# Patient Record
Sex: FEMALE | Race: WHITE | Employment: OTHER | URBAN - NONMETROPOLITAN AREA
[De-identification: names, ages, dates, MRNs, and addresses within clinical notes are randomized per-mention and may not be internally consistent; named-entity substitution may affect disease eponyms.]

---

## 2020-07-31 ENCOUNTER — HOSPITAL ENCOUNTER (EMERGENCY)
Age: 60
Discharge: HOME | End: 2020-07-31
Attending: EMERGENCY MEDICINE
Payer: MEDICARE

## 2020-07-31 ENCOUNTER — APPOINTMENT (OUTPATIENT)
Dept: CT IMAGING | Age: 60
End: 2020-07-31
Payer: MEDICARE

## 2020-07-31 VITALS
OXYGEN SATURATION: 99 % | HEIGHT: 65 IN | TEMPERATURE: 98.8 F | WEIGHT: 155 LBS | DIASTOLIC BLOOD PRESSURE: 73 MMHG | BODY MASS INDEX: 25.83 KG/M2 | SYSTOLIC BLOOD PRESSURE: 135 MMHG | RESPIRATION RATE: 14 BRPM | HEART RATE: 54 BPM

## 2020-07-31 LAB
ALBUMIN SERPL-MCNC: 4 G/DL (ref 3.5–5.1)
ALP BLD-CCNC: 68 U/L (ref 38–126)
ALT SERPL-CCNC: 13 U/L (ref 11–66)
ANION GAP SERPL CALCULATED.3IONS-SCNC: 10 MEQ/L (ref 8–16)
AST SERPL-CCNC: 18 U/L (ref 5–40)
BASOPHILS # BLD: 1 %
BASOPHILS ABSOLUTE: 0.1 THOU/MM3 (ref 0–0.1)
BILIRUB SERPL-MCNC: 0.2 MG/DL (ref 0.3–1.2)
BILIRUBIN DIRECT: < 0.2 MG/DL (ref 0–0.3)
BUN BLDV-MCNC: 11 MG/DL (ref 7–22)
CALCIUM SERPL-MCNC: 9 MG/DL (ref 8.5–10.5)
CHLORIDE BLD-SCNC: 102 MEQ/L (ref 98–111)
CO2: 26 MEQ/L (ref 23–33)
CREAT SERPL-MCNC: 0.7 MG/DL (ref 0.4–1.2)
EKG ATRIAL RATE: 65 BPM
EKG P AXIS: 61 DEGREES
EKG P-R INTERVAL: 172 MS
EKG Q-T INTERVAL: 426 MS
EKG QRS DURATION: 82 MS
EKG QTC CALCULATION (BAZETT): 443 MS
EKG R AXIS: 2 DEGREES
EKG T AXIS: 66 DEGREES
EKG VENTRICULAR RATE: 65 BPM
EOSINOPHIL # BLD: 1.2 %
EOSINOPHILS ABSOLUTE: 0.1 THOU/MM3 (ref 0–0.4)
ERYTHROCYTE [DISTWIDTH] IN BLOOD BY AUTOMATED COUNT: 12.7 % (ref 11.5–14.5)
ERYTHROCYTE [DISTWIDTH] IN BLOOD BY AUTOMATED COUNT: 41.7 FL (ref 35–45)
GFR SERPL CREATININE-BSD FRML MDRD: 86 ML/MIN/1.73M2
GLUCOSE BLD-MCNC: 98 MG/DL (ref 70–108)
HCT VFR BLD CALC: 37.9 % (ref 37–47)
HEMOGLOBIN: 12.4 GM/DL (ref 12–16)
IMMATURE GRANS (ABS): 0.01 THOU/MM3 (ref 0–0.07)
IMMATURE GRANULOCYTES: 0.2 %
LIPASE: 29.4 U/L (ref 5.6–51.3)
LYMPHOCYTES # BLD: 28.1 %
LYMPHOCYTES ABSOLUTE: 1.6 THOU/MM3 (ref 1–4.8)
MAGNESIUM: 1.9 MG/DL (ref 1.6–2.4)
MCH RBC QN AUTO: 29.9 PG (ref 26–33)
MCHC RBC AUTO-ENTMCNC: 32.7 GM/DL (ref 32.2–35.5)
MCV RBC AUTO: 91.3 FL (ref 81–99)
MONOCYTES # BLD: 5.3 %
MONOCYTES ABSOLUTE: 0.3 THOU/MM3 (ref 0.4–1.3)
NUCLEATED RED BLOOD CELLS: 0 /100 WBC
OSMOLALITY CALCULATION: 275.1 MOSMOL/KG (ref 275–300)
PLATELET # BLD: 254 THOU/MM3 (ref 130–400)
PMV BLD AUTO: 10.7 FL (ref 9.4–12.4)
POTASSIUM SERPL-SCNC: 3.5 MEQ/L (ref 3.5–5.2)
RBC # BLD: 4.15 MILL/MM3 (ref 4.2–5.4)
SEG NEUTROPHILS: 64.2 %
SEGMENTED NEUTROPHILS ABSOLUTE COUNT: 3.7 THOU/MM3 (ref 1.8–7.7)
SODIUM BLD-SCNC: 138 MEQ/L (ref 135–145)
TOTAL PROTEIN: 6.2 G/DL (ref 6.1–8)
TROPONIN T: < 0.01 NG/ML
WBC # BLD: 5.8 THOU/MM3 (ref 4.8–10.8)

## 2020-07-31 PROCEDURE — 85025 COMPLETE CBC W/AUTO DIFF WBC: CPT

## 2020-07-31 PROCEDURE — 82248 BILIRUBIN DIRECT: CPT

## 2020-07-31 PROCEDURE — 83690 ASSAY OF LIPASE: CPT

## 2020-07-31 PROCEDURE — 96375 TX/PRO/DX INJ NEW DRUG ADDON: CPT

## 2020-07-31 PROCEDURE — 6360000002 HC RX W HCPCS: Performed by: EMERGENCY MEDICINE

## 2020-07-31 PROCEDURE — 80053 COMPREHEN METABOLIC PANEL: CPT

## 2020-07-31 PROCEDURE — 96374 THER/PROPH/DIAG INJ IV PUSH: CPT

## 2020-07-31 PROCEDURE — 6360000004 HC RX CONTRAST MEDICATION: Performed by: EMERGENCY MEDICINE

## 2020-07-31 PROCEDURE — 6370000000 HC RX 637 (ALT 250 FOR IP)

## 2020-07-31 PROCEDURE — 93005 ELECTROCARDIOGRAM TRACING: CPT | Performed by: EMERGENCY MEDICINE

## 2020-07-31 PROCEDURE — 74177 CT ABD & PELVIS W/CONTRAST: CPT

## 2020-07-31 PROCEDURE — 84484 ASSAY OF TROPONIN QUANT: CPT

## 2020-07-31 PROCEDURE — 99283 EMERGENCY DEPT VISIT LOW MDM: CPT

## 2020-07-31 PROCEDURE — 83735 ASSAY OF MAGNESIUM: CPT

## 2020-07-31 PROCEDURE — 36415 COLL VENOUS BLD VENIPUNCTURE: CPT

## 2020-07-31 RX ORDER — BUTALBITAL, ACETAMINOPHEN AND CAFFEINE 50; 325; 40 MG/1; MG/1; MG/1
1 TABLET ORAL EVERY 4 HOURS PRN
COMMUNITY
End: 2021-06-09 | Stop reason: SDUPTHER

## 2020-07-31 RX ORDER — ATORVASTATIN CALCIUM 20 MG/1
40 TABLET, FILM COATED ORAL DAILY
COMMUNITY
End: 2021-04-15

## 2020-07-31 RX ORDER — MECLIZINE HCL 12.5 MG/1
TABLET ORAL
Qty: 24 TABLET | Refills: 0 | Status: SHIPPED | OUTPATIENT
Start: 2020-07-31 | End: 2021-03-10

## 2020-07-31 RX ORDER — BUPROPION HYDROCHLORIDE 300 MG/1
300 TABLET ORAL EVERY MORNING
COMMUNITY
End: 2021-02-17 | Stop reason: SDUPTHER

## 2020-07-31 RX ORDER — ONDANSETRON 2 MG/ML
4 INJECTION INTRAMUSCULAR; INTRAVENOUS ONCE
Status: COMPLETED | OUTPATIENT
Start: 2020-07-31 | End: 2020-07-31

## 2020-07-31 RX ORDER — TOPIRAMATE 100 MG/1
100 TABLET, FILM COATED ORAL 2 TIMES DAILY
COMMUNITY
End: 2021-01-20 | Stop reason: SDUPTHER

## 2020-07-31 RX ORDER — NITROGLYCERIN 0.6 MG/1
0.6 TABLET SUBLINGUAL EVERY 5 MIN PRN
COMMUNITY
End: 2021-09-07 | Stop reason: SDUPTHER

## 2020-07-31 RX ORDER — FENTANYL CITRATE 50 UG/ML
25 INJECTION, SOLUTION INTRAMUSCULAR; INTRAVENOUS ONCE
Status: COMPLETED | OUTPATIENT
Start: 2020-07-31 | End: 2020-07-31

## 2020-07-31 RX ORDER — PANTOPRAZOLE SODIUM 40 MG/1
40 TABLET, DELAYED RELEASE ORAL 2 TIMES DAILY
COMMUNITY
End: 2021-05-17 | Stop reason: SINTOL

## 2020-07-31 RX ORDER — LORAZEPAM 0.5 MG/1
0.5 TABLET ORAL EVERY 6 HOURS PRN
COMMUNITY
End: 2021-02-17 | Stop reason: SDUPTHER

## 2020-07-31 RX ORDER — TRAZODONE HYDROCHLORIDE 150 MG/1
225 TABLET ORAL NIGHTLY
COMMUNITY
End: 2021-02-17 | Stop reason: SDUPTHER

## 2020-07-31 RX ORDER — MECLIZINE HYDROCHLORIDE CHEWABLE TABLETS 25 MG/1
TABLET, CHEWABLE ORAL
Status: COMPLETED
Start: 2020-07-31 | End: 2020-07-31

## 2020-07-31 RX ORDER — HYDROXYCHLOROQUINE SULFATE 200 MG/1
200 TABLET, FILM COATED ORAL 2 TIMES DAILY
COMMUNITY

## 2020-07-31 RX ORDER — AMOXICILLIN AND CLAVULANATE POTASSIUM 875; 125 MG/1; MG/1
1 TABLET, FILM COATED ORAL 2 TIMES DAILY
Qty: 20 TABLET | Refills: 0 | Status: SHIPPED | OUTPATIENT
Start: 2020-07-31 | End: 2020-08-10

## 2020-07-31 RX ORDER — ONDANSETRON 4 MG/1
4 TABLET, FILM COATED ORAL 3 TIMES DAILY PRN
Qty: 15 TABLET | Refills: 0 | Status: SHIPPED | OUTPATIENT
Start: 2020-07-31 | End: 2021-10-15 | Stop reason: SDUPTHER

## 2020-07-31 RX ADMIN — ONDANSETRON 4 MG: 2 INJECTION INTRAMUSCULAR; INTRAVENOUS at 16:27

## 2020-07-31 RX ADMIN — IOPAMIDOL 80 ML: 755 INJECTION, SOLUTION INTRAVENOUS at 17:08

## 2020-07-31 RX ADMIN — FENTANYL CITRATE 25 MCG: 50 INJECTION, SOLUTION INTRAMUSCULAR; INTRAVENOUS at 16:27

## 2020-07-31 RX ADMIN — MECLIZINE HCL 25 MG: 25 TABLET, CHEWABLE ORAL at 17:12

## 2020-07-31 ASSESSMENT — PAIN DESCRIPTION - LOCATION: LOCATION: ABDOMEN

## 2020-07-31 ASSESSMENT — ENCOUNTER SYMPTOMS
BLOOD IN STOOL: 0
CHEST TIGHTNESS: 0
EYE PAIN: 0
NAUSEA: 1
ABDOMINAL PAIN: 1
SINUS PAIN: 0
RECTAL PAIN: 0
DIARRHEA: 1
VOMITING: 1
BACK PAIN: 0
SHORTNESS OF BREATH: 0
CONSTIPATION: 0
COUGH: 0
SINUS PRESSURE: 0

## 2020-07-31 ASSESSMENT — PAIN SCALES - GENERAL
PAINLEVEL_OUTOF10: 6
PAINLEVEL_OUTOF10: 5
PAINLEVEL_OUTOF10: 6

## 2020-07-31 ASSESSMENT — PAIN DESCRIPTION - ORIENTATION: ORIENTATION: LEFT;LOWER

## 2020-07-31 NOTE — ED PROVIDER NOTES
Attending Physician Note:    Patient was seen and manage initially by Dr. Noemi Hurtado. Patient was signed out to me because of pending CT scan    I personally saw and examined the patient. I have reviewed and agree with the Dr Noemi Hurtado findings, including all diagnostic interpretations and treatment plans as written. I was present for the key portion of any procedures performed and the inclusive time noted in any critical care statement. Patient presented to the emergency room due to abdominal pain mostly left-sided. Patient had a history of diverticulitis. Patient has been also having some dizziness especially when flexing the head and been vomiting. Patient had recent surgery for a sinus. Denies any fever, no chills, no shortness of breath or chest pain    Physical examination showed normocephalic tympanic membranes normal lungs clear to auscultation, heart regular rate and rhythm, abdomen is flat soft depressible tenderness mildly in the left lower quadrant back showed no CVA tenderness    Evaluation: Agree above , seen the patient and discussed the diagnosis and treatment plans     FINAL IMPRESSION       1. Left sided abdominal pain, clinical diverticulitis    2. Abdominal pain, unspecified abdominal location    3.  Non-intractable vomiting with nausea, unspecified vomiting type            DISPOSITION/PLAN  PATIENT REFERRED TO:  Family physician    Schedule an appointment as soon as possible for a visit in 3 days      [facility] [address] [address] EMERGENCY DEPT  [address]    As needed    DISCHARGE MEDICATIONS:  New Prescriptions    AMOXICILLIN-CLAVULANATE (AUGMENTIN) 875-125 MG PER TABLET    Take 1 tablet by mouth 2 times daily for 10 days    MECLIZINE (ANTIVERT) 12.5 MG TABLET    1 to 2 tablets every 6 hours for dizziness as needed    ONDANSETRON (ZOFRAN) 4 MG TABLET    Take 1 tablet by mouth 3 times daily as needed for Nausea or Vomiting       (Please note that portions of this note were completed with a voice recognition program and electronically transcribed. Efforts were [facility] edit the dictations but occasionally words are mis-transcribed . The transcription may contain errors not detected in proofreading.   This transcription was electronically signed.)      Mikel Moncada MD      Emergency room physician       Mikel Moncada MD  07/31/20 8212

## 2020-07-31 NOTE — ED NOTES
Pt called out stating that she is still having discomfort in her LLQ. Dr. Lashawn Medrano notified and pt would like to be updated.      Sharlot Rubinstein, RN  07/31/20 2466

## 2020-07-31 NOTE — ED NOTES
ED nurse-to-nurse bedside report    Chief Complaint   Patient presents with    Dizziness    Nausea      LOC: alert and orientated to name, place, date  Vital signs   Vitals:    07/31/20 1511 07/31/20 1633   BP: (!) 153/82 (!) 140/92   Pulse: 68 64   Resp: 18 19   Temp: 98.8 °F (37.1 °C)    TempSrc: Oral    SpO2: 100% 100%   Weight: 155 lb (70.3 kg)    Height: 5' 5\" (1.651 m)       Pain:    Pain Interventions: fentanyl 25 mcg  Pain Goal: 3  Oxygen: No    Current needs required none   Telemetry: Yes  LDAs:   Peripheral IV 07/31/20 Right Antecubital (Active)   Site Assessment Dry;Clean; Intact 07/31/20 1529   Line Status Blood return noted;Brisk blood return 07/31/20 1529   Dressing Status Clean;Dry; Intact 07/31/20 1529     Continuous Infusions:   Mobility: Independent  Real Fall Risk Score: No flowsheet data found.   Fall Interventions: none  Report given to: Luab Somers RN  07/31/20 9926

## 2020-07-31 NOTE — ED TRIAGE NOTES
Presents to ED via private car states that she hasn't felt well for a week complains of lower left abdominal pain, States that she has had some episodes of diarrhea. States that today she experienced dizziness along with nausea and thought that she was going to pass out.  Patient is alert and oriented to person place and time EKG completed and IV started

## 2020-07-31 NOTE — ED PROVIDER NOTES
history on file. MEDICATIONS   No current facility-administered medications for this encounter. Current Outpatient Medications:     buPROPion (WELLBUTRIN XL) 300 MG extended release tablet, Take 300 mg by mouth every morning, Disp: , Rfl:     pantoprazole (PROTONIX) 40 MG tablet, Take 40 mg by mouth daily, Disp: , Rfl:     hydroxychloroquine (PLAQUENIL) 200 MG tablet, Take 40 mg by mouth daily, Disp: , Rfl:     tiotropium (SPIRIVA) 18 MCG inhalation capsule, Inhale 18 mcg into the lungs daily, Disp: , Rfl:     Erenumab-aooe (AIMOVIG, 140 MG DOSE, SC), Inject 1 Dose into the skin Monthly injection, Disp: , Rfl:     traZODone (DESYREL) 150 MG tablet, Take 225 mg by mouth nightly, Disp: , Rfl:     topiramate (TOPAMAX) 100 MG tablet, Take 100 mg by mouth 2 times daily, Disp: , Rfl:     LORazepam (ATIVAN) 0.5 MG tablet, Take 0.5 mg by mouth every 6 hours as needed for Anxiety. , Disp: , Rfl:     butalbital-acetaminophen-caffeine (FIORICET, ESGIC) -40 MG per tablet, Take 1 tablet by mouth every 4 hours as needed for Headaches, Disp: , Rfl:     nitroglycerin (NITROSTAT) 0.6 MG SL tablet, Place 0.6 mg under the tongue every 5 minutes as needed for Chest pain up to max of 3 total doses. If no relief after 1 dose, call 911., Disp: , Rfl:     atorvastatin (LIPITOR) 20 MG tablet, Take 20 mg by mouth daily, Disp: , Rfl:       SOCIAL HISTORY     Social History     Social History Narrative    Not on file     Social History     Tobacco Use    Smoking status: Current Every Day Smoker     Packs/day: 0.50     Types: Cigarettes   Substance Use Topics    Alcohol use: Not on file    Drug use: Not on file         ALLERGIES     Allergies   Allergen Reactions    Morphine     Sulfa Antibiotics          FAMILY HISTORY   No family history on file. PREVIOUS RECORDS   Previous records reviewed: This is this patient's first visit to [facility] ED, no previous records available on EMR. Tonye Cogan        PHYSICAL EXAM     ED Triage Vitals [07/31/20 1511]   BP Temp Temp Source Pulse Resp SpO2 Height Weight   (!) 153/82 98.8 °F (37.1 °C) Oral 68 18 100 % 5' 5\" (1.651 m) 155 lb (70.3 kg)     Initial vital signs and nursing assessment reviewed and abnormal from Systolic hypertension. Pulsoximetry is normal per my interpretation. Additional Vital Signs:  Vitals:    07/31/20 1511   BP: (!) 153/82   Pulse: 68   Resp: 18   Temp: 98.8 °F (37.1 °C)   SpO2: 100%       Physical Exam  Vitals signs and nursing note reviewed. Constitutional:       General: She is not in acute distress. Appearance: Normal appearance. She is well-developed. HENT:      Head: Normocephalic and atraumatic. Right Ear: External ear normal.      Left Ear: External ear normal.      Nose: Nose normal.      Mouth/Throat:      Mouth: Mucous membranes are dry. Eyes:      Conjunctiva/sclera: Conjunctivae normal.      Pupils: Pupils are equal, round, and reactive to light. Neck:      Musculoskeletal: Neck supple. Vascular: No JVD. Cardiovascular:      Rate and Rhythm: Normal rate and regular rhythm. Heart sounds: Normal heart sounds. No murmur. No friction rub. No gallop. Pulmonary:      Effort: Pulmonary effort is normal.      Breath sounds: Normal breath sounds. No stridor. No wheezing or rales. Abdominal:      General: Abdomen is flat. There is no distension ([place] on RLQ). Palpations: Abdomen is soft. There is no mass. Tenderness: There is abdominal tenderness. There is no guarding or rebound. Musculoskeletal:      Right lower leg: No edema. Left lower leg: No edema. Skin:     General: Skin is warm and dry. Neurological:      Mental Status: She is alert and oriented to person, place, and time. Psychiatric:         Behavior: Behavior normal.             MEDICAL DECISION MAKING   Initial Assessment: Nausea, vomiting, diarrhea, possible diverticulitis, possible traveler's diarrhea.   Plan: IV line, labs, imaging, analgesia, antiemetic, observation. ED RESULTS   Laboratory results:  Labs Reviewed   CBC WITH AUTO DIFFERENTIAL - Abnormal; Notable for the following components:       Result Value    RBC 4.15 (*)     Monocytes Absolute 0.3 (*)     All other components within normal limits   HEPATIC FUNCTION PANEL - Abnormal; Notable for the following components: Total Bilirubin 0.2 (*)     All other components within normal limits   GLOMERULAR FILTRATION RATE, ESTIMATED - Abnormal; Notable for the following components:    Est, Glom Filt Rate 86 (*)     All other components within normal limits   BASIC METABOLIC PANEL   LIPASE   TROPONIN   MAGNESIUM   ANION GAP   OSMOLALITY       Radiologic studies results:  CT ABDOMEN PELVIS W IV CONTRAST Additional Contrast? None    (Results Pending)       ED Medications administered this visit:   Medications   fentaNYL (SUBLIMAZE) injection 25 mcg (25 mcg Intravenous Given 7/31/20 1627)   ondansetron (ZOFRAN) injection 4 mg (4 mg Intravenous Given 7/31/20 1627)   iopamidol (ISOVUE-370) 76 % injection 80 mL (80 mLs Intravenous Given 7/31/20 1708)   meclizine (ANTIVERT) 25 MG chewable tablet (25 mg  Given 7/31/20 1712)         MEDICATION CHANGES     New Prescriptions    No medications on file         FINAL DISPOSITION     Final diagnoses:   Abdominal pain, unspecified abdominal location   Non-intractable vomiting with nausea, unspecified vomiting type     Condition: condition: good  Dispo: Transfer of care to Dr. Vivienne Aggarwal, pending CT scan for reevaluation and disposition. This transcription was electronically signed. It was dictated by use of voice recognition software and electronically transcribed. The transcription may contain errors not detected in proofreading.         Beverly Stoddard MD  07/31/20 2368

## 2020-07-31 NOTE — ED NOTES
Pt back to room, had no problems or complaints. Placed back on monitor and revitaled.      Teri Olguin, EMT-P  07/31/20 0118

## 2020-09-16 ENCOUNTER — HOSPITAL ENCOUNTER (OUTPATIENT)
Age: 60
Setting detail: SPECIMEN
Discharge: HOME | End: 2020-09-16
Payer: MEDICARE

## 2020-09-16 LAB
ALBUMIN SERPL-MCNC: 4.6 G/DL (ref 3.5–5.2)
ALBUMIN/GLOBULIN RATIO: 2.4 (ref 1–2.5)
ALP BLD-CCNC: 82 U/L (ref 35–104)
ALT SERPL-CCNC: 15 U/L (ref 5–33)
ANION GAP SERPL CALCULATED.3IONS-SCNC: 14 MMOL/L (ref 9–17)
AST SERPL-CCNC: 17 U/L
BILIRUB SERPL-MCNC: 0.25 MG/DL (ref 0.3–1.2)
BUN BLDV-MCNC: 12 MG/DL (ref 6–20)
BUN/CREAT BLD: ABNORMAL (ref 9–20)
C-REACTIVE PROTEIN: 1.7 MG/L (ref 0–5)
CALCIUM SERPL-MCNC: 9.5 MG/DL (ref 8.6–10.4)
CHLORIDE BLD-SCNC: 106 MMOL/L (ref 98–107)
CHOLESTEROL/HDL RATIO: 4.4
CHOLESTEROL: 284 MG/DL
CO2: 21 MMOL/L (ref 20–31)
CREAT SERPL-MCNC: 0.72 MG/DL (ref 0.5–0.9)
GFR AFRICAN AMERICAN: >60 ML/MIN
GFR NON-AFRICAN AMERICAN: >60 ML/MIN
GFR SERPL CREATININE-BSD FRML MDRD: ABNORMAL ML/MIN/{1.73_M2}
GFR SERPL CREATININE-BSD FRML MDRD: ABNORMAL ML/MIN/{1.73_M2}
GLUCOSE BLD-MCNC: 85 MG/DL (ref 70–99)
HDLC SERPL-MCNC: 64 MG/DL
HEPATITIS C ANTIBODY: NONREACTIVE
HIGH SENSITIVE C-REACTIVE PROTEIN: 1.4 MG/L
LDL CHOLESTEROL: 193 MG/DL (ref 0–130)
POTASSIUM SERPL-SCNC: 3.9 MMOL/L (ref 3.7–5.3)
RHEUMATOID FACTOR: <10 IU/ML
SODIUM BLD-SCNC: 141 MMOL/L (ref 135–144)
TOTAL PROTEIN: 6.5 G/DL (ref 6.4–8.3)
TRIGL SERPL-MCNC: 135 MG/DL
TSH SERPL DL<=0.05 MIU/L-ACNC: 0.9 MIU/L (ref 0.3–5)
URIC ACID: 4.2 MG/DL (ref 2.4–5.7)
VLDLC SERPL CALC-MCNC: ABNORMAL MG/DL (ref 1–30)

## 2020-09-17 LAB
ANTI-NUCLEAR ANTIBODY (ANA): NEGATIVE
CCP IGG ANTIBODIES: <1.5 U/ML

## 2020-09-22 ENCOUNTER — HOSPITAL ENCOUNTER (OUTPATIENT)
Age: 60
Discharge: HOME | End: 2020-09-22
Payer: MEDICARE

## 2020-09-22 ENCOUNTER — HOSPITAL ENCOUNTER (OUTPATIENT)
Dept: GENERAL RADIOLOGY | Age: 60
Discharge: HOME | End: 2020-09-22
Payer: MEDICARE

## 2020-09-22 ENCOUNTER — HOSPITAL ENCOUNTER (OUTPATIENT)
Age: 60
Setting detail: SPECIMEN
Discharge: HOME | End: 2020-09-22
Payer: COMMERCIAL

## 2020-09-22 LAB
ABSOLUTE EOS #: 0.08 K/UL (ref 0–0.44)
ABSOLUTE IMMATURE GRANULOCYTE: <0.03 K/UL (ref 0–0.3)
ABSOLUTE LYMPH #: 2.38 K/UL (ref 1.1–3.7)
ABSOLUTE MONO #: 0.35 K/UL (ref 0.1–1.2)
BASOPHILS # BLD: 1 % (ref 0–2)
BASOPHILS ABSOLUTE: 0.07 K/UL (ref 0–0.2)
DIFFERENTIAL TYPE: NORMAL
EOSINOPHILS RELATIVE PERCENT: 1 % (ref 1–4)
HCT VFR BLD CALC: 38.5 % (ref 36.3–47.1)
HEMOGLOBIN: 12.2 G/DL (ref 11.9–15.1)
IMMATURE GRANULOCYTES: 0 %
LYMPHOCYTES # BLD: 34 % (ref 24–43)
MCH RBC QN AUTO: 29 PG (ref 25.2–33.5)
MCHC RBC AUTO-ENTMCNC: 31.7 G/DL (ref 28.4–34.8)
MCV RBC AUTO: 91.4 FL (ref 82.6–102.9)
MONOCYTES # BLD: 5 % (ref 3–12)
NRBC AUTOMATED: 0 PER 100 WBC
PDW BLD-RTO: 12.7 % (ref 11.8–14.4)
PLATELET # BLD: 284 K/UL (ref 138–453)
PLATELET ESTIMATE: NORMAL
PMV BLD AUTO: 12.6 FL (ref 8.1–13.5)
RBC # BLD: 4.21 M/UL (ref 3.95–5.11)
RBC # BLD: NORMAL 10*6/UL
SEDIMENTATION RATE, ERYTHROCYTE: 9 MM (ref 0–30)
SEG NEUTROPHILS: 59 % (ref 36–65)
SEGMENTED NEUTROPHILS ABSOLUTE COUNT: 4.06 K/UL (ref 1.5–8.1)
WBC # BLD: 7 K/UL (ref 3.5–11.3)
WBC # BLD: NORMAL 10*3/UL

## 2020-09-22 PROCEDURE — 72100 X-RAY EXAM L-S SPINE 2/3 VWS: CPT

## 2020-09-22 PROCEDURE — 73562 X-RAY EXAM OF KNEE 3: CPT

## 2020-09-22 PROCEDURE — 72040 X-RAY EXAM NECK SPINE 2-3 VW: CPT

## 2020-09-22 PROCEDURE — 72072 X-RAY EXAM THORAC SPINE 3VWS: CPT

## 2020-09-22 PROCEDURE — 73030 X-RAY EXAM OF SHOULDER: CPT

## 2020-09-29 ENCOUNTER — HOSPITAL ENCOUNTER (OUTPATIENT)
Dept: WOMENS IMAGING | Age: 60
Discharge: HOME | End: 2020-09-29

## 2020-10-05 ENCOUNTER — HOSPITAL ENCOUNTER (OUTPATIENT)
Dept: CT IMAGING | Age: 60
Discharge: HOME | End: 2020-10-05
Payer: MEDICARE

## 2020-10-05 ENCOUNTER — HOSPITAL ENCOUNTER (OUTPATIENT)
Dept: MRI IMAGING | Age: 60
Discharge: HOME | End: 2020-10-05
Payer: MEDICARE

## 2020-10-05 ENCOUNTER — HOSPITAL ENCOUNTER (OUTPATIENT)
Dept: MRI IMAGING | Age: 60
Discharge: HOME | End: 2020-10-05

## 2020-10-05 ENCOUNTER — HOSPITAL ENCOUNTER (OUTPATIENT)
Dept: CT IMAGING | Age: 60
Discharge: HOME | End: 2020-10-05

## 2020-10-05 PROCEDURE — 70551 MRI BRAIN STEM W/O DYE: CPT

## 2020-10-05 PROCEDURE — 72125 CT NECK SPINE W/O DYE: CPT

## 2020-10-05 PROCEDURE — 3209999900 MRI COMPARISON OF OUTSIDE FILMS

## 2020-10-05 PROCEDURE — 3209999900 CT COMPARISON OF OUTSIDE FILMS

## 2020-10-07 ENCOUNTER — HOSPITAL ENCOUNTER (OUTPATIENT)
Dept: GENERAL RADIOLOGY | Age: 60
Discharge: HOME | End: 2020-10-07
Payer: MEDICARE

## 2020-10-07 ENCOUNTER — HOSPITAL ENCOUNTER (OUTPATIENT)
Age: 60
Discharge: HOME | End: 2020-10-07
Payer: MEDICARE

## 2020-10-07 PROCEDURE — 71046 X-RAY EXAM CHEST 2 VIEWS: CPT

## 2020-10-12 ENCOUNTER — HOSPITAL ENCOUNTER (OUTPATIENT)
Dept: WOMENS IMAGING | Age: 60
Discharge: HOME | End: 2020-10-12
Payer: MEDICARE

## 2020-10-12 PROCEDURE — 76642 ULTRASOUND BREAST LIMITED: CPT

## 2020-10-12 PROCEDURE — 77066 DX MAMMO INCL CAD BI: CPT

## 2020-11-22 ENCOUNTER — HOSPITAL ENCOUNTER (EMERGENCY)
Age: 60
Discharge: HOME | End: 2020-11-22
Payer: MEDICARE

## 2020-11-22 VITALS
WEIGHT: 160 LBS | OXYGEN SATURATION: 99 % | HEIGHT: 66 IN | TEMPERATURE: 97.5 F | RESPIRATION RATE: 16 BRPM | SYSTOLIC BLOOD PRESSURE: 134 MMHG | BODY MASS INDEX: 25.71 KG/M2 | HEART RATE: 68 BPM | DIASTOLIC BLOOD PRESSURE: 63 MMHG

## 2020-11-22 LAB
BILIRUBIN URINE: NEGATIVE
BLOOD, URINE: NEGATIVE
CHARACTER, URINE: CLEAR
COLOR: YELLOW
GLUCOSE URINE: NEGATIVE MG/DL
KETONES, URINE: NEGATIVE
LEUKOCYTE ESTERASE, URINE: ABNORMAL
NITRITE, URINE: NEGATIVE
PH UA: 5.5 (ref 5–9)
PROTEIN UA: NEGATIVE MG/DL
SPECIFIC GRAVITY UA: 1.01 (ref 1–1.03)
UROBILINOGEN, URINE: 0.2 EU/DL (ref 0.2–1)

## 2020-11-22 PROCEDURE — 81003 URINALYSIS AUTO W/O SCOPE: CPT

## 2020-11-22 PROCEDURE — 99213 OFFICE O/P EST LOW 20 MIN: CPT

## 2020-11-22 PROCEDURE — 99214 OFFICE O/P EST MOD 30 MIN: CPT | Performed by: NURSE PRACTITIONER

## 2020-11-22 PROCEDURE — 87086 URINE CULTURE/COLONY COUNT: CPT

## 2020-11-22 RX ORDER — NITROFURANTOIN 25; 75 MG/1; MG/1
100 CAPSULE ORAL 2 TIMES DAILY
Qty: 10 CAPSULE | Refills: 0 | Status: SHIPPED | OUTPATIENT
Start: 2020-11-22 | End: 2020-11-27

## 2020-11-22 ASSESSMENT — PAIN DESCRIPTION - DESCRIPTORS: DESCRIPTORS: ACHING

## 2020-11-22 ASSESSMENT — ENCOUNTER SYMPTOMS
EYE ITCHING: 0
ABDOMINAL PAIN: 0
COUGH: 0
VOMITING: 0
EYE REDNESS: 0
SHORTNESS OF BREATH: 0
DIARRHEA: 0
BACK PAIN: 1
NAUSEA: 0

## 2020-11-22 ASSESSMENT — PAIN DESCRIPTION - ORIENTATION: ORIENTATION: LOWER

## 2020-11-22 ASSESSMENT — PAIN DESCRIPTION - LOCATION: LOCATION: BACK

## 2020-11-22 NOTE — ED NOTES
Presents with c/o burning with urination, low back pain, foul smelling urine x 4 days.      Dewey Garcia RN  11/22/20 4396

## 2020-11-22 NOTE — ED PROVIDER NOTES
[id number] Encounter      CHIEFCOMPLAINT       Chief Complaint   Patient presents with    Urinary Tract Infection       Nurses Notes reviewed and I agree except as noted in the HPI. HISTORY OF PRESENT ILLNESS   Ranjan Leonardo is a 62 y.o. female who presents for evaluation of UTI that started 4 days ago. REVIEW OF SYSTEMS     Review of Systems   Constitutional: Positive for chills. Negative for fatigue and fever. Eyes: Negative for redness and itching. Respiratory: Negative for cough and shortness of breath. Cardiovascular: Negative for chest pain. Gastrointestinal: Negative for abdominal pain, diarrhea, nausea and vomiting. Genitourinary: Positive for dysuria (feels like peeing out razor blades), frequency, hematuria and urgency. Musculoskeletal: Positive for back pain (chronic) and neck pain (chronic awaiting surgery). Negative for joint swelling and myalgias. Skin: Negative for rash. Allergic/Immunologic: Negative for environmental allergies and food allergies. Neurological: Negative for dizziness and headaches. Hematological: Negative for adenopathy. PAST MEDICAL HISTORY         Diagnosis Date    Adrenal abnormality ([facility] [address]     [facility])     Arthritis     Asthma     Cerebral artery occlusion with cerebral infarction ([facility] [address]     Headache     Hyperlipidemia        SURGICAL HISTORY     Patient  has a past surgical history that includes Hysterectomy; Tubal ligation; shoulder surgery; Tonsillectomy; sinus surgery; and Breast biopsy.     CURRENT MEDICATIONS       Discharge Medication List as of 11/22/2020  3:22 PM      CONTINUE these medications which have NOT CHANGED    Details   Rotigotine (NEUPRO TD) Place onto the skinHistorical Med      buPROPion (WELLBUTRIN XL) 300 MG extended release tablet Take 300 mg by mouth every morningHistorical Med      pantoprazole (PROTONIX) 40 MG tablet Take 40 mg by mouth dailyHistorical Med hydroxychloroquine (PLAQUENIL) 200 MG tablet Take 40 mg by mouth dailyHistorical Med      tiotropium (SPIRIVA) 18 MCG inhalation capsule Inhale 18 mcg into the lungs dailyHistorical Med      Erenumab-aooe (AIMOVIG, 140 MG DOSE, SC) Inject 1 Dose into the skin Monthly injectionHistorical Med      traZODone (DESYREL) 150 MG tablet Take 225 mg by mouth nightlyHistorical Med      topiramate (TOPAMAX) 100 MG tablet Take 100 mg by mouth 2 times dailyHistorical Med      LORazepam (ATIVAN) 0.5 MG tablet Take 0.5 mg by mouth every 6 hours as needed for Anxiety. Historical Med      butalbital-acetaminophen-caffeine (FIORICET, ESGIC) -40 MG per tablet Take 1 tablet by mouth every 4 hours as needed for HeadachesHistorical Med      nitroglycerin (NITROSTAT) 0.6 MG SL tablet Place 0.6 mg under the tongue every 5 minutes as needed for Chest pain up to max of 3 total doses. If no relief after 1 dose, call 911. Historical Med      atorvastatin (LIPITOR) 20 MG tablet Take 20 mg by mouth dailyHistorical Med      ondansetron (ZOFRAN) 4 MG tablet Take 1 tablet by mouth 3 times daily as needed for Nausea or Vomiting, Disp-15 tablet,R-0Print      meclizine (ANTIVERT) 12.5 MG tablet 1 to 2 tablets every 6 hours for dizziness as needed, Disp-24 tablet,R-0Print             ALLERGIES     Patient is is allergic to amoxil [amoxicillin]; doxycycline; morphine; and sulfa antibiotics. FAMILY HISTORY     Patient'sfamily history is not on file. SOCIAL HISTORY     Patient  reports that she has been smoking cigarettes. She has never used smokeless tobacco. She reports current alcohol use. She reports that she does not use drugs. PHYSICAL EXAM     ED TRIAGE VITALS  BP: 134/63, Temp: 97.5 °F (36.4 °C), Pulse: 68, Resp: 16, SpO2: 99 %  Physical Exam  Vitals signs and nursing note reviewed. Constitutional:       General: She is not in acute distress. Appearance: Normal appearance. She is well-developed.    HENT:      Head:

## 2020-11-24 LAB
ORGANISM: ABNORMAL
URINE CULTURE, ROUTINE: ABNORMAL

## 2021-01-18 ENCOUNTER — HOSPITAL ENCOUNTER (OUTPATIENT)
Age: 61
Setting detail: SPECIMEN
Discharge: HOME | End: 2021-01-18
Payer: MEDICARE

## 2021-01-19 LAB
CULTURE: NORMAL
Lab: NORMAL
SPECIMEN DESCRIPTION: NORMAL

## 2021-01-20 ENCOUNTER — VIRTUAL VISIT (OUTPATIENT)
Dept: NEUROLOGY | Age: 61
End: 2021-01-20
Payer: MEDICARE

## 2021-01-20 DIAGNOSIS — G43.109 MIGRAINE WITH AURA AND WITHOUT STATUS MIGRAINOSUS, NOT INTRACTABLE: Primary | ICD-10-CM

## 2021-01-20 PROCEDURE — G8428 CUR MEDS NOT DOCUMENT: HCPCS | Performed by: PSYCHIATRY & NEUROLOGY

## 2021-01-20 PROCEDURE — 3017F COLORECTAL CA SCREEN DOC REV: CPT | Performed by: PSYCHIATRY & NEUROLOGY

## 2021-01-20 PROCEDURE — 99204 OFFICE O/P NEW MOD 45 MIN: CPT | Performed by: PSYCHIATRY & NEUROLOGY

## 2021-01-20 RX ORDER — TOPIRAMATE 100 MG/1
100 TABLET, FILM COATED ORAL 2 TIMES DAILY
Qty: 60 TABLET | Refills: 3 | Status: SHIPPED | OUTPATIENT
Start: 2021-01-20 | End: 2021-05-04

## 2021-01-20 RX ORDER — ERENUMAB-AOOE 140 MG/ML
140 INJECTION, SOLUTION SUBCUTANEOUS
Qty: 1 PEN | Refills: 3 | Status: SHIPPED | OUTPATIENT
Start: 2021-01-20 | End: 2021-06-09 | Stop reason: SDUPTHER

## 2021-01-20 NOTE — PATIENT INSTRUCTIONS
1. Continue with Aimovig 140 mg/ml injection monthly. Refills given  2. Continue with Topamax at current dose. Refills given   3. Referral to PM&R  for Botox injections  4. Keep headache diary  5. Follow up in 5 months or sooner if needed. 6. Call if any questions or concerns.

## 2021-01-26 NOTE — PROGRESS NOTES
2021    TELEHEALTH EVALUATION -- Audio/Visual (During MIINT-00 public health emergency)    HPI:    Mary Pang (:  1960) has requested an audio/video evaluation for the following concern(s): Headache for for the past 35 years. She recently moved here from [place] and is establishing local care. Location of her pain is starts in her neck and radiates behind her bilateral eyes. Symptoms are severe and constant. Onset is sudden. Modifiers are unknown. She is nauseated and sensitive to light and sound. Frequency is 6-8 headaches a month. Duration of headache can last up to 1 week. She has also had complicated migraine where she can have right sided weakness with severe headaches. She is on aimovig 140 mg monthly as well as Topamax. She has tried fioricet, [place] which have helped some. She has also tired over the counter medications, noretriptyline. Her sleep is poor and interrupted, she wakes up feeling tired from sleep. She does not take daytime naps. She has been told she snores. Family history is significant for headache in her son. She denies any history of head injury with loss of consciousness. MRI brain done 10/5/2020 showed Minimal signal hyperintensity foci in the white matter the brain suggesting chronic small vessel ischemic changes. These foci are stable. No acute findings. History provided by patient. Review of Systems  All systems were reviewed and were negative other than what is mentioned in the HPI    Prior to Visit Medications    Medication Sig Taking?  Authorizing Provider   Rotigotine (NEUPRO TD) Place onto the skin  Historical Provider, MD   buPROPion (WELLBUTRIN XL) 300 MG extended release tablet Take 300 mg by mouth every morning  Historical Provider, MD   pantoprazole (PROTONIX) 40 MG tablet Take 40 mg by mouth daily  Historical Provider, MD   hydroxychloroquine (PLAQUENIL) 200 MG tablet Take 40 mg by mouth daily  Historical Provider, MD   tiotropium (SPIRIVA) 18 MCG inhalation capsule Inhale 18 mcg into the lungs daily  Historical Provider, MD   Erenumab-aooe (AIMOVIG, 140 MG DOSE, SC) Inject 1 Dose into the skin Monthly injection  Historical Provider, MD   traZODone (DESYREL) 150 MG tablet Take 225 mg by mouth nightly  Historical Provider, MD   topiramate (TOPAMAX) 100 MG tablet Take 100 mg by mouth 2 times daily  Historical Provider, MD   LORazepam (ATIVAN) 0.5 MG tablet Take 0.5 mg by mouth every 6 hours as needed for Anxiety. Historical Provider, MD   butalbital-acetaminophen-caffeine (FIORICET, ESGIC) -40 MG per tablet Take 1 tablet by mouth every 4 hours as needed for Headaches  Historical Provider, MD   nitroglycerin (NITROSTAT) 0.6 MG SL tablet Place 0.6 mg under the tongue every 5 minutes as needed for Chest pain up to max of 3 total doses. If no relief after 1 dose, call 911.   Historical Provider, MD   atorvastatin (LIPITOR) 20 MG tablet Take 20 mg by mouth daily  Historical Provider, MD   ondansetron (ZOFRAN) 4 MG tablet Take 1 tablet by mouth 3 times daily as needed for Nausea or Vomiting  Miracle Wang MD   meclizine (ANTIVERT) 12.5 MG tablet 1 to 2 tablets every 6 hours for dizziness as needed  Miracle Lrr, MD       Social History     Tobacco Use    Smoking status: Current Some Day Smoker     Types: Cigarettes    Smokeless tobacco: Never Used   Substance Use Topics    Alcohol use: Yes     Comment: occasionally    Drug use: Never        Past Medical History:   Diagnosis Date    Ad[facility]al abnormality ([facility] [address]     [facility])     Arthritis     Asthma     Cerebral artery occlusion with cerebral infarction ([facility] [address]     Headache     Hyperlipidemia    ,   Past Surgical History:   Procedure Laterality Date    BREAST BIOPSY      HYSTERECTOMY      SHOULDER SURGERY      SINUS SURGERY      TONSILLECTOMY      TUBAL LIGATION     ,   Social History     Tobacco Use    Smoking status: Current Some Day Smoker     Types: Cigarettes    Smokeless tobacco: Never Used   Substance Use Topics    Alcohol use: Yes     Comment: occasionally    Drug use: Never   , No family history on file. PHYSICAL EXAMINATION:  [ INSTRUCTIONS:  \"[x]\" Indicates a positive item  \"[]\" Indicates a negative item  -- DELETE ALL ITEMS NOT EXAMINED]  Vital Signs: (As obtained by patient/caregiver or practitioner observation)    Blood pressure-  Heart rate-    Respiratory rate-    Temperature-  Pulse oximetry-     Constitutional: [x] Appears well-developed and well-nourished [x] No apparent distress      [] Abnormal-   Mental status  [x] Alert and awake  [x] Oriented to person/place/time [x]Able to follow commands      Eyes:  EOM    [x]  Normal  [] Abnormal-  Sclera  [x]  Normal  [] Abnormal -         Discharge [x]  None visible  [] Abnormal -    HENT:   [x] Normocephalic, atraumatic. [] Abnormal   [x] Mouth/Throat: Mucous membranes are moist.     External Ears [x] Normal  [] Abnormal-     Neck: [x] No visualized mass     Pulmonary/Chest: [x] Respiratory effort normal.  [x] No visualized signs of difficulty breathing or respiratory distress        [] Abnormal-      Musculoskeletal:   [] Normal gait with no signs of ataxia         [] Normal range of motion of neck        [x] Abnormal- LROM of neck    Neurological:        [x] No Facial Asymmetry (Cranial nerve 7 motor function) (limited exam to video visit)          [x] No gaze palsy        [] Abnormal-         Skin:        [x] No significant exanthematous lesions or discoloration noted on facial skin         [] Abnormal-            Psychiatric:       [x] Normal Affect [x] No Hallucinations        [] Abnormal-     Other pertinent observable physical exam findings-     ASSESSMENT/PLAN:  1. Migraine with aura and without status migrainosus, not intractable    This is a 52-year-old female who presents with headaches for the past 35 years. She recently moved here from [place] and is establishing local neurologic care.   Her exam is nonfocal.  She has been tried on numerous medications in the past for her headache including Fioricet, Ubrelvy, nortriptyline, and is currently on Aimovig and Topamax. She did undergo an MRI brain done 10/5/2020 that showed no acute findings. We will continue her on her current medications, and refer her to physical medicine and rehab for Botox injections. After detailed discussion with the patient we agreed on the following plan. Plan:  1. Continue with Aimovig 140 mg/ml injection monthly. Refills given  2. Continue with Topamax at current dose. Refills given   3. Referral to PM&R  for Botox injections  4. Keep headache diary  5. Follow up in 5 months or sooner if needed. 6. Call if any questions or concerns. Yamileth Ornelas is a 62 y.o. female being evaluated by a Virtual Visit (video visit) encounter to address concerns as mentioned above. A caregiver was present when appropriate. Due to this being a TeleHealth encounter (During CLITY-38 public health emergency), evaluation of the following organ systems was limited: Vitals/Constitutional/EENT/Resp/CV/GI//MS/Neuro/Skin/Heme-Lymph-Imm. Pursuant to the emergency declaration under the [address] [place] [address] authority and the [employer] Act, this Virtual Visit was conducted with patient's (and/or legal guardian's) consent, to reduce the patient's risk of exposure to COVID-19 and provide necessary medical care. The patient (and/or legal guardian) has also been advised to contact this office for worsening conditions or problems, and seek emergency medical treatment and/or call 911 if deemed necessary. Patient identification was verified at the start of the visit: Yes  Total time spent on this encounter: 55 minutes  Services were provided through a video synchronous discussion virtually to substitute for in-person clinic visit.  Patient and provider were located at their individual homes. Terrance Finley MD on 1/20/2021 at 10:31 AM    An electronic signature was used to authenticate this note.

## 2021-02-17 ENCOUNTER — VIRTUAL VISIT (OUTPATIENT)
Dept: PSYCHIATRY | Age: 61
End: 2021-02-17
Payer: MEDICARE

## 2021-02-17 DIAGNOSIS — F41.9 ANXIETY: ICD-10-CM

## 2021-02-17 DIAGNOSIS — F43.10 PTSD (POST-TRAUMATIC STRESS DISORDER): Primary | ICD-10-CM

## 2021-02-17 DIAGNOSIS — R41.3 MEMORY PROBLEM: ICD-10-CM

## 2021-02-17 DIAGNOSIS — F33.41 RECURRENT MAJOR DEPRESSIVE DISORDER, IN PARTIAL REMISSION (HCC): ICD-10-CM

## 2021-02-17 PROCEDURE — 90792 PSYCH DIAG EVAL W/MED SRVCS: CPT | Performed by: PSYCHIATRY & NEUROLOGY

## 2021-02-17 RX ORDER — UBROGEPANT 100 MG/1
100 TABLET ORAL PRN
COMMUNITY
End: 2021-10-15 | Stop reason: SDUPTHER

## 2021-02-17 RX ORDER — DONEPEZIL HYDROCHLORIDE 5 MG/1
5 TABLET, FILM COATED ORAL NIGHTLY
COMMUNITY
End: 2021-04-15 | Stop reason: ALTCHOICE

## 2021-02-17 RX ORDER — BUPROPION HYDROCHLORIDE 300 MG/1
300 TABLET ORAL EVERY MORNING
Qty: 30 TABLET | Refills: 2 | Status: SHIPPED | OUTPATIENT
Start: 2021-02-17 | End: 2021-05-19

## 2021-02-17 RX ORDER — LORAZEPAM 0.5 MG/1
0.5 TABLET ORAL DAILY PRN
Qty: 30 TABLET | Refills: 0 | Status: SHIPPED | OUTPATIENT
Start: 2021-02-17 | End: 2021-03-19

## 2021-02-17 RX ORDER — TRAZODONE HYDROCHLORIDE 150 MG/1
225 TABLET ORAL NIGHTLY
Qty: 45 TABLET | Refills: 2 | Status: SHIPPED | OUTPATIENT
Start: 2021-02-17 | End: 2021-04-15 | Stop reason: SDUPTHER

## 2021-02-17 RX ORDER — DICYCLOMINE HYDROCHLORIDE 10 MG/1
10 CAPSULE ORAL
COMMUNITY
End: 2021-04-15 | Stop reason: ALTCHOICE

## 2021-02-17 RX ORDER — FLUOXETINE HYDROCHLORIDE 20 MG/1
20 CAPSULE ORAL DAILY
Qty: 30 CAPSULE | Refills: 2 | Status: SHIPPED | OUTPATIENT
Start: 2021-02-17 | End: 2021-03-30
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
## 2021-02-19 ENCOUNTER — HOSPITAL ENCOUNTER (OUTPATIENT)
Age: 61
Setting detail: SPECIMEN
Discharge: HOME | End: 2021-02-19
Payer: COMMERCIAL

## 2021-02-19 LAB
ANION GAP SERPL CALCULATED.3IONS-SCNC: 10 MMOL/L (ref 9–17)
BUN BLDV-MCNC: 16 MG/DL (ref 6–20)
BUN/CREAT BLD: ABNORMAL (ref 9–20)
CALCIUM SERPL-MCNC: 9.2 MG/DL (ref 8.6–10.4)
CHLORIDE BLD-SCNC: 109 MMOL/L (ref 98–107)
CO2: 23 MMOL/L (ref 20–31)
CREAT SERPL-MCNC: 0.76 MG/DL (ref 0.5–0.9)
GFR AFRICAN AMERICAN: >60 ML/MIN
GFR NON-AFRICAN AMERICAN: >60 ML/MIN
GFR SERPL CREATININE-BSD FRML MDRD: ABNORMAL ML/MIN/{1.73_M2}
GFR SERPL CREATININE-BSD FRML MDRD: ABNORMAL ML/MIN/{1.73_M2}
GLUCOSE BLD-MCNC: 77 MG/DL (ref 70–99)
POTASSIUM SERPL-SCNC: 3.2 MMOL/L (ref 3.7–5.3)
SODIUM BLD-SCNC: 142 MMOL/L (ref 135–144)

## 2021-03-02 ENCOUNTER — TELEPHONE (OUTPATIENT)
Dept: CARDIOLOGY CLINIC | Age: 61
End: 2021-03-02

## 2021-03-03 ENCOUNTER — HOSPITAL ENCOUNTER (OUTPATIENT)
Dept: CT IMAGING | Age: 61
Discharge: HOME | End: 2021-03-03

## 2021-03-03 ENCOUNTER — TELEPHONE (OUTPATIENT)
Dept: ENT CLINIC | Age: 61
End: 2021-03-03

## 2021-03-03 DIAGNOSIS — Z00.6 ENCOUNTER FOR EXAMINATION FOR NORMAL COMPARISON AND CONTROL IN CLINICAL RESEARCH PROGRAM: ICD-10-CM

## 2021-03-03 NOTE — TELEPHONE ENCOUNTER
Lorena Devi was referred to [facility] by Neetu Shen CNP for chronic sinusitis. Lorena Devi stated that she recently moved here from [place]. Lorena Devi stated that while she lived in [place], she had a balloon sinoplasty. Lorena Devi stated that following the surgery for her sinuses, she had four sinus infections, in addition, she states she had an ear infection. Lorena Devi stated that she had a CT of her sinuses completed at [facility] sometime around September 2020. Lorena Devi stated that Neetu Shen ordered the imaging and reviewed the CT scan. Lorena Devi also mentioned she had an MRI completed.

## 2021-03-03 NOTE — TELEPHONE ENCOUNTER
Talked to patient and she is requesting Dr. Autumn Plunkett. Appt scheduled on 3/10/2021 at 1130. I was confirming appt and call was disconnected. Attempted to call patient back with our location and call goes to .

## 2021-03-03 NOTE — TELEPHONE ENCOUNTER
[facility], spoke to [facility]. [facility] stated patient only had a CT Head done on Aug 1, 2020. [facility] states there was no CT sinus done. Asked to have the CT uploaded to the PACS. I have called our radiology to have the images uploaded.

## 2021-03-04 NOTE — TELEPHONE ENCOUNTER
Called [facility] radiology and spoke with Blake Retana stated patient did not have any MRI done. Called patient and she stated it may be at [facility]. Called [facility] medical records, [place], and patient did not have MRI done. Patient only had xr head/neck in January. If we need xr record, the office will need to send letter head requesting it to fax [phone]. Attempted to call patient. Got voicemail, left message to call the office.

## 2021-03-04 NOTE — TELEPHONE ENCOUNTER
Anju Rees called [facility] to let us know that she had a MRI completed which showed swelling. Anju Rees stated that she had the MRI completed in October of 2020, at [facility].

## 2021-03-05 ENCOUNTER — OFFICE VISIT (OUTPATIENT)
Dept: PHYSICAL MEDICINE AND REHAB | Age: 61
End: 2021-03-05
Payer: MEDICARE

## 2021-03-05 VITALS
SYSTOLIC BLOOD PRESSURE: 96 MMHG | HEIGHT: 66 IN | BODY MASS INDEX: 27.64 KG/M2 | TEMPERATURE: 97.5 F | WEIGHT: 172 LBS | DIASTOLIC BLOOD PRESSURE: 78 MMHG

## 2021-03-05 DIAGNOSIS — G89.29 OTHER CHRONIC PAIN: ICD-10-CM

## 2021-03-05 DIAGNOSIS — G43.019 INTRACTABLE MIGRAINE WITHOUT AURA AND WITHOUT STATUS MIGRAINOSUS: Primary | ICD-10-CM

## 2021-03-05 DIAGNOSIS — M54.59 LUMBAR FACET JOINT PAIN: ICD-10-CM

## 2021-03-05 PROCEDURE — 64615 CHEMODENERV MUSC MIGRAINE: CPT | Performed by: ANESTHESIOLOGY

## 2021-03-05 PROCEDURE — G8427 DOCREV CUR MEDS BY ELIG CLIN: HCPCS | Performed by: ANESTHESIOLOGY

## 2021-03-05 PROCEDURE — 99205 OFFICE O/P NEW HI 60 MIN: CPT | Performed by: ANESTHESIOLOGY

## 2021-03-05 PROCEDURE — G8484 FLU IMMUNIZE NO ADMIN: HCPCS | Performed by: ANESTHESIOLOGY

## 2021-03-05 PROCEDURE — 3017F COLORECTAL CA SCREEN DOC REV: CPT | Performed by: ANESTHESIOLOGY

## 2021-03-05 PROCEDURE — 4004F PT TOBACCO SCREEN RCVD TLK: CPT | Performed by: ANESTHESIOLOGY

## 2021-03-05 PROCEDURE — G8419 CALC BMI OUT NRM PARAM NOF/U: HCPCS | Performed by: ANESTHESIOLOGY

## 2021-03-05 NOTE — PROGRESS NOTES
Pre-operative Diagnosis: Chronic Migraine Headaches     Post-operative Diagnosis: Chronic Migraine Headaches     Procedure: Botox for migraine headaches     Procedure Description:  Patient was reclined on the examination table. Botox was drawn up into a tuberculin syringes, to a concentration of 5 units per 0.1 mL with a 30-gauge needle. Skin was prepped with alcohol wipes. The following muscles were injected after negative aspiration; The frontalis muscle bilaterally a total of 4 sites, The  muscle bilaterally a total of 2 sites, the procerus one site, the occipitalis bilaterally a total of 6 sites, The temporalis muscle bilaterally a total of 8 sites, the trapezius bilaterally a total of 6 sites, and cervical paraspinal muscle groups a total of 4 sites. This was a total of 155 units at 31 sites. The patient tolerated the procedure well. Medications: 2.2 mL in 100 U Botox drawn up in 1 mL TB syringe = 5 U per 0.1 mL syringe (4 total TB syringes used).   Amount medication wasted: 45 units        Procedural Complications: None        Leandro Powell DO  Interventional Pain Management/PM&R   [place]

## 2021-03-05 NOTE — PROGRESS NOTES
Chronic Pain Clinic Note     Encounter Date: 3/5/21    Subjective:   Chief Complaint:   Chief Complaint   Patient presents with    New Patient     Migraine       History of Present Illness:   Karey Mayfield is a 62 y.o. female seen in the office on 03/05/21 for her management of migraines. She has medical history of previous TIA with residual cognitive deficits, and ACDF C3C7 with Dr Sandy Simmons. She has longstanding history of migraine headaches as well as of the last 35 years. She describes 2 different types of migraine headaches. Her standard migraine headaches she reports started on the right side the neck and wrap around the entire side of the front of the head. She has associated phonophobia and photophobia. She also notices these migraines to begin with an aura where her nose starts to burn. She has associated nausea/vomiting when the migraines are severe. Her migraines last greater than 4 hours in duration interfering everyday activities. In addition she reports greater than 15 migraine attacks last month. She has failed multiple medications in the past including Fioricet, Ubrelvy, nortriptyline, and is currently on Aimovig and Topamax. 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 In addition, she does have axial low back pain that contributes to a lot of her debilitating pain. She denies any radiation down the legs, focal leg weakness, leg paresthesias, saddle anesthesia, bowel/bladder incontinence.     Past Medical History:   Diagnosis Date    Adrenal abnormality (HCC)     [facility] rest [facility])     Arthritis     Asthma     Cerebral artery occlusion with cerebral infarction ([facility] [address]     Headache     Hyperlipidemia        Past Surgical History:   Procedure Laterality Date    BREAST BIOPSY      HYSTERECTOMY      NECK SURGERY  12/2020    ACDF    SHOULDER SURGERY      SHOULDER SURGERY Left     SINUS SURGERY      TONSILLECTOMY      TUBAL LIGATION         History reviewed. No pertinent family history.     Social History     Socioeconomic History    Marital status:      Spouse name: Not on file    Number of children: Not on file    Years of education: Not on file    Highest education level: Not on file   Occupational History    Not on file   Social Needs    Financial resource strain: Not on file    Food insecurity     Worry: Not on file     Inability: Not on file    Transportation needs     Medical: Not on file     Non-medical: Not on file   Tobacco Use    Smoking status: Current Some Day Smoker     Types: Cigarettes    Smokeless tobacco: Never Used   Substance and Sexual Activity    Alcohol use: Yes     Comment: occasionally    Drug use: Never    Sexual activity: Never   Lifestyle    Physical activity     Days per week: Not on file     Minutes per session: Not on file    Stress: Not on file   Relationships    Social connections     Talks on phone: Not on file     Gets together: Not on file     Attends [employer] service: Not on file     Active member of club or organization: Not on file     Attends meetings of clubs or organizations: Not on file     Relationship status: Not on file    Intimate partner violence     Fear of current or ex partner: Not on file     Emotionally abused: Not on file     Physically abused: Not on file     Forced sexual activity: Not on file   Other Topics Concern    Not on file   Social History Narrative    Not on file       Medications & Allergies:   Current Outpatient Medications   Medication Instructions    Aimovig 140 mg, Subcutaneous, EVERY 30 DAYS    atorvastatin (LIPITOR) 40 mg, Oral, DAILY    buPROPion (WELLBUTRIN XL) 300 mg, Oral, EVERY MORNING    butalbital-acetaminophen-caffeine (FIORICET, ESGIC) -40 MG per tablet 1 tablet, Oral, EVERY 4 HOURS PRN    dicyclomine (BENTYL) 10 mg, Oral, 4 TIMES DAILY BEFORE MEALS & NIGHTLY    donepezil (ARICEPT) 5 mg, Oral, NIGHTLY    FLUoxetine (PROZAC) 20 mg, Oral, DAILY    hydroxychloroquine (PLAQUENIL) 200 mg, Oral, 2 TIMES DAILY    LORazepam (ATIVAN) 0.5 mg, Oral, DAILY PRN    meclizine (ANTIVERT) 12.5 MG tablet 1 to 2 tablets every 6 hours for dizziness as needed    nitroglycerin (NITROSTAT) 0.6 mg, Sublingual, EVERY 5 MIN PRN, up to max of 3 total doses. If no relief after 1 dose, call 911.  ondansetron (ZOFRAN) 4 mg, Oral, 3 TIMES DAILY PRN    pantoprazole (PROTONIX) 40 mg, Oral, 2 TIMES DAILY    Rotigotine (NEUPRO TD) Transdermal    tiotropium (SPIRIVA) 18 mcg, Inhalation, DAILY    topiramate (TOPAMAX) 100 mg, Oral, 2 TIMES DAILY    traZODone (DESYREL) 225 mg, Oral, NIGHTLY    Ubrelvy 100 mg, Oral, PRN       Allergies   Allergen Reactions    Amoxil [Amoxicillin]     Doxycycline     Morphine     Sulfa Antibiotics        Review of Systems:   Constitutional: negative for fevers  MSK: Positive for low back pain  Neurological: positive for migraines  Behavioral/Psych: negative for anxiety/depression   All other systems reviewed and are negative    Objective:     Vitals:    03/05/21 1112   BP: 96/78   Temp: 97.5 °F (36.4 °C)       Constitutional: Pleasant, no acute distress   Head: Normocephalic, atraumatic   Eyes: Conjunctivae normal   Neck: Supple, symmetrical   Respiration: Non-labored breathing   Cardiovascular: Limbs warm and well perfused   Musculoskeletal:   Cervical - Reduced range of motion in all cervical planes. Tenderness to palpation in cervical paraspinals. Positive tinel's sign over 1 inch lateral to occipital protuberance bilaterally. Lumbar -lumbar facet mediated pain. Tenderness palpation along bilateral lumbar paraspinals. Neuro: Alert, oriented. CN II-XII appear grossly intact. No focal motor deficits appreciated. Skin: no skin rashes or lesions visible on face  Psychological: Cooperative, no exaggerated pain behaviors       Assessment:    Diagnosis Orders   1. Intractable migraine without aura and without status migrainosus     2. Lumbar facet joint pain     3. Other chronic pain           Krys Curtis is a 62 y. o.female presenting to the pain clinic for evaluation of migraines. She underwent treatment with Botox today. In addition, she has lumbar facet mediated pain she would like to potentially address in the near future. We may consider bilateral lumbar medial branch targeting the bilateral facet joints at L4/L5 and L5/S1. Follow-up in 6 weeks tentatively for Botox follow-up. Plan: The following treatment recommendations and plan were discussed in detail with Krys Curtis. In the light of patient's clinical history and suboptimal response to multiple treatment modalities as detailed above, we discussed the option of using onabotulinumtoxinA (Botox) injection for better management of chronic migraine headaches. The risks and benefits were discussed in detail with the patient. Patient wants to proceed with this injection during today's visit. A second injection is planned 12 weeks after the first injection. Follow-up: 6 weeks    I spent 50 minutes with the patient greater than 50% this time was discussing Botox treatments, previous treatments for migraine headaches, and description of her migraine headaches. In addition, we discussed her low back pain and potential  treatment options of this as well.       Olamide Wright DO  Interventional Pain Management/PM&R   [place]

## 2021-03-05 NOTE — TELEPHONE ENCOUNTER
MRI was completed at [facility] Please review and see if patient needs a sooner appointment then 03/23/2021.

## 2021-03-10 ENCOUNTER — HOSPITAL ENCOUNTER (OUTPATIENT)
Dept: SPEECH THERAPY | Age: 61
Setting detail: THERAPIES SERIES
Discharge: HOME | End: 2021-03-10
Payer: MEDICARE

## 2021-03-10 ENCOUNTER — OFFICE VISIT (OUTPATIENT)
Dept: CARDIOLOGY CLINIC | Age: 61
End: 2021-03-10
Payer: MEDICARE

## 2021-03-10 VITALS — DIASTOLIC BLOOD PRESSURE: 72 MMHG | HEART RATE: 72 BPM | SYSTOLIC BLOOD PRESSURE: 134 MMHG

## 2021-03-10 DIAGNOSIS — M79.89 LOCALIZED SWELLING OF LOWER EXTREMITY: ICD-10-CM

## 2021-03-10 DIAGNOSIS — I87.1 MAY-THURNER SYNDROME: Primary | ICD-10-CM

## 2021-03-10 PROCEDURE — 3017F COLORECTAL CA SCREEN DOC REV: CPT | Performed by: INTERNAL MEDICINE

## 2021-03-10 PROCEDURE — 96125 COGNITIVE TEST BY HC PRO: CPT | Performed by: SPEECH-LANGUAGE PATHOLOGIST

## 2021-03-10 PROCEDURE — G8419 CALC BMI OUT NRM PARAM NOF/U: HCPCS | Performed by: INTERNAL MEDICINE

## 2021-03-10 PROCEDURE — G8427 DOCREV CUR MEDS BY ELIG CLIN: HCPCS | Performed by: INTERNAL MEDICINE

## 2021-03-10 PROCEDURE — 1036F TOBACCO NON-USER: CPT | Performed by: INTERNAL MEDICINE

## 2021-03-10 PROCEDURE — 99204 OFFICE O/P NEW MOD 45 MIN: CPT | Performed by: INTERNAL MEDICINE

## 2021-03-10 PROCEDURE — G8484 FLU IMMUNIZE NO ADMIN: HCPCS | Performed by: INTERNAL MEDICINE

## 2021-03-10 NOTE — PROGRESS NOTES
[address] [facility] [address]  [facility] [address]  Dept: [phone]  Dept Fax: [phone]  Loc: [phone]    Visit Date: 3/10/2021    Ms. Estiven Valadez is a 62 y.o. female  who presented for:  Chief Complaint   Patient presents with    New Patient     moved from [place]       HPI:   HPI   61 yo F had hx of L sided MT syndrome s/p stent 2013 who presents to [facility] care. Swelling occurred in the last 3 weeks. She feels that the swelling is similar to prior. She has microvascular angina. She is on chronic Lasix, she takes potassium. No recent LE DVT. She is retired from [employer]. After she was stented, she felt much better. She is having significant pain when she sits and walks. 8/10. She took a couple extra Lasix but still not improving. No issues in the other legs. No a/t/d. No major family hx. She was on ASA/coumadin after the stent, then was just on ASA. She is not on ASA any more either. She recently had shoulder and cervical surgeries in the last 6 months. She did drive from [facility] to [place] in 8/2020. She takes Plaquenil for MCTD--gets butterfly facial rash. She has noted more left back sided pain. Current Outpatient Medications:     donepezil (ARICEPT) 5 MG tablet, Take 5 mg by mouth nightly, Disp: , Rfl:     Ubrogepant (UBRELVY) 100 MG TABS, Take 100 mg by mouth as needed, Disp: , Rfl:     dicyclomine (BENTYL) 10 MG capsule, Take 10 mg by mouth 4 times daily (before meals and nightly), Disp: , Rfl:     traZODone (DESYREL) 150 MG tablet, Take 1.5 tablets by mouth nightly, Disp: 45 tablet, Rfl: 2    LORazepam (ATIVAN) 0.5 MG tablet, Take 1 tablet by mouth daily as needed for Anxiety for up to 30 days. , Disp: 30 tablet, Rfl: 0    buPROPion (WELLBUTRIN XL) 300 MG extended release tablet, Take 1 tablet by mouth every morning, Disp: 30 tablet, Rfl: 2    FLUoxetine (PROZAC) 20 MG capsule, Take 1 capsule by mouth daily, Disp: 30 capsule, Rfl: 2    topiramate (TOPAMAX) 100 MG tablet, Take 1 tablet by mouth 2 times daily, Disp: 60 tablet, Rfl: 3    Erenumab-aooe (AIMOVIG) 140 MG/ML SOAJ, Inject 140 mg into the skin every 30 days, Disp: 1 pen, Rfl: 3    Rotigotine (NEUPRO TD), Place onto the skin, Disp: , Rfl:     pantoprazole (PROTONIX) 40 MG tablet, Take 40 mg by mouth 2 times daily , Disp: , Rfl:     hydroxychloroquine (PLAQUENIL) 200 MG tablet, Take 200 mg by mouth 2 times daily , Disp: , Rfl:     tiotropium (SPIRIVA) 18 MCG inhalation capsule, Inhale 18 mcg into the lungs daily, Disp: , Rfl:     butalbital-acetaminophen-caffeine (FIORICET, ESGIC) -40 MG per tablet, Take 1 tablet by mouth every 4 hours as needed for Headaches, Disp: , Rfl:     nitroglycerin (NITROSTAT) 0.6 MG SL tablet, Place 0.6 mg under the tongue every 5 minutes as needed for Chest pain up to max of 3 total doses. If no relief after 1 dose, call 911., Disp: , Rfl:     atorvastatin (LIPITOR) 20 MG tablet, Take 40 mg by mouth daily , Disp: , Rfl:     ondansetron (ZOFRAN) 4 MG tablet, Take 1 tablet by mouth 3 times daily as needed for Nausea or Vomiting, Disp: 15 tablet, Rfl: 0    Past Medical History  Sabino Wright  has a past medical history of Adrenal abnormality ([facility] [address] Angina at rest [facility]), Arthritis, Asthma, Cerebral artery occlusion with cerebral infarction ([facility] [address] Headache, Hyperlipidemia, and Stroud syndrome. Social History  Sabino Wright  reports that she has quit smoking. Her smoking use included cigarettes. She has never used smokeless tobacco. She reports current alcohol use. She reports that she does not use drugs. Family History  Sabino Wright family history is not on file. There is no family history of bicuspid aortic valve, aneurysms, heart transplant, pacemakers, defibrillators, or sudden cardiac death.       Past Surgical History   Past Surgical History:   Procedure Laterality Date    BREAST BIOPSY      HYSTERECTOMY      NECK SURGERY  12/2020 ACDF    SHOULDER SURGERY      SHOULDER SURGERY Left     SINUS SURGERY      TONSILLECTOMY      TUBAL LIGATION         Review of Systems   Constitutional: Negative for chills and fever  HENT: Negative for congestion, sinus pressure, sneezing and sore throat. Eyes: Negative for pain, discharge, redness and itching. Respiratory: Negative for apnea, cough  Gastrointestinal: Negative for blood in stool, constipation, diarrhea   Endocrine: Negative for cold intolerance, heat intolerance, polydipsia. Genitourinary: Negative for dysuria, enuresis, flank pain and hematuria. Musculoskeletal: Negative for arthralgias, joint swelling and neck pain. Neurological: Negative for numbness and headaches. Psychiatric/Behavioral: Negative for agitation, confusion, decreased concentration and dysphoric mood. Objective:     /72   Pulse 72     Wt Readings from Last 3 Encounters:   03/05/21 172 lb (78 kg)   11/22/20 160 lb (72.6 kg)   07/31/20 155 lb (70.3 kg)     BP Readings from Last 3 Encounters:   03/10/21 134/72   03/05/21 96/78   11/22/20 134/63       Nursing note and vitals reviewed. Physical Exam   Constitutional: Oriented to person, place, and time. Appears well-developed and well-nourished. HENT:   Head: Normocephalic and atraumatic. Eyes: EOM are normal. Pupils are equal, round, and reactive to light. Neck: Normal range of motion. Neck supple. No JVD present. Cardiovascular: Normal rate, regular rhythm, normal heart sounds and intact distal pulses. No murmur heard. Pulmonary/Chest: Effort normal and breath sounds normal. No respiratory distress. No wheezes. No rales. Abdominal: Soft. Bowel sounds are normal. No distension. There is no tenderness. Musculoskeletal: Normal range of motion. 2-3+ of the LLE edema, small superficial varicosities. Neurological: Alert and oriented to person, place, and time. No cranial nerve deficit. Coordination normal.   Skin: Skin is warm and dry. Psychiatric: Normal mood and affect. No results found for: CKTOTAL, CKMB, CKMBINDEX    Lab Results   Component Value Date    WBC 7.3 02/05/2021    RBC 4.72 02/05/2021    HGB 13.7 02/05/2021    HCT 41.7 02/05/2021    MCV 88.4 02/05/2021    MCH 29.0 02/05/2021    MCHC 32.8 02/05/2021    RDW 13.7 02/05/2021     02/05/2021    MPV 12.6 09/22/2020       Lab Results   Component Value Date     02/20/2021    K 3.4 02/20/2021     02/20/2021    CO2 26 02/20/2021    BUN 14 02/20/2021    LABALBU 4.5 02/20/2021    CREATININE 0.68 02/20/2021    CALCIUM 9.20 02/20/2021    GFRAA >60 02/19/2021    LABGLOM >60 02/19/2021    LABGLOM 86 07/31/2020    GLUCOSE 99 02/20/2021       Lab Results   Component Value Date    ALKPHOS 89 02/20/2021    ALT 18 02/20/2021    AST 15 02/20/2021    PROT 6.9 02/20/2021    BILITOT 0.2 02/20/2021    BILIDIR 0.1 02/20/2021    LABALBU 4.5 02/20/2021       Lab Results   Component Value Date    MG 1.9 07/31/2020       No results found for: INR, PROTIME      No results found for: LABA1C    Lab Results   Component Value Date    TRIG 135 09/16/2020    HDL 64 09/16/2020       Lab Results   Component Value Date    TSH 1.697 02/05/2021         Testing Reviewed:      I have individually reviewed the cardiac test below:    ECHO: No results found for this or any previous visit. Assessment/Plan   L MTS s/p stenting, 2013 (non-thrombotic)  New and worsening LLE swelling/pain  Hx of MCTD on Plaquenil  Microvascular Angina  Check CT venogram and LE duplex. Refer to [facility]. Compression stockings as much as she can tolerate. Continue Lasix. Had LifeStent in the past, may have fractured or occluded. She wants to undergo intervention if needed ASAP. If there is an indication, we will proceed. Prone, IVUS/Venovo would be needed. Discussed diet/exercise/BP/weight loss/health lifestyle choices/lipids; the patient understands the goals and will try to comply.       Disposition:  3-6 months Electronically signed by Gibran Tyler MD   3/10/2021 at 11:45 AM EST

## 2021-03-10 NOTE — FLOWSHEET NOTE
** PLEASE SIGN, DATE AND TIME CERTIFICATION BELOW AND RETURN TO [facility] OUTPATIENT REHABILITATION (FAX #: [phone]). ATTEST/CO-SIGN IF ACCESSING VIA [employer]. THANK YOU.**    I certify that I have examined the patient below and determined that Physical Medicine and Rehabilitation service is necessary and that I approve the established plan of care for up to 90 days or as specifically noted. Attestation, signature or co-signature of physician indicates approval of certification requirements.    ________________________ ____________ __________  Physician Signature   Date   Time     [address] [facility]  [x] SPEECH LANGUAGE COGNITIVE EVALUATION  [] DAILY NOTE   [] PROGRESS NOTE [] DISCHARGE NOTE    [x] [facility]   [] [address]    [] [address]   [] [place]     Date: 3/10/2021  Patient Name:  Yaquelin Zelaya  : 1960  MRN: [id number]  CSN: [id number]    Referring Practitioner ESTEFANIA Borden *   Diagnosis Mild cognitive impairment, so stated [G31.84]    Treatment Diagnosis Cognitive-communication deficits   Date of Evaluation 3/10/21      Functional Outcome Measure Used CLQT   Functional Outcome Score 3.4/4.0 (3/10/21)       Insurance: Primary: Payor: Teetee Foreman /  /  / ,   Secondary: [facility]   Authorization Information: Medicare   Visit # 1, 1/10 for progress note   Visits Allowed: 1 - unlimited visits based on medical necessity   Recertification Date:    Physician Follow-Up: Dr. Sheryl Abraham (Neurologist) - 2021; Rosemary Rubin - ?? Physician Orders: Speech therapy   Pertinent History: Patient reports she had 2 TIA's about 4-5 years ago. Patient reports she had more symptoms/deficits from the second TIA. Patient reports she still has some weakness on the right side. Patient reports she has memory deficits and it has gotten progressively worse over the last 2 years.   Patient reports she Functioning  Memory: 130. Moderate Impairment   Executive Functions: 27. Typical Functioning  Language: 27. Mild Impairment  Visuospatial Skills: 89. Typical Functioning  Clock Drawing: 10. Mild Impairment     Overall Composite Severity Range: 3.4/4.0. Mild Impairment    **Patient reports difficulty recalling words as well as remembering how to get places sometimes. **Patient with occasional anomia and phonemic paraphasias within conversation this session. **Patient reports she has over withdrawn her account a couple of times because she forgot that she made some purchases. SWALLOWING:  Patient is on a regular diet with thin liquids. Patient reports she has some difficulty swallowing pills at times. Reports they feel like they stick in her throat at times. Patient reports she had an EGD with dilation about 6 months ago and she is due to have it completed again soon. IMPRESSIONS: Patient presents with mild cognitive deficits characterized by impaired short term memory, working memory and functional executive function tasks, such as drawing a clock. Patient also presents with mild expressive language deficits characterized by occasional anomia and phonemic paraphasias in conversation. Areas for Improvement: Impaired cognition and Impaired expressive language  Prognosis: good  Specific Interventions Next Treatment: education on word finding strategies and memory strategies. Complete memory and word finding task.     Activity/Treatment Tolerance:  [x]  Patient tolerated treatment well  []  Patient limited by fatigue  []  Patient limited by pain   []  Patient limited by other medical complications  []  Other:     GOALS:  Patient Goal: \"Recall words better\"    Short Term Goals:  Short-term Goals  Timeframe for Short-term Goals: 4 weeks  Goal 1: Patient will verbalize and utilize word finding strategies to complete higher level naming tasks with no more than min cues, 80% of the time for improved success

## 2021-03-15 ENCOUNTER — HOSPITAL ENCOUNTER (OUTPATIENT)
Dept: CT IMAGING | Age: 61
Discharge: HOME | End: 2021-03-15
Payer: MEDICARE

## 2021-03-15 ENCOUNTER — HOSPITAL ENCOUNTER (OUTPATIENT)
Dept: INTERVENTIONAL RADIOLOGY/VASCULAR | Age: 61
Discharge: HOME | End: 2021-03-15
Payer: MEDICARE

## 2021-03-15 DIAGNOSIS — M79.89 LOCALIZED SWELLING OF LOWER EXTREMITY: ICD-10-CM

## 2021-03-15 DIAGNOSIS — I87.1 MAY-THURNER SYNDROME: ICD-10-CM

## 2021-03-15 PROCEDURE — 74174 CTA ABD&PLVS W/CONTRAST: CPT

## 2021-03-15 PROCEDURE — 93970 EXTREMITY STUDY: CPT

## 2021-03-15 PROCEDURE — 6360000004 HC RX CONTRAST MEDICATION: Performed by: INTERNAL MEDICINE

## 2021-03-15 RX ADMIN — IOPAMIDOL 90 ML: 755 INJECTION, SOLUTION INTRAVENOUS at 15:09

## 2021-03-17 ENCOUNTER — TELEPHONE (OUTPATIENT)
Dept: CARDIOLOGY CLINIC | Age: 61
End: 2021-03-17

## 2021-03-17 DIAGNOSIS — M79.89 LOCALIZED SWELLING OF LOWER EXTREMITY: Primary | ICD-10-CM

## 2021-03-18 ENCOUNTER — HOSPITAL ENCOUNTER (OUTPATIENT)
Dept: SPEECH THERAPY | Age: 61
Setting detail: THERAPIES SERIES
Discharge: HOME | End: 2021-03-18
Payer: MEDICARE

## 2021-03-18 PROCEDURE — 92507 TX SP LANG VOICE COMM INDIV: CPT | Performed by: SPEECH-LANGUAGE PATHOLOGIST

## 2021-03-18 NOTE — PROGRESS NOTES
[address] [facility]  [] SPEECH LANGUAGE COGNITIVE EVALUATION  [x] DAILY NOTE   [] PROGRESS NOTE [] DISCHARGE NOTE    [x] OUTPATIENT REHABILITATION CENTER [facility]   [] [place] [address]    [] [facility]   [] [place] [place]    Date: 3/18/2021  Patient Name:  Luann Sanchez  : 1960  MRN: [id number]    Referring Practitioner ESTEFANIA Weldon *   Diagnosis Mild cognitive impairment, so stated [G31.84]    Treatment Diagnosis Cognitive-communication deficits   Date of Evaluation 3/10/21      Functional Outcome Measure Used CLQT   Functional Outcome Score 3.4/4.0 (3/10/21)       Insurance: Primary: Payor: Ishan Wiggins /  /  / ,   Secondary: [facility]   Authorization Information: Medicare   Visit # 2, 2/10 for progress note   Visits Allowed: Unlimited visits based on medical necessity   Recertification Date:    Physician Follow-Up: Dr. Alley Watters (Neurologist) - 2021; Nancy Greenberg - ?? Physician Orders: Speech therapy   Pertinent History: Patient reports she had 2 TIA's about 4-5 years ago. Patient reports she had more symptoms/deficits from the second TIA. Patient reports she still has some weakness on the right side. Patient reports she has memory deficits and it has gotten progressively worse over the last 2 years. Patient reports she has a cane that she uses some times d/t loss of balance. Reports she has also had several falls recently. SUBJECTIVE: Patient reports she has been talking with social security the past couple of days and it has her upset and frustrated. She stated, \"I will try to focus. \"  Patient reports she has difficulty focusing when filling out paperwork at [employer]. Discussed the recommendation of using earbuds to help block out the noise. Patient reports she could try it to see if it helps.   Also discussed the recommendation to avoid other distractors at home, such as the TV on, music playing, etc when completing important tasks. SHORT TERM GOAL #1:  Goal 1: Patient will verbalize and utilize word finding strategies to complete higher level naming tasks with no more than min cues, 80% of the time for improved success communicating with family and friends. INTERVENTIONS:  Completed extensive education on word finding strategies, including gesturing, describing, sentence completion, coming up the initial sound in the word or trying to visualize the word. An example was provided this date for the patient. Patient then completed a word finding task in which she was given 3 clues/descriptors and she had to verbalize the word that fit the descriptors:  19/20 without cues, 1/20 with min cues; mildly slow response time on 2/20 trials. Patient also given a handout to focus on higher level word finding for homework. SHORT TERM GOAL #2:  Goal 2: Patient will demonstrate use of word finding strategies within functional communication for 4-5 minutes with no more than 2 instances of anomia or paraphasias for improved success communicating with family and friends. INTERVENTIONS:  Educated the patient on this goal and rationale for it. No instances of anomia or paraphasias noted throughout this session. SHORT TERM GOAL #3:  Goal 3: Patient will verbalize and demonstrate use of compensatory memory strategies to complete functional recall tasks with up to 4 items with no more than min cues for improved success with managing her daily schedule/appointments. INTERVENTIONS:  Patient educated at length on compensatory memory strategies utilizing the acronym WARP (write it down, associate it, repeat it, picture it). Examples provided for each strategy and patient was given a list of the strategies to put in a location at home that she will see it frequently. Patient verbalized understanding. Patient reports she misplaces items at home frequently.   Educated the patient on the importance of having a \"home\" for her important items that she uses frequently. Patient verbalized understanding and reports she will try it with her keys for now. SHORT TERM GOAL #4:  Goal 4: Patient will complete working memory tasks with 80% accuracy with no more than min cues for improved success with completing IADL's. INTERVENTIONS:  Did not test this date d/t focus on other goals, however, educated the patient on the goal and rationale for it. Assessment: Progressing towards goals. Specific Interventions Next Treatment: high level word finding task, 4-5 minute conversation with use of word finding strategies as needed, short term memory task, working memory task    Activity/Treatment Tolerance:  [x]  Patient tolerated treatment well  []  Patient limited by fatigue  []  Patient limited by pain   []  Patient limited by other medical complications  []  Other:     Patient Education:   [x]  HEP/Education Completed: Plan of Care, Goals, word finding strategies, memory strategies  []  No new Education completed  []  Reviewed Prior HEP      [x]  Patient verbalized and/or demonstrated understanding of education provided. []  Patient unable to verbalize and/or demonstrate understanding of education provided. Will continue education. []  Barriers to learning:     PLAN:  []  Plan of care initiated. Plan to see patient 2 times per week for 6 weeks to address the treatment planned outlined above.   [x]  Continue with current plan of care  []  Modify plan of care as follows:    []  Hold pending physician visit  []  Discharge    Time In 1446   Time Out 1516   Timed Code Minutes: 0 min   Total Treatment Time: 30 min       RALPH Salamanca [id number]

## 2021-03-23 ENCOUNTER — OFFICE VISIT (OUTPATIENT)
Dept: ENT CLINIC | Age: 61
End: 2021-03-23
Payer: MEDICARE

## 2021-03-23 VITALS
BODY MASS INDEX: 25.83 KG/M2 | DIASTOLIC BLOOD PRESSURE: 68 MMHG | WEIGHT: 160.7 LBS | HEIGHT: 66 IN | TEMPERATURE: 96.8 F | HEART RATE: 56 BPM | SYSTOLIC BLOOD PRESSURE: 92 MMHG | RESPIRATION RATE: 12 BRPM

## 2021-03-23 DIAGNOSIS — J34.2 NASAL SEPTAL DEVIATION: ICD-10-CM

## 2021-03-23 DIAGNOSIS — J32.2 CHRONIC ETHMOIDAL SINUSITIS: ICD-10-CM

## 2021-03-23 DIAGNOSIS — I87.1 MAY-THURNER SYNDROME: Primary | ICD-10-CM

## 2021-03-23 DIAGNOSIS — H65.01 NON-RECURRENT ACUTE SEROUS OTITIS MEDIA OF RIGHT EAR: ICD-10-CM

## 2021-03-23 PROCEDURE — G8484 FLU IMMUNIZE NO ADMIN: HCPCS | Performed by: OTOLARYNGOLOGY

## 2021-03-23 PROCEDURE — 1036F TOBACCO NON-USER: CPT | Performed by: OTOLARYNGOLOGY

## 2021-03-23 PROCEDURE — G8427 DOCREV CUR MEDS BY ELIG CLIN: HCPCS | Performed by: OTOLARYNGOLOGY

## 2021-03-23 PROCEDURE — 99204 OFFICE O/P NEW MOD 45 MIN: CPT | Performed by: OTOLARYNGOLOGY

## 2021-03-23 PROCEDURE — 3017F COLORECTAL CA SCREEN DOC REV: CPT | Performed by: OTOLARYNGOLOGY

## 2021-03-23 PROCEDURE — G8419 CALC BMI OUT NRM PARAM NOF/U: HCPCS | Performed by: OTOLARYNGOLOGY

## 2021-03-23 ASSESSMENT — ENCOUNTER SYMPTOMS
CHEST TIGHTNESS: 0
ABDOMINAL PAIN: 0
SINUS PRESSURE: 0
DIARRHEA: 0
VOMITING: 0
COLOR CHANGE: 0
WHEEZING: 0
VOICE CHANGE: 0
APNEA: 0
SHORTNESS OF BREATH: 0
FACIAL SWELLING: 0
CHOKING: 0
SORE THROAT: 0
STRIDOR: 0
RHINORRHEA: 0
TROUBLE SWALLOWING: 0
COUGH: 0
NAUSEA: 0

## 2021-03-23 NOTE — PATIENT INSTRUCTIONS
The rationale for prolonged antibiotics for sinusitis was discussed including the importance of recovery of the mucociliary transport mechanism to prevent reinfection from retained secretions. The patient will be given a four-week prescription for broad-spectrum antibiotics based on the culture results of possible, will use topical steroid nasal spray, and perform bid sinus irrigations. Then a CT of the paranasal sinuses without contrast will be obtained, and the patient will return for reevaluation.

## 2021-03-23 NOTE — PROGRESS NOTES
[address] EAR, NOSE AND THROAT  [facility]  Dept: [phone]  Dept Fax: [phone]  Loc: [phone]    Heri Guevara is a 62 y.o. female who was referred byAmanda Mattson APRN * for:  Chief Complaint   Patient presents with    Sinusitis     New patient here for evaluation of her chronic sinusitis. Patient believes she currently has a sinus infection. Referred by GAMA De Oliveira HPI:     Heri Guevara is a 62 y.o. female who presents today for chronic sinusitis. She had a balloon sinuplasty last July and has gotten worse. She has had 3 major sinus infections since and has 1 now as well. She gets a rash with amoxicillin sulfa antibiotic and doxycycline. She has chronic nasal obstruction and bad headaches. Right ear had ear infection a while back and has not cleared. She has decreased hearing on the right side with autophony. MRIs when she was not ill were reviewed. This demonstrated a left septal deviation especially posteriorly with a large spur impaling a turbinate. Juan Peterson History:      Allergies   Allergen Reactions    Amoxil [Amoxicillin]     Doxycycline     Morphine     Sulfa Antibiotics      Current Outpatient Medications   Medication Sig Dispense Refill    donepezil (ARICEPT) 5 MG tablet Take 5 mg by mouth nightly      Ubrogepant (UBRELVY) 100 MG TABS Take 100 mg by mouth as needed      dicyclomine (BENTYL) 10 MG capsule Take 10 mg by mouth 4 times daily (before meals and nightly)      traZODone (DESYREL) 150 MG tablet Take 1.5 tablets by mouth nightly 45 tablet 2    buPROPion (WELLBUTRIN XL) 300 MG extended release tablet Take 1 tablet by mouth every morning 30 tablet 2    FLUoxetine (PROZAC) 20 MG capsule Take 1 capsule by mouth daily 30 capsule 2    topiramate (TOPAMAX) 100 MG tablet Take 1 tablet by mouth 2 times daily 60 tablet 3    Erenumab-aooe (AIMOVIG) 140 MG/ML SOAJ Inject 140 mg into the skin every 30 days 1 pen 3    pantoprazole (PROTONIX) 40 MG tablet Take 40 mg by mouth 2 times daily       hydroxychloroquine (PLAQUENIL) 200 MG tablet Take 200 mg by mouth 2 times daily       tiotropium (SPIRIVA) 18 MCG inhalation capsule Inhale 18 mcg into the lungs daily      butalbital-acetaminophen-caffeine (FIORICET, ESGIC) -40 MG per tablet Take 1 tablet by mouth every 4 hours as needed for Headaches      nitroglycerin (NITROSTAT) 0.6 MG SL tablet Place 0.6 mg under the tongue every 5 minutes as needed for Chest pain up to max of 3 total doses. If no relief after 1 dose, call 911.  atorvastatin (LIPITOR) 20 MG tablet Take 40 mg by mouth daily       ondansetron (ZOFRAN) 4 MG tablet Take 1 tablet by mouth 3 times daily as needed for Nausea or Vomiting 15 tablet 0    Rotigotine (NEUPRO TD) Place onto the skin       Current Facility-Administered Medications   Medication Dose Route Frequency Provider Last Rate Last Admin    onabotulinumtoxin A (BOTOX) injection 200 Units  200 Units Intramuscular Once Leandro Calderón DO         Past Medical History:   Diagnosis Date    Adrenal abnormality ([facility] [address]     Angina at rest [facility])     Arthritis     Asthma     Cerebral artery occlusion with cerebral infarction ([facility] [address]     Headache     Hyperlipidemia       Past Surgical History:   Procedure Laterality Date    BREAST BIOPSY      HYSTERECTOMY      NECK SURGERY  12/2020    ACDF    SHOULDER SURGERY      SHOULDER SURGERY Left     SINUS SURGERY      TONSILLECTOMY      TUBAL LIGATION       History reviewed. No pertinent family history.   Social History     Tobacco Use    Smoking status: Former Smoker     Types: Cigarettes    Smokeless tobacco: Never Used   Substance Use Topics    Alcohol use: Yes     Comment: occasionally       Subjective:      Review of Systems   Constitutional: Negative for activity change, appetite change, chills, diaphoresis, fatigue, fever and unexpected weight change. HENT: Negative for congestion, dental problem, ear discharge, ear pain, facial swelling, hearing loss, mouth sores, nosebleeds, postnasal drip, rhinorrhea, sinus pressure, sneezing, sore throat, tinnitus, trouble swallowing and voice change. Eyes: Negative for visual disturbance. Respiratory: Negative for apnea, cough, choking, chest tightness, shortness of breath, wheezing and stridor. Cardiovascular: Negative for chest pain, palpitations and leg swelling. Gastrointestinal: Negative for abdominal pain, diarrhea, nausea and vomiting. Endocrine: Negative for cold intolerance, heat intolerance, polydipsia and polyuria. Genitourinary: Negative for dysuria, enuresis and hematuria. Musculoskeletal: Negative for arthralgias, gait problem, neck pain and neck stiffness. Skin: Negative for color change and rash. Allergic/Immunologic: Negative for environmental allergies, food allergies and immunocompromised state. Neurological: Negative for dizziness, syncope, facial asymmetry, speech difficulty, light-headedness and headaches. Hematological: Negative for adenopathy. Does not bruise/bleed easily. Psychiatric/Behavioral: Negative for confusion and sleep disturbance. The patient is not nervous/anxious. Objective:   BP 92/68 (Site: Right Upper Arm, Position: Sitting)   Pulse 56   Temp 96.8 °F (36 °C) (Infrared)   Resp 12   Ht 5' 6\" (1.676 m)   Wt 160 lb 11.2 oz (72.9 kg)   BMI 25.94 kg/m²     Physical Exam  Vitals signs and nursing note reviewed. Constitutional:       Appearance: She is well-developed. HENT:      Head: Normocephalic and atraumatic. No laceration. Comments:        Right Ear: Ear canal and external ear normal. Decreased hearing noted. No drainage or swelling. A middle ear effusion is present. Tympanic membrane is not perforated or erythematous. Tympanic membrane has decreased mobility.       Left Ear: Hearing, tympanic membrane, ear canal and external ear normal. No drainage or swelling. No middle ear effusion. Tympanic membrane is not perforated or erythematous. Nose: Septal deviation, mucosal edema and rhinorrhea ( Cultured) present. Mouth/Throat:      Mouth: Mucous membranes are not pale and not dry. No oral lesions. Pharynx: Oropharynx is clear. Uvula midline. No oropharyngeal exudate or posterior oropharyngeal erythema. Comments: LIps: lips normal     Mallampati 1  Base of tongue: symmetric,  Eyes:      Extraocular Movements:      Right eye: Normal extraocular motion and no nystagmus. Left eye: Normal extraocular motion and no nystagmus. Comments: Conjugate gaze   Neck:      Musculoskeletal: Neck supple. Thyroid: No thyroid mass or thyromegaly. Trachea: Phonation normal. No tracheal deviation. Comments:   Salivary glands not enlarged and normal to palpation    Pulmonary:      Effort: Pulmonary effort is normal. No retractions. Breath sounds: No stridor. Neurological:      Mental Status: She is alert and oriented to person, place, and time. Cranial Nerves: No cranial nerve deficit (VIIth N function intact bilat). Psychiatric:         Mood and Affect: Mood and affect normal.         Behavior: Behavior is cooperative. Data:  All of the past medical history, past surgical history, family history,social history, allergies and current medications were reviewed with the patient. Assessment & Plan   Diagnoses and all orders for this visit:     Diagnosis Orders   1. May-Thurner syndrome     2. Chronic ethmoidal sinusitis  Culture, Nasal    CT FACIAL BONES WO CONTRAST   3. Nasal septal deviation  CT FACIAL BONES WO CONTRAST   4. Non-recurrent acute serous otitis media of right ear  CT FACIAL BONES WO CONTRAST       The findings were explained and her questions were answered.   CT is indicated following vigorous therapy for sinusitis to help figure out what it takes to correct the problems because by the outflow obstruction. This will also assess her airway. We will use culture directed antibiotics and Cefdinir if nothing grows. The rationale for prolonged antibiotics for sinusitis was discussed including the importance of recovery of the mucociliary transport mechanism to prevent reinfection from retained secretions. The patient will be given a four-week prescription for broad-spectrum antibiotics, will use topical steroid nasal spray, and perform bid sinus irrigations. Then a CT of the paranasal sinuses without contrast will be obtained, and the patient will return for reevaluation. Raad Mas. Regan Ramey MD    **This report has been created using voice recognition software. It may contain minor errors which are inherent in voicerecognition technology. **

## 2021-03-25 ENCOUNTER — HOSPITAL ENCOUNTER (OUTPATIENT)
Dept: SPEECH THERAPY | Age: 61
Setting detail: THERAPIES SERIES
Discharge: HOME | End: 2021-03-25
Payer: MEDICARE

## 2021-03-25 PROCEDURE — 97129 THER IVNTJ 1ST 15 MIN: CPT | Performed by: SPEECH-LANGUAGE PATHOLOGIST

## 2021-03-25 PROCEDURE — 97130 THER IVNTJ EA ADDL 15 MIN: CPT | Performed by: SPEECH-LANGUAGE PATHOLOGIST

## 2021-03-25 NOTE — PROGRESS NOTES
[address] [facility]  [] SPEECH LANGUAGE COGNITIVE EVALUATION  [x] DAILY NOTE   [] PROGRESS NOTE [] DISCHARGE NOTE    [x] [facility] [place]   [] [facility]    [] [facility]   [] Conchita Lara    Date: 3/25/2021  Patient Name:  Yaquelin Zelaya  : 1960  MRN: [id number]    Referring Practitioner ESTEFANIA Borden *   Diagnosis Mild cognitive impairment, so stated [G31.84]    Treatment Diagnosis Cognitive-communication deficits   Date of Evaluation 3/10/21      Functional Outcome Measure Used CLQT   Functional Outcome Score 3.4/4.0 (3/10/21)       Insurance: Primary: Payor: [employer] /  /  / ,   Secondary: [facility]   Authorization Information: Medicare   Visit # 3, 3/10 for progress note   Visits Allowed: Unlimited visits based on medical necessity   Recertification Date:    Physician Follow-Up: Dr. Sheryl Abraham (Neurologist) - 2021; Rosemary Rubin - ?? Physician Orders: Speech therapy   Pertinent History: Patient reports she had 2 TIA's about 4-5 years ago. Patient reports she had more symptoms/deficits from the second TIA. Patient reports she still has some weakness on the right side. Patient reports she has memory deficits and it has gotten progressively worse over the last 2 years. Patient reports she has a cane that she uses some times d/t loss of balance. Reports she has also had several falls recently. SUBJECTIVE: Patient reports the Neurologist in [facility] diagnosed her with \"cognitive impairment with retrograde amnesia. \"  Patient reports she started on aricept about 2 months ago with no improvements in her memory. SHORT TERM GOAL #1:  Goal 1: Patient will verbalize and utilize word finding strategies to complete higher level naming tasks with no more than min cues, 80% of the time for improved success communicating with family and friends.   INTERVENTIONS:  Briefly reviewed word finding strategies to utilize when needed. Patient verbalized understanding. SHORT TERM GOAL #2:  Goal 2: Patient will demonstrate use of word finding strategies within functional communication for 4-5 minutes with no more than 2 instances of anomia or paraphasias for improved success communicating with family and friends. INTERVENTIONS:  Patient talked about her dog for ~3 minutes. Within the 3 minutes, patient noted to have x 1 paraphasia as she stated \"adopted\" but meant \"rescued. \"  Patient self-corrected immediately. SHORT TERM GOAL #3:  Goal 3: Patient will verbalize and demonstrate use of compensatory memory strategies to complete functional recall tasks with up to 4 items with no more than min cues for improved success with managing her daily schedule/appointments. INTERVENTIONS:  Patient reports she can recall the acronym WARP (write it down, associate it, repeat it, picture it), but she has a hard time remembering each strategy, except \"write it down. \"  Patient required max cues to recall 3/4 of the strategies. Patient reviewed a written list of the strategies for ~1 minute and then recalled 3/4 of the strategies without cues following an additional 5 delay. 1/4 strategies unable to be recalled with max cues. Patient reports she has been putting her car keys and her life alert necklace (at night) in the same place so she knows right where they are at when she needs them. SHORT TERM GOAL #4:  Goal 4: Patient will complete working memory tasks with 80% accuracy with no more than min cues for improved success with completing IADL's. INTERVENTIONS:   Completed a working memory task in which the patient was provided with a list of 3 words with auditory provision and she mentally manipulated the words into reverse order: 8/10 without cues, 2/10 with min cues    Assessment: Progressing towards goals.   Specific Interventions Next Treatment: high level word finding task, 4-5 minute conversation with use of word finding strategies as needed, short term memory task, working memory task    Activity/Treatment Tolerance:  [x]  Patient tolerated treatment well  []  Patient limited by fatigue  []  Patient limited by pain   []  Patient limited by other medical complications  []  Other:     Patient Education:   [x]  HEP/Education Completed: Plan of Care, Goals, word finding strategies, memory strategies  []  No new Education completed  []  Reviewed Prior HEP      [x]  Patient verbalized and/or demonstrated understanding of education provided. []  Patient unable to verbalize and/or demonstrate understanding of education provided. Will continue education. []  Barriers to learning:     PLAN:  []  Plan of care initiated. Plan to see patient 2 times per week for 6 weeks to address the treatment planned outlined above.   [x]  Continue with current plan of care  []  Modify plan of care as follows:    []  Hold pending physician visit  []  Discharge    Time In 1402   Time Out 1434   Timed Code Minutes: 32 min   Total Treatment Time: 32 min       RALPH Foy [id number]

## 2021-03-26 ENCOUNTER — TELEPHONE (OUTPATIENT)
Dept: ENT CLINIC | Age: 61
End: 2021-03-26

## 2021-03-26 LAB — AEROBIC CULTURE: NORMAL

## 2021-03-26 RX ORDER — CEPHALEXIN 500 MG/1
500 CAPSULE ORAL 3 TIMES DAILY
Qty: 84 CAPSULE | Refills: 0 | Status: SHIPPED | OUTPATIENT
Start: 2021-03-26 | End: 2021-04-23

## 2021-03-26 RX ORDER — FLUCONAZOLE 150 MG/1
150 TABLET ORAL WEEKLY
Qty: 4 TABLET | Refills: 0 | Status: SHIPPED | OUTPATIENT
Start: 2021-03-26 | End: 2021-04-17

## 2021-03-26 NOTE — TELEPHONE ENCOUNTER
Called patient and updated her allergies to medications. She stated that Dr. Merrilyn Eisenmenger mentioned at her appointment putting her on keflex. She stated that she can take that. She also mentioned that she would need a order for diflucan because when she on antibiotics she gets a yeast infection,.

## 2021-03-26 NOTE — TELEPHONE ENCOUNTER
Patient has multiple antibiotic allergies listed in her chart. There is no severity or reaction type listed. She has listed reactions to penicillins, doxycycline and sulfa antibiotics. That leaves very little room for treatment depending on what her reactions actually are. Her culture was negative, but we need to find an antibiotic that we can treat her with prior to her CT sinus.

## 2021-03-30 ENCOUNTER — OFFICE VISIT (OUTPATIENT)
Dept: RHEUMATOLOGY | Age: 61
End: 2021-03-30
Payer: MEDICARE

## 2021-03-30 ENCOUNTER — NURSE ONLY (OUTPATIENT)
Dept: LAB | Age: 61
End: 2021-03-30

## 2021-03-30 VITALS
OXYGEN SATURATION: 97 % | BODY MASS INDEX: 25.83 KG/M2 | WEIGHT: 160.72 LBS | SYSTOLIC BLOOD PRESSURE: 110 MMHG | HEIGHT: 66 IN | DIASTOLIC BLOOD PRESSURE: 68 MMHG | HEART RATE: 113 BPM | TEMPERATURE: 97 F

## 2021-03-30 DIAGNOSIS — M25.50 POLYARTHRALGIA: ICD-10-CM

## 2021-03-30 DIAGNOSIS — Z87.39 H/O MIXED CONNECTIVE TISSUE DISEASE: ICD-10-CM

## 2021-03-30 DIAGNOSIS — Z86.73 H/O: CVA (CEREBROVASCULAR ACCIDENT): ICD-10-CM

## 2021-03-30 DIAGNOSIS — M25.50 POLYARTHRALGIA: Primary | ICD-10-CM

## 2021-03-30 DIAGNOSIS — Z51.81 MEDICATION MONITORING ENCOUNTER: ICD-10-CM

## 2021-03-30 LAB
ALBUMIN SERPL-MCNC: 4.6 G/DL (ref 3.5–5.1)
ALP BLD-CCNC: 83 U/L (ref 38–126)
ALT SERPL-CCNC: 13 U/L (ref 11–66)
AMORPHOUS: ABNORMAL
ANION GAP SERPL CALCULATED.3IONS-SCNC: 12 MEQ/L (ref 8–16)
AST SERPL-CCNC: 19 U/L (ref 5–40)
BACTERIA: ABNORMAL
BASOPHILS # BLD: 1.1 %
BASOPHILS ABSOLUTE: 0.1 THOU/MM3 (ref 0–0.1)
BILIRUB SERPL-MCNC: 0.2 MG/DL (ref 0.3–1.2)
BILIRUBIN URINE: ABNORMAL
BLOOD, URINE: NEGATIVE
BUN BLDV-MCNC: 24 MG/DL (ref 7–22)
C-REACTIVE PROTEIN: 0.68 MG/DL (ref 0–1)
CALCIUM SERPL-MCNC: 10.1 MG/DL (ref 8.5–10.5)
CASTS: ABNORMAL /LPF
CASTS: ABNORMAL /LPF
CHARACTER, URINE: ABNORMAL
CHLORIDE BLD-SCNC: 103 MEQ/L (ref 98–111)
CO2: 24 MEQ/L (ref 23–33)
COLOR: ABNORMAL
CREAT SERPL-MCNC: 0.8 MG/DL (ref 0.4–1.2)
CREATININE URINE: 168.5 MG/DL
CRYSTALS: ABNORMAL
EOSINOPHIL # BLD: 4.3 %
EOSINOPHILS ABSOLUTE: 0.2 THOU/MM3 (ref 0–0.4)
EPITHELIAL CELLS, UA: ABNORMAL /HPF
ERYTHROCYTE [DISTWIDTH] IN BLOOD BY AUTOMATED COUNT: 12.8 % (ref 11.5–14.5)
ERYTHROCYTE [DISTWIDTH] IN BLOOD BY AUTOMATED COUNT: 43.8 FL (ref 35–45)
GFR SERPL CREATININE-BSD FRML MDRD: 73 ML/MIN/1.73M2
GLUCOSE BLD-MCNC: 98 MG/DL (ref 70–108)
GLUCOSE, URINE: NEGATIVE MG/DL
HCT VFR BLD CALC: 42.4 % (ref 37–47)
HEMOGLOBIN: 12.9 GM/DL (ref 12–16)
ICTOTEST: NEGATIVE
IMMATURE GRANS (ABS): 0.01 THOU/MM3 (ref 0–0.07)
IMMATURE GRANULOCYTES: 0.2 %
KETONES, URINE: NEGATIVE
LD: 186 U/L (ref 100–190)
LEUKOCYTE EST, POC: ABNORMAL
LYMPHOCYTES # BLD: 34.6 %
LYMPHOCYTES ABSOLUTE: 1.9 THOU/MM3 (ref 1–4.8)
MCH RBC QN AUTO: 28.5 PG (ref 26–33)
MCHC RBC AUTO-ENTMCNC: 30.4 GM/DL (ref 32.2–35.5)
MCV RBC AUTO: 93.6 FL (ref 81–99)
MISCELLANEOUS LAB TEST RESULT: ABNORMAL
MONOCYTES # BLD: 7.8 %
MONOCYTES ABSOLUTE: 0.4 THOU/MM3 (ref 0.4–1.3)
MUCUS: ABNORMAL
NITRITE, URINE: NEGATIVE
NUCLEATED RED BLOOD CELLS: 0 /100 WBC
PH UA: 8.5 (ref 5–9)
PLATELET # BLD: 245 THOU/MM3 (ref 130–400)
PMV BLD AUTO: 13 FL (ref 9.4–12.4)
POTASSIUM SERPL-SCNC: 4.2 MEQ/L (ref 3.5–5.2)
PROT/CREAT RATIO, UR: 0.09
PROTEIN UA: ABNORMAL MG/DL
PROTEIN, URINE: 15.5 MG/DL
RBC # BLD: 4.53 MILL/MM3 (ref 4.2–5.4)
RBC URINE: ABNORMAL /HPF
RENAL EPITHELIAL, UA: ABNORMAL
SEDIMENTATION RATE, ERYTHROCYTE: 17 MM/HR (ref 0–20)
SEG NEUTROPHILS: 52 %
SEGMENTED NEUTROPHILS ABSOLUTE COUNT: 2.9 THOU/MM3 (ref 1.8–7.7)
SODIUM BLD-SCNC: 139 MEQ/L (ref 135–145)
SPECIFIC GRAVITY UA: 1.02 (ref 1–1.03)
TOTAL CK: 124 U/L (ref 30–135)
TOTAL PROTEIN: 7.4 G/DL (ref 6.1–8)
UROBILINOGEN, URINE: 0.2 EU/DL (ref 0–1)
WBC # BLD: 5.6 THOU/MM3 (ref 4.8–10.8)
WBC UA: ABNORMAL /HPF
YEAST: ABNORMAL

## 2021-03-30 PROCEDURE — G8484 FLU IMMUNIZE NO ADMIN: HCPCS | Performed by: INTERNAL MEDICINE

## 2021-03-30 PROCEDURE — 99204 OFFICE O/P NEW MOD 45 MIN: CPT | Performed by: INTERNAL MEDICINE

## 2021-03-30 PROCEDURE — G8427 DOCREV CUR MEDS BY ELIG CLIN: HCPCS | Performed by: INTERNAL MEDICINE

## 2021-03-30 PROCEDURE — G8419 CALC BMI OUT NRM PARAM NOF/U: HCPCS | Performed by: INTERNAL MEDICINE

## 2021-03-30 NOTE — PROGRESS NOTES
[facility]   Date Of Service: 3/30/2021  Provider: Winsome Cotton DO  Name: Lorraine Eaton   MRN: [id number]    Chief Complaint(s)      Chief Complaint   Patient presents with    New Patient     referral from Gerald Kaba CNP for SLE        History of Present illness (HPI)    Lorraine Eaton   is a(n)60 y.o. female with a hx of  has a past medical history of Adrenal abnormality ([address] Angina at rest [facility]), Arthritis, Asthma, Cerebral artery occlusion with cerebral infarction [facility]), Headache, and Hyperlipidemia. Myalgia, h/o myositis, bipolar disorder, dry eye, fatty liver,   referred by ESTEFANIA Dolan * for evaluation of mctd  / systemic lupus     Reported diagnosed with MCTD / myositis - after presenting with   inflamamtory arthritis, malar rash, orasl sores, nasal sores, muscle weakness, dry eyes. Treated with plaquenil (restarted 2019) which helps with flares, but continues to   required  prednisone tapers. H/o CVA  X 2 2016 with residual right sided weakness since , retrograde amnesia. Other treatment - none. Moved from [employer] August 2020. Back pain curently followed by pain manage, recent right shoulder surgery, left shoulder surgery 2020, cervical spine sugery nov 2020 ([facility] C3-C6). Ongoing pain ans stiffness in the hands, knees feet and lower back. Intermittent, localized pain , most severe pain in the mornings and with weather changes. Aggravating factors: weatsher changes,. Back: getting up up from seated positing, prolonged standing. Knees: getting up from seated position. Alleviating factors: tylnenol, hands - movement. Swelling left cmc and right 1st MTP. AM stiffness lasting about 30 minutes. Balance issues with reported recurrent falls and occasionally walking with a cane. - swelling of left leg.   - intermittent numbness/tingling of fingers.        Cancer screening: up to date       Review of Systems    Review of Systems  BOLDED INFORMATION is positive     REVIEW OF SYSTEMS:    CONSTITUTIONAL:  negative for  fevers, fatigue and malaise  EYES:  negative for  dry eyes, visual disturbance and redness  HEENT:  negative for  Recurrent infection, + sinus pain - on antibiotics after July 2020  sinus surgery   Eyes: negative for: Vision changes, eye pain, Eye redness, Dry eyes, photophobia  Ears:  negative for hearing  loss , Tinnitus ,  Discharge. Nose: negative for  frequent nose bleed,  persistent congestion,   Mouth: negative for mouth sores, pain with chewing  RESPIRATORY:  negative for  dyspnea, hemoptysis and pleuritic pain, h/o pleural effusions   CARDIOVASCULAR:  negative for  chest pain, dyspnea, pleurisy, h/o pericardial effusion   GASTROINTESTINAL:  negative for nausea, vomiting, change in bowel habits, diarrhea, constipation, dysphagia , melena, BRBPR  GENITOURINARY:  negative for frequency, dysuria, hematuria, pyuria, foamy urination,     INTEGUMENT/BREAST:  negative for rash, alopecia,malar rash,, livedo reticularis  and Raynaud's symptoms. HEMATOLOGIC/LYMPHATIC:  negative for easy bruising, lymphadenopathy, cytopenias, blood clots,   ALLERGY/IMMUNOLOGIC:  Negative for blood transfusions, pulmonary embolism, or deep venous thrombus  ENDOCRINE:  Negative.,  Polydipsia, polyuria, polyphagia , heat intolerance, cold intolerance,  NEUROLOGICAL:  negative for seizures, memory problems, visual disturbance, dysphagia and weakness  BEHAVIOR/PSYCH:  negative for depressed mood and psychosis    PAST MEDICAL HISTORY     has a past medical history of Adrenal abnormality ([facility] [address] Angina at rest [facility]), Arthritis, Asthma, Cerebral artery occlusion with cerebral infarction ([facility] [address] Headache, and Hyperlipidemia. PAST SURGICAL HISTORY     has a past surgical history that includes Hysterectomy; Tubal ligation; shoulder surgery; Tonsillectomy; sinus surgery; Breast biopsy; shoulder surgery (Left); and Neck surgery (12/2020).      FAMILY HISTORY      Family History Adopted: Yes       SOCIAL HISTORY     reports that she has quit smoking. Her smoking use included cigarettes. She has a 12.00 pack-year smoking history. She has never used smokeless tobacco. She reports current alcohol use. She reports that she does not use drugs.       ALLERGIES     Allergies   Allergen Reactions    Amoxil [Amoxicillin]      rash    Doxycycline      rash    Morphine     Sulfa Antibiotics Swelling     lip       CURRENT MEDICATIONS      Current Outpatient Medications:     cephALEXin (KEFLEX) 500 MG capsule, Take 1 capsule by mouth 3 times daily for 28 days, Disp: 84 capsule, Rfl: 0    fluconazole (DIFLUCAN) 150 MG tablet, Take 1 tablet by mouth once a week for 4 doses, Disp: 4 tablet, Rfl: 0    donepezil (ARICEPT) 5 MG tablet, Take 5 mg by mouth nightly, Disp: , Rfl:     Ubrogepant (UBRELVY) 100 MG TABS, Take 100 mg by mouth as needed, Disp: , Rfl:     dicyclomine (BENTYL) 10 MG capsule, Take 10 mg by mouth 4 times daily (before meals and nightly), Disp: , Rfl:     traZODone (DESYREL) 150 MG tablet, Take 1.5 tablets by mouth nightly, Disp: 45 tablet, Rfl: 2    buPROPion (WELLBUTRIN XL) 300 MG extended release tablet, Take 1 tablet by mouth every morning, Disp: 30 tablet, Rfl: 2    topiramate (TOPAMAX) 100 MG tablet, Take 1 tablet by mouth 2 times daily, Disp: 60 tablet, Rfl: 3    Erenumab-aooe (AIMOVIG) 140 MG/ML SOAJ, Inject 140 mg into the skin every 30 days, Disp: 1 pen, Rfl: 3    pantoprazole (PROTONIX) 40 MG tablet, Take 40 mg by mouth 2 times daily , Disp: , Rfl:     hydroxychloroquine (PLAQUENIL) 200 MG tablet, Take 200 mg by mouth 2 times daily , Disp: , Rfl:     tiotropium (SPIRIVA) 18 MCG inhalation capsule, Inhale 18 mcg into the lungs daily, Disp: , Rfl:     butalbital-acetaminophen-caffeine (FIORICET, ESGIC) -40 MG per tablet, Take 1 tablet by mouth every 4 hours as needed for Headaches, Disp: , Rfl:     nitroglycerin (NITROSTAT) 0.6 MG SL tablet, Place 0.6 mg under the tongue every 5 minutes as needed for Chest pain up to max of 3 total doses. If no relief after 1 dose, call 911., Disp: , Rfl:     atorvastatin (LIPITOR) 20 MG tablet, Take 40 mg by mouth daily , Disp: , Rfl:     ondansetron (ZOFRAN) 4 MG tablet, Take 1 tablet by mouth 3 times daily as needed for Nausea or Vomiting, Disp: 15 tablet, Rfl: 0    PHYSICAL EXAMINATION / OBJECTIVE     Objective:  /68 (Site: Left Upper Arm, Position: Sitting, Cuff Size: Medium Adult)   Pulse 113   Temp 97 °F (36.1 °C)   Ht 5' 5.98\" (1.676 m)   Wt 160 lb 11.5 oz (72.9 kg)   SpO2 97%   BMI 25.95 kg/m²     General Appearance:   alert and oriented to person, place and time well-developed and well nourished  Physch : appropriate affects ,   Head:  Normocephalic and atraumatic  Eyes: No gross abnormalities. , PERRL, Sclera nonicteric, conjunctiva non-INJECTED  ENT:  MMM,  no deformities , NO oral/nasal sores, Non-tender sinuses. Neck:  Neck supple, Non-tender, No  mass, thyromegaly,    Lymph:  No cervical  or  supraclavicular lymph node swelling. Pulmonary/Chest:  CTA bilat. , normal air movement, no respiratory distress  Cardiovascular:  Normal rate, + S1 and S2,  NO murmurs , rubs, gallups,     * edema  :  Deferred   Abd/GI: Deferred   Neurologic:  gait and coordination normal and speech normal  Intact, symmetric bilateral  DTR's 2/4 Patellar,  Achilles,  biceps,  triceps,  brachioradialis   Skin:  Skin color and temp ,  No rashes or lesions  Extremities:  No clubbing ,     Musculoskeletal:  Strength 4/5 bilat upper and lower ext (shoulder, , hips, knees,)   Upper extremities:    SHOULDERS - tender right. , + hawking right  ,   ELBOWS tender l ,   WRISTS Non-tender, No swelling ,   HANDS/FINGERS    CMCs: tender bilat, cmc grind bilat, squaring left. MCPs tender right 2,3  PIPs tender left 5  , w/ dorsal left 5th PIP mobile nodule  DIPs + dipsnidules     Lower extremities:  HIPS tender bilat.    KNEES tender medal joint space. ANKLES Non-tender,    FEET : tender mid soles bilat, mtps bilat. Spine:   C-spine, T-spine & L-spine:  + tender - upper shoulder/traps and lumbar spine. ,  ROM ,  abundio, , SLR/Cross SLR.         KEY:  Tender : T  Swelling: S  Non-tender : NT  No swelling: NS           LABS        CBC  Lab Results   Component Value Date    WBC 7.3 02/05/2021    RBC 4.72 02/05/2021    HGB 13.7 02/05/2021    HCT 41.7 02/05/2021    MCV 88.4 02/05/2021    MCH 29.0 02/05/2021    MCHC 32.8 02/05/2021    RDW 13.7 02/05/2021     02/05/2021       CMP  Lab Results   Component Value Date    CALCIUM 9.20 02/20/2021    LABALBU 4.5 02/20/2021    PROT 6.9 02/20/2021     02/20/2021    K 3.4 02/20/2021    CO2 26 02/20/2021     02/20/2021    BUN 14 02/20/2021    CREATININE 0.68 02/20/2021    ALKPHOS 89 02/20/2021    ALT 18 02/20/2021    AST 15 02/20/2021       HgBA1c: No components found for: HGBA1C    Lab Results   Component Value Date    TSH 1.697 02/05/2021     No results found for: VITD25      No results found for: ANASCRN  No results found for: SSA  No results found for: SSB  No results found for: ANTI-SMITH  No results found for: DSDNAAB   No results found for: ANTIRNP  No results found for: C3, C4  No results found for: CCPAB  Lab Results   Component Value Date    RF <10 09/16/2020       No components found for: CANCASCRN, APANCASCRN  Lab Results   Component Value Date    SEDRATE 9 09/22/2020     Lab Results   Component Value Date    CRP < 0.5 02/05/2021       RADIOLOGY:     PROCEDURE: XR SHOULDER LEFT (MIN 2 VIEWS)       CLINICAL INFORMATION: Left shoulder pain       TECHNIQUE: 4 views of the left shoulder       COMPARISON: None       FINDINGS: There is no fracture or dislocation. Joint spaces are preserved. No soft tissue or osseous abnormalities are identified. Impression       No fracture or dislocation.      PROCEDURE: XR KNEE RIGHT (3 VIEWS)       CLINICAL INFORMATION: Right knee pain       TECHNIQUE: 3 views of the right knee       COMPARISON: None       FINDINGS: There is no fracture or dislocation. Joint spaces are preserved. No soft tissue or osseous abnormalities are identified. Impression       No fracture or dislocation. CLINICAL INFORMATION: Low back pain, unspecified back pain laterality, unspecified chronicity, unspecified whether sciatica present. COMPARISON: No prior study. TECHNIQUE: AP, lateral and swimmer's view       FINDINGS:    There is a levocurvature of the thoracic spine. There is otherwise anatomic vertebral body height and alignment. No fracture of the thoracic vertebral column is identified. Intervertebral disc spaces appear preserved. There are minimal anterior    osteophytes. Impression    Mild levocurvature with mild degenerative changes anteriorly. Narrative   PROCEDURE: CT CERVICAL SPINE WO CONTRAST       CLINICAL INFORMATION: Cervicalgia. Neck pain, especially on the right. Nerve pain. Prior stroke with some right-sided weakness. COMPARISON: Cervical spine x-rays 9/22/2020. TECHNIQUE: 3 mm noncontrast axial images were obtained through the cervical spine with sagittal and coronal reconstructions. All CT scans at this facility use dose modulation, iterative reconstruction, and/or weight-based dosing when appropriate to reduce radiation dose to as low as reasonably achievable. FINDINGS:               There is 3 mm of anterolisthesis of C3 on C4. There is stepwise retrolisthesis of C4 on C5 and C5 on C6. There is osseous demineralization. There is no fracture. There is no prevertebral soft tissue swelling. There is a plate loss of disc height at the    C4-5 and C5-6 levels. There are some scattered facet degenerative changes. These are most pronounced in the upper cervical spine and they are worse on the left than the right. No suspicious osseous lesions are present.        There are no gross abnormalities within the spinal canal.       On the axial images, at C2-3, there is no spinal canal stenosis. There is mild bilateral foraminal stenosis. At C3-4, there is no spinal canal stenosis. There is mild bilateral foraminal stenosis. At C4-5, there is mild spinal canal stenosis. There is moderate severity left and mild right foraminal stenosis. At C5-6, there is no spinal canal stenosis. There is moderate to severe left foraminal stenosis. There is no right foraminal stenosis. At C6-7, there is no spinal canal or foraminal stenosis. At C7-T1, there is no spinal canal or foraminal stenosis. There are no suspicious findings in the cervical soft tissues. There are no suspicious findings in the lung apices. Impression       1. Mild spinal canal stenosis at the C4-5 level with moderate severity left and mild right foraminal stenosis. 2. Moderate-severe left foraminal stenosis at the C5-6 level. ASSESSMENT/PLAN      1. Polyarthralgia    2. H/O: CVA (cerebrovascular accident)    3. H/O mixed connective tissue disease    4. Medication monitoring encounter        1. Polyarthralgia  2. H/O mixed connective tissue disease   - reported hx of inflammatory arthritis, malar rash, nasla sores, oral sores,  Muscle inflammation.    - baseline serologic evaluation as outlined below. - cont plaquenil 200mg twice daily   - request outside rheumatology records. -     CBC Auto Differential; Future  -     Comprehensive Metabolic Panel; Future  -     Sedimentation Rate; Future  -     C-Reactive Protein; Future  -     Miscellaneous Sendout 1; Future  -     C4 Complement; Future  -     C3 Complement; Future  -     Protein / creatinine ratio, urine; Future  -     Urinalysis; Future  -     CK; Future  -     Aldolase; Future  -     Lactate Dehydrogenase; Future  -     Lupus Anticoagulant;  Future  -     AFL - Veena Leal MD, Opthalmology, [place] [email or web address]    3 H/O: CVA (cerebrovascular accident)   - evaluation for APLS ab's. , prior smoker, no h/o miscarriages, eclpamsia or pre-eclampsia     4. Medication monitoring encounter  -     AFL - Veena Leal MD, Opthalmology, [facility] [place] [email or web address] -  For plaquenil eye examiantion. Return in about 2 months (around 5/30/2021). Electronically signed by Mara Pena DO on 3/30/2021 at 12:32 PM    New Prescriptions    No medications on file       3/30/2021       The risks and benefits of my recommendations, as well as other treatment options, benefits and side effects were discussed with the patient today. Questions were answered. Thank you for allowing me to participate in the care of this patient. Please call if there are any questions.

## 2021-04-01 ENCOUNTER — HOSPITAL ENCOUNTER (OUTPATIENT)
Dept: SPEECH THERAPY | Age: 61
Setting detail: THERAPIES SERIES
End: 2021-04-01
Payer: MEDICARE

## 2021-04-01 LAB
ALDOLASE: 5.6 U/L (ref 1.5–8.1)
C3 COMPLEMENT: 131 MG/DL (ref 90–180)
COMPLEMENT C4: 25 MG/DL (ref 10–40)

## 2021-04-02 LAB
MYELOPEROXIDASE AB, IGG: 0 AU/ML (ref 0–19)
NEUTROPHIL CYTOPLASMIC AB IGG: NORMAL
SERINE PROTEASE 3, IGG: 1 AU/ML (ref 0–19)

## 2021-04-03 LAB
DRVVT 1:1 MIX: ABNORMAL SEC (ref 33–44)
DRVVT CONFIRMATION TEST: ABNORMAL RATIO
DRVVT SCREEN: 33 SEC (ref 33–44)
HEXAGONAL PHOSPHOLIPID NEUTRALIZAT TEST: ABNORMAL
LUPUS ANTICOAG INTERP: ABNORMAL
PLATELET NEUTRALIZATION: ABNORMAL
PROTHROMBIN TIME: 12.4 SEC (ref 12–15.5)
PTT 1:1 MIX: ABNORMAL SEC (ref 32–48)
PTT LUPUS ANTICOAGULANT: 31 SEC (ref 32–48)
PTT-HEPARIN NEUTRALIZED: ABNORMAL SEC (ref 32–48)
REPTILASE TIME: ABNORMAL SEC
THROMBIN TIME: ABNORMAL SEC (ref 14.7–19.5)

## 2021-04-06 ENCOUNTER — TELEPHONE (OUTPATIENT)
Dept: RHEUMATOLOGY | Age: 61
End: 2021-04-06

## 2021-04-06 NOTE — TELEPHONE ENCOUNTER
----- Message from Fernando Diggs DO sent at 4/6/2021  7:51 AM EDT -----  Regarding: please request patients prior rheumatology records. Will you please request the prior rheumatology records for review.

## 2021-04-06 NOTE — TELEPHONE ENCOUNTER
Spoke with patient. Dr Mesha Brennan is the physician she saw in [place] at [facility]. Phone [phone] and fax [phone] filled out and pending patients signature. She states she will stop in this afternoon to sign so we can fax it.  JACQUE given to [facility]

## 2021-04-08 ENCOUNTER — HOSPITAL ENCOUNTER (OUTPATIENT)
Dept: SPEECH THERAPY | Age: 61
Setting detail: THERAPIES SERIES
Discharge: HOME | End: 2021-04-08
Payer: MEDICARE

## 2021-04-08 LAB — MISC REFERENCE: NORMAL

## 2021-04-08 PROCEDURE — 97130 THER IVNTJ EA ADDL 15 MIN: CPT | Performed by: SPEECH-LANGUAGE PATHOLOGIST

## 2021-04-08 PROCEDURE — 97129 THER IVNTJ 1ST 15 MIN: CPT | Performed by: SPEECH-LANGUAGE PATHOLOGIST

## 2021-04-08 NOTE — PROGRESS NOTES
[address] [facility]  [] SPEECH LANGUAGE COGNITIVE EVALUATION  [x] DAILY NOTE   [] PROGRESS NOTE [] DISCHARGE NOTE    [x] [facility] [place]   [] [facility]    [] [facility]   [] Arina Lewis    Date: 2021  Patient Name:  Demian Lu  : 1960  MRN: [id number]    Referring Practitioner ESTEFANIA Ahmadi *   Diagnosis Mild cognitive impairment, so stated [G31.84]    Treatment Diagnosis Cognitive-communication deficits   Date of Evaluation 3/10/21      Functional Outcome Measure Used CLQT   Functional Outcome Score 3.4/4.0 (3/10/21)       Insurance: Primary: Payor: Lauryn Kerns /  /  / ,   Secondary: [facility]   Authorization Information: Medicare   Visit # 3, 3/10 for progress note   Visits Allowed: Unlimited visits based on medical necessity   Recertification Date:    Physician Follow-Up: Dr. Yaima Hart (Neurologist) - 2021; Jonny Flynn - ?? Physician Orders: Speech therapy   Pertinent History: Patient reports she had 2 TIA's about 4-5 years ago. Patient reports she had more symptoms/deficits from the second TIA. Patient reports she still has some weakness on the right side. Patient reports she has memory deficits and it has gotten progressively worse over the last 2 years. Patient reports she has a cane that she uses some times d/t loss of balance. Reports she has also had several falls recently. SUBJECTIVE: Patient reports she has a headache this date. Patient reported the pain is 6/10 (recently took medicine). Patient reports the Neurologist changed her from aricept to namenda, but she hasn't started it yet. Patient reports she was also tested for myasthenia gravis.       SHORT TERM GOAL #1:  Goal 1: Patient will verbalize and utilize word finding strategies to complete higher level naming tasks with no more than min cues, 80% of the time for improved success communicating with family with no more than min cues for improved success with completing IADL's. INTERVENTIONS:   Working memory task completed in which the patient was asked to complete a basic task or answer a basic question. Immediately following, patient was asked to complete another basic task. After the second task was completed, the patient was asked to recall the information from the first task/question (brief delay). Patient recalled the information without cues on 8/10 trials, min cues on 2/10 trials. Patient noted to utilize visualization as a strategy to aid in recall. **Patient reports she has difficulty with telling time with an analog clock as well as counting change back. Plan to address in the next session to determine the need for adding a goal.    Assessment: Progressing towards goals. Specific Interventions Next Treatment: high level word finding task, 4-5 minute conversation with use of word finding strategies as needed, short term memory task, working memory task    Activity/Treatment Tolerance:  [x]  Patient tolerated treatment well  []  Patient limited by fatigue  []  Patient limited by pain   []  Patient limited by other medical complications  []  Other:     Patient Education:   [x]  HEP/Education Completed: Plan of Care, Goals, word finding strategies, memory strategies  []  No new Education completed  []  Reviewed Prior HEP      [x]  Patient verbalized and/or demonstrated understanding of education provided. []  Patient unable to verbalize and/or demonstrate understanding of education provided. Will continue education. []  Barriers to learning:     PLAN:  []  Plan of care initiated. Plan to see patient 2 times per week for 6 weeks to address the treatment planned outlined above.   [x]  Continue with current plan of care  []  Modify plan of care as follows:    []  Hold pending physician visit  []  Discharge    Time In 1503   Time Out 1533   Timed Code Minutes: 30 min   Total Treatment Time: 30 min Cori Orourke, M.S. [id number] [phone]

## 2021-04-15 ENCOUNTER — NURSE ONLY (OUTPATIENT)
Dept: LAB | Age: 61
End: 2021-04-15

## 2021-04-15 ENCOUNTER — OFFICE VISIT (OUTPATIENT)
Dept: FAMILY MEDICINE CLINIC | Age: 61
End: 2021-04-15
Payer: MEDICARE

## 2021-04-15 ENCOUNTER — VIRTUAL VISIT (OUTPATIENT)
Dept: PSYCHIATRY | Age: 61
End: 2021-04-15
Payer: MEDICARE

## 2021-04-15 VITALS
DIASTOLIC BLOOD PRESSURE: 60 MMHG | WEIGHT: 161 LBS | RESPIRATION RATE: 12 BRPM | BODY MASS INDEX: 25.88 KG/M2 | SYSTOLIC BLOOD PRESSURE: 96 MMHG | TEMPERATURE: 98.1 F | OXYGEN SATURATION: 96 % | HEIGHT: 66 IN | HEART RATE: 75 BPM

## 2021-04-15 DIAGNOSIS — G43.409 HEMIPLEGIC MIGRAINE WITHOUT STATUS MIGRAINOSUS, NOT INTRACTABLE: ICD-10-CM

## 2021-04-15 DIAGNOSIS — I69.30 HISTORY OF CVA WITH RESIDUAL DEFICIT: ICD-10-CM

## 2021-04-15 DIAGNOSIS — E78.5 HYPERLIPIDEMIA, UNSPECIFIED HYPERLIPIDEMIA TYPE: ICD-10-CM

## 2021-04-15 DIAGNOSIS — K76.0 FATTY LIVER DISEASE, NONALCOHOLIC: ICD-10-CM

## 2021-04-15 DIAGNOSIS — Z00.00 HEALTH CARE MAINTENANCE: ICD-10-CM

## 2021-04-15 DIAGNOSIS — F41.9 ANXIETY: ICD-10-CM

## 2021-04-15 DIAGNOSIS — J32.2 CHRONIC ETHMOIDAL SINUSITIS: ICD-10-CM

## 2021-04-15 DIAGNOSIS — L82.1 SEBORRHEIC KERATOSIS: Primary | ICD-10-CM

## 2021-04-15 DIAGNOSIS — F32.A DEPRESSION, UNSPECIFIED DEPRESSION TYPE: ICD-10-CM

## 2021-04-15 DIAGNOSIS — F33.41 RECURRENT MAJOR DEPRESSIVE DISORDER, IN PARTIAL REMISSION (HCC): ICD-10-CM

## 2021-04-15 DIAGNOSIS — R41.3 MEMORY PROBLEM: ICD-10-CM

## 2021-04-15 DIAGNOSIS — I87.1 MAY-THURNER SYNDROME: ICD-10-CM

## 2021-04-15 DIAGNOSIS — F43.10 PTSD (POST-TRAUMATIC STRESS DISORDER): Primary | ICD-10-CM

## 2021-04-15 DIAGNOSIS — M35.1 MIXED CONNECTIVE TISSUE DISEASE (HCC): ICD-10-CM

## 2021-04-15 LAB
CHOLESTEROL, TOTAL: 210 MG/DL (ref 100–199)
HDLC SERPL-MCNC: 46 MG/DL
LDL CHOLESTEROL CALCULATED: 140 MG/DL
TRIGL SERPL-MCNC: 122 MG/DL (ref 0–199)

## 2021-04-15 PROCEDURE — 1036F TOBACCO NON-USER: CPT | Performed by: STUDENT IN AN ORGANIZED HEALTH CARE EDUCATION/TRAINING PROGRAM

## 2021-04-15 PROCEDURE — 99204 OFFICE O/P NEW MOD 45 MIN: CPT | Performed by: STUDENT IN AN ORGANIZED HEALTH CARE EDUCATION/TRAINING PROGRAM

## 2021-04-15 PROCEDURE — 99443 PR PHYS/QHP TELEPHONE EVALUATION 21-30 MIN: CPT | Performed by: PSYCHIATRY & NEUROLOGY

## 2021-04-15 PROCEDURE — G8427 DOCREV CUR MEDS BY ELIG CLIN: HCPCS | Performed by: STUDENT IN AN ORGANIZED HEALTH CARE EDUCATION/TRAINING PROGRAM

## 2021-04-15 PROCEDURE — 3017F COLORECTAL CA SCREEN DOC REV: CPT | Performed by: STUDENT IN AN ORGANIZED HEALTH CARE EDUCATION/TRAINING PROGRAM

## 2021-04-15 PROCEDURE — G8419 CALC BMI OUT NRM PARAM NOF/U: HCPCS | Performed by: STUDENT IN AN ORGANIZED HEALTH CARE EDUCATION/TRAINING PROGRAM

## 2021-04-15 RX ORDER — CYCLOBENZAPRINE HCL 10 MG
TABLET ORAL 2 TIMES DAILY PRN
COMMUNITY
Start: 2020-09-16

## 2021-04-15 RX ORDER — MEMANTINE HYDROCHLORIDE 5 MG/1
5 TABLET ORAL DAILY
COMMUNITY

## 2021-04-15 RX ORDER — DIAZEPAM 5 MG/1
TABLET ORAL EVERY 8 HOURS PRN
COMMUNITY
Start: 2021-01-22 | End: 2021-05-17

## 2021-04-15 RX ORDER — PRAMIPEXOLE DIHYDROCHLORIDE 0.12 MG/1
0.12 TABLET ORAL
COMMUNITY
Start: 2021-04-14 | End: 2021-05-17

## 2021-04-15 RX ORDER — ATORVASTATIN CALCIUM 40 MG/1
40 TABLET, FILM COATED ORAL DAILY
Status: ON HOLD | COMMUNITY
Start: 2020-09-16 | End: 2021-06-14

## 2021-04-15 RX ORDER — FLUTICASONE PROPIONATE 50 MCG
SPRAY, SUSPENSION (ML) NASAL PRN
Status: ON HOLD | COMMUNITY
Start: 2020-09-16 | End: 2021-06-15 | Stop reason: HOSPADM

## 2021-04-15 RX ORDER — TRAZODONE HYDROCHLORIDE 150 MG/1
225 TABLET ORAL NIGHTLY
Qty: 45 TABLET | Refills: 2 | Status: SHIPPED | OUTPATIENT
Start: 2021-04-15 | End: 2021-06-28 | Stop reason: SDUPTHER

## 2021-04-15 RX ORDER — BUPROPION HYDROCHLORIDE 150 MG/1
150 TABLET ORAL EVERY MORNING
Qty: 30 TABLET | Refills: 2 | Status: SHIPPED | OUTPATIENT
Start: 2021-04-15 | End: 2021-06-28 | Stop reason: SDUPTHER

## 2021-04-15 RX ORDER — POTASSIUM CHLORIDE 750 MG/1
TABLET, FILM COATED, EXTENDED RELEASE ORAL PRN
COMMUNITY
Start: 2020-09-16

## 2021-04-15 RX ORDER — CLOTRIMAZOLE AND BETAMETHASONE DIPROPIONATE 10; .64 MG/G; MG/G
CREAM TOPICAL PRN
COMMUNITY
Start: 2020-09-16

## 2021-04-15 RX ORDER — FUROSEMIDE 20 MG/1
TABLET ORAL PRN
COMMUNITY
Start: 2020-09-16

## 2021-04-15 SDOH — HEALTH STABILITY: MENTAL HEALTH: HOW OFTEN DO YOU HAVE A DRINK CONTAINING ALCOHOL?: NOT ASKED

## 2021-04-15 ASSESSMENT — ENCOUNTER SYMPTOMS
SHORTNESS OF BREATH: 0
SINUS PAIN: 1
ABDOMINAL PAIN: 0
BACK PAIN: 1
ABDOMINAL DISTENTION: 0
COUGH: 0
NAUSEA: 0
DIARRHEA: 0
SORE THROAT: 0
SINUS PRESSURE: 1
CONSTIPATION: 0
VOMITING: 0

## 2021-04-15 NOTE — PROGRESS NOTES
Health Maintenance Due   Topic Date Due    HIV screen  Never done Pended    COVID-19 Vaccine (1) Never done Declined    DTaP/Tdap/Td vaccine (1 - Tdap) Never done    Cervical cancer screen  Never done    Shingles Vaccine (1 of 2) Never done Will get completed at pharmacy    Colon cancer screen colonoscopy  Never done    Annual Wellness Visit (AWV)  Never done Will schedule

## 2021-04-15 NOTE — PROGRESS NOTES
buPROPion (WELLBUTRIN XL) 300 MG extended release tablet Take 1 tablet by mouth every morning 30 tablet 2    topiramate (TOPAMAX) 100 MG tablet Take 1 tablet by mouth 2 times daily (Patient taking differently: Take 100 mg by mouth daily ) 60 tablet 3    Erenumab-aooe (AIMOVIG) 140 MG/ML SOAJ Inject 140 mg into the skin every 30 days 1 pen 3    pantoprazole (PROTONIX) 40 MG tablet Take 40 mg by mouth 2 times daily       hydroxychloroquine (PLAQUENIL) 200 MG tablet Take 200 mg by mouth daily       butalbital-acetaminophen-caffeine (FIORICET, ESGIC) -40 MG per tablet Take 1 tablet by mouth every 4 hours as needed for Headaches      nitroglycerin (NITROSTAT) 0.6 MG SL tablet Place 0.6 mg under the tongue every 5 minutes as needed for Chest pain up to max of 3 total doses. If no relief after 1 dose, call 911.       ondansetron (ZOFRAN) 4 MG tablet Take 1 tablet by mouth 3 times daily as needed for Nausea or Vomiting 15 tablet 0    memantine (NAMENDA) 5 MG tablet Take 5 mg by mouth daily       traZODone (DESYREL) 150 MG tablet Take 1.5 tablets by mouth nightly 45 tablet 2    buPROPion (WELLBUTRIN XL) 150 MG extended release tablet Take 1 tablet by mouth every morning Take with 300 mg tablet for total 450 mg once daily in the morning 30 tablet 2    fluconazole (DIFLUCAN) 150 MG tablet Take 1 tablet by mouth once a week for 4 doses (Patient not taking: Reported on 4/15/2021) 4 tablet 0    tiotropium (SPIRIVA) 18 MCG inhalation capsule Inhale 18 mcg into the lungs daily as needed        Current Facility-Administered Medications   Medication Dose Route Frequency Provider Last Rate Last Admin    onabotulinumtoxin A (BOTOX) injection 200 Units  200 Units Intramuscular Once Leandro Quiroz, DO           Social Needs   Food insecurity    Worry: Not on file    Inability: Not on file       Health Maintenance   Topic Date Due    HIV screen  Never done    COVID-19 Vaccine (1) Never done    DTaP/Tdap/Td vaccine (1 - Tdap) Never done    Cervical cancer screen  Never done    Shingles Vaccine (1 of 2) Never done    Colon cancer screen colonoscopy  Never done    Flu vaccine (Season Ended) 09/01/2021    Potassium monitoring  03/30/2022    Creatinine monitoring  03/30/2022    Lipid screen  04/15/2022    Breast cancer screen  10/12/2022    Hepatitis C screen  Completed    Hepatitis A vaccine  Aged Out    Hepatitis B vaccine  Aged Out    Hib vaccine  Aged Out    Meningococcal (ACWY) vaccine  Aged Out    Pneumococcal 0-64 years Vaccine  Aged Out       ROS:      Review of Systems   Constitutional: Negative for activity change, appetite change, chills, fatigue and fever. HENT: Positive for sinus pressure and sinus pain. Negative for congestion and sore throat. Eyes: Negative for visual disturbance. Respiratory: Negative for cough and shortness of breath. Cardiovascular: Positive for leg swelling. Negative for chest pain and palpitations. Left LE   Gastrointestinal: Negative for abdominal distention, abdominal pain, constipation, diarrhea, nausea and vomiting. Genitourinary: Negative for dysuria and menstrual problem. Musculoskeletal: Positive for back pain and neck pain. Negative for arthralgias. Skin: Negative for rash. Neurological: Negative for dizziness, weakness, light-headedness, numbness and headaches. Psychiatric/Behavioral: Negative for dysphoric mood, sleep disturbance and suicidal ideas. The patient is not nervous/anxious. Objective:     Vitals:    04/15/21 1012   BP: 96/60   Site: Right Upper Arm   Position: Sitting   Cuff Size: Medium Adult   Pulse: 75   Resp: 12   Temp: 98.1 °F (36.7 °C)   TempSrc: Oral   SpO2: 96%   Weight: 161 lb (73 kg)   Height: 5' 5.98\" (1.676 m)       Body mass index is 26 kg/m².     Wt Readings from Last 3 Encounters:   04/15/21 161 lb (73 kg)   03/30/21 160 lb 11.5 oz (72.9 kg)   03/23/21 160 lb 11.2 oz (72.9 kg)     BP Readings from Last 3 Encounters:   04/15/21 96/60   03/30/21 110/68   03/23/21 92/68       Physical Exam  Vitals signs reviewed. Constitutional:       General: She is not in acute distress. Appearance: Normal appearance. HENT:      Head: Normocephalic and atraumatic. Right Ear: External ear normal.      Left Ear: External ear normal.      Nose:      Right Sinus: Maxillary sinus tenderness present. Mouth/Throat:      Mouth: Mucous membranes are moist.   Eyes:      Conjunctiva/sclera: Conjunctivae normal.   Neck:      Musculoskeletal: Normal range of motion. Cardiovascular:      Rate and Rhythm: Normal rate and regular rhythm. Pulses: Normal pulses. Heart sounds: Normal heart sounds. No murmur. No gallop. Pulmonary:      Effort: Pulmonary effort is normal. No respiratory distress. Breath sounds: Normal breath sounds. No wheezing, rhonchi or rales. Abdominal:      General: Abdomen is flat. Bowel sounds are normal. There is no distension. Palpations: Abdomen is soft. Tenderness: There is no abdominal tenderness. Musculoskeletal:         General: Swelling present. Left lower leg: Edema present. Skin:     General: Skin is warm and dry. Capillary Refill: Capillary refill takes less than 2 seconds. Neurological:      General: No focal deficit present. Mental Status: She is alert. Motor: Pronator drift present. Comments: Some difficulty with word-finding   Psychiatric:         Mood and Affect: Mood normal.         Behavior: Behavior normal.         Assessment / Plan:     1. Seborrheic keratosis  -Skin lesion looks like seborrheic keratosis. Instructed to follow it and let us know if it changes in size. May consider punch biopsy if changes. 2. History of CVA with residual deficit  -Follow with neurology and speech therapy     3. May-Thurner syndrome  -Follow with cardiology and lymphedema clinic.      4. Mixed connective tissue disease ([facility] [address]  -Follow with rheumatology    5. Hyperlipidemia, unspecified hyperlipidemia type  -Continue Lipitor. Will check Lipitor. Discussed changing to TID or switching to lower potency statin due to side effects.  - Lipid Panel; Future    6. Chronic ethmoidal sinusitis  -Follow with ENT     7. Depression, unspecified depression type  -Follow with psychiatry    8. Hemiplegic migraine without status migrainosus, not intractable  -Follow with neurology    9. Fatty liver disease, nonalcoholic  -Follow with endocrinology    10. Health care maintenance  -Obtain records for colonoscopy, pap smear. Will check HIV screen. - HIV Screen; Future           Return in about 6 months (around 10/15/2021). Medications Prescribed:  No orders of the defined types were placed in this encounter. Future Appointments   Date Time Provider Bharat Veliz   4/19/2021 11:00 AM Leandro Clifford DO N [facility] [address]   4/21/2021  2:00 PM Anna Sarkar, [facility]   4/27/2021  4:00 PM STR CT IMAGING RM1 STRZ CT SCAN STR Radiolog   4/29/2021  3:00 PM Mynor Fuentes MD N ENT [address]   6/3/2021 12:30 PM Tylor Schaefer MD N [facility] Heart [address]   6/8/2021 10:20 AM DO JAIMIE Bernal [facility]um [facility] [place]   6/28/2021  1:30 PM MD Blade Wong [place] [facility] [address]   7/2/2021  3:30 PM Parish Gardner MD N [facility] [address]   10/19/2021  1:00 PM Masood Medrano MD [facility] FM RES [address]       Patient given educational materials - see patient instructions. Discussed use, benefit, and side effects of prescribed medications. All patient questions answered. Patient voiced understanding. Reviewed health maintenance. Instructed to continue current medications, diet and exercise. Patient agreed with treatment plan. Follow up as directed.      Electronically signed by Masood Medrano MD on 4/15/2021 at 10:08 PM

## 2021-04-15 NOTE — PATIENT INSTRUCTIONS
Thank you   1. Thank you for trusting us with your healthcare needs. You may receive a survey regarding today's visit. It would help us out if you would take a few moments to provide your feedback. We value your input. 2. Please bring in ALL medications BOTTLES, including inhalers, herbal supplements, over the counter, prescribed & non-prescribed medicine. The office would like actual medication bottles and a list.   3. Please note our OFFICE POLICIES:   a. Prior to getting your labs drawn, please check with your insurance company for benefits and eligibility of lab services. Often, insurance companies cover certain tests for preventative visits only. It is patient's responsibility to see what is covered. b. We are unable to change a diagnosis after the test has been performed. c. Lab orders will not be re-printed. Please hold onto your original lab orders and take them to your lab to be completed. d. If you no show your scheduled appointment three times, you will be dismissed from this practice. e. Vearl Hey must be completed 24 hours prior to your schedule appointment. 4. If the list below has been completed, PLEASE FAX RECORDS TO OUR OFFICE @ [phone].  Once the records have been received we will update your records at our office:  Health Maintenance Due   Topic Date Due    HIV screen  Never done    COVID-19 Vaccine (1) Never done    DTaP/Tdap/Td vaccine (1 - Tdap) Never done    Cervical cancer screen  Never done    Shingles Vaccine (1 of 2) Never done    Colon cancer screen colonoscopy  Never done    Annual Wellness Visit (AWV)  Never done

## 2021-04-15 NOTE — PROGRESS NOTES
S: 62 y.o. female with   Chief Complaint   Patient presents with    New Patient     Establish care also lump on Right side of head feels that it has increased in size       HPI: est care. Has multiple specialists. MT syndrome seeing dr. Lisbeth Meneses. Managed there. lymphedemain with PT twice weekly. Stroke 5 yrs ago with significant cognitive deficits, right sided weakness. Active in speech therapy. Migraines: managed by neuro and PMR for botox injections as well as a doc in [facility]. Sinus surgeries and problem sin the past managed by ENT. Mixed CT disorder. Managed by Dr. Berlin Lamb. Adrenal insufficiency managed by endo. NAFLD told to lose weight, using optiva and managed by endo. BP Readings from Last 3 Encounters:   04/15/21 96/60   03/30/21 110/68   03/23/21 92/68     Wt Readings from Last 3 Encounters:   04/15/21 161 lb (73 kg)   03/30/21 160 lb 11.5 oz (72.9 kg)   03/23/21 160 lb 11.2 oz (72.9 kg)           O: VS:  height is 5' 5.98\" (1.676 m) and weight is 161 lb (73 kg). Her oral temperature is 98.1 °F (36.7 °C). Her blood pressure is 96/60 and her pulse is 75. Her respiration is 12 and oxygen saturation is 96%. AAO/NAD, appropriate affect for mood  Raised skin colored lesion with bumpy appearance. Diagnosis Orders   1. Seborrheic keratosis     2. History of CVA with residual deficit     3. May-Thurner syndrome     4. Mixed connective tissue disease ([facility] [address]     5. Hyperlipidemia, unspecified hyperlipidemia type  Lipid Panel   6. Chronic ethmoidal sinusitis     7. Depression, unspecified depression type     8. Hemiplegic migraine without status migrainosus, not intractable     9. Fatty liver disease, nonalcoholic     10. Health care maintenance  HIV Screen       Plan:  Continue f/u with consultants. Skin lesions looks like a seb K but she is to let us know if it's growing or changing.        Health Maintenance Due   Topic Date Due    HIV screen  Never done    COVID-19 Vaccine (1) Never done    DTaP/Tdap/Td vaccine (1 - Tdap) Never done    Cervical cancer screen  Never done    Shingles Vaccine (1 of 2) Never done    Colon cancer screen colonoscopy  Never done         Attending Physician Statement  I have discussed the case, including pertinent history and exam findings with the resident. I also have seen the patient and performed key portions of the examination. I agree with the documented assessment and plan as documented by the resident.         Laquita Diggs DO 4/16/2021 11:41 AM
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
## 2021-04-16 NOTE — RESULT ENCOUNTER NOTE
Leonardo Velazquez. Nice to meet you today. I just reviewed your lipid panel. Your total cholesterol is still elevated. There are some improvements in the LDL and triglycerides. We will need to continue the statin. If you are having muscle pains, take it every other day. If you continue to have pain, we can switch to a lower potency one. The results from the HIV test have not been released yet. I will let you know when I receive them. Please let me know if you have any questions or concerns.  Thanks,  Dr. Aliya Llanos

## 2021-04-17 LAB — HIV-2 AB: NEGATIVE

## 2021-04-19 ENCOUNTER — OFFICE VISIT (OUTPATIENT)
Dept: PHYSICAL MEDICINE AND REHAB | Age: 61
End: 2021-04-19
Payer: MEDICARE

## 2021-04-19 VITALS
WEIGHT: 161 LBS | DIASTOLIC BLOOD PRESSURE: 68 MMHG | BODY MASS INDEX: 25.88 KG/M2 | SYSTOLIC BLOOD PRESSURE: 94 MMHG | HEIGHT: 66 IN

## 2021-04-19 DIAGNOSIS — M54.59 LUMBAR FACET JOINT PAIN: ICD-10-CM

## 2021-04-19 DIAGNOSIS — G43.019 INTRACTABLE MIGRAINE WITHOUT AURA AND WITHOUT STATUS MIGRAINOSUS: Primary | ICD-10-CM

## 2021-04-19 DIAGNOSIS — G89.29 OTHER CHRONIC PAIN: ICD-10-CM

## 2021-04-19 PROCEDURE — 99214 OFFICE O/P EST MOD 30 MIN: CPT | Performed by: ANESTHESIOLOGY

## 2021-04-19 PROCEDURE — 1036F TOBACCO NON-USER: CPT | Performed by: ANESTHESIOLOGY

## 2021-04-19 PROCEDURE — G8427 DOCREV CUR MEDS BY ELIG CLIN: HCPCS | Performed by: ANESTHESIOLOGY

## 2021-04-19 PROCEDURE — G8419 CALC BMI OUT NRM PARAM NOF/U: HCPCS | Performed by: ANESTHESIOLOGY

## 2021-04-19 PROCEDURE — 3017F COLORECTAL CA SCREEN DOC REV: CPT | Performed by: ANESTHESIOLOGY

## 2021-04-19 NOTE — RESULT ENCOUNTER NOTE
Good news - The results of the HIV test are negative. Let me know if you have any questions or concerns about your lab results.   Thanks,  Dr. Sagar Castrejon

## 2021-04-19 NOTE — PROGRESS NOTES
Chronic Pain Clinic Note     Encounter Date: 4/19/21    Subjective:   Chief Complaint:   Chief Complaint   Patient presents with    Follow-up       History of Present Illness:   Hellen Opitz is a 62 y.o. female seen in the office on 03/05/21 for her management of migraines. She has medical history of previous TIA with residual cognitive deficits, and ACDF C3C7 with Dr Abundio Bishop. She has longstanding history of migraine headaches as well as of the last 35 years. She describes 2 different types of migraine headaches. Her standard migraine headaches she reports started on the right side the neck and wrap around the entire side of the front of the head. She has associated phonophobia and photophobia. She also notices these migraines to begin with an aura where her nose starts to burn. She has associated nausea/vomiting when the migraines are severe. Her migraines last greater than 4 hours in duration interfering everyday activities. In addition she reports greater than 15 migraine attacks last month. She has failed multiple medications in the past including Fioricet, Ubrelvy, nortriptyline, and is currently on Aimovig and Topamax. 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 In addition, she does have axial low back pain that contributes to a lot of her debilitating pain. She denies any radiation down the legs, focal leg weakness, leg paresthesias, saddle anesthesia, bowel/bladder incontinence. Today, 4/19/2021, patient presents for planned follow-up for management of migraine headaches. She reports that her initial round of Botox has provided some relief in her chronic migraine headaches. She reports about 50% reduction in severity and frequency of her chronic migraine headaches. She still appears to have hemiplegic migraines that have remained unchanged. She denies any changes to her medications to treat migraine headaches.   She does want to proceed with repeat Botox injections next 6 weeks. She still wants to investigate her low back pain. She states that she is undergoing potentially septoplasty and correct her sinus issues in the near future. She wants hold off any lumbar medial branch blocks until this is done.     Past Medical History:   Diagnosis Date    Adrenal abnormality (HCC)     Angina at rest [facility])     Anxiety     Arthritis     Asthma     Cerebral artery occlusion with cerebral infarction (HCC)     Chronic sinusitis     Cognitive impairment     with retograde amnesia    Diabetes mellitus ([facility] [address]     Headache     Migraines Hemiplegic    History of degenerative disc disease     Hyperlipidemia     Lymphedema     Left leg    May-Thurner syndrome     Peripheral vascular disease (HCC)     Positive BJ (antinuclear antibody)     PTSD (post-traumatic stress disorder)        Past Surgical History:   Procedure Laterality Date    BREAST BIOPSY      HYSTERECTOMY      NECK SURGERY  12/2020    ACDF    SHOULDER SURGERY      SHOULDER SURGERY Left     SINUS SURGERY      TONSILLECTOMY      TUBAL LIGATION         Family History   Adopted: Yes   Problem Relation Age of Onset    Cancer Father         Stomach Cancer    Cancer Paternal Uncle         Colon Cancer    Stroke Maternal Grandmother     Rheum Arthritis Maternal Grandmother        Social History     Socioeconomic History    Marital status:      Spouse name: Not on file    Number of children: Not on file    Years of education: Not on file    Highest education level: Not on file   Occupational History    Not on file   Social Needs    Financial resource strain: Not on file    Food insecurity     Worry: Not on file     Inability: Not on file    Transportation needs     Medical: Not on file     Non-medical: Not on file   Tobacco Use    Smoking status: Former Smoker     Packs/day: 1.00     Years: 12.00     Pack years: 12.00     Types: Cigarettes     Quit date: 1/1/2019     Years since quittin.2    Smokeless tobacco: Never Used   Substance and Sexual Activity    Alcohol use: Not Currently     Comment: stopped in 2020 due to fatty liver    Drug use: Never    Sexual activity: Never   Lifestyle    Physical activity     Days per week: Not on file     Minutes per session: Not on file    Stress: Not on file   Relationships    Social connections     Talks on phone: Not on file     Gets together: Not on file     Attends L[employer]-day service: Not on file     Active member of club or organization: Not on file     Attends meetings of clubs or organizations: Not on file     Relationship status: Not on file    Intimate partner violence     Fear of current or ex partner: Not on file     Emotionally abused: Not on file     Physically abused: Not on file     Forced sexual activity: Not on file   Other Topics Concern    Not on file   Social History Narrative    Not on file       Medications & Allergies:   Current Outpatient Medications   Medication Instructions    Aimovig 140 mg, Subcutaneous, EVERY 30 DAYS    atorvastatin (LIPITOR) 40 mg, Oral, DAILY    buPROPion (WELLBUTRIN XL) 300 mg, Oral, EVERY MORNING    buPROPion (WELLBUTRIN XL) 150 mg, Oral, EVERY MORNING, Take with 300 mg tablet for total 450 mg once daily in the morning    butalbital-acetaminophen-caffeine (FIORICET, ESGIC) -40 MG per tablet 1 tablet, Oral, EVERY 4 HOURS PRN    cephALEXin (KEFLEX) 500 mg, Oral, 3 TIMES DAILY    clotrimazole-betamethasone (LOTRISONE) 1-0.05 % cream Topical, PRN    cyclobenzaprine (FLEXERIL) 10 MG tablet 2 TIMES DAILY PRN    diazePAM (VALIUM) 5 MG tablet EVERY 8 HOURS PRN    fluticasone (FLONASE) 50 MCG/ACT nasal spray PRN    furosemide (LASIX) 20 MG tablet PRN    hydroxychloroquine (PLAQUENIL) 200 mg, Oral, DAILY    memantine (NAMENDA) 5 mg, Oral, DAILY    nitroglycerin (NITROSTAT) 0.6 mg, Sublingual, EVERY 5 MIN PRN, up to max of 3 total doses. If no relief after 1 dose, call 911.  Onabotulinumtoxin A (BOTOX, COSMETIC,) 200 units injection Botulinum Toxin Type A Botox 200 unit injection recon soln monthly for migraines   [facility] ([id number])    ondansetron (ZOFRAN) 4 mg, Oral, 3 TIMES DAILY PRN    pantoprazole (PROTONIX) 40 mg, Oral, 2 TIMES DAILY    potassium chloride (KLOR-CON) 10 MEQ extended release tablet PRN    pramipexole (MIRAPEX) 0.125 mg, Oral    tiotropium (SPIRIVA) 18 mcg, Inhalation, DAILY PRN    topiramate (TOPAMAX) 100 mg, Oral, 2 TIMES DAILY    traZODone (DESYREL) 225 mg, Oral, NIGHTLY    Ubrelvy 100 mg, Oral, PRN       Allergies   Allergen Reactions    Amoxil [Amoxicillin]      rash    Doxycycline      rash    Morphine     Sulfa Antibiotics Swelling     lip       Review of Systems:   Constitutional: negative for fevers  MSK: Positive for low back pain  Neurological: positive for migraines  Behavioral/Psych: negative for anxiety/depression   All other systems reviewed and are negative    Objective:     Vitals:    04/19/21 1110   BP: 94/68       Constitutional: Pleasant, no acute distress   Head: Normocephalic, atraumatic   Eyes: Conjunctivae normal   Neck: Supple, symmetrical   Respiration: Non-labored breathing   Cardiovascular: Limbs warm and well perfused   Musculoskeletal:   Cervical - Reduced range of motion in all cervical planes. Tenderness to palpation in cervical paraspinals. Positive tinel's sign over 1 inch lateral to occipital protuberance bilaterally. Lumbar -lumbar facet mediated pain. Tenderness palpation along bilateral lumbar paraspinals. Neuro: Alert, oriented. CN II-XII appear grossly intact. No focal motor deficits appreciated. Skin: no skin rashes or lesions visible on face  Psychological: Cooperative, no exaggerated pain behaviors       Assessment:    Diagnosis Orders   1. Intractable migraine without aura and without status migrainosus     2. Lumbar facet joint pain     3.  Other chronic pain           Alexia Tony Malou Sosa is a 62 y. o.female presenting to the pain clinic for evaluation of migraines. She underwent treatment with Botox today. In addition, she has lumbar facet mediated pain she would like to potentially address in the near future. We may consider bilateral lumbar medial branch targeting the bilateral facet joints at L4/L5 and L5/S1 but wants to wait until she has completed her sinus surgery. Follow-up in 6 weeks for repeat Botox injections. Plan: The following treatment recommendations and plan were discussed in detail with Felisha Kumar. Interventions:  Low Back Pain  Patient wants to consider diagnostic lumbar medial branch blocks but is in the process of potentially undergoing a septoplasty for her sinus issues. She will likely reach out to us when this will be done and coordinate efforts to start her diagnostic test blocks. Migraine Headaches  In the light of patient's clinical history and suboptimal response to multiple treatment modalities as detailed above, we discussed the option of using onabotulinumtoxinA (Botox) injection for better management of chronic migraine headaches. The risks and benefits were discussed in detail with the patient. Patient wants to proceed with this injection during the next visit. Follow-up: 6 weeks for repeat Botox injections    I spent 30 minutes with the patient greater than 50% of time was spent discussing expectations for Botox treatment of chronic migraines and discussing diagnostic lumbar medial branch blocks to address low back pain from lumbar facet arthropathy.       Sarah Izquierdo, DO  Interventional Pain Management/PM&R   [place]

## 2021-04-19 NOTE — PATIENT INSTRUCTIONS
The following treatment recommendations and plan were discussed in detail with Danny Casas. Interventions:  Low Back Pain  Patient wants to consider diagnostic lumbar medial branch blocks but is in the process of potentially undergoing a septoplasty for her sinus issues. She will likely reach out to us when this will be done and coordinate efforts to start her diagnostic test blocks. Migraine Headaches  In the light of patient's clinical history and suboptimal response to multiple treatment modalities as detailed above, we discussed the option of using onabotulinumtoxinA (Botox) injection for better management of chronic migraine headaches. The risks and benefits were discussed in detail with the patient. Patient wants to proceed with this injection during the next visit.       Follow-up: 6 weeks for repeat Botox injections

## 2021-04-19 NOTE — TELEPHONE ENCOUNTER
[facility] has requested a 90 day supply of Fluoxetine 20mg on Isha's behalf. She attended an appointment 4/15 and is to return 6/28. It is unclear as to whether patient is still taking this medication. If refill inappropriate, please refuse.

## 2021-04-20 RX ORDER — FLUOXETINE HYDROCHLORIDE 20 MG/1
20 CAPSULE ORAL DAILY
Qty: 90 CAPSULE | Refills: 0 | OUTPATIENT
Start: 2021-04-20

## 2021-04-20 NOTE — TELEPHONE ENCOUNTER
Juan Kamini reported to me on 4/15 that she has discontinued Prozac so I have refused Rx refill.    Electronically signed by Fabio Rosen MD on 4/20/2021 at 9:13 AM

## 2021-04-23 ENCOUNTER — TELEPHONE (OUTPATIENT)
Dept: FAMILY MEDICINE CLINIC | Age: 61
End: 2021-04-23

## 2021-04-27 ENCOUNTER — HOSPITAL ENCOUNTER (OUTPATIENT)
Dept: CT IMAGING | Age: 61
Discharge: HOME | End: 2021-04-27
Payer: MEDICARE

## 2021-04-27 DIAGNOSIS — J32.2 CHRONIC ETHMOIDAL SINUSITIS: ICD-10-CM

## 2021-04-27 DIAGNOSIS — J34.2 NASAL SEPTAL DEVIATION: ICD-10-CM

## 2021-04-27 DIAGNOSIS — H65.01 NON-RECURRENT ACUTE SEROUS OTITIS MEDIA OF RIGHT EAR: ICD-10-CM

## 2021-04-27 PROCEDURE — 70486 CT MAXILLOFACIAL W/O DYE: CPT

## 2021-04-29 ENCOUNTER — OFFICE VISIT (OUTPATIENT)
Dept: ENT CLINIC | Age: 61
End: 2021-04-29
Payer: MEDICARE

## 2021-04-29 VITALS
RESPIRATION RATE: 16 BRPM | SYSTOLIC BLOOD PRESSURE: 102 MMHG | WEIGHT: 159 LBS | HEART RATE: 74 BPM | DIASTOLIC BLOOD PRESSURE: 74 MMHG | BODY MASS INDEX: 25.68 KG/M2 | TEMPERATURE: 90.5 F

## 2021-04-29 DIAGNOSIS — J34.3 HYPERTROPHY OF INFERIOR NASAL TURBINATE: ICD-10-CM

## 2021-04-29 DIAGNOSIS — R06.83 SNORING: ICD-10-CM

## 2021-04-29 DIAGNOSIS — E55.9 VITAMIN D DEFICIENCY: ICD-10-CM

## 2021-04-29 DIAGNOSIS — Z01.818 PRE-OP TESTING: ICD-10-CM

## 2021-04-29 DIAGNOSIS — J34.89 CONCHA BULLOSA: ICD-10-CM

## 2021-04-29 DIAGNOSIS — G25.81 RESTLESS LEG SYNDROME: ICD-10-CM

## 2021-04-29 DIAGNOSIS — J34.2 DEVIATED NASAL SEPTUM: Primary | ICD-10-CM

## 2021-04-29 DIAGNOSIS — K21.9 GASTROESOPHAGEAL REFLUX DISEASE WITHOUT ESOPHAGITIS: ICD-10-CM

## 2021-04-29 PROCEDURE — G8419 CALC BMI OUT NRM PARAM NOF/U: HCPCS | Performed by: OTOLARYNGOLOGY

## 2021-04-29 PROCEDURE — 31575 DIAGNOSTIC LARYNGOSCOPY: CPT | Performed by: OTOLARYNGOLOGY

## 2021-04-29 PROCEDURE — G8427 DOCREV CUR MEDS BY ELIG CLIN: HCPCS | Performed by: OTOLARYNGOLOGY

## 2021-04-29 PROCEDURE — 99214 OFFICE O/P EST MOD 30 MIN: CPT | Performed by: OTOLARYNGOLOGY

## 2021-04-29 PROCEDURE — 3017F COLORECTAL CA SCREEN DOC REV: CPT | Performed by: OTOLARYNGOLOGY

## 2021-04-29 PROCEDURE — 1036F TOBACCO NON-USER: CPT | Performed by: OTOLARYNGOLOGY

## 2021-04-29 ASSESSMENT — ENCOUNTER SYMPTOMS
COUGH: 0
COLOR CHANGE: 0
SHORTNESS OF BREATH: 0
VOMITING: 0
STRIDOR: 0
DIARRHEA: 0
SORE THROAT: 0
FACIAL SWELLING: 0
VOICE CHANGE: 0
APNEA: 0
WHEEZING: 0
TROUBLE SWALLOWING: 0
CHEST TIGHTNESS: 0
RHINORRHEA: 0
NAUSEA: 0
SINUS PRESSURE: 0
CHOKING: 0
ABDOMINAL PAIN: 0

## 2021-04-29 NOTE — PROGRESS NOTES
[facility]  [facility] [place]  Dept: [phone]  Dept Fax: [phone]  Loc: [phone]    Marla Houser is a 62 y.o. female who was referred byNo ref. provider found for:  Chief Complaint   Patient presents with    Results     Patient here for results of her CT scan. Radha Morejon HPI:     Marla Houser is a 62 y.o. female who presents today for CT results. Results reviewed with patient. Thyroid ultrasound reviewed from [facility] as well. CT Facial bones:   1. Normally aerated paranasal sinuses. No evidence of sinusitis. 2. Nasal septal deviation to the left.                   **This report has been created using voice recognition software. It may contain minor errors which are inherent in voice recognition technology. **       Final report electronically signed by Dr. Jonh Nogueira on 4/28/2021     Nasal septum is deviated to right anteriorly at caudal margin. Deviated to left posteriorly with a spur. Hypertrophy right inferior turbinate. Superior turbinates impacting septum. There is a vinh bullosa, right middle turbinate also impacting the septum. She is having pain under her eyes, states it's getting worse. The pain is under her eyes and goes back to her ears. Has drainage down the back of her throat. She is not getting anything up. Feeling like she has bad breath. Went to her dentist and nothing is wrong with her teeth. She has trouble breathing through her nose. She was diagnoses on sleep study with Upper airway resistance syndrome. She wakes up with a stuffy nose. Does not take vitamin D 3, she use to live in [place]. She gets regular migraines. She has GERD and she has takes Pantoprazole. She sleeps with 2 pillows. She has to get her esophagus dilated. Wakes up choking. She kept getting sore throats had her tonsils out age 28.   She had an infection and she went septic, then they took her tonsils out. She has restless leg syndrome. 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 History:      Allergies   Allergen Reactions    Amoxil [Amoxicillin]      rash    Doxycycline      rash    Morphine     Sulfa Antibiotics Swelling     lip     Current Outpatient Medications   Medication Sig Dispense Refill    atorvastatin (LIPITOR) 40 MG tablet Take 40 mg by mouth daily       memantine (NAMENDA) 5 MG tablet Take 5 mg by mouth daily       clotrimazole-betamethasone (LOTRISONE) 1-0.05 % cream Apply topically as needed       cyclobenzaprine (FLEXERIL) 10 MG tablet 2 times daily as needed       fluticasone (FLONASE) 50 MCG/ACT nasal spray as needed       furosemide (LASIX) 20 MG tablet as needed       potassium chloride (KLOR-CON) 10 MEQ extended release tablet as needed       pramipexole (MIRAPEX) 0.125 MG tablet Take 0.125 mg by mouth       Onabotulinumtoxin A (BOTOX, COSMETIC,) 200 units injection Botulinum Toxin Type A Botox 200 unit injection recon soln monthly for migraines   [facility] ([id number])      traZODone (DESYREL) 150 MG tablet Take 1.5 tablets by mouth nightly 45 tablet 2    buPROPion (WELLBUTRIN XL) 150 MG extended release tablet Take 1 tablet by mouth every morning Take with 300 mg tablet for total 450 mg once daily in the morning 30 tablet 2    Ubrogepant (UBRELVY) 100 MG TABS Take 100 mg by mouth as needed      buPROPion (WELLBUTRIN XL) 300 MG extended release tablet Take 1 tablet by mouth every morning 30 tablet 2    topiramate (TOPAMAX) 100 MG tablet Take 1 tablet by mouth 2 times daily (Patient taking differently: Take 100 mg by mouth daily ) 60 tablet 3    Erenumab-aooe (AIMOVIG) 140 MG/ML SOAJ Inject 140 mg into the skin every 30 days 1 pen 3    pantoprazole (PROTONIX) 40 MG tablet Take 40 mg by mouth 2 times daily       hydroxychloroquine (PLAQUENIL) 200 MG tablet Take 200 mg by mouth daily       tiotropium (SPIRIVA) 18 MCG inhalation capsule Inhale 18 mcg into the lungs daily as needed       butalbital-acetaminophen-caffeine (FIORICET, ESGIC) -40 MG per tablet Take 1 tablet by mouth every 4 hours as needed for Headaches      nitroglycerin (NITROSTAT) 0.6 MG SL tablet Place 0.6 mg under the tongue every 5 minutes as needed for Chest pain up to max of 3 total doses. If no relief after 1 dose, call 911.  ondansetron (ZOFRAN) 4 MG tablet Take 1 tablet by mouth 3 times daily as needed for Nausea or Vomiting 15 tablet 0    diazePAM (VALIUM) 5 MG tablet every 8 hours as needed.         Current Facility-Administered Medications   Medication Dose Route Frequency Provider Last Rate Last Admin    onabotulinumtoxin A (BOTOX) injection 200 Units  200 Units Intramuscular Once Leandro Morejon,          Past Medical History:   Diagnosis Date    Adrenal abnormality ([facility] [address]     Angina at rest [facility])     Anxiety     Arthritis     Asthma     Cerebral artery occlusion with cerebral infarction ([address]     Chronic sinusitis     Cognitive impairment     with retograde amnesia    Diabetes mellitus ([address]     Headache     Migraines Hemiplegic    History of degenerative disc disease     Hyperlipidemia     Lymphedema     Left leg    May-Thurner syndrome     Peripheral vascular disease (HCC)     Positive BJ (antinuclear antibody)     PTSD (post-traumatic stress disorder)       Past Surgical History:   Procedure Laterality Date    BREAST BIOPSY      HYSTERECTOMY      NECK SURGERY  12/2020    ACDF    SHOULDER SURGERY      SHOULDER SURGERY Left     SINUS SURGERY      TONSILLECTOMY      TUBAL LIGATION       Family History   Adopted: Yes   Problem Relation Age of Onset    Cancer Father         Stomach Cancer    Cancer Paternal Uncle         Colon Cancer    Stroke Maternal Grandmother     Rheum Arthritis Maternal Grandmother      Social History     Tobacco Use    Smoking status: Former Smoker     Packs/day: 1.00     Years: 12.00     Pack years: 12.00     Types: Cigarettes     Quit date: 2019     Years since quittin.3    Smokeless tobacco: Never Used   Substance Use Topics    Alcohol use: Not Currently     Comment: stopped in 2020 due to fatty liver       Subjective:       Review of Systems   Constitutional: Negative for activity change, appetite change, chills, diaphoresis, fatigue, fever and unexpected weight change. HENT: Negative for congestion, dental problem, ear discharge, ear pain, facial swelling, hearing loss, mouth sores, nosebleeds, postnasal drip, rhinorrhea, sinus pressure, sneezing, sore throat, tinnitus, trouble swallowing and voice change. Eyes: Negative for visual disturbance. Respiratory: Negative for apnea, cough, choking, chest tightness, shortness of breath, wheezing and stridor. Cardiovascular: Negative for chest pain, palpitations and leg swelling. Gastrointestinal: Negative for abdominal pain, diarrhea, nausea and vomiting. Endocrine: Negative for cold intolerance, heat intolerance, polydipsia and polyuria. Genitourinary: Negative for dysuria, enuresis and hematuria. Musculoskeletal: Negative for arthralgias, gait problem, neck pain and neck stiffness. Skin: Negative for color change and rash. Allergic/Immunologic: Negative for environmental allergies, food allergies and immunocompromised state. Neurological: Negative for dizziness, syncope, facial asymmetry, speech difficulty, light-headedness and headaches. Hematological: Negative for adenopathy. Does not bruise/bleed easily. Psychiatric/Behavioral: Negative for confusion and sleep disturbance. The patient is not nervous/anxious. Objective:     /74 (Site: Left Upper Arm, Position: Sitting)   Pulse 74   Temp (!) 90.5 °F (32.5 °C) (Infrared)   Resp 16   Wt 159 lb (72.1 kg)   BMI 25.68 kg/m²     Physical Exam  Vitals signs and nursing note reviewed. Constitutional:       Appearance: She is well-developed. HENT:      Head: Normocephalic and atraumatic. No laceration. Comments:        Right Ear: Ear canal and external ear normal. No drainage or swelling. Tympanic membrane is not perforated or erythematous. Tympanic membrane has normal mobility. Left Ear: Hearing, tympanic membrane, ear canal and external ear normal. No drainage or swelling. Tympanic membrane is not perforated or erythematous. Nose: Septal deviation, mucosal edema and rhinorrhea ( Cultured) present. Mouth/Throat:      Mouth: Mucous membranes are not pale and not dry. No oral lesions. Pharynx: Oropharynx is clear. Uvula midline. No oropharyngeal exudate or posterior oropharyngeal erythema. Comments: LIps: lips normal     Mallampati 1  Base of tongue: symmetric,  Eyes:      Extraocular Movements:      Right eye: Normal extraocular motion and no nystagmus. Left eye: Normal extraocular motion and no nystagmus. Comments: Conjugate gaze   Neck:      Musculoskeletal: Neck supple. Thyroid: No thyroid mass or thyromegaly. Trachea: Phonation normal. No tracheal deviation. Comments:   Salivary glands not enlarged and normal to palpation    Cardiovascular:      Rate and Rhythm: Normal rate and regular rhythm. Heart sounds: No murmur. Pulmonary:      Effort: Pulmonary effort is normal. No retractions. Breath sounds: Normal breath sounds. No stridor. Chest:      Chest wall: There is no dullness to percussion. Neurological:      Mental Status: She is alert and oriented to person, place, and time. Cranial Nerves: No cranial nerve deficit (VIIth N function intact bilat). Psychiatric:         Mood and Affect: Mood and affect normal.         Behavior: Behavior is cooperative. Ian Fox PROCEDURE: FIBEROPTIC LARYNGOSCOPY    A fiberoptic laryngoscopy was performed under topical anesthesia, after using Afrin and Lidocaine spray in the nasal fossa. The nasal fossa, nasopharynx, hypopharynx and larynx were carefully examined. Base of tongue was symmetrical, with very large lingual tonsils. . Epiglottis appeared normal and was retrodisplaced. True vocal cords had normal mobility. There was erythema of the arytenoid area bilaterally. Bright red arytenoids. Erythema at the vocal processes where she makes contact clearing her throat  No masses or mucosal lesions were noted. No pooling in the pyriform sinuses. Data:  All of the past medical history, past surgical history, family history,social history, allergies and current medications were reviewed with the patient. Assessment & Plan   Diagnoses and all orders for this visit:     Diagnosis Orders   1. Deviated nasal septum  DC REPAIR OF NASAL SEPTUM   2. Hypertrophy of inferior nasal turbinate  DC EXCISION TURBINATE,SUBMUCOUS   3. Jhon bullosa  DC NASAL/SINUS SCOPY,RMV JHON BULL   4. Vitamin D deficiency  Vitamin D 25 Hydroxy   5. Gastroesophageal reflux disease without esophagitis  DC LARYNGOSCOPY FLEXIBLE DIAGNOSTIC   6. Snoring     7. Restless leg syndrome         The findings were explained and her questions were answered. Nithin Payan she is vitamin D deficient and supplementation with D3 would probably help her leg jerks. GERD regimen would be beneficial.  Do not fill up stomach for 3 hours before bedtime. Elevate the head of the bed, perhaps with a foam wedge. May take Pantoprazole with supper. She agreed. Options were discussed including surgery to clear her airway. Surgery would be Septoplasty, right SMR inferior turbinate partial resection right middle turbinate jhon bullosa.      Surgical time estimate would be 2 hours    Informed consent: Septoplasty complications that may occur were discussed including postoperative bleeding (usually easy to control), infection, nasal crusting, a hole in the septum (perforation), failure to completely improve breathing, persistent septal deflection resulting in the need for revision (uncommon), and very rarely, a change in appearance. They understand that no guarantees are made regarding either outcome or potential complications. They read the patient information sheet and were given a copy to take with them. All of their questions were answered. They requested we proceed. Benefits and risks are discussed, along with alternatives, and their questions were answered. No guarantees were made. They request we proceed. I, Judy Tatum CMA ([facility]), am scribing for, and in the presence of Dr. Norberto Vega. Electronically signed by Harpal Reyez CMA ([facility]) on 4/29/21 at 2:53 PM EDT. (Please note that portions of this note were completed with a voice recognition program. Efforts were made to edit the dictations butoccasionally words are mis-transcribed.)     I agree to the above documentation placed by my scribe. I have personally evaluated this patient. Additional findings are as noted. I reviewed the scribe's note and agree with the documented findings and plan of care. Any areas of disagreement are corrected. I agree with the chief complaint, past medical history, past surgical history, allergies, medications, social and family history as documented unless otherwise noted below.      Electronically signed by Pepe Alexis MD on 5/9/2021 at 6:02 PM

## 2021-04-30 ENCOUNTER — HOSPITAL ENCOUNTER (OUTPATIENT)
Dept: SPEECH THERAPY | Age: 61
Setting detail: THERAPIES SERIES
Discharge: HOME | End: 2021-04-30
Payer: MEDICARE

## 2021-04-30 ENCOUNTER — TELEPHONE (OUTPATIENT)
Dept: FAMILY MEDICINE CLINIC | Age: 61
End: 2021-04-30

## 2021-04-30 ENCOUNTER — TELEPHONE (OUTPATIENT)
Dept: CARDIOLOGY CLINIC | Age: 61
End: 2021-04-30

## 2021-04-30 PROCEDURE — 97130 THER IVNTJ EA ADDL 15 MIN: CPT | Performed by: SPEECH-LANGUAGE PATHOLOGIST

## 2021-04-30 PROCEDURE — 97129 THER IVNTJ 1ST 15 MIN: CPT | Performed by: SPEECH-LANGUAGE PATHOLOGIST

## 2021-04-30 NOTE — PROGRESS NOTES
[address] [facility]  [] SPEECH LANGUAGE COGNITIVE EVALUATION  [x] DAILY NOTE   [] PROGRESS NOTE [] DISCHARGE NOTE    [x] OUTPATIENT REHABILITATION CENTER [facility]   [] [place] [address]    [] [facility]   [] Dierdre Leaver    Date: 2021  Patient Name:  Adelfo Zamuido  : 1960  MRN: [id number]    Referring Practitioner Dr. Dayan Allen   Diagnosis Cognitive impairment   Treatment Diagnosis Cognitive-communication deficits   Date of Evaluation 3/10/21      Functional Outcome Measure Used CLQT   Functional Outcome Score 3.4/4.0 (3/10/21)       Insurance: Primary: Payor: [employer]ong [employer] /  /  / ,   Secondary: MEDICARE   Authorization Information: Medicare   Visit # 4, 4/10 for progress note   Visits Allowed: Unlimited visits based on medical necessity   Recertification Date:  (new order received on 21)   Physician Follow-Up: Dr. Don Jordan (Neurologist) - 2021; Jimena Montgomery - ?? Physician Orders: Speech therapy   Pertinent History: Patient reports she had 2 TIA's about 4-5 years ago. Patient reports she had more symptoms/deficits from the second TIA. Patient reports she still has some weakness on the right side. Patient reports she has memory deficits and it has gotten progressively worse over the last 2 years. Patient reports she has a cane that she uses some times d/t loss of balance. Reports she has also had several falls recently. SUBJECTIVE: Patient feels the namenda that she has been taking does not seem to be helping her memory. Reports it may be a little worse. SHORT TERM GOAL #1:  Goal 1: Patient will verbalize and utilize word finding strategies to complete higher level naming tasks with no more than min cues, 80% of the time for improved success communicating with family and friends. INTERVENTIONS:  Briefly reviewed word finding strategies. Patient reports she has been using them, but they don't always help. Reports others still help her come up with the words occasionally. Completed a higher level word finding task in which the patient was asked to verbalize an object when provided with a part of the object as quickly as possible. Patient complete the task without cues or errors on 9/10 trials, 1/10 with min cues. SHORT TERM GOAL #2:  Goal 2: Patient will demonstrate use of word finding strategies within functional communication for 4-5 minutes with no more than 2 instances of anomia or paraphasias for improved success communicating with family and friends. INTERVENTIONS:  Minimal instances of paraphasias in conversation this session, however, identified and self-corrected each time. Patient reports her words will run together at times (ie: panper for paper - reports she was thinking pen and paper) and it's frustrating for her. Encouraged the patient to slow her speech down just a little. SHORT TERM GOAL #3:  Goal 3: Patient will verbalize and demonstrate use of compensatory memory strategies to complete functional recall tasks with up to 4 items with no more than min cues for improved success with managing her daily schedule/appointments. INTERVENTIONS:  Patient reports she feels like her memory is getting worse, despite being on namenda. Patient reports she put on her deodorant this morning and shortly after she couldn't remember if she did or not. Also reports she has difficulty remembering how to use some objects, such as her sweeper. Reports she can eventually recall, but it takes extra time. Patient also reports she has been having difficulty keeping track of her appointments if they switch the dates/times after they are already scheduled. Reports she gets overwhelmed and she writes down the wrong time, etc.  Discussed the possibility of using her calendar on her cell phone for this and she could set reminders/alerts for each.   Patient initially thought it might be too overwhelming with all of the alarms going off, but reports she is willing to try it. Patient reports she would still like to continue to write everything down in a planner (for the visual) and she is waiting for a smaller planner to arrive that will fit inside her purse. Patient reports she left her car running 2 times because she has a push button start/stop and she forgets to push the button to turn it off. Discussed use of notes to remind her to turn off her car, putting the notes in a place that she will see it before she gets out of the car (on the door handle, dash, etc). Patient verbalized understanding. Patient independently recalled 3/4 compensatory memory strategies this session, but required min cues to recall 1/4 strategies. Completed a short term recall task in which the patient was provided with 4 words associated with the suits in a deck of cards. Reviewed the association/visualization to aid in recall of the words. Patient then immediately recalled 4/4 of the words without cues. Following a 6 minute delay, patient independently recalled 4/4 of the words. Patient then verbalized the word associated with the suit while going through a pile of 20 cards without cues throughout and timely completion of the task. SHORT TERM GOAL #4:  Goal 4: Patient will complete working memory tasks with 80% accuracy with no more than min cues for improved success with completing IADL's. INTERVENTIONS:  Patient completed a working memory task in which she was provided with a list of 4 words with auditory provision and she then answered a question regarding attributes immediately after presentation of the words: 7/10 without cues, 3/10 with min cues. Patient with difficulty recalling 1-2 of the words on 3 trials. Assessment: Progressing towards goals.   Specific Interventions Next Treatment: high level word finding task, 4-5 minute conversation with use of word finding strategies as needed, short term memory task,

## 2021-04-30 NOTE — TELEPHONE ENCOUNTER
Patient is being scheduled for surgery with Dr. Johnathon Taylor on 06/02/2021    Surgery: septoplasty, smr-right partial inferior, nasal endoscopy with partial resection of right middle vinh bullosa. Requesting a clearance from patients PCP, Dr. Dolly Mccrary. Faxing over form for your convenience. Thank you!

## 2021-04-30 NOTE — TELEPHONE ENCOUNTER
Patient is scheduled with Dr. Suleman Cat for surgery on 06/02/2021. Surgery: septoplasty, smr right partial inferior, nasal endoscopy with partial resection of right middle vinh bullosa. Requesting cardiac clearance from Dr. Lana Chin. He last saw patient on 3/10/2021. Thank you!

## 2021-04-30 NOTE — TELEPHONE ENCOUNTER
Pre op Risk Assessment    Procedure Septoplasty, smr right partial inferior nasal endoscopy with partial resection right middle vinh bullosa  Physician Dr. Sejal Murray  Date of surgery/procedure 6/2/2021    Last OV 3/10/2021  Last Stress Nothing in [facility]  Last Echo Nothing in [facility]  Last Cath Nothing in [facility]  Last Stent hx life stent  Is patient on blood thinners   Hold Meds/how many days

## 2021-05-04 DIAGNOSIS — G43.109 MIGRAINE WITH AURA AND WITHOUT STATUS MIGRAINOSUS, NOT INTRACTABLE: Primary | ICD-10-CM

## 2021-05-04 RX ORDER — TOPIRAMATE 100 MG/1
TABLET, FILM COATED ORAL
Qty: 180 TABLET | Refills: 1 | Status: SHIPPED | OUTPATIENT
Start: 2021-05-04 | End: 2021-07-30 | Stop reason: SDUPTHER

## 2021-05-07 ENCOUNTER — HOSPITAL ENCOUNTER (OUTPATIENT)
Dept: SPEECH THERAPY | Age: 61
Setting detail: THERAPIES SERIES
End: 2021-05-07
Payer: MEDICARE

## 2021-05-10 ENCOUNTER — NURSE ONLY (OUTPATIENT)
Dept: LAB | Age: 61
End: 2021-05-10

## 2021-05-10 DIAGNOSIS — E55.9 VITAMIN D DEFICIENCY: ICD-10-CM

## 2021-05-10 DIAGNOSIS — G25.81 RESTLESS LEG SYNDROME: ICD-10-CM

## 2021-05-10 LAB — VITAMIN D 25-HYDROXY: 41 NG/ML (ref 30–100)

## 2021-05-12 ENCOUNTER — HOSPITAL ENCOUNTER (OUTPATIENT)
Dept: SPEECH THERAPY | Age: 61
Setting detail: THERAPIES SERIES
Discharge: HOME | End: 2021-05-12
Payer: MEDICARE

## 2021-05-12 PROCEDURE — 97130 THER IVNTJ EA ADDL 15 MIN: CPT

## 2021-05-12 PROCEDURE — 97129 THER IVNTJ 1ST 15 MIN: CPT

## 2021-05-12 NOTE — PROGRESS NOTES
[address] [facility]  [] SPEECH LANGUAGE COGNITIVE EVALUATION  [x] DAILY NOTE   [] PROGRESS NOTE [] DISCHARGE NOTE    [x] OUTPATIENT [facility]HAB[facility] - [place]   [] [place] [address]    [] [facility]   [] Aylin Esqueda    Date: 2021  Patient Name:  Margareth Jones  : 1960  MRN: [id number]    Referring Practitioner Dr. Clare Garcia   Diagnosis Cognitive impairment   Treatment Diagnosis Cognitive-communication deficits   Date of Evaluation 3/10/21      Functional Outcome Measure Used CLQT   Functional Outcome Score 3.4/4.0 (3/10/21)       Insurance: Primary: Payor: [employer] [place] [employer] /  /  / ,   Secondary: [facility]   Authorization Information: Medicare   Visit # 5, 5/10 for progress note   Visits Allowed: Unlimited visits based on medical necessity   Recertification Date: 25 (new order received on 21)   Physician Follow-Up: Dr. Guera Valladares (Neurologist) - 2021; Frandy Correa - ?? Physician Orders: Speech therapy   Pertinent History: Patient reports she had 2 TIA's about 4-5 years ago. Patient reports she had more symptoms/deficits from the second TIA. Patient reports she still has some weakness on the right side. Patient reports she has memory deficits and it has gotten progressively worse over the last 2 years. Patient reports she has a cane that she uses some times d/t loss of balance. Reports she has also had several falls recently. SUBJECTIVE: Patient feels expressing increased frustration with memory since previous session. PT reported and example of difficulties arising at home -- unable to determine how to put a 3-piece mop, despite having written directions. SHORT TERM GOAL #1:  Goal 1: Patient will verbalize and utilize word finding strategies to complete higher level naming tasks with no more than min cues, 80% of the time for improved success communicating with family and friends.   INTERVENTIONS:  Did not address on this date d/t focus on other goals. PRIOR SESSION:  Briefly reviewed word finding strategies. Patient reports she has been using them, but they don't always help. Reports others still help her come up with the words occasionally. Completed a higher level word finding task in which the patient was asked to verbalize an object when provided with a part of the object as quickly as possible. Patient complete the task without cues or errors on 9/10 trials, 1/10 with min cues. SHORT TERM GOAL #2:  Goal 2: Patient will demonstrate use of word finding strategies within functional communication for 4-5 minutes with no more than 2 instances of anomia or paraphasias for improved success communicating with family and friends. INTERVENTIONS:  Did not address on this date d/t focus on other goals. PRIOR SESSION:  Minimal instances of paraphasias in conversation this session, however, identified and self-corrected each time. Patient reports her words will run together at times (ie: panper for paper - reports she was thinking pen and paper) and it's frustrating for her. Encouraged the patient to slow her speech down just a little. SHORT TERM GOAL #3:  Goal 3: Patient will verbalize and demonstrate use of compensatory memory strategies to complete functional recall tasks with up to 4 items with no more than min cues for improved success with managing her daily schedule/appointments. INTERVENTIONS:   Complete quick review of memory strategies the pt is implementing in the home setting. The pt reported setting a daily timer to recall eating every 2 hours, as well as, leaving sticky notes throughout her house. STM:   Pt required to create a daily check list, which will later be placed in phone \"notes\" application (HEP). Pt can then refer back to the note to determine if she has anything else that needs completed or to reassure that she complete the task already.  Pt required to complete this functional tasks as she stated; \"I will even forget if I put on my deodorant. I think there have been days that I've put it on 3x. \"  *disorganized thought processing  *decreased attention. SHORT TERM GOAL #4:  Goal 4: Patient will complete working memory tasks with 80% accuracy with no more than min cues for improved success with completing IADL's. INTERVENTIONS:  Given a deck of cards, the the was required to complete a complex naming task to address immediate recall, word retrieval,, attention, and mental flexibility as the task evolves and becomes more difficult. -Each suit represents a category, which pt has to ID a item each time she sorts that particular suit. TASK 1: (Sort 10 cards following aforementioned directions, given written/visual cues of 4/4 categories)   -10/10 indep   TASK 2: (Sort 10 cards following aforementioned directions, given written/visual cues of 2/4 categories)   -10/10 indep   TASK 3: (Sort 15 cards followingn aforementioned directions, given NO written/visual cues of categories)   -13/15 indep, 2/15 mod I additional time for naming without repetition   TASK 4: (Sort 12 cards followingn aforementioned directions, given 0 visual/written cues of categories, AND suit ORDER was rearranged)   -8/12 indep, 2/12 mod I via additional time, 2/12 min A via verbal cues   *2 repetitions   *1 error of naming the wrong category. Assessment: Progressing towards goals.   Specific Interventions Next Treatment: high level word finding task, 4-5 minute conversation with use of word finding strategies as needed, short term memory task, working memory task    Activity/Treatment Tolerance:  [x]  Patient tolerated treatment well  []  Patient limited by fatigue  []  Patient limited by pain   []  Patient limited by other medical complications  []  Other:     Patient Education:   [x]  HEP/Education Completed: Plan of Care, Goals, word finding strategies, memory strategies  []  No new Education completed  []  Reviewed Prior HEP      [x]  Patient verbalized and/or demonstrated understanding of education provided. []  Patient unable to verbalize and/or demonstrate understanding of education provided. Will continue education. []  Barriers to learning:     PLAN:  []  Plan of care initiated. Plan to see patient 2 times per week for 6 weeks to address the treatment planned outlined above. [x]  Continue with current plan of care  []  Modify plan of care as follows:    []  Hold pending physician visit  []  Discharge    Time In 1417   Time Out 1449   Timed Code Minutes: 32 min   Total Treatment Time: 32 min       Marta Wilcox 19 MA., CCC-SLP ED#.[id number]

## 2021-05-14 ENCOUNTER — OFFICE VISIT (OUTPATIENT)
Dept: ENT CLINIC | Age: 61
End: 2021-05-14

## 2021-05-14 VITALS
BODY MASS INDEX: 24.68 KG/M2 | DIASTOLIC BLOOD PRESSURE: 68 MMHG | WEIGHT: 152.8 LBS | HEART RATE: 72 BPM | TEMPERATURE: 96.8 F | SYSTOLIC BLOOD PRESSURE: 102 MMHG | RESPIRATION RATE: 14 BRPM

## 2021-05-14 DIAGNOSIS — J34.3 HYPERTROPHY OF INFERIOR NASAL TURBINATE: ICD-10-CM

## 2021-05-14 DIAGNOSIS — J34.2 DEVIATED NASAL SEPTUM: Primary | ICD-10-CM

## 2021-05-14 DIAGNOSIS — J34.89 CONCHA BULLOSA: ICD-10-CM

## 2021-05-14 DIAGNOSIS — G43.909 MIGRAINE WITHOUT STATUS MIGRAINOSUS, NOT INTRACTABLE, UNSPECIFIED MIGRAINE TYPE: ICD-10-CM

## 2021-05-14 DIAGNOSIS — R51.9 RHINOGENIC HEADACHE: ICD-10-CM

## 2021-05-14 PROCEDURE — 99999 PR OFFICE/OUTPT VISIT,PROCEDURE ONLY: CPT | Performed by: OTOLARYNGOLOGY

## 2021-05-14 ASSESSMENT — ENCOUNTER SYMPTOMS
DIARRHEA: 0
RHINORRHEA: 0
CHEST TIGHTNESS: 0
TROUBLE SWALLOWING: 0
VOICE CHANGE: 0
NAUSEA: 0
CHOKING: 0
COLOR CHANGE: 0
SHORTNESS OF BREATH: 0
STRIDOR: 0
ABDOMINAL PAIN: 0
SINUS PRESSURE: 0
APNEA: 0
SORE THROAT: 0
WHEEZING: 0
VOMITING: 0
FACIAL SWELLING: 0
COUGH: 0

## 2021-05-14 NOTE — PROGRESS NOTES
[address] EAR, NOSE AND THROAT  [facility] [place]  Dept: [phone]  Dept Fax: [phone]  Loc: [phone]    Kevin Uriostegui is a 62 y.o. female who was referred byNo ref. provider found for:  Chief Complaint   Patient presents with    Pre-op Exam     Patient here for pre op exam.    . HPI:     Kevin Uriostegui is a 62 y.o. female who presents today for preop visit regarding upcoming nasal surgery. 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 There is also contact of the superior turbinates and middle turbinates against the septum, which can cause rhinogenic headache. There is a left posterior septal spur. Below is a representative image. History:      Allergies   Allergen Reactions    Amoxil [Amoxicillin]      rash    Biaxin [Clarithromycin] Swelling     Lips swelled    Doxycycline      rash    Morphine     Sulfa Antibiotics Swelling     lip     Current Outpatient Medications   Medication Sig Dispense Refill    topiramate (TOPAMAX) 100 MG tablet TAKE 1 TABLET BY MOUTH TWICE A  tablet 1    atorvastatin (LIPITOR) 40 MG tablet Take 40 mg by mouth daily       memantine (NAMENDA) 5 MG tablet Take 5 mg by mouth daily       clotrimazole-betamethasone (LOTRISONE) 1-0.05 % cream Apply topically as needed       cyclobenzaprine (FLEXERIL) 10 MG tablet 2 times daily as needed       fluticasone (FLONASE) 50 MCG/ACT nasal spray as needed       furosemide (LASIX) 20 MG tablet as needed       potassium chloride (KLOR-CON) 10 MEQ extended release tablet as needed       Onabotulinumtoxin A (BOTOX, COSMETIC,) 200 units injection Botulinum Toxin Type A Botox 200 unit injection recon soln monthly for migraines   [facility] ([id number])     [facility] History of degenerative disc disease     Hyperlipidemia     Lymphedema     Left leg    May-Thurner syndrome     Peripheral vascular disease (HCC)     Positive BJ (antinuclear antibody)     PTSD (post-traumatic stress disorder)       Past Surgical History:   Procedure Laterality Date    BREAST BIOPSY      HYSTERECTOMY      NECK SURGERY  2020    ACDF    SHOULDER SURGERY      SHOULDER SURGERY Left     SINUS SURGERY      TONSILLECTOMY      TUBAL LIGATION       Family History   Adopted: Yes   Problem Relation Age of Onset    Cancer Father         Stomach Cancer    Cancer Paternal Uncle         Colon Cancer    Stroke Maternal Grandmother     Rheum Arthritis Maternal Grandmother      Social History     Tobacco Use    Smoking status: Former Smoker     Packs/day: 1.00     Years: 12.00     Pack years: 12.00     Types: Cigarettes     Quit date: 2019     Years since quittin.4    Smokeless tobacco: Never Used   Substance Use Topics    Alcohol use: Not Currently     Comment: stopped in 2020 due to fatty liver       Subjective:      Review of Systems   Constitutional: Negative for activity change, appetite change, chills, diaphoresis, fatigue, fever and unexpected weight change. HENT: Negative for congestion, dental problem, ear discharge, ear pain, facial swelling, hearing loss, mouth sores, nosebleeds, postnasal drip, rhinorrhea, sinus pressure, sneezing, sore throat, tinnitus, trouble swallowing and voice change. Eyes: Negative for visual disturbance. Respiratory: Negative for apnea, cough, choking, chest tightness, shortness of breath, wheezing and stridor. Cardiovascular: Negative for chest pain, palpitations and leg swelling. Gastrointestinal: Negative for abdominal pain, diarrhea, nausea and vomiting. Endocrine: Negative for cold intolerance, heat intolerance, polydipsia and polyuria. Genitourinary: Negative for dysuria, enuresis and hematuria.    Musculoskeletal: Negative for arthralgias, gait problem, neck pain and neck stiffness. Skin: Negative for color change and rash. Allergic/Immunologic: Negative for environmental allergies, food allergies and immunocompromised state. Neurological: Negative for dizziness, syncope, facial asymmetry, speech difficulty, light-headedness and headaches. Hematological: Negative for adenopathy. Does not bruise/bleed easily. Psychiatric/Behavioral: Negative for confusion and sleep disturbance. The patient is not nervous/anxious. Objective:   /68 (Site: Left Upper Arm, Position: Sitting)   Pulse 72   Temp 96.8 °F (36 °C) (Infrared)   Resp 14   Wt 152 lb 12.8 oz (69.3 kg)   BMI 24.68 kg/m²     Physical Exam  Vitals and nursing note reviewed. Constitutional:       Appearance: She is well-developed. HENT:      Head: Normocephalic and atraumatic. No laceration. Comments:        Right Ear: Hearing, ear canal and external ear normal. No drainage or swelling. No middle ear effusion. Tympanic membrane is not perforated or erythematous. Left Ear: Hearing, tympanic membrane, ear canal and external ear normal. No drainage or swelling. No middle ear effusion. Tympanic membrane is not perforated or erythematous. Nose: Septal deviation ( 3+ to the left) present. No mucosal edema or rhinorrhea. Right Turbinates: Enlarged ( 3+ hypertrophy right inferior turbinate and wide right middle turbinate). Mouth/Throat:      Mouth: Mucous membranes are not pale and not dry. No oral lesions. Pharynx: Oropharynx is clear. Uvula midline. No oropharyngeal exudate or posterior oropharyngeal erythema. Comments: LIps: lips normal     Mallampati 1  Base of tongue: symmetric,  Eyes:      Extraocular Movements:      Right eye: Normal extraocular motion and no nystagmus. Left eye: Normal extraocular motion and no nystagmus. Comments: Conjugate gaze   Neck:      Thyroid: No thyroid mass or thyromegaly. outcome or potential complications. They read the patient information sheet and were given a copy to take with them. All of their questions were answered. They requested we proceed. Prescriptions to be held prior to surgery: None       Bernarda Del Toro. Paulette Boeck, MD    **This report has been created using voice recognition software. It may contain minor errors which are inherent in voicerecognition technology. **

## 2021-05-17 ENCOUNTER — OFFICE VISIT (OUTPATIENT)
Dept: FAMILY MEDICINE CLINIC | Age: 61
End: 2021-05-17
Payer: MEDICARE

## 2021-05-17 VITALS
DIASTOLIC BLOOD PRESSURE: 78 MMHG | BODY MASS INDEX: 24.62 KG/M2 | OXYGEN SATURATION: 98 % | SYSTOLIC BLOOD PRESSURE: 128 MMHG | HEART RATE: 86 BPM | HEIGHT: 66 IN | TEMPERATURE: 98.1 F | RESPIRATION RATE: 14 BRPM | WEIGHT: 153.2 LBS

## 2021-05-17 DIAGNOSIS — R41.9 NEUROCOGNITIVE DISORDER: ICD-10-CM

## 2021-05-17 DIAGNOSIS — G25.81 RESTLESS LEGS: ICD-10-CM

## 2021-05-17 DIAGNOSIS — Z01.818 PREOP GENERAL PHYSICAL EXAM: Primary | ICD-10-CM

## 2021-05-17 DIAGNOSIS — K21.9 GASTROESOPHAGEAL REFLUX DISEASE, UNSPECIFIED WHETHER ESOPHAGITIS PRESENT: ICD-10-CM

## 2021-05-17 DIAGNOSIS — B37.9 YEAST INFECTION: ICD-10-CM

## 2021-05-17 PROCEDURE — 3017F COLORECTAL CA SCREEN DOC REV: CPT | Performed by: STUDENT IN AN ORGANIZED HEALTH CARE EDUCATION/TRAINING PROGRAM

## 2021-05-17 PROCEDURE — G8420 CALC BMI NORM PARAMETERS: HCPCS | Performed by: STUDENT IN AN ORGANIZED HEALTH CARE EDUCATION/TRAINING PROGRAM

## 2021-05-17 PROCEDURE — 99214 OFFICE O/P EST MOD 30 MIN: CPT | Performed by: STUDENT IN AN ORGANIZED HEALTH CARE EDUCATION/TRAINING PROGRAM

## 2021-05-17 PROCEDURE — G8427 DOCREV CUR MEDS BY ELIG CLIN: HCPCS | Performed by: STUDENT IN AN ORGANIZED HEALTH CARE EDUCATION/TRAINING PROGRAM

## 2021-05-17 PROCEDURE — 1036F TOBACCO NON-USER: CPT | Performed by: STUDENT IN AN ORGANIZED HEALTH CARE EDUCATION/TRAINING PROGRAM

## 2021-05-17 RX ORDER — FLUCONAZOLE 150 MG/1
150 TABLET ORAL DAILY
Qty: 3 TABLET | Refills: 0 | Status: SHIPPED | OUTPATIENT
Start: 2021-05-17 | End: 2021-05-20

## 2021-05-17 RX ORDER — PRAMIPEXOLE DIHYDROCHLORIDE 0.12 MG/1
0.25 TABLET ORAL 2 TIMES DAILY
Qty: 120 TABLET | Refills: 3 | Status: SHIPPED | OUTPATIENT
Start: 2021-05-17

## 2021-05-17 RX ORDER — ESOMEPRAZOLE MAGNESIUM 40 MG/1
40 CAPSULE, DELAYED RELEASE ORAL
Qty: 90 CAPSULE | Refills: 1 | Status: SHIPPED | OUTPATIENT
Start: 2021-05-17

## 2021-05-17 SDOH — ECONOMIC STABILITY: FOOD INSECURITY: WITHIN THE PAST 12 MONTHS, THE FOOD YOU BOUGHT JUST DIDN'T LAST AND YOU DIDN'T HAVE MONEY TO GET MORE.: NEVER TRUE

## 2021-05-17 ASSESSMENT — ENCOUNTER SYMPTOMS
NAUSEA: 0
SHORTNESS OF BREATH: 0
ABDOMINAL PAIN: 0
COUGH: 0
CONSTIPATION: 0
SINUS PRESSURE: 1
VOMITING: 0
SINUS PAIN: 1
DIARRHEA: 0

## 2021-05-17 NOTE — PROGRESS NOTES
S: 62 y.o. female with   Chief Complaint   Patient presents with    Pre-op Exam     Pt presents for a pre-op for surgery with Dr. Candie Charles.  Yeast Infection     Pt presents c/o a yeast infection d/t being on an antibiotic for 5 weeks.  Other     Pt would like to discuss issues with cognitive therapy. HPI: please see resident note for HPI and ROS. BP Readings from Last 3 Encounters:   05/17/21 128/78   05/14/21 102/68   04/29/21 102/74     Wt Readings from Last 3 Encounters:   05/17/21 153 lb 3.2 oz (69.5 kg)   05/14/21 152 lb 12.8 oz (69.3 kg)   04/29/21 159 lb (72.1 kg)       O: VS:  height is 5' 6\" (1.676 m) and weight is 153 lb 3.2 oz (69.5 kg). Her temperature is 98.1 °F (36.7 °C). Her blood pressure is 128/78 and her pulse is 86. Her respiration is 14 and oxygen saturation is 98%. AAO/NAD, appropriate affect for mood       Diagnosis Orders   1. Preop general physical exam     2. Yeast infection  fluconazole (DIFLUCAN) 150 MG tablet   3. Gastroesophageal reflux disease, unspecified whether esophagitis present  esomeprazole (NEXIUM) 40 MG delayed release capsule       Plan:  Stable vascular status. [address] Donna Guillen for surgery. diflucan 150mg. Restart nexium. Stop protonix. Health Maintenance Due   Topic Date Due    COVID-19 Vaccine (1) Never done    DTaP/Tdap/Td vaccine (1 - Tdap) Never done    Cervical cancer screen  Never done    Shingles Vaccine (1 of 2) Never done    Colon cancer screen colonoscopy  Never done    Annual Wellness Visit (AWV)  Never done       Attending Physician Statement  I have discussed the case, including pertinent history and exam findings with the resident. I also have seen the patient and performed key portions of the examination. I agree with the documented assessment and plan as documented by the resident.         Aston Iyer DO 5/17/2021 11:05 AM

## 2021-05-17 NOTE — PROGRESS NOTES
and having itching and some discharge. Otherwise has continued to lose weight on the [address]. Thinks she is consuming around 1000 calories per day. All GI symptoms have improved since starting diet.      Current Outpatient Medications   Medication Sig Dispense Refill    esomeprazole (NEXIUM) 40 MG delayed release capsule Take 1 capsule by mouth every morning (before breakfast) 90 capsule 1    fluconazole (DIFLUCAN) 150 MG tablet Take 1 tablet by mouth daily for 3 days 3 tablet 0    pramipexole (MIRAPEX) 0.125 MG tablet Take 2 tablets by mouth 2 times daily 120 tablet 3    topiramate (TOPAMAX) 100 MG tablet TAKE 1 TABLET BY MOUTH TWICE A  tablet 1    atorvastatin (LIPITOR) 40 MG tablet Take 40 mg by mouth daily       memantine (NAMENDA) 5 MG tablet Take 5 mg by mouth daily       clotrimazole-betamethasone (LOTRISONE) 1-0.05 % cream Apply topically as needed       cyclobenzaprine (FLEXERIL) 10 MG tablet 2 times daily as needed       fluticasone (FLONASE) 50 MCG/ACT nasal spray as needed       furosemide (LASIX) 20 MG tablet as needed       potassium chloride (KLOR-CON) 10 MEQ extended release tablet as needed       Onabotulinumtoxin A (BOTOX, COSMETIC,) 200 units injection Botulinum Toxin Type A Botox 200 unit injection recon soln monthly for migraines   [facility] ([id number])      traZODone (DESYREL) 150 MG tablet Take 1.5 tablets by mouth nightly 45 tablet 2    buPROPion (WELLBUTRIN XL) 150 MG extended release tablet Take 1 tablet by mouth every morning Take with 300 mg tablet for total 450 mg once daily in the morning 30 tablet 2    Ubrogepant (UBRELVY) 100 MG TABS Take 100 mg by mouth as needed      buPROPion (WELLBUTRIN XL) 300 MG extended release tablet Take 1 tablet by mouth every morning 30 tablet 2    Erenumab-aooe (AIMOVIG) 140 MG/ML SOAJ Inject 140 mg into the skin every 30 days 1 pen 3    hydroxychloroquine (PLAQUENIL) 200 MG tablet Take 200 mg by mouth daily       tiotropium (SPIRIVA) 18 MCG inhalation capsule Inhale 18 mcg into the lungs daily as needed       butalbital-acetaminophen-caffeine (FIORICET, ESGIC) -40 MG per tablet Take 1 tablet by mouth every 4 hours as needed for Headaches      nitroglycerin (NITROSTAT) 0.6 MG SL tablet Place 0.6 mg under the tongue every 5 minutes as needed for Chest pain up to max of 3 total doses. If no relief after 1 dose, call 911.  ondansetron (ZOFRAN) 4 MG tablet Take 1 tablet by mouth 3 times daily as needed for Nausea or Vomiting 15 tablet 0     Current Facility-Administered Medications   Medication Dose Route Frequency Provider Last Rate Last Admin    onabotulinumtoxin A (BOTOX) injection 200 Units  200 Units Intramuscular Once Leandro Dillon Shoulder, DO              Food Insecurity: No Food Insecurity    Worried About Running Out of Food in the Last Year: Never true    Ran Out of Food in the Last Year: Never true       Health Maintenance   Topic Date Due    COVID-19 Vaccine (1) Never done    DTaP/Tdap/Td vaccine (1 - Tdap) Never done    Cervical cancer screen  Never done    Shingles Vaccine (1 of 2) Never done    Colon cancer screen colonoscopy  Never done   [employer] Visit (AWV)  Never done    Flu vaccine (Season Ended) 09/01/2021    Potassium monitoring  03/30/2022    Creatinine monitoring  03/30/2022    Lipid screen  04/15/2022    Breast cancer screen  10/12/2022    Hepatitis C screen  Completed    HIV screen  Completed    Hepatitis A vaccine  Aged Out    Hepatitis B vaccine  Aged Out    Hib vaccine  Aged Out    Meningococcal (ACWY) vaccine  Aged Out    Pneumococcal 0-64 years Vaccine  Aged Out       ROS:      Review of Systems   Constitutional: Negative for activity change, appetite change, chills, fatigue and fever. HENT: Positive for sinus pressure and sinus pain. Negative for congestion. Eyes: Negative for visual disturbance.    Respiratory: Negative for cough and shortness of breath. Cardiovascular: Negative for chest pain and palpitations. Left left lymphedema   Gastrointestinal: Negative for abdominal pain, constipation, diarrhea, nausea and vomiting. Genitourinary: Negative for dysuria and menstrual problem. Musculoskeletal: Positive for arthralgias. Trigger points, generalized joint pain   Skin: Negative for rash. Neurological: Positive for headaches. Negative for dizziness, weakness and light-headedness. Difficulty with word finding, memory   Psychiatric/Behavioral: Positive for sleep disturbance. Negative for decreased concentration and suicidal ideas. The patient is not nervous/anxious. Objective:     Vitals:    05/17/21 1021   BP: 128/78   Pulse: 86   Resp: 14   Temp: 98.1 °F (36.7 °C)   SpO2: 98%   Weight: 153 lb 3.2 oz (69.5 kg)   Height: 5' 6\" (1.676 m)       Body mass index is 24.73 kg/m². Wt Readings from Last 3 Encounters:   05/17/21 153 lb 3.2 oz (69.5 kg)   05/14/21 152 lb 12.8 oz (69.3 kg)   04/29/21 159 lb (72.1 kg)     BP Readings from Last 3 Encounters:   05/17/21 128/78   05/14/21 102/68   04/29/21 102/74       Physical Exam  Vitals reviewed. Constitutional:       General: She is not in acute distress. Appearance: Normal appearance. She is normal weight. HENT:      Head: Normocephalic and atraumatic. Right Ear: External ear normal.      Left Ear: External ear normal.      Nose: Nose normal. No congestion. Mouth/Throat:      Mouth: Mucous membranes are moist.      Pharynx: Oropharynx is clear. Eyes:      General:         Right eye: No discharge. Left eye: No discharge. Conjunctiva/sclera: Conjunctivae normal.      Pupils: Pupils are equal, round, and reactive to light. Cardiovascular:      Rate and Rhythm: Normal rate and regular rhythm. Pulses: Normal pulses. Heart sounds: Normal heart sounds. No murmur heard. No friction rub. No gallop.     Pulmonary:      Effort: Prescribed:  Orders Placed This Encounter   Medications    esomeprazole (NEXIUM) 40 MG delayed release capsule     Sig: Take 1 capsule by mouth every morning (before breakfast)     Dispense:  90 capsule     Refill:  1    fluconazole (DIFLUCAN) 150 MG tablet     Sig: Take 1 tablet by mouth daily for 3 days     Dispense:  3 tablet     Refill:  0    pramipexole (MIRAPEX) 0.125 MG tablet     Sig: Take 2 tablets by mouth 2 times daily     Dispense:  120 tablet     Refill:  3       Future Appointments   Date Time Provider Bharat Puentesi   5/18/2021  2:15 PM Dru Delacruz, SLP [facility] [place]   5/25/2021 11:45 AM BELGICA Nuñez [facility] [place]   6/3/2021 11:45 AM MD JAIMIE Patterson ENT [facility] [place]   6/8/2021 10:20 AM William Vicente DO N [facility] Rheum [facility] - [place]   6/10/2021  3:00 PM MD JAIMIE Patterson ENT [facility] [place]   6/24/2021  2:00 PM MD JAIMIE Patterson ENT [facility] [place]   6/28/2021  9:30 AM DO JAIMIE Dahl [facility] Pain [facility] [place]   6/28/2021  1:30 PM Norva Hammans, MD Joyce Sly PSY[facility] P - [place]   7/2/2021  3:30 PM MD JAIMIE Bergman [facility] [place]   8/9/2021 11:00 AM MD JAIMIE Miller [facility] Heart [facility] [place]   8/17/2021  1:40 PM MD ROMELIA WilsonX FM RES [facility] [place]   10/19/2021  1:00 PM [facility] [place]       Patient given educational materials - see patient instructions. Discussed use, benefit, and side effects of prescribed medications. All patient questions answered. Patient voiced understanding. Reviewed health maintenance. Instructed to continue current medications, diet and exercise. Patient agreed with treatment plan. Follow up as directed.      Electronically signed by Ashish Comer MD on 5/17/2021 at 1:15 PM

## 2021-05-18 ENCOUNTER — APPOINTMENT (OUTPATIENT)
Dept: SPEECH THERAPY | Age: 61
End: 2021-05-18
Payer: MEDICARE

## 2021-05-19 RX ORDER — BUPROPION HYDROCHLORIDE 300 MG/1
TABLET ORAL
Qty: 90 TABLET | Refills: 0 | Status: SHIPPED | OUTPATIENT
Start: 2021-05-19 | End: 2021-06-28 | Stop reason: SDUPTHER

## 2021-05-19 NOTE — TELEPHONE ENCOUNTER
[facility] has requested a refill of Wellbutrin XL 300mg/d on Isha's behalf.   She attended an appointment 4/15 and is to return 6/28

## 2021-05-19 NOTE — TELEPHONE ENCOUNTER
Yes, patient is cleared for surgery. Needed note cosigned. Will print and fax to Dr. Demarcus Payan office.  Thanks

## 2021-05-25 ENCOUNTER — APPOINTMENT (OUTPATIENT)
Dept: SPEECH THERAPY | Age: 61
End: 2021-05-25
Payer: MEDICARE

## 2021-05-27 ENCOUNTER — TELEPHONE (OUTPATIENT)
Dept: ENT CLINIC | Age: 61
End: 2021-05-27

## 2021-05-27 NOTE — TELEPHONE ENCOUNTER
Patient lvm on nurse line asking if she should d/c plaquenil for surgery. The nurse from registration advised her to call and ask the office. I called her back and informed her that it was not necessary to hold. We had only directed her to hold asa, ibuprofen, garlic, supplements, fish oils. She voiced understanding.

## 2021-06-03 ENCOUNTER — OFFICE VISIT (OUTPATIENT)
Dept: RHEUMATOLOGY | Age: 61
End: 2021-06-03
Payer: MEDICARE

## 2021-06-03 VITALS
HEIGHT: 65 IN | DIASTOLIC BLOOD PRESSURE: 62 MMHG | BODY MASS INDEX: 25.19 KG/M2 | SYSTOLIC BLOOD PRESSURE: 116 MMHG | WEIGHT: 151.2 LBS | HEART RATE: 84 BPM | OXYGEN SATURATION: 97 %

## 2021-06-03 DIAGNOSIS — M25.50 POLYARTHRALGIA: Primary | ICD-10-CM

## 2021-06-03 DIAGNOSIS — Z87.39 H/O MIXED CONNECTIVE TISSUE DISEASE: ICD-10-CM

## 2021-06-03 DIAGNOSIS — Z86.73 H/O: CVA (CEREBROVASCULAR ACCIDENT): ICD-10-CM

## 2021-06-03 DIAGNOSIS — Z51.81 MEDICATION MONITORING ENCOUNTER: ICD-10-CM

## 2021-06-03 PROCEDURE — G8419 CALC BMI OUT NRM PARAM NOF/U: HCPCS | Performed by: NURSE PRACTITIONER

## 2021-06-03 PROCEDURE — G8427 DOCREV CUR MEDS BY ELIG CLIN: HCPCS | Performed by: NURSE PRACTITIONER

## 2021-06-03 PROCEDURE — 1036F TOBACCO NON-USER: CPT | Performed by: NURSE PRACTITIONER

## 2021-06-03 PROCEDURE — 3017F COLORECTAL CA SCREEN DOC REV: CPT | Performed by: NURSE PRACTITIONER

## 2021-06-03 PROCEDURE — 99214 OFFICE O/P EST MOD 30 MIN: CPT | Performed by: NURSE PRACTITIONER

## 2021-06-03 ASSESSMENT — ENCOUNTER SYMPTOMS
ABDOMINAL PAIN: 0
CONSTIPATION: 1
COUGH: 0
EYE PAIN: 0
BACK PAIN: 0
TROUBLE SWALLOWING: 0
DIARRHEA: 0
EYE ITCHING: 0
NAUSEA: 0
SHORTNESS OF BREATH: 1

## 2021-06-03 NOTE — PROGRESS NOTES
[facility] RHEUMATOLOGY FOLLOW UP NOTE       Date Of Service: 6/3/2021  Provider: Radha Rojas APRN - CNP    Name: Paulie Manzo   MRN: [id number]    Chief Complaint(s)     Chief Complaint   Patient presents with    Follow-up     2 month f/u        History of Present Illness (HPI)     Paulie Manzo  is a(n)60 y.o. female with a hx of adrenal abnormalities, arthritis, asthma, CVA, headaches, hyperlipidemia, h/o myositis, bipolar disorder, dry eye, fatty liver here for the f/u evaluation of MCTD/ systemic lupus. Interval hx:    - every joint is \"on fire\" since getting new mattress    pain affecting the fingers, wrists, elbows, shoulder,s hips, knees, ankles, toes, neck, back  Pain on a scale 0-10: 7.5/10  Type of pain: constant  Timing: intermittent  Aggravating factors: weather changes, back: getting up from seated position, prolonged standing  Alleviating factors: tylenol hands    Associated symptoms:  denies swelling/  Redness/ warmth, + AM stiffness lasting ~ 30 minutes      REVIEW OF SYSTEMS: (ROS)    Review of Systems   Constitutional: Positive for fatigue. Negative for fever and unexpected weight change. HENT: Negative for congestion and trouble swallowing. Eyes: Negative for pain and itching. Respiratory: Positive for shortness of breath. Negative for cough. Cardiovascular: Negative for chest pain and leg swelling. Gastrointestinal: Positive for constipation. Negative for abdominal pain, diarrhea and nausea. Endocrine: Negative for cold intolerance and heat intolerance. Genitourinary: Negative for difficulty urinating, frequency and urgency. Musculoskeletal: Positive for arthralgias. Negative for back pain and joint swelling. Skin: Negative for rash. Neurological: Negative for dizziness, weakness, numbness and headaches. Psychiatric/Behavioral: The patient is not nervous/anxious.         PAST MEDICAL HISTORY      Past Medical History:   Diagnosis Date    Adrenal abnormality ([address][email or web address]) 40 MG delayed release capsule Take 1 capsule by mouth every morning (before breakfast) 90 capsule 1    pramipexole (MIRAPEX) 0.125 MG tablet Take 2 tablets by mouth 2 times daily 120 tablet 3    topiramate (TOPAMAX) 100 MG tablet TAKE 1 TABLET BY MOUTH TWICE A  tablet 1    atorvastatin (LIPITOR) 40 MG tablet Take 40 mg by mouth daily       memantine (NAMENDA) 5 MG tablet Take 5 mg by mouth daily       clotrimazole-betamethasone (LOTRISONE) 1-0.05 % cream Apply topically as needed       cyclobenzaprine (FLEXERIL) 10 MG tablet 2 times daily as needed       fluticasone (FLONASE) 50 MCG/ACT nasal spray as needed       furosemide (LASIX) 20 MG tablet as needed       potassium chloride (KLOR-CON) 10 MEQ extended release tablet as needed       Onabotulinumtoxin A (BOTOX, COSMETIC,) 200 units injection Botulinum Toxin Type A Botox 200 unit injection recon soln monthly for migraines   [facility] ([address])      traZODone (DESYREL) 150 MG tablet Take 1.5 tablets by mouth nightly 45 tablet 2    buPROPion (WELLBUTRIN XL) 150 MG extended release tablet Take 1 tablet by mouth every morning Take with 300 mg tablet for total 450 mg once daily in the morning 30 tablet 2    Ubrogepant (UBRELVY) 100 MG TABS Take 100 mg by mouth as needed      Erenumab-aooe (AIMOVIG) 140 MG/ML SOAJ Inject 140 mg into the skin every 30 days 1 pen 3    hydroxychloroquine (PLAQUENIL) 200 MG tablet Take 200 mg by mouth daily       tiotropium (SPIRIVA) 18 MCG inhalation capsule Inhale 18 mcg into the lungs daily as needed       butalbital-acetaminophen-caffeine (FIORICET, ESGIC) -40 MG per tablet Take 1 tablet by mouth every 4 hours as needed for Headaches      nitroglycerin (NITROSTAT) 0.6 MG SL tablet Place 0.6 mg under the tongue every 5 minutes as needed for Chest pain up to max of 3 total doses. If no relief after 1 dose, call 911.       ondansetron (ZOFRAN) 4 MG tablet Take 1 tablet by Component Value Date    CALCIUM 10.1 03/30/2021    LABALBU 4.6 03/30/2021    PROT 7.4 03/30/2021     03/30/2021    K 4.2 03/30/2021    CO2 24 03/30/2021     03/30/2021    BUN 24 03/30/2021    CREATININE 0.8 03/30/2021    ALKPHOS 83 03/30/2021    ALT 13 03/30/2021    AST 19 03/30/2021       HgBA1c: No components found for: HGBA1C    Lab Results   Component Value Date    VITD25 41 05/10/2021         No results found for: ANASCRN  No results found for: SSA  No results found for: SSB  No results found for: ANTI-SMITH  No results found for: DSDNAAB   No results found for: ANTIRNP  Lab Results   Component Value Date    C3 131 03/30/2021    C4 25 03/30/2021     No results found for: CCPAB  Lab Results   Component Value Date    RF <10 09/16/2020       No components found for: CANCASCRN, APANCASCRN  Lab Results   Component Value Date    SEDRATE 17 03/30/2021     Lab Results   Component Value Date    CRP 0.68 03/30/2021       RADIOLOGY:         ASSESSMENT/PLAN    Assessment   Plan     Polyarthralgia  H/o mixed connective tissue disease  - reported hx of inflammatory arthritis, malar rash, nasal sores, oral sores, muscle inflammation  - AVISE panel 3/2021 +BJ 1:320 homogenous, negative subserologies   - plaquenil 200 mg BID    H/o CVA  - negative APLS evaluation    Medication monitoring   - plaquenil eye exam (5/10/2021)      No follow-ups on file. Electronically signed by ESTEFANIA Bell CNP on 6/3/2021 at 11:41 AM    New Prescriptions    No medications on file       Thank you for allowing me to participate in the care of this patient. Please call if there are any questions.

## 2021-06-06 PROBLEM — J34.89 CONCHA BULLOSA: Status: ACTIVE | Noted: 2021-06-06

## 2021-06-06 PROBLEM — J34.2 DEVIATED NASAL SEPTUM: Status: ACTIVE | Noted: 2021-06-06

## 2021-06-06 PROBLEM — R51.9 RHINOGENIC HEADACHE: Status: ACTIVE | Noted: 2021-06-06

## 2021-06-06 PROBLEM — G43.909 MIGRAINE WITHOUT STATUS MIGRAINOSUS, NOT INTRACTABLE: Status: ACTIVE | Noted: 2021-06-06

## 2021-06-06 PROBLEM — J34.3 HYPERTROPHY OF INFERIOR NASAL TURBINATE: Status: ACTIVE | Noted: 2021-06-06

## 2021-06-06 NOTE — H&P (VIEW-ONLY)
[address] EAR, NOSE AND THROAT  [facility]  Dept: [phone]  Dept Fax: [phone]  Loc: [phone]    Kelby Spears is a 62 y.o. female who was referred byNo ref. provider found for:  No chief complaint on file. Syed Neighbours HPI:     Kelby Spears is a 62 y.o. female who presents today for preop visit regarding upcoming nasal surgery. 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 There is also contact of the superior turbinates and middle turbinates against the septum, which can cause rhinogenic headache. There is a left posterior septal spur. Below is a representative image. History:      Allergies   Allergen Reactions    Amoxil [Amoxicillin]      rash    Biaxin [Clarithromycin] Swelling     Lips swelled    Doxycycline      rash    Morphine     Sulfa Antibiotics Swelling     lip     Current Facility-Administered Medications   Medication Dose Route Frequency Provider Last Rate Last Admin    onabotulinumtoxin A (BOTOX) injection 200 Units  200 Units Intramuscular Once Leandro Pelletier Holiday, DO         Current Outpatient Medications   Medication Sig Dispense Refill    buPROPion (WELLBUTRIN XL) 300 MG extended release tablet TAKE 1 TABLET BY MOUTH EVERY DAY IN THE MORNING 90 tablet 0    esomeprazole (NEXIUM) 40 MG delayed release capsule Take 1 capsule by mouth every morning (before breakfast) 90 capsule 1    pramipexole (MIRAPEX) 0.125 MG tablet Take 2 tablets by mouth 2 times daily 120 tablet 3    topiramate (TOPAMAX) 100 MG tablet TAKE 1 TABLET BY MOUTH TWICE A  tablet 1    atorvastatin (LIPITOR) 40 MG tablet Take 40 mg by mouth daily       memantine (NAMENDA) 5 MG tablet Take 5 mg by mouth daily       clotrimazole-betamethasone (LOTRISONE) 1-0.05 % cream Apply topically as needed       cyclobenzaprine (FLEXERIL) 10 MG tablet 2 times daily as needed       fluticasone (FLONASE) 50 MCG/ACT nasal spray as needed       furosemide (LASIX) 20 MG tablet as needed       potassium chloride (KLOR-CON) 10 MEQ extended release tablet as needed       Onabotulinumtoxin A (BOTOX, COSMETIC,) 200 units injection Botulinum Toxin Type A Botox 200 unit injection recon soln monthly for migraines   [facility] ([id number])      traZODone (DESYREL) 150 MG tablet Take 1.5 tablets by mouth nightly 45 tablet 2    buPROPion (WELLBUTRIN XL) 150 MG extended release tablet Take 1 tablet by mouth every morning Take with 300 mg tablet for total 450 mg once daily in the morning 30 tablet 2    Ubrogepant (UBRELVY) 100 MG TABS Take 100 mg by mouth as needed      Erenumab-aooe (AIMOVIG) 140 MG/ML SOAJ Inject 140 mg into the skin every 30 days 1 pen 3    hydroxychloroquine (PLAQUENIL) 200 MG tablet Take 200 mg by mouth daily       tiotropium (SPIRIVA) 18 MCG inhalation capsule Inhale 18 mcg into the lungs daily as needed       butalbital-acetaminophen-caffeine (FIORICET, ESGIC) -40 MG per tablet Take 1 tablet by mouth every 4 hours as needed for Headaches      nitroglycerin (NITROSTAT) 0.6 MG SL tablet Place 0.6 mg under the tongue every 5 minutes as needed for Chest pain up to max of 3 total doses. If no relief after 1 dose, call 911.       ondansetron (ZOFRAN) 4 MG tablet Take 1 tablet by mouth 3 times daily as needed for Nausea or Vomiting 15 tablet 0     Past Medical History:   Diagnosis Date    Adrenal abnormality (HCC)     Angina at rest [facility])     Anxiety     Arthritis     Asthma     Cerebral artery occlusion with cerebral infarction ([facility] [address]     Chronic sinusitis     Cognitive impairment     with retograde amnesia    Diabetes mellitus ([facility] [address]     Headache     Migraines Hemiplegic    History of degenerative disc disease     Hyperlipidemia     stiffness. Skin: Negative for color change and rash. Allergic/Immunologic: Negative for environmental allergies, food allergies and immunocompromised state. Neurological: Negative for dizziness, syncope, facial asymmetry, speech difficulty, light-headedness and headaches. Hematological: Negative for adenopathy. Does not bruise/bleed easily. Psychiatric/Behavioral: Negative for confusion and sleep disturbance. The patient is not nervous/anxious. Objective:   Ht 5' 5\" (1.651 m)   Wt 148 lb (67.1 kg)   BMI 24.63 kg/m²     Physical Exam  Vitals and nursing note reviewed. Constitutional:       Appearance: She is well-developed. HENT:      Head: Normocephalic and atraumatic. No laceration. Comments:        Right Ear: Hearing, ear canal and external ear normal. No drainage or swelling. No middle ear effusion. Tympanic membrane is not perforated or erythematous. Left Ear: Hearing, tympanic membrane, ear canal and external ear normal. No drainage or swelling. No middle ear effusion. Tympanic membrane is not perforated or erythematous. Nose: Septal deviation ( 3+ to the left) present. No mucosal edema or rhinorrhea. Right Turbinates: Enlarged ( 3+ hypertrophy right inferior turbinate and wide right middle turbinate). Mouth/Throat:      Mouth: Mucous membranes are not pale and not dry. No oral lesions. Pharynx: Oropharynx is clear. Uvula midline. No oropharyngeal exudate or posterior oropharyngeal erythema. Comments: LIps: lips normal     Mallampati 1  Base of tongue: symmetric,  Eyes:      Extraocular Movements:      Right eye: Normal extraocular motion and no nystagmus. Left eye: Normal extraocular motion and no nystagmus. Comments: Conjugate gaze   Neck:      Thyroid: No thyroid mass or thyromegaly. Trachea: Phonation normal. No tracheal deviation.       Comments:   Salivary glands not enlarged and normal to palpation    Pulmonary:      Effort: Pulmonary effort is normal. No retractions. Breath sounds: No stridor. Musculoskeletal:      Cervical back: Neck supple. Neurological:      Mental Status: She is alert and oriented to person, place, and time. Cranial Nerves: No cranial nerve deficit (VIIth N function intact bilat). Psychiatric:         Mood and Affect: Mood and affect normal.         Behavior: Behavior is cooperative. Data:  All of the past medical history, past surgical history, family history,social history, allergies and current medications were reviewed with the patient. Assessment & Plan   Diagnoses and all orders for this visit:     Diagnosis Orders   1. Deviated nasal septum     2. Hypertrophy of right inferior turbinate     3. Vinh bullosa right middle     4. Rhinogenic headache     5. Migraine without status migrainosus, not intractable, unspecified migraine type         The findings were explained and her questions were answered. CT is reviewed with the patient and compared to normal imaging. These problems are bothering her enough that she would like to proceed with surgical correction. Recommended surgical procedures are  Septoplasty  Partial submucous resection (SMR)  inferior turbinate right  Partial resection vinh bullosa middle turbinate right  Also possible disimpaction of the turbinates against septum. Surgical time estimate 2 hours. Informed consent: Septoplasty complications that may occur were discussed including postoperative bleeding (usually easy to control), infection, nasal crusting, a hole in the septum (perforation), failure to completely improve breathing, persistent septal deflection resulting in the need for revision (uncommon), and very rarely, a change in appearance. They understand that no guarantees are made regarding either outcome or potential complications. They read the patient information sheet and were given a copy to take with them.  All of their questions were

## 2021-06-09 DIAGNOSIS — G43.109 MIGRAINE WITH AURA AND WITHOUT STATUS MIGRAINOSUS, NOT INTRACTABLE: Primary | ICD-10-CM

## 2021-06-09 RX ORDER — BUTALBITAL, ACETAMINOPHEN AND CAFFEINE 50; 325; 40 MG/1; MG/1; MG/1
1 TABLET ORAL EVERY 4 HOURS PRN
Qty: 180 TABLET | Refills: 1 | Status: SHIPPED | OUTPATIENT
Start: 2021-06-09 | End: 2021-10-15 | Stop reason: SDUPTHER

## 2021-06-09 RX ORDER — ERENUMAB-AOOE 140 MG/ML
140 INJECTION, SOLUTION SUBCUTANEOUS
Qty: 1 PEN | Refills: 5 | Status: SHIPPED | OUTPATIENT
Start: 2021-06-09

## 2021-06-09 NOTE — TELEPHONE ENCOUNTER
Nancy Womack called requesting a refill on the following medications:  Requested Prescriptions     Pending Prescriptions Disp Refills    butalbital-acetaminophen-caffeine (FIORICET, ESGIC) -40 MG per tablet 180 tablet      Sig: Take 1 tablet by mouth every 4 hours as needed for Headaches     Pharmacy verified: [facility]/pharmacy #[id number]- [place], [place] - [address] [address] - F [phone]  . pv      Date of last visit: 5/17/21  Date of next visit (if applicable): 6/36/78

## 2021-06-13 ENCOUNTER — ANESTHESIA EVENT (OUTPATIENT)
Dept: OPERATING ROOM | Age: 61
End: 2021-06-13
Payer: MEDICARE

## 2021-06-14 ENCOUNTER — ANESTHESIA (OUTPATIENT)
Dept: OPERATING ROOM | Age: 61
End: 2021-06-14
Payer: MEDICARE

## 2021-06-14 ENCOUNTER — HOSPITAL ENCOUNTER (OUTPATIENT)
Age: 61
Discharge: HOME | End: 2021-06-15
Attending: OTOLARYNGOLOGY | Admitting: OTOLARYNGOLOGY
Payer: MEDICARE

## 2021-06-14 VITALS
SYSTOLIC BLOOD PRESSURE: 120 MMHG | DIASTOLIC BLOOD PRESSURE: 67 MMHG | TEMPERATURE: 96.8 F | OXYGEN SATURATION: 99 % | RESPIRATION RATE: 24 BRPM

## 2021-06-14 DIAGNOSIS — R51.9 RHINOGENIC HEADACHE: ICD-10-CM

## 2021-06-14 DIAGNOSIS — J34.2 DEVIATED NASAL SEPTUM: Primary | ICD-10-CM

## 2021-06-14 DIAGNOSIS — J34.89 CONCHA BULLOSA: ICD-10-CM

## 2021-06-14 DIAGNOSIS — G89.18 ACUTE POST-OPERATIVE PAIN: ICD-10-CM

## 2021-06-14 DIAGNOSIS — J34.3 HYPERTROPHY OF INFERIOR NASAL TURBINATE: ICD-10-CM

## 2021-06-14 PROBLEM — Z98.890 POSTOPERATIVE STATE: Status: ACTIVE | Noted: 2021-06-14

## 2021-06-14 LAB — POC POTASSIUM, WHOLE BLOOD: 3.8 MEQ/L (ref 3.5–4.9)

## 2021-06-14 PROCEDURE — 6370000000 HC RX 637 (ALT 250 FOR IP): Performed by: OTOLARYNGOLOGY

## 2021-06-14 PROCEDURE — 31240 NSL/SNS NDSC CNCH BULL RESCJ: CPT | Performed by: OTOLARYNGOLOGY

## 2021-06-14 PROCEDURE — 6360000002 HC RX W HCPCS: Performed by: OTOLARYNGOLOGY

## 2021-06-14 PROCEDURE — 3700000001 HC ADD 15 MINUTES (ANESTHESIA): Performed by: OTOLARYNGOLOGY

## 2021-06-14 PROCEDURE — 6370000000 HC RX 637 (ALT 250 FOR IP)

## 2021-06-14 PROCEDURE — 7100000010 HC PHASE II RECOVERY - FIRST 15 MIN: Performed by: OTOLARYNGOLOGY

## 2021-06-14 PROCEDURE — 2580000003 HC RX 258: Performed by: OTOLARYNGOLOGY

## 2021-06-14 PROCEDURE — 3700000000 HC ANESTHESIA ATTENDED CARE: Performed by: OTOLARYNGOLOGY

## 2021-06-14 PROCEDURE — 84132 ASSAY OF SERUM POTASSIUM: CPT

## 2021-06-14 PROCEDURE — 2780000010 HC IMPLANT OTHER: Performed by: OTOLARYNGOLOGY

## 2021-06-14 PROCEDURE — 2709999900 HC NON-CHARGEABLE SUPPLY: Performed by: OTOLARYNGOLOGY

## 2021-06-14 PROCEDURE — 2720000010 HC SURG SUPPLY STERILE: Performed by: OTOLARYNGOLOGY

## 2021-06-14 PROCEDURE — 7100000001 HC PACU RECOVERY - ADDTL 15 MIN: Performed by: OTOLARYNGOLOGY

## 2021-06-14 PROCEDURE — 2500000003 HC RX 250 WO HCPCS: Performed by: OTOLARYNGOLOGY

## 2021-06-14 PROCEDURE — 3600000004 HC SURGERY LEVEL 4 BASE: Performed by: OTOLARYNGOLOGY

## 2021-06-14 PROCEDURE — 6360000002 HC RX W HCPCS

## 2021-06-14 PROCEDURE — 3600000014 HC SURGERY LEVEL 4 ADDTL 15MIN: Performed by: OTOLARYNGOLOGY

## 2021-06-14 PROCEDURE — 2500000003 HC RX 250 WO HCPCS

## 2021-06-14 PROCEDURE — 30520 REPAIR OF NASAL SEPTUM: CPT | Performed by: OTOLARYNGOLOGY

## 2021-06-14 PROCEDURE — 7100000011 HC PHASE II RECOVERY - ADDTL 15 MIN: Performed by: OTOLARYNGOLOGY

## 2021-06-14 PROCEDURE — 7100000000 HC PACU RECOVERY - FIRST 15 MIN: Performed by: OTOLARYNGOLOGY

## 2021-06-14 PROCEDURE — 30140 RESECT INFERIOR TURBINATE: CPT | Performed by: OTOLARYNGOLOGY

## 2021-06-14 RX ORDER — MINERAL OIL AND WHITE PETROLATUM 150; 830 MG/G; MG/G
OINTMENT OPHTHALMIC PRN
Status: DISCONTINUED | OUTPATIENT
Start: 2021-06-14 | End: 2021-06-14 | Stop reason: ALTCHOICE

## 2021-06-14 RX ORDER — FENTANYL CITRATE 50 UG/ML
50 INJECTION, SOLUTION INTRAMUSCULAR; INTRAVENOUS ONCE
Status: COMPLETED | OUTPATIENT
Start: 2021-06-14 | End: 2021-06-14

## 2021-06-14 RX ORDER — FENTANYL CITRATE 50 UG/ML
INJECTION, SOLUTION INTRAMUSCULAR; INTRAVENOUS PRN
Status: DISCONTINUED | OUTPATIENT
Start: 2021-06-14 | End: 2021-06-14 | Stop reason: SDUPTHER

## 2021-06-14 RX ORDER — LIDOCAINE HYDROCHLORIDE AND EPINEPHRINE 10; 10 MG/ML; UG/ML
INJECTION, SOLUTION INFILTRATION; PERINEURAL PRN
Status: DISCONTINUED | OUTPATIENT
Start: 2021-06-14 | End: 2021-06-14 | Stop reason: ALTCHOICE

## 2021-06-14 RX ORDER — ATORVASTATIN CALCIUM 40 MG/1
40 TABLET, FILM COATED ORAL DAILY
Qty: 30 TABLET | Refills: 3 | OUTPATIENT
Start: 2021-06-14

## 2021-06-14 RX ORDER — SCOLOPAMINE TRANSDERMAL SYSTEM 1 MG/1
PATCH, EXTENDED RELEASE TRANSDERMAL
Status: DISCONTINUED
Start: 2021-06-14 | End: 2021-06-15 | Stop reason: HOSPADM

## 2021-06-14 RX ORDER — PRAMIPEXOLE DIHYDROCHLORIDE 0.25 MG/1
0.25 TABLET ORAL 2 TIMES DAILY
Status: DISCONTINUED | OUTPATIENT
Start: 2021-06-14 | End: 2021-06-15 | Stop reason: HOSPADM

## 2021-06-14 RX ORDER — HYDROCODONE BITARTRATE AND ACETAMINOPHEN 7.5; 325 MG/1; MG/1
1 TABLET ORAL EVERY 6 HOURS PRN
Qty: 20 TABLET | Refills: 0 | OUTPATIENT
Start: 2021-06-14 | End: 2021-06-19

## 2021-06-14 RX ORDER — EPINEPHRINE 1 MG/ML
INJECTION, SOLUTION, CONCENTRATE INTRAVENOUS PRN
Status: DISCONTINUED | OUTPATIENT
Start: 2021-06-14 | End: 2021-06-14 | Stop reason: ALTCHOICE

## 2021-06-14 RX ORDER — DEXAMETHASONE SODIUM PHOSPHATE 10 MG/ML
10 INJECTION, EMULSION INTRAMUSCULAR; INTRAVENOUS
Status: DISCONTINUED | OUTPATIENT
Start: 2021-06-14 | End: 2021-06-14

## 2021-06-14 RX ORDER — BUPROPION HYDROCHLORIDE 150 MG/1
150 TABLET ORAL EVERY MORNING
Status: DISCONTINUED | OUTPATIENT
Start: 2021-06-15 | End: 2021-06-15 | Stop reason: HOSPADM

## 2021-06-14 RX ORDER — SODIUM CHLORIDE 9 MG/ML
25 INJECTION, SOLUTION INTRAVENOUS PRN
Status: DISCONTINUED | OUTPATIENT
Start: 2021-06-14 | End: 2021-06-15 | Stop reason: HOSPADM

## 2021-06-14 RX ORDER — OXYMETAZOLINE HYDROCHLORIDE 0.05 G/100ML
SPRAY NASAL PRN
Status: DISCONTINUED | OUTPATIENT
Start: 2021-06-14 | End: 2021-06-14 | Stop reason: ALTCHOICE

## 2021-06-14 RX ORDER — BUPROPION HYDROCHLORIDE 300 MG/1
300 TABLET ORAL DAILY
Status: DISCONTINUED | OUTPATIENT
Start: 2021-06-14 | End: 2021-06-15 | Stop reason: HOSPADM

## 2021-06-14 RX ORDER — BUTALBITAL, ACETAMINOPHEN AND CAFFEINE 50; 325; 40 MG/1; MG/1; MG/1
1 TABLET ORAL EVERY 4 HOURS PRN
Status: DISCONTINUED | OUTPATIENT
Start: 2021-06-14 | End: 2021-06-15 | Stop reason: HOSPADM

## 2021-06-14 RX ORDER — MEMANTINE HYDROCHLORIDE 5 MG/1
5 TABLET ORAL DAILY
Status: DISCONTINUED | OUTPATIENT
Start: 2021-06-14 | End: 2021-06-15 | Stop reason: HOSPADM

## 2021-06-14 RX ORDER — ONDANSETRON 2 MG/ML
INJECTION INTRAMUSCULAR; INTRAVENOUS
Status: COMPLETED
Start: 2021-06-14 | End: 2021-06-14

## 2021-06-14 RX ORDER — PREDNISONE 20 MG/1
20 TABLET ORAL DAILY
Qty: 4 TABLET | Refills: 0 | OUTPATIENT
Start: 2021-06-14 | End: 2021-06-17

## 2021-06-14 RX ORDER — LIDOCAINE HYDROCHLORIDE 20 MG/ML
INJECTION, SOLUTION EPIDURAL; INFILTRATION; INTRACAUDAL; PERINEURAL PRN
Status: DISCONTINUED | OUTPATIENT
Start: 2021-06-14 | End: 2021-06-14 | Stop reason: SDUPTHER

## 2021-06-14 RX ORDER — SODIUM CHLORIDE 0.9 % (FLUSH) 0.9 %
10 SYRINGE (ML) INJECTION EVERY 12 HOURS SCHEDULED
Status: DISCONTINUED | OUTPATIENT
Start: 2021-06-14 | End: 2021-06-15 | Stop reason: HOSPADM

## 2021-06-14 RX ORDER — GINSENG 100 MG
CAPSULE ORAL PRN
Status: DISCONTINUED | OUTPATIENT
Start: 2021-06-14 | End: 2021-06-14 | Stop reason: ALTCHOICE

## 2021-06-14 RX ORDER — CYCLOBENZAPRINE HCL 10 MG
5 TABLET ORAL 2 TIMES DAILY PRN
Status: DISCONTINUED | OUTPATIENT
Start: 2021-06-14 | End: 2021-06-15 | Stop reason: HOSPADM

## 2021-06-14 RX ORDER — ONDANSETRON 4 MG/1
4 TABLET, FILM COATED ORAL 3 TIMES DAILY PRN
Status: DISCONTINUED | OUTPATIENT
Start: 2021-06-14 | End: 2021-06-15 | Stop reason: HOSPADM

## 2021-06-14 RX ORDER — HYDROXYCHLOROQUINE SULFATE 200 MG/1
200 TABLET, FILM COATED ORAL DAILY
Status: DISCONTINUED | OUTPATIENT
Start: 2021-06-14 | End: 2021-06-15 | Stop reason: HOSPADM

## 2021-06-14 RX ORDER — SODIUM CHLORIDE 9 MG/ML
INJECTION, SOLUTION INTRAVENOUS CONTINUOUS
Status: DISCONTINUED | OUTPATIENT
Start: 2021-06-14 | End: 2021-06-15 | Stop reason: HOSPADM

## 2021-06-14 RX ORDER — CLINDAMYCIN PHOSPHATE 600 MG/50ML
600 INJECTION INTRAVENOUS ONCE
Status: COMPLETED | OUTPATIENT
Start: 2021-06-14 | End: 2021-06-14

## 2021-06-14 RX ORDER — ROPIVACAINE HYDROCHLORIDE 5 MG/ML
INJECTION, SOLUTION EPIDURAL; INFILTRATION; PERINEURAL PRN
Status: DISCONTINUED | OUTPATIENT
Start: 2021-06-14 | End: 2021-06-14 | Stop reason: ALTCHOICE

## 2021-06-14 RX ORDER — ROCURONIUM BROMIDE 10 MG/ML
INJECTION, SOLUTION INTRAVENOUS PRN
Status: DISCONTINUED | OUTPATIENT
Start: 2021-06-14 | End: 2021-06-14 | Stop reason: SDUPTHER

## 2021-06-14 RX ORDER — OXYMETAZOLINE HYDROCHLORIDE 0.05 G/100ML
2 SPRAY NASAL EVERY 6 HOURS SCHEDULED
Status: DISCONTINUED | OUTPATIENT
Start: 2021-06-14 | End: 2021-06-15 | Stop reason: HOSPADM

## 2021-06-14 RX ORDER — FENTANYL CITRATE 50 UG/ML
INJECTION, SOLUTION INTRAMUSCULAR; INTRAVENOUS
Status: COMPLETED
Start: 2021-06-14 | End: 2021-06-14

## 2021-06-14 RX ORDER — TOPIRAMATE 100 MG/1
100 TABLET, FILM COATED ORAL 2 TIMES DAILY
Status: DISCONTINUED | OUTPATIENT
Start: 2021-06-14 | End: 2021-06-15 | Stop reason: HOSPADM

## 2021-06-14 RX ORDER — ONDANSETRON 2 MG/ML
4 INJECTION INTRAMUSCULAR; INTRAVENOUS ONCE
Status: COMPLETED | OUTPATIENT
Start: 2021-06-14 | End: 2021-06-14

## 2021-06-14 RX ORDER — CLINDAMYCIN HYDROCHLORIDE 300 MG/1
300 CAPSULE ORAL 3 TIMES DAILY
Qty: 42 CAPSULE | Refills: 0 | OUTPATIENT
Start: 2021-06-14 | End: 2021-06-28

## 2021-06-14 RX ORDER — SCOLOPAMINE TRANSDERMAL SYSTEM 1 MG/1
1 PATCH, EXTENDED RELEASE TRANSDERMAL
Status: DISCONTINUED | OUTPATIENT
Start: 2021-06-14 | End: 2021-06-15 | Stop reason: HOSPADM

## 2021-06-14 RX ORDER — GLYCOPYRROLATE 1 MG/5 ML
SYRINGE (ML) INTRAVENOUS PRN
Status: DISCONTINUED | OUTPATIENT
Start: 2021-06-14 | End: 2021-06-14 | Stop reason: SDUPTHER

## 2021-06-14 RX ORDER — PROPOFOL 10 MG/ML
INJECTION, EMULSION INTRAVENOUS PRN
Status: DISCONTINUED | OUTPATIENT
Start: 2021-06-14 | End: 2021-06-14 | Stop reason: SDUPTHER

## 2021-06-14 RX ORDER — PANTOPRAZOLE SODIUM 40 MG/1
40 TABLET, DELAYED RELEASE ORAL
Status: DISCONTINUED | OUTPATIENT
Start: 2021-06-15 | End: 2021-06-15 | Stop reason: HOSPADM

## 2021-06-14 RX ORDER — NITROGLYCERIN 0.6 MG/1
0.6 TABLET SUBLINGUAL EVERY 5 MIN PRN
Status: DISCONTINUED | OUTPATIENT
Start: 2021-06-14 | End: 2021-06-15 | Stop reason: HOSPADM

## 2021-06-14 RX ORDER — ONDANSETRON 2 MG/ML
INJECTION INTRAMUSCULAR; INTRAVENOUS PRN
Status: DISCONTINUED | OUTPATIENT
Start: 2021-06-14 | End: 2021-06-14 | Stop reason: SDUPTHER

## 2021-06-14 RX ORDER — NEOSTIGMINE METHYLSULFATE 5 MG/5 ML
SYRINGE (ML) INTRAVENOUS PRN
Status: DISCONTINUED | OUTPATIENT
Start: 2021-06-14 | End: 2021-06-14 | Stop reason: SDUPTHER

## 2021-06-14 RX ORDER — HYDROCODONE BITARTRATE AND ACETAMINOPHEN 7.5; 325 MG/1; MG/1
1 TABLET ORAL ONCE
Status: COMPLETED | OUTPATIENT
Start: 2021-06-14 | End: 2021-06-14

## 2021-06-14 RX ORDER — SODIUM CHLORIDE 0.9 % (FLUSH) 0.9 %
10 SYRINGE (ML) INJECTION PRN
Status: DISCONTINUED | OUTPATIENT
Start: 2021-06-14 | End: 2021-06-15 | Stop reason: HOSPADM

## 2021-06-14 RX ORDER — OXYMETAZOLINE HYDROCHLORIDE 0.05 G/100ML
SPRAY NASAL PRN
Status: DISCONTINUED | OUTPATIENT
Start: 2021-06-14 | End: 2021-06-14 | Stop reason: SDUPTHER

## 2021-06-14 RX ORDER — HYDROCODONE BITARTRATE AND ACETAMINOPHEN 7.5; 325 MG/1; MG/1
1 TABLET ORAL EVERY 6 HOURS PRN
Status: DISCONTINUED | OUTPATIENT
Start: 2021-06-14 | End: 2021-06-15 | Stop reason: HOSPADM

## 2021-06-14 RX ORDER — DEXAMETHASONE SODIUM PHOSPHATE 4 MG/ML
INJECTION, SOLUTION INTRA-ARTICULAR; INTRALESIONAL; INTRAMUSCULAR; INTRAVENOUS; SOFT TISSUE PRN
Status: DISCONTINUED | OUTPATIENT
Start: 2021-06-14 | End: 2021-06-14 | Stop reason: ALTCHOICE

## 2021-06-14 RX ADMIN — HYDROCODONE BITARTRATE AND ACETAMINOPHEN 1 TABLET: 7.5; 325 TABLET ORAL at 20:16

## 2021-06-14 RX ADMIN — MEMANTINE HYDROCHLORIDE 5 MG: 5 TABLET, FILM COATED ORAL at 20:17

## 2021-06-14 RX ADMIN — OXYMETAZOLINE HYDROCHLORIDE 4 SPRAY: 0.05 SPRAY NASAL at 09:48

## 2021-06-14 RX ADMIN — PHENYLEPHRINE HYDROCHLORIDE 50 MCG: 10 INJECTION INTRAVENOUS at 10:28

## 2021-06-14 RX ADMIN — HYDROXYCHLOROQUINE SULFATE 200 MG: 200 TABLET ORAL at 20:17

## 2021-06-14 RX ADMIN — FENTANYL CITRATE 50 MCG: 50 INJECTION, SOLUTION INTRAMUSCULAR; INTRAVENOUS at 09:41

## 2021-06-14 RX ADMIN — ONDANSETRON 4 MG: 2 INJECTION INTRAMUSCULAR; INTRAVENOUS at 12:37

## 2021-06-14 RX ADMIN — PHENYLEPHRINE HYDROCHLORIDE 50 MCG: 10 INJECTION INTRAVENOUS at 10:40

## 2021-06-14 RX ADMIN — Medication 0.5 MG: at 11:54

## 2021-06-14 RX ADMIN — FENTANYL CITRATE 50 MCG: 50 INJECTION, SOLUTION INTRAMUSCULAR; INTRAVENOUS at 12:50

## 2021-06-14 RX ADMIN — PRAMIPEXOLE DIHYDROCHLORIDE 0.25 MG: 0.25 TABLET ORAL at 21:41

## 2021-06-14 RX ADMIN — DEXAMETHASONE SODIUM PHOSPHATE 10 MG: 10 INJECTION, EMULSION INTRAMUSCULAR; INTRAVENOUS at 09:02

## 2021-06-14 RX ADMIN — ROCURONIUM BROMIDE 10 MG: 10 INJECTION INTRAVENOUS at 10:25

## 2021-06-14 RX ADMIN — FENTANYL CITRATE 25 MCG: 50 INJECTION, SOLUTION INTRAMUSCULAR; INTRAVENOUS at 11:39

## 2021-06-14 RX ADMIN — PHENYLEPHRINE HYDROCHLORIDE 50 MCG: 10 INJECTION INTRAVENOUS at 11:22

## 2021-06-14 RX ADMIN — TOPIRAMATE 100 MG: 100 TABLET, FILM COATED ORAL at 20:16

## 2021-06-14 RX ADMIN — ROCURONIUM BROMIDE 10 MG: 10 INJECTION INTRAVENOUS at 10:05

## 2021-06-14 RX ADMIN — CYCLOBENZAPRINE 5 MG: 10 TABLET, FILM COATED ORAL at 20:16

## 2021-06-14 RX ADMIN — LIDOCAINE HYDROCHLORIDE 60 MG: 20 INJECTION, SOLUTION EPIDURAL; INFILTRATION; INTRACAUDAL; PERINEURAL at 09:41

## 2021-06-14 RX ADMIN — PHENYLEPHRINE HYDROCHLORIDE 50 MCG: 10 INJECTION INTRAVENOUS at 10:49

## 2021-06-14 RX ADMIN — PROPOFOL 150 MG: 10 INJECTION, EMULSION INTRAVENOUS at 09:41

## 2021-06-14 RX ADMIN — FENTANYL CITRATE 50 MCG: 50 INJECTION, SOLUTION INTRAMUSCULAR; INTRAVENOUS at 10:08

## 2021-06-14 RX ADMIN — BUTALBITAL, ACETAMINOPHEN, AND CAFFEINE 1 TABLET: 50; 325; 40 TABLET ORAL at 20:16

## 2021-06-14 RX ADMIN — ROCURONIUM BROMIDE 40 MG: 10 INJECTION INTRAVENOUS at 09:41

## 2021-06-14 RX ADMIN — PHENYLEPHRINE HYDROCHLORIDE 50 MCG: 10 INJECTION INTRAVENOUS at 10:31

## 2021-06-14 RX ADMIN — Medication 3 MG: at 11:54

## 2021-06-14 RX ADMIN — FENTANYL CITRATE 25 MCG: 50 INJECTION, SOLUTION INTRAMUSCULAR; INTRAVENOUS at 11:48

## 2021-06-14 RX ADMIN — PHENYLEPHRINE HYDROCHLORIDE 50 MCG: 10 INJECTION INTRAVENOUS at 11:01

## 2021-06-14 RX ADMIN — CLINDAMYCIN PHOSPHATE 600 MG: 600 INJECTION, SOLUTION INTRAVENOUS at 09:07

## 2021-06-14 RX ADMIN — OXYMETAZOLINE HYDROCHLORIDE 2 SPRAY: 0.05 SPRAY NASAL at 22:01

## 2021-06-14 RX ADMIN — ROCURONIUM BROMIDE 10 MG: 10 INJECTION INTRAVENOUS at 10:51

## 2021-06-14 RX ADMIN — ONDANSETRON HYDROCHLORIDE 4 MG: 4 INJECTION, SOLUTION INTRAMUSCULAR; INTRAVENOUS at 11:42

## 2021-06-14 RX ADMIN — MINERAL OIL AND WHITE PETROLATUM 1 APPLICATOR: 150; 830 OINTMENT OPHTHALMIC at 09:42

## 2021-06-14 RX ADMIN — SODIUM CHLORIDE, PRESERVATIVE FREE 10 ML: 5 INJECTION INTRAVENOUS at 20:17

## 2021-06-14 RX ADMIN — HYDROCODONE BITARTRATE AND ACETAMINOPHEN 1 TABLET: 7.5; 325 TABLET ORAL at 13:53

## 2021-06-14 RX ADMIN — SODIUM CHLORIDE: 9 INJECTION, SOLUTION INTRAVENOUS at 08:58

## 2021-06-14 RX ADMIN — SODIUM CHLORIDE: 9 INJECTION, SOLUTION INTRAVENOUS at 11:17

## 2021-06-14 ASSESSMENT — PULMONARY FUNCTION TESTS
PIF_VALUE: 15
PIF_VALUE: 1
PIF_VALUE: 15
PIF_VALUE: 16
PIF_VALUE: 15
PIF_VALUE: 16
PIF_VALUE: 15
PIF_VALUE: 16
PIF_VALUE: 15
PIF_VALUE: 15
PIF_VALUE: 21
PIF_VALUE: 15
PIF_VALUE: 1
PIF_VALUE: 15
PIF_VALUE: 16
PIF_VALUE: 15
PIF_VALUE: 15
PIF_VALUE: 16
PIF_VALUE: 15
PIF_VALUE: 16
PIF_VALUE: 16
PIF_VALUE: 15
PIF_VALUE: 4
PIF_VALUE: 15
PIF_VALUE: 16
PIF_VALUE: 13
PIF_VALUE: 15
PIF_VALUE: 13
PIF_VALUE: 15
PIF_VALUE: 13
PIF_VALUE: 16
PIF_VALUE: 15
PIF_VALUE: 1
PIF_VALUE: 15
PIF_VALUE: 16
PIF_VALUE: 15
PIF_VALUE: 16
PIF_VALUE: 16
PIF_VALUE: 3
PIF_VALUE: 15
PIF_VALUE: 16
PIF_VALUE: 7
PIF_VALUE: 16
PIF_VALUE: 14
PIF_VALUE: 15
PIF_VALUE: 16
PIF_VALUE: 16
PIF_VALUE: 15
PIF_VALUE: 13
PIF_VALUE: 16
PIF_VALUE: 16
PIF_VALUE: 2
PIF_VALUE: 4
PIF_VALUE: 16
PIF_VALUE: 15
PIF_VALUE: 23
PIF_VALUE: 18
PIF_VALUE: 16
PIF_VALUE: 15
PIF_VALUE: 16
PIF_VALUE: 13
PIF_VALUE: 15
PIF_VALUE: 16
PIF_VALUE: 15
PIF_VALUE: 16
PIF_VALUE: 16
PIF_VALUE: 15
PIF_VALUE: 12
PIF_VALUE: 15
PIF_VALUE: 15
PIF_VALUE: 18
PIF_VALUE: 15
PIF_VALUE: 16
PIF_VALUE: 16
PIF_VALUE: 15
PIF_VALUE: 15
PIF_VALUE: 3
PIF_VALUE: 15
PIF_VALUE: 1
PIF_VALUE: 15
PIF_VALUE: 15
PIF_VALUE: 16
PIF_VALUE: 15
PIF_VALUE: 16
PIF_VALUE: 14
PIF_VALUE: 15
PIF_VALUE: 16
PIF_VALUE: 15
PIF_VALUE: 16
PIF_VALUE: 14
PIF_VALUE: 16
PIF_VALUE: 13
PIF_VALUE: 15
PIF_VALUE: 16
PIF_VALUE: 15
PIF_VALUE: 16
PIF_VALUE: 16
PIF_VALUE: 1
PIF_VALUE: 15
PIF_VALUE: 15
PIF_VALUE: 16
PIF_VALUE: 13
PIF_VALUE: 15
PIF_VALUE: 16
PIF_VALUE: 15
PIF_VALUE: 16
PIF_VALUE: 15
PIF_VALUE: 16

## 2021-06-14 ASSESSMENT — PAIN - FUNCTIONAL ASSESSMENT
PAIN_FUNCTIONAL_ASSESSMENT: ACTIVITIES ARE NOT PREVENTED
PAIN_FUNCTIONAL_ASSESSMENT: 0-10

## 2021-06-14 ASSESSMENT — PAIN SCALES - GENERAL
PAINLEVEL_OUTOF10: 9
PAINLEVEL_OUTOF10: 8

## 2021-06-14 ASSESSMENT — PAIN DESCRIPTION - DESCRIPTORS: DESCRIPTORS: HEADACHE

## 2021-06-14 ASSESSMENT — PAIN SCALES - WONG BAKER: WONGBAKER_NUMERICALRESPONSE: 0

## 2021-06-14 NOTE — ANESTHESIA POSTPROCEDURE EVALUATION
Department of Anesthesiology  Postprocedure Note    Patient: Ciro Hernandez  MRN: [id number]  YOB: 1960  Date of evaluation: 6/14/2021  Time:  4:20 PM     Procedure Summary     Date: 06/14/21 Room / Location: [facility] 01 / [facility]    Anesthesia Start: 7196 Anesthesia Stop: 6268    Procedure: SEPTOPLASTY, SUBMUCOUS RESECTION TURBINATE, RIGHT PARTIAL INFERIOR, NASAL ENDOSCOPY WITH PARTIAL RESECTION OF RIGHT MIDDLE JHON BULLOSA (Right ) Diagnosis: (DEVIATED NASAL SEPTUM, HYPERTROPHY OF RIGHT INFERIOR TURBINATE, JHON BULLOSA)    Surgeons: Daniel Batista MD Responsible Provider: Edmond Valladares MD    Anesthesia Type: general ASA Status: 3          Anesthesia Type: general    Sean Phase I: Sean Score: 9    Sean Phase II: Sean Score: 9    Last vitals: Reviewed and per EMR flowsheets.        Anesthesia Post Evaluation    Patient location during evaluation: PACU  Complications: no  Cardiovascular status: hemodynamically stable  Respiratory status: acceptable

## 2021-06-14 NOTE — BRIEF OP NOTE
Brief Postoperative Note      Patient: Garima Rogers  YOB: 1960  MRN: [id number]    Date of Procedure: 6/14/2021    Pre-Op Diagnosis: DEVIATED NASAL SEPTUM, HYPERTROPHY OF RIGHT INFERIOR TURBINATE, JHON BULLOSA RIGHT MIDDLE TURBINATE, RHINOGENIC HEADACHE    Post-Op Diagnosis: Same plus cognitive impairment       Procedure(s):  SEPTOPLASTY, SUBMUCOUS RESECTION TURBINATE, RIGHT PARTIAL INFERIOR, NASAL ENDOSCOPY WITH PARTIAL RESECTION OF RIGHT MIDDLE JHON BULLOSA    Surgeon(s):  Latrell Neff MD    Assistant:  * No surgical staff found *    Anesthesia: General    Estimated Blood Loss (mL): less than 910     Complications: None    Specimens:   * No specimens in log *    Implants:  * No implants in log *      Drains: * No LDAs found *    Findings: AS PER DIAGNOSES. PROMINENT LEFT MAXILLARY CREST EXTENDING POSTERIORLY TO LARGE SPUR AT AEWX OF THE LEFT DEVIATION. SUPERIOR TURBINATES DISIMPACTED FROM THE SEPTUM. This patient has cognitive impairment and no one to stay with her overnight. She will be kept overnight outpt in a bed.     Electronically signed by Sharda Lowery MD on 6/14/2021 at 12:16 PM

## 2021-06-14 NOTE — INTERVAL H&P NOTE
Pt Name: Shirlene Hernandez  MRN: [id number]  YOB: 1960  Date of evaluation: 6/14/2021    I have examined the patient and reviewed the H&P/Consult and there are no changes to the patient or plans.          Electronically signed by Charlene Pickett MD on 6/14/2021 at 9:08 AM

## 2021-06-14 NOTE — PROGRESS NOTES
1209-Patient to Phase I via cart. Report received from [facility]. CRNA student and [facility]. Patient placed on cardiac monitor. Patient not responding to command at this time. Oral airway in place. Respirations even and unlabored on room air. No drainage from nose noted. 1213-Patient opens eyes on command. Oral airway removed. Some bloody drainage noted on oral airway. No drainage from nose. Patient denies pain and nausea. Continues to be drowsy. 1220-Patient questioned about who will stay with her 24 hours post-op per Dr. Priscilla Tsang request . Patient states her friend is just a ride home and cannot stay with her. Patient cannot stay at friends house do to poor living conditions and smoke exposure. Patient states \" My son does not get off until 5 pm and will not provide care that is necessary. \" will update Dr. Vandana Meyers. 1225-Dr. Vandana Meyers notified and wants patient to be admitted for 24 hour observation. Grace Garcia RN manager notified and agrees with plan. Patient updated as well. 1230-Patient complains of pain. Updated Dr. Erik Allan and order for fentanyl. Will medicate per order. 1235-patient complains of nausea. Will wait to medicate for pain until nausea subsides. Dr. Erik Allan notified. Order to medicate with zofran. 1239-Patient medicated with zofran per order. See MAR. Provided with ice chips for throat. 1250-Patient denies nausea. Patient rates pain 9/10. Complains of burning in nose and forehead. States throat is sore. Medicated with fentanyl per order. See MAR.   1300-Patient rates pain 7/10. States throat is still bothersome. Provided with ice chips. 1310-Patient states pain is still about the same. Ok to move to Phase II to take oral medication. 1315-Patient to Phase II via cart. Patient provided with snack and drink. Instructed on call light use. Denies needs. 1320-Patient states she is not ready for oral pain meds at this time. Wants to get settled before taking pain meds. Call light remains in reach.   1340-Patient states she needs to go to bathroom. Assisted to wheelchair and to bathroom by RN. Patient able to void. Assisted back to room. 1345-While patient up moving scant amount of blood from left nostril noted. Provided with tissues. Instructed to not blow nose just blot away drainage. Offered nasal splint dressing but declines at this time. 1415-Patient states pain is improving. Provided with another drink. Kev light remains in reach. 1430-Admitting called to notify of admit order. State they are waiting for available bed. Updated patient. 1450-Patient provided with orange sherbet. Denies needs. Call light in reach. 1520- Patient resting in bed. States pain is tolerable. Denies nausea. 1530-Report given to [employer]. Updated patient.

## 2021-06-14 NOTE — ANESTHESIA PRE PROCEDURE
Rhinogenic headache R51.9    Migraine without status migrainosus, not intractable G43.909       Past Medical History:        Diagnosis Date    Adrenal abnormality (HCC)     [facility] rest [facility])     Anxiety     Arthritis     Asthma     Cerebral artery occlusion with cerebral infarction (HCC)     Chronic sinusitis     Cognitive impairment     with retograde amnesia    Headache     Migraines Hemiplegic    History of degenerative disc disease     Hyperlipidemia     Lymphedema     Left leg    May-Thurner syndrome     Peripheral vascular disease (HCC)     Positive BJ (antinuclear antibody)     PTSD (post-traumatic stress disorder)        Past Surgical History:        Procedure Laterality Date    BREAST BIOPSY      HYSTERECTOMY      NECK SURGERY  2020    ACDF    SHOULDER SURGERY      SHOULDER SURGERY Left     SINUS SURGERY      TONSILLECTOMY      TUBAL LIGATION         Social History:    Social History     Tobacco Use    Smoking status: Former Smoker     Packs/day: 1.00     Years: 12.00     Pack years: 12.00     Types: Cigarettes     Quit date: 2019     Years since quittin.4    Smokeless tobacco: Never Used   Substance Use Topics    Alcohol use: Not Currently     Comment: stopped in 2020 due to fatty liver                                Counseling given: Not Answered      Vital Signs (Current):   Vitals:    21 0758   BP: 105/60   Pulse: 68   Resp: 18   Temp: 97.6 °F (36.4 °C)   TempSrc: Temporal   SpO2: 97%   Weight: 148 lb 6.4 oz (67.3 kg)   Height: 5' 5\" (1.651 m)                                              BP Readings from Last 3 Encounters:   21 105/60   21 116/62   21 128/78       NPO Status: Time of last liquid consumption: 0000                        Time of last solid consumption:                         Date of last liquid consumption: 21                        Date of last solid food consumption: 21    BMI:   Wt Readings from Last 3 Encounters:   06/14/21 148 lb 6.4 oz (67.3 kg)   06/03/21 151 lb 3.2 oz (68.6 kg)   05/17/21 153 lb 3.2 oz (69.5 kg)     Body mass index is 24.7 kg/m². CBC:   Lab Results   Component Value Date    WBC 5.6 03/30/2021    RBC 4.53 03/30/2021    HGB 12.9 03/30/2021    HCT 42.4 03/30/2021    MCV 93.6 03/30/2021    RDW 13.7 02/05/2021     03/30/2021       CMP:   Lab Results   Component Value Date     03/30/2021    K 4.2 03/30/2021     03/30/2021    CO2 24 03/30/2021    BUN 24 03/30/2021    CREATININE 0.8 03/30/2021    GFRAA >60 02/19/2021    AGRATIO 2.9 02/05/2021    LABGLOM 73 03/30/2021    GLUCOSE 98 03/30/2021    GLUCOSE 99 02/20/2021    PROT 7.4 03/30/2021    CALCIUM 10.1 03/30/2021    BILITOT 0.2 03/30/2021    ALKPHOS 83 03/30/2021    AST 19 03/30/2021    ALT 13 03/30/2021       POC Tests: No results for input(s): POCGLU, POCNA, POCK, POCCL, POCBUN, POCHEMO, POCHCT in the last 72 hours. Coags:   Lab Results   Component Value Date    PROTIME 12.4 03/30/2021       HCG (If Applicable): No results found for: PREGTESTUR, PREGSERUM, HCG, HCGQUANT     ABGs: No results found for: PHART, PO2ART, CUR4RPN, NJT1UZZ, BEART, S9ORYLLQ     Type & Screen (If Applicable):  No results found for: LABABO, LABRH    Drug/Infectious Status (If Applicable):  Lab Results   Component Value Date    HEPCAB NONREACTIVE 09/16/2020       COVID-19 Screening (If Applicable): No results found for: COVID19        Anesthesia Evaluation    Airway: Mallampati: II        Dental:          Pulmonary:   (+) asthma:                            Cardiovascular:    (+) angina:,                   Neuro/Psych:   (+) CVA:, headaches:, psychiatric history:            GI/Hepatic/Renal:             Endo/Other:                     Abdominal:           Vascular:                                        Anesthesia Plan      general     ASA 3       Induction: intravenous. Anesthetic plan and risks discussed with patient.       Plan discussed with CRNA.                   Noreen Rosa MD   6/14/2021

## 2021-06-15 ENCOUNTER — OFFICE VISIT (OUTPATIENT)
Dept: ENT CLINIC | Age: 61
End: 2021-06-15

## 2021-06-15 VITALS
OXYGEN SATURATION: 99 % | SYSTOLIC BLOOD PRESSURE: 116 MMHG | DIASTOLIC BLOOD PRESSURE: 71 MMHG | HEART RATE: 66 BPM | BODY MASS INDEX: 24.72 KG/M2 | RESPIRATION RATE: 16 BRPM | TEMPERATURE: 97.6 F | WEIGHT: 148.4 LBS | HEIGHT: 65 IN

## 2021-06-15 VITALS
SYSTOLIC BLOOD PRESSURE: 110 MMHG | DIASTOLIC BLOOD PRESSURE: 78 MMHG | WEIGHT: 150.9 LBS | RESPIRATION RATE: 16 BRPM | HEART RATE: 64 BPM | TEMPERATURE: 97.5 F | BODY MASS INDEX: 25.11 KG/M2

## 2021-06-15 DIAGNOSIS — R51.9 RHINOGENIC HEADACHE: ICD-10-CM

## 2021-06-15 DIAGNOSIS — J34.2 DEVIATED NASAL SEPTUM: Primary | ICD-10-CM

## 2021-06-15 DIAGNOSIS — J34.3 HYPERTROPHY OF INFERIOR NASAL TURBINATE: ICD-10-CM

## 2021-06-15 DIAGNOSIS — J34.89 CONCHA BULLOSA: ICD-10-CM

## 2021-06-15 DIAGNOSIS — I87.1 MAY-THURNER SYNDROME: ICD-10-CM

## 2021-06-15 DIAGNOSIS — Z98.890 STATUS POST NASAL SEPTOPLASTY: ICD-10-CM

## 2021-06-15 PROCEDURE — 2580000003 HC RX 258: Performed by: OTOLARYNGOLOGY

## 2021-06-15 PROCEDURE — 99024 POSTOP FOLLOW-UP VISIT: CPT | Performed by: OTOLARYNGOLOGY

## 2021-06-15 PROCEDURE — 6370000000 HC RX 637 (ALT 250 FOR IP): Performed by: OTOLARYNGOLOGY

## 2021-06-15 PROCEDURE — 99024 POSTOP FOLLOW-UP VISIT: CPT | Performed by: NURSE PRACTITIONER

## 2021-06-15 PROCEDURE — 6370000000 HC RX 637 (ALT 250 FOR IP): Performed by: NURSE PRACTITIONER

## 2021-06-15 RX ORDER — OXYMETAZOLINE HYDROCHLORIDE 0.05 G/100ML
SPRAY NASAL
Refills: 3 | Status: ON HOLD | COMMUNITY
Start: 2021-06-15 | End: 2021-06-29 | Stop reason: ALTCHOICE

## 2021-06-15 RX ORDER — PSEUDOEPHEDRINE HCL 30 MG
100 TABLET ORAL DAILY
Qty: 7 CAPSULE | Refills: 0 | Status: SHIPPED | OUTPATIENT
Start: 2021-06-15 | End: 2021-06-22

## 2021-06-15 RX ORDER — PSEUDOEPHEDRINE HYDROCHLORIDE 30 MG/1
30 TABLET ORAL EVERY 6 HOURS PRN
Qty: 56 TABLET | Refills: 0 | Status: SHIPPED | OUTPATIENT
Start: 2021-06-15 | End: 2021-06-29

## 2021-06-15 RX ORDER — DOCUSATE SODIUM 100 MG/1
100 CAPSULE, LIQUID FILLED ORAL DAILY
Status: DISCONTINUED | OUTPATIENT
Start: 2021-06-15 | End: 2021-06-15 | Stop reason: HOSPADM

## 2021-06-15 RX ORDER — CLINDAMYCIN HYDROCHLORIDE 300 MG/1
300 CAPSULE ORAL 3 TIMES DAILY
Qty: 42 CAPSULE | Refills: 0 | Status: SHIPPED | OUTPATIENT
Start: 2021-06-15 | End: 2021-06-29

## 2021-06-15 RX ORDER — HYDROCODONE BITARTRATE AND ACETAMINOPHEN 5; 325 MG/1; MG/1
1 TABLET ORAL EVERY 6 HOURS PRN
Qty: 20 TABLET | Refills: 0 | Status: SHIPPED | OUTPATIENT
Start: 2021-06-15 | End: 2021-06-20

## 2021-06-15 RX ORDER — PREDNISONE 20 MG/1
20 TABLET ORAL DAILY
Qty: 4 TABLET | Refills: 0 | Status: SHIPPED | OUTPATIENT
Start: 2021-06-15 | End: 2021-06-18

## 2021-06-15 RX ADMIN — HYDROXYCHLOROQUINE SULFATE 200 MG: 200 TABLET ORAL at 08:17

## 2021-06-15 RX ADMIN — SODIUM CHLORIDE, PRESERVATIVE FREE 10 ML: 5 INJECTION INTRAVENOUS at 08:18

## 2021-06-15 RX ADMIN — BUPROPION HYDROCHLORIDE 300 MG: 300 TABLET, EXTENDED RELEASE ORAL at 08:18

## 2021-06-15 RX ADMIN — BUPROPION HYDROCHLORIDE 150 MG: 150 TABLET, EXTENDED RELEASE ORAL at 08:18

## 2021-06-15 RX ADMIN — OXYMETAZOLINE HYDROCHLORIDE 2 SPRAY: 0.05 SPRAY NASAL at 11:28

## 2021-06-15 RX ADMIN — HYDROCODONE BITARTRATE AND ACETAMINOPHEN 1 TABLET: 7.5; 325 TABLET ORAL at 11:27

## 2021-06-15 RX ADMIN — OXYMETAZOLINE HYDROCHLORIDE 2 SPRAY: 0.05 SPRAY NASAL at 05:48

## 2021-06-15 RX ADMIN — PRAMIPEXOLE DIHYDROCHLORIDE 0.25 MG: 0.25 TABLET ORAL at 08:17

## 2021-06-15 RX ADMIN — PANTOPRAZOLE SODIUM 40 MG: 40 TABLET, DELAYED RELEASE ORAL at 05:48

## 2021-06-15 RX ADMIN — HYDROCODONE BITARTRATE AND ACETAMINOPHEN 1 TABLET: 7.5; 325 TABLET ORAL at 03:12

## 2021-06-15 RX ADMIN — DOCUSATE SODIUM 100 MG: 100 CAPSULE, LIQUID FILLED ORAL at 10:40

## 2021-06-15 RX ADMIN — OXYMETAZOLINE HYDROCHLORIDE 2 SPRAY: 0.05 SPRAY NASAL at 00:32

## 2021-06-15 RX ADMIN — ONDANSETRON HYDROCHLORIDE 4 MG: 4 TABLET, FILM COATED ORAL at 11:27

## 2021-06-15 ASSESSMENT — PAIN DESCRIPTION - ORIENTATION
ORIENTATION: MID
ORIENTATION: MID

## 2021-06-15 ASSESSMENT — PAIN DESCRIPTION - ONSET: ONSET: ON-GOING

## 2021-06-15 ASSESSMENT — PAIN DESCRIPTION - PAIN TYPE
TYPE: SURGICAL PAIN

## 2021-06-15 ASSESSMENT — PAIN DESCRIPTION - LOCATION
LOCATION: THROAT;NOSE
LOCATION: THROAT;HEAD

## 2021-06-15 ASSESSMENT — ENCOUNTER SYMPTOMS
WHEEZING: 0
NAUSEA: 0
TROUBLE SWALLOWING: 0
SORE THROAT: 0
VOMITING: 0
SINUS PRESSURE: 0
STRIDOR: 0
VOICE CHANGE: 0
COUGH: 0
FACIAL SWELLING: 0
ABDOMINAL PAIN: 0
APNEA: 0
RHINORRHEA: 0
DIARRHEA: 0
SHORTNESS OF BREATH: 0
COLOR CHANGE: 0
CHOKING: 0
CHEST TIGHTNESS: 0

## 2021-06-15 ASSESSMENT — PAIN DESCRIPTION - PROGRESSION: CLINICAL_PROGRESSION: NOT CHANGED

## 2021-06-15 ASSESSMENT — PAIN SCALES - GENERAL
PAINLEVEL_OUTOF10: 8
PAINLEVEL_OUTOF10: 6
PAINLEVEL_OUTOF10: 7
PAINLEVEL_OUTOF10: 5
PAINLEVEL_OUTOF10: 5

## 2021-06-15 ASSESSMENT — PAIN - FUNCTIONAL ASSESSMENT: PAIN_FUNCTIONAL_ASSESSMENT: ACTIVITIES ARE NOT PREVENTED

## 2021-06-15 ASSESSMENT — PAIN DESCRIPTION - DESCRIPTORS: DESCRIPTORS: BURNING;ACHING

## 2021-06-15 ASSESSMENT — PAIN DESCRIPTION - FREQUENCY: FREQUENCY: CONTINUOUS

## 2021-06-15 NOTE — PROGRESS NOTES
[address] EAR, NOSE AND THROAT  [facility]  Dept: [phone]  Dept Fax: [phone]  Loc: [phone]    Guy Hinton is a 62 y.o. female who was referred byNo ref. provider found for:  Chief Complaint   Patient presents with   Krissy Almonte Post-Op Check     Patient here for post op exam.    . HPI:     Guy Hinton is a 62 y.o. female who presents today for follow-up on her nasal surgery yesterday. She has some cognitive issues and had no one to take care of her last night so she was kept as outpatient in a bed. She is doing well today. Maryln Solar History: Allergies   Allergen Reactions    Amoxil [Amoxicillin]      rash    Biaxin [Clarithromycin] Swelling     Lips swelled    Doxycycline      rash    Morphine Nausea And Vomiting    Sulfa Antibiotics Rash     Current Outpatient Medications   Medication Sig Dispense Refill    docusate sodium (COLACE, DULCOLAX) 100 MG CAPS Take 100 mg by mouth daily for 7 days 7 capsule 0    oxymetazoline (AFRIN) 0.05 % nasal spray Use Afrin nasal spray, 8 drops each nostril on back with head hanging off edge of bed, stay 5 minutes. Repeat every 6 hours. IF IT JUST RUNS DOWN THE THROAT, YOU ARE NOT BACK FAR ENOUGH. REPOSITION AND DO IT AGAIN. Use for 5 days.   3    pseudoephedrine (DECONGESTANT) 30 MG tablet Take 1 tablet by mouth every 6 hours as needed for Congestion 56 tablet 0    Erenumab-aooe (AIMOVIG) 140 MG/ML SOAJ Inject 140 mg into the skin every 30 days 1 pen 5    butalbital-acetaminophen-caffeine (FIORICET, ESGIC) -40 MG per tablet Take 1 tablet by mouth every 4 hours as needed for Headaches 180 tablet 1    buPROPion (WELLBUTRIN XL) 300 MG extended release tablet TAKE 1 TABLET BY MOUTH EVERY DAY IN THE MORNING 90 tablet 0    esomeprazole (NEXIUM) 40 MG delayed release capsule Take 1 capsule by mouth every morning (before breakfast) 90 capsule 1    pramipexole (MIRAPEX) 0.125 MG tablet Take 2 tablets by mouth 2 times daily 120 tablet 3    topiramate (TOPAMAX) 100 MG tablet TAKE 1 TABLET BY MOUTH TWICE A  tablet 1    memantine (NAMENDA) 5 MG tablet Take 5 mg by mouth daily       clotrimazole-betamethasone (LOTRISONE) 1-0.05 % cream Apply topically as needed       cyclobenzaprine (FLEXERIL) 10 MG tablet 2 times daily as needed       furosemide (LASIX) 20 MG tablet as needed       potassium chloride (KLOR-CON) 10 MEQ extended release tablet as needed       Onabotulinumtoxin A (BOTOX, COSMETIC,) 200 units injection Botulinum Toxin Type A Botox 200 unit injection recon soln monthly for migraines   [facility] ([id number])      traZODone (DESYREL) 150 MG tablet Take 1.5 tablets by mouth nightly 45 tablet 2    buPROPion (WELLBUTRIN XL) 150 MG extended release tablet Take 1 tablet by mouth every morning Take with 300 mg tablet for total 450 mg once daily in the morning 30 tablet 2    Ubrogepant (UBRELVY) 100 MG TABS Take 100 mg by mouth as needed      hydroxychloroquine (PLAQUENIL) 200 MG tablet Take 200 mg by mouth daily       nitroglycerin (NITROSTAT) 0.6 MG SL tablet Place 0.6 mg under the tongue every 5 minutes as needed for Chest pain up to max of 3 total doses. If no relief after 1 dose, call 911.  ondansetron (ZOFRAN) 4 MG tablet Take 1 tablet by mouth 3 times daily as needed for Nausea or Vomiting 15 tablet 0    HYDROcodone-acetaminophen (NORCO) 5-325 MG per tablet Take 1 tablet by mouth every 6 hours as needed for Pain for up to 5 days. Intended supply: 5 days. Take lowest dose possible to manage pain 20 tablet 0    predniSONE (DELTASONE) 20 MG tablet Take 1 tablet by mouth daily for 3 days Then one-half tablet for two days.  Start the day AFTER surgery 4 tablet 0    clindamycin (CLEOCIN) 300 MG capsule Take 1 capsule by mouth 3 times daily for 14 days 42 capsule 0     Current Facility-Administered Medications   Medication Dose Route unexpected weight change. HENT: Negative for congestion, dental problem, ear discharge, ear pain, facial swelling, hearing loss, mouth sores, nosebleeds, postnasal drip, rhinorrhea, sinus pressure, sneezing, sore throat, tinnitus, trouble swallowing and voice change. Eyes: Negative for visual disturbance. Respiratory: Negative for apnea, cough, choking, chest tightness, shortness of breath, wheezing and stridor. Cardiovascular: Negative for chest pain, palpitations and leg swelling. Gastrointestinal: Negative for abdominal pain, diarrhea, nausea and vomiting. Endocrine: Negative for cold intolerance, heat intolerance, polydipsia and polyuria. Genitourinary: Negative for dysuria, enuresis and hematuria. Musculoskeletal: Negative for arthralgias, gait problem, neck pain and neck stiffness. Skin: Negative for color change and rash. Allergic/Immunologic: Negative for environmental allergies, food allergies and immunocompromised state. Neurological: Negative for dizziness, syncope, facial asymmetry, speech difficulty, light-headedness and headaches. Hematological: Negative for adenopathy. Does not bruise/bleed easily. Psychiatric/Behavioral: Negative for confusion and sleep disturbance. The patient is not nervous/anxious. Objective:   /78 (Site: Left Upper Arm, Position: Sitting)   Pulse 64   Temp 97.5 °F (36.4 °C) (Infrared)   Resp 16   Wt 150 lb 14.4 oz (68.4 kg)   BMI 25.11 kg/m²     Physical Exam   Nose: Nose is decongested and anesthetized with topical sprays. Nasal fossa is suctioned bilaterally. Clots are removed. Normal postoperative appearance. Data:  All of the past medical history, past surgical history, family history,social history, allergies and current medications were reviewed with the patient. Assessment & Plan   Diagnoses and all orders for this visit:     Diagnosis Orders   1. Deviated nasal septum     2.  Hypertrophy of right inferior turbinate 3. Latisha bullosa right middle     4. Rhinogenic headache     5. Status post nasal septoplasty     6. May-Thurner syndrome         The findings were explained and her questions were answered. Follow the instructions from yesterday's AVS form regarding continuing the Afrin, and starting the saline irrigations in 3 days. Use NeilMed bottle, their packets, and only distilled water at least twice a day. Flush up one nostril and out the other, leaning forward over sink. Stop the Afrin after 4 more days. Slim Johnson. Ruslan Ramirez MD    **This report has been created using voice recognition software. It may contain minor errors which are inherent in voicerecognition technology. **

## 2021-06-15 NOTE — OP NOTE
[address] [place] [address]                                OPERATIVE REPORT    PATIENT NAME: Philip Morley                        :        1960  MED REC NO:   [id number]                           ROOM:       [id number]  ACCOUNT NO:   [de-identified]                           ADMIT DATE: 2021  PROVIDER:     Charli Hernadez. Leah Guo M.D.    Yadira Kaba:  2021    OPERATIONS:  Septoplasty; partial submucous resection, right inferior  turbinate; partial resection of vinh bullosa, right middle turbinate. SURGEON:  Charli Hernadez. Leah Guo MD    ANESTHESIA:  General endotracheal.    PREOPERATIVE DIAGNOSES:  Nasal obstruction secondary to nasal septal  deviation, right inferior turbinate hypertrophy, and vinh bullosa,  right middle turbinate. Also, May-Thurner syndrome, rhinogenic  headache. POSTOPERATIVE DIAGNOSES:  Nasal obstruction secondary to nasal septal  deviation, right inferior turbinate hypertrophy, and vinh bullosa,  right middle turbinate. Also, May-Thurner syndrome, rhinogenic  headache. HISTORY AND OPERATIVE FINDINGS:  This 67-year-old female who has had  chronic nasal obstruction. She also has chronic headaches with stuffy  nose. She had contact between the middle turbinate and superior  turbinates of the septum, has a left septal deviation. There is  prominent left maxillary crest that does extend this further  posteriorly, becomes a pronounced deviation with a spur present,  impacted inferior turbinate. Left inferior turbinate was dented inward. Right inferior turbinate was quite large and there was a vinh bullosa  in the right middle turbinate. Airway corrected very nicely with the  above procedures.   There appeared to be some scarring in the anterior  septum but the more posterior deviation appeared to be congenital.   There was an adequate fenestration on the left side at the end of  procedure and there was no packing required. ESTIMATED BLOOD LOSS:  Less than 100 mL. DESCRIPTION OF PROCEDURE:  After adequate level of general endotracheal  anesthesia had been obtained, the face was prepped and draped in an  aseptic fashion. The nose decongested, vasoconstricted, and  anesthetized in usual manner for nasal surgery. Partial submucous resection in the right inferior turbinate was  performed through an anterior entry point treating the entire medial  surface. The turbinate was then fractured laterally and two cottonoids  with Afrin were placed against it. Lateral aspect of the right middle turbinate vinh bullosa was trimmed  down with the microdebrider and cutting forceps as needed. Cottonoid-impregnated Afrin was placed in that space. Mucosal  preservation from within the vinh bullosa was excellent. Left hemitransfixion incision was created and left anterior tunnel was  elevated. Posterior-inferior aspect of quadrangular cartilage was  resected submucosally. Bilateral posterior tunnels were elevated. Posterior bony and cartilaginous deflections were removed. This left an  adequate fenestration on the left side only posteriorly. Prominent left wing of maxillary crest was dissected out and trimmed  with the Dioni forceps. Septal envelope was reapproximated with a plain 4-0 gut suture with a  knot in the end and tied anteriorly, \"quilting\" septum back together. Hemitransfixion incision was closed with this suture as well. Neurovascular bundle blocks were placed with 0.5% ropivacaine with  epinephrine. Surgiflo was placed in the right middle meatus on the raw  surfaces. Superior turbinates were disimpacted using Boies elevator. 2 mL 1% lidocaine with epinephrine injected into the right  pterygopalatine fossa for postoperative pain relief and  vasoconstriction. Stomach was suctioned of some clear fluid. Attention turned to the nose.   Septum appeared straight and there

## 2021-06-15 NOTE — CARE COORDINATION
6/15/21, 9:19 AM EDT  DISCHARGE PLANNING EVALUATION:    Paulie Manzo       Admitted: 6/14/2021/ [address] [facility] day: 0   Location: Count includes the [facility]13/013-A Reason for admit: Postoperative state [Z98.890]   PMH:  has a past medical history of Adrenal abnormality ([address] Angina at rest [facility]), Anxiety, Arthritis, Asthma, Cerebral artery occlusion with cerebral infarction ([address] Chronic sinusitis, Cognitive impairment, Headache, History of degenerative disc disease, Hyperlipidemia, Lymphedema, May-Thurner syndrome, Peripheral vascular disease ([address] PONV (postoperative nausea and vomiting), Positive BJ (antinuclear antibody), and PTSD (post-traumatic stress disorder). Procedure:6/14/21 surgery by Dr Marin Rosa:  SEPTOPLASTY, SUBMUCOUS RESECTION TURBINATE, RIGHT PARTIAL INFERIOR, NASAL ENDOSCOPY WITH PARTIAL RESECTION OF RIGHT MIDDLE JHON BULLOSA   Barriers to Discharge:  POD 1. Ambulate with assistance. I&O. Pain management. Per Dr Christa Trujillo is not prn.  Q6h  Note the instructions regarding dripping it in with head hanging down. Call me if she refuses. She has May Thurner syndrome which predisposes to venous retention in legs.  Must use the SCD's while in bed. She has cognitive impairment\"  PCP: Roderick Duncan MD   %    Patient Goals/Plan/Treatment Preferences: Tony Kahn said that she is going to Dr Harden Jennifer office today from here. A friend is then driving her home. Her friend helps her as needed. Her landlord is watching her dog. Her son and daughter in law live in town. Denied need for [facility] or any other services. Transportation/Food Security/Housekeeping Addressed:  No issues identified. 6/15/21, 9:29 AM EDT    Patient goals/plan/ treatment preferences discussed by  and . Patient goals/plan/ treatment preferences reviewed with patient/ family. Patient/ family verbalize understanding of discharge plan and are in agreement with goal/plan/treatment preferences.   Understanding was demonstrated using the teach back method. AVS provided by RN at time of discharge, which includes all necessary medical information pertaining to the patients current course of illness, treatment, post-discharge goals of care, and treatment preferences.

## 2021-06-15 NOTE — PATIENT INSTRUCTIONS
NASAL SURGERY   POST-OP INSTRUCTION SHEET    1. Keep your scheduled post-operative appointment. 2. Do not blow your nose for 3 days after surgery. You may gently sniff   to clear drainage. No snorting. 3. Expect moderate amounts of bloody discharge for a few hours. Change your dressing   as needed if it becomes soiled. Place it on your upper lip and do not block the nostrils. If you are not having discharge you do not have to wear a dressing. 4. Activity should be gradually increased, but you should not lift over   10-15 lbs., or exercise more strenuously than fast walking for 3 days  following surgery. Straining to stabilize your core is the issue, not how much your arm is holding. You may return to work, with these guidelines in mind, as soon as you feel well enough, and are off narcotic pain medicine, unless work is in a dexter/dirty environment. Please follow additional post-op instructions provided by nursing staff upon leaving hospital.  If you have any questions or problems, please contact our office ar [phone]       What symptoms are normal?     Soreness in front of nose and/or upper teeth   Clear/bloody drainage during first 3 days is not unusual   Facial pains/headaches   Sutures inside of nose will either dissolve away or fall off-- May take up to 6 wks after surgery   Nasal stuffiness improves gradually over weeks. Medicines: You will get 5 prescriptions sent directly to your pharmacy     Use Afrin nasal spray, 8 drops each nostril on back with head hanging off edge of bed, stay five minutes, at least every 8 hours and at bedtime for five days. Hitesh Armenta Apply Bacitracin ointment with a Q tip, inside about a half inch all around. Three times a day for 2 weeks. .then every night as needed for 4 weeks     Saline Sinus Rinse:   Twice a Day beginning three days after surgery. Use NeilMed bottle and packets and use only distilled water.  If distilled water is not available, boiled tap water is acceptable, but let it cool. May use this more often if desired, as it  helps congestion and pressure, especially if you use two packets per bottle to make it hypertonic. Dos and Donts    DO cough or sneeze with your mouth open  DO sniff in to clear secretions  DO sleep on a recliner if possible for the first 1-2days  DO use the sinus rinse twice a day in each nostril after three days  DO NOT use fingernails or nozzle of ointment tube in nose  DO NOT pick at crusts just inside the nostril if you have a septoplasty. There are sutures there. May clean gently with peroxide on a Q-tip to remove bloody crusts from the nostrils  DO NOT take aspirin products (aspirin, aleve, ibuprofen, motrin, etc for (2) two weeks following surgery or any of the other platelet inhibitors on the list provided to you. Includes garlic in any form. DO NOT blow your nose for 3 days following surgery. DO NOT try to \"stifle\" a sneeze by holding your nose, rather open mouth and let it out, even holler.   DO NOT participate in strenuous activity for at least 3 days following surgery, seven if a nasal splint is applied to dorsum

## 2021-06-15 NOTE — PROGRESS NOTES
9695 Discharge instructions reviewed and taught using teach-back method. Patient denies any questions. Await for transport. 1400 Patient discharged via wheelchair with personal belongings. Patient sent to   [facility] to see ENT physician. Friend to pick patient up from office.

## 2021-06-15 NOTE — DISCHARGE SUMMARY
DISCHARGE SUMMARY    Pt Name: Marvin Mccoy  MRN: [id number]  YOB: 1960  Primary Care Physician: Osiel Hamlin MD    Admit date:  6/14/2021  7:27 AM  Discharge date:  6/15/2021  Disposition: Home  Admitting Diagnosis:   1. Deviated nasal septum    2. Hypertrophy of right inferior turbinate    3. Vinh bullosa right middle    4. Rhinogenic headache      Discharge Diagnosis: S/p nasal septoplasty, partial SMR right inferior turbinate and partial resection right middle turbinate vinh bullosa 6/14/2021 with Dr Ruslan Ramirez  Patient Active Problem List   Diagnosis Code    May-Thurner syndrome I87.1    Deviated nasal septum J34.2    Hypertrophy of right inferior turbinate J34.3    Vinh bullosa right middle J34.89    Rhinogenic headache R51.9    Migraine without status migrainosus, not intractable G43.909    Postoperative state Z98.890     Consultants:  none  Procedures/Diagnostic Test:  none    Hospital Course: Dalia Gonzales originally presented to the hospital on 6/14/2021  7:27 AM for above described procedure. She was admitted overnight for observation and pain control. At time of discharge, Dalia Gonzales was tolerating a regular diet, having bowel movements, ambulating on her own accord and had adequate analgesia on oral pain medications. Patient had no signs or symptoms of complications. PHYSICAL EXAMINATION     Discharge Vitals:  height is 5' 5\" (1.651 m) and weight is 148 lb 6.4 oz (67.3 kg). Her oral temperature is 97.6 °F (36.4 °C). Her blood pressure is 116/71 and her pulse is 66. Her respiration is 16 and oxygen saturation is 99%. General appearance - alert, well appearing, and in no distress and oriented to person, place, and time  HEENT - dried blood bilateral nares    LABS     No results for input(s): WBC, HGB, HCT, PLT, NA, K, CL, CO2, BUN, CREATININE in the last 72 hours.     DISCHARGE INSTRUCTIONS     Discharge Medications:      Medication List      START taking these medications    docusate 100 MG Caps  Commonly known as: COLACE, DULCOLAX  Take 100 mg by mouth daily for 7 days     oxymetazoline 0.05 % nasal spray  Commonly known as: AFRIN  Use Afrin nasal spray, 8 drops each nostril on back with head hanging off edge of bed, stay 5 minutes. Repeat every 6 hours. IF IT JUST RUNS DOWN THE THROAT, YOU ARE NOT BACK FAR ENOUGH. REPOSITION AND DO IT AGAIN. Use for 5 days. pseudoephedrine 30 MG tablet  Commonly known as: Decongestant  Take 1 tablet by mouth every 6 hours as needed for Congestion        CONTINUE taking these medications    Aimovig 140 MG/ML Soaj  Generic drug: Erenumab-aooe  Inject 140 mg into the skin every 30 days     * buPROPion 150 MG extended release tablet  Commonly known as:  Wellbutrin XL  Take 1 tablet by mouth every morning Take with 300 mg tablet for total 450 mg once daily in the morning     * buPROPion 300 MG extended release tablet  Commonly known as: WELLBUTRIN XL  TAKE 1 TABLET BY MOUTH EVERY DAY IN THE MORNING     butalbital-acetaminophen-caffeine -40 MG per tablet  Commonly known as: FIORICET, ESGIC  Take 1 tablet by mouth every 4 hours as needed for Headaches     clotrimazole-betamethasone 1-0.05 % cream  Commonly known as: LOTRISONE     cyclobenzaprine 10 MG tablet  Commonly known as: FLEXERIL     esomeprazole 40 MG delayed release capsule  Commonly known as: NEXIUM  Take 1 capsule by mouth every morning (before breakfast)     furosemide 20 MG tablet  Commonly known as: LASIX     memantine 5 MG tablet  Commonly known as: NAMENDA     nitroglycerin 0.6 MG SL tablet  Commonly known as: NITROSTAT     Onabotulinumtoxin A 200 units injection  Commonly known as: BOTOX (COSMETIC)     ondansetron 4 MG tablet  Commonly known as: ZOFRAN  Take 1 tablet by mouth 3 times daily as needed for Nausea or Vomiting     Plaquenil 200 MG tablet  Generic drug: hydroxychloroquine     potassium chloride 10 MEQ extended release tablet  Commonly known as: KLOR-CON     pramipexole 0.125 MG tablet  Commonly known as: MIRAPEX  Take 2 tablets by mouth 2 times daily     topiramate 100 MG tablet  Commonly known as: TOPAMAX  TAKE 1 TABLET BY MOUTH TWICE A DAY     traZODone 150 MG tablet  Commonly known as: DESYREL  Take 1.5 tablets by mouth nightly     Ubrelvy 100 MG Tabs  Generic drug: Ubrogepant         * This list has 2 medication(s) that are the same as other medications prescribed for you. Read the directions carefully, and ask your doctor or other care provider to review them with you.             STOP taking these medications    fluticasone 50 MCG/ACT nasal spray  Commonly known as: FLONASE           Where to Get Your Medications      These medications were sent to [facility]/pharmacy #[id number]- [place], [place] - [address] - F [phone]  [address] [place] [address]    Phone: [phone]   · docusate 100 MG Caps  · pseudoephedrine 30 MG tablet     You can get these medications from any pharmacy    You don't need a prescription for these medications  · oxymetazoline 0.05 % nasal spray         Electronically signed by ESTEFANIA Herrera CNP on 6/15/2021 at 11:16 AM

## 2021-06-15 NOTE — PROGRESS NOTES
Verbal order from Dr. Johnathon aTylor to anesthetize the patient's nasal cavity. If patient used Afrin as directed at 12:00 pm, proceed with 4% Xylocaine only. After checking the patient's allergy list to confirm no listed medication allergy and verbally asking the patient, the patient's nasal cavity was anesthetized with topical 4% Xylocaine 4 sprays each nostril.

## 2021-06-16 ENCOUNTER — TELEPHONE (OUTPATIENT)
Dept: FAMILY MEDICINE CLINIC | Age: 61
End: 2021-06-16

## 2021-06-22 ENCOUNTER — OFFICE VISIT (OUTPATIENT)
Dept: ENT CLINIC | Age: 61
End: 2021-06-22

## 2021-06-22 ENCOUNTER — OFFICE VISIT (OUTPATIENT)
Dept: FAMILY MEDICINE CLINIC | Age: 61
End: 2021-06-22
Payer: MEDICARE

## 2021-06-22 VITALS
TEMPERATURE: 97.8 F | WEIGHT: 146.2 LBS | SYSTOLIC BLOOD PRESSURE: 120 MMHG | OXYGEN SATURATION: 98 % | HEIGHT: 65 IN | RESPIRATION RATE: 16 BRPM | DIASTOLIC BLOOD PRESSURE: 64 MMHG | BODY MASS INDEX: 24.36 KG/M2 | HEART RATE: 74 BPM

## 2021-06-22 VITALS
TEMPERATURE: 97 F | SYSTOLIC BLOOD PRESSURE: 112 MMHG | HEART RATE: 80 BPM | DIASTOLIC BLOOD PRESSURE: 68 MMHG | BODY MASS INDEX: 24.21 KG/M2 | WEIGHT: 145.3 LBS | HEIGHT: 65 IN | RESPIRATION RATE: 14 BRPM

## 2021-06-22 DIAGNOSIS — J34.2 DEVIATED NASAL SEPTUM: ICD-10-CM

## 2021-06-22 DIAGNOSIS — J34.89 CONCHA BULLOSA: ICD-10-CM

## 2021-06-22 DIAGNOSIS — I87.1 MAY-THURNER SYNDROME: ICD-10-CM

## 2021-06-22 DIAGNOSIS — Z98.890 STATUS POST NASAL SEPTOPLASTY: ICD-10-CM

## 2021-06-22 DIAGNOSIS — J31.0 PURULENT RHINITIS: Primary | ICD-10-CM

## 2021-06-22 DIAGNOSIS — J34.3 HYPERTROPHY OF INFERIOR NASAL TURBINATE: ICD-10-CM

## 2021-06-22 DIAGNOSIS — J34.3 HYPERTROPHY OF INFERIOR NASAL TURBINATE: Primary | ICD-10-CM

## 2021-06-22 PROCEDURE — 1036F TOBACCO NON-USER: CPT | Performed by: STUDENT IN AN ORGANIZED HEALTH CARE EDUCATION/TRAINING PROGRAM

## 2021-06-22 PROCEDURE — G8427 DOCREV CUR MEDS BY ELIG CLIN: HCPCS | Performed by: STUDENT IN AN ORGANIZED HEALTH CARE EDUCATION/TRAINING PROGRAM

## 2021-06-22 PROCEDURE — 99213 OFFICE O/P EST LOW 20 MIN: CPT | Performed by: STUDENT IN AN ORGANIZED HEALTH CARE EDUCATION/TRAINING PROGRAM

## 2021-06-22 PROCEDURE — 3017F COLORECTAL CA SCREEN DOC REV: CPT | Performed by: STUDENT IN AN ORGANIZED HEALTH CARE EDUCATION/TRAINING PROGRAM

## 2021-06-22 PROCEDURE — 99024 POSTOP FOLLOW-UP VISIT: CPT | Performed by: OTOLARYNGOLOGY

## 2021-06-22 PROCEDURE — 1111F DSCHRG MED/CURRENT MED MERGE: CPT | Performed by: STUDENT IN AN ORGANIZED HEALTH CARE EDUCATION/TRAINING PROGRAM

## 2021-06-22 PROCEDURE — G8420 CALC BMI NORM PARAMETERS: HCPCS | Performed by: STUDENT IN AN ORGANIZED HEALTH CARE EDUCATION/TRAINING PROGRAM

## 2021-06-22 ASSESSMENT — ENCOUNTER SYMPTOMS
ABDOMINAL PAIN: 0
WHEEZING: 0
SHORTNESS OF BREATH: 0
APNEA: 0
VOICE CHANGE: 0
VOMITING: 0
ABDOMINAL PAIN: 0
NAUSEA: 0
TROUBLE SWALLOWING: 0
CHEST TIGHTNESS: 0
COUGH: 0
BACK PAIN: 0
FACIAL SWELLING: 0
SINUS PRESSURE: 1
SINUS PAIN: 1
SORE THROAT: 0
STRIDOR: 0
CONSTIPATION: 0
COLOR CHANGE: 0
NAUSEA: 0
SORE THROAT: 1
CHOKING: 0
RHINORRHEA: 0
DIARRHEA: 0
VOMITING: 0
PHOTOPHOBIA: 1
SINUS PRESSURE: 0
SHORTNESS OF BREATH: 0
COUGH: 0
DIARRHEA: 0

## 2021-06-22 NOTE — PROGRESS NOTES
SUBJECTIVE     Dagoberto Guthrie is a 62 y. o.female    Chief Complaint   Patient presents with    Follow-Up from Hospital     sinus surgery        Chief complaint, [place], and all pertinent details of the case reviewed with the resident. Please see resident's note for specific details discussed at today's visit. Patient Active Problem List   Diagnosis    May-Thurner syndrome    Deviated nasal septum    Hypertrophy of right inferior turbinate    Latisha bullosa right middle    Rhinogenic headache    Migraine without status migrainosus, not intractable    Postoperative state       Current Outpatient Medications   Medication Sig Dispense Refill    docusate sodium (COLACE, DULCOLAX) 100 MG CAPS Take 100 mg by mouth daily for 7 days 7 capsule 0    oxymetazoline (AFRIN) 0.05 % nasal spray Use Afrin nasal spray, 8 drops each nostril on back with head hanging off edge of bed, stay 5 minutes. Repeat every 6 hours. IF IT JUST RUNS DOWN THE THROAT, YOU ARE NOT BACK FAR ENOUGH. REPOSITION AND DO IT AGAIN. Use for 5 days.   3    pseudoephedrine (DECONGESTANT) 30 MG tablet Take 1 tablet by mouth every 6 hours as needed for Congestion 56 tablet 0    clindamycin (CLEOCIN) 300 MG capsule Take 1 capsule by mouth 3 times daily for 14 days 42 capsule 0    Erenumab-aooe (AIMOVIG) 140 MG/ML SOAJ Inject 140 mg into the skin every 30 days 1 pen 5    butalbital-acetaminophen-caffeine (FIORICET, ESGIC) -40 MG per tablet Take 1 tablet by mouth every 4 hours as needed for Headaches 180 tablet 1    buPROPion (WELLBUTRIN XL) 300 MG extended release tablet TAKE 1 TABLET BY MOUTH EVERY DAY IN THE MORNING 90 tablet 0    esomeprazole (NEXIUM) 40 MG delayed release capsule Take 1 capsule by mouth every morning (before breakfast) 90 capsule 1    pramipexole (MIRAPEX) 0.125 MG tablet Take 2 tablets by mouth 2 times daily 120 tablet 3    topiramate (TOPAMAX) 100 MG tablet TAKE 1 TABLET BY MOUTH TWICE A  tablet 1    memantine (NAMENDA) 5 MG tablet Take 5 mg by mouth daily       clotrimazole-betamethasone (LOTRISONE) 1-0.05 % cream Apply topically as needed       cyclobenzaprine (FLEXERIL) 10 MG tablet 2 times daily as needed       furosemide (LASIX) 20 MG tablet as needed       potassium chloride (KLOR-CON) 10 MEQ extended release tablet as needed       Onabotulinumtoxin A (BOTOX, COSMETIC,) 200 units injection Botulinum Toxin Type A Botox 200 unit injection recon soln monthly for migraines   [facility] ([id number])      traZODone (DESYREL) 150 MG tablet Take 1.5 tablets by mouth nightly 45 tablet 2    buPROPion (WELLBUTRIN XL) 150 MG extended release tablet Take 1 tablet by mouth every morning Take with 300 mg tablet for total 450 mg once daily in the morning 30 tablet 2    Ubrogepant (UBRELVY) 100 MG TABS Take 100 mg by mouth as needed      hydroxychloroquine (PLAQUENIL) 200 MG tablet Take 200 mg by mouth daily       nitroglycerin (NITROSTAT) 0.6 MG SL tablet Place 0.6 mg under the tongue every 5 minutes as needed for Chest pain up to max of 3 total doses. If no relief after 1 dose, call 911.       ondansetron (ZOFRAN) 4 MG tablet Take 1 tablet by mouth 3 times daily as needed for Nausea or Vomiting 15 tablet 0     Current Facility-Administered Medications   Medication Dose Route Frequency Provider Last Rate Last Admin    onabotulinumtoxin A (BOTOX) injection 200 Units  200 Units Intramuscular Once Leandro Lopez, DO           Review of Systems per Dr. Claudetta Dutton     /64 (Site: Left Upper Arm, Position: Sitting, Cuff Size: Medium Adult)   Pulse 74   Temp 97.8 °F (36.6 °C) (Oral)   Resp 16   Ht 5' 5\" (1.651 m)   Wt 146 lb 3.2 oz (66.3 kg)   SpO2 98%   BMI 24.33 kg/m²   BP Readings from Last 3 Encounters:   06/22/21 120/64   06/22/21 112/68   06/15/21 110/78       Wt Readings from Last 3 Encounters:   06/22/21 146 lb 3.2 oz (66.3 kg)   06/22/21 145 lb 4.8 oz (65.9 kg) 06/15/21 150 lb 14.4 oz (68.4 kg)     Body mass index is 24.33 kg/m². Physical Exam per Dr. Mara Ann    No results found for this visit on 06/22/21. No results found for: LABA1C    Lab Results   Component Value Date    CHOL 210 (H) 04/15/2021    TRIG 122 04/15/2021    HDL 46 04/15/2021    LDLCALC 140 04/15/2021       The 10-year ASCVD risk score (Alma Andres et al., 2013) is: 3.1%    Values used to calculate the score:      Age: 62 years      Sex: Female      Is Non- : No      Diabetic: No      Tobacco smoker: No      Systolic Blood Pressure: 495 mmHg      Is BP treated: No      HDL Cholesterol: 46 mg/dL      Total Cholesterol: 210 mg/dL    Lab Results   Component Value Date     03/30/2021    K 4.2 03/30/2021     03/30/2021    CO2 24 03/30/2021    BUN 24 (H) 03/30/2021    CREATININE 0.8 03/30/2021    GLUCOSE 98 03/30/2021    CALCIUM 10.1 03/30/2021    PROT 7.4 03/30/2021    LABALBU 4.6 03/30/2021    BILITOT 0.2 (L) 03/30/2021    ALKPHOS 83 03/30/2021    AST 19 03/30/2021    ALT 13 03/30/2021    LABGLOM 73 (A) 03/30/2021    GFRAA >60 02/19/2021    AGRATIO 2.9 (H) 02/05/2021       Lab Results   Component Value Date    TSH 1.697 02/05/2021       Lab Results   Component Value Date    WBC 5.6 03/30/2021    HGB 12.9 03/30/2021    HCT 42.4 03/30/2021    MCV 93.6 03/30/2021     03/30/2021           There is no immunization history on file for this patient.     Health Maintenance   Topic Date Due    COVID-19 Vaccine (1) Never done    DTaP/Tdap/Td vaccine (1 - Tdap) Never done    Cervical cancer screen  Never done    Shingles Vaccine (1 of 2) Never done    Colon cancer screen colonoscopy  Never done    Annual Wellness Visit (AWV)  Never done    Flu vaccine (Season Ended) 09/01/2021    Creatinine monitoring  03/30/2022    Potassium monitoring  06/14/2022    Breast cancer screen  10/12/2022    Lipid screen  04/15/2026    Hepatitis C screen  Completed    HIV screen

## 2021-06-22 NOTE — PROGRESS NOTES
Post-Discharge Transitional Care Management Services or Hospital Follow Up      Demian Lu   YOB: 1960    Date of Office Visit:  6/22/2021  Date of Hospital Admission: 6/14/21  Date of Hospital Discharge: 6/15/21  Readmission Risk Score(high >=14%.  Medium >=10%):No data recorded    Care management risk score Rising risk (score 2-5) and Complex Care (Scores >=6): 0     Non face to face  following discharge, date last encounter closed (first attempt may have been earlier): 6/16/2021 10:34 AM 6/16/2021 10:34 AM    Call initiated 2 business days of discharge: Yes     Patient Active Problem List   Diagnosis    May-Thurner syndrome    Deviated nasal septum    Hypertrophy of right inferior turbinate    Latisha bullosa right middle    Rhinogenic headache    Migraine without status migrainosus, not intractable    Postoperative state       Allergies   Allergen Reactions    Amoxil [Amoxicillin]      rash    Biaxin [Clarithromycin] Swelling     Lips swelled    Doxycycline      rash    Morphine Nausea And Vomiting    Sulfa Antibiotics Rash       Medications listed as ordered at the time of discharge from hospital   Si Anis   Home Medication Instructions KSENIA:    Printed on:06/22/21 1341   Medication Information                      buPROPion (WELLBUTRIN XL) 150 MG extended release tablet  Take 1 tablet by mouth every morning Take with 300 mg tablet for total 450 mg once daily in the morning             buPROPion (WELLBUTRIN XL) 300 MG extended release tablet  TAKE 1 TABLET BY MOUTH EVERY DAY IN THE MORNING             butalbital-acetaminophen-caffeine (FIORICET, ESGIC) -40 MG per tablet  Take 1 tablet by mouth every 4 hours as needed for Headaches             clindamycin (CLEOCIN) 300 MG capsule  Take 1 capsule by mouth 3 times daily for 14 days             clotrimazole-betamethasone (LOTRISONE) 1-0.05 % cream  Apply topically as needed              cyclobenzaprine (FLEXERIL) 10 MG tablet  2 times daily as needed              docusate sodium (COLACE, DULCOLAX) 100 MG CAPS  Take 100 mg by mouth daily for 7 days             Erenumab-aooe (AIMOVIG) 140 MG/ML SOAJ  Inject 140 mg into the skin every 30 days             esomeprazole (NEXIUM) 40 MG delayed release capsule  Take 1 capsule by mouth every morning (before breakfast)             furosemide (LASIX) 20 MG tablet  as needed              hydroxychloroquine (PLAQUENIL) 200 MG tablet  Take 200 mg by mouth daily              memantine (NAMENDA) 5 MG tablet  Take 5 mg by mouth daily              nitroglycerin (NITROSTAT) 0.6 MG SL tablet  Place 0.6 mg under the tongue every 5 minutes as needed for Chest pain up to max of 3 total doses. If no relief after 1 dose, call 911. Onabotulinumtoxin A (BOTOX, COSMETIC,) 200 units injection  Botulinum Toxin Type A Botox 200 unit injection recon soln monthly for migraines   [facility] ([id number])             ondansetron (ZOFRAN) 4 MG tablet  Take 1 tablet by mouth 3 times daily as needed for Nausea or Vomiting             oxymetazoline (AFRIN) 0.05 % nasal spray  Use Afrin nasal spray, 8 drops each nostril on back with head hanging off edge of bed, stay 5 minutes. Repeat every 6 hours. IF IT JUST RUNS DOWN THE THROAT, YOU ARE NOT BACK FAR ENOUGH. REPOSITION AND DO IT AGAIN. Use for 5 days.              potassium chloride (KLOR-CON) 10 MEQ extended release tablet  as needed              pramipexole (MIRAPEX) 0.125 MG tablet  Take 2 tablets by mouth 2 times daily             pseudoephedrine (DECONGESTANT) 30 MG tablet  Take 1 tablet by mouth every 6 hours as needed for Congestion             topiramate (TOPAMAX) 100 MG tablet  TAKE 1 TABLET BY MOUTH TWICE A DAY             traZODone (DESYREL) 150 MG tablet  Take 1.5 tablets by mouth nightly             Ubrogepant (UBRELVY) 100 MG TABS  Take 100 mg by mouth as needed                   Medications marked \"taking\" at this time  Outpatient Medications Marked as Taking for the 6/22/21 encounter (Office Visit) with Lauryn Maldonado MD   Medication Sig Dispense Refill    docusate sodium (COLACE, DULCOLAX) 100 MG CAPS Take 100 mg by mouth daily for 7 days 7 capsule 0    oxymetazoline (AFRIN) 0.05 % nasal spray Use Afrin nasal spray, 8 drops each nostril on back with head hanging off edge of bed, stay 5 minutes. Repeat every 6 hours. IF IT JUST RUNS DOWN THE THROAT, YOU ARE NOT BACK FAR ENOUGH. REPOSITION AND DO IT AGAIN. Use for 5 days.   3    pseudoephedrine (DECONGESTANT) 30 MG tablet Take 1 tablet by mouth every 6 hours as needed for Congestion 56 tablet 0    clindamycin (CLEOCIN) 300 MG capsule Take 1 capsule by mouth 3 times daily for 14 days 42 capsule 0    Erenumab-aooe (AIMOVIG) 140 MG/ML SOAJ Inject 140 mg into the skin every 30 days 1 pen 5    butalbital-acetaminophen-caffeine (FIORICET, ESGIC) -40 MG per tablet Take 1 tablet by mouth every 4 hours as needed for Headaches 180 tablet 1    buPROPion (WELLBUTRIN XL) 300 MG extended release tablet TAKE 1 TABLET BY MOUTH EVERY DAY IN THE MORNING 90 tablet 0    esomeprazole (NEXIUM) 40 MG delayed release capsule Take 1 capsule by mouth every morning (before breakfast) 90 capsule 1    pramipexole (MIRAPEX) 0.125 MG tablet Take 2 tablets by mouth 2 times daily 120 tablet 3    topiramate (TOPAMAX) 100 MG tablet TAKE 1 TABLET BY MOUTH TWICE A  tablet 1    memantine (NAMENDA) 5 MG tablet Take 5 mg by mouth daily       clotrimazole-betamethasone (LOTRISONE) 1-0.05 % cream Apply topically as needed       cyclobenzaprine (FLEXERIL) 10 MG tablet 2 times daily as needed       furosemide (LASIX) 20 MG tablet as needed       potassium chloride (KLOR-CON) 10 MEQ extended release tablet as needed       Onabotulinumtoxin A (BOTOX, COSMETIC,) 200 units injection Botulinum Toxin Type A Botox 200 unit injection recon soln monthly for migraines   [facility] [address] [facility] ([id number])      traZODone (DESYREL) 150 MG tablet Take 1.5 tablets by mouth nightly 45 tablet 2    buPROPion (WELLBUTRIN XL) 150 MG extended release tablet Take 1 tablet by mouth every morning Take with 300 mg tablet for total 450 mg once daily in the morning 30 tablet 2    Ubrogepant (UBRELVY) 100 MG TABS Take 100 mg by mouth as needed      hydroxychloroquine (PLAQUENIL) 200 MG tablet Take 200 mg by mouth daily       nitroglycerin (NITROSTAT) 0.6 MG SL tablet Place 0.6 mg under the tongue every 5 minutes as needed for Chest pain up to max of 3 total doses. If no relief after 1 dose, call 911.  ondansetron (ZOFRAN) 4 MG tablet Take 1 tablet by mouth 3 times daily as needed for Nausea or Vomiting 15 tablet 0        Medications patient taking as of now reconciled against medications ordered at time of hospital discharge: Yes    Chief Complaint   Patient presents with    Follow-Up from Hospital     sinus surgery        HPI    Inpatient course: Discharge summary reviewed- see chart. Interval history/Current status: Had surgery last week. Stayed overnight 1 night. States she still is not yet feeling well. Feels like she has a sinus infection. Still taking Clindamycin. Saw Dr. Andre Babinski this am and sent sample for culture. Taking Fiorecet and Hydrocodone as well as pseudophed for generalized stuffiness. Appetite is low after surgery. Able to stay hydrated. Review of Systems   Constitutional: Positive for appetite change. Negative for fatigue and fever. HENT: Positive for sinus pressure, sinus pain and sore throat. Negative for congestion. Eyes: Positive for photophobia. Negative for visual disturbance. Respiratory: Negative for cough and shortness of breath. Cardiovascular: Positive for leg swelling. Negative for chest pain and palpitations. Gastrointestinal: Negative for abdominal pain, constipation, diarrhea, nausea and vomiting.    Genitourinary: Negative for dysuria and menstrual problem. Musculoskeletal: Negative for arthralgias and back pain. Skin: Negative for rash. Neurological: Positive for headaches. Psychiatric/Behavioral: Negative for sleep disturbance. The patient is not nervous/anxious. Vitals:    06/22/21 1310   BP: 120/64   Site: Left Upper Arm   Position: Sitting   Cuff Size: Medium Adult   Pulse: 74   Resp: 16   Temp: 97.8 °F (36.6 °C)   TempSrc: Oral   SpO2: 98%   Weight: 146 lb 3.2 oz (66.3 kg)   Height: 5' 5\" (1.651 m)     Body mass index is 24.33 kg/m². Wt Readings from Last 3 Encounters:   06/22/21 146 lb 3.2 oz (66.3 kg)   06/22/21 145 lb 4.8 oz (65.9 kg)   06/15/21 150 lb 14.4 oz (68.4 kg)     BP Readings from Last 3 Encounters:   06/22/21 120/64   06/22/21 112/68   06/15/21 110/78       Physical Exam  Vitals reviewed. Constitutional:       Appearance: Normal appearance. HENT:      Head: Normocephalic and atraumatic. Right Ear: External ear normal.      Left Ear: External ear normal.      Nose: Nasal tenderness present. Right Sinus: Maxillary sinus tenderness and frontal sinus tenderness present. Left Sinus: Maxillary sinus tenderness and frontal sinus tenderness present. Mouth/Throat:      Mouth: Mucous membranes are moist.   Eyes:      General:         Right eye: No discharge. Left eye: No discharge. Conjunctiva/sclera: Conjunctivae normal.   Cardiovascular:      Rate and Rhythm: Normal rate and regular rhythm. Pulses: Normal pulses. Heart sounds: Normal heart sounds. No murmur heard. Pulmonary:      Effort: Pulmonary effort is normal. No respiratory distress. Breath sounds: Normal breath sounds. No wheezing. Abdominal:      General: Abdomen is flat. Bowel sounds are normal. There is no distension. Palpations: Abdomen is soft. Tenderness: There is no abdominal tenderness. Musculoskeletal:      Cervical back: Normal range of motion. Right lower leg: No edema.       Left

## 2021-06-22 NOTE — PROGRESS NOTES
[address] EAR, NOSE AND THROAT  [facility]  Dept: [phone]  Dept Fax: [phone]  Loc: [phone]    Paulie Manzo is a 62 y.o. female who was referred byNo ref. provider found for:  Chief Complaint   Patient presents with    Post-Op Check     Patient is here post-op septo,SMR,vinh 6/14/21 c/o congestion, headache, fatigue    . HPI:     Paulie Manzo is a 62 y.o. female who presents today for follow-up on her nasal surgery about a week ago. She feels like she is getting a sinus infection. She is blowing out purulent drainage. [facility] Payment History: Allergies   Allergen Reactions    Amoxil [Amoxicillin]      rash    Biaxin [Clarithromycin] Swelling     Lips swelled    Doxycycline      rash    Morphine Nausea And Vomiting    Sulfa Antibiotics Rash     Current Outpatient Medications   Medication Sig Dispense Refill    oxymetazoline (AFRIN) 0.05 % nasal spray Use Afrin nasal spray, 8 drops each nostril on back with head hanging off edge of bed, stay 5 minutes. Repeat every 6 hours. IF IT JUST RUNS DOWN THE THROAT, YOU ARE NOT BACK FAR ENOUGH. REPOSITION AND DO IT AGAIN. Use for 5 days.   3    pseudoephedrine (DECONGESTANT) 30 MG tablet Take 1 tablet by mouth every 6 hours as needed for Congestion 56 tablet 0    clindamycin (CLEOCIN) 300 MG capsule Take 1 capsule by mouth 3 times daily for 14 days 42 capsule 0    Erenumab-aooe (AIMOVIG) 140 MG/ML SOAJ Inject 140 mg into the skin every 30 days 1 pen 5    butalbital-acetaminophen-caffeine (FIORICET, ESGIC) -40 MG per tablet Take 1 tablet by mouth every 4 hours as needed for Headaches 180 tablet 1    esomeprazole (NEXIUM) 40 MG delayed release capsule Take 1 capsule by mouth every morning (before breakfast) 90 capsule 1    pramipexole (MIRAPEX) 0.125 MG tablet Take 2 tablets by mouth 2 times daily 120 tablet 3    topiramate (TOPAMAX) 100 MG tablet TAKE 1 TABLET BY MOUTH TWICE A  tablet 1    memantine (NAMENDA) 5 MG tablet Take 5 mg by mouth daily       clotrimazole-betamethasone (LOTRISONE) 1-0.05 % cream Apply topically as needed       cyclobenzaprine (FLEXERIL) 10 MG tablet 2 times daily as needed       furosemide (LASIX) 20 MG tablet as needed       potassium chloride (KLOR-CON) 10 MEQ extended release tablet as needed       Onabotulinumtoxin A (BOTOX, COSMETIC,) 200 units injection Botulinum Toxin Type A Botox 200 unit injection recon soln monthly for migraines   [facility] ([id number])      Ubrogepant (UBRELVY) 100 MG TABS Take 100 mg by mouth as needed      hydroxychloroquine (PLAQUENIL) 200 MG tablet Take 200 mg by mouth daily       nitroglycerin (NITROSTAT) 0.6 MG SL tablet Place 0.6 mg under the tongue every 5 minutes as needed for Chest pain up to max of 3 total doses. If no relief after 1 dose, call 911.  ondansetron (ZOFRAN) 4 MG tablet Take 1 tablet by mouth 3 times daily as needed for Nausea or Vomiting 15 tablet 0    traZODone (DESYREL) 150 MG tablet Take 1.5 tablets by mouth nightly 45 tablet 2    buPROPion (WELLBUTRIN XL) 150 MG extended release tablet Take 1 tablet by mouth every morning Take with 300 mg tablet for total 450 mg once daily in the morning 90 tablet 0    buPROPion (WELLBUTRIN XL) 300 MG extended release tablet TAKE 1 TABLET BY MOUTH EVERY DAY IN THE MORNING 90 tablet 0    LORazepam (ATIVAN) 0.5 MG tablet Take 1 tablet by mouth daily as needed for Anxiety for up to 30 days.  30 tablet 0     Current Facility-Administered Medications   Medication Dose Route Frequency Provider Last Rate Last Admin    onabotulinumtoxin A (BOTOX) injection 200 Units  200 Units Intramuscular Once Leandro De La Cruz Fus, DO         Past Medical History:   Diagnosis Date    Adrenal abnormality (HCC)     [facility] at rest [facility])     Anxiety     Arthritis     Asthma     Cerebral artery occlusion with cerebral infarction ([facility] [address]     Chronic sinusitis     Cognitive impairment     with retograde amnesia    Headache     Migraines Hemiplegic    History of degenerative disc disease     Hyperlipidemia     Lymphedema     Left leg    May-Thurner syndrome     Peripheral vascular disease (HCC)     PONV (postoperative nausea and vomiting)     Positive BJ (antinuclear antibody)     PTSD (post-traumatic stress disorder)       Past Surgical History:   Procedure Laterality Date    BREAST BIOPSY      HYSTERECTOMY      NECK SURGERY  2020    ACDF    SEPTOPLASTY Right 2021    SEPTOPLASTY, SUBMUCOUS RESECTION TURBINATE, RIGHT PARTIAL INFERIOR, NASAL ENDOSCOPY WITH PARTIAL RESECTION OF RIGHT MIDDLE JHON BULLOSA performed by Lia Potts MD at [address] Left     SINUS SURGERY      TONSILLECTOMY      TUBAL LIGATION       Family History   Adopted: Yes   Problem Relation Age of Onset    Cancer Father         Stomach Cancer    Cancer Paternal Uncle         Colon Cancer    Stroke Maternal Grandmother     Rheum Arthritis Maternal Grandmother      Social History     Tobacco Use    Smoking status: Former Smoker     Packs/day: 1.00     Years: 12.00     Pack years: 12.00     Types: Cigarettes     Quit date: 2019     Years since quittin.4    Smokeless tobacco: Never Used   Substance Use Topics    Alcohol use: Not Currently     Comment: stopped in 2020 due to fatty liver       Subjective:      Review of Systems   Constitutional: Negative for activity change, appetite change, chills, diaphoresis, fatigue, fever and unexpected weight change. HENT: Negative for congestion, dental problem, ear discharge, ear pain, facial swelling, hearing loss, mouth sores, nosebleeds, postnasal drip, rhinorrhea, sinus pressure, sneezing, sore throat, tinnitus, trouble swallowing and voice change. Eyes: Negative for visual disturbance.    Respiratory: Negative for apnea, cough, choking, chest tightness, shortness of breath, wheezing and stridor. Cardiovascular: Negative for chest pain, palpitations and leg swelling. Gastrointestinal: Negative for abdominal pain, diarrhea, nausea and vomiting. Endocrine: Negative for cold intolerance, heat intolerance, polydipsia and polyuria. Genitourinary: Negative for dysuria, enuresis and hematuria. Musculoskeletal: Negative for arthralgias, gait problem, neck pain and neck stiffness. Skin: Negative for color change and rash. Allergic/Immunologic: Negative for environmental allergies, food allergies and immunocompromised state. Neurological: Negative for dizziness, syncope, facial asymmetry, speech difficulty, light-headedness and headaches. Hematological: Negative for adenopathy. Does not bruise/bleed easily. Psychiatric/Behavioral: Negative for confusion and sleep disturbance. The patient is not nervous/anxious. Objective:   /68 (Site: Left Upper Arm, Position: Sitting)   Pulse 80   Temp 97 °F (36.1 °C) (Infrared)   Resp 14   Ht 5' 5\" (1.651 m)   Wt 145 lb 4.8 oz (65.9 kg)   BMI 24.18 kg/m²     Physical Exam   Nose: Decongested anesthetized with sprays. Thick purulent drainage from right side is cultured and suctioned. Data:  All of the past medical history, past surgical history, family history,social history, allergies and current medications were reviewed with the patient. Assessment & Plan   Diagnoses and all orders for this visit:     Diagnosis Orders   1. Purulent rhinitis  Culture, Nasal   2. Deviated nasal septum     3. Hypertrophy of right inferior turbinate     4. Latisha bullosa right middle     5. Status post nasal septoplasty     6. May-Thurner syndrome         The findings were explained and her questions were answered. Patient is encouraged to pursue the irrigations and keep her return visit. ADDENDUM: Culture showed no growth       Jaspal Saunders MD    **This report has been created using voice recognition software. It may contain minor errors which are inherent in voicerecognition technology. **

## 2021-06-24 LAB — AEROBIC CULTURE: NORMAL

## 2021-06-28 ENCOUNTER — TELEPHONE (OUTPATIENT)
Dept: ENT CLINIC | Age: 61
End: 2021-06-28

## 2021-06-28 ENCOUNTER — OFFICE VISIT (OUTPATIENT)
Dept: PHYSICAL MEDICINE AND REHAB | Age: 61
End: 2021-06-28
Payer: MEDICARE

## 2021-06-28 ENCOUNTER — OFFICE VISIT (OUTPATIENT)
Dept: PSYCHIATRY | Age: 61
End: 2021-06-28
Payer: MEDICARE

## 2021-06-28 VITALS
DIASTOLIC BLOOD PRESSURE: 66 MMHG | WEIGHT: 145 LBS | SYSTOLIC BLOOD PRESSURE: 92 MMHG | HEIGHT: 65 IN | BODY MASS INDEX: 24.16 KG/M2

## 2021-06-28 DIAGNOSIS — G43.019 INTRACTABLE MIGRAINE WITHOUT AURA AND WITHOUT STATUS MIGRAINOSUS: ICD-10-CM

## 2021-06-28 DIAGNOSIS — F41.9 ANXIETY: ICD-10-CM

## 2021-06-28 DIAGNOSIS — G89.29 OTHER CHRONIC PAIN: ICD-10-CM

## 2021-06-28 DIAGNOSIS — M79.18 MYOFASCIAL PAIN: ICD-10-CM

## 2021-06-28 DIAGNOSIS — M54.59 LUMBAR FACET JOINT PAIN: Primary | ICD-10-CM

## 2021-06-28 DIAGNOSIS — R41.3 MEMORY PROBLEM: ICD-10-CM

## 2021-06-28 DIAGNOSIS — F33.41 RECURRENT MAJOR DEPRESSIVE DISORDER, IN PARTIAL REMISSION (HCC): ICD-10-CM

## 2021-06-28 DIAGNOSIS — F43.10 PTSD (POST-TRAUMATIC STRESS DISORDER): Primary | ICD-10-CM

## 2021-06-28 PROCEDURE — 3017F COLORECTAL CA SCREEN DOC REV: CPT | Performed by: ANESTHESIOLOGY

## 2021-06-28 PROCEDURE — G8420 CALC BMI NORM PARAMETERS: HCPCS | Performed by: ANESTHESIOLOGY

## 2021-06-28 PROCEDURE — G8420 CALC BMI NORM PARAMETERS: HCPCS | Performed by: PSYCHIATRY & NEUROLOGY

## 2021-06-28 PROCEDURE — 1036F TOBACCO NON-USER: CPT | Performed by: ANESTHESIOLOGY

## 2021-06-28 PROCEDURE — 1036F TOBACCO NON-USER: CPT | Performed by: PSYCHIATRY & NEUROLOGY

## 2021-06-28 PROCEDURE — 99214 OFFICE O/P EST MOD 30 MIN: CPT | Performed by: ANESTHESIOLOGY

## 2021-06-28 PROCEDURE — G8427 DOCREV CUR MEDS BY ELIG CLIN: HCPCS | Performed by: ANESTHESIOLOGY

## 2021-06-28 PROCEDURE — 99214 OFFICE O/P EST MOD 30 MIN: CPT | Performed by: PSYCHIATRY & NEUROLOGY

## 2021-06-28 PROCEDURE — 64615 CHEMODENERV MUSC MIGRAINE: CPT | Performed by: ANESTHESIOLOGY

## 2021-06-28 PROCEDURE — 3017F COLORECTAL CA SCREEN DOC REV: CPT | Performed by: PSYCHIATRY & NEUROLOGY

## 2021-06-28 PROCEDURE — G8427 DOCREV CUR MEDS BY ELIG CLIN: HCPCS | Performed by: PSYCHIATRY & NEUROLOGY

## 2021-06-28 RX ORDER — TRAZODONE HYDROCHLORIDE 150 MG/1
225 TABLET ORAL NIGHTLY
Qty: 45 TABLET | Refills: 2 | Status: SHIPPED | OUTPATIENT
Start: 2021-06-28 | End: 2021-09-20 | Stop reason: SDUPTHER

## 2021-06-28 RX ORDER — LORAZEPAM 0.5 MG/1
0.5 TABLET ORAL DAILY PRN
Qty: 30 TABLET | Refills: 0 | Status: SHIPPED | OUTPATIENT
Start: 2021-06-28 | End: 2021-07-28

## 2021-06-28 RX ORDER — BUPROPION HYDROCHLORIDE 150 MG/1
150 TABLET ORAL EVERY MORNING
Qty: 90 TABLET | Refills: 0 | Status: SHIPPED | OUTPATIENT
Start: 2021-06-28 | End: 2021-09-20 | Stop reason: SDUPTHER

## 2021-06-28 RX ORDER — BUPROPION HYDROCHLORIDE 300 MG/1
TABLET ORAL
Qty: 90 TABLET | Refills: 0 | Status: SHIPPED | OUTPATIENT
Start: 2021-06-28 | End: 2021-09-20 | Stop reason: SDUPTHER

## 2021-06-28 NOTE — PROGRESS NOTES
Chronic Pain/PM&R Clinic Note     Encounter Date: 6/28/21    Subjective:   Chief Complaint:   Chief Complaint   Patient presents with    Botox Injection     100 units     Procedure     discuss RFA        History of Present Illness:   Chris Carolina is a 62 y.o. female seen in the office initially on 03/05/21 for her management of migraines. She has medical history of previous TIA with residual cognitive deficits, and ACDF C3-C7 with Dr Chiquita Acosta. She has longstanding history of migraine headaches as well as of the last 35 years. She describes 2 different types of migraine headaches. Her standard migraine headaches she reports started on the right side the neck and wrap around the entire side of the front of the head. She has associated phonophobia and photophobia. She also notices these migraines to begin with an aura where her nose starts to burn. She has associated nausea/vomiting when the migraines are severe. Her migraines last greater than 4 hours in duration interfering everyday activities. In addition she reports greater than 15 migraine attacks last month. She has failed multiple medications in the past including Fioricet, Ubrelvy, nortriptyline, and is currently on Aimovig and Topamax. 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 In addition, she does have axial low back pain that contributes to a lot of her debilitating pain. She denies any radiation down the legs, focal leg weakness, leg paresthesias, saddle anesthesia, bowel/bladder incontinence. 4/19/2021: Patient presents for planned follow-up for management of migraine headaches. She reports that her initial round of Botox has provided some relief in her chronic migraine headaches. She reports about 50% reduction in severity and frequency of her chronic migraine headaches. She still appears to have hemiplegic migraines that have remained unchanged.   She denies any changes to her medications to treat migraine headaches. She does want to proceed with repeat Botox injections next 6 weeks. She still wants to investigate her low back pain. She states that she is undergoing potentially septoplasty and correct her sinus issues in the near future. She wants hold off any lumbar medial branch blocks until this is done. Today, 6/20/2021, patient presents for planned follow-up for management of migraine headaches and low back pain. She reports greater than 50% of reduction in severity and frequency of her chronic migraine headaches for 12 weeks since her last Botox injection. She states that she has continued hemiplegic migraines very rarely. She denies any changes to the medications that she is taking to treat migraine headaches. She denies any changes to the presentation of her classic migraine headaches. She states that she has completed a septoplasty and is currently on prophylactic antibiotics. She states they obtained cultures that were negative for any growth. She is continuing to have low back pain particularly at her waistline. She states this is worse with any activities where she is standing upright for an extended duration of time or bending over to pick something up. She denies any pain rating down her legs, leg paresthesias, saddle anesthesia, bowel/bladder incontinence. She states she would like to proceed with diagnostic test blocks in her low back as previously discussed in clinic.     Past Medical History:   Diagnosis Date    Adrenal abnormality (HCC)     Angina at rest [facility])     Anxiety     Arthritis     Asthma     Cerebral artery occlusion with cerebral infarction (HCC)     Chronic sinusitis     Cognitive impairment     with retograde amnesia    Headache     Migraines Hemiplegic    History of degenerative disc disease     Hyperlipidemia     Lymphedema     Left leg    May-Thurner syndrome     Peripheral vascular disease ([facility])     PONV (postoperative nausea and vomiting)  Positive BJ (antinuclear antibody)     PTSD (post-traumatic stress disorder)        Past Surgical History:   Procedure Laterality Date    BREAST BIOPSY      HYSTERECTOMY      NECK SURGERY  2020    ACDF    SEPTOPLASTY Right 2021    SEPTOPLASTY, SUBMUCOUS RESECTION TURBINATE, RIGHT PARTIAL INFERIOR, NASAL ENDOSCOPY WITH PARTIAL RESECTION OF RIGHT MIDDLE JHON BULLOSA performed by Tana Jimenez MD at [address]     SINUS SURGERY      TONSILLECTOMY      TUBAL LIGATION         Family History   Adopted: Yes   Problem Relation Age of Onset    Cancer Father         Stomach Cancer    Cancer Paternal Uncle         Colon Cancer    Stroke Maternal Grandmother     Rheum Arthritis Maternal Grandmother        Social History     Socioeconomic History    Marital status:      Spouse name: Not on file    Number of children: Not on file    Years of education: Not on file    Highest education level: Not on file   Occupational History    Not on file   Tobacco Use    Smoking status: Former Smoker     Packs/day: 1.00     Years: 12.00     Pack years: 12.00     Types: Cigarettes     Quit date: 2019     Years since quittin.4    Smokeless tobacco: Never Used   Vaping Use    Vaping Use: Never used   Substance and Sexual Activity    Alcohol use: Not Currently     Comment: stopped in 2020 due to fatty liver    Drug use: Never    Sexual activity: Never   Other Topics Concern    Not on file   Social History Narrative    Not on file     Social Determinants of Health     Financial Resource Strain: Low Risk     Difficulty of Paying Living Expenses: Not hard at all   Food Insecurity: No Food Insecurity    Worried About Running Out of Food in the Last Year: Never true    Migue of Food in the Last Year: Never true   Transportation Needs:     Lack of Transportation (Medical):      Lack of Transportation (Non-Medical): minutes. Repeat every 6 hours. IF IT JUST RUNS DOWN THE THROAT, YOU ARE NOT BACK FAR ENOUGH. REPOSITION AND DO IT AGAIN. Use for 5 days.  potassium chloride (KLOR-CON) 10 MEQ extended release tablet PRN    pramipexole (MIRAPEX) 0.25 mg, Oral, 2 TIMES DAILY    pseudoephedrine (DECONGESTANT) 30 mg, Oral, EVERY 6 HOURS PRN    topiramate (TOPAMAX) 100 MG tablet TAKE 1 TABLET BY MOUTH TWICE A DAY    traZODone (DESYREL) 225 mg, Oral, NIGHTLY    Ubrelvy 100 mg, Oral, PRN       Allergies   Allergen Reactions    Amoxil [Amoxicillin]      rash    Biaxin [Clarithromycin] Swelling     Lips swelled    Doxycycline      rash    Morphine Nausea And Vomiting    Sulfa Antibiotics Rash       Review of Systems:   Constitutional: negative for fevers  Musculoskeletal: Positive for low back pain  Neurological: positive for migraines but negative for leg numbness/tingling or weakness  Behavioral/Psych: negative for anxiety/depression   All other systems reviewed and are negative    Objective:     Vitals:    06/28/21 0947   BP: 92/66       Constitutional: Pleasant, no acute distress   Head: Normocephalic, atraumatic   Eyes: Conjunctivae normal   Neck: Supple, symmetrical   Respiration: Non-labored breathing   Cardiovascular: Limbs warm and well perfused   Musculoskeletal:   Cervical - Reduced range of motion in all cervical planes. Tenderness to palpation in cervical paraspinals. Positive tinel's sign over 1 inch lateral to occipital protuberance bilaterally. Lumbar -ROM reduced in extension. Increased pain with facet loading bilaterally. Tenderness palpation along bilateral lumbar paraspinals. Neuro: Alert, oriented. CN II-XII appear grossly intact. 5/5 motor strength in bilateral hip flexion, knee extension, knee flexion, ankle dorsiflexion, ankle plantarflexion. Light touch sensation intact in lower limbs. No evidence of hyperreflexia on examination and patellar Achilles reflexes.   Skin: no skin rashes or lesions management of chronic migraine headaches. The risks and benefits were discussed in detail with the patient. Patient underwent repeat Botox injections today. (Please see attached procedure note)      Follow-up: For lumbar MBBs, then 3 months for repeat Botox    I spent 30 minutes with the patient greater than 50% this time was spent discussing lumbar facet mediated pain and discussing diagnostic lumbar medial branch blocks and the risks versus benefits of this procedure.       Akua Valentino, DO  Interventional Pain Management/PM&R   [place]

## 2021-06-28 NOTE — H&P
Today, patient presents for planned medial branch blocks at bilateral L4/L5 and L5/S1. This note is reflective of the patient's previous visit for evaluation. We will proceed with today's planned procedure. Since patient's last visit for evaluation, there have been no interval changes in medical history. Patient has no new numbness, weakness, or focal neurological deficit since evaluation. Patient has no contraindications to injection (no anticoagulation or recent antibiotic intake for active infections), and has a  present or is able to drive themselves (as discussed and cleared by physician). Allergies to latex, contrast dye, and steroid medications have been confirmed with the patient prior to the procedure. NPO necessity has been assessed and accepted based on procedure complexity. The risks and benefits of the procedure have been explained including but are not limited to infection, bleeding, paralysis, immediate post procedure weakness, and dizziness; the patient acknowledges understanding and desires to proceed with the procedure. Patient has signed consent for same procedure as discussed in previous clinic encounter. All other questions and concerns were addressed at bedside. See procedure note for full details. Follow up: 7/9/21    Post procedure Instructions: The patient was advised not to drive during the day of the procedure and not to engage in any significant decision making (unless otherwise states by physician). The patient was also advised to be cautious with walking/activity for 24 hours following today's visit and asked not to engage in over-exertion (unless otherwise states by physician). After this time, it is ok to resume pre-procedure level of activity. Patient advised to apply ice to site of injection in situations of pain and discomfort. Patient advised to not submerge site of injection during bath or pool activities for approximately 24 hours post-procedure.  Patient attested remained unchanged. She denies any changes to her medications to treat migraine headaches. She does want to proceed with repeat Botox injections next 6 weeks. She still wants to investigate her low back pain. She states that she is undergoing potentially septoplasty and correct her sinus issues in the near future. She wants hold off any lumbar medial branch blocks until this is done. Today, 6/20/2021, patient presents for planned follow-up for management of migraine headaches and low back pain. She reports greater than 50% of reduction in severity and frequency of her chronic migraine headaches for 12 weeks since her last Botox injection. She states that she has continued hemiplegic migraines very rarely. She denies any changes to the medications that she is taking to treat migraine headaches. She denies any changes to the presentation of her classic migraine headaches. She states that she has completed a septoplasty and is currently on prophylactic antibiotics. She states they obtained cultures that were negative for any growth. She is continuing to have low back pain particularly at her waistline. She states this is worse with any activities where she is standing upright for an extended duration of time or bending over to pick something up. She denies any pain rating down her legs, leg paresthesias, saddle anesthesia, bowel/bladder incontinence. She states she would like to proceed with diagnostic test blocks in her low back as previously discussed in clinic.     Past Medical History:   Diagnosis Date    Adrenal abnormality (HCC)     Angina at rest [facility])     Anxiety     Arthritis     Asthma     Cerebral artery occlusion with cerebral infarction (HCC)     Chronic sinusitis     Cognitive impairment     with retograde amnesia    Headache     Migraines Hemiplegic    History of degenerative disc disease     Hyperlipidemia     Lymphedema     Left leg    May-Thurner syndrome     Peripheral vascular disease ([facility] [address]     PONV (postoperative nausea and vomiting)     Positive BJ (antinuclear antibody)     PTSD (post-traumatic stress disorder)        Past Surgical History:   Procedure Laterality Date    BREAST BIOPSY      HYSTERECTOMY      NECK SURGERY  2020    ACDF    SEPTOPLASTY Right 2021    SEPTOPLASTY, SUBMUCOUS RESECTION TURBINATE, RIGHT PARTIAL INFERIOR, NASAL ENDOSCOPY WITH PARTIAL RESECTION OF RIGHT MIDDLE JHON BULLOSA performed by Cheryl Britton MD at [address] Left     SINUS SURGERY      TONSILLECTOMY      TUBAL LIGATION         Family History   Adopted: Yes   Problem Relation Age of Onset    Cancer Father         Stomach Cancer    Cancer Paternal Uncle         Colon Cancer    Stroke Maternal Grandmother     Rheum Arthritis Maternal Grandmother        Social History     Socioeconomic History    Marital status:      Spouse name: Not on file    Number of children: Not on file    Years of education: Not on file    Highest education level: Not on file   Occupational History    Not on file   Tobacco Use    Smoking status: Former Smoker     Packs/day: 1.00     Years: 12.00     Pack years: 12.00     Types: Cigarettes     Quit date: 2019     Years since quittin.4    Smokeless tobacco: Never Used   Vaping Use    Vaping Use: Never used   Substance and Sexual Activity    Alcohol use: Not Currently     Comment: stopped in 2020 due to fatty liver    Drug use: Never    Sexual activity: Never   Other Topics Concern    Not on file   Social History Narrative    Not on file     Social Determinants of Health     Financial Resource Strain: Low Risk     Difficulty of Paying Living Expenses: Not hard at all   Food Insecurity: No Food Insecurity    Worried About Running Out of Food in the Last Year: Never true    Migue of Food in the Last Year: Never true   Transportation Needs:     Lack of Transportation (Medical):  Lack of Transportation (Non-Medical):    Physical Activity:     Days of Exercise per Week:     Minutes of Exercise per Session:    Stress:     Feeling of Stress :    Social Connections:     Frequency of Communication with Friends and Family:     Frequency of Social Gatherings with Friends and Family:     Attends Hindu Services:     Active Member of Clubs or Organizations:     Attends Club or Organization Meetings:     Marital Status:    Intimate Partner Violence:     Fear of Current or Ex-Partner:     Emotionally Abused:     Physically Abused:     Sexually Abused:        Medications & Allergies:   Current Outpatient Medications   Medication Instructions    Aimovig 140 mg, Subcutaneous, EVERY 30 DAYS    buPROPion (WELLBUTRIN XL) 300 MG extended release tablet TAKE 1 TABLET BY MOUTH EVERY DAY IN THE MORNING    buPROPion (WELLBUTRIN XL) 150 mg, Oral, EVERY MORNING, Take with 300 mg tablet for total 450 mg once daily in the morning    butalbital-acetaminophen-caffeine (FIORICET, ESGIC) -40 MG per tablet 1 tablet, Oral, EVERY 4 HOURS PRN    clindamycin (CLEOCIN) 300 mg, Oral, 3 TIMES DAILY    clotrimazole-betamethasone (LOTRISONE) 1-0.05 % cream Topical, PRN    cyclobenzaprine (FLEXERIL) 10 MG tablet 2 TIMES DAILY PRN    esomeprazole (NEXIUM) 40 mg, Oral, DAILY BEFORE BREAKFAST    furosemide (LASIX) 20 MG tablet PRN    hydroxychloroquine (PLAQUENIL) 200 mg, Oral, DAILY    LORazepam (ATIVAN) 0.5 mg, Oral, DAILY PRN    memantine (NAMENDA) 5 mg, Oral, DAILY    nitroglycerin (NITROSTAT) 0.6 mg, Sublingual, EVERY 5 MIN PRN, up to max of 3 total doses. If no relief after 1 dose, call 911.      Onabotulinumtoxin A (BOTOX, COSMETIC,) 200 units injection Botulinum Toxin Type A Botox 200 unit injection recon soln monthly for migraines   [facility] ([id number])    ondansetron (ZOFRAN) 4 mg, Oral, 3 TIMES DAILY PRN    oxymetazoline (AFRIN) 0.05 % nasal spray Use Afrin nasal spray, 8 drops each nostril on back with head hanging off edge of bed, stay 5 minutes. Repeat every 6 hours. IF IT JUST RUNS DOWN THE THROAT, YOU ARE NOT BACK FAR ENOUGH. REPOSITION AND DO IT AGAIN. Use for 5 days.  potassium chloride (KLOR-CON) 10 MEQ extended release tablet PRN    pramipexole (MIRAPEX) 0.25 mg, Oral, 2 TIMES DAILY    pseudoephedrine (DECONGESTANT) 30 mg, Oral, EVERY 6 HOURS PRN    topiramate (TOPAMAX) 100 MG tablet TAKE 1 TABLET BY MOUTH TWICE A DAY    traZODone (DESYREL) 225 mg, Oral, NIGHTLY    Ubrelvy 100 mg, Oral, PRN       Allergies   Allergen Reactions    Amoxil [Amoxicillin]      rash    Biaxin [Clarithromycin] Swelling     Lips swelled    Doxycycline      rash    Morphine Nausea And Vomiting    Sulfa Antibiotics Rash       Review of Systems:   Constitutional: negative for fevers  Musculoskeletal: Positive for low back pain  Neurological: positive for migraines but negative for leg numbness/tingling or weakness  Behavioral/Psych: negative for anxiety/depression   All other systems reviewed and are negative    Objective: There were no vitals filed for this visit. Constitutional: Pleasant, no acute distress   Head: Normocephalic, atraumatic   Eyes: Conjunctivae normal   Neck: Supple, symmetrical   Respiration: Non-labored breathing   Cardiovascular: Limbs warm and well perfused   Musculoskeletal:   Cervical - Reduced range of motion in all cervical planes. Tenderness to palpation in cervical paraspinals. Positive tinel's sign over 1 inch lateral to occipital protuberance bilaterally. Lumbar -ROM reduced in extension. Increased pain with facet loading bilaterally. Tenderness palpation along bilateral lumbar paraspinals. Neuro: Alert, oriented. CN II-XII appear grossly intact. 5/5 motor strength in bilateral hip flexion, knee extension, knee flexion, ankle dorsiflexion, ankle plantarflexion.   Light touch sensation intact in lower treatment modalities as detailed above, we discussed the option of using onabotulinumtoxinA (Botox) injection for better management of chronic migraine headaches. The risks and benefits were discussed in detail with the patient. Patient underwent repeat Botox injections today. (Please see attached procedure note)      Follow-up: For lumbar MBBs, then 3 months for repeat Botox    I spent 30 minutes with the patient greater than 50% this time was spent discussing lumbar facet mediated pain and discussing diagnostic lumbar medial branch blocks and the risks versus benefits of this procedure.       Sarah Izquierdo, DO  Interventional Pain Management/PM&R   [place]
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
## 2021-06-28 NOTE — PROGRESS NOTES
Pre-operative Diagnosis: Chronic Migraine Headaches     Post-operative Diagnosis: Chronic Migraine Headaches     Procedure: Botox for migraine headaches     Procedure Description:  Patient was reclined on the examination table. Botox was drawn up into a tuberculin syringes, to a concentration of 5 units per 0.1 mL with a 30-gauge needle. Skin was prepped with alcohol wipes. The following muscles were injected after negative aspiration; The frontalis muscle bilaterally a total of 4 sites (20 units), The  muscle bilaterally a total of 2 sites (10 units), the procerus one site (5 units), the occipitalis bilaterally a total of 6 sites (30 units), The temporalis muscle bilaterally a total of 8 sites (40 units), the trapezius bilaterally a total of 6 sites (30 units), and cervical paraspinal muscle groups a total of 4 sites (20 units). This was a total of 155 units at 31 sites. The patient tolerated the procedure well.     Medications: 4 mL in 200 U Botox drawn up in 4 separate 1 mL TB syringes = 5 U per 0.1 mL syringe    Amount medication wasted: 45 units        Procedural Complications: None        Leandro Powell DO  Interventional Pain Management/PM&R   [place]

## 2021-06-29 ENCOUNTER — HOSPITAL ENCOUNTER (OUTPATIENT)
Age: 61
Setting detail: OUTPATIENT SURGERY
Discharge: HOME | End: 2021-06-29
Attending: ANESTHESIOLOGY | Admitting: ANESTHESIOLOGY
Payer: MEDICARE

## 2021-06-29 ENCOUNTER — APPOINTMENT (OUTPATIENT)
Dept: GENERAL RADIOLOGY | Age: 61
End: 2021-06-29
Attending: ANESTHESIOLOGY
Payer: MEDICARE

## 2021-06-29 VITALS
DIASTOLIC BLOOD PRESSURE: 50 MMHG | OXYGEN SATURATION: 98 % | SYSTOLIC BLOOD PRESSURE: 89 MMHG | TEMPERATURE: 98.5 F | HEART RATE: 60 BPM | WEIGHT: 142.6 LBS | BODY MASS INDEX: 23.76 KG/M2 | RESPIRATION RATE: 16 BRPM | HEIGHT: 65 IN

## 2021-06-29 PROCEDURE — 3209999900 FLUORO FOR SURGICAL PROCEDURES

## 2021-06-29 PROCEDURE — 7100000011 HC PHASE II RECOVERY - ADDTL 15 MIN: Performed by: ANESTHESIOLOGY

## 2021-06-29 PROCEDURE — 64494 INJ PARAVERT F JNT L/S 2 LEV: CPT | Performed by: ANESTHESIOLOGY

## 2021-06-29 PROCEDURE — 2500000003 HC RX 250 WO HCPCS: Performed by: ANESTHESIOLOGY

## 2021-06-29 PROCEDURE — 3600000057 HC PAIN LEVEL 4 ADDL 15 MIN: Performed by: ANESTHESIOLOGY

## 2021-06-29 PROCEDURE — 7100000010 HC PHASE II RECOVERY - FIRST 15 MIN: Performed by: ANESTHESIOLOGY

## 2021-06-29 PROCEDURE — 64493 INJ PARAVERT F JNT L/S 1 LEV: CPT | Performed by: ANESTHESIOLOGY

## 2021-06-29 PROCEDURE — 2709999900 HC NON-CHARGEABLE SUPPLY: Performed by: ANESTHESIOLOGY

## 2021-06-29 PROCEDURE — 99152 MOD SED SAME PHYS/QHP 5/>YRS: CPT | Performed by: ANESTHESIOLOGY

## 2021-06-29 PROCEDURE — 3600000056 HC PAIN LEVEL 4 BASE: Performed by: ANESTHESIOLOGY

## 2021-06-29 PROCEDURE — 6360000002 HC RX W HCPCS: Performed by: ANESTHESIOLOGY

## 2021-06-29 RX ORDER — LIDOCAINE HYDROCHLORIDE 10 MG/ML
INJECTION, SOLUTION EPIDURAL; INFILTRATION; INTRACAUDAL; PERINEURAL PRN
Status: DISCONTINUED | OUTPATIENT
Start: 2021-06-29 | End: 2021-06-29 | Stop reason: ALTCHOICE

## 2021-06-29 RX ORDER — BUPIVACAINE HYDROCHLORIDE 5 MG/ML
INJECTION, SOLUTION PERINEURAL PRN
Status: DISCONTINUED | OUTPATIENT
Start: 2021-06-29 | End: 2021-06-29 | Stop reason: ALTCHOICE

## 2021-06-29 RX ORDER — FENTANYL CITRATE 50 UG/ML
INJECTION, SOLUTION INTRAMUSCULAR; INTRAVENOUS PRN
Status: DISCONTINUED | OUTPATIENT
Start: 2021-06-29 | End: 2021-06-29 | Stop reason: ALTCHOICE

## 2021-06-29 RX ORDER — MIDAZOLAM HYDROCHLORIDE 1 MG/ML
INJECTION INTRAMUSCULAR; INTRAVENOUS PRN
Status: DISCONTINUED | OUTPATIENT
Start: 2021-06-29 | End: 2021-06-29 | Stop reason: ALTCHOICE

## 2021-06-29 ASSESSMENT — PAIN - FUNCTIONAL ASSESSMENT: PAIN_FUNCTIONAL_ASSESSMENT: 0-10

## 2021-06-29 NOTE — PRE SEDATION
[facility]  Pre-Sedation/Analgesia History & Physical    Pt Name: Demian Lu  MRN: [id number]  YOB: 1960  Provider Performing Procedure: Tata Arenas DO   Primary Care Physician: Jarett Davis MD      MEDICAL HISTORY       has a past medical history of Adrenal abnormality ([facility] [address] Angina at rest [facility]), Anxiety, Arthritis, Asthma, Cerebral artery occlusion with cerebral infarction [facility]), Chronic sinusitis, Cognitive impairment, Headache, History of degenerative disc disease, Hyperlipidemia, Lymphedema, May-Thurner syndrome, Peripheral vascular disease ([facility] [address] PONV (postoperative nausea and vomiting), Positive BJ (antinuclear antibody), and PTSD (post-traumatic stress disorder). SURGICAL HISTORY   has a past surgical history that includes Hysterectomy; Tubal ligation; shoulder surgery; Tonsillectomy; sinus surgery; Breast biopsy; shoulder surgery (Left); Neck surgery (12/2020); and Septoplasty (Right, 6/14/2021). ALLERGIES   Allergies as of 06/28/2021 - Fully Reviewed 06/28/2021   Allergen Reaction Noted    Amoxil [amoxicillin]  11/22/2020    Biaxin [clarithromycin] Swelling 05/27/2021    Doxycycline  11/22/2020    Morphine Nausea And Vomiting 07/31/2020    Sulfa antibiotics Rash 07/31/2020       MEDICATIONS   Prior to Admission medications    Medication Sig Start Date End Date Taking?  Authorizing Provider   traZODone (DESYREL) 150 MG tablet Take 1.5 tablets by mouth nightly 6/28/21  Yes Satya Martinez MD   buPROPion (WELLBUTRIN XL) 150 MG extended release tablet Take 1 tablet by mouth every morning Take with 300 mg tablet for total 450 mg once daily in the morning 6/28/21  Yes Satya Martinez MD   buPROPion (WELLBUTRIN XL) 300 MG extended release tablet TAKE 1 TABLET BY MOUTH EVERY DAY IN THE MORNING 6/28/21  Yes Satya Martinez MD   pseudoephedrine (DECONGESTANT) 30 MG tablet Take 1 tablet by mouth every 6 hours as needed for Congestion 6/15/21 6/29/21 Yes [facility]el [facility] [place] [facility], APRN - CNP   clindamycin (CLEOCIN) 300 MG capsule Take 1 capsule by mouth 3 times daily for 14 days 6/15/21 6/29/21 Yes ESTEFANIA San CNP   Erenumab-aooe (AIMOVIG) 140 MG/ML SOAJ Inject 140 mg into the skin every 30 days 6/9/21  Yes ESTEFANIA Stock CNP   butalbital-acetaminophen-caffeine (FIORICET, ESGIC) -33 MG per tablet Take 1 tablet by mouth every 4 hours as needed for Headaches 6/9/21  Yes Asher Began, MD   esomeprazole (LIANAI) 40 MG delayed release capsule Take 1 capsule by mouth every morning (before breakfast) 5/17/21  Yes Asher Bhandari MD   pramipexole (MIRAPEX) 0.125 MG tablet Take 2 tablets by mouth 2 times daily 5/17/21  Yes Asher Bhandari MD   topiramate (TOPAMAX) 100 MG tablet TAKE 1 TABLET BY MOUTH TWICE A DAY 5/4/21  Yes ESTEFANIA Stock CNP   memantine (NAMENDA) 5 MG tablet Take 5 mg by mouth daily    Yes Historical Provider, MD   cyclobenzaprine (FLEXERIL) 10 MG tablet 2 times daily as needed  9/16/20  Yes Historical Provider, MD   Onabotulinumtoxin A (BOTOX, COSMETIC,) 200 units injection Botulinum Toxin Type A Botox 200 unit injection recon soln monthly for migraines   [facility] ([id number])   Yes Historical Provider, MD   Ubrogepant (UBRELVY) 100 MG TABS Take 100 mg by mouth as needed   Yes Historical Provider, MD   hydroxychloroquine (PLAQUENIL) 200 MG tablet Take 200 mg by mouth daily    Yes Historical Provider, MD   ondansetron (ZOFRAN) 4 MG tablet Take 1 tablet by mouth 3 times daily as needed for Nausea or Vomiting 7/31/20  Yes Vika Haider MD   LORazepam (ATIVAN) 0.5 MG tablet Take 1 tablet by mouth daily as needed for Anxiety for up to 30 days.  6/28/21 7/28/21  Jessica Cottrell MD   clotrimazole-betamethasone (LOTRISONE) 1-0.05 % cream Apply topically as needed  9/16/20   Historical Provider, MD   furosemide (LASIX) 20 MG tablet as needed  9/16/20   Historical Provider, MD   potassium chloride (KLOR-CON) 10 MEQ extended release tablet as needed  9/16/20   Historical Provider, MD   nitroglycerin (NITROSTAT) 0.6 MG SL tablet Place 0.6 mg under the tongue every 5 minutes as needed for Chest pain up to max of 3 total doses. If no relief after 1 dose, call 911. Historical Provider, MD     PHYSICAL:   Vitals:    06/29/21 1157   BP: (!) 89/50   Pulse: 60   Resp: 16   Temp: 98.5 °F (36.9 °C)   SpO2: 98%     PLANNED PROCEDURE   See procedure note  SEDATION  Planned agent: Versed and Fentanyl  ASA Classification: 1  Class 1: A normal healthy patient  Class 2: Pt with mild to moderate systemic disease  Class 3: Severe systemic disease or disturbance  Class 4: Severe systemic disorders that are already life threatening. Class 5: Moribund pt with little chances of survival, for more than 24 hours. Mallampati I Airway Classification: 1    1. Pre-procedure diagnostic studies complete and results available. 2. Previous sedation/anesthesia experiences assessed. 3. The patient is an appropriate candidate to undergo the planned procedure sedation and anesthesia. (Refer to nursing sedation/analgesia documentation record)  4. Formulation and discussion of sedation/procedure plan, risks, and expectations with patient and/or responsible adult completed. 5. Patient examined immediately prior to the procedure.  (Refer to nursing sedation/analgesia documentation record)    Florentino Ellison DO  Electronically signed 6/29/2021 at 12:53 PM

## 2021-06-29 NOTE — PROGRESS NOTES
1155: Patient arrived to Phase II Recovery via cart. Awake and alert. Report received from Abena Espion, [address] [place] [address] Rd: VSS, patient denies pain at this time. HOB elevated and snack and drink provided. Call light placed within reach. 1215: IV removed without complications. Band aid applied to site. Patient sat edge of bed without difficulty. Dressed independently in bed. 1240: Patient discharged home in stable condition with friend.

## 2021-06-29 NOTE — PROCEDURES
Pre-operative Diagnosis: Bilateral lumbar facet pain     Post-operative Diagnosis: Bilateral lumbar facet pain     Procedure: Bilateral lumbar medial branch blocks targeting facet joints of L4/L5 and L5/S1     Procedure Description:  After consent was obtained, the patient was placed in the prone position having pressure points appropriately padded. The lower back was prepped with chloraprep and draped in a sterile fashion. 0.5 cc of 1 % lidocaine was used for local anesthesia of the skin and superficial subcutaneous tissues. Three 22-gauge 3.5-inch needles were advanced under fluoroscopy in an AP view with caudad angulation: one at the sacral ala and two at the junction of the right superior articular process and the transverse process of the L4 and L5 vertebrae. There was no paresthesias, heme, or CSF obtained. Needle placement was confirmed using AP and oblique views. Then 0.5 cc of 0.5% bupivacaine was injected at the site without complications. The procedure was repeated at L4, L5, and sacral ala on the left side. The patient tolerated the procedure well, transported to the recovery room, and observed for 15 minutes prior to being discharged in ambulatory fashion.      Procedural Complications: None      IV sedation was used during the procedure:  - Moderate intravenous conscious sedation was supervised by Dr. Melisa Rajput  - The patient was independently monitored by a Registered Nurse assigned to the procedure room  - Monitoring included automated blood pressure, continuous EKG, and continuous pulse oximetry  - The detailed conscious record is permanently stored in the [place] [id number]  - The following is the conscious sedation record:  Start Time: 11:43  End Time : 11:59  Duration: 16 minutes   Medications Administered: 1 mg Versed, 50 mcg Fentanyl     Leandro Powell DO  Interventional Pain Management/PM&R   [facility]

## 2021-07-01 ENCOUNTER — TELEPHONE (OUTPATIENT)
Dept: FAMILY MEDICINE CLINIC | Age: 61
End: 2021-07-01

## 2021-07-01 ENCOUNTER — OFFICE VISIT (OUTPATIENT)
Dept: ENT CLINIC | Age: 61
End: 2021-07-01

## 2021-07-01 VITALS
TEMPERATURE: 97.2 F | WEIGHT: 144.8 LBS | HEART RATE: 69 BPM | HEIGHT: 65 IN | DIASTOLIC BLOOD PRESSURE: 77 MMHG | SYSTOLIC BLOOD PRESSURE: 116 MMHG | BODY MASS INDEX: 24.12 KG/M2

## 2021-07-01 DIAGNOSIS — J34.3 HYPERTROPHY OF INFERIOR NASAL TURBINATE: ICD-10-CM

## 2021-07-01 DIAGNOSIS — R51.9 RHINOGENIC HEADACHE: ICD-10-CM

## 2021-07-01 DIAGNOSIS — J34.89 CONCHA BULLOSA: ICD-10-CM

## 2021-07-01 DIAGNOSIS — I87.1 MAY-THURNER SYNDROME: ICD-10-CM

## 2021-07-01 DIAGNOSIS — J34.2 DEVIATED NASAL SEPTUM: Primary | ICD-10-CM

## 2021-07-01 DIAGNOSIS — Z98.890 STATUS POST NASAL SEPTOPLASTY: ICD-10-CM

## 2021-07-01 DIAGNOSIS — B37.9 YEAST INFECTION: Primary | ICD-10-CM

## 2021-07-01 PROCEDURE — 99024 POSTOP FOLLOW-UP VISIT: CPT | Performed by: OTOLARYNGOLOGY

## 2021-07-01 RX ORDER — FLUCONAZOLE 150 MG/1
150 TABLET ORAL ONCE
Qty: 1 TABLET | Refills: 0 | Status: SHIPPED | OUTPATIENT
Start: 2021-07-01 | End: 2021-07-09 | Stop reason: SDUPTHER

## 2021-07-01 NOTE — TELEPHONE ENCOUNTER
Pt called to request Diflucan for a yeast infection that's starting up. Uses [facility].     DOLV:6/22/21

## 2021-07-08 DIAGNOSIS — B37.9 YEAST INFECTION: ICD-10-CM

## 2021-07-08 NOTE — TELEPHONE ENCOUNTER
Patient requesting another dose or 2 of medication to treat yeast infection. Patient stated she usually has to take a few doses to get rid of infection.      LOV: 6/22/2021

## 2021-07-09 ENCOUNTER — OFFICE VISIT (OUTPATIENT)
Dept: PHYSICAL MEDICINE AND REHAB | Age: 61
End: 2021-07-09
Payer: MEDICARE

## 2021-07-09 VITALS
HEIGHT: 65 IN | SYSTOLIC BLOOD PRESSURE: 84 MMHG | WEIGHT: 144 LBS | DIASTOLIC BLOOD PRESSURE: 62 MMHG | BODY MASS INDEX: 23.99 KG/M2

## 2021-07-09 DIAGNOSIS — M79.18 MYOFASCIAL PAIN: ICD-10-CM

## 2021-07-09 DIAGNOSIS — M54.59 LUMBAR FACET JOINT PAIN: Primary | ICD-10-CM

## 2021-07-09 DIAGNOSIS — M47.816 LUMBAR SPONDYLOSIS: ICD-10-CM

## 2021-07-09 DIAGNOSIS — G89.29 OTHER CHRONIC PAIN: ICD-10-CM

## 2021-07-09 PROCEDURE — 1036F TOBACCO NON-USER: CPT | Performed by: ANESTHESIOLOGY

## 2021-07-09 PROCEDURE — G8427 DOCREV CUR MEDS BY ELIG CLIN: HCPCS | Performed by: ANESTHESIOLOGY

## 2021-07-09 PROCEDURE — 3017F COLORECTAL CA SCREEN DOC REV: CPT | Performed by: ANESTHESIOLOGY

## 2021-07-09 PROCEDURE — 99213 OFFICE O/P EST LOW 20 MIN: CPT | Performed by: ANESTHESIOLOGY

## 2021-07-09 PROCEDURE — G8420 CALC BMI NORM PARAMETERS: HCPCS | Performed by: ANESTHESIOLOGY

## 2021-07-09 RX ORDER — FLUCONAZOLE 150 MG/1
150 TABLET ORAL ONCE
Qty: 1 TABLET | Refills: 0 | Status: SHIPPED | OUTPATIENT
Start: 2021-07-09 | End: 2021-07-09

## 2021-07-09 NOTE — PROGRESS NOTES
Chronic Pain/PM&R Clinic Note     Encounter Date: 7/9/21    Subjective:   Chief Complaint:   Chief Complaint   Patient presents with    Follow Up After Procedure       History of Present Illness:   Philomena Malhotra is a 62 y.o. female seen in the office initially on 03/05/21 for her management of migraines. She has medical history of previous TIA with residual cognitive deficits, and ACDF C3-C7 with Dr Meryl Persaud. She has longstanding history of migraine headaches as well as of the last 35 years. She describes 2 different types of migraine headaches. Her standard migraine headaches she reports started on the right side the neck and wrap around the entire side of the front of the head. She has associated phonophobia and photophobia. She also notices these migraines to begin with an aura where her nose starts to burn. She has associated nausea/vomiting when the migraines are severe. Her migraines last greater than 4 hours in duration interfering everyday activities. In addition she reports greater than 15 migraine attacks last month. She has failed multiple medications in the past including Fioricet, Ubrelvy, nortriptyline, and is currently on Aimovig and Topamax. 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 In addition, she does have axial low back pain that contributes to a lot of her debilitating pain. She denies any radiation down the legs, focal leg weakness, leg paresthesias, saddle anesthesia, bowel/bladder incontinence. Today, 7/10/2021, patient presents for planned follow-up for chronic low back pain. She underwent diagnostic lumbar medial branch blocks on 6/29/2021. She reports 100% pain relief in her low back for 12 hours after the procedure.     Past Medical History:   Diagnosis Date    Adrenal abnormality (HCC)     Angina at rest [facility])     MICROVASCULAR CARDIAC DISEASE    Anxiety     Arthritis     Asthma     Cerebral artery occlusion with cerebral infarction ([facility] [address]     Chronic sinusitis     Cognitive impairment     with retograde amnesia    Headache     Migraines Hemiplegic    History of degenerative disc disease     Hyperlipidemia     Lymphedema     Left leg    May-Thurner syndrome     Peripheral vascular disease (HCC)     PONV (postoperative nausea and vomiting)     Positive BJ (antinuclear antibody)     PTSD (post-traumatic stress disorder)        Past Surgical History:   Procedure Laterality Date    BREAST BIOPSY      HYSTERECTOMY      NECK SURGERY  2020    ACDF    PAIN MANAGEMENT PROCEDURE Bilateral 2021    medial branch blocks at bilateral L4/L5 and L5/S1 performed by Olga Simmons DO at [address] [facility] SEPTOPLASTY Right 2021    SEPTOPLASTY, SUBMUCOUS RESECTION TURBINATE, RIGHT PARTIAL INFERIOR, NASAL ENDOSCOPY WITH PARTIAL RESECTION OF RIGHT MIDDLE JHON BULLOSA performed by Rebel Calvo MD at [address] Left     SINUS SURGERY      TONSILLECTOMY      TUBAL LIGATION         Family History   Adopted: Yes   Problem Relation Age of Onset    Cancer Father         Stomach Cancer    Cancer Paternal Uncle         Colon Cancer    Stroke Maternal Grandmother     Rheum Arthritis Maternal Grandmother        Social History     Socioeconomic History    Marital status:      Spouse name: Not on file    Number of children: Not on file    Years of education: Not on file    Highest education level: Not on file   Occupational History    Not on file   Tobacco Use    Smoking status: Former Smoker     Packs/day: 1.00     Years: 12.00     Pack years: 12.00     Types: Cigarettes     Quit date: 2019     Years since quittin.5    Smokeless tobacco: Never Used   Vaping Use    Vaping Use: Never used   Substance and Sexual Activity    Alcohol use: Not Currently     Comment: stopped in 2020 due to fatty liver    Drug use: Never    Sexual activity: Never Other Topics Concern    Not on file   Social History Narrative    Not on file     Social Determinants of Health     Financial Resource Strain: Low Risk     Difficulty of Paying Living Expenses: Not hard at all   Food Insecurity: No Food Insecurity    Worried About Running Out of Food in the Last Year: Never true    [address] in the Last Year: Never true   Transportation Needs:     Lack of Transportation (Medical):      Lack of Transportation (Non-Medical):    Physical Activity:     Days of Exercise per Week:     Minutes of Exercise per Session:    Stress:     Feeling of Stress :    Social Connections:     Frequency of Communication with Friends and Family:     Frequency of Social Gatherings with Friends and Family:     Attends Sabianism Services:     Active Member of Clubs or Organizations:     Attends Club or Organization Meetings:     Marital Status:    Intimate Partner Violence:     Fear of Current or Ex-Partner:     Emotionally Abused:     Physically Abused:     Sexually Abused:        Medications & Allergies:   Current Outpatient Medications   Medication Instructions    Aimovig 140 mg, Subcutaneous, EVERY 30 DAYS    buPROPion (WELLBUTRIN XL) 300 MG extended release tablet TAKE 1 TABLET BY MOUTH EVERY DAY IN THE MORNING    buPROPion (WELLBUTRIN XL) 150 mg, Oral, EVERY MORNING, Take with 300 mg tablet for total 450 mg once daily in the morning    butalbital-acetaminophen-caffeine (FIORICET, ESGIC) -40 MG per tablet 1 tablet, Oral, EVERY 4 HOURS PRN    clotrimazole-betamethasone (LOTRISONE) 1-0.05 % cream Topical, PRN    cyclobenzaprine (FLEXERIL) 10 MG tablet 2 TIMES DAILY PRN    esomeprazole (NEXIUM) 40 mg, Oral, DAILY BEFORE BREAKFAST    fluconazole (DIFLUCAN) 150 mg, Oral, ONCE    furosemide (LASIX) 20 MG tablet PRN    hydroxychloroquine (PLAQUENIL) 200 mg, Oral, DAILY    LORazepam (ATIVAN) 0.5 mg, Oral, DAILY PRN    memantine (NAMENDA) 5 mg, Oral, DAILY    nitroglycerin (NITROSTAT) 0.6 mg, Sublingual, EVERY 5 MIN PRN, up to max of 3 total doses. If no relief after 1 dose, call 911.  Onabotulinumtoxin A (BOTOX, COSMETIC,) 200 units injection Botulinum Toxin Type A Botox 200 unit injection recon soln monthly for migraines   [facility] ([id number])    ondansetron (ZOFRAN) 4 mg, Oral, 3 TIMES DAILY PRN    potassium chloride (KLOR-CON) 10 MEQ extended release tablet PRN    pramipexole (MIRAPEX) 0.25 mg, Oral, 2 TIMES DAILY    topiramate (TOPAMAX) 100 MG tablet TAKE 1 TABLET BY MOUTH TWICE A DAY    traZODone (DESYREL) 225 mg, Oral, NIGHTLY    Ubrelvy 100 mg, Oral, PRN       Allergies   Allergen Reactions    Amoxil [Amoxicillin]      rash    Biaxin [Clarithromycin] Swelling     Lips swelled    Doxycycline      rash    Morphine Nausea And Vomiting    Sulfa Antibiotics Rash       Review of Systems:   Constitutional: negative for fevers  Musculoskeletal: Positive for low back pain  Neurological: positive for migraines but negative for leg numbness/tingling or weakness  Behavioral/Psych: negative for anxiety/depression   All other systems reviewed and are negative    Objective:     Vitals:    07/09/21 1202   BP: 84/62       Constitutional: Pleasant, no acute distress   Head: Normocephalic, atraumatic   Eyes: Conjunctivae normal   Neck: Supple, symmetrical   Respiration: Non-labored breathing   Cardiovascular: Limbs warm and well perfused   Musculoskeletal:   Lumbar -ROM reduced in extension. Increased pain with facet loading bilaterally. Tenderness palpation along bilateral lumbar paraspinals. Neuro: Alert, oriented. CN II-XII appear grossly intact. No focal motor deficits appreciated in lower limbs. Skin: no skin rashes or lesions visible on face  Psychological: Cooperative, no exaggerated pain behaviors       Assessment:    Diagnosis Orders   1. Lumbar facet joint pain     2. Lumbar spondylosis     3. Myofascial pain     4.  Other chronic pain           Teresa Fulton is a 62 y. o.female presenting to the pain clinic for evaluation of migraines. She underwent Botox injections for management of migraine headaches today. In addition, she has lumbar facet mediated pain. I have set her up for diagnostic lumbar medial branch blocks targeting the facet joints of L4/L5 and L5/S1. She responded significantly to her diagnostic medial branch blocks. I set her up for confirmatory blocks at these levels. Plan: The following treatment recommendations and plan were discussed in detail with Teresa Fulton. Interventions:  Low Back Pain  In presence of lumbar axial back pain and with physical exam consistent for facetal pain and significant response to diagnostic medial branch blocks, confirmatory medial branch blocks at bilateral L4/L5 and L5/S1 was chosen. The risks and benefits were discussed in detail with the patient. Patient wants to proceed with the injection. Anticoagulation/NPO Recommendations:   Patient does not need to hold any medications prior to the procedure. Patient will need to be NPO x 8 hours prior to the procedure.   We will start an IV prior to the procedure      Follow-up: For confirmatory lumbar MBBs      Leandro Powell DO  Interventional Pain Management/PM&R   [place]

## 2021-07-12 ENCOUNTER — TELEPHONE (OUTPATIENT)
Dept: PHYSICAL MEDICINE AND REHAB | Age: 61
End: 2021-07-12

## 2021-07-12 ENCOUNTER — NURSE ONLY (OUTPATIENT)
Dept: LAB | Age: 61
End: 2021-07-12

## 2021-07-12 ENCOUNTER — OFFICE VISIT (OUTPATIENT)
Dept: FAMILY MEDICINE CLINIC | Age: 61
End: 2021-07-12
Payer: MEDICARE

## 2021-07-12 VITALS
HEART RATE: 79 BPM | HEIGHT: 65 IN | WEIGHT: 142.2 LBS | RESPIRATION RATE: 16 BRPM | SYSTOLIC BLOOD PRESSURE: 122 MMHG | DIASTOLIC BLOOD PRESSURE: 70 MMHG | TEMPERATURE: 97.9 F | OXYGEN SATURATION: 97 % | BODY MASS INDEX: 23.69 KG/M2

## 2021-07-12 DIAGNOSIS — K76.0 FATTY LIVER DISEASE, NONALCOHOLIC: ICD-10-CM

## 2021-07-12 DIAGNOSIS — E78.5 HYPERLIPIDEMIA, UNSPECIFIED HYPERLIPIDEMIA TYPE: ICD-10-CM

## 2021-07-12 DIAGNOSIS — R53.83 FATIGUE, UNSPECIFIED TYPE: Primary | ICD-10-CM

## 2021-07-12 DIAGNOSIS — R30.0 DYSURIA: ICD-10-CM

## 2021-07-12 DIAGNOSIS — R53.83 FATIGUE, UNSPECIFIED TYPE: ICD-10-CM

## 2021-07-12 LAB
ALBUMIN SERPL-MCNC: 4.6 G/DL (ref 3.5–5.1)
ALP BLD-CCNC: 95 U/L (ref 38–126)
ALT SERPL-CCNC: 22 U/L (ref 11–66)
ANION GAP SERPL CALCULATED.3IONS-SCNC: 13 MEQ/L (ref 8–16)
AST SERPL-CCNC: 21 U/L (ref 5–40)
BASOPHILS # BLD: 0.8 %
BASOPHILS ABSOLUTE: 0.1 THOU/MM3 (ref 0–0.1)
BILIRUB SERPL-MCNC: < 0.2 MG/DL (ref 0.3–1.2)
BILIRUBIN URINE: ABNORMAL
BLOOD URINE, POC: ABNORMAL
BUN BLDV-MCNC: 19 MG/DL (ref 7–22)
CALCIUM SERPL-MCNC: 9.8 MG/DL (ref 8.5–10.5)
CHARACTER, URINE: CLEAR
CHLORIDE BLD-SCNC: 101 MEQ/L (ref 98–111)
CO2: 25 MEQ/L (ref 23–33)
COLOR, URINE: YELLOW
CREAT SERPL-MCNC: 0.8 MG/DL (ref 0.4–1.2)
EOSINOPHIL # BLD: 2.5 %
EOSINOPHILS ABSOLUTE: 0.2 THOU/MM3 (ref 0–0.4)
ERYTHROCYTE [DISTWIDTH] IN BLOOD BY AUTOMATED COUNT: 13.6 % (ref 11.5–14.5)
ERYTHROCYTE [DISTWIDTH] IN BLOOD BY AUTOMATED COUNT: 46.6 FL (ref 35–45)
GFR SERPL CREATININE-BSD FRML MDRD: 73 ML/MIN/1.73M2
GLUCOSE BLD-MCNC: 98 MG/DL (ref 70–108)
GLUCOSE URINE: NEGATIVE MG/DL
HCT VFR BLD CALC: 41.9 % (ref 37–47)
HEMOGLOBIN: 13.1 GM/DL (ref 12–16)
IMMATURE GRANS (ABS): 0.01 THOU/MM3 (ref 0–0.07)
IMMATURE GRANULOCYTES: 0.1 %
KETONES, URINE: NEGATIVE
LEUKOCYTE CLUMPS, URINE: ABNORMAL
LYMPHOCYTES # BLD: 33.3 %
LYMPHOCYTES ABSOLUTE: 2.4 THOU/MM3 (ref 1–4.8)
MCH RBC QN AUTO: 28.9 PG (ref 26–33)
MCHC RBC AUTO-ENTMCNC: 31.3 GM/DL (ref 32.2–35.5)
MCV RBC AUTO: 92.5 FL (ref 81–99)
MONOCYTES # BLD: 6.7 %
MONOCYTES ABSOLUTE: 0.5 THOU/MM3 (ref 0.4–1.3)
NITRITE, URINE: NEGATIVE
NUCLEATED RED BLOOD CELLS: 0 /100 WBC
PH, URINE: 6.5 (ref 5–9)
PLATELET # BLD: 223 THOU/MM3 (ref 130–400)
PMV BLD AUTO: 12.9 FL (ref 9.4–12.4)
POTASSIUM SERPL-SCNC: 4.2 MEQ/L (ref 3.5–5.2)
PROTEIN, URINE: ABNORMAL MG/DL
RBC # BLD: 4.53 MILL/MM3 (ref 4.2–5.4)
SEG NEUTROPHILS: 56.6 %
SEGMENTED NEUTROPHILS ABSOLUTE COUNT: 4 THOU/MM3 (ref 1.8–7.7)
SODIUM BLD-SCNC: 139 MEQ/L (ref 135–145)
SPECIFIC GRAVITY, URINE: 1.02 (ref 1–1.03)
TOTAL PROTEIN: 7 G/DL (ref 6.1–8)
TSH SERPL DL<=0.05 MIU/L-ACNC: 0.92 UIU/ML (ref 0.4–4.2)
UROBILINOGEN, URINE: 0.2 EU/DL (ref 0–1)
WBC # BLD: 7.1 THOU/MM3 (ref 4.8–10.8)

## 2021-07-12 PROCEDURE — 1036F TOBACCO NON-USER: CPT | Performed by: STUDENT IN AN ORGANIZED HEALTH CARE EDUCATION/TRAINING PROGRAM

## 2021-07-12 PROCEDURE — 3017F COLORECTAL CA SCREEN DOC REV: CPT | Performed by: STUDENT IN AN ORGANIZED HEALTH CARE EDUCATION/TRAINING PROGRAM

## 2021-07-12 PROCEDURE — G8420 CALC BMI NORM PARAMETERS: HCPCS | Performed by: STUDENT IN AN ORGANIZED HEALTH CARE EDUCATION/TRAINING PROGRAM

## 2021-07-12 PROCEDURE — 99214 OFFICE O/P EST MOD 30 MIN: CPT | Performed by: STUDENT IN AN ORGANIZED HEALTH CARE EDUCATION/TRAINING PROGRAM

## 2021-07-12 PROCEDURE — G8427 DOCREV CUR MEDS BY ELIG CLIN: HCPCS | Performed by: STUDENT IN AN ORGANIZED HEALTH CARE EDUCATION/TRAINING PROGRAM

## 2021-07-12 ASSESSMENT — ENCOUNTER SYMPTOMS
BACK PAIN: 1
NAUSEA: 0
RHINORRHEA: 0
CONSTIPATION: 0
ABDOMINAL PAIN: 0
SHORTNESS OF BREATH: 1
VOMITING: 0
SORE THROAT: 0
COUGH: 0
DIARRHEA: 0
PHOTOPHOBIA: 1

## 2021-07-12 NOTE — PATIENT INSTRUCTIONS
Thank you   1. Thank you for trusting us with your healthcare needs. You may receive a survey regarding today's visit. It would help us out if you would take a few moments to provide your feedback. We value your input. 2. Please bring in ALL medications BOTTLES, including inhalers, herbal supplements, over the counter, prescribed & non-prescribed medicine. The office would like actual medication bottles and a list.   3. Please note our OFFICE POLICIES:   a. Prior to getting your labs drawn, please check with your insurance company for benefits and eligibility of lab services. Often, insurance companies cover certain tests for preventative visits only. It is patient's responsibility to see what is covered. b. We are unable to change a diagnosis after the test has been performed. c. Lab orders will not be re-printed. Please hold onto your original lab orders and take them to your lab to be completed. d. If you no show your scheduled appointment three times, you will be dismissed from this practice. e. [facility] must be completed 24 hours prior to your schedule appointment. 4. If the list below has been completed, PLEASE FAX RECORDS TO OUR OFFICE @ [phone].  Once the records have been received we will update your records at our office:  Health Maintenance Due   Topic Date Due    COVID-19 Vaccine (1) Never done    DTaP/Tdap/Td vaccine (1 - Tdap) Never done    Cervical cancer screen  Never done    Colon cancer screen colonoscopy  Never done    Shingles Vaccine (1 of 2) Never done   [employer] Visit (AWV)  Never done

## 2021-07-12 NOTE — PROGRESS NOTES
[id number] [facility] [address] [facility] [address]  Dept: [phone]  Loc: [phone]     Norma Murphy is a 62 y.o. female who presents today for:  Chief Complaint   Patient presents with   [employer]     concerned of anemia due to feeling weak and also sadness due to service dog had to be put to sleep 1.5 weeks ago also dysuria and urinary frequency       Goals    None         HPI:     HPI   81-year-old female here for sick visit. States \"I just do not feel well today. \" Reports suffering a lot of grief due to the unexpected death of her dog recently. Had him for 9 years and he got sick and had to be put down. Already suffers from underlying PTSD and depression/anxiety - follows with Dr. Bridget Clancy. Fatigue: States she just has had low appetite with nausea, short of breath and rundown. Continues to lose weight. She is not sleeping well. She is tired all the time. Recently had surgery so enquiring if could have some anemia. Did recently spend a long time outside during the heat wave and got very sweaty, dizzy and wonders if she had heat exhaustion. Checked her blood pressure after and it was 200/110 (typically has low BP). Does have history of electrolyte imbalance and also wonders if electrolytes are off. Just had Botox in head recently - has bad migraine right now for last 2 days with photophobia. Also feeling like might have UTI due to dysuria and increased urinary frequency.        Current Outpatient Medications   Medication Sig Dispense Refill    traZODone (DESYREL) 150 MG tablet Take 1.5 tablets by mouth nightly 45 tablet 2    buPROPion (WELLBUTRIN XL) 150 MG extended release tablet Take 1 tablet by mouth every morning Take with 300 mg tablet for total 450 mg once daily in the morning 90 tablet 0    buPROPion (WELLBUTRIN XL) 300 MG extended release tablet TAKE 1 TABLET BY MOUTH EVERY DAY IN THE MORNING 90 tablet 0    LORazepam (ATIVAN) 0.5 MG tablet Take 1 tablet by mouth daily as needed for Anxiety for up to 30 days. 30 tablet 0    Erenumab-aooe (AIMOVIG) 140 MG/ML SOAJ Inject 140 mg into the skin every 30 days 1 pen 5    butalbital-acetaminophen-caffeine (FIORICET, ESGIC) -40 MG per tablet Take 1 tablet by mouth every 4 hours as needed for Headaches 180 tablet 1    esomeprazole (NEXIUM) 40 MG delayed release capsule Take 1 capsule by mouth every morning (before breakfast) 90 capsule 1    pramipexole (MIRAPEX) 0.125 MG tablet Take 2 tablets by mouth 2 times daily 120 tablet 3    topiramate (TOPAMAX) 100 MG tablet TAKE 1 TABLET BY MOUTH TWICE A DAY (Patient taking differently: Take 100 mg by mouth daily ) 180 tablet 1    memantine (NAMENDA) 5 MG tablet Take 5 mg by mouth daily       clotrimazole-betamethasone (LOTRISONE) 1-0.05 % cream Apply topically as needed       cyclobenzaprine (FLEXERIL) 10 MG tablet 2 times daily as needed       furosemide (LASIX) 20 MG tablet as needed       potassium chloride (KLOR-CON) 10 MEQ extended release tablet as needed       Onabotulinumtoxin A (BOTOX, COSMETIC,) 200 units injection 200 Units every 3 months       Ubrogepant (UBRELVY) 100 MG TABS Take 100 mg by mouth as needed      hydroxychloroquine (PLAQUENIL) 200 MG tablet Take 200 mg by mouth 2 times daily       nitroglycerin (NITROSTAT) 0.6 MG SL tablet Place 0.6 mg under the tongue every 5 minutes as needed for Chest pain up to max of 3 total doses. If no relief after 1 dose, call 911.       ondansetron (ZOFRAN) 4 MG tablet Take 1 tablet by mouth 3 times daily as needed for Nausea or Vomiting 15 tablet 0     Current Facility-Administered Medications   Medication Dose Route Frequency Provider Last Rate Last Admin    onabotulinumtoxin A (BOTOX) injection 200 Units  200 Units Intramuscular Once Leandro Ambrose DO              Food Insecurity: No Food Insecurity    Worried About Running Out of Food in the Last Year: Never true    Ran Out of Food in the Last Year: Never true       Health Maintenance   Topic Date Due    COVID-19 Vaccine (1) Never done    DTaP/Tdap/Td vaccine (1 - Tdap) Never done    Cervical cancer screen  Never done    Colon cancer screen colonoscopy  Never done    Shingles Vaccine (1 of 2) Never done    Annual Wellness Visit (AWV)  Never done    Flu vaccine (1) 09/01/2021    Creatinine monitoring  03/30/2022    Potassium monitoring  06/14/2022    Breast cancer screen  10/12/2022    Lipid screen  04/15/2026    Hepatitis C screen  Completed    HIV screen  Completed    Hepatitis A vaccine  Aged Out    Hepatitis B vaccine  Aged Out    Hib vaccine  Aged Out    Meningococcal (ACWY) vaccine  Aged Out    Pneumococcal 0-64 years Vaccine  Aged Out       ROS:      Review of Systems   Constitutional: Positive for appetite change and fatigue. Negative for fever. HENT: Negative for congestion, rhinorrhea and sore throat. Eyes: Positive for photophobia. Negative for visual disturbance. Respiratory: Positive for shortness of breath. Negative for cough. Cardiovascular: Negative for chest pain, palpitations and leg swelling. Gastrointestinal: Negative for abdominal pain, constipation, diarrhea, nausea and vomiting. Genitourinary: Positive for dysuria and frequency. Negative for menstrual problem and urgency. Musculoskeletal: Positive for back pain. Negative for arthralgias. Skin: Negative for rash. Neurological: Positive for headaches. Negative for dizziness, weakness and numbness. Psychiatric/Behavioral: Positive for dysphoric mood. Negative for sleep disturbance. The patient is not nervous/anxious.           Objective:     Vitals:    07/12/21 1531   BP: 122/70   Site: Right Upper Arm   Position: Sitting   Cuff Size: Medium Adult   Pulse: 79   Resp: 16   Temp: 97.9 °F (36.6 °C)   TempSrc: Oral   SpO2: 97%   Weight: 142 lb 3.2 oz (64.5 kg)   Height: 5' 5\" (1.651 m)       Body mass index is 23.66 kg/m². Wt Readings from Last 3 Encounters:   07/12/21 142 lb 3.2 oz (64.5 kg)   07/09/21 144 lb (65.3 kg)   07/01/21 144 lb 12.8 oz (65.7 kg)     BP Readings from Last 3 Encounters:   07/12/21 122/70   07/09/21 84/62   07/01/21 116/77       Physical Exam  Vitals and nursing note reviewed. Constitutional:       General: She is not in acute distress. Appearance: Normal appearance. She is normal weight. She is ill-appearing. HENT:      Head: Normocephalic and atraumatic. Right Ear: External ear normal.      Left Ear: External ear normal.      Nose: Nose normal.      Mouth/Throat:      Mouth: Mucous membranes are moist.   Eyes:      Conjunctiva/sclera: Conjunctivae normal.   Cardiovascular:      Rate and Rhythm: Normal rate and regular rhythm. Pulses: Normal pulses. Heart sounds: Normal heart sounds. No murmur heard. Pulmonary:      Effort: Pulmonary effort is normal. No respiratory distress. Breath sounds: Normal breath sounds. No wheezing, rhonchi or rales. Abdominal:      General: Abdomen is flat. Bowel sounds are normal. There is no distension. Palpations: Abdomen is soft. Tenderness: There is no abdominal tenderness. Musculoskeletal:      Cervical back: Normal range of motion and neck supple. Right lower leg: No edema. Left lower leg: No edema. Skin:     General: Skin is warm and dry. Capillary Refill: Capillary refill takes less than 2 seconds. Findings: No rash. Neurological:      General: No focal deficit present. Mental Status: She is alert and oriented to person, place, and time. Mental status is at baseline. Psychiatric:         Attention and Perception: Attention normal.         Mood and Affect: Mood is depressed. Affect is flat. Speech: Speech normal.         Behavior: Behavior normal.         Assessment / Plan:     1.  Fatigue, unspecified type  - Possible differentials to consider include anemia given recent surgery, electrolyte abnormalities (typically runs low and was dehydrated recently), hypothyroidism vs. depression and grief. Will obtain labs as she has not had labs since March 2021. Will call with results. - CBC With Auto Differential; Future  - Comprehensive Metabolic Panel; Future  - TSH With Reflex Ft4; Future    2. Fatty liver disease, nonalcoholic  - Due for lipid panel July 15.   - Comprehensive Metabolic Panel; Future  - Lipid, Fasting; Future    3. Hyperlipidemia, unspecified hyperlipidemia type  - Due for lipid panel July 15.  - Lipid, Fasting; Future    4. Dysuria  - No UTI seen on UA.  - POCT Urinalysis No Micro (Auto)      Health Maintenance Due   Topic Date Due    COVID-19 Vaccine (1) Never done    DTaP/Tdap/Td vaccine (1 - Tdap) Never done    Cervical cancer screen  Never done    Colon cancer screen colonoscopy  Never done    Shingles Vaccine (1 of 2) Never done   [employer] Visit (AWV)  Never done           No follow-ups on file. Medications Prescribed:  No orders of the defined types were placed in this encounter. Future Appointments   Date Time Provider Bharat Veliz   7/13/2021  1:15 PM Trent Vang MD N ENT [facility] [place]   7/30/2021 11:15 AM Shiloh Stoddard MD N [facility] [place]   8/9/2021 11:00 AM Ephraim Douglass MD N [facility] Heart [facility] [place]   8/17/2021  1:40 PM Arden Valenzuela MD SR[facility] FM RES [facility] [place]   9/20/2021  2:00 PM Glory Tao MD [facility] [place]   9/30/2021  9:30 AM Leandro Gaspar DO N [facility] Pain [facility] [place]   10/7/2021 11:40 AM Daiana [place] DO N [facility] Rheum [facility] [place]   10/19/2021  1:00 PM Arden Valenzuela MD SRP[facility] FM RES [facility] [place]   11/22/2021 10:00 AM Jessenia Cuevas, PhD N [facility]l [facility]       Patient given educational materials - see patient instructions. Discussed use, benefit, and side effects of prescribed medications. All patient questions answered. Patient voiced understanding. Reviewed health maintenance.   Instructed to continue current medications, diet and exercise. Patient agreed with treatment plan. Follow up as directed.      Electronically signed by Conrad Bernstein MD on 7/12/2021 at 5:59 PM

## 2021-07-12 NOTE — PROGRESS NOTES
Health Maintenance Due   Topic Date Due    COVID-19 Vaccine (1) Never done    DTaP/Tdap/Td vaccine (1 - Tdap) Never done    Cervical cancer screen  Never done    Colon cancer screen colonoscopy  Never done    Shingles Vaccine (1 of 2) Never done   [employer] Visit (AWV)  Never done

## 2021-07-13 ENCOUNTER — TELEPHONE (OUTPATIENT)
Dept: FAMILY MEDICINE CLINIC | Age: 61
End: 2021-07-13

## 2021-07-13 ENCOUNTER — OFFICE VISIT (OUTPATIENT)
Dept: ENT CLINIC | Age: 61
End: 2021-07-13

## 2021-07-13 VITALS
RESPIRATION RATE: 14 BRPM | WEIGHT: 142.9 LBS | BODY MASS INDEX: 23.81 KG/M2 | HEIGHT: 65 IN | DIASTOLIC BLOOD PRESSURE: 64 MMHG | HEART RATE: 72 BPM | TEMPERATURE: 96.6 F | SYSTOLIC BLOOD PRESSURE: 110 MMHG

## 2021-07-13 DIAGNOSIS — J34.89 CONCHA BULLOSA: ICD-10-CM

## 2021-07-13 DIAGNOSIS — Z98.890 STATUS POST NASAL SEPTOPLASTY: ICD-10-CM

## 2021-07-13 DIAGNOSIS — E04.1 RIGHT THYROID NODULE: ICD-10-CM

## 2021-07-13 DIAGNOSIS — J34.3 HYPERTROPHY OF INFERIOR NASAL TURBINATE: ICD-10-CM

## 2021-07-13 DIAGNOSIS — J34.2 DEVIATED NASAL SEPTUM: Primary | ICD-10-CM

## 2021-07-13 PROCEDURE — G8420 CALC BMI NORM PARAMETERS: HCPCS | Performed by: OTOLARYNGOLOGY

## 2021-07-13 PROCEDURE — 1036F TOBACCO NON-USER: CPT | Performed by: OTOLARYNGOLOGY

## 2021-07-13 PROCEDURE — G8427 DOCREV CUR MEDS BY ELIG CLIN: HCPCS | Performed by: OTOLARYNGOLOGY

## 2021-07-13 PROCEDURE — 99024 POSTOP FOLLOW-UP VISIT: CPT | Performed by: OTOLARYNGOLOGY

## 2021-07-13 PROCEDURE — 3017F COLORECTAL CA SCREEN DOC REV: CPT | Performed by: OTOLARYNGOLOGY

## 2021-07-13 ASSESSMENT — ENCOUNTER SYMPTOMS
COLOR CHANGE: 0
DIARRHEA: 0
RHINORRHEA: 0
SHORTNESS OF BREATH: 0
NAUSEA: 0
TROUBLE SWALLOWING: 0
SORE THROAT: 0
CHOKING: 0
CHEST TIGHTNESS: 0
APNEA: 0
VOICE CHANGE: 0
WHEEZING: 0
SINUS PRESSURE: 0
FACIAL SWELLING: 0
VOMITING: 0
STRIDOR: 0
COUGH: 0
ABDOMINAL PAIN: 0

## 2021-07-13 NOTE — TELEPHONE ENCOUNTER
----- Message from Marla Denis MD sent at 7/12/2021  8:47 PM EDT -----  Ran Hwang, I reviewed your lab results this evening. They all appear normal - thyroid function is good, potassium is normal, liver function is perfect, and no signs of anemia. The metabolic panel did say your potassium might have been hemolyzed so might actually be a bit lower, so it's okay to take your potassium supplements. Let me know if the fatigue and feeling poorly does not improve over the next couple weeks.    Thanks,  Dr. Dru Graham

## 2021-07-13 NOTE — PROGRESS NOTES
[address] EAR, NOSE AND THROAT  [facility] [place]  Dept: [phone]  Dept Fax: [phone]  Loc: [phone]    Lew Riddle is a 62 y.o. female who was referred byNo ref. provider found for:  Chief Complaint   Patient presents with    Post-Op Check     Patient is here post-op septo,SMR,vinh 6/14/21 c/o headaches   . HPI:     Lew Riddle is a 62 y.o. female who presents today for follow-up on her nose surgery as well as her right thyroid nodule. Her nose is doing very well in terms of the airway. She is having a chronic headache that feels to her like a migraine including photophobia. It is across her frontal area. Her nose feels fine and she is breathing extremely well. She is very happy with the improved sleeping. Patient has a history of thyroid nodule in the right side that was last investigated a few years ago in [place]. She she had a TSH yesterday that was normal.  No other labs are available for her thyroid. Davis Cartagena History: Allergies   Allergen Reactions    Amoxil [Amoxicillin]      rash    Biaxin [Clarithromycin] Swelling     Lips swelled    Doxycycline      rash    Morphine Nausea And Vomiting    Sulfa Antibiotics Rash     Current Outpatient Medications   Medication Sig Dispense Refill    traZODone (DESYREL) 150 MG tablet Take 1.5 tablets by mouth nightly 45 tablet 2    buPROPion (WELLBUTRIN XL) 150 MG extended release tablet Take 1 tablet by mouth every morning Take with 300 mg tablet for total 450 mg once daily in the morning 90 tablet 0    buPROPion (WELLBUTRIN XL) 300 MG extended release tablet TAKE 1 TABLET BY MOUTH EVERY DAY IN THE MORNING 90 tablet 0    LORazepam (ATIVAN) 0.5 MG tablet Take 1 tablet by mouth daily as needed for Anxiety for up to 30 days.  30 tablet 0    Erenumab-aooe (AIMOVIG) 140 MG/ML SOAJ Inject 140 mg into the skin every 30 days 1 pen 5    butalbital-acetaminophen-caffeine (FIORICET, ESGIC) -40 MG per tablet Take 1 tablet by mouth every 4 hours as needed for Headaches 180 tablet 1    esomeprazole (NEXIUM) 40 MG delayed release capsule Take 1 capsule by mouth every morning (before breakfast) 90 capsule 1    pramipexole (MIRAPEX) 0.125 MG tablet Take 2 tablets by mouth 2 times daily 120 tablet 3    topiramate (TOPAMAX) 100 MG tablet TAKE 1 TABLET BY MOUTH TWICE A DAY (Patient taking differently: Take 100 mg by mouth daily ) 180 tablet 1    memantine (NAMENDA) 5 MG tablet Take 5 mg by mouth daily       clotrimazole-betamethasone (LOTRISONE) 1-0.05 % cream Apply topically as needed       cyclobenzaprine (FLEXERIL) 10 MG tablet 2 times daily as needed       furosemide (LASIX) 20 MG tablet as needed       potassium chloride (KLOR-CON) 10 MEQ extended release tablet as needed       Onabotulinumtoxin A (BOTOX, COSMETIC,) 200 units injection 200 Units every 3 months       Ubrogepant (UBRELVY) 100 MG TABS Take 100 mg by mouth as needed      hydroxychloroquine (PLAQUENIL) 200 MG tablet Take 200 mg by mouth 2 times daily       nitroglycerin (NITROSTAT) 0.6 MG SL tablet Place 0.6 mg under the tongue every 5 minutes as needed for Chest pain up to max of 3 total doses. If no relief after 1 dose, call 911.       ondansetron (ZOFRAN) 4 MG tablet Take 1 tablet by mouth 3 times daily as needed for Nausea or Vomiting 15 tablet 0     Current Facility-Administered Medications   Medication Dose Route Frequency Provider Last Rate Last Admin    onabotulinumtoxin A (BOTOX) injection 200 Units  200 Units Intramuscular Once Leandro Celester Dose, DO         Past Medical History:   Diagnosis Date    Adrenal abnormality (HCC)     Angina at rest [facility])     MICROVASCULAR CARDIAC DISEASE    Anxiety     Arthritis     Asthma     Cerebral artery occlusion with cerebral infarction ([facility] [address]     Chronic sinusitis     Cognitive impairment     with retograde amnesia    Headache     Migraines Hemiplegic    History of degenerative disc disease     Hyperlipidemia     Lymphedema     Left leg    May-Thurner syndrome     Peripheral vascular disease (HCC)     PONV (postoperative nausea and vomiting)     Positive BJ (antinuclear antibody)     PTSD (post-traumatic stress disorder)       Past Surgical History:   Procedure Laterality Date    BREAST BIOPSY      HYSTERECTOMY      NECK SURGERY  2020    ACDF    PAIN MANAGEMENT PROCEDURE Bilateral 2021    medial branch blocks at bilateral L4/L5 and L5/S1 performed by Stan Torres DO at [facility] SEPTOPLASTY Right 2021    SEPTOPLASTY, SUBMUCOUS RESECTION TURBINATE, RIGHT PARTIAL INFERIOR, NASAL ENDOSCOPY WITH PARTIAL RESECTION OF RIGHT MIDDLE JHON BULLOSA performed by Pato Ennis MD at [address] Left     SINUS SURGERY      TONSILLECTOMY      TUBAL LIGATION       Family History   Adopted: Yes   Problem Relation Age of Onset    Cancer Father         Stomach Cancer    Cancer Paternal Uncle         Colon Cancer    Stroke Maternal Grandmother     Rheum Arthritis Maternal Grandmother      Social History     Tobacco Use    Smoking status: Former Smoker     Packs/day: 1.00     Years: 12.00     Pack years: 12.00     Types: Cigarettes     Quit date: 2019     Years since quittin.5    Smokeless tobacco: Never Used   Substance Use Topics    Alcohol use: Not Currently     Comment: stopped in 2020 due to fatty liver       Subjective:      Review of Systems   Constitutional: Negative for activity change, appetite change, chills, diaphoresis, fatigue, fever and unexpected weight change. HENT: Negative for congestion, dental problem, ear discharge, ear pain, facial swelling, hearing loss, mouth sores, nosebleeds, postnasal drip, rhinorrhea, sinus pressure, sneezing, sore throat, tinnitus, trouble swallowing and voice change. Eyes: Negative for visual disturbance. Respiratory: Negative for apnea, cough, choking, chest tightness, shortness of breath, wheezing and stridor. Cardiovascular: Negative for chest pain, palpitations and leg swelling. Gastrointestinal: Negative for abdominal pain, diarrhea, nausea and vomiting. Endocrine: Negative for cold intolerance, heat intolerance, polydipsia and polyuria. Genitourinary: Negative for dysuria, enuresis and hematuria. Musculoskeletal: Negative for arthralgias, gait problem, neck pain and neck stiffness. Skin: Negative for color change and rash. Allergic/Immunologic: Negative for environmental allergies, food allergies and immunocompromised state. Neurological: Negative for dizziness, syncope, facial asymmetry, speech difficulty, light-headedness and headaches. Hematological: Negative for adenopathy. Does not bruise/bleed easily. Psychiatric/Behavioral: Negative for confusion and sleep disturbance. The patient is not nervous/anxious. Objective:   /64 (Site: Left Upper Arm, Position: Sitting)   Pulse 72   Temp 96.6 °F (35.9 °C) (Infrared)   Resp 14   Ht 5' 5\" (1.651 m)   Wt 142 lb 14.4 oz (64.8 kg)   BMI 23.78 kg/m²     Physical Exam   Nose: Nose decongested anesthetized and examined a 30 degree telescope. All surfaces are healing well. There is no erythema or congestion that would suggest sinus infection. No drainage. Oropharynx: No streaks  Neck: Right lobe of the thyroid is enlarged. Feels like 1 large nodule. Data:  All of the past medical history, past surgical history, family history,social history, allergies and current medications were reviewed with the patient. Assessment & Plan   Diagnoses and all orders for this visit:     Diagnosis Orders   1. Deviated nasal septum     2. Hypertrophy of right inferior turbinate     3. Latisha bullosa right middle     4. Status post nasal septoplasty     5.  Right thyroid nodule  US THYROID    T4, Free    T3, Free       The findings were explained and her questions were answered. Continue buffered hypertonic saline irrigations daily for two months. With her full consent and in fact at her request we will obtain a thyroid ultrasound and then review it with her. She needs free T4 and free T3 levels to go with the TSH from yesterday because of the nodule. She seems quite anxious, but she still has her migraine headache and I am sure she is uncomfortable. Marisela Wilson. Hilda Leal MD    **This report has been created using voice recognition software. It may contain minor errors which are inherent in voicerecognition technology. **

## 2021-07-13 NOTE — RESULT ENCOUNTER NOTE
Vandana Sales, I reviewed your lab results this evening. They all appear normal - thyroid function is good, potassium is normal, liver function is perfect, and no signs of anemia. The metabolic panel did say your potassium might have been hemolyzed so might actually be a bit lower, so it's okay to take your potassium supplements. Let me know if the fatigue and feeling poorly does not improve over the next couple weeks.    Thanks,  Dr. Rajwinder Aaron

## 2021-07-13 NOTE — TELEPHONE ENCOUNTER
Pt informed and verbalized understanding. She said she took a K supplement this morning and has started feeling better.

## 2021-07-15 ENCOUNTER — HOSPITAL ENCOUNTER (OUTPATIENT)
Dept: ULTRASOUND IMAGING | Age: 61
Discharge: HOME | End: 2021-07-15
Payer: MEDICARE

## 2021-07-15 ENCOUNTER — NURSE ONLY (OUTPATIENT)
Dept: LAB | Age: 61
End: 2021-07-15

## 2021-07-15 DIAGNOSIS — E04.1 RIGHT THYROID NODULE: ICD-10-CM

## 2021-07-15 DIAGNOSIS — K76.0 FATTY LIVER DISEASE, NONALCOHOLIC: ICD-10-CM

## 2021-07-15 DIAGNOSIS — E78.5 HYPERLIPIDEMIA, UNSPECIFIED HYPERLIPIDEMIA TYPE: ICD-10-CM

## 2021-07-15 LAB
ALBUMIN SERPL-MCNC: 4.2 G/DL (ref 3.5–5.1)
ALP BLD-CCNC: 85 U/L (ref 38–126)
ALT SERPL-CCNC: 16 U/L (ref 11–66)
ANION GAP SERPL CALCULATED.3IONS-SCNC: 13 MEQ/L (ref 8–16)
AST SERPL-CCNC: 15 U/L (ref 5–40)
BASOPHILS # BLD: 0.6 %
BASOPHILS ABSOLUTE: 0 THOU/MM3 (ref 0–0.1)
BILIRUB SERPL-MCNC: 0.2 MG/DL (ref 0.3–1.2)
BUN BLDV-MCNC: 22 MG/DL (ref 7–22)
CALCIUM SERPL-MCNC: 9.4 MG/DL (ref 8.5–10.5)
CHLORIDE BLD-SCNC: 105 MEQ/L (ref 98–111)
CHOLESTEROL, FASTING: 229 MG/DL (ref 100–199)
CO2: 21 MEQ/L (ref 23–33)
CREAT SERPL-MCNC: 0.7 MG/DL (ref 0.4–1.2)
EOSINOPHIL # BLD: 1.1 %
EOSINOPHILS ABSOLUTE: 0.1 THOU/MM3 (ref 0–0.4)
ERYTHROCYTE [DISTWIDTH] IN BLOOD BY AUTOMATED COUNT: 13.6 % (ref 11.5–14.5)
ERYTHROCYTE [DISTWIDTH] IN BLOOD BY AUTOMATED COUNT: 46.2 FL (ref 35–45)
GFR SERPL CREATININE-BSD FRML MDRD: 85 ML/MIN/1.73M2
GLUCOSE BLD-MCNC: 92 MG/DL (ref 70–108)
HCT VFR BLD CALC: 38.7 % (ref 37–47)
HDLC SERPL-MCNC: 43 MG/DL
HEMOGLOBIN: 12 GM/DL (ref 12–16)
IMMATURE GRANS (ABS): 0.01 THOU/MM3 (ref 0–0.07)
IMMATURE GRANULOCYTES: 0.2 %
LDL CHOLESTEROL CALCULATED: 160 MG/DL
LYMPHOCYTES # BLD: 37.6 %
LYMPHOCYTES ABSOLUTE: 2 THOU/MM3 (ref 1–4.8)
MCH RBC QN AUTO: 28.6 PG (ref 26–33)
MCHC RBC AUTO-ENTMCNC: 31 GM/DL (ref 32.2–35.5)
MCV RBC AUTO: 92.4 FL (ref 81–99)
MONOCYTES # BLD: 5.8 %
MONOCYTES ABSOLUTE: 0.3 THOU/MM3 (ref 0.4–1.3)
NUCLEATED RED BLOOD CELLS: 0 /100 WBC
PLATELET # BLD: 244 THOU/MM3 (ref 130–400)
PMV BLD AUTO: 12.8 FL (ref 9.4–12.4)
POTASSIUM SERPL-SCNC: 3.5 MEQ/L (ref 3.5–5.2)
RBC # BLD: 4.19 MILL/MM3 (ref 4.2–5.4)
SEG NEUTROPHILS: 54.7 %
SEGMENTED NEUTROPHILS ABSOLUTE COUNT: 2.9 THOU/MM3 (ref 1.8–7.7)
SODIUM BLD-SCNC: 139 MEQ/L (ref 135–145)
T4 FREE: 1.28 NG/DL (ref 0.93–1.76)
TOTAL PROTEIN: 6.6 G/DL (ref 6.1–8)
TRIGLYCERIDE, FASTING: 131 MG/DL (ref 0–199)
TSH SERPL DL<=0.05 MIU/L-ACNC: 0.75 UIU/ML (ref 0.4–4.2)
WBC # BLD: 5.3 THOU/MM3 (ref 4.8–10.8)

## 2021-07-15 PROCEDURE — 76536 US EXAM OF HEAD AND NECK: CPT

## 2021-07-17 LAB — T3 FREE: 2.56 PG/ML (ref 2.02–4.43)

## 2021-07-18 NOTE — PROGRESS NOTES
[address] EAR, NOSE AND THROAT  [facility]  Dept: [phone]  Dept Fax: [phone]  Loc: [phone]    Amita Peres is a 62 y.o. female who was referred byNo ref. provider found for:  Chief Complaint   Patient presents with    Post-Op Check     6/14 septo   . HPI:     Amita Peres is a 62 y.o. female who presents today for follow-up on her nasal surgery 2 weeks ago. She is breathing very well and is very pleased. Siva Chavez History: Allergies   Allergen Reactions    Amoxil [Amoxicillin]      rash    Biaxin [Clarithromycin] Swelling     Lips swelled    Doxycycline      rash    Morphine Nausea And Vomiting    Sulfa Antibiotics Rash     Current Outpatient Medications   Medication Sig Dispense Refill    traZODone (DESYREL) 150 MG tablet Take 1.5 tablets by mouth nightly 45 tablet 2    buPROPion (WELLBUTRIN XL) 150 MG extended release tablet Take 1 tablet by mouth every morning Take with 300 mg tablet for total 450 mg once daily in the morning 90 tablet 0    buPROPion (WELLBUTRIN XL) 300 MG extended release tablet TAKE 1 TABLET BY MOUTH EVERY DAY IN THE MORNING 90 tablet 0    LORazepam (ATIVAN) 0.5 MG tablet Take 1 tablet by mouth daily as needed for Anxiety for up to 30 days.  30 tablet 0    Erenumab-aooe (AIMOVIG) 140 MG/ML SOAJ Inject 140 mg into the skin every 30 days 1 pen 5    butalbital-acetaminophen-caffeine (FIORICET, ESGIC) -40 MG per tablet Take 1 tablet by mouth every 4 hours as needed for Headaches 180 tablet 1    esomeprazole (NEXIUM) 40 MG delayed release capsule Take 1 capsule by mouth every morning (before breakfast) 90 capsule 1    pramipexole (MIRAPEX) 0.125 MG tablet Take 2 tablets by mouth 2 times daily 120 tablet 3    topiramate (TOPAMAX) 100 MG tablet TAKE 1 TABLET BY MOUTH TWICE A DAY (Patient taking differently: Take 100 mg by mouth daily ) 180 tablet 1    memantine (NAMENDA) 5 MG tablet Take 5 mg by mouth daily       clotrimazole-betamethasone (LOTRISONE) 1-0.05 % cream Apply topically as needed       cyclobenzaprine (FLEXERIL) 10 MG tablet 2 times daily as needed       furosemide (LASIX) 20 MG tablet as needed       potassium chloride (KLOR-CON) 10 MEQ extended release tablet as needed       Onabotulinumtoxin A (BOTOX, COSMETIC,) 200 units injection 200 Units every 3 months       Ubrogepant (UBRELVY) 100 MG TABS Take 100 mg by mouth as needed      hydroxychloroquine (PLAQUENIL) 200 MG tablet Take 200 mg by mouth 2 times daily       nitroglycerin (NITROSTAT) 0.6 MG SL tablet Place 0.6 mg under the tongue every 5 minutes as needed for Chest pain up to max of 3 total doses. If no relief after 1 dose, call 911.       ondansetron (ZOFRAN) 4 MG tablet Take 1 tablet by mouth 3 times daily as needed for Nausea or Vomiting 15 tablet 0     Current Facility-Administered Medications   Medication Dose Route Frequency Provider Last Rate Last Admin    onabotulinumtoxin A (BOTOX) injection 200 Units  200 Units Intramuscular Once Derik Dodson Cogan, DO         Past Medical History:   Diagnosis Date    Adrenal abnormality (HCC)     [facility] rest [facility]     MICROVASCULAR CARDIAC DISEASE    Anxiety     Arthritis     Asthma     Cerebral artery occlusion with cerebral infarction (HCC)     Chronic sinusitis     Cognitive impairment     with retograde amnesia    Headache     Migraines Hemiplegic    History of degenerative disc disease     Hyperlipidemia     Lymphedema     Left leg    May-Thurner syndrome     Peripheral vascular disease (HCC)     PONV (postoperative nausea and vomiting)     Positive BJ (antinuclear antibody)     PTSD (post-traumatic stress disorder)       Past Surgical History:   Procedure Laterality Date    BREAST BIOPSY      HYSTERECTOMY      NECK SURGERY  12/2020    ACDF    PAIN MANAGEMENT PROCEDURE Bilateral 6/29/2021    medial branch blocks at bilateral L4/L5 and L5/S1 performed by Aditi Stevens DO at [facility] SEPTOPLASTY Right 2021    SEPTOPLASTY, SUBMUCOUS RESECTION TURBINATE, RIGHT PARTIAL INFERIOR, NASAL ENDOSCOPY WITH PARTIAL RESECTION OF RIGHT MIDDLE JHON BULLOSA performed by Aline Mcmillan MD at [address] Left     SINUS SURGERY      TONSILLECTOMY      TUBAL LIGATION       Family History   Adopted: Yes   Problem Relation Age of Onset    Cancer Father         Stomach Cancer    Cancer Paternal Uncle         Colon Cancer    Stroke Maternal Grandmother     Rheum Arthritis Maternal Grandmother      Social History     Tobacco Use    Smoking status: Former Smoker     Packs/day: 1.00     Years: 12.00     Pack years: 12.00     Types: Cigarettes     Quit date: 2019     Years since quittin.5    Smokeless tobacco: Never Used   Substance Use Topics    Alcohol use: Not Currently     Comment: stopped in 2020 due to fatty liver       Subjective:      Review of Systems    Objective:   /77   Pulse 69   Temp 97.2 °F (36.2 °C)   Ht 5' 5\" (1.651 m)   Wt 144 lb 12.8 oz (65.7 kg)   BMI 24.10 kg/m²     Physical Exam   Nose: Decongested and anesthetized with topical sprays. Normal healing. Minimal crusting. Data:  All of the past medical history, past surgical history, family history,social history, allergies and current medications were reviewed with the patient. Assessment & Plan   Diagnoses and all orders for this visit:     Diagnosis Orders   1. Deviated nasal septum     2. Hypertrophy of right inferior turbinate     3. Jhon bullosa right middle     4. Status post nasal septoplasty     5. May-Thurner syndrome     6. Rhinogenic headache         The findings were explained and her questions were answered. Continue irrigations and keep return visit       Jaspal Abraham MD    **This report has been created using voice recognition software.  It may contain minor errors which are inherent in voicerecognition technology. **

## 2021-07-19 NOTE — RESULT ENCOUNTER NOTE
Ravi Vieira, I reviewed your lipid panel. Looks like your cholesterol level did not change despite the weight loss and dietary changes. We can discuss this further at your next appointment. Let me know if you have any questions or concerns.  Thanks,  Dr. Pilo Maria

## 2021-07-19 NOTE — H&P
Today, patient presents for planned confirmatory medial branch blocks at bilateral L4/L5 and L5/S1. This note is reflective of the patient's previous visit for evaluation. We will proceed with today's planned procedure. Since patient's last visit for evaluation, there have been no interval changes in medical history. Patient has no new numbness, weakness, or focal neurological deficit since evaluation. Patient has no contraindications to injection (no anticoagulation or recent antibiotic intake for active infections), and has a  present or is able to drive themselves (as discussed and cleared by physician). Allergies to latex, contrast dye, and steroid medications have been confirmed with the patient prior to the procedure. NPO necessity has been assessed and accepted based on procedure complexity. The risks and benefits of the procedure have been explained including but are not limited to infection, bleeding, paralysis, immediate post procedure weakness, and dizziness; the patient acknowledges understanding and desires to proceed with the procedure. Patient has signed consent for same procedure as discussed in previous clinic encounter. All other questions and concerns were addressed at bedside. See procedure note for full details. Follow up: 7/26/21    Post procedure Instructions: The patient was advised not to drive during the day of the procedure and not to engage in any significant decision making (unless otherwise states by physician). The patient was also advised to be cautious with walking/activity for 24 hours following today's visit and asked not to engage in over-exertion (unless otherwise states by physician). After this time, it is ok to resume pre-procedure level of activity. Patient advised to apply ice to site of injection in situations of pain and discomfort. Patient advised to not submerge site of injection during bath or pool activities for approximately 24 hours post-procedure. Patient attested to understanding post procedure directions / restrictions. All other questions and concerns addressed before patient discharge in ambulatory fashion. Chronic Pain/PM&R Clinic Note     Encounter Date: 7/9/21    Subjective:   Chief Complaint:   No chief complaint on file. History of Present Illness:   Maria M Romero is a 62 y.o. female seen in the office initially on 03/05/21 for her management of migraines. She has medical history of previous TIA with residual cognitive deficits, and ACDF C3-C7 with Dr Diane Pickens. She has longstanding history of migraine headaches as well as of the last 35 years. She describes 2 different types of migraine headaches. Her standard migraine headaches she reports started on the right side the neck and wrap around the entire side of the front of the head. She has associated phonophobia and photophobia. She also notices these migraines to begin with an aura where her nose starts to burn. She has associated nausea/vomiting when the migraines are severe. Her migraines last greater than 4 hours in duration interfering everyday activities. In addition she reports greater than 15 migraine attacks last month. She has failed multiple medications in the past including Fioricet, Ubrelvy, nortriptyline, and is currently on Aimovig and Topamax. 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 In addition, she does have axial low back pain that contributes to a lot of her debilitating pain. She denies any radiation down the legs, focal leg weakness, leg paresthesias, saddle anesthesia, bowel/bladder incontinence. Today, 7/10/2021, patient presents for planned follow-up for chronic low back pain. She underwent diagnostic lumbar medial branch blocks on 6/29/2021. She reports 100% pain relief in her low back for 12 hours after the procedure.     Past Medical History:   Diagnosis Date    Adrenal abnormality ([address]     Angina at rest [facility]) MICROVASCULAR CARDIAC DISEASE    Anxiety     Arthritis     Asthma     Cerebral artery occlusion with cerebral infarction ([facility] [address]     Chronic sinusitis     Cognitive impairment     with retograde amnesia    Headache     Migraines Hemiplegic    History of degenerative disc disease     Hyperlipidemia     Lymphedema     Left leg    May-Thurner syndrome     Peripheral vascular disease (HCC)     PONV (postoperative nausea and vomiting)     Positive BJ (antinuclear antibody)     PTSD (post-traumatic stress disorder)        Past Surgical History:   Procedure Laterality Date    BREAST BIOPSY      HYSTERECTOMY      NECK SURGERY  2020    ACDF    PAIN MANAGEMENT PROCEDURE Bilateral 2021    medial branch blocks at bilateral L4/L5 and L5/S1 performed by Massimo Gaytan DO at [facility] SEPTOPLASTY Right 2021    SEPTOPLASTY, SUBMUCOUS RESECTION TURBINATE, RIGHT PARTIAL INFERIOR, NASAL ENDOSCOPY WITH PARTIAL RESECTION OF RIGHT MIDDLE JHON BULLOSA performed by Shea Shabazz MD at [address] Left     SINUS SURGERY      TONSILLECTOMY      TUBAL LIGATION         Family History   Adopted: Yes   Problem Relation Age of Onset    Cancer Father         Stomach Cancer    Cancer Paternal Uncle         Colon Cancer    Stroke Maternal Grandmother     Rheum Arthritis Maternal Grandmother        Social History     Socioeconomic History    Marital status:      Spouse name: Not on file    Number of children: Not on file    Years of education: Not on file    Highest education level: Not on file   Occupational History    Not on file   Tobacco Use    Smoking status: Former Smoker     Packs/day: 1.00     Years: 12.00     Pack years: 12.00     Types: Cigarettes     Quit date: 2019     Years since quittin.5    Smokeless tobacco: Never Used   Vaping Use    Vaping Use: Never used   Substance and Sexual Activity    Alcohol use: Not Currently     Comment: stopped in August 2020 due to fatty liver    Drug use: Never    Sexual activity: Never   Other Topics Concern    Not on file   Social History Narrative    Not on file     Social Determinants of Health     Financial Resource Strain: Low Risk     Difficulty of Paying Living Expenses: Not hard at all   Food Insecurity: No Food Insecurity    Worried About [address] [employer] [employer] in the Last Year: Never true    [address] in the Last Year: Never true   Transportation Needs:     Lack of Transportation (Medical):      Lack of Transportation (Non-Medical):    Physical Activity:     Days of Exercise per Week:     Minutes of Exercise per Session:    Stress:     Feeling of Stress :    Social Connections:     Frequency of Communication with Friends and Family:     Frequency of Social Gatherings with Friends and Family:     Attends Restoration Services:     Active Member of Clubs or Organizations:     Attends Club or Organization Meetings:     Marital Status:    Intimate Partner Violence:     Fear of Current or Ex-Partner:     Emotionally Abused:     Physically Abused:     Sexually Abused:        Medications & Allergies:   Current Outpatient Medications   Medication Instructions    Aimovig 140 mg, Subcutaneous, EVERY 30 DAYS    buPROPion (WELLBUTRIN XL) 300 MG extended release tablet TAKE 1 TABLET BY MOUTH EVERY DAY IN THE MORNING    buPROPion (WELLBUTRIN XL) 150 mg, Oral, EVERY MORNING, Take with 300 mg tablet for total 450 mg once daily in the morning    butalbital-acetaminophen-caffeine (FIORICET, ESGIC) -40 MG per tablet 1 tablet, Oral, EVERY 4 HOURS PRN    clotrimazole-betamethasone (LOTRISONE) 1-0.05 % cream Topical, PRN    cyclobenzaprine (FLEXERIL) 10 MG tablet 2 TIMES DAILY PRN    esomeprazole (NEXIUM) 40 mg, Oral, DAILY BEFORE BREAKFAST    furosemide (LASIX) 20 MG tablet PRN    hydroxychloroquine (PLAQUENIL) 200 mg, Oral, 2 TIMES DAILY    is a 62 y. o.female presenting to the pain clinic for evaluation of migraines. She underwent Botox injections for management of migraine headaches today. In addition, she has lumbar facet mediated pain. I have set her up for diagnostic lumbar medial branch blocks targeting the facet joints of L4/L5 and L5/S1. She responded significantly to her diagnostic medial branch blocks. I set her up for confirmatory blocks at these levels. Plan: The following treatment recommendations and plan were discussed in detail with Norma Murphy. Interventions:  Low Back Pain  In presence of lumbar axial back pain and with physical exam consistent for facetal pain and significant response to diagnostic medial branch blocks, confirmatory medial branch blocks at bilateral L4/L5 and L5/S1 was chosen. The risks and benefits were discussed in detail with the patient. Patient wants to proceed with the injection. Anticoagulation/NPO Recommendations:   Patient does not need to hold any medications prior to the procedure. Patient will need to be NPO x 8 hours prior to the procedure.   We will start an IV prior to the procedure      Follow-up: For confirmatory lumbar MBBs      Leandro Powell DO  Interventional Pain Management/PM&R   [place]

## 2021-07-20 ENCOUNTER — APPOINTMENT (OUTPATIENT)
Dept: GENERAL RADIOLOGY | Age: 61
End: 2021-07-20
Attending: ANESTHESIOLOGY
Payer: MEDICARE

## 2021-07-20 ENCOUNTER — HOSPITAL ENCOUNTER (OUTPATIENT)
Age: 61
Setting detail: OUTPATIENT SURGERY
Discharge: HOME | End: 2021-07-20
Attending: ANESTHESIOLOGY | Admitting: ANESTHESIOLOGY
Payer: MEDICARE

## 2021-07-20 VITALS
TEMPERATURE: 98.2 F | DIASTOLIC BLOOD PRESSURE: 58 MMHG | SYSTOLIC BLOOD PRESSURE: 99 MMHG | HEART RATE: 68 BPM | OXYGEN SATURATION: 94 % | RESPIRATION RATE: 16 BRPM

## 2021-07-20 PROCEDURE — 2500000003 HC RX 250 WO HCPCS: Performed by: ANESTHESIOLOGY

## 2021-07-20 PROCEDURE — 99152 MOD SED SAME PHYS/QHP 5/>YRS: CPT | Performed by: ANESTHESIOLOGY

## 2021-07-20 PROCEDURE — 3600000054 HC PAIN LEVEL 3 BASE: Performed by: ANESTHESIOLOGY

## 2021-07-20 PROCEDURE — 64493 INJ PARAVERT F JNT L/S 1 LEV: CPT | Performed by: ANESTHESIOLOGY

## 2021-07-20 PROCEDURE — 7100000011 HC PHASE II RECOVERY - ADDTL 15 MIN: Performed by: ANESTHESIOLOGY

## 2021-07-20 PROCEDURE — 64494 INJ PARAVERT F JNT L/S 2 LEV: CPT | Performed by: ANESTHESIOLOGY

## 2021-07-20 PROCEDURE — 2709999900 HC NON-CHARGEABLE SUPPLY: Performed by: ANESTHESIOLOGY

## 2021-07-20 PROCEDURE — 3209999900 FLUORO FOR SURGICAL PROCEDURES

## 2021-07-20 PROCEDURE — 6360000002 HC RX W HCPCS: Performed by: ANESTHESIOLOGY

## 2021-07-20 PROCEDURE — 7100000010 HC PHASE II RECOVERY - FIRST 15 MIN: Performed by: ANESTHESIOLOGY

## 2021-07-20 RX ORDER — BUPIVACAINE HYDROCHLORIDE 5 MG/ML
INJECTION, SOLUTION PERINEURAL PRN
Status: DISCONTINUED | OUTPATIENT
Start: 2021-07-20 | End: 2021-07-20 | Stop reason: ALTCHOICE

## 2021-07-20 RX ORDER — FENTANYL CITRATE 50 UG/ML
INJECTION, SOLUTION INTRAMUSCULAR; INTRAVENOUS PRN
Status: DISCONTINUED | OUTPATIENT
Start: 2021-07-20 | End: 2021-07-20 | Stop reason: ALTCHOICE

## 2021-07-20 RX ORDER — MIDAZOLAM HYDROCHLORIDE 1 MG/ML
INJECTION INTRAMUSCULAR; INTRAVENOUS PRN
Status: DISCONTINUED | OUTPATIENT
Start: 2021-07-20 | End: 2021-07-20 | Stop reason: ALTCHOICE

## 2021-07-20 RX ORDER — LIDOCAINE HYDROCHLORIDE 10 MG/ML
INJECTION, SOLUTION EPIDURAL; INFILTRATION; INTRACAUDAL; PERINEURAL PRN
Status: DISCONTINUED | OUTPATIENT
Start: 2021-07-20 | End: 2021-07-20 | Stop reason: ALTCHOICE

## 2021-07-20 ASSESSMENT — PAIN SCALES - GENERAL: PAINLEVEL_OUTOF10: 0

## 2021-07-20 ASSESSMENT — PAIN - FUNCTIONAL ASSESSMENT: PAIN_FUNCTIONAL_ASSESSMENT: 0-10

## 2021-07-20 ASSESSMENT — PAIN DESCRIPTION - DESCRIPTORS: DESCRIPTORS: CONSTANT;ACHING

## 2021-07-20 NOTE — PRE SEDATION
[address] [address]  Pre-Sedation/Analgesia History & Physical    Pt Name: Philomena Malhotra  MRN: [id number]  YOB: 1960  Provider Performing Procedure: Maki Delacruz DO   Primary Care Physician: Johnathan José MD      MEDICAL HISTORY       has a past medical history of Adrenal abnormality ([facility] [address] Angina at rest [facility]), Anxiety, Arthritis, Asthma, Cerebral artery occlusion with cerebral infarction [facility]), Chronic sinusitis, Cognitive impairment, Headache, History of degenerative disc disease, Hyperlipidemia, Lymphedema, May-Thurner syndrome, Peripheral vascular disease ([facility] [address] PONV (postoperative nausea and vomiting), Positive BJ (antinuclear antibody), and PTSD (post-traumatic stress disorder). SURGICAL HISTORY   has a past surgical history that includes Hysterectomy; Tubal ligation; shoulder surgery; Tonsillectomy; sinus surgery; Breast biopsy; shoulder surgery (Left); Neck surgery (12/2020); Septoplasty (Right, 6/14/2021); and Pain management procedure (Bilateral, 6/29/2021). ALLERGIES   Allergies as of 07/13/2021 - Fully Reviewed 07/13/2021   Allergen Reaction Noted    Amoxil [amoxicillin]  11/22/2020    Biaxin [clarithromycin] Swelling 05/27/2021    Doxycycline  11/22/2020    Morphine Nausea And Vomiting 07/31/2020    Sulfa antibiotics Rash 07/31/2020       MEDICATIONS   Prior to Admission medications    Medication Sig Start Date End Date Taking?  Authorizing Provider   traZODone (DESYREL) 150 MG tablet Take 1.5 tablets by mouth nightly 6/28/21  Yes Js Harper MD   buPROPion (WELLBUTRIN XL) 150 MG extended release tablet Take 1 tablet by mouth every morning Take with 300 mg tablet for total 450 mg once daily in the morning 6/28/21  Yes Js Harper MD   buPROPion (WELLBUTRIN XL) 300 MG extended release tablet TAKE 1 TABLET BY MOUTH EVERY DAY IN THE MORNING 6/28/21  Yes Js Harper MD   LORazepam (ATIVAN) 0.5 MG tablet Take 1 tablet by mouth daily as needed for Anxiety for up to 30 days. 6/28/21 7/28/21 Yes David Maldonado MD   Erenumab-aooe (AIMOVIG) 140 MG/ML SOAJ Inject 140 mg into the skin every 30 days 6/9/21  Yes ESTEFANIA Meza CNP   butalbital-acetaminophen-caffeine (FIORICET, ESGIC) -62 MG per tablet Take 1 tablet by mouth every 4 hours as needed for Headaches 6/9/21  Yes Jamison Sierra MD   esomeprazole ([address]) 40 MG delayed release capsule Take 1 capsule by mouth every morning (before breakfast) 5/17/21  Yes Jamison Sierra MD   pramipexole (MIRAPEX) 0.125 MG tablet Take 2 tablets by mouth 2 times daily 5/17/21  Yes Jamison Sierra MD   topiramate (TOPAMAX) 100 MG tablet TAKE 1 TABLET BY MOUTH TWICE A DAY  Patient taking differently: Take 100 mg by mouth daily  5/4/21  Yes ESTEFANIA Meza CNP   memantine (NAMENDA) 5 MG tablet Take 5 mg by mouth daily    Yes Historical Provider, MD   cyclobenzaprine (FLEXERIL) 10 MG tablet 2 times daily as needed  9/16/20  Yes Historical Provider, MD   potassium chloride (KLOR-CON) 10 MEQ extended release tablet as needed  9/16/20  Yes Historical Provider, MD   Ubrogepant (UBRELVY) 100 MG TABS Take 100 mg by mouth as needed   Yes Historical Provider, MD   hydroxychloroquine (PLAQUENIL) 200 MG tablet Take 200 mg by mouth 2 times daily    Yes Historical Provider, MD   ondansetron (ZOFRAN) 4 MG tablet Take 1 tablet by mouth 3 times daily as needed for Nausea or Vomiting 7/31/20  Yes Gus Barrera MD   clotrimazole-betamethasone ([place]) 1-0.05 % cream Apply topically as needed  9/16/20   Historical Provider, MD   furosemide (LASIX) 20 MG tablet as needed  9/16/20   Historical Provider, MD   Onabotulinumtoxin A (BOTOX, COSMETIC,) 200 units injection 200 Units every 3 months     Historical Provider, MD   nitroglycerin (NITROSTAT) 0.6 MG SL tablet Place 0.6 mg under the tongue every 5 minutes as needed for Chest pain up to max of 3 total doses. If no relief after 1 dose, call 911.     Historical Provider, MD     PHYSICAL:   Vitals:

## 2021-07-20 NOTE — POST SEDATION
[address] [address]  Sedation/Analgesia Post Sedation Record    Pt Name: Mona King  MRN: [id number]  YOB: 1960  Procedure Performed By: Jenni Ace DO  Primary Care Physician: Yecenia Merlos MD    POST-PROCEDURE    Physicians/Assistants: Jenni Ace DO  Procedure Performed: See Procedure Note   Sedation/Anesthesia: Versed and Fentanyl (See procedure note for amount and duration)  Estimated Blood Loss:     0  ml  Specimens Removed: None        Complications: None           Leandro Noriega DO  Electronically signed 7/20/2021 at 12:19 PM

## 2021-07-20 NOTE — PROGRESS NOTES
1127: Patient to phase 2 recovery room via cart. Patient is awake and alert. Report received from surgical RN, Radames. Patient's vitals obtained, see charting. 1129: Patient is denying pain and nausea at this time. IV is INT'd in her hand. Dr. Gilda Carolina is in the room to talk to the patient. 1131: Offered drink and snack provided to the patient. Bed is in the lowest position and call light is within reach. 1154: IV removed at this time- no complications and dressing applied. 1155: Patient dressing. Patient's ride called at this time. 1207: Patient's ride is here at this time. 1209: Discharge instructions given and explained to the patient and the patient's friend, both verbalized understanding. Patient discharged home in stable condition with her friend, Lisseth.

## 2021-07-26 ENCOUNTER — OFFICE VISIT (OUTPATIENT)
Dept: PHYSICAL MEDICINE AND REHAB | Age: 61
End: 2021-07-26
Payer: MEDICARE

## 2021-07-26 VITALS
DIASTOLIC BLOOD PRESSURE: 60 MMHG | BODY MASS INDEX: 23.66 KG/M2 | WEIGHT: 142 LBS | SYSTOLIC BLOOD PRESSURE: 102 MMHG | HEIGHT: 65 IN

## 2021-07-26 DIAGNOSIS — M79.18 MYOFASCIAL PAIN: ICD-10-CM

## 2021-07-26 DIAGNOSIS — M47.816 LUMBAR SPONDYLOSIS: ICD-10-CM

## 2021-07-26 DIAGNOSIS — M54.59 LUMBAR FACET JOINT PAIN: Primary | ICD-10-CM

## 2021-07-26 DIAGNOSIS — G89.29 OTHER CHRONIC PAIN: ICD-10-CM

## 2021-07-26 PROCEDURE — 99213 OFFICE O/P EST LOW 20 MIN: CPT | Performed by: ANESTHESIOLOGY

## 2021-07-26 PROCEDURE — 3017F COLORECTAL CA SCREEN DOC REV: CPT | Performed by: ANESTHESIOLOGY

## 2021-07-26 PROCEDURE — 1036F TOBACCO NON-USER: CPT | Performed by: ANESTHESIOLOGY

## 2021-07-26 PROCEDURE — G8420 CALC BMI NORM PARAMETERS: HCPCS | Performed by: ANESTHESIOLOGY

## 2021-07-26 PROCEDURE — G8427 DOCREV CUR MEDS BY ELIG CLIN: HCPCS | Performed by: ANESTHESIOLOGY

## 2021-07-26 NOTE — H&P
Today, patient presents for planned thermal radiofrequency ablation at RIGHT medial branches L4/L5 and L5/S1. This note is reflective of the patient's previous visit for evaluation. We will proceed with today's planned procedure. Since patient's last visit for evaluation, there have been no interval changes in medical history. Patient has no new numbness, weakness, or focal neurological deficit since evaluation. Patient has no contraindications to injection (no anticoagulation or recent antibiotic intake for active infections), and has a  present or is able to drive themselves (as discussed and cleared by physician). Allergies to latex, contrast dye, and steroid medications have been confirmed with the patient prior to the procedure. NPO necessity has been assessed and accepted based on procedure complexity. The risks and benefits of the procedure have been explained including but are not limited to infection, bleeding, paralysis, immediate post procedure weakness, and dizziness; the patient acknowledges understanding and desires to proceed with the procedure. Patient has signed consent for same procedure as discussed in previous clinic encounter. All other questions and concerns were addressed at bedside. See procedure note for full details. Follow up: 8/3/31 (for LEFT-sided RFA)    Post procedure Instructions: The patient was advised not to drive during the day of the procedure and not to engage in any significant decision making (unless otherwise states by physician). The patient was also advised to be cautious with walking/activity for 24 hours following today's visit and asked not to engage in over-exertion (unless otherwise states by physician). After this time, it is ok to resume pre-procedure level of activity. Patient advised to apply ice to site of injection in situations of pain and discomfort.  Patient advised to not submerge site of injection during bath or pool activities for approximately 24 hours post-procedure. Patient attested to understanding post procedure directions / restrictions. All other questions and concerns addressed before patient discharge in ambulatory fashion. Chronic Pain/PM&R Clinic Note     Encounter Date: 7/26/21    Subjective:   Chief Complaint:   No chief complaint on file. History of Present Illness:   Shelia June is a 62 y.o. female seen in the office initially on 03/05/21 for her management of migraines. She has medical history of previous TIA with residual cognitive deficits, and ACDF C3-C7 with Dr Vanessa Hardwick. She has longstanding history of migraine headaches as well as of the last 35 years. She describes 2 different types of migraine headaches. Her standard migraine headaches she reports started on the right side the neck and wrap around the entire side of the front of the head. She has associated phonophobia and photophobia. She also notices these migraines to begin with an aura where her nose starts to burn. She has associated nausea/vomiting when the migraines are severe. Her migraines last greater than 4 hours in duration interfering everyday activities. In addition she reports greater than 15 migraine attacks last month. She has failed multiple medications in the past including Fioricet, Ubrelvy, nortriptyline, and is currently on Aimovig and Topamax. 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 In addition, she does have axial low back pain that contributes to a lot of her debilitating pain. She denies any radiation down the legs, focal leg weakness, leg paresthesias, saddle anesthesia, bowel/bladder incontinence. Today, 7/26/2021, patient presents for planned follow-up for chronic low back pain. She underwent confirmatory lumbar medial branch blocks on 7/20/2021. She reports >90% pain relief low back pain. She states she would like to proceed with her radiofrequency ablation for her low back.   In addition, she would like to pursue eventually occipital nerve blocks in the future. She wants to get her previous pain records sent over to me for further evaluation and discussion about treatment options for her neck pain.       Past Medical History:   Diagnosis Date    Adrenal abnormality (HCC)     Angina at rest [facility])     MICROVASCULAR CARDIAC DISEASE    Anxiety     Arthritis     Asthma     Cerebral artery occlusion with cerebral infarction ([address]     Chronic sinusitis     Cognitive impairment     with retograde amnesia    Headache     Migraines Hemiplegic    History of degenerative disc disease     Hyperlipidemia     Lymphedema     Left leg    May-Thurner syndrome     Peripheral vascular disease ([address]     PONV (postoperative nausea and vomiting)     Positive BJ (antinuclear antibody)     PTSD (post-traumatic stress disorder)        Past Surgical History:   Procedure Laterality Date    BREAST BIOPSY      HYSTERECTOMY      NECK SURGERY  12/2020    ACDF    PAIN MANAGEMENT PROCEDURE Bilateral 6/29/2021    medial branch blocks at bilateral L4/L5 and L5/S1 performed by Jenni Ace DO at [facility] Bilateral 7/20/2021    confirmatory medial branch blocks at bilateral L4/L5 and L5/S1 performed by Jenni Ace DO at [address] [place] [address] SEPTOPLASTY Right 6/14/2021    SEPTOPLASTY, SUBMUCOUS RESECTION TURBINATE, RIGHT PARTIAL INFERIOR, NASAL ENDOSCOPY WITH PARTIAL RESECTION OF RIGHT MIDDLE JHON BULLOSA performed by Bekah Campoverde MD at [address] Left     SINUS SURGERY      TONSILLECTOMY      TUBAL LIGATION         Family History   Adopted: Yes   Problem Relation Age of Onset    Cancer Father         Stomach Cancer    Cancer Paternal Uncle         Colon Cancer    Stroke Maternal Grandmother     Rheum Arthritis Maternal Grandmother        Social History     Socioeconomic History  Marital status:      Spouse name: Not on file    Number of children: Not on file    Years of education: Not on file    Highest education level: Not on file   Occupational History    Not on file   Tobacco Use    Smoking status: Former Smoker     Packs/day: 1.00     Years: 12.00     Pack years: 12.00     Types: Cigarettes     Quit date: 2019     Years since quittin.5    Smokeless tobacco: Never Used   Vaping Use    Vaping Use: Never used   Substance and Sexual Activity    Alcohol use: Not Currently     Comment: stopped in 2020 due to fatty liver    Drug use: Never    Sexual activity: Never   Other Topics Concern    Not on file   Social History Narrative    Not on file     Social Determinants of Health     Financial Resource Strain: Low Risk     Difficulty of Paying Living Expenses: Not hard at all   Food Insecurity: No Food Insecurity    Worried About [address] in the Last Year: Never true    [facility] in the Last Year: Never true   Transportation Needs:     Lack of Transportation (Medical):      Lack of Transportation (Non-Medical):    Physical Activity:     Days of Exercise per Week:     Minutes of Exercise per Session:    Stress:     Feeling of Stress :    Social Connections:     Frequency of Communication with Friends and Family:     Frequency of Social Gatherings with Friends and Family:     Attends Jain Services:     Active Member of Clubs or Organizations:     Attends Club or Organization Meetings:     Marital Status:    Intimate Partner Violence:     Fear of Current or Ex-Partner:     Emotionally Abused:     Physically Abused:     Sexually Abused:        Medications & Allergies:   Current Outpatient Medications   Medication Instructions    Aimovig 140 mg, Subcutaneous, EVERY 30 DAYS    buPROPion (WELLBUTRIN XL) 300 MG extended release tablet TAKE 1 TABLET BY MOUTH EVERY DAY IN THE MORNING    buPROPion (WELLBUTRIN XL) 150 mg, Oral, EVERY MORNING, Take with 300 mg tablet for total 450 mg once daily in the morning    butalbital-acetaminophen-caffeine (FIORICET, ESGIC) -40 MG per tablet 1 tablet, Oral, EVERY 4 HOURS PRN    clotrimazole-betamethasone (LOTRISONE) 1-0.05 % cream Topical, PRN    cyclobenzaprine (FLEXERIL) 10 MG tablet 2 TIMES DAILY PRN    esomeprazole (NEXIUM) 40 mg, Oral, DAILY BEFORE BREAKFAST    furosemide (LASIX) 20 MG tablet PRN    hydroxychloroquine (PLAQUENIL) 200 mg, Oral, 2 TIMES DAILY    LORazepam (ATIVAN) 0.5 mg, Oral, DAILY PRN    memantine (NAMENDA) 5 mg, Oral, DAILY    nitroglycerin (NITROSTAT) 0.6 mg, Sublingual, EVERY 5 MIN PRN, up to max of 3 total doses. If no relief after 1 dose, call 911.  Onabotulinumtoxin A (BOTOX (COSMETIC)) 200 Units, EVERY 3 MONTHS    ondansetron (ZOFRAN) 4 mg, Oral, 3 TIMES DAILY PRN    potassium chloride (KLOR-CON) 10 MEQ extended release tablet PRN    pramipexole (MIRAPEX) 0.25 mg, Oral, 2 TIMES DAILY    topiramate (TOPAMAX) 100 MG tablet TAKE 1 TABLET BY MOUTH TWICE A DAY    traZODone (DESYREL) 225 mg, Oral, NIGHTLY    Ubrelvy 100 mg, Oral, PRN       Allergies   Allergen Reactions    Amoxil [Amoxicillin]      rash    Biaxin [Clarithromycin] Swelling     Lips swelled    Doxycycline      rash    Morphine Nausea And Vomiting    Sulfa Antibiotics Rash       Review of Systems:   Constitutional: negative for fevers  Musculoskeletal: Positive for low back pain  Neurological: positive for migraines but negative for leg numbness/tingling or weakness  Behavioral/Psych: negative for anxiety/depression   All other systems reviewed and are negative    Objective: There were no vitals filed for this visit.     Constitutional: Pleasant, no acute distress   Head: Normocephalic, atraumatic   Eyes: Conjunctivae normal   Neck: Supple, symmetrical   Respiration: Non-labored breathing   Cardiovascular: Limbs warm and well perfused   Musculoskeletal:   Lumbar -ROM reduced in extension. Increased pain with facet loading bilaterally. Tenderness palpation along bilateral lumbar paraspinals. Neuro: Alert, oriented. CN II-XII appear grossly intact. No focal motor deficits appreciated in lower limbs. Skin: no skin rashes or lesions visible on face  Psychological: Cooperative, no exaggerated pain behaviors       Assessment:    Diagnosis Orders   1. Lumbar facet joint pain  CHG FLUOR NEEDLE/CATH SPINE/PARASPINAL DX/THER ADDON    NE RADIOFREQUENCY NEUROTOMY LUMBAR OR SACRAL, W IMAGE GUIDANCE, SINGLE    NE RADIOFREQ NEUROTOMY LUMBAR OR SACRAL, W IMAGE GUIDE,EA ADDL LEVEL   2. Lumbar spondylosis  CHG FLUOR NEEDLE/CATH SPINE/PARASPINAL DX/THER ADDON    NE RADIOFREQUENCY NEUROTOMY LUMBAR OR SACRAL, W IMAGE GUIDANCE, SINGLE    NE RADIOFREQ NEUROTOMY LUMBAR OR SACRAL, W IMAGE GUIDE,EA ADDL LEVEL   3. Myofascial pain     4. Other chronic pain           Lan Bloom is a 62 y. o.female presenting to the pain clinic for evaluation of migraines. She underwent Botox injections for management of migraine headaches today. In addition, she has lumbar facet mediated pain. I have set her up for diagnostic lumbar medial branch blocks targeting the facet joints of L4/L5 and L5/S1. She responded significantly to her diagnostic and confirmatory medial branch blocks. I set her up for thermal radiofrequency ablation targeting bilateral facet joints of L4/L5 and L5/S1. Plan: The following treatment recommendations and plan were discussed in detail with Lan Bloom. Interventions:  Low Back Pain  In presence of lumbar axial back pain and with physical exam consistent for facetal pain and significant response to diagnostic and confirmatory medial branch blocks, thermal radiofrequency ablation at bilateral medial branches L4/L5 and L5/S1 was chosen. We will start with the right side first and proceed with a left-sided 1 week later. The risks and benefits were discussed in detail with the patient.  Patient wants to proceed with the injection. Anticoagulation/NPO Recommendations:   Patient does not need to hold any medications prior to the procedure. Patient will need to be NPO x 8 hours prior to the procedure.   We will start an IV prior to the procedure      Follow-up: For RIGHT-sided RFA      Leandro Powell, DO  Interventional Pain Management/PM&R   [place]

## 2021-07-26 NOTE — PATIENT INSTRUCTIONS
The following treatment recommendations and plan were discussed in detail with Sherry Felton. Interventions:  Low Back Pain  In presence of lumbar axial back pain and with physical exam consistent for facetal pain and significant response to diagnostic and confirmatory medial branch blocks, thermal radiofrequency ablation at bilateral medial branches L4/L5 and L5/S1 was chosen. We will start with the right side first and proceed with a left-sided 1 week later. The risks and benefits were discussed in detail with the patient. Patient wants to proceed with the injection. Anticoagulation/NPO Recommendations:   Patient does not need to hold any medications prior to the procedure. Patient will need to be NPO x 8 hours prior to the procedure.   We will start an IV prior to the procedure      Follow-up: For RIGHT-sided RFA

## 2021-07-26 NOTE — PROGRESS NOTES
Chronic Pain/PM&R Clinic Note     Encounter Date: 7/26/21    Subjective:   Chief Complaint:   Chief Complaint   Patient presents with    Follow-up       History of Present Illness:   Soniya Porras is a 62 y.o. female seen in the office initially on 03/05/21 for her management of migraines. She has medical history of previous TIA with residual cognitive deficits, and ACDF C3-C7 with Dr Shan Brasher. She has longstanding history of migraine headaches as well as of the last 35 years. She describes 2 different types of migraine headaches. Her standard migraine headaches she reports started on the right side the neck and wrap around the entire side of the front of the head. She has associated phonophobia and photophobia. She also notices these migraines to begin with an aura where her nose starts to burn. She has associated nausea/vomiting when the migraines are severe. Her migraines last greater than 4 hours in duration interfering everyday activities. In addition she reports greater than 15 migraine attacks last month. She has failed multiple medications in the past including Fioricet, Ubrelvy, nortriptyline, and is currently on Aimovig and Topamax. 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 In addition, she does have axial low back pain that contributes to a lot of her debilitating pain. She denies any radiation down the legs, focal leg weakness, leg paresthesias, saddle anesthesia, bowel/bladder incontinence. Today, 7/26/2021, patient presents for planned follow-up for chronic low back pain. She underwent confirmatory lumbar medial branch blocks on 7/20/2021. She reports >90% pain relief low back pain. She states she would like to proceed with her radiofrequency ablation for her low back. In addition, she would like to pursue eventually occipital nerve blocks in the future.   She wants to get her previous pain records sent over to me for further evaluation and discussion Oral, EVERY 4 HOURS PRN    clotrimazole-betamethasone (LOTRISONE) 1-0.05 % cream Topical, PRN    cyclobenzaprine (FLEXERIL) 10 MG tablet 2 TIMES DAILY PRN    esomeprazole (NEXIUM) 40 mg, Oral, DAILY BEFORE BREAKFAST    furosemide (LASIX) 20 MG tablet PRN    hydroxychloroquine (PLAQUENIL) 200 mg, Oral, 2 TIMES DAILY    LORazepam (ATIVAN) 0.5 mg, Oral, DAILY PRN    memantine (NAMENDA) 5 mg, Oral, DAILY    nitroglycerin (NITROSTAT) 0.6 mg, Sublingual, EVERY 5 MIN PRN, up to max of 3 total doses. If no relief after 1 dose, call 911.  Onabotulinumtoxin A (BOTOX (COSMETIC)) 200 Units, EVERY 3 MONTHS    ondansetron (ZOFRAN) 4 mg, Oral, 3 TIMES DAILY PRN    potassium chloride (KLOR-CON) 10 MEQ extended release tablet PRN    pramipexole (MIRAPEX) 0.25 mg, Oral, 2 TIMES DAILY    topiramate (TOPAMAX) 100 MG tablet TAKE 1 TABLET BY MOUTH TWICE A DAY    traZODone (DESYREL) 225 mg, Oral, NIGHTLY    Ubrelvy 100 mg, Oral, PRN       Allergies   Allergen Reactions    Amoxil [Amoxicillin]      rash    Biaxin [Clarithromycin] Swelling     Lips swelled    Doxycycline      rash    Morphine Nausea And Vomiting    Sulfa Antibiotics Rash       Review of Systems:   Constitutional: negative for fevers  Musculoskeletal: Positive for low back pain  Neurological: positive for migraines but negative for leg numbness/tingling or weakness  Behavioral/Psych: negative for anxiety/depression   All other systems reviewed and are negative    Objective:     Vitals:    07/26/21 1137   BP: 102/60       Constitutional: Pleasant, no acute distress   Head: Normocephalic, atraumatic   Eyes: Conjunctivae normal   Neck: Supple, symmetrical   Respiration: Non-labored breathing   Cardiovascular: Limbs warm and well perfused   Musculoskeletal:   Lumbar -ROM reduced in extension. Increased pain with facet loading bilaterally. Tenderness palpation along bilateral lumbar paraspinals. Neuro: Alert, oriented. CN II-XII appear grossly intact. NPO x 8 hours prior to the procedure.   We will start an IV prior to the procedure      Follow-up: For RIGHT-sided RFA      Leandro Powell,   Interventional Pain Management/PM&R   [place]

## 2021-07-27 ENCOUNTER — HOSPITAL ENCOUNTER (OUTPATIENT)
Age: 61
Setting detail: OUTPATIENT SURGERY
Discharge: HOME | End: 2021-07-27
Attending: ANESTHESIOLOGY | Admitting: ANESTHESIOLOGY
Payer: MEDICARE

## 2021-07-27 ENCOUNTER — APPOINTMENT (OUTPATIENT)
Dept: GENERAL RADIOLOGY | Age: 61
End: 2021-07-27
Attending: ANESTHESIOLOGY
Payer: MEDICARE

## 2021-07-27 VITALS
HEIGHT: 65 IN | RESPIRATION RATE: 16 BRPM | DIASTOLIC BLOOD PRESSURE: 57 MMHG | HEART RATE: 58 BPM | BODY MASS INDEX: 23.26 KG/M2 | SYSTOLIC BLOOD PRESSURE: 96 MMHG | TEMPERATURE: 98 F | WEIGHT: 139.6 LBS | OXYGEN SATURATION: 96 %

## 2021-07-27 PROCEDURE — 3600000054 HC PAIN LEVEL 3 BASE: Performed by: ANESTHESIOLOGY

## 2021-07-27 PROCEDURE — 7100000010 HC PHASE II RECOVERY - FIRST 15 MIN: Performed by: ANESTHESIOLOGY

## 2021-07-27 PROCEDURE — 3209999900 FLUORO FOR SURGICAL PROCEDURES

## 2021-07-27 PROCEDURE — 3600000055 HC PAIN LEVEL 3 ADDL 15 MIN: Performed by: ANESTHESIOLOGY

## 2021-07-27 PROCEDURE — 99152 MOD SED SAME PHYS/QHP 5/>YRS: CPT | Performed by: ANESTHESIOLOGY

## 2021-07-27 PROCEDURE — 7100000011 HC PHASE II RECOVERY - ADDTL 15 MIN: Performed by: ANESTHESIOLOGY

## 2021-07-27 PROCEDURE — 6360000002 HC RX W HCPCS: Performed by: ANESTHESIOLOGY

## 2021-07-27 PROCEDURE — 64636 DESTROY L/S FACET JNT ADDL: CPT | Performed by: ANESTHESIOLOGY

## 2021-07-27 PROCEDURE — 64635 DESTROY LUMB/SAC FACET JNT: CPT | Performed by: ANESTHESIOLOGY

## 2021-07-27 PROCEDURE — 2500000003 HC RX 250 WO HCPCS: Performed by: ANESTHESIOLOGY

## 2021-07-27 PROCEDURE — 6370000000 HC RX 637 (ALT 250 FOR IP): Performed by: ANESTHESIOLOGY

## 2021-07-27 PROCEDURE — 2709999900 HC NON-CHARGEABLE SUPPLY: Performed by: ANESTHESIOLOGY

## 2021-07-27 RX ORDER — SCOLOPAMINE TRANSDERMAL SYSTEM 1 MG/1
1 PATCH, EXTENDED RELEASE TRANSDERMAL
Status: DISCONTINUED | OUTPATIENT
Start: 2021-07-27 | End: 2021-07-27 | Stop reason: HOSPADM

## 2021-07-27 RX ORDER — LIDOCAINE HYDROCHLORIDE 20 MG/ML
INJECTION, SOLUTION EPIDURAL; INFILTRATION; INTRACAUDAL; PERINEURAL PRN
Status: DISCONTINUED | OUTPATIENT
Start: 2021-07-27 | End: 2021-07-27 | Stop reason: ALTCHOICE

## 2021-07-27 RX ORDER — SCOLOPAMINE TRANSDERMAL SYSTEM 1 MG/1
PATCH, EXTENDED RELEASE TRANSDERMAL
Status: DISCONTINUED
Start: 2021-07-27 | End: 2021-07-27 | Stop reason: HOSPADM

## 2021-07-27 RX ORDER — MIDAZOLAM HYDROCHLORIDE 1 MG/ML
INJECTION INTRAMUSCULAR; INTRAVENOUS PRN
Status: DISCONTINUED | OUTPATIENT
Start: 2021-07-27 | End: 2021-07-27 | Stop reason: ALTCHOICE

## 2021-07-27 RX ORDER — FENTANYL CITRATE 50 UG/ML
INJECTION, SOLUTION INTRAMUSCULAR; INTRAVENOUS PRN
Status: DISCONTINUED | OUTPATIENT
Start: 2021-07-27 | End: 2021-07-27 | Stop reason: ALTCHOICE

## 2021-07-27 RX ORDER — LIDOCAINE HYDROCHLORIDE 10 MG/ML
INJECTION, SOLUTION EPIDURAL; INFILTRATION; INTRACAUDAL; PERINEURAL PRN
Status: DISCONTINUED | OUTPATIENT
Start: 2021-07-27 | End: 2021-07-27 | Stop reason: ALTCHOICE

## 2021-07-27 ASSESSMENT — PAIN - FUNCTIONAL ASSESSMENT
PAIN_FUNCTIONAL_ASSESSMENT: PREVENTS OR INTERFERES SOME ACTIVE ACTIVITIES AND ADLS
PAIN_FUNCTIONAL_ASSESSMENT: 0-10

## 2021-07-27 ASSESSMENT — PAIN SCALES - GENERAL: PAINLEVEL_OUTOF10: 0

## 2021-07-27 ASSESSMENT — PAIN DESCRIPTION - DESCRIPTORS: DESCRIPTORS: ACHING;CONSTANT;DISCOMFORT

## 2021-07-27 NOTE — POST SEDATION
[address] [address]  Sedation/Analgesia Post Sedation Record    Pt Name: Shelia June  MRN: [id number]  YOB: 1960  Procedure Performed By: Massimo Gaytan DO  Primary Care Physician: Mitch Florez MD    POST-PROCEDURE    Physicians/Assistants: Massimo Gaytan DO  Procedure Performed: See Procedure Note   Sedation/Anesthesia: Versed and Fentanyl (See procedure note for amount and duration)  Estimated Blood Loss:     0  ml  Specimens Removed: None        Complications: None           Leandro Dior DO  Electronically signed 7/27/2021 at 9:45 AM

## 2021-07-27 NOTE — PROCEDURES
Pre-operative Diagnosis: Lumbar facet pain     Post-operative Diagnosis: Lumbar facet pain     Procedure: RIGHT lumbar thermal radiofrequency ablation targeting facet joints L4/L5 and L5/S1    Procedure Description:  After consent was obtained, the patient was placed in the prone position having pressure points appropriately padded. The lower back was prepped with chloraprep and draped in a sterile fashion. 0.5 cc of 1 % lidocaine was used for local anesthesia of the skin and superficial subcutaneous tissues. Three 20-gauge 100mm SMK cannula with a 10-mm active tip was advanced under fluoroscopy in an AP view with caudad angulation on to junction of the right superior articular process and the transverse process of the L4 and L5 vertebra and at the sacral ala. There were no paresthesias, heme or CSF obtained. Needle placement was confirmed using AP and oblique views. Sensory and motor stimulation at 50Hz and 2Hz and impedance measurements were carried out having reached threshold on the right side at:     RIGHT  L4: 0.4V/3V/200 Ohms  L5: 0.2V/3V/190 Ohms  SA: 0.2V/3V/195 Ohms        Then, 1cc of 2 % Lidocaine was injected at the site. Temperature was then raised to 80 degrees centigrade for 90 seconds with a 15 second temperature ramp. No pain was reported during the lesioning. The needles were then withdrawn without complications. The patient tolerated the procedure well and was transported to the recovery room where he was observed for 15 minutes to then be discharged in ambulatory fashion.     Procedural Complications: None  Estimated Blood Loss: 0 mL    IV sedation was used during the procedure:  - Moderate intravenous conscious sedation was supervised by Dr. Sarah Geronimo  - The patient was independently monitored by a Registered Nurse assigned to the procedure room  - Monitoring included automated blood pressure, continuous EKG, and continuous pulse oximetry  - The detailed conscious record is permanently stored in the [address] [address]  - The following is the conscious sedation record:  Start Time: 09:26  End Time : 09:45  Duration: 19 minutes   Medications Administered: 1 mg Versed, 50 mcg Fentanyl        Leandro Powell DO  Interventional Pain Management/PM&R   [place]

## 2021-07-27 NOTE — PROGRESS NOTES
0943-Patient to Phase II via cart. Report received from [employer]. Patient drowsy but responsive. Vitals obtained. BP 84/57. Patient asymptomatic. Respirations even and unlabored on room air. Patient denies pain, nausea, numbness and tingling. Patient able to move all extremities. No drainage noted at injection sites. Patient instructed to stay in bed. Instructed on call light use. 0948-Patient provided with snacks and drink. Denies needs. Call light in reach. 0958-BP rechecked 96/57. Patient awake and alert. Denies needs. 1010-IV removed with no complications. Patient getting dressed at bedside. 1025-Patient discharged in stable condition with responsible . All belongings given to patient. Patient ambulated to car with assistance from RN. Patient tolerated well.

## 2021-07-27 NOTE — PRE SEDATION
[address] [address]  Pre-Sedation/Analgesia History & Physical    Pt Name: Amita Peres  MRN: [id number]  YOB: 1960  Provider Performing Procedure: Aleks Jimenez DO   Primary Care Physician: Refugio Allen MD      MEDICAL HISTORY       has a past medical history of Adrenal abnormality ([facility] Angina at rest [facility]), Anxiety, Arthritis, Asthma, Cerebral artery occlusion with cerebral infarction [facility]), Chronic sinusitis, Cognitive impairment, Headache, History of degenerative disc disease, Hyperlipidemia, Lymphedema, May-Thurner syndrome, Peripheral vascular disease ([facility] PONV (postoperative nausea and vomiting), Positive JB (antinuclear antibody), and PTSD (post-traumatic stress disorder). SURGICAL HISTORY   has a past surgical history that includes Hysterectomy; Tubal ligation; shoulder surgery; Tonsillectomy; sinus surgery; Breast biopsy; shoulder surgery (Left); Neck surgery (12/2020); Septoplasty (Right, 6/14/2021); Pain management procedure (Bilateral, 6/29/2021); and Pain management procedure (Bilateral, 7/20/2021). ALLERGIES   Allergies as of 07/26/2021 - Fully Reviewed 07/26/2021   Allergen Reaction Noted    Amoxil [amoxicillin]  11/22/2020    Biaxin [clarithromycin] Swelling 05/27/2021    Doxycycline  11/22/2020    Morphine Nausea And Vomiting 07/31/2020    Sulfa antibiotics Rash 07/31/2020       MEDICATIONS   Prior to Admission medications    Medication Sig Start Date End Date Taking?  Authorizing Provider   traZODone (DESYREL) 150 MG tablet Take 1.5 tablets by mouth nightly 6/28/21  Yes uRdy Ramirez MD   buPROPion (WELLBUTRIN XL) 150 MG extended release tablet Take 1 tablet by mouth every morning Take with 300 mg tablet for total 450 mg once daily in the morning 6/28/21  Yes Rudy Ramirez MD   buPROPion (WELLBUTRIN XL) 300 MG extended release tablet TAKE 1 TABLET BY MOUTH EVERY DAY IN THE MORNING 6/28/21  Yes Rudy Ramirez MD   esomeprazole (Admitly) 40 MG delayed release capsule Take 1 capsule by mouth every morning (before breakfast) 5/17/21  Yes Pierre Ortega MD   pramipexole (MIRAPEX) 0.125 MG tablet Take 2 tablets by mouth 2 times daily 5/17/21  Yes Pierre Ortega MD   topiramate (TOPAMAX) 100 MG tablet TAKE 1 TABLET BY MOUTH TWICE A DAY  Patient taking differently: Take 100 mg by mouth daily  5/4/21  Yes ESTEFANIA Hernandez CNP   memantine (NAMENDA) 5 MG tablet Take 5 mg by mouth daily    Yes Historical Provider, MD   clotrimazole-betamethasone ([facility]) 1-0.05 % cream Apply topically as needed  9/16/20  Yes Historical Provider, MD   cyclobenzaprine (FLEXERIL) 10 MG tablet 2 times daily as needed  9/16/20  Yes Historical Provider, MD   potassium chloride (KLOR-CON) 10 MEQ extended release tablet as needed  9/16/20  Yes Historical Provider, MD   Onabotulinumtoxin A (BOTOX, COSMETIC,) 200 units injection 200 Units every 3 months    Yes Historical Provider, MD   hydroxychloroquine (PLAQUENIL) 200 MG tablet Take 200 mg by mouth 2 times daily    Yes Historical Provider, MD   LORazepam (ATIVAN) 0.5 MG tablet Take 1 tablet by mouth daily as needed for Anxiety for up to 30 days. 6/28/21 7/28/21  Bisi Chamberlain MD   Erenumab-aooe (AIMOVIG) 140 MG/ML SOAJ Inject 140 mg into the skin every 30 days 6/9/21   ESTEFANIA Hernandez CNP   butalbital-acetaminophen-caffeine (FIORIC, [facility]) -42 MG per tablet Take 1 tablet by mouth every 4 hours as needed for Headaches 6/9/21   Pierre Ortega MD   furosemide (LASIX) 20 MG tablet as needed  9/16/20   Historical Provider, MD   Ubrogepant (UBRELVY) 100 MG TABS Take 100 mg by mouth as needed    Historical Provider, MD   nitroglycerin (NITROSTAT) 0.6 MG SL tablet Place 0.6 mg under the tongue every 5 minutes as needed for Chest pain up to max of 3 total doses. If no relief after 1 dose, call 911.     Historical Provider, MD   ondansetron (ZOFRAN) 4 MG tablet Take 1 tablet by mouth 3 times daily as needed for Nausea or Vomiting 7/31/20

## 2021-07-29 ENCOUNTER — OFFICE VISIT (OUTPATIENT)
Dept: ENT CLINIC | Age: 61
End: 2021-07-29
Payer: MEDICARE

## 2021-07-29 VITALS
SYSTOLIC BLOOD PRESSURE: 126 MMHG | TEMPERATURE: 97.6 F | WEIGHT: 141 LBS | HEIGHT: 65 IN | RESPIRATION RATE: 14 BRPM | BODY MASS INDEX: 23.49 KG/M2 | HEART RATE: 76 BPM | DIASTOLIC BLOOD PRESSURE: 64 MMHG

## 2021-07-29 DIAGNOSIS — E04.1 THYROID NODULE: Primary | ICD-10-CM

## 2021-07-29 PROCEDURE — 99213 OFFICE O/P EST LOW 20 MIN: CPT | Performed by: OTOLARYNGOLOGY

## 2021-07-29 PROCEDURE — 4004F PT TOBACCO SCREEN RCVD TLK: CPT | Performed by: OTOLARYNGOLOGY

## 2021-07-29 PROCEDURE — 3017F COLORECTAL CA SCREEN DOC REV: CPT | Performed by: OTOLARYNGOLOGY

## 2021-07-29 PROCEDURE — G8420 CALC BMI NORM PARAMETERS: HCPCS | Performed by: OTOLARYNGOLOGY

## 2021-07-29 PROCEDURE — G8427 DOCREV CUR MEDS BY ELIG CLIN: HCPCS | Performed by: OTOLARYNGOLOGY

## 2021-07-29 ASSESSMENT — ENCOUNTER SYMPTOMS
STRIDOR: 0
FACIAL SWELLING: 0
RHINORRHEA: 0
CHEST TIGHTNESS: 0
WHEEZING: 0
APNEA: 0
SINUS PRESSURE: 0
TROUBLE SWALLOWING: 0
COUGH: 0
VOMITING: 0
VOICE CHANGE: 0
DIARRHEA: 0
SHORTNESS OF BREATH: 0
COLOR CHANGE: 0
SORE THROAT: 0
CHOKING: 0
NAUSEA: 0
ABDOMINAL PAIN: 0

## 2021-07-29 NOTE — PROGRESS NOTES
[address] EAR, NOSE AND THROAT  [facility] [place]  Dept: [phone]  Dept Fax: [phone]  Loc: [phone]    Geovanna Moreland is a 62 y.o. female who was referred byNo ref. provider found for:  Chief Complaint   Patient presents with    Follow-up     Patient is here for a follow up after US Thyroid 7/15/21   . HPI:     Geovanna Moreland is a 62 y.o. female who presents today for discussion of the results of her ultrasound of her thyroid gland. This showed a large nodule on the left side with biopsy recommended    PROCEDURE: US THYROID       CLINICAL INFORMATION: Right thyroid nodule.       COMPARISON: No prior study.       TECHNIQUE: Ultrasound images of the thyroid gland were obtained with a linear transducer.       FINDINGS:           Right Thyroid - 5.0 x 1.0 x 1.9 cm   Left Thyroid -5.7 x 1.6 x 2.2 cm   Isthmus -0.15 cm       Thyroid size: Normal.       Nodules:. L1. Left lobe inferiorly. 2.9 x 1.6 x 2.2 cm heterogeneous well-circumscribed nodule with slightly increased peripheral vascularity. TR3. Please correlate clinically. If this nodule has not been biopsied previously, ultrasound-guided biopsy is    recommended for further evaluation. If previous studies become available for comparison, we will be happy to do that.               Parenchymal echogenicity/vascularity: Normal.       Microlithiasis: None.       Cervical lymph nodes: None.                   Impression       1. 2.9 x 1.6 x 2.2 cm heterogeneous well-circumscribed nodule with slight peripheral vascularity in the left lobe inferiorly. If this nodule has not been previously biopsied, ultrasound-guided biopsy is recommended for further evaluation. We would be    happy to compare with previous studies if they become available. 2. Otherwise negative thyroid ultrasound. .                   **This report has been created using voice recognition software.  It may contain under the tongue every 5 minutes as needed for Chest pain up to max of 3 total doses. If no relief after 1 dose, call 911.  ondansetron (ZOFRAN) 4 MG tablet Take 1 tablet by mouth 3 times daily as needed for Nausea or Vomiting 15 tablet 0    atorvastatin (LIPITOR) 40 MG tablet Take 1 tablet by mouth daily 90 tablet 3    aspirin EC 81 MG EC tablet Take 1 tablet by mouth daily 90 tablet 1    Magnesium Oxide 200 MG TABS Take 200 mg 4 times a day as needed for migraines and constipation.  120 tablet 3    topiramate (TOPAMAX) 100 MG tablet TAKE 1 TABLET BY MOUTH TWICE A  tablet 1     Current Facility-Administered Medications   Medication Dose Route Frequency Provider Last Rate Last Admin    onabotulinumtoxin A (BOTOX) injection 200 Units  200 Units Intramuscular Once Leandro Noriega DO         Past Medical History:   Diagnosis Date    Adrenal abnormality (HCC)     Angina at rest [facility])     MICROVASCULAR CARDIAC DISEASE    Anxiety     Arthritis     Asthma     Cerebral artery occlusion with cerebral infarction (HCC)     Chronic sinusitis     Cognitive impairment     with retograde amnesia    Headache     Migraines Hemiplegic    History of degenerative disc disease     Hyperlipidemia     Lymphedema     Left leg    May-Thurner syndrome     Peripheral vascular disease (HCC)     PONV (postoperative nausea and vomiting)     Positive BJ (antinuclear antibody)     PTSD (post-traumatic stress disorder)       Past Surgical History:   Procedure Laterality Date    BREAST BIOPSY      HYSTERECTOMY      NECK SURGERY  12/2020    ACDF    PAIN MANAGEMENT PROCEDURE Bilateral 6/29/2021    medial branch blocks at bilateral L4/L5 and L5/S1 performed by Jenni Ace DO at [facility] Bilateral 7/20/2021    confirmatory medial branch blocks at bilateral L4/L5 and L5/S1 performed by Jenni Ace DO at [address] [address] Right 2021    thermal radiofrequency ablation medial branches L4/L5 and L5/S1 right side first performed by Roseline Childs DO at [address] Left 8/3/2021    thermal radiofrequency ablation medial branches L4/L5 and L5/S1 left performed by Roseline Childs DO at [address] [facility] SEPTOPLASTY Right 2021    SEPTOPLASTY, SUBMUCOUS RESECTION TURBINATE, RIGHT PARTIAL INFERIOR, NASAL ENDOSCOPY WITH PARTIAL RESECTION OF RIGHT MIDDLE JHON BULLOSA performed by Steve Jeronimo MD at [address] Left     SINUS SURGERY      TONSILLECTOMY      TUBAL LIGATION      US THYROID CYST ASPIRATION AND OR INJECTION  2021    US THYROID CYST ASPIRATION AND OR INJECTION 2021 [facility]     Family History   Adopted: Yes   Problem Relation Age of Onset    Cancer Father         Stomach Cancer    Cancer Paternal Uncle         Colon Cancer    Stroke Maternal Grandmother     Rheum Arthritis Maternal Grandmother      Social History     Tobacco Use    Smoking status: Light Tobacco Smoker     Packs/day: 1.00     Years: 12.00     Pack years: 12.00     Types: Cigarettes     Last attempt to quit: 2019     Years since quittin.6    Smokeless tobacco: Never Used   Substance Use Topics    Alcohol use: Not Currently     Comment: stopped in 2020 due to fatty liver       Subjective:      Review of Systems   Constitutional: Negative for activity change, appetite change, chills, diaphoresis, fatigue, fever and unexpected weight change. HENT: Negative for congestion, dental problem, ear discharge, ear pain, facial swelling, hearing loss, mouth sores, nosebleeds, postnasal drip, rhinorrhea, sinus pressure, sneezing, sore throat, tinnitus, trouble swallowing and voice change. Eyes: Negative for visual disturbance.    Respiratory: Negative for apnea, cough, choking, chest tightness, shortness of breath, wheezing and stridor. Cardiovascular: Negative for chest pain, palpitations and leg swelling. Gastrointestinal: Negative for abdominal pain, diarrhea, nausea and vomiting. Endocrine: Negative for cold intolerance, heat intolerance, polydipsia and polyuria. Genitourinary: Negative for dysuria, enuresis and hematuria. Musculoskeletal: Negative for arthralgias, gait problem, neck pain and neck stiffness. Skin: Negative for color change and rash. Allergic/Immunologic: Negative for environmental allergies, food allergies and immunocompromised state. Neurological: Negative for dizziness, syncope, facial asymmetry, speech difficulty, light-headedness and headaches. Hematological: Negative for adenopathy. Does not bruise/bleed easily. Psychiatric/Behavioral: Negative for confusion and sleep disturbance. The patient is not nervous/anxious. Objective:   /64 (Site: Left Upper Arm, Position: Sitting)   Pulse 76   Temp 97.6 °F (36.4 °C) (Infrared)   Resp 14   Ht 5' 5\" (1.651 m)   Wt 141 lb (64 kg)   BMI 23.46 kg/m²     Physical Exam   Left lobe of thyroid enlarged    Data:  All of the past medical history, past surgical history, family history,social history, allergies and current medications were reviewed with the patient. Assessment & Plan   Diagnoses and all orders for this visit:     Diagnosis Orders   1. Thyroid nodule, left   Cytology, Non-Gyn       The findings were explained and her questions were answered. I reviewed the findings with her and recommended a fine-needle aspirate for cytology, ultrasound-guided. She agreed. ADDENDUM: Cytology report indicated the lesion was consistent with a benign colloid nodule. She is euthyroid. We have contacted the patient and suggested that we simply get another thyroid ultrasound in 1 year. Scheduled        Rosendo Santiago. Maddie Hammer MD    **This report has been created using voice recognition software.  It may contain minor errors which are inherent in voicerecognition technology. **

## 2021-07-30 ENCOUNTER — OFFICE VISIT (OUTPATIENT)
Dept: NEUROLOGY | Age: 61
End: 2021-07-30
Payer: MEDICARE

## 2021-07-30 VITALS
OXYGEN SATURATION: 99 % | SYSTOLIC BLOOD PRESSURE: 122 MMHG | BODY MASS INDEX: 23.66 KG/M2 | DIASTOLIC BLOOD PRESSURE: 68 MMHG | WEIGHT: 142 LBS | HEART RATE: 62 BPM | HEIGHT: 65 IN

## 2021-07-30 DIAGNOSIS — G43.109 MIGRAINE WITH AURA AND WITHOUT STATUS MIGRAINOSUS, NOT INTRACTABLE: Primary | ICD-10-CM

## 2021-07-30 PROCEDURE — 4004F PT TOBACCO SCREEN RCVD TLK: CPT | Performed by: PSYCHIATRY & NEUROLOGY

## 2021-07-30 PROCEDURE — G8427 DOCREV CUR MEDS BY ELIG CLIN: HCPCS | Performed by: PSYCHIATRY & NEUROLOGY

## 2021-07-30 PROCEDURE — 3017F COLORECTAL CA SCREEN DOC REV: CPT | Performed by: PSYCHIATRY & NEUROLOGY

## 2021-07-30 PROCEDURE — 99213 OFFICE O/P EST LOW 20 MIN: CPT | Performed by: PSYCHIATRY & NEUROLOGY

## 2021-07-30 PROCEDURE — G8420 CALC BMI NORM PARAMETERS: HCPCS | Performed by: PSYCHIATRY & NEUROLOGY

## 2021-07-30 RX ORDER — TOPIRAMATE 100 MG/1
TABLET, FILM COATED ORAL
Qty: 180 TABLET | Refills: 1 | Status: SHIPPED | OUTPATIENT
Start: 2021-07-30

## 2021-08-02 NOTE — H&P
Today, patient presents for planned thermal radiofrequency ablation at LEFT medial branches L4/L5 and L5/S1. This note is reflective of the patient's previous visit for evaluation. We will proceed with today's planned procedure. Since patient's last visit for evaluation, there have been no interval changes in medical history. Patient has no new numbness, weakness, or focal neurological deficit since evaluation. Patient has no contraindications to injection (no anticoagulation or recent antibiotic intake for active infections), and has a  present or is able to drive themselves (as discussed and cleared by physician). Allergies to latex, contrast dye, and steroid medications have been confirmed with the patient prior to the procedure. NPO necessity has been assessed and accepted based on procedure complexity. The risks and benefits of the procedure have been explained including but are not limited to infection, bleeding, paralysis, immediate post procedure weakness, and dizziness; the patient acknowledges understanding and desires to proceed with the procedure. Patient has signed consent for same procedure as discussed in previous clinic encounter. All other questions and concerns were addressed at bedside. See procedure note for full details. Follow up: 9/30/21    Post procedure Instructions: The patient was advised not to drive during the day of the procedure and not to engage in any significant decision making (unless otherwise states by physician). The patient was also advised to be cautious with walking/activity for 24 hours following today's visit and asked not to engage in over-exertion (unless otherwise states by physician). After this time, it is ok to resume pre-procedure level of activity. Patient advised to apply ice to site of injection in situations of pain and discomfort.  Patient advised to not submerge site of injection during bath or pool activities for approximately 24 hours post-procedure. Patient attested to understanding post procedure directions / restrictions. All other questions and concerns addressed before patient discharge in ambulatory fashion. Chronic Pain/PM&R Clinic Note     Encounter Date: 7/26/21    Subjective:   Chief Complaint:   No chief complaint on file. History of Present Illness:   Emanuel Whitt is a 62 y.o. female seen in the office initially on 03/05/21 for her management of migraines. She has medical history of previous TIA with residual cognitive deficits, and ACDF C3-C7 with Dr Robin Valera. She has longstanding history of migraine headaches as well as of the last 35 years. She describes 2 different types of migraine headaches. Her standard migraine headaches she reports started on the right side the neck and wrap around the entire side of the front of the head. She has associated phonophobia and photophobia. She also notices these migraines to begin with an aura where her nose starts to burn. She has associated nausea/vomiting when the migraines are severe. Her migraines last greater than 4 hours in duration interfering everyday activities. In addition she reports greater than 15 migraine attacks last month. She has failed multiple medications in the past including Fioricet, Ubrelvy, nortriptyline, and is currently on Aimovig and Topamax. 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 In addition, she does have axial low back pain that contributes to a lot of her debilitating pain. She denies any radiation down the legs, focal leg weakness, leg paresthesias, saddle anesthesia, bowel/bladder incontinence. Today, 7/26/2021, patient presents for planned follow-up for chronic low back pain. She underwent confirmatory lumbar medial branch blocks on 7/20/2021. She reports >90% pain relief low back pain. She states she would like to proceed with her radiofrequency ablation for her low back.   In addition, she would like to pursue eventually occipital nerve blocks in the future. She wants to get her previous pain records sent over to me for further evaluation and discussion about treatment options for her neck pain.       Past Medical History:   Diagnosis Date    Adrenal abnormality (HCC)     Angina at rest [facility])     MICROVASCULAR CARDIAC DISEASE    Anxiety     Arthritis     Asthma     Cerebral artery occlusion with cerebral infarction ([facility]     Chronic sinusitis     Cognitive impairment     with retograde amnesia    Headache     Migraines Hemiplegic    History of degenerative disc disease     Hyperlipidemia     Lymphedema     Left leg    May-Thurner syndrome     Peripheral vascular disease ([facility]     PONV (postoperative nausea and vomiting)     Positive BJ (antinuclear antibody)     PTSD (post-traumatic stress disorder)        Past Surgical History:   Procedure Laterality Date    BREAST BIOPSY      HYSTERECTOMY      NECK SURGERY  12/2020    ACDF    PAIN MANAGEMENT PROCEDURE Bilateral 6/29/2021    medial branch blocks at bilateral L4/L5 and L5/S1 performed by Shayna Merida DO at [place] [address] Bilateral 7/20/2021    confirmatory medial branch blocks at bilateral L4/L5 and L5/S1 performed by Shayna Merida DO [facility] Right 7/27/2021    thermal radiofrequency ablation medial branches L4/L5 and L5/S1 right side first performed by Shayna Merida DO at [address] [place] [address] SEPTOPLASTY Right 6/14/2021    SEPTOPLASTY, SUBMUCOUS RESECTION TURBINATE, RIGHT PARTIAL INFERIOR, NASAL ENDOSCOPY WITH PARTIAL RESECTION OF RIGHT MIDDLE JHON BULLOSA performed by Sanjeev Ríos MD at [address] Left     SINUS SURGERY      TONSILLECTOMY      TUBAL LIGATION         Family History   Adopted: Yes   Problem Relation Age of Onset    Cancer Father         Stomach Cancer  Cancer Paternal Uncle         Colon Cancer    Stroke Maternal Grandmother     Rheum Arthritis Maternal Grandmother        Social History     Socioeconomic History    Marital status:      Spouse name: Not on file    Number of children: Not on file    Years of education: Not on file    Highest education level: Not on file   Occupational History    Not on file   Tobacco Use    Smoking status: Light Tobacco Smoker     Packs/day: 1.00     Years: 12.00     Pack years: 12.00     Types: Cigarettes     Last attempt to quit: 2019     Years since quittin.5    Smokeless tobacco: Never Used   Vaping Use    Vaping Use: Never used   Substance and Sexual Activity    Alcohol use: Not Currently     Comment: stopped in 2020 due to fatty liver    Drug use: Never    Sexual activity: Never   Other Topics Concern    Not on file   Social History Narrative    Not on file     Social Determinants of Health     Financial Resource Strain: Low Risk     Difficulty of Paying Living Expenses: Not hard at all   Food Insecurity: No Food Insecurity    Worried About [address] in the Last Year: Never true    [address] [employer] in the Last Year: Never true   Transportation Needs:     Lack of Transportation (Medical):      Lack of Transportation (Non-Medical):    Physical Activity:     Days of Exercise per Week:     Minutes of Exercise per Session:    Stress:     Feeling of Stress :    Social Connections:     Frequency of Communication with Friends and Family:     Frequency of Social Gatherings with Friends and Family:     Attends Confucianist Services:     Active Member of Clubs or Organizations:     Attends Club or Organization Meetings:     Marital Status:    Intimate Partner Violence:     Fear of Current or Ex-Partner:     Emotionally Abused:     Physically Abused:     Sexually Abused:        Medications & Allergies:   Current Outpatient Medications   Medication Instructions    Janis Kirk 140 mg, Subcutaneous, EVERY 30 DAYS    buPROPion (WELLBUTRIN XL) 300 MG extended release tablet TAKE 1 TABLET BY MOUTH EVERY DAY IN THE MORNING    buPROPion (WELLBUTRIN XL) 150 mg, Oral, EVERY MORNING, Take with 300 mg tablet for total 450 mg once daily in the morning    butalbital-acetaminophen-caffeine (FIORICET, ESGIC) -40 MG per tablet 1 tablet, Oral, EVERY 4 HOURS PRN    clotrimazole-betamethasone (LOTRISONE) 1-0.05 % cream Topical, PRN    cyclobenzaprine (FLEXERIL) 10 MG tablet 2 TIMES DAILY PRN    esomeprazole (NEXIUM) 40 mg, Oral, DAILY BEFORE BREAKFAST    furosemide (LASIX) 20 MG tablet PRN    hydroxychloroquine (PLAQUENIL) 200 mg, Oral, 2 TIMES DAILY    memantine (NAMENDA) 5 mg, Oral, DAILY    nitroglycerin (NITROSTAT) 0.6 mg, Sublingual, EVERY 5 MIN PRN, up to max of 3 total doses. If no relief after 1 dose, call 911.  Onabotulinumtoxin A (BOTOX (COSMETIC)) 200 Units, EVERY 3 MONTHS    ondansetron (ZOFRAN) 4 mg, Oral, 3 TIMES DAILY PRN    potassium chloride (KLOR-CON) 10 MEQ extended release tablet PRN    pramipexole (MIRAPEX) 0.25 mg, Oral, 2 TIMES DAILY    topiramate (TOPAMAX) 100 MG tablet TAKE 1 TABLET BY MOUTH TWICE A DAY    traZODone (DESYREL) 225 mg, Oral, NIGHTLY    Ubrelvy 100 mg, Oral, PRN       Allergies   Allergen Reactions    Amoxil [Amoxicillin]      rash    Biaxin [Clarithromycin] Swelling     Lips swelled    Doxycycline      rash    Morphine Nausea And Vomiting    Sulfa Antibiotics Rash       Review of Systems:   Constitutional: negative for fevers  Musculoskeletal: Positive for low back pain  Neurological: positive for migraines but negative for leg numbness/tingling or weakness  Behavioral/Psych: negative for anxiety/depression   All other systems reviewed and are negative    Objective: There were no vitals filed for this visit.     Constitutional: Pleasant, no acute distress   Head: Normocephalic, atraumatic   Eyes: Conjunctivae normal   Neck: Supple, symmetrical   Respiration: Non-labored breathing   Cardiovascular: Limbs warm and well perfused   Musculoskeletal:   Lumbar -ROM reduced in extension. Increased pain with facet loading bilaterally. Tenderness palpation along bilateral lumbar paraspinals. Neuro: Alert, oriented. CN II-XII appear grossly intact. No focal motor deficits appreciated in lower limbs. Skin: no skin rashes or lesions visible on face  Psychological: Cooperative, no exaggerated pain behaviors       Assessment:    Diagnosis Orders   1. Lumbar facet joint pain  CHG FLUOR NEEDLE/CATH SPINE/PARASPINAL DX/THER ADDON    SC RADIOFREQUENCY NEUROTOMY LUMBAR OR SACRAL, W IMAGE GUIDANCE, SINGLE    SC RADIOFREQ NEUROTOMY LUMBAR OR SACRAL, W IMAGE GUIDE,EA ADDL LEVEL   2. Lumbar spondylosis  CHG FLUOR NEEDLE/CATH SPINE/PARASPINAL DX/THER ADDON    SC RADIOFREQUENCY NEUROTOMY LUMBAR OR SACRAL, W IMAGE GUIDANCE, SINGLE    SC RADIOFREQ NEUROTOMY LUMBAR OR SACRAL, W IMAGE GUIDE,EA ADDL LEVEL   3. Myofascial pain     4. Other chronic pain           Teresa Fulton is a 62 y. o.female presenting to the pain clinic for evaluation of migraines. She underwent Botox injections for management of migraine headaches today. In addition, she has lumbar facet mediated pain. I have set her up for diagnostic lumbar medial branch blocks targeting the facet joints of L4/L5 and L5/S1. She responded significantly to her diagnostic and confirmatory medial branch blocks. I set her up for thermal radiofrequency ablation targeting bilateral facet joints of L4/L5 and L5/S1. Plan: The following treatment recommendations and plan were discussed in detail with Teresa Fulton. Interventions:  Low Back Pain  In presence of lumbar axial back pain and with physical exam consistent for facetal pain and significant response to diagnostic and confirmatory medial branch blocks, thermal radiofrequency ablation at bilateral medial branches L4/L5 and L5/S1 was chosen.   We will start with the right side first and proceed with a left-sided 1 week later. The risks and benefits were discussed in detail with the patient. Patient wants to proceed with the injection. Anticoagulation/NPO Recommendations:   Patient does not need to hold any medications prior to the procedure. Patient will need to be NPO x 8 hours prior to the procedure.   We will start an IV prior to the procedure      Follow-up: For RIGHT-sided RFA      Leandro Powell, DO  Interventional Pain Management/PM&R   [place]

## 2021-08-03 ENCOUNTER — APPOINTMENT (OUTPATIENT)
Dept: GENERAL RADIOLOGY | Age: 61
End: 2021-08-03
Attending: ANESTHESIOLOGY
Payer: MEDICARE

## 2021-08-03 ENCOUNTER — HOSPITAL ENCOUNTER (OUTPATIENT)
Age: 61
Setting detail: OUTPATIENT SURGERY
Discharge: HOME | End: 2021-08-03
Attending: ANESTHESIOLOGY | Admitting: ANESTHESIOLOGY
Payer: MEDICARE

## 2021-08-03 VITALS
OXYGEN SATURATION: 98 % | DIASTOLIC BLOOD PRESSURE: 55 MMHG | RESPIRATION RATE: 16 BRPM | HEART RATE: 60 BPM | HEIGHT: 65 IN | SYSTOLIC BLOOD PRESSURE: 101 MMHG | TEMPERATURE: 97.7 F | WEIGHT: 139 LBS | BODY MASS INDEX: 23.16 KG/M2

## 2021-08-03 PROCEDURE — 64636 DESTROY L/S FACET JNT ADDL: CPT | Performed by: ANESTHESIOLOGY

## 2021-08-03 PROCEDURE — 2709999900 HC NON-CHARGEABLE SUPPLY: Performed by: ANESTHESIOLOGY

## 2021-08-03 PROCEDURE — 6370000000 HC RX 637 (ALT 250 FOR IP)

## 2021-08-03 PROCEDURE — 3600000054 HC PAIN LEVEL 3 BASE: Performed by: ANESTHESIOLOGY

## 2021-08-03 PROCEDURE — 7100000011 HC PHASE II RECOVERY - ADDTL 15 MIN: Performed by: ANESTHESIOLOGY

## 2021-08-03 PROCEDURE — 3600000055 HC PAIN LEVEL 3 ADDL 15 MIN: Performed by: ANESTHESIOLOGY

## 2021-08-03 PROCEDURE — 64635 DESTROY LUMB/SAC FACET JNT: CPT | Performed by: ANESTHESIOLOGY

## 2021-08-03 PROCEDURE — 7100000010 HC PHASE II RECOVERY - FIRST 15 MIN: Performed by: ANESTHESIOLOGY

## 2021-08-03 PROCEDURE — 99152 MOD SED SAME PHYS/QHP 5/>YRS: CPT | Performed by: ANESTHESIOLOGY

## 2021-08-03 PROCEDURE — 2500000003 HC RX 250 WO HCPCS: Performed by: ANESTHESIOLOGY

## 2021-08-03 PROCEDURE — 3209999900 FLUORO FOR SURGICAL PROCEDURES

## 2021-08-03 PROCEDURE — 6360000002 HC RX W HCPCS: Performed by: ANESTHESIOLOGY

## 2021-08-03 RX ORDER — FENTANYL CITRATE 50 UG/ML
INJECTION, SOLUTION INTRAMUSCULAR; INTRAVENOUS PRN
Status: DISCONTINUED | OUTPATIENT
Start: 2021-08-03 | End: 2021-08-03 | Stop reason: ALTCHOICE

## 2021-08-03 RX ORDER — SCOLOPAMINE TRANSDERMAL SYSTEM 1 MG/1
1 PATCH, EXTENDED RELEASE TRANSDERMAL ONCE
Status: DISCONTINUED | OUTPATIENT
Start: 2021-08-03 | End: 2021-08-03 | Stop reason: HOSPADM

## 2021-08-03 RX ORDER — MIDAZOLAM HYDROCHLORIDE 1 MG/ML
INJECTION INTRAMUSCULAR; INTRAVENOUS PRN
Status: DISCONTINUED | OUTPATIENT
Start: 2021-08-03 | End: 2021-08-03 | Stop reason: ALTCHOICE

## 2021-08-03 RX ORDER — SCOLOPAMINE TRANSDERMAL SYSTEM 1 MG/1
PATCH, EXTENDED RELEASE TRANSDERMAL
Status: COMPLETED
Start: 2021-08-03 | End: 2021-08-03

## 2021-08-03 RX ORDER — LIDOCAINE HYDROCHLORIDE 20 MG/ML
INJECTION, SOLUTION EPIDURAL; INFILTRATION; INTRACAUDAL; PERINEURAL PRN
Status: DISCONTINUED | OUTPATIENT
Start: 2021-08-03 | End: 2021-08-03 | Stop reason: ALTCHOICE

## 2021-08-03 RX ORDER — LIDOCAINE HYDROCHLORIDE 10 MG/ML
INJECTION, SOLUTION EPIDURAL; INFILTRATION; INTRACAUDAL; PERINEURAL PRN
Status: DISCONTINUED | OUTPATIENT
Start: 2021-08-03 | End: 2021-08-03 | Stop reason: ALTCHOICE

## 2021-08-03 ASSESSMENT — PAIN DESCRIPTION - DESCRIPTORS: DESCRIPTORS: CONSTANT;DISCOMFORT

## 2021-08-03 ASSESSMENT — PAIN SCALES - GENERAL: PAINLEVEL_OUTOF10: 0

## 2021-08-03 ASSESSMENT — PAIN - FUNCTIONAL ASSESSMENT
PAIN_FUNCTIONAL_ASSESSMENT: 0-10
PAIN_FUNCTIONAL_ASSESSMENT: PREVENTS OR INTERFERES SOME ACTIVE ACTIVITIES AND ADLS

## 2021-08-03 NOTE — PROCEDURES
Pre-operative Diagnosis: Lumbar facet pain     Post-operative Diagnosis: Lumbar facet pain     Procedure: LEFT lumbar thermal radiofrequency ablation targeting facet joints L4/L5 and L5/S1     Procedure Description:  After consent was obtained, the patient was placed in the prone position having pressure points appropriately padded. The lower back was prepped with chloraprep and draped in a sterile fashion.  0.5 cc of 1 % lidocaine was used for local anesthesia of the skin and superficial subcutaneous tissues.  Three 20-gauge 100mm SMK cannula with a 10-mm active tip was advanced under fluoroscopy in an AP view with caudad angulation on to junction of the LEFT superior articular process and the transverse process of the L4 and L5 vertebra and at the sacral ala. There were no paresthesias, heme or CSF obtained.  Needle placement was confirmed using AP and oblique views.      Sensory and motor stimulation at 50Hz and 2Hz and impedance measurements were carried out having reached threshold on the LEFT side at:     LEFT  L4: 0.2V/3V/150 Ohms  L5: 0.2V/3V/180 Ohms  SA: 0.2V/3V/195 Ohms        Then, 1cc of 2 % Lidocaine was injected at the site. Temperature was then raised to 80 degrees centigrade for 90 seconds with a 15 second temperature ramp. No pain was reported during the lesioning. The needles were then withdrawn without complications.  The patient tolerated the procedure well and was transported to the recovery room where he was observed for 15 minutes to then be discharged in ambulatory fashion.     Procedural Complications: None  Estimated Blood Loss: 0 mL     IV sedation was used during the procedure:  - Moderate intravenous conscious sedation was supervised by Dr. Brittany Broussard  - The patient was independently monitored by a Registered Nurse assigned to the procedure room  - Monitoring included automated blood pressure, continuous EKG, and continuous pulse oximetry  - The detailed conscious record is permanently stored in the [place] [id number]  - The following is the conscious sedation record:  Start Time: 14:07  End Time : 14:25  Duration: 18 minutes   Medications Administered: 2 mg Versed, 50 mcg Fentanyl         Leandro Fernandez DO  Interventional Pain Management/PM&R   [facility]

## 2021-08-03 NOTE — POST SEDATION
[facility]  Sedation/Analgesia Post Sedation Record    Pt Name: Poli Carlos  MRN: [id number]  YOB: 1960  Procedure Performed By: Norma Baez DO  Primary Care Physician: Franky Nunez MD    POST-PROCEDURE    Physicians/Assistants: Norma Baez DO  Procedure Performed: See Procedure Note   Sedation/Anesthesia: Versed and Fentanyl (See procedure note for amount and duration)  Estimated Blood Loss:     0  ml  Specimens Removed: None        Complications: None           Leandro Bush DO  Electronically signed 8/3/2021 at 3:28 PM

## 2021-08-03 NOTE — PROGRESS NOTES
[address] . PULSE OX 98% ROOM AIR . DENIES PAIN AT PRESENT. INJECTION SITE DRY AND INTACT. DR. Desmond Abbasi IN TO SEE  1430 PEPSI AND CRACKERS GIVEN AS REQUESTED.   [address] [facility]

## 2021-08-03 NOTE — PRE SEDATION
[facility] [facility]  Pre-Sedation/Analgesia History & Physical    Pt Name: Sherry Felton  MRN: [id number]  YOB: 1960  Provider Performing Procedure: Leonard Del Valle DO   Primary Care Physician: Julio Singh MD      MEDICAL HISTORY       has a past medical history of Adrenal abnormality ([facility] [address] Angina at rest [facility] Anxiety, Arthritis, Asthma, Cerebral artery occlusion with cerebral infarction [facility]), Chronic sinusitis, Cognitive impairment, Headache, History of degenerative disc disease, Hyperlipidemia, Lymphedema, May-Thurner syndrome, Peripheral vascular disease ([facility] [address] PONV (postoperative nausea and vomiting), Positive BJ (antinuclear antibody), and PTSD (post-traumatic stress disorder). SURGICAL HISTORY   has a past surgical history that includes Hysterectomy; Tubal ligation; shoulder surgery; Tonsillectomy; sinus surgery; Breast biopsy; shoulder surgery (Left); Neck surgery (12/2020); Septoplasty (Right, 6/14/2021); Pain management procedure (Bilateral, 6/29/2021); Pain management procedure (Bilateral, 7/20/2021); and Pain management procedure (Right, 7/27/2021). ALLERGIES   Allergies as of 07/26/2021 - Fully Reviewed 07/26/2021   Allergen Reaction Noted    Amoxil [amoxicillin]  11/22/2020    Biaxin [clarithromycin] Swelling 05/27/2021    Doxycycline  11/22/2020    Morphine Nausea And Vomiting 07/31/2020    Sulfa antibiotics Rash 07/31/2020       MEDICATIONS   Prior to Admission medications    Medication Sig Start Date End Date Taking?  Authorizing Provider   topiramate (TOPAMAX) 100 MG tablet TAKE 1 TABLET BY MOUTH TWICE A DAY 7/30/21  Yes Clare Mata MD   traZODone (DESYREL) 150 MG tablet Take 1.5 tablets by mouth nightly 6/28/21  Yes Arleth Abbott MD   buPROPion (WELLBUTRIN XL) 150 MG extended release tablet Take 1 tablet by mouth every morning Take with 300 mg tablet for total 450 mg once daily in the morning 6/28/21  Yes Arleth Abbott MD   buPROPion (WELLBUTRIN XL) 300 MG extended release tablet TAKE 1 TABLET BY MOUTH EVERY DAY IN THE MORNING 6/28/21  Yes Jovanni Wilson MD   esomeprazole (Hitpost) 40 MG delayed release capsule Take 1 capsule by mouth every morning (before breakfast) 5/17/21  Yes Yecenia Merlos MD   pramipexole (MIRAPEX) 0.125 MG tablet Take 2 tablets by mouth 2 times daily 5/17/21  Yes Yecenia Merlos MD   memantine (NAMENDA) 5 MG tablet Take 5 mg by mouth daily    Yes Historical Provider, MD   clotrimazole-betamethasone (Fozia Ego) 1-0.05 % cream Apply topically as needed  9/16/20  Yes Historical Provider, MD   potassium chloride (KLOR-CON) 10 MEQ extended release tablet as needed  9/16/20  Yes Historical Provider, MD   Onabotulinumtoxin A (BOTOX, COSMETIC,) 200 units injection 200 Units every 3 months    Yes Historical Provider, MD   hydroxychloroquine (PLAQUENIL) 200 MG tablet Take 200 mg by mouth 2 times daily    Yes Historical Provider, MD Kaity Becerra (AIMOVIG) 140 MG/ML SOAJ Inject 140 mg into the skin every 30 days 6/9/21   Florence Mclaughlin APRN - CNP   butalbital-acetaminophen-caffeine (FIORICET, ESGIC) -99 MG per tablet Take 1 tablet by mouth every 4 hours as needed for Headaches 6/9/21   Yecenia Merlos MD   cyclobenzaprine (FLEXERIL) 10 MG tablet 2 times daily as needed  9/16/20   Historical Provider, MD   furosemide (LASIX) 20 MG tablet as needed  9/16/20   Historical Provider, MD   Ubrogepant (UBRELVY) 100 MG TABS Take 100 mg by mouth as needed    Historical Provider, MD   nitroglycerin (NITROSTAT) 0.6 MG SL tablet Place 0.6 mg under the tongue every 5 minutes as needed for Chest pain up to max of 3 total doses. If no relief after 1 dose, call 911.     Historical Provider, MD   ondansetron (ZOFRAN) 4 MG tablet Take 1 tablet by mouth 3 times daily as needed for Nausea or Vomiting 7/31/20   Digna Ohaar MD     PHYSICAL:   Vitals:    08/03/21 1425   BP: (!) 101/55   Pulse: 60   Resp: 16   Temp: 97.7 °F (36.5 °C)   SpO2: 98%     PLANNED

## 2021-08-06 ENCOUNTER — HOSPITAL ENCOUNTER (OUTPATIENT)
Dept: ULTRASOUND IMAGING | Age: 61
Discharge: HOME | End: 2021-08-06
Payer: MEDICARE

## 2021-08-06 DIAGNOSIS — E04.1 THYROID NODULE: ICD-10-CM

## 2021-08-06 PROCEDURE — 88173 CYTOPATH EVAL FNA REPORT: CPT

## 2021-08-06 PROCEDURE — 88177 CYTP FNA EVAL EA ADDL: CPT

## 2021-08-06 PROCEDURE — 88172 CYTP DX EVAL FNA 1ST EA SITE: CPT

## 2021-08-06 PROCEDURE — 76942 ECHO GUIDE FOR BIOPSY: CPT

## 2021-08-13 ENCOUNTER — OFFICE VISIT (OUTPATIENT)
Dept: FAMILY MEDICINE CLINIC | Age: 61
End: 2021-08-13
Payer: MEDICARE

## 2021-08-13 ENCOUNTER — PREP FOR PROCEDURE (OUTPATIENT)
Dept: ENT CLINIC | Age: 61
End: 2021-08-13

## 2021-08-13 ENCOUNTER — TELEPHONE (OUTPATIENT)
Dept: PHYSICAL MEDICINE AND REHAB | Age: 61
End: 2021-08-13

## 2021-08-13 VITALS
BODY MASS INDEX: 23.03 KG/M2 | WEIGHT: 138.4 LBS | OXYGEN SATURATION: 100 % | SYSTOLIC BLOOD PRESSURE: 98 MMHG | HEART RATE: 80 BPM | TEMPERATURE: 97.8 F | DIASTOLIC BLOOD PRESSURE: 60 MMHG

## 2021-08-13 DIAGNOSIS — G43.409 HEMIPLEGIC MIGRAINE WITHOUT STATUS MIGRAINOSUS, NOT INTRACTABLE: ICD-10-CM

## 2021-08-13 DIAGNOSIS — Z01.811 PRE-OP CHEST EXAM: Primary | ICD-10-CM

## 2021-08-13 DIAGNOSIS — G43.909 MIGRAINE WITHOUT STATUS MIGRAINOSUS, NOT INTRACTABLE, UNSPECIFIED MIGRAINE TYPE: ICD-10-CM

## 2021-08-13 DIAGNOSIS — E78.5 HYPERLIPIDEMIA, UNSPECIFIED HYPERLIPIDEMIA TYPE: ICD-10-CM

## 2021-08-13 DIAGNOSIS — K76.0 FATTY LIVER DISEASE, NONALCOHOLIC: ICD-10-CM

## 2021-08-13 DIAGNOSIS — I69.30 HISTORY OF CVA WITH RESIDUAL DEFICIT: Primary | ICD-10-CM

## 2021-08-13 PROCEDURE — 4004F PT TOBACCO SCREEN RCVD TLK: CPT | Performed by: STUDENT IN AN ORGANIZED HEALTH CARE EDUCATION/TRAINING PROGRAM

## 2021-08-13 PROCEDURE — 3017F COLORECTAL CA SCREEN DOC REV: CPT | Performed by: STUDENT IN AN ORGANIZED HEALTH CARE EDUCATION/TRAINING PROGRAM

## 2021-08-13 PROCEDURE — G8420 CALC BMI NORM PARAMETERS: HCPCS | Performed by: STUDENT IN AN ORGANIZED HEALTH CARE EDUCATION/TRAINING PROGRAM

## 2021-08-13 PROCEDURE — 99214 OFFICE O/P EST MOD 30 MIN: CPT | Performed by: STUDENT IN AN ORGANIZED HEALTH CARE EDUCATION/TRAINING PROGRAM

## 2021-08-13 PROCEDURE — G8427 DOCREV CUR MEDS BY ELIG CLIN: HCPCS | Performed by: STUDENT IN AN ORGANIZED HEALTH CARE EDUCATION/TRAINING PROGRAM

## 2021-08-13 RX ORDER — ASPIRIN 81 MG/1
81 TABLET ORAL DAILY
Qty: 90 TABLET | Refills: 1 | Status: SHIPPED | OUTPATIENT
Start: 2021-08-13

## 2021-08-13 RX ORDER — ADHESIVE TAPE 3"X 2.3 YD
TAPE, NON-MEDICATED TOPICAL
Qty: 120 TABLET | Refills: 3 | Status: SHIPPED | OUTPATIENT
Start: 2021-08-13 | End: 2021-09-22

## 2021-08-13 RX ORDER — ATORVASTATIN CALCIUM 40 MG/1
40 TABLET, FILM COATED ORAL DAILY
Qty: 90 TABLET | Refills: 3 | Status: SHIPPED | OUTPATIENT
Start: 2021-08-13

## 2021-08-13 ASSESSMENT — ENCOUNTER SYMPTOMS
CONSTIPATION: 0
ABDOMINAL PAIN: 0
RHINORRHEA: 0
DIARRHEA: 0
VOMITING: 0
COUGH: 0
SHORTNESS OF BREATH: 0
NAUSEA: 0
SORE THROAT: 0

## 2021-08-13 NOTE — TELEPHONE ENCOUNTER
Patient came into office to discuss her pain she was still having in her back. She stated she had an ablation and she is still having pain when she is getting up from a chair on her rt. Side. She was concerned if this was normal or if all the nerves were not burnt. Please advise.

## 2021-08-13 NOTE — TELEPHONE ENCOUNTER
This can be very normal.  Everyone is different as far as how quickly the nerve dies off. It can take some time several weeks before the nerve is completely dead. In addition, the patient can sometimes experience new pain generators that were previously there once a primary pain generator is taken away. I would encourage the patient to continue to monitor her symptoms. I would anticipate this to improve slowly over time.       Thanks,    Kori Frias, DO  Interventional Pain Management/PM&R   [place]

## 2021-08-13 NOTE — PROGRESS NOTES
S: 62 y.o. female with   Chief Complaint   Patient presents with    3 Month Follow-Up    Constipation       May smith syndrome - hemiplegic migraines - has had a stroke    Sees crissy in [facility] does the migraines and nothing with cognitive deficits    Would like help in consolidating the  Medical care    Seeing ent and seeing rhematology    BP Readings from Last 3 Encounters:   08/13/21 122/80   08/03/21 (!) 101/55   07/30/21 122/68     Wt Readings from Last 3 Encounters:   08/13/21 138 lb 6.4 oz (62.8 kg)   08/03/21 139 lb (63 kg)   07/30/21 142 lb (64.4 kg)           O: VS:   Vitals:    08/13/21 1042   BP: 122/80   Pulse: 80   Temp: 97.8 °F (36.6 °C)   SpO2: 100%   Weight: 138 lb 6.4 oz (62.8 kg)     Body mass index is 23.03 kg/m². AAO/NAD, appropriate affect for mood  Normocephalic, atraumatic, eyes - conjunctiva and sclera normal,   skin no rashes on exposed areas   Insight, judgement normal and in no acute distress      Lab Results   Component Value Date    WBC 5.3 07/15/2021    HGB 12.0 07/15/2021    HCT 38.7 07/15/2021     07/15/2021    CHOL 210 (H) 04/15/2021    TRIG 122 04/15/2021    HDL 43 07/15/2021    LDLCALC 160 07/15/2021    AST 15 07/15/2021     07/15/2021    K 3.5 07/15/2021     07/15/2021    CREATININE 0.7 07/15/2021    BUN 22 07/15/2021    CO2 21 (L) 07/15/2021    TSH 0.750 07/15/2021    LABGLOM 85 (A) 07/15/2021    MG 1.9 07/31/2020    CALCIUM 9.4 07/15/2021    VITD25 41 05/10/2021       US THYROID    Result Date: 7/16/2021  PROCEDURE: US THYROID CLINICAL INFORMATION: Right thyroid nodule. COMPARISON: No prior study. TECHNIQUE: Ultrasound images of the thyroid gland were obtained with a linear transducer. FINDINGS: Right Thyroid - 5.0 x 1.0 x 1.9 cm Left Thyroid -5.7 x 1.6 x 2.2 cm Isthmus -0.15 cm Thyroid size: Normal. Nodules:. L1. Left lobe inferiorly.  2.9 x 1.6 x 2.2 cm heterogeneous well-circumscribed nodule with slightly increased peripheral vascularity. TR3. Please correlate clinically. If this nodule has not been biopsied previously, ultrasound-guided biopsy is recommended for further evaluation. If previous studies become available for comparison, we will be happy to do that. Parenchymal echogenicity/vascularity: Normal. Microlithiasis: None. Cervical lymph nodes: None. 1. 2.9 x 1.6 x 2.2 cm heterogeneous well-circumscribed nodule with slight peripheral vascularity in the left lobe inferiorly. If this nodule has not been previously biopsied, ultrasound-guided biopsy is recommended for further evaluation. We would be happy to compare with previous studies if they become available. 2. Otherwise negative thyroid ultrasound. . **This report has been created using voice recognition software. It may contain minor errors which are inherent in voice recognition technology. ** Final report electronically signed by DR Ottoniel William on 7/16/2021 8:23 AM    US ASP/INJ THYROID CYST    Result Date: 8/6/2021  PROCEDURE: US ASP/INJ THYROID CYST CLINICAL INFORMATION: Thyroid nodule . COMPARISON: Thyroid ultrasound dated 7/15/2021. TECHNIQUE: Risks and benefits of the procedure were thoroughly explained and oral and written informed consent obtained. The patient was placed supine on the ultrasound gurney and a timeout procedure was performed. The patient was prepped and draped in the usual fashion and 2% lidocaine was infiltrated in the subcutaneous tissues superficial to the left lobe of thyroid gland. 25-gauge needles were directed into the dominant left thyroid nodule under ultrasound guidance. A total of 3 FNA passes were made. The on-site pathologist designated the samples to be adequate. The patient tolerated the procedure well and no immediate postprocedural complication was identified.  Physician Performing Procedure: Dr. Pérez Hair Informed Consent Signed: yes Anesthetic: 2% lidocaine Nodule 1 (nodule size/gauge and number of passes): 2.9 x 2.1 x 1.7cm/25 gauge/3 passes Complications: none FINDINGS: There are 11 saved ultrasound images. Intraprocedural images show echogenic needle within the dominant left thyroid nodule. No evidence of postprocedural complication on the post images. Successful ultrasound-guided FNA of dominant left thyroid nodule. **This report has been created using voice recognition software. It may contain minor errors which are inherent in voice recognition technology. ** Final report electronically signed by Dr. Tari Mcbride MD on 8/6/2021 1:37 PM    FLUORO FOR SURGICAL PROCEDURES    Result Date: 8/3/2021  Radiology exam is complete. No Radiologist dictation. Please follow up with ordering provider. FLUORO FOR SURGICAL PROCEDURES    Result Date: 7/27/2021  Radiology exam is complete. No Radiologist dictation. Please follow up with ordering provider. FLUORO FOR SURGICAL PROCEDURES    Result Date: 7/20/2021  Radiology exam is complete. No Radiologist dictation. Please follow up with ordering provider. Diagnosis Orders   1. Hyperlipidemia, unspecified hyperlipidemia type  atorvastatin (LIPITOR) 40 MG tablet   2. History of CVA with residual deficit  atorvastatin (LIPITOR) 40 MG tablet    aspirin EC 81 MG EC tablet   3. Fatty liver disease, nonalcoholic     4. Migraine without status migrainosus, not intractable, unspecified migraine type         Plan      Will restart the asa and the lipitor    Can refer to the neurologist at [facility] for another opinion - or can ask about jonnie or arsenio    Will keep up the diet and exercise    Will keep watching the fatty liver disease    Will see you back in a few months and go over the paperwork and can set an agenda for when we go over which diseases to be sure to cover them all this next year       No follow-ups on file. Orders Placed:  No orders of the defined types were placed in this encounter.     Medications Prescribed:  No orders of the defined types were placed in this encounter. Future Appointments   Date Time Provider Bharat Puentesi   8/16/2021 10:30 AM Nolvia Gallo, APRN - CNP N SRP[facility] Heart P - [place]   8/16/2021  1:30 PM Kade Dodd MD N ENT [facility] [place]   9/20/2021  2:00 PM Jaleesa Florentino MD [facility] [place]   9/30/2021  9:30 AM Leandro Cartagena, DO N [facility] Pain [facility] - [place]   10/7/2021 11:40 AM Radames Hand, DO N SRPX Rheum [facility] [place]   10/25/2021  2:40 PM Pino Weinberg MD SRPX FM RES [facility] [place]   11/22/2021 10:00 AM Junior Khan, PhD N [id number] [facility] [place]   1/7/2022 11:00 AM Nereyda Varela  [address] Maintenance Due   Topic Date Due    Pneumococcal 0-64 years Vaccine (1 of 2 - PPSV23) 11[birth year]/1967    DTaP/Tdap/Td vaccine (1 - Tdap) Never done    Cervical cancer screen  Never done    Colon cancer screen colonoscopy  Never done    Shingles Vaccine (1 of 2) Never done    Annual Wellness Visit (AWV)  Never done         Attending Physician Statement  I have discussed the case, including pertinent history and exam findings with the resident. I also have seen the patient and performed key portions of the examination. I agree with the documented assessment and plan as documented by the resident.   GE modifier added to this encounter      Gina Desai DO 8/13/2021 11:21 AM
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
## 2021-08-16 ENCOUNTER — OFFICE VISIT (OUTPATIENT)
Dept: ENT CLINIC | Age: 61
End: 2021-08-16
Payer: MEDICARE

## 2021-08-16 VITALS
RESPIRATION RATE: 14 BRPM | HEART RATE: 80 BPM | SYSTOLIC BLOOD PRESSURE: 122 MMHG | TEMPERATURE: 96.4 F | DIASTOLIC BLOOD PRESSURE: 80 MMHG | BODY MASS INDEX: 23.46 KG/M2 | WEIGHT: 141 LBS

## 2021-08-16 DIAGNOSIS — E04.1 THYROID NODULE: Primary | ICD-10-CM

## 2021-08-16 PROCEDURE — G8427 DOCREV CUR MEDS BY ELIG CLIN: HCPCS | Performed by: OTOLARYNGOLOGY

## 2021-08-16 PROCEDURE — G8420 CALC BMI NORM PARAMETERS: HCPCS | Performed by: OTOLARYNGOLOGY

## 2021-08-16 PROCEDURE — 3017F COLORECTAL CA SCREEN DOC REV: CPT | Performed by: OTOLARYNGOLOGY

## 2021-08-16 PROCEDURE — 99212 OFFICE O/P EST SF 10 MIN: CPT | Performed by: OTOLARYNGOLOGY

## 2021-08-16 PROCEDURE — 4004F PT TOBACCO SCREEN RCVD TLK: CPT | Performed by: OTOLARYNGOLOGY

## 2021-08-16 ASSESSMENT — ENCOUNTER SYMPTOMS
SORE THROAT: 0
CHEST TIGHTNESS: 0
STRIDOR: 0
VOICE CHANGE: 0
FACIAL SWELLING: 0
NAUSEA: 0
TROUBLE SWALLOWING: 0
COUGH: 0
VOMITING: 0
WHEEZING: 0
CHOKING: 0
RHINORRHEA: 0
ABDOMINAL PAIN: 0
APNEA: 0
SHORTNESS OF BREATH: 0
DIARRHEA: 0
COLOR CHANGE: 0
SINUS PRESSURE: 0

## 2021-08-16 NOTE — PROGRESS NOTES
[address] EAR, NOSE AND THROAT  [facility] [place]  Dept: [phone]  Dept Fax: [phone]  Loc: [phone]    Saintclair Pinion is a 62 y.o. female who was referred byNo ref. provider found for:  Chief Complaint   Patient presents with    Other     Patient is here for FNA results   . HPI:     Saintclair Pinion is a 62 y.o. female who presents today for follow-up on cytology of the fine-needle aspirate on her dominant left thyroid lobe nodule. The nodule was 3.2 cm in greatest dimension. She is asymptomatic. Cytology report suggested this was a benign colloid nodule. Clinical Information:  LARGE NODULE LEFT THYROID       RAPID ONSITE EVALUATION:  #1-2: scattered cells, #3: adequate cells. SMW       FINAL RESULTS:   BENIGN                 Benign-appearing follicular cells, colloid and cyst                 contents consistent with a benign follicular nodule. History: Allergies   Allergen Reactions    Amoxil [Amoxicillin]      rash    Biaxin [Clarithromycin] Swelling     Lips swelled    Doxycycline      rash    Morphine Nausea And Vomiting    Sulfa Antibiotics Rash     Current Outpatient Medications   Medication Sig Dispense Refill    atorvastatin (LIPITOR) 40 MG tablet Take 1 tablet by mouth daily 90 tablet 3    aspirin EC 81 MG EC tablet Take 1 tablet by mouth daily 90 tablet 1    Magnesium Oxide 200 MG TABS Take 200 mg 4 times a day as needed for migraines and constipation.  120 tablet 3    topiramate (TOPAMAX) 100 MG tablet TAKE 1 TABLET BY MOUTH TWICE A  tablet 1    traZODone (DESYREL) 150 MG tablet Take 1.5 tablets by mouth nightly 45 tablet 2    buPROPion (WELLBUTRIN XL) 150 MG extended release tablet Take 1 tablet by mouth every morning Take with 300 mg tablet for total 450 mg once daily in the morning 90 tablet 0    buPROPion (WELLBUTRIN XL) 300 MG extended release tablet TAKE 1 TABLET BY MOUTH EVERY DAY IN THE MORNING 90 tablet 0    Erenumab-aooe (AIMOVIG) 140 MG/ML SOAJ Inject 140 mg into the skin every 30 days 1 pen 5    butalbital-acetaminophen-caffeine (FIORICET, ESGIC) -40 MG per tablet Take 1 tablet by mouth every 4 hours as needed for Headaches 180 tablet 1    esomeprazole (NEXIUM) 40 MG delayed release capsule Take 1 capsule by mouth every morning (before breakfast) 90 capsule 1    pramipexole (MIRAPEX) 0.125 MG tablet Take 2 tablets by mouth 2 times daily 120 tablet 3    memantine (NAMENDA) 5 MG tablet Take 5 mg by mouth daily       clotrimazole-betamethasone (LOTRISONE) 1-0.05 % cream Apply topically as needed       cyclobenzaprine (FLEXERIL) 10 MG tablet 2 times daily as needed       furosemide (LASIX) 20 MG tablet as needed       potassium chloride (KLOR-CON) 10 MEQ extended release tablet as needed       Onabotulinumtoxin A (BOTOX, COSMETIC,) 200 units injection 200 Units every 3 months       Ubrogepant (UBRELVY) 100 MG TABS Take 100 mg by mouth as needed      hydroxychloroquine (PLAQUENIL) 200 MG tablet Take 200 mg by mouth 2 times daily       nitroglycerin (NITROSTAT) 0.6 MG SL tablet Place 0.6 mg under the tongue every 5 minutes as needed for Chest pain up to max of 3 total doses. If no relief after 1 dose, call 911.       ondansetron (ZOFRAN) 4 MG tablet Take 1 tablet by mouth 3 times daily as needed for Nausea or Vomiting 15 tablet 0     Current Facility-Administered Medications   Medication Dose Route Frequency Provider Last Rate Last Admin    onabotulinumtoxin A (BOTOX) injection 200 Units  200 Units IntraMUSCular Once Leandro Morfin Chi, DO         Past Medical History:   Diagnosis Date    Adrenal abnormality (HCC)     Angina at rest [facility])     MICROVASCULAR CARDIAC DISEASE    Anxiety     Arthritis     Asthma     Cerebral artery occlusion with cerebral infarction ([facility] [address]     Chronic sinusitis     Cognitive impairment     with retograde amnesia    Headache Migraines Hemiplegic    History of degenerative disc disease     Hyperlipidemia     Lymphedema     Left leg    May-Thurner syndrome     Peripheral vascular disease (HCC)     PONV (postoperative nausea and vomiting)     Positive BJ (antinuclear antibody)     PTSD (post-traumatic stress disorder)       Past Surgical History:   Procedure Laterality Date    BREAST BIOPSY      HYSTERECTOMY      NECK SURGERY  12/2020    ACDF    PAIN MANAGEMENT PROCEDURE Bilateral 6/29/2021    medial branch blocks at bilateral L4/L5 and L5/S1 performed by Roseline Blood, DO at [address] Bilateral 7/20/2021    confirmatory medial branch blocks at bilateral L4/L5 and L5/S1 performed by Roseline Blood, DO at [address] Right 7/27/2021    thermal radiofrequency ablation medial branches L4/L5 and L5/S1 right side first performed by Roseline Childs, DO at [address] Left 8/3/2021    thermal radiofrequency ablation medial branches L4/L5 and L5/S1 left performed by Roseline Childs, DO at [address] [place] [address] SEPTOPLASTY Right 6/14/2021    SEPTOPLASTY, SUBMUCOUS RESECTION TURBINATE, RIGHT PARTIAL INFERIOR, NASAL ENDOSCOPY WITH PARTIAL RESECTION OF RIGHT MIDDLE JHON BULLOSA performed by Steve Jeronimo MD at [facility] [address] THYROID CYST ASPIRATION AND OR INJECTION  8/6/2021     THYROID CYST ASPIRATION AND OR INJECTION 8/6/2021 [facility]TRAS[facility]     Family History   Adopted: Yes   Problem Relation Age of Onset    Cancer Father         Stomach Cancer    Cancer Paternal Uncle         Colon Cancer    Stroke Maternal Grandmother     Rheum Arthritis Maternal Grandmother      Social History     Tobacco Use    Smoking status: Light Tobacco Smoker     Packs/day: 1.00     Years: 12.00     Pack years: 12.00     Types: Cigarettes     Last attempt to quit: 2019     Years since quittin.6    Smokeless tobacco: Never Used   Substance Use Topics    Alcohol use: Not Currently     Comment: stopped in 2020 due to fatty liver       Subjective:      Review of Systems   Constitutional: Negative for activity change, appetite change, chills, diaphoresis, fatigue, fever and unexpected weight change. HENT: Negative for congestion, dental problem, ear discharge, ear pain, facial swelling, hearing loss, mouth sores, nosebleeds, postnasal drip, rhinorrhea, sinus pressure, sneezing, sore throat, tinnitus, trouble swallowing and voice change. Eyes: Negative for visual disturbance. Respiratory: Negative for apnea, cough, choking, chest tightness, shortness of breath, wheezing and stridor. Cardiovascular: Negative for chest pain, palpitations and leg swelling. Gastrointestinal: Negative for abdominal pain, diarrhea, nausea and vomiting. Endocrine: Negative for cold intolerance, heat intolerance, polydipsia and polyuria. Genitourinary: Negative for dysuria, enuresis and hematuria. Musculoskeletal: Negative for arthralgias, gait problem, neck pain and neck stiffness. Skin: Negative for color change and rash. Allergic/Immunologic: Negative for environmental allergies, food allergies and immunocompromised state. Neurological: Negative for dizziness, syncope, facial asymmetry, speech difficulty, light-headedness and headaches. Hematological: Negative for adenopathy. Does not bruise/bleed easily. Psychiatric/Behavioral: Negative for confusion and sleep disturbance. The patient is not nervous/anxious. Objective:   /80 (Site: Left Upper Arm, Position: Sitting)   Pulse 80   Temp 96.4 °F (35.8 °C) (Infrared)   Resp 14   Wt 141 lb (64 kg)   BMI 23.46 kg/m²     Physical Exam  Mobile nodule left thyroid. Soft.     Data:  All of the past medical history, past surgical history, family history,social history, allergies and current medications were reviewed with the patient. Assessment & Plan   Diagnoses and all orders for this visit:     Diagnosis Orders   1. Thyroid nodule, left   US THYROID       The findings were explained and her questions were answered. Follow-up ultrasound in a year is indicated to confirm stability. Patient is pleased    Return in about 1 year (around 8/18/2022). Rosa Conklin. Veronica Spain MD    **This report has been created using voice recognition software. It may contain minor errors which are inherent in voicerecognition technology. **

## 2021-08-16 NOTE — TELEPHONE ENCOUNTER
I spoke with patient today and she said her pain has not improved at all. She said the pain is not just the right side it is in the middle and when she is getting up from a seated position. I advised her that I would let Dr. Grazyna Martino know. Patient voiced understanding.

## 2021-09-07 ENCOUNTER — OFFICE VISIT (OUTPATIENT)
Dept: CARDIOLOGY CLINIC | Age: 61
End: 2021-09-07
Payer: MEDICARE

## 2021-09-07 VITALS
SYSTOLIC BLOOD PRESSURE: 88 MMHG | DIASTOLIC BLOOD PRESSURE: 60 MMHG | HEIGHT: 65 IN | BODY MASS INDEX: 22.63 KG/M2 | WEIGHT: 135.8 LBS | HEART RATE: 73 BPM

## 2021-09-07 DIAGNOSIS — I25.9 CHEST PAIN DUE TO MYOCARDIAL ISCHEMIA, UNSPECIFIED ISCHEMIC CHEST PAIN TYPE: ICD-10-CM

## 2021-09-07 DIAGNOSIS — I87.1 MAY-THURNER SYNDROME: Primary | ICD-10-CM

## 2021-09-07 DIAGNOSIS — R06.02 SOB (SHORTNESS OF BREATH): ICD-10-CM

## 2021-09-07 DIAGNOSIS — M79.89 LOCALIZED SWELLING OF LOWER EXTREMITY: ICD-10-CM

## 2021-09-07 PROCEDURE — 3017F COLORECTAL CA SCREEN DOC REV: CPT | Performed by: NURSE PRACTITIONER

## 2021-09-07 PROCEDURE — 4004F PT TOBACCO SCREEN RCVD TLK: CPT | Performed by: NURSE PRACTITIONER

## 2021-09-07 PROCEDURE — G8420 CALC BMI NORM PARAMETERS: HCPCS | Performed by: NURSE PRACTITIONER

## 2021-09-07 PROCEDURE — 93000 ELECTROCARDIOGRAM COMPLETE: CPT | Performed by: NURSE PRACTITIONER

## 2021-09-07 PROCEDURE — G8428 CUR MEDS NOT DOCUMENT: HCPCS | Performed by: NURSE PRACTITIONER

## 2021-09-07 PROCEDURE — 99213 OFFICE O/P EST LOW 20 MIN: CPT | Performed by: NURSE PRACTITIONER

## 2021-09-07 RX ORDER — NITROGLYCERIN 0.6 MG/1
0.6 TABLET SUBLINGUAL EVERY 5 MIN PRN
Qty: 30 TABLET | Refills: 1 | Status: SHIPPED | OUTPATIENT
Start: 2021-09-07

## 2021-09-07 RX ORDER — TIOTROPIUM BROMIDE 18 UG/1
18 CAPSULE ORAL; RESPIRATORY (INHALATION) DAILY
COMMUNITY

## 2021-09-07 NOTE — PROGRESS NOTES
[facility] PROFESSIONAL SERVICES  [facility]   [address]   [address] [address]   Dept: [phone]   Dept Fax: [phone]: [phone]      Chief Complaint   Patient presents with    Follow-up     Chief Complaint: \"Doing really well - occasional swelling and soreness in my legs but much better than I was doing\". Office follow-up   Last seen in office by Dr. Ihsan Coe on 3/10/21 as new patient who relocated from [place]. Per Dr. Ihsan Coe office note:  HPI   63 yo F had hx of L sided MT syndrome s/p stent 2013 who presents to [facility] care. Swelling occurred in the last 3 weeks. She feels that the swelling is similar to prior. She has microvascular angina. She is on chronic Lasix, she takes potassium. No recent LE DVT. She is retired from [employer]. After she was stented, she felt much better. She is having significant pain when she sits and walks. 8/10. She took a couple extra Lasix but still not improving. No issues in the other legs. No a/t/d. No major family hx. She was on ASA/coumadin after the stent, then was just on ASA. She is not on ASA any more either. She recently had shoulder and cervical surgeries in the last 6 months. She did drive from [facility] to [place] in 8/2020. She takes Plaquenil for MCTD--gets butterfly facial rash. She has noted more left back sided pain. CT venogram and LE duplex ordered. Compression stockings to tolerance. Lasix continued. Referral to [facility]. CT venogram noted patent stent. Duplex negative for thrombosis. Follow-up with Vein clinic; dx with lymphedema in her LLE. Treatment on-going with them with much improvement. Denies recent chest pain - states only comes on once or twice a year - lasts ~ 5 minutes and then subsides; longest event ~ 30 minutes - did have eval at that time and was negative for any acute findings. Does carry Ntg but cannot remember when last used it.      3/17/21:  Katia Galicia MD 3/17/2021  6:58 AM EDT       All her studies show patent stent without fracture and no DVT  Only other possibility is to do IVUS of the stent and LLE  Refer to [facility]  Continue compression stockings  If no [facility] at [facility] - then we will do IVUS/venography       Cardiologist:  Dr. Inga Osgood:   No fever, no chills, No fatigue or unintentional weight loss      - lost ~ 50# intentionally with [employer] program  Pulmonary:    No dyspnea, no wheezing  Cardiac:    Denies recent chest pain   GI:     No nausea or vomiting, no abdominal pain  Neuro:     No dizziness or light headedness  Musculoskeletal:  No recent active issues  Extremities:   Trace edema LLE, no numbness or paresthesia      Past Medical History:   Diagnosis Date    [facility] abnormality [facility])     Angina at rest [facility])     MICROVASCULAR CARDIAC DISEASE    Anxiety     Arthritis     Asthma     Cerebral artery occlusion with cerebral infarction ([facility] [address]     Chronic sinusitis     Cognitive impairment     with retograde amnesia    Headache     Migraines Hemiplegic    History of degenerative disc disease     Hyperlipidemia     Lymphedema     Left leg    May-Thurner syndrome     Peripheral vascular disease (HCC)     PONV (postoperative nausea and vomiting)     Positive BJ (antinuclear antibody)     PTSD (post-traumatic stress disorder)        Allergies   Allergen Reactions    Amoxil [Amoxicillin]      rash    Biaxin [Clarithromycin] Swelling     Lips swelled    Doxycycline      rash    Morphine Nausea And Vomiting    Sulfa Antibiotics Rash       Current Outpatient Medications   Medication Sig Dispense Refill    tiotropium (SPIRIVA HANDIHALER) 18 MCG inhalation capsule Inhale 18 mcg into the lungs daily      nitroglycerin (NITROSTAT) 0.6 MG SL tablet Place 1 tablet under the tongue every 5 minutes as needed for Chest pain up to max of 3 total doses.  If no relief after 1 dose, call 540. 30 tablet 1    atorvastatin (LIPITOR) 40 MG tablet Take 1 tablet by mouth daily 90 tablet 3    aspirin EC 81 MG EC tablet Take 1 tablet by mouth daily 90 tablet 1    Magnesium Oxide 200 MG TABS Take 200 mg 4 times a day as needed for migraines and constipation.  120 tablet 3    topiramate (TOPAMAX) 100 MG tablet TAKE 1 TABLET BY MOUTH TWICE A  tablet 1    traZODone (DESYREL) 150 MG tablet Take 1.5 tablets by mouth nightly 45 tablet 2    buPROPion (WELLBUTRIN XL) 150 MG extended release tablet Take 1 tablet by mouth every morning Take with 300 mg tablet for total 450 mg once daily in the morning 90 tablet 0    buPROPion (WELLBUTRIN XL) 300 MG extended release tablet TAKE 1 TABLET BY MOUTH EVERY DAY IN THE MORNING 90 tablet 0    Erenumab-aooe (AIMOVIG) 140 MG/ML SOAJ Inject 140 mg into the skin every 30 days 1 pen 5    butalbital-acetaminophen-caffeine (FIORICET, ESGIC) -40 MG per tablet Take 1 tablet by mouth every 4 hours as needed for Headaches 180 tablet 1    esomeprazole (NEXIUM) 40 MG delayed release capsule Take 1 capsule by mouth every morning (before breakfast) 90 capsule 1    pramipexole (MIRAPEX) 0.125 MG tablet Take 2 tablets by mouth 2 times daily 120 tablet 3    memantine (NAMENDA) 5 MG tablet Take 5 mg by mouth daily       clotrimazole-betamethasone (LOTRISONE) 1-0.05 % cream Apply topically as needed       cyclobenzaprine (FLEXERIL) 10 MG tablet 2 times daily as needed       furosemide (LASIX) 20 MG tablet as needed       potassium chloride (KLOR-CON) 10 MEQ extended release tablet as needed       Onabotulinumtoxin A (BOTOX, COSMETIC,) 200 units injection 200 Units every 3 months       Ubrogepant (UBRELVY) 100 MG TABS Take 100 mg by mouth as needed      hydroxychloroquine (PLAQUENIL) 200 MG tablet Take 200 mg by mouth 2 times daily       ondansetron (ZOFRAN) 4 MG tablet Take 1 tablet by mouth 3 times daily as needed for Nausea or Vomiting 15 tablet 0     Current Facility-Administered Medications   Medication Dose Route Frequency Provider Last Rate Last Admin    onabotulinumtoxin A (BOTOX) injection 200 Units  200 Units IntraMUSCular Once Leandro Morfin Chi, DO           Social History     Socioeconomic History    Marital status:      Spouse name: None    Number of children: None    Years of education: None    Highest education level: None   Occupational History    None   Tobacco Use    Smoking status: Light Tobacco Smoker     Packs/day: 1.00     Years: 12.00     Pack years: 12.00     Types: Cigarettes     Last attempt to quit: 2019     Years since quittin.6    Smokeless tobacco: Never Used   Vaping Use    Vaping Use: Never used   Substance and Sexual Activity    Alcohol use: Not Currently     Comment: stopped in 2020 due to fatty liver    Drug use: Never    Sexual activity: Never   Other Topics Concern    None   Social History Narrative    None     Social Determinants of Health     Financial Resource Strain: Low Risk     Difficulty of Paying Living Expenses: Not hard at all   Food Insecurity: No Food Insecurity    Worried About Running Out of Food in the Last Year: Never true    [address] in the Last Year: Never true   Transportation Needs:     Lack of Transportation (Medical):      Lack of Transportation (Non-Medical):    Physical Activity:     Days of Exercise per Week:     Minutes of Exercise per Session:    Stress:     Feeling of Stress :    Social Connections:     Frequency of Communication with Friends and Family:     Frequency of Social Gatherings with Friends and Family:     Attends [employer] Services:     Active Member of Clubs or Organizations:     Attends Club or Organization Meetings:     Marital Status:    Intimate Partner Violence:     Fear of Current or Ex-Partner:     Emotionally Abused:     Physically Abused:     Sexually Abused:        Family History   Adopted: Yes   Problem Relation Age of Onset    Cancer Father         Stomach Cancer    Other Father         mitral valve prolapse    Cancer Paternal Uncle         Colon Cancer    Stroke Maternal Grandmother     Rheum Arthritis Maternal Grandmother        Blood pressure 88/60, pulse 73, height 5' 5\" (1.651 m), weight 135 lb 12.8 oz (61.6 kg). General:   Well developed, well nourished, pleasant, talkative  Lungs:   Clear to auscultation  Heart:    Normal S1 S2, No murmur, rubs, or gallops  Abdomen:   Soft, non tender, no organomegalies, positive bowel sounds  Extremities:   Mild swelling LLE vs RLE, no gross edema. No cyanosis,    good peripheral pulses - 2+  Neurological:   Awake, alert, oriented. No obvious focal deficits  Musculoskeletal:  No obvious deformities    EK21: NSR    3/15/21:  PROCEDURE: CTA VENOUS ABDOMEN PELVIS W WO CONTRAST       CLINICAL INFORMATION: 41-year-old female with a history of May-Thurner syndrome, Localized swelling of lower extremity.       COMPARISON: CT scan 2020.       TECHNIQUE: 3mm noncontrast axial images were obtained through the abdomen and pelvis.  Then 3 mm axial images were obtained through the abdomen and pelvis after the administration of intravenous Optiray contrast.  3D reconstructions were performed on the    scanner to include sagittal and coronal MIP reconstructions through the abdomen and pelvis.  Images were obtained 70 seconds, 4 minutes and 8 minutes following contrast administration.       All CT scans at this facility use dose modulation, iterative reconstruction, and/or weight-based dosing when appropriate to reduce radiation dose to as low as reasonably achievable.       FINDINGS:   There is atelectasis/scarring at the lung bases.  No pleural effusion.       The base of the heart is within acceptable limits.       The liver is hypoattenuated which may be due to fatty infiltration.       The spleen, pancreas, gallbladder and adrenal glands are within normal limits.       The kidneys are symmetric in size, shape and degree of enhancement. There is no hydronephrosis.       There is no evidence of a small bowel obstruction.       There is no colonic wall thickening or edema.       The appendix appears normal with no inflammatory changes.       The aorta is normal in caliber without aneurysmal dilatation. There is a vascular stent in the left common iliac vein extending into the IVC. There do not appear to be filling defects within the vascular stent to indicate a DVT. The venous structures in    the left leg also appear to be patent without filling defects.       There is no free intraperitoneal air or free fluid in the abdomen or pelvis.       There is generalized osteopenia. There are no acute fractures.           Impression       1. The vascular stent in the left common iliac vein appears to be patent. There are no obvious filling defects indicative of a DVT. 2. Hepatic steatosis.           **This report has been created using voice recognition software. It may contain minor errors which are inherent in voice recognition technology. **       Final report electronically signed by Dr Lillian Alvarado on 3/15/2021 5:27 PM       3/15/21:  PROCEDURE: VL DUP LOWER EXTREMITY VENOUS BILATERAL       CLINICAL INFORMATION: 32-year-old female with left leg swelling for one month.       COMPARISON: No prior study.       TECHNIQUE: Venous doppler ultrasound was performed of the bilateral lower extremities using gray scale, color flow and spectral doppler imaging.       FINDINGS:   There is normal color flow, spectral analysis and compressibility of the common femoral vein, superficial femoral vein and popliteal vein bilaterally .       There is normal color flow and compressibility in the posterior tibial veins, anterior tibial veins and peroneal veins.           Impression   No evidence of a DVT.       **This report has been created using voice recognition software. It may contain minor errors which are inherent in voice recognition technology. **     Final report electronically signed by Dr Cecy Rose on 3/15/2021 1:10 PM          Diagnosis Orders   1. SOB (shortness of breath)  EKG 12 Lead   2. Chest pain due to myocardial ischemia, unspecified ischemic chest pain type  nitroglycerin (NITROSTAT) 0.6 MG SL tablet       Orders Placed This Encounter   Procedures    EKG 12 Lead     Order Specific Question:   Reason for Exam?     Answer: Other     Continue Dr Ricardo Gibbs current treatment plan  Continue to carry the Nitroglycerin for your comfort. Take one tablet under the tongue every 5 minutes to a total of 3. If tightness/pain does not resolve you need to come to the ED. Continue treatments for lymphedema. Continue to follow with your PCP. Follow-up with Dr. Champagne Knows in one year as scheduled or sooner if symptoms worsen.

## 2021-09-07 NOTE — PROGRESS NOTES
Pt C/O pt was her in march to see dr Boni Zamudio pt states she is having sob, and chest pain, over exertion she feels a \"catch on her heart\" and legs ache and get weak. Pt is very stressed. HA but pt has migraines, heart palpitations, fatigued. If pt is out in sun alot her bp goes very high.          Pt denies  dizziness, swelling

## 2021-09-07 NOTE — PATIENT INSTRUCTIONS
Continue to carry the Nitroglycerin. Take one tablet under the tongue every 5 minutes to a total of 3. If tightness/pain does not resolve you need to come to the ED. Continue treatments for lymphedema. Continue to follow with your PCP. Follow-up with Dr. Daphnie Ramirez in one year as scheduled or sooner if symptoms worsen.

## 2021-09-13 ENCOUNTER — TELEMEDICINE (OUTPATIENT)
Dept: FAMILY MEDICINE CLINIC | Age: 61
End: 2021-09-13
Payer: MEDICARE

## 2021-09-13 DIAGNOSIS — F43.10 PTSD (POST-TRAUMATIC STRESS DISORDER): ICD-10-CM

## 2021-09-13 DIAGNOSIS — G43.409 HEMIPLEGIC MIGRAINE WITHOUT STATUS MIGRAINOSUS, NOT INTRACTABLE: ICD-10-CM

## 2021-09-13 DIAGNOSIS — I69.30 HISTORY OF CVA WITH RESIDUAL DEFICIT: Primary | ICD-10-CM

## 2021-09-13 PROCEDURE — 99213 OFFICE O/P EST LOW 20 MIN: CPT | Performed by: STUDENT IN AN ORGANIZED HEALTH CARE EDUCATION/TRAINING PROGRAM

## 2021-09-13 ASSESSMENT — ENCOUNTER SYMPTOMS
DIARRHEA: 0
ABDOMINAL PAIN: 0
COUGH: 0
SHORTNESS OF BREATH: 0
VOMITING: 0
NAUSEA: 0
CONSTIPATION: 0

## 2021-09-13 NOTE — PROGRESS NOTES
S: 62 y.o. female with   Chief Complaint   Patient presents with    Other     Discuss paperwork, service dog       Needs a letter for a service dog- thought things here would go better than they did - may move back to [facility] marcial    Needs the dog for anxiety, ptsd, migraines, balance with the stroke    BP Readings from Last 3 Encounters:   09/07/21 88/60   08/16/21 122/80   08/13/21 98/60     Wt Readings from Last 3 Encounters:   09/07/21 135 lb 12.8 oz (61.6 kg)   08/16/21 141 lb (64 kg)   08/13/21 138 lb 6.4 oz (62.8 kg)           O: VS: There were no vitals filed for this visit. There is no height or weight on file to calculate BMI. Lab Results   Component Value Date    WBC 5.3 07/15/2021    HGB 12.0 07/15/2021    HCT 38.7 07/15/2021     07/15/2021    CHOL 210 (H) 04/15/2021    TRIG 122 04/15/2021    HDL 43 07/15/2021    LDLCALC 160 07/15/2021    AST 15 07/15/2021     07/15/2021    K 3.5 07/15/2021     07/15/2021    CREATININE 0.7 07/15/2021    BUN 22 07/15/2021    CO2 21 (L) 07/15/2021    TSH 0.750 07/15/2021    LABGLOM 85 (A) 07/15/2021    MG 1.9 07/31/2020    CALCIUM 9.4 07/15/2021    VITD25 41 05/10/2021       No results found. Diagnosis Orders   1. History of CVA with residual deficit     2. Hemiplegic migraine without status migrainosus, not intractable     3. PTSD (post-traumatic stress disorder)         Plan    Will write the letter for the service dog      Return if symptoms worsen or fail to improve. Orders Placed:  No orders of the defined types were placed in this encounter. Medications Prescribed:  No orders of the defined types were placed in this encounter.       Future Appointments   Date Time Provider Bharat Veliz   9/20/2021  2:00 PM [facility]   9/30/2021  9:30 AM Leandro Gustafson DO [facility] [place] [facility]   10/7/2021 11:40 AM Leonel Noriega DO N SRPX Rheum [facility] [place]   10/25/2021  2:40 PM Sushma Jimenez MD SRPX  [facility] P - [place]   11/22/2021 10:00 AM Christelle Wolf, PhD N GUBISFSIGJ [facility] [place]   1/7/2022 11:00 AM Chirag Graf  [address] Maintenance Due   Topic Date Due    Pneumococcal 0-64 years Vaccine (1 of 2 - PPSV23) 11/03/1966    DTaP/Tdap/Td vaccine (1 - Tdap) Never done    Colon cancer screen colonoscopy  Never done    Annual Wellness Visit (AWV)  Never done         Attending Physician Statement  I have discussed the case, including pertinent history and exam findings with the resident. I also have seen the patient and performed key portions of the examination. I agree with the documented assessment and plan as documented by the resident.   GE modifier added to this encounter      Maria De Jesus Huertas DO 9/13/2021 12:27 PM
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
## 2021-09-16 ENCOUNTER — TELEPHONE (OUTPATIENT)
Dept: FAMILY MEDICINE CLINIC | Age: 61
End: 2021-09-16

## 2021-09-16 NOTE — TELEPHONE ENCOUNTER
----- Message from Cecelia Finley sent at 2/70/1606 11:45 AM EDT -----  Subject: Message to Provider    QUESTIONS  Information for Provider? Pt requesting appointment to get the   Pneumococcal, & tetanus vaccine   ---------------------------------------------------------------------------  --------------  CALL BACK INFO  What is the best way for the office to contact you? OK to leave message on   voicemail  Preferred Call Back Phone Number? [phone]  ---------------------------------------------------------------------------  --------------  SCRIPT ANSWERS  Relationship to Patient?  Self

## 2021-09-16 NOTE — TELEPHONE ENCOUNTER
Patient is informed that her letter is available for , she will come tomorrow to pick it up. Patient wants appointment for pneumococcal and tetanus vaccine. Appointment is scheduled for 9/17/21.

## 2021-09-16 NOTE — TELEPHONE ENCOUNTER
----- Message from Karla Holguin sent at 9/16/2021  3:46 PM EDT -----  Subject: Message to Provider    QUESTIONS  Information for Provider? Pt calling about letter that needs to be picked   up. Also needs a couple of shots as well.   ---------------------------------------------------------------------------  --------------  CALL BACK INFO  What is the best way for the office to contact you? OK to leave message on   voicemail, OK to respond with electronic message via [facility] portal (only   for patients who have registered [facility] account)  Preferred Call Back Phone Number?  [phone]  ---------------------------------------------------------------------------  --------------  SCRIPT ANSWERS  undefined

## 2021-09-17 ENCOUNTER — NURSE ONLY (OUTPATIENT)
Dept: FAMILY MEDICINE CLINIC | Age: 61
End: 2021-09-17
Payer: MEDICARE

## 2021-09-17 DIAGNOSIS — Z23 NEED FOR VACCINATION: Primary | ICD-10-CM

## 2021-09-17 PROCEDURE — 90732 PPSV23 VACC 2 YRS+ SUBQ/IM: CPT | Performed by: FAMILY MEDICINE

## 2021-09-17 PROCEDURE — G0009 ADMIN PNEUMOCOCCAL VACCINE: HCPCS | Performed by: FAMILY MEDICINE

## 2021-09-20 ENCOUNTER — VIRTUAL VISIT (OUTPATIENT)
Dept: PSYCHIATRY | Age: 61
End: 2021-09-20
Payer: MEDICARE

## 2021-09-20 DIAGNOSIS — F33.41 RECURRENT MAJOR DEPRESSIVE DISORDER, IN PARTIAL REMISSION (HCC): ICD-10-CM

## 2021-09-20 DIAGNOSIS — F41.9 ANXIETY: ICD-10-CM

## 2021-09-20 DIAGNOSIS — R41.3 MEMORY PROBLEM: ICD-10-CM

## 2021-09-20 DIAGNOSIS — F43.10 PTSD (POST-TRAUMATIC STRESS DISORDER): Primary | ICD-10-CM

## 2021-09-20 PROCEDURE — 3017F COLORECTAL CA SCREEN DOC REV: CPT | Performed by: PSYCHIATRY & NEUROLOGY

## 2021-09-20 PROCEDURE — 99214 OFFICE O/P EST MOD 30 MIN: CPT | Performed by: PSYCHIATRY & NEUROLOGY

## 2021-09-20 PROCEDURE — G8427 DOCREV CUR MEDS BY ELIG CLIN: HCPCS | Performed by: PSYCHIATRY & NEUROLOGY

## 2021-09-20 RX ORDER — BUPROPION HYDROCHLORIDE 150 MG/1
150 TABLET ORAL EVERY MORNING
Qty: 90 TABLET | Refills: 0 | Status: SHIPPED | OUTPATIENT
Start: 2021-09-20

## 2021-09-20 RX ORDER — TRAZODONE HYDROCHLORIDE 150 MG/1
225 TABLET ORAL NIGHTLY
Qty: 135 TABLET | Refills: 0 | Status: SHIPPED | OUTPATIENT
Start: 2021-09-20

## 2021-09-20 RX ORDER — LORAZEPAM 1 MG/1
1 TABLET ORAL 2 TIMES DAILY PRN
Qty: 60 TABLET | Refills: 0 | Status: SHIPPED | OUTPATIENT
Start: 2021-09-20 | End: 2021-10-20

## 2021-09-20 RX ORDER — BUPROPION HYDROCHLORIDE 300 MG/1
TABLET ORAL
Qty: 90 TABLET | Refills: 0 | Status: SHIPPED | OUTPATIENT
Start: 2021-09-20
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
## 2021-09-22 DIAGNOSIS — G43.409 HEMIPLEGIC MIGRAINE WITHOUT STATUS MIGRAINOSUS, NOT INTRACTABLE: ICD-10-CM

## 2021-09-22 DIAGNOSIS — G43.909 MIGRAINE WITHOUT STATUS MIGRAINOSUS, NOT INTRACTABLE, UNSPECIFIED MIGRAINE TYPE: ICD-10-CM

## 2021-09-22 RX ORDER — MAGNESIUM OXIDE 400 MG/1
TABLET ORAL
Qty: 60 TABLET | Refills: 3 | Status: SHIPPED | OUTPATIENT
Start: 2021-09-22

## 2021-09-22 NOTE — TELEPHONE ENCOUNTER
Patient's last appointment was : 9/13/2021  Patient's next appointment is : 10/25/2021  Last refilled: 8/13/21

## 2021-09-29 ENCOUNTER — PATIENT MESSAGE (OUTPATIENT)
Dept: FAMILY MEDICINE CLINIC | Age: 61
End: 2021-09-29

## 2021-09-29 ENCOUNTER — TELEPHONE (OUTPATIENT)
Dept: FAMILY MEDICINE CLINIC | Age: 61
End: 2021-09-29

## 2021-09-29 NOTE — TELEPHONE ENCOUNTER
----- Message from Tim Mariola sent at 9/29/2021  2:30 PM EDT -----  Subject: Message to Provider    QUESTIONS  Information for Provider? Patient has a service dog and Dr Abida Cordova wrote up a   note for patient about service dog so the dog will be accepted in the   apartment and apartment complex says the note must be notarized. Patient   would like to know if there's a notary in the office or hospital or how   she should go about doing that.  ---------------------------------------------------------------------------  --------------  [employer]  What is the best way for the office to contact you? OK to leave message on   voicemail  Preferred Call Back Phone Number? [phone]  ---------------------------------------------------------------------------  --------------  SCRIPT ANSWERS  Relationship to Patient?  Self

## 2021-09-29 NOTE — TELEPHONE ENCOUNTER
I know someone in [facility] who is a notary, but [facility]ar[facility] documents for patients.

## 2021-09-30 NOTE — TELEPHONE ENCOUNTER
Pt said that Dr. Payton Lopez needs to be present to sign with the person who can notarize the letter. She is bringing someone here from [address] [place] [address] at about noon, unless we can find someone here to do it earlier. She said she called here and was told we don't have anyone who can notarize things. Tomorrow is her last day to do this.   Please advise

## 2021-10-01 NOTE — TELEPHONE ENCOUNTER
So do I need Carrie Sportswanda to come or is the [facility] notary coming with the patient? I'm here at noon.

## 2021-10-07 ENCOUNTER — NURSE ONLY (OUTPATIENT)
Dept: LAB | Age: 61
End: 2021-10-07

## 2021-10-07 ENCOUNTER — OFFICE VISIT (OUTPATIENT)
Dept: RHEUMATOLOGY | Age: 61
End: 2021-10-07
Payer: MEDICARE

## 2021-10-07 VITALS
HEIGHT: 65 IN | OXYGEN SATURATION: 98 % | WEIGHT: 135 LBS | BODY MASS INDEX: 22.49 KG/M2 | HEART RATE: 64 BPM | SYSTOLIC BLOOD PRESSURE: 90 MMHG | DIASTOLIC BLOOD PRESSURE: 64 MMHG

## 2021-10-07 DIAGNOSIS — Z87.39 H/O MIXED CONNECTIVE TISSUE DISEASE: Primary | ICD-10-CM

## 2021-10-07 DIAGNOSIS — Z51.81 MEDICATION MONITORING ENCOUNTER: ICD-10-CM

## 2021-10-07 DIAGNOSIS — Z87.39 H/O FIBROMYALGIA: ICD-10-CM

## 2021-10-07 DIAGNOSIS — Z87.39 H/O MIXED CONNECTIVE TISSUE DISEASE: ICD-10-CM

## 2021-10-07 LAB
ALBUMIN SERPL-MCNC: 4.4 G/DL (ref 3.5–5.1)
ALP BLD-CCNC: 88 U/L (ref 38–126)
ALT SERPL-CCNC: 13 U/L (ref 11–66)
ANION GAP SERPL CALCULATED.3IONS-SCNC: 11 MEQ/L (ref 8–16)
AST SERPL-CCNC: 12 U/L (ref 5–40)
BASOPHILS # BLD: 0.8 %
BASOPHILS ABSOLUTE: 0.1 THOU/MM3 (ref 0–0.1)
BILIRUB SERPL-MCNC: 0.3 MG/DL (ref 0.3–1.2)
BILIRUBIN URINE: NEGATIVE
BLOOD, URINE: NEGATIVE
BUN BLDV-MCNC: 29 MG/DL (ref 7–22)
C-REACTIVE PROTEIN: < 0.3 MG/DL (ref 0–1)
C3 COMPLEMENT: 113 MG/DL (ref 90–180)
CALCIUM SERPL-MCNC: 9.7 MG/DL (ref 8.5–10.5)
CHARACTER, URINE: CLEAR
CHLORIDE BLD-SCNC: 105 MEQ/L (ref 98–111)
CO2: 24 MEQ/L (ref 23–33)
COLOR: YELLOW
COMPLEMENT C4: 23 MG/DL (ref 10–40)
CREAT SERPL-MCNC: 0.7 MG/DL (ref 0.4–1.2)
CREATININE URINE: 122 MG/DL
EOSINOPHIL # BLD: 1.6 %
EOSINOPHILS ABSOLUTE: 0.1 THOU/MM3 (ref 0–0.4)
ERYTHROCYTE [DISTWIDTH] IN BLOOD BY AUTOMATED COUNT: 12.5 % (ref 11.5–14.5)
ERYTHROCYTE [DISTWIDTH] IN BLOOD BY AUTOMATED COUNT: 43 FL (ref 35–45)
GFR SERPL CREATININE-BSD FRML MDRD: 85 ML/MIN/1.73M2
GLUCOSE BLD-MCNC: 90 MG/DL (ref 70–108)
GLUCOSE, URINE: NEGATIVE MG/DL
HCT VFR BLD CALC: 39.8 % (ref 37–47)
HEMOGLOBIN: 12.7 GM/DL (ref 12–16)
IMMATURE GRANS (ABS): 0.02 THOU/MM3 (ref 0–0.07)
IMMATURE GRANULOCYTES: 0.3 %
KETONES, URINE: NEGATIVE
LEUKOCYTE EST, POC: NEGATIVE
LYMPHOCYTES # BLD: 32.1 %
LYMPHOCYTES ABSOLUTE: 2.4 THOU/MM3 (ref 1–4.8)
MCH RBC QN AUTO: 29.9 PG (ref 26–33)
MCHC RBC AUTO-ENTMCNC: 31.9 GM/DL (ref 32.2–35.5)
MCV RBC AUTO: 93.6 FL (ref 81–99)
MONOCYTES # BLD: 6.3 %
MONOCYTES ABSOLUTE: 0.5 THOU/MM3 (ref 0.4–1.3)
NITRITE, URINE: NEGATIVE
NUCLEATED RED BLOOD CELLS: 0 /100 WBC
PH UA: 5.5 (ref 5–9)
PLATELET # BLD: 255 THOU/MM3 (ref 130–400)
PMV BLD AUTO: 12.6 FL (ref 9.4–12.4)
POTASSIUM SERPL-SCNC: 3.9 MEQ/L (ref 3.5–5.2)
PROT/CREAT RATIO, UR: 0.12
PROTEIN UA: NEGATIVE MG/DL
PROTEIN, URINE: 14.5 MG/DL
RBC # BLD: 4.25 MILL/MM3 (ref 4.2–5.4)
SEDIMENTATION RATE, ERYTHROCYTE: 4 MM/HR (ref 0–20)
SEG NEUTROPHILS: 58.9 %
SEGMENTED NEUTROPHILS ABSOLUTE COUNT: 4.4 THOU/MM3 (ref 1.8–7.7)
SODIUM BLD-SCNC: 140 MEQ/L (ref 135–145)
SPECIFIC GRAVITY UA: 1.02 (ref 1–1.03)
TOTAL PROTEIN: 6.4 G/DL (ref 6.1–8)
UROBILINOGEN, URINE: 0.2 EU/DL (ref 0–1)
WBC # BLD: 7.5 THOU/MM3 (ref 4.8–10.8)

## 2021-10-07 PROCEDURE — G8482 FLU IMMUNIZE ORDER/ADMIN: HCPCS | Performed by: INTERNAL MEDICINE

## 2021-10-07 PROCEDURE — 3017F COLORECTAL CA SCREEN DOC REV: CPT | Performed by: INTERNAL MEDICINE

## 2021-10-07 PROCEDURE — 99214 OFFICE O/P EST MOD 30 MIN: CPT | Performed by: INTERNAL MEDICINE

## 2021-10-07 PROCEDURE — G8420 CALC BMI NORM PARAMETERS: HCPCS | Performed by: INTERNAL MEDICINE

## 2021-10-07 PROCEDURE — 4004F PT TOBACCO SCREEN RCVD TLK: CPT | Performed by: INTERNAL MEDICINE

## 2021-10-07 PROCEDURE — G8427 DOCREV CUR MEDS BY ELIG CLIN: HCPCS | Performed by: INTERNAL MEDICINE

## 2021-10-07 ASSESSMENT — ENCOUNTER SYMPTOMS
CONSTIPATION: 0
DIARRHEA: 0
COUGH: 0
NAUSEA: 0
ABDOMINAL PAIN: 1
VOMITING: 0
WHEEZING: 0
COLOR CHANGE: 0
SHORTNESS OF BREATH: 1
EYE REDNESS: 0
EYES NEGATIVE: 1
EYE PAIN: 0

## 2021-10-07 NOTE — PROGRESS NOTES
-- + anxiety , depression and weight loss (intentional initially an to the 130's and dropped to 120's)   -- lipitor started by PCP 3 times per week. -- diagnosed w/ retrograde amnesia Dr. Darci Riddle (in [facility]). Pain currently bilateral hands, left shoulder, left hip, left toes, neck and lower back. Pain up to 7.5/10 over the past week. Constant, pain. Aggravating factors: weather changes, back: getting up from seated position, prolonged standing , walking. Alleviating factors: tylenol hands. Limited ROM of the neck currently.    + AM stiffness lasting ~ 30 minutes. Occasional swelling /redness of cmc joint. (left) , and 1st MTP.     + dry eyes and mouth, prolonged AM stiffness  Nodule @ left 5th PIP      REVIEW OF SYSTEMS: (ROS)    Review of Systems   Constitutional: Positive for fatigue. Negative for diaphoresis and fever. HENT: Positive for tinnitus. Negative for congestion, hearing loss, mouth sores and nosebleeds. Eyes: Negative. Negative for pain and redness. Respiratory: Positive for shortness of breath (wtih walking < 1-2 blocks). Negative for cough and wheezing. Cardiovascular: Negative. Negative for chest pain. Gastrointestinal: Positive for abdominal pain (left upper quadrant). Negative for constipation, diarrhea, nausea and vomiting. Genitourinary: Negative. Negative for difficulty urinating, frequency and hematuria. Musculoskeletal: Positive for arthralgias and neck pain. Negative for myalgias. Skin: Negative for color change and rash. Neurological: Positive for numbness (fingers intermittent) and headaches. Negative for dizziness and weakness. Hematological: Does not bruise/bleed easily. Psychiatric/Behavioral: Negative for sleep disturbance. The patient is not nervous/anxious.            PmHx:  has a past medical history of Adrenal abnormality ([address] Angina at rest [facility]), Anxiety, Arthritis, Asthma, Cerebral artery occlusion with cerebral infarction [facility]), Chronic sinusitis, Cognitive impairment, Headache, History of degenerative disc disease, Hyperlipidemia, Lymphedema, May-Thurner syndrome, Peripheral vascular disease ([address] PONV (postoperative nausea and vomiting), Positive JB (antinuclear antibody), and PTSD (post-traumatic stress disorder). Social History:  reports that she has been smoking cigarettes. She has a 12.00 pack-year smoking history. She has never used smokeless tobacco. She reports previous alcohol use. She reports that she does not use drugs. ALLERGIES     Allergies   Allergen Reactions    Amoxil [Amoxicillin]      rash    Biaxin [Clarithromycin] Swelling     Lips swelled    Doxycycline Hives     rash    Penicillins     Triamcinolone Acetonide      Other reaction(s): Edema    Morphine Nausea And Vomiting    Sulfa Antibiotics Rash       CURRENT MEDICATIONS      Current Outpatient Medications:     magnesium oxide (MAG-OX) 400 MG tablet, TAKE HALF OF TABLET 4 TIMES A DAY AS NEEDED FOR MIGRAINES AND CONSTIPATION., Disp: 60 tablet, Rfl: 3    traZODone (DESYREL) 150 MG tablet, Take 1.5 tablets by mouth nightly, Disp: 135 tablet, Rfl: 0    buPROPion (WELLBUTRIN XL) 300 MG extended release tablet, TAKE 1 TABLET BY MOUTH EVERY DAY IN THE MORNING, Disp: 90 tablet, Rfl: 0    buPROPion (WELLBUTRIN XL) 150 MG extended release tablet, Take 1 tablet by mouth every morning Take with 300 mg tablet for total 450 mg once daily in the morning, Disp: 90 tablet, Rfl: 0    LORazepam (ATIVAN) 1 MG tablet, Take 1 tablet by mouth 2 times daily as needed for Anxiety for up to 30 days. , Disp: 60 tablet, Rfl: 0    tiotropium (SPIRIVA HANDIHALER) 18 MCG inhalation capsule, Inhale 18 mcg into the lungs daily, Disp: , Rfl:     nitroglycerin (NITROSTAT) 0.6 MG SL tablet, Place 1 tablet under the tongue every 5 minutes as needed for Chest pain up to max of 3 total doses.  If no relief after 1 dose, call 911., Disp: 30 tablet, Rfl: 1    atorvastatin (LIPITOR) 40 MG tablet, Take 1 tablet by mouth daily, Disp: 90 tablet, Rfl: 3    aspirin EC 81 MG EC tablet, Take 1 tablet by mouth daily, Disp: 90 tablet, Rfl: 1    topiramate (TOPAMAX) 100 MG tablet, TAKE 1 TABLET BY MOUTH TWICE A DAY, Disp: 180 tablet, Rfl: 1    Erenumab-aooe (AIMOVIG) 140 MG/ML SOAJ, Inject 140 mg into the skin every 30 days, Disp: 1 pen, Rfl: 5    butalbital-acetaminophen-caffeine (FIORICET, ESGIC) -40 MG per tablet, Take 1 tablet by mouth every 4 hours as needed for Headaches, Disp: 180 tablet, Rfl: 1    esomeprazole (NEXIUM) 40 MG delayed release capsule, Take 1 capsule by mouth every morning (before breakfast), Disp: 90 capsule, Rfl: 1    pramipexole (MIRAPEX) 0.125 MG tablet, Take 2 tablets by mouth 2 times daily, Disp: 120 tablet, Rfl: 3    memantine (NAMENDA) 5 MG tablet, Take 5 mg by mouth daily , Disp: , Rfl:     clotrimazole-betamethasone (LOTRISONE) 1-0.05 % cream, Apply topically as needed , Disp: , Rfl:     cyclobenzaprine (FLEXERIL) 10 MG tablet, 2 times daily as needed , Disp: , Rfl:     furosemide (LASIX) 20 MG tablet, as needed , Disp: , Rfl:     potassium chloride (KLOR-CON) 10 MEQ extended release tablet, as needed , Disp: , Rfl:     Onabotulinumtoxin A (BOTOX, COSMETIC,) 200 units injection, 200 Units every 3 months , Disp: , Rfl:     Ubrogepant (UBRELVY) 100 MG TABS, Take 100 mg by mouth as needed, Disp: , Rfl:     hydroxychloroquine (PLAQUENIL) 200 MG tablet, Take 200 mg by mouth 2 times daily , Disp: , Rfl:     ondansetron (ZOFRAN) 4 MG tablet, Take 1 tablet by mouth 3 times daily as needed for Nausea or Vomiting, Disp: 15 tablet, Rfl: 0    PHYSICAL EXAMINATION / OBJECTIVE     Objective:  BP 90/64 (Site: Left Upper Arm, Position: Sitting, Cuff Size: Medium Adult)   Pulse 64   Ht 5' 5\" (1.651 m)   Wt 135 lb (61.2 kg)   SpO2 98%   BMI 22.47 kg/m²     Physical Exam    General Appearance: General appearance:  AAO x 3 ,  well-developed and well nourished  Head: NCAT  Eyes: No abnormalities. ,  Sclera non-icteric,   Ears / Nose:  normal  appearance  ears , eternal nose. + nasal sore (left nasal septum)  Mouth:  ears w/o deformities, poor salivary pooling   Neck: No jugular venous distention, appears symmetric, good ROM  Lymph: no cervical adenopathy   Pulmonary/Chest: CTA bilateral ,  symmetric chest expansion. Cardiovascular: Normal S1 and S2, NO murmur, rub, gallop  : Deferred   Abd/GI: Deferred   Neurologic: Speech normal, no facial droop,  Skin: NO rash on exposed skin.        Upper extremities:    SHOULDERS + tender bilat ,   ELBOWS Non-tender   WRISTS nontender, no swelling,   HANDS/FINGERS tender bilat PIPs     Lower extremities:  HIPS tender bilat  KNEES Non-tender   ANKLES nontender   FEET : tender MTPs bilat     Spine: tender lumbar spine.          Remission: <3  Low Disease Activity: <6  Moderate Disease Activity: >=6 and <=12  High Disease Activity: >12     LABS      Lab Results   Component Value Date    WBC 5.3 07/15/2021    HGB 12.0 07/15/2021    MCV 92.4 07/15/2021    MCHC 31.0 (L) 07/15/2021    RDW 13.7 02/05/2021     07/15/2021    NEUTROABS 3400 02/05/2021    LYMPHSABS 2.0 07/15/2021    EOSABS 0.1 07/15/2021    BASOSABS 0.0 07/15/2021         Chemistry        Component Value Date/Time     07/15/2021 1340    K 3.5 07/15/2021 1340     07/15/2021 1340    CO2 21 (L) 07/15/2021 1340    BUN 22 07/15/2021 1340    CREATININE 0.7 07/15/2021 1340        Component Value Date/Time    CALCIUM 9.4 07/15/2021 1340    ALKPHOS 85 07/15/2021 1340    AST 15 07/15/2021 1340    ALT 16 07/15/2021 1340    BILITOT 0.2 (L) 07/15/2021 1340            Lab Results   Component Value Date    SEDRATE 17 03/30/2021    SEDRATE 9 09/22/2020    CRP 0.68 03/30/2021    CRP < 0.5 02/05/2021    CRP 1.7 09/16/2020       Lab Results   Component Value Date    VITD25 41 05/10/2021       No results found for: ANASCRN  No results found for: SSA  No results found for: SSB  No results found for: ANTI-SMITH  No results found for: DSDNAAB   No results found for: ANTIRNP  Lab Results   Component Value Date    C3 131 03/30/2021    C4 25 03/30/2021     No results found for: CCPAB  Lab Results   Component Value Date    RF <10 09/16/2020           RADIOLOGY / PROCEDURES:                 Electronically signed by Chuck Anderson DO on 10/7/21 at 11:56 AM EDT  Please contact the office if you have any questions or change of symptoms.

## 2021-10-11 ENCOUNTER — OFFICE VISIT (OUTPATIENT)
Dept: PHYSICAL MEDICINE AND REHAB | Age: 61
End: 2021-10-11
Payer: MEDICARE

## 2021-10-11 VITALS
DIASTOLIC BLOOD PRESSURE: 64 MMHG | SYSTOLIC BLOOD PRESSURE: 102 MMHG | HEIGHT: 65 IN | HEART RATE: 71 BPM | BODY MASS INDEX: 22.57 KG/M2 | WEIGHT: 135.5 LBS

## 2021-10-11 DIAGNOSIS — M79.18 MYOFASCIAL PAIN: ICD-10-CM

## 2021-10-11 DIAGNOSIS — G43.019 INTRACTABLE MIGRAINE WITHOUT AURA AND WITHOUT STATUS MIGRAINOSUS: Primary | ICD-10-CM

## 2021-10-11 DIAGNOSIS — G89.29 OTHER CHRONIC PAIN: ICD-10-CM

## 2021-10-11 DIAGNOSIS — M47.816 LUMBAR SPONDYLOSIS: ICD-10-CM

## 2021-10-11 PROCEDURE — G8427 DOCREV CUR MEDS BY ELIG CLIN: HCPCS | Performed by: ANESTHESIOLOGY

## 2021-10-11 PROCEDURE — G8420 CALC BMI NORM PARAMETERS: HCPCS | Performed by: ANESTHESIOLOGY

## 2021-10-11 PROCEDURE — 64615 CHEMODENERV MUSC MIGRAINE: CPT | Performed by: ANESTHESIOLOGY

## 2021-10-11 PROCEDURE — 99213 OFFICE O/P EST LOW 20 MIN: CPT | Performed by: ANESTHESIOLOGY

## 2021-10-11 PROCEDURE — 4004F PT TOBACCO SCREEN RCVD TLK: CPT | Performed by: ANESTHESIOLOGY

## 2021-10-11 PROCEDURE — 3017F COLORECTAL CA SCREEN DOC REV: CPT | Performed by: ANESTHESIOLOGY

## 2021-10-11 PROCEDURE — G8482 FLU IMMUNIZE ORDER/ADMIN: HCPCS | Performed by: ANESTHESIOLOGY

## 2021-10-11 NOTE — PROGRESS NOTES
Pre-operative Diagnosis: Chronic Migraine Headaches     Post-operative Diagnosis: Chronic Migraine Headaches     Procedure: Botox for migraine headaches     Procedure Description:  Patient was reclined on the examination table. Botox was drawn up into a tuberculin syringes, to a concentration of 5 units per 0.1 mL with a 30-gauge needle. Skin was prepped with alcohol wipes. The following muscles were injected after negative aspiration; The frontalis muscle bilaterally a total of 4 sites (20 units), The  muscle bilaterally a total of 2 sites (10 units), the procerus one site (5 units), the occipitalis bilaterally a total of 6 sites (30 units), The temporalis muscle bilaterally a total of 8 sites (40 units), the trapezius bilaterally a total of 6 sites (30 units), cervical paraspinal muscle groups a total of 4 sites (20 units), an additional 10 units in right masseter muscle, and an additional 10 units in left sternocleidomastoid muscle. This was a total of 175 units.  The patient tolerated the procedure well.     Medications: 4 mL in 200 U Botox drawn up in 4 separate 1 mL TB syringes = 5 U per 0.1 mL syringe     Amount medication wasted: 25 units        Procedural Complications: None        Leandro Powell DO  Interventional Pain Management/PM&R   [facility]

## 2021-10-11 NOTE — PROGRESS NOTES
Chronic Pain/PM&R Clinic Note     Encounter Date: 10/11/21    Subjective:   Chief Complaint:   Chief Complaint   Patient presents with    Follow-up     botox injections 200 units        History of Present Illness:   Anna Hernandez is a 62 y.o. female seen in the office initially on 03/05/21 for her management of migraines. She has medical history of previous TIA with residual cognitive deficits, and ACDF C3-C7 with Dr Annabelle Meneses. She has longstanding history of migraine headaches as well as of the last 35 years. She describes 2 different types of migraine headaches. Her standard migraine headaches she reports started on the right side the neck and wrap around the entire side of the front of the head. She has associated phonophobia and photophobia. She also notices these migraines to begin with an aura where her nose starts to burn. She has associated nausea/vomiting when the migraines are severe. Her migraines last greater than 4 hours in duration interfering everyday activities. In addition she reports greater than 15 migraine attacks last month. She has failed multiple medications in the past including Fioricet, Ubrelvy, nortriptyline, and is currently on Aimovig and Topamax. 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 In addition, she does have axial low back pain that contributes to a lot of her debilitating pain. She denies any radiation down the legs, focal leg weakness, leg paresthesias, saddle anesthesia, bowel/bladder incontinence. Today, 10/11/2021, patient presents for planned follow-up for chronic low back pain and migraine headaches. She has completed her lumbar radiofrequency ablation in her low back. She states that she has noticed minimal relief from this injection. She states that she was doing very well overall though with her migraine management. She feels like her Botox lasted about 12 weeks.   She states that she has been struggling more with right-sided TMJ dysfunction is wonder if Botox to be used to be address this. She denies any new changes in the presentation of her migraine headaches or changes in her medications utilized to manage migraine headaches. She states she is moving to [place] though and is continuing her care with another pain physician down there.       Past Medical History:   Diagnosis Date    Adrenal abnormality (HCC)     Angina at rest [facility])     MICROVASCULAR CARDIAC DISEASE    Anxiety     Arthritis     Asthma     Cerebral artery occlusion with cerebral infarction ([facility] [address]     Chronic sinusitis     Cognitive impairment     with retograde amnesia    Headache     Migraines Hemiplegic    History of degenerative disc disease     Hyperlipidemia     Lymphedema     Left leg    May-Thurner syndrome     Peripheral vascular disease ([facility] [address]     PONV (postoperative nausea and vomiting)     Positive BJ (antinuclear antibody)     PTSD (post-traumatic stress disorder)        Past Surgical History:   Procedure Laterality Date    BREAST BIOPSY      HYSTERECTOMY      NECK SURGERY  12/2020    ACDF    PAIN MANAGEMENT PROCEDURE Bilateral 6/29/2021    medial branch blocks at bilateral L4/L5 and L5/S1 [facility] Bilateral 7/20/2021    confirmatory medial branch blocks at bilateral L4/L5 and L5/S1 performed [facility] Right 7/27/2021    thermal radiofrequency ablation medial branches L4/L5 and L5/S1 right side first performed by [facility] Left 8/3/2021    thermal radiofrequency ablation medial branches L4/L5 and L5/S1 left performed by Jailene Aden DO at [address] [place] [address] SEPTOPLASTY Right 6/14/2021    SEPTOPLASTY, SUBMUCOUS RESECTION TURBINATE, RIGHT PARTIAL INFERIOR, NASAL ENDOSCOPY WITH PARTIAL RESECTION OF Communication with Friends and Family:     Frequency of Social Gatherings with Friends and Family:     Attends Judaism Services:     Active Member of Clubs or Organizations:     Attends Club or Organization Meetings:     Marital Status:    Intimate Partner Violence:     Fear of Current or Ex-Partner:     Emotionally Abused:     Physically Abused:     Sexually Abused:        Medications & Allergies:   Current Outpatient Medications   Medication Instructions    Aimovig 140 mg, SubCUTAneous, EVERY 30 DAYS    aspirin EC 81 mg, Oral, DAILY    atorvastatin (LIPITOR) 40 mg, Oral, DAILY    buPROPion (WELLBUTRIN XL) 300 MG extended release tablet TAKE 1 TABLET BY MOUTH EVERY DAY IN THE MORNING    buPROPion (WELLBUTRIN XL) 150 mg, Oral, EVERY MORNING, Take with 300 mg tablet for total 450 mg once daily in the morning    butalbital-acetaminophen-caffeine (FIORICET, ESGIC) -40 MG per tablet 1 tablet, Oral, EVERY 4 HOURS PRN    clotrimazole-betamethasone (LOTRISONE) 1-0.05 % cream Topical, PRN    cyclobenzaprine (FLEXERIL) 10 MG tablet 2 TIMES DAILY PRN    esomeprazole (NEXIUM) 40 mg, Oral, DAILY BEFORE BREAKFAST    furosemide (LASIX) 20 MG tablet PRN    hydroxychloroquine (PLAQUENIL) 200 mg, Oral, 2 TIMES DAILY    LORazepam (ATIVAN) 1 mg, Oral, 2 TIMES DAILY PRN    magnesium oxide (MAG-OX) 400 MG tablet TAKE HALF OF TABLET 4 TIMES A DAY AS NEEDED FOR MIGRAINES AND CONSTIPATION.  memantine (NAMENDA) 5 mg, Oral, DAILY    nitroglycerin (NITROSTAT) 0.6 mg, SubLINGual, EVERY 5 MIN PRN, up to max of 3 total doses. If no relief after 1 dose, call 911.      Onabotulinumtoxin A (BOTOX (COSMETIC)) 200 Units, EVERY 3 MONTHS    ondansetron (ZOFRAN) 4 mg, Oral, 3 TIMES DAILY PRN    potassium chloride (KLOR-CON) 10 MEQ extended release tablet PRN    pramipexole (MIRAPEX) 0.25 mg, Oral, 2 TIMES DAILY    Spiriva HandiHaler 18 mcg, Inhalation, DAILY    topiramate (TOPAMAX) 100 MG tablet TAKE 1 TABLET BY MOUTH TWICE A DAY    traZODone (DESYREL) 225 mg, Oral, NIGHTLY    Ubrelvy 100 mg, Oral, PRN       Allergies   Allergen Reactions    Amoxil [Amoxicillin]      rash    Biaxin [Clarithromycin] Swelling     Lips swelled    Doxycycline Hives     rash    Penicillins     Triamcinolone Acetonide      Other reaction(s): Edema    Morphine Nausea And Vomiting    Sulfa Antibiotics Rash       Review of Systems:   Constitutional: negative for fevers  Musculoskeletal: Positive for low back pain  Neurological: positive for migraines but negative for leg numbness/tingling or weakness  Behavioral/Psych: negative for anxiety/depression   All other systems reviewed and are negative    Objective:     Vitals:    10/11/21 1037   BP: 102/64   Pulse:        Constitutional: Pleasant, no acute distress   Head: Normocephalic, atraumatic   Eyes: Conjunctivae normal   Neck: Supple, symmetrical   Respiration: Non-labored breathing   Cardiovascular: Limbs warm and well perfused   Musculoskeletal:   Lumbar -ROM reduced in extension. Increased pain with facet loading bilaterally. Tenderness palpation along bilateral lumbar paraspinals. Neuro: Alert, oriented. CN II-XII appear grossly intact. No focal motor deficits appreciated in lower limbs. Skin: no skin rashes or lesions visible on face  Psychological: Cooperative, no exaggerated pain behaviors       Assessment:    Diagnosis Orders   1. Intractable migraine without aura and without status migrainosus     2. Lumbar spondylosis     3. Myofascial pain     4. Other chronic pain           Monse Christianson is a 62 y. o.female presenting to the pain clinic for evaluation of migraines. She underwent Botox injections for management of migraine headaches today. In addition, she has lumbar facet mediated pain. She has completed her lumbar radiofrequency ablation for her chronic low back pain.   She would benefit from physical therapy revisitation for core stability exercise program.  For her migraine headaches, she underwent repeat Botox injections today. We will transition her care to another physician down in [place]. Plan: The following treatment recommendations and plan were discussed in detail with Wilda Taylor. Migraine Headaches  In the light of patient's clinical history and suboptimal response to multiple treatment modalities as detailed above, we discussed the option of using onabotulinumtoxinA (Botox) injection for better management of chronic migraine headaches. The risks and benefits were discussed in detail with the patient. Patient underwent repeat Botox injections today.   (Please see attached procedure note)      Follow-up: NEEMA Maguire DO  Interventional Pain Management/PM&R   [place]

## 2021-10-15 ENCOUNTER — OFFICE VISIT (OUTPATIENT)
Dept: FAMILY MEDICINE CLINIC | Age: 61
End: 2021-10-15
Payer: MEDICARE

## 2021-10-15 VITALS
HEIGHT: 65 IN | BODY MASS INDEX: 22.16 KG/M2 | HEART RATE: 76 BPM | TEMPERATURE: 96 F | DIASTOLIC BLOOD PRESSURE: 86 MMHG | RESPIRATION RATE: 16 BRPM | OXYGEN SATURATION: 99 % | SYSTOLIC BLOOD PRESSURE: 120 MMHG | WEIGHT: 133 LBS

## 2021-10-15 DIAGNOSIS — G43.409 HEMIPLEGIC MIGRAINE WITHOUT STATUS MIGRAINOSUS, NOT INTRACTABLE: Primary | ICD-10-CM

## 2021-10-15 DIAGNOSIS — G43.909 MIGRAINE WITHOUT STATUS MIGRAINOSUS, NOT INTRACTABLE, UNSPECIFIED MIGRAINE TYPE: ICD-10-CM

## 2021-10-15 PROCEDURE — G8420 CALC BMI NORM PARAMETERS: HCPCS | Performed by: STUDENT IN AN ORGANIZED HEALTH CARE EDUCATION/TRAINING PROGRAM

## 2021-10-15 PROCEDURE — G8427 DOCREV CUR MEDS BY ELIG CLIN: HCPCS | Performed by: STUDENT IN AN ORGANIZED HEALTH CARE EDUCATION/TRAINING PROGRAM

## 2021-10-15 PROCEDURE — 3017F COLORECTAL CA SCREEN DOC REV: CPT | Performed by: STUDENT IN AN ORGANIZED HEALTH CARE EDUCATION/TRAINING PROGRAM

## 2021-10-15 PROCEDURE — 4004F PT TOBACCO SCREEN RCVD TLK: CPT | Performed by: STUDENT IN AN ORGANIZED HEALTH CARE EDUCATION/TRAINING PROGRAM

## 2021-10-15 PROCEDURE — G8482 FLU IMMUNIZE ORDER/ADMIN: HCPCS | Performed by: STUDENT IN AN ORGANIZED HEALTH CARE EDUCATION/TRAINING PROGRAM

## 2021-10-15 PROCEDURE — 99213 OFFICE O/P EST LOW 20 MIN: CPT | Performed by: STUDENT IN AN ORGANIZED HEALTH CARE EDUCATION/TRAINING PROGRAM

## 2021-10-15 RX ORDER — UBROGEPANT 100 MG/1
100 TABLET ORAL PRN
Qty: 16 TABLET | Refills: 1 | Status: SHIPPED | OUTPATIENT
Start: 2021-10-15

## 2021-10-15 RX ORDER — ONDANSETRON 4 MG/1
4 TABLET, FILM COATED ORAL 3 TIMES DAILY PRN
Qty: 15 TABLET | Refills: 0 | Status: SHIPPED | OUTPATIENT
Start: 2021-10-15

## 2021-10-15 RX ORDER — BUTALBITAL, ACETAMINOPHEN AND CAFFEINE 50; 325; 40 MG/1; MG/1; MG/1
1 TABLET ORAL EVERY 4 HOURS PRN
Qty: 180 TABLET | Refills: 1 | Status: SHIPPED | OUTPATIENT
Start: 2021-10-15

## 2021-10-15 NOTE — PROGRESS NOTES
[address] [address]. [address]  [place] [address]  Dept: [phone]  Loc: [phone]     Uziel Kimball is a 62 y.o. female who presents today for:  Chief Complaint   Patient presents with    6 Month Follow-Up     disbality review     Other     sore on back and left ankle, 3 months, itching on back    Stress    Medication Refill       Goals    None         HPI:     HPI   78-year-old female with multiple chronic medical conditions here to discuss disability paperwork and obtain refills. Patient is moving back to [place] in 2 weeks. She is feeling extra stressed right now due to landlord who has been harassing her. She has lost some extra weight despite eating food outside of her normal [address]. Former job did not send in paperwork yet for disability paperwork. Also did not receive paperwork from Dr. Melany Moreira office. She is also reporting a sore on her back and on her ankle - wanted it looked it. Denies pain or drainage. Current Outpatient Medications   Medication Sig Dispense Refill    butalbital-acetaminophen-caffeine (FIORICET, ESGIC) -40 MG per tablet Take 1 tablet by mouth every 4 hours as needed for Headaches 180 tablet 1    ondansetron (ZOFRAN) 4 MG tablet Take 1 tablet by mouth 3 times daily as needed for Nausea or Vomiting 15 tablet 0    Ubrogepant (UBRELVY) 100 MG TABS Take 100 mg by mouth as needed (headache) 16 tablet 1    magnesium oxide (MAG-OX) 400 MG tablet TAKE HALF OF TABLET 4 TIMES A DAY AS NEEDED FOR MIGRAINES AND CONSTIPATION.  60 tablet 3    traZODone (DESYREL) 150 MG tablet Take 1.5 tablets by mouth nightly 135 tablet 0    buPROPion (WELLBUTRIN XL) 300 MG extended release tablet TAKE 1 TABLET BY MOUTH EVERY DAY IN THE MORNING 90 tablet 0    buPROPion (WELLBUTRIN XL) 150 MG extended release tablet Take 1 tablet by mouth every morning Take with 300 mg tablet for total 450 mg once daily in the morning 90 tablet 0    LORazepam (ATIVAN) 1 MG tablet Take 1 tablet by mouth 2 times daily as needed for Anxiety for up to 30 days. 60 tablet 0    tiotropium (SPIRIVA HANDIHALER) 18 MCG inhalation capsule Inhale 18 mcg into the lungs daily      nitroglycerin (NITROSTAT) 0.6 MG SL tablet Place 1 tablet under the tongue every 5 minutes as needed for Chest pain up to max of 3 total doses.  If no relief after 1 dose, call 911. 30 tablet 1    atorvastatin (LIPITOR) 40 MG tablet Take 1 tablet by mouth daily 90 tablet 3    aspirin EC 81 MG EC tablet Take 1 tablet by mouth daily 90 tablet 1    topiramate (TOPAMAX) 100 MG tablet TAKE 1 TABLET BY MOUTH TWICE A  tablet 1    Erenumab-aooe (AIMOVIG) 140 MG/ML SOAJ Inject 140 mg into the skin every 30 days 1 pen 5    esomeprazole (NEXIUM) 40 MG delayed release capsule Take 1 capsule by mouth every morning (before breakfast) 90 capsule 1    pramipexole (MIRAPEX) 0.125 MG tablet Take 2 tablets by mouth 2 times daily 120 tablet 3    memantine (NAMENDA) 5 MG tablet Take 5 mg by mouth daily       clotrimazole-betamethasone (LOTRISONE) 1-0.05 % cream Apply topically as needed       cyclobenzaprine (FLEXERIL) 10 MG tablet 2 times daily as needed       furosemide (LASIX) 20 MG tablet as needed       potassium chloride (KLOR-CON) 10 MEQ extended release tablet as needed       Onabotulinumtoxin A (BOTOX, COSMETIC,) 200 units injection 200 Units every 3 months       hydroxychloroquine (PLAQUENIL) 200 MG tablet Take 200 mg by mouth 2 times daily        Current Facility-Administered Medications   Medication Dose Route Frequency Provider Last Rate Last Admin    onabotulinumtoxin A (BOTOX) injection 200 Units  200 Units IntraMUSCular Once [place] Erp, DO              Food Insecurity: No Food Insecurity    Worried About Running Out of Food in the Last Year: Never true    Migue of Food in the Last Year: Never true       Health Maintenance   Topic Date Due    DTaP/Tdap/Td vaccine (1 - Tdap) Never done    Colon cancer screen colonoscopy  Never done    Annual Wellness Visit (AWV)  Never done    Shingles Vaccine (2 of 2) 11/03/2021    Lipid screen  07/15/2022    Potassium monitoring  10/07/2022    Creatinine monitoring  10/07/2022    Breast cancer screen  10/12/2022    Pneumococcal 0-64 years Vaccine (2 of 2 - PPSV23) 11/03/2026    Flu vaccine  Completed    COVID-19 Vaccine  Completed    Hepatitis C screen  Completed    HIV screen  Completed    Hepatitis A vaccine  Aged Out    Hepatitis B vaccine  Aged Out    Hib vaccine  Aged Out    Meningococcal (ACWY) vaccine  Aged Out       ROS:      Review of Systems   Constitutional: Positive for appetite change and fatigue. Negative for fever. HENT: Negative for congestion. Eyes: Negative for visual disturbance. Respiratory: Negative for cough and shortness of breath. Cardiovascular: Negative for chest pain and palpitations. Gastrointestinal: Negative for abdominal pain, constipation, diarrhea, nausea and vomiting. Genitourinary: Negative for dysuria and menstrual problem. Musculoskeletal: Positive for neck pain. Negative for arthralgias and back pain. Skin: Negative for rash. Neurological: Positive for headaches. Negative for dizziness, weakness and light-headedness. Psychiatric/Behavioral: Positive for dysphoric mood. Negative for sleep disturbance and suicidal ideas. The patient is nervous/anxious. Objective:     Vitals:    10/15/21 0950   BP: 120/86   Site: Left Upper Arm   Position: Sitting   Cuff Size: Medium Adult   Pulse: 76   Resp: 16   Temp: 96 °F (35.6 °C)   TempSrc: Skin   SpO2: 99%   Weight: 133 lb (60.3 kg)   Height: 5' 5\" (1.651 m)       Body mass index is 22.13 kg/m².     Wt Readings from Last 3 Encounters:   10/15/21 133 lb (60.3 kg)   10/11/21 135 lb 8 oz (61.5 kg)   10/07/21 135 lb (61.2 kg)     BP Readings from Last 3 Encounters:   10/15/21 120/86   10/11/21 102/64   10/07/21 90/64       Physical Exam  Vitals reviewed. Constitutional:       General: She is not in acute distress. Appearance: Normal appearance. She is normal weight. HENT:      Head: Normocephalic and atraumatic. Right Ear: External ear normal.      Left Ear: External ear normal.      Nose: Nose normal.      Mouth/Throat:      Mouth: Mucous membranes are moist.   Eyes:      Conjunctiva/sclera: Conjunctivae normal.   Cardiovascular:      Rate and Rhythm: Normal rate and regular rhythm. Pulses: Normal pulses. Heart sounds: Normal heart sounds. No murmur heard. Pulmonary:      Effort: Pulmonary effort is normal. No respiratory distress. Breath sounds: Normal breath sounds. No wheezing or rales. Abdominal:      General: Abdomen is flat. Bowel sounds are normal. There is no distension. Palpations: Abdomen is soft. Tenderness: There is no abdominal tenderness. Musculoskeletal:         General: Normal range of motion. Cervical back: Normal range of motion and neck supple. Right lower leg: No edema. Left lower leg: No edema. Skin:     General: Skin is warm and dry. Comments: Small circular erythematous lesion noted to mid-back, no swelling or drainage    Neurological:      General: No focal deficit present. Mental Status: She is alert. Psychiatric:         Mood and Affect: Mood normal.         Behavior: Behavior normal.         Assessment / Plan:     1. Hemiplegic migraine without status migrainosus, not intractable  - refilled  - butalbital-acetaminophen-caffeine (FIORICET, ESGIC) -40 MG per tablet; Take 1 tablet by mouth every 4 hours as needed for Headaches  Dispense: 180 tablet; Refill: 1  - ondansetron (ZOFRAN) 4 MG tablet; Take 1 tablet by mouth 3 times daily as needed for Nausea or Vomiting  Dispense: 15 tablet;  Refill: 0  - Ubrogepant (UBRELVY) 100 MG TABS; Take 100 mg by mouth as needed (headache)  Dispense: 16 tablet; Refill: 1    2. Migraine without status migrainosus, not intractable, unspecified migraine type  - refilled   - butalbital-acetaminophen-caffeine (FIORICET, ESGIC) -40 MG per tablet; Take 1 tablet by mouth every 4 hours as needed for Headaches  Dispense: 180 tablet; Refill: 1  - ondansetron (ZOFRAN) 4 MG tablet; Take 1 tablet by mouth 3 times daily as needed for Nausea or Vomiting  Dispense: 15 tablet; Refill: 0  - Ubrogepant (UBRELVY) 100 MG TABS; Take 100 mg by mouth as needed (headache)  Dispense: 16 tablet; Refill: 1      Instructed patient to follow-up with company regarding disability paperwork and send me [employer] message when it is in. Also would like her to send me message when she gets to [place] safely. Health Maintenance Due   Topic Date Due    DTaP/Tdap/Td vaccine (1 - Tdap) Never done    Colon cancer screen colonoscopy  Never done    Annual Wellness Visit (AWV)  Never done           Return if symptoms worsen or fail to improve. Medications Prescribed:  Orders Placed This Encounter   Medications    butalbital-acetaminophen-caffeine (FIORICET, ESGIC) -40 MG per tablet     Sig: Take 1 tablet by mouth every 4 hours as needed for Headaches     Dispense:  180 tablet     Refill:  1    ondansetron (ZOFRAN) 4 MG tablet     Sig: Take 1 tablet by mouth 3 times daily as needed for Nausea or Vomiting     Dispense:  15 tablet     Refill:  0    Ubrogepant (UBRELVY) 100 MG TABS     Sig: Take 100 mg by mouth as needed (headache)     Dispense:  16 tablet     Refill:  1       Future Appointments   Date Time Provider Bharat Veliz   10/18/2021 10:40 AM STR MAMMOGRAPHY RM3 DIG STRZ WOMEN STR Radiolog   11/22/2021 10:00 AM Ananth Calvillo, PhD N [facility]l [facility]   1/7/2022 11:00 AM Isauro Flanagan MD N [facility] [place] [address]   2/10/2022 10:40 AM Hortencia Flores DO N [facility] [place] [address]       Patient given educational materials - see patient instructions.   Discussed use, benefit, and

## 2021-10-15 NOTE — PROGRESS NOTES
Health Maintenance Due   Topic Date Due    DTaP/Tdap/Td vaccine (1 - Tdap) Never done    Colon cancer screen colonoscopy  Never done    Annual Wellness Visit (AWV)  Never done

## 2021-10-15 NOTE — PROGRESS NOTES
S: 62 y.o. female with   Chief Complaint   Patient presents with    6 Month Follow-Up     disbality review     Other     sore on back and left ankle, 3 months, itching on back    Stress    Medication Refill       HPI: please see resident note for HPI and ROS. BP Readings from Last 3 Encounters:   10/15/21 120/86   10/11/21 102/64   10/07/21 90/64     Wt Readings from Last 3 Encounters:   10/15/21 133 lb (60.3 kg)   10/11/21 135 lb 8 oz (61.5 kg)   10/07/21 135 lb (61.2 kg)       O: VS:  height is 5' 5\" (1.651 m) and weight is 133 lb (60.3 kg). Her skin temperature is 96 °F (35.6 °C). Her blood pressure is 120/86 and her pulse is 76. Her respiration is 16 and oxygen saturation is 99%. Diagnosis Orders   1. Hemiplegic migraine without status migrainosus, not intractable  butalbital-acetaminophen-caffeine (FIORICET, ESGIC) -40 MG per tablet    ondansetron (ZOFRAN) 4 MG tablet    Ubrogepant (UBRELVY) 100 MG TABS   2. Migraine without status migrainosus, not intractable, unspecified migraine type  butalbital-acetaminophen-caffeine (FIORICET, ESGIC) -40 MG per tablet    ondansetron (ZOFRAN) 4 MG tablet    Ubrogepant (UBRELVY) 100 MG TABS       Plan:  Refilled medications as ordered. Stable issues. Has doctors lined up in [place] [id number] where she'll be moving in a week or so. Health Maintenance Due   Topic Date Due    DTaP/Tdap/Td vaccine (1 - Tdap) Never done    Colon cancer screen colonoscopy  Never done    Annual Wellness Visit (AWV)  Never done       Attending Physician Statement  I have discussed the case, including pertinent history and exam findings with the resident. I agree with the documented assessment and plan as documented by the resident.         Yrn Renteria DO 10/15/2021 10:34 AM

## 2021-10-16 ASSESSMENT — ENCOUNTER SYMPTOMS
BACK PAIN: 0
VOMITING: 0
NAUSEA: 0
SHORTNESS OF BREATH: 0
CONSTIPATION: 0
ABDOMINAL PAIN: 0
DIARRHEA: 0
COUGH: 0

## 2021-10-18 ENCOUNTER — HOSPITAL ENCOUNTER (OUTPATIENT)
Dept: WOMENS IMAGING | Age: 61
Discharge: HOME | End: 2021-10-18
Payer: MEDICARE

## 2021-10-18 DIAGNOSIS — Z12.31 VISIT FOR SCREENING MAMMOGRAM: ICD-10-CM

## 2021-10-18 PROCEDURE — 77063 BREAST TOMOSYNTHESIS BI: CPT

## 2021-10-20 DIAGNOSIS — B37.9 YEAST INFECTION: Primary | ICD-10-CM

## 2021-10-20 RX ORDER — FLUCONAZOLE 150 MG/1
150 TABLET ORAL DAILY
Qty: 3 TABLET | Refills: 1 | Status: SHIPPED | OUTPATIENT
Start: 2021-10-20 | End: 2021-10-23

## 2021-11-10 ENCOUNTER — TELEPHONE (OUTPATIENT)
Dept: PSYCHIATRY | Age: 61
End: 2021-11-10

## 2021-11-10 NOTE — TELEPHONE ENCOUNTER
She should get established with a new PCP immediately to provide her medications. I am unable to continue prescribing her medications as I'm not licensed in [place].    Electronically signed by Suleiman Chase MD on 11/10/2021 at 4:51 PM

## 2022-01-13 ENCOUNTER — TELEPHONE (OUTPATIENT)
Dept: CARDIOLOGY CLINIC | Age: 62
End: 2022-01-13

## 2022-07-11 ENCOUNTER — TELEPHONE (OUTPATIENT)
Dept: ENT CLINIC | Age: 62
End: 2022-07-11

## 2022-07-11 DIAGNOSIS — E04.1 THYROID NODULE: Primary | ICD-10-CM

## 2022-07-11 NOTE — TELEPHONE ENCOUNTER
Patient called in stating she received a letter from our office telling her it was time to schedule a f/u Thyroid US and appt with Dr Nannette Hamman. Patient states she has moved to [place] and would like to have a referral faxed to Dr. Austin Henriquez, endocrinologist, at [phone]. Patient also asked to have all her records sent to him also. I informed patient we could send the referral but the medical records would need to come the medical records dept. I gave the patient the phone number to call to request them. I asked patient for her new address and will update in our system. Patient verbalized understanding and thanked me. Please place referral for External Endocrinologist for continuation of care. Patient is due for her f/u Thyroid US.

## 2023-02-27 NOTE — PROGRESS NOTES
[facility]   Pediatric General Surgery Consultation    Patient: Sung Onofre   MRN: [id number]   : 2021       History Of Present Illness  Patient is a 13 month of male transferred here from [facility] for intussusception. He had multiple episodes of vomiting with bloody stool. His father states that he had decreased number of wet diapers and states patient is fatigued.      Pediatric History  No birth history on file.    Past Medical History   has no past medical history on file.    Surgical History   has no past surgical history on file.     Social History   has no history on file for tobacco use, alcohol use, and drug use.     Allergies  ALLERGIES:  Patient has no known allergies.    Medications  (Not in a hospital admission)      ROS  Review of Systems   Constitutional: Negative for fever.   HENT: Negative for nosebleeds.    Respiratory: Negative for cough.    Cardiovascular: Negative for chest pain.   Gastrointestinal: Positive for abdominal pain, blood in stool and vomiting.   Genitourinary: Negative for difficulty urinating.   Musculoskeletal: Negative for back pain.   Neurological: Negative for seizures.        Physical Exam  Physical Exam  Vitals reviewed.   HENT:      Head: Normocephalic.   Eyes:      Extraocular Movements: Extraocular movements intact.      Pupils: Pupils are equal, round, and reactive to light.   Cardiovascular:      Rate and Rhythm: Normal rate.      Pulses: Normal pulses.   Pulmonary:      Effort: Pulmonary effort is normal. No respiratory distress.   Abdominal:      Palpations: Abdomen is soft.      Tenderness: There is no abdominal tenderness. There is no guarding or rebound.   Musculoskeletal:         General: Normal range of motion.   Skin:     General: Skin is warm and dry.   Neurological:      General: No focal deficit present.      Mental Status: He is alert.            Last Recorded Vitals    VITAL SIGNS:     Vital Last Value 24 Hour Range  Pharmacy Medication History Note      List of current medications patient is taking is complete. Source of information patient    Medications removed (include reason, ex. therapy complete or physician discontinued):  none      Medications added/doses adjusted:  Adjusted hydroxychloroquine to 200 mg PO BID   Adjusted topiramate to 100 mg PO once daily  Adjusted Botox to 200 units  IM every 3 months. Other notes (ex. Recent course of antibiotics, Coumadin dosing):    Denies use of other OTC or herbal medications. Allergies reviewed        For Pharmacy Admin Tracking Only    CPA in place:  No  Recommendation Provided To:  Other: 1  Intervention Detail: Adherence Monitorin  Time Spent (min): 15   Temperature 97.8 °F (36.6 °C) (02/27/23 0627) Temp  Min: 97.2 °F (36.2 °C)  Max: 99 °F (37.2 °C)   Pulse 130 (02/27/23 0627) Pulse  Min: 130  Max: 152   Respiratory 36 (02/27/23 0627) Resp  Min: 34  Max: 40   Non-Invasive  Blood Pressure (!) 100/65 (02/27/23 0627) BP  Min: 100/65  Max: 118/80   Pulse Oximetry 98 % (02/27/23 0627) SpO2  Min: 97 %  Max: 99 %     Vital Today Admitted   Weight 9.9 kg (21 lb 13.2 oz) (02/27/23 0354) Weight: 9.9 kg (21 lb 13.2 oz) (02/27/23 0354)   Height N/A     Body Mass Index N/A       INTAKE/OUTPUT:    No intake or output data in the 24 hours ending 02/27/23 0721      LABORATORY DATA:    Lab Results   Component Value Date    SODIUM 144 02/27/2023    POTASSIUM 5.0 02/27/2023    CHLORIDE 104 02/27/2023    CO2 24 02/27/2023    BUN 20 (H) 02/27/2023    CREATININE 0.27 02/27/2023    GLUCOSE 107 (H) 02/27/2023     Lab Results   Component Value Date    WBC 12.8 02/27/2023    HCT 31.8 02/27/2023    HGB 11.3 02/27/2023     02/27/2023     No results found for: COL, UAPP, USPG, UPH, UPROT, UGLU, UKET, UBILI, URBC, UNITR, UROB, UWBC  Lab Results   Component Value Date    AST 51 02/27/2023    GPT 46 (H) 02/27/2023    ALKPT 275 (H) 02/27/2023    BILIRUBIN 0.4 02/27/2023        IMAGING STUDIES:    XR ABDOMEN 2 VIEWS   Final Result      Nonspecific bowel gas pattern.  Air-fluid levels within bowel loops, nonspecific.  Consider developing obstruction secondary to known intussusception.      Soft tissue mass in the region of the right upper quadrant, corresponding to known intussusception.      This document is electronically signed by Ashish Noland ([facility] pediatric radiologist), on 02/27/2023 06:35 AM CST.          US INTUSSUSCEPTION   Final Result      Exam positive for ileocolic intussusception in the right upper quadrant.      Findings discussed with Dr. Michelle at 6:35 AM eastern time, 2/27/2023.      This document is electronically signed by Ashish Noland ([facility] pediatric  radiologist), on 02/27/2023 05:39 AM CST.          FL COLON INTUSSUSCEPTION    (Results Pending)         Assessment:  Patient is a 13 month of male transferred here from [facility] for intussusception. Afebrile and hemodynamically stable. Ultrasound shows ileocolic intussusception in right upper quadrant. Reduced by radiology under fluoroscopic guidance    Plan:  -No acute surgical intervention needed at this time  -Observe patient for 4 hours  -Rest per primary    Karsten Mathur  PGY 2, Surgery    [facility] Pediatric General Surgery  Please contact at [phone]

## 2023-03-23 ENCOUNTER — TELEPHONE (OUTPATIENT)
Dept: FAMILY MEDICINE CLINIC | Age: 63
End: 2023-03-23

## 2023-03-23 NOTE — TELEPHONE ENCOUNTER
----- Message from Cat Mccollum sent at 3/23/2023  9:45 AM EDT -----  Subject: Appointment Request    Reason for Call: Established Patient Appointment needed: Urgent (Patient   Request) No Script    QUESTIONS    Reason for appointment request? Available appointments did not meet   patient need     Additional Information for Provider?  PT had VV 3/22 no one ever contacted   he, she is out of medication needs a VV, office states they had VV todays   but Im not showing and no answer, please return call   ---------------------------------------------------------------------------  --------------  [id number] [employer]  [phone]; OK to leave message on voicemail  ---------------------------------------------------------------------------  --------------  SCRIPT ANSWERS  ALVARO Screen: Usman Moon

## 2023-03-27 ENCOUNTER — TELEMEDICINE (OUTPATIENT)
Dept: FAMILY MEDICINE CLINIC | Age: 63
End: 2023-03-27
Payer: MEDICARE

## 2023-03-27 DIAGNOSIS — R39.9 URINARY TRACT INFECTION SYMPTOMS: Primary | ICD-10-CM

## 2023-03-27 PROCEDURE — 99213 OFFICE O/P EST LOW 20 MIN: CPT | Performed by: STUDENT IN AN ORGANIZED HEALTH CARE EDUCATION/TRAINING PROGRAM

## 2023-03-27 PROCEDURE — G8428 CUR MEDS NOT DOCUMENT: HCPCS | Performed by: STUDENT IN AN ORGANIZED HEALTH CARE EDUCATION/TRAINING PROGRAM

## 2023-03-27 PROCEDURE — 3017F COLORECTAL CA SCREEN DOC REV: CPT | Performed by: STUDENT IN AN ORGANIZED HEALTH CARE EDUCATION/TRAINING PROGRAM

## 2023-03-27 SDOH — ECONOMIC STABILITY: FOOD INSECURITY: WITHIN THE PAST 12 MONTHS, THE FOOD YOU BOUGHT JUST DIDN'T LAST AND YOU DIDN'T HAVE MONEY TO GET MORE.: NEVER TRUE

## 2023-03-27 SDOH — ECONOMIC STABILITY: HOUSING INSECURITY
IN THE LAST 12 MONTHS, WAS THERE A TIME WHEN YOU DID NOT HAVE A STEADY PLACE TO SLEEP OR SLEPT IN A SHELTER (INCLUDING NOW)?: NO

## 2023-03-27 SDOH — ECONOMIC STABILITY: TRANSPORTATION INSECURITY
IN THE PAST 12 MONTHS, HAS LACK OF TRANSPORTATION KEPT YOU FROM MEETINGS, WORK, OR FROM GETTING THINGS NEEDED FOR DAILY LIVING?: YES

## 2023-03-27 SDOH — ECONOMIC STABILITY: FOOD INSECURITY: WITHIN THE PAST 12 MONTHS, YOU WORRIED THAT YOUR FOOD WOULD RUN OUT BEFORE YOU GOT MONEY TO BUY MORE.: NEVER TRUE

## 2023-03-27 SDOH — ECONOMIC STABILITY: INCOME INSECURITY: HOW HARD IS IT FOR YOU TO PAY FOR THE VERY BASICS LIKE FOOD, HOUSING, MEDICAL CARE, AND HEATING?: SOMEWHAT HARD

## 2023-03-27 ASSESSMENT — ENCOUNTER SYMPTOMS
COUGH: 0
VOMITING: 0
NAUSEA: 0
DIARRHEA: 0
CONSTIPATION: 0
BACK PAIN: 1
SHORTNESS OF BREATH: 0
ABDOMINAL PAIN: 0

## 2023-03-27 NOTE — PROGRESS NOTES
Health Maintenance Due   Topic Date Due    COVID-19 Vaccine (1) Never done    Depression Monitoring  Never done    DTaP/Tdap/Td vaccine (1 - Tdap) Never done    Colorectal Cancer Screen  Never done    Annual Wellness Visit (AWV)  Never done    Shingles vaccine (2 of 2) 11/03/2021    Lipids  07/15/2022    Flu vaccine (1) 08/01/2022    Pneumococcal 0-64 years Vaccine (2 - PCV) 09/17/2022
audio-video encounter. The patient (or guardian if applicable) is aware that this is a billable service, which includes applicable co-pays. This Virtual Visit was conducted with patient's (and/or legal guardian's) consent. The visit was conducted pursuant to the emergency declaration under the [address] [place] [address] authority and the [employer] General Act. Patient identification was verified, and a caregiver was present when appropriate. The patient was located at Home: [address] [place] [address]  [address]   Provider was located at [facility] ([place] [address]): [address] [place], [place] [address] [address],  [address]         --Neha Gan MD

## 2023-03-28 ENCOUNTER — TELEPHONE (OUTPATIENT)
Dept: RHEUMATOLOGY | Age: 63
End: 2023-03-28

## 2023-03-28 ENCOUNTER — OFFICE VISIT (OUTPATIENT)
Dept: FAMILY MEDICINE CLINIC | Age: 63
End: 2023-03-28
Payer: MEDICARE

## 2023-03-28 ENCOUNTER — HOSPITAL ENCOUNTER (EMERGENCY)
Age: 63
Discharge: HOME | End: 2023-03-28
Attending: EMERGENCY MEDICINE
Payer: MEDICARE

## 2023-03-28 ENCOUNTER — APPOINTMENT (OUTPATIENT)
Dept: CT IMAGING | Age: 63
End: 2023-03-28
Payer: MEDICARE

## 2023-03-28 ENCOUNTER — PATIENT MESSAGE (OUTPATIENT)
Dept: FAMILY MEDICINE CLINIC | Age: 63
End: 2023-03-28

## 2023-03-28 VITALS
TEMPERATURE: 97.3 F | SYSTOLIC BLOOD PRESSURE: 110 MMHG | WEIGHT: 166.4 LBS | HEIGHT: 65 IN | DIASTOLIC BLOOD PRESSURE: 80 MMHG | OXYGEN SATURATION: 98 % | HEART RATE: 98 BPM | RESPIRATION RATE: 18 BRPM | BODY MASS INDEX: 27.72 KG/M2

## 2023-03-28 VITALS
TEMPERATURE: 98.3 F | SYSTOLIC BLOOD PRESSURE: 127 MMHG | DIASTOLIC BLOOD PRESSURE: 78 MMHG | HEIGHT: 65 IN | RESPIRATION RATE: 18 BRPM | HEART RATE: 61 BPM | BODY MASS INDEX: 27.66 KG/M2 | WEIGHT: 166 LBS | OXYGEN SATURATION: 95 %

## 2023-03-28 DIAGNOSIS — Z13.31 SCREENING FOR DEPRESSION: ICD-10-CM

## 2023-03-28 DIAGNOSIS — I87.1 MAY-THURNER SYNDROME: ICD-10-CM

## 2023-03-28 DIAGNOSIS — G43.909 MIGRAINE WITHOUT STATUS MIGRAINOSUS, NOT INTRACTABLE, UNSPECIFIED MIGRAINE TYPE: ICD-10-CM

## 2023-03-28 DIAGNOSIS — E04.1 THYROID NODULE: ICD-10-CM

## 2023-03-28 DIAGNOSIS — R39.9 URINARY TRACT INFECTION SYMPTOMS: ICD-10-CM

## 2023-03-28 DIAGNOSIS — G43.409 HEMIPLEGIC MIGRAINE WITHOUT STATUS MIGRAINOSUS, NOT INTRACTABLE: ICD-10-CM

## 2023-03-28 DIAGNOSIS — R07.89 ATYPICAL CHEST PAIN: Primary | ICD-10-CM

## 2023-03-28 DIAGNOSIS — G89.4 CHRONIC PAIN SYNDROME: ICD-10-CM

## 2023-03-28 DIAGNOSIS — R06.02 SHORTNESS OF BREATH: Primary | ICD-10-CM

## 2023-03-28 DIAGNOSIS — N30.01 ACUTE CYSTITIS WITH HEMATURIA: Primary | ICD-10-CM

## 2023-03-28 DIAGNOSIS — M54.9 ACUTE MIDLINE BACK PAIN, UNSPECIFIED BACK LOCATION: ICD-10-CM

## 2023-03-28 DIAGNOSIS — M35.1 MIXED CONNECTIVE TISSUE DISEASE (HCC): ICD-10-CM

## 2023-03-28 PROBLEM — L57.0 ACTINIC KERATOSIS: Status: ACTIVE | Noted: 2020-11-09

## 2023-03-28 LAB
ALBUMIN SERPL BCG-MCNC: 4 G/DL (ref 3.5–5.1)
ALP SERPL-CCNC: 74 U/L (ref 38–126)
ALT SERPL W/O P-5'-P-CCNC: 12 U/L (ref 11–66)
ANION GAP SERPL CALC-SCNC: 10 MEQ/L (ref 8–16)
AST SERPL-CCNC: 13 U/L (ref 5–40)
BASOPHILS ABSOLUTE: 0 THOU/MM3 (ref 0–0.1)
BASOPHILS NFR BLD AUTO: 0.6 %
BILIRUB SERPL-MCNC: 0.2 MG/DL (ref 0.3–1.2)
BILIRUBIN URINE: NEGATIVE
BLOOD URINE, POC: ABNORMAL
BUN SERPL-MCNC: 23 MG/DL (ref 7–22)
CALCIUM SERPL-MCNC: 9.1 MG/DL (ref 8.5–10.5)
CHARACTER, URINE: ABNORMAL
CHLORIDE SERPL-SCNC: 106 MEQ/L (ref 98–111)
CO2 SERPL-SCNC: 24 MEQ/L (ref 23–33)
COLOR, URINE: YELLOW
CREAT SERPL-MCNC: 0.7 MG/DL (ref 0.4–1.2)
DEPRECATED RDW RBC AUTO: 40.5 FL (ref 35–45)
EKG ATRIAL RATE: 74 BPM
EKG P AXIS: 41 DEGREES
EKG P-R INTERVAL: 148 MS
EKG Q-T INTERVAL: 394 MS
EKG QRS DURATION: 72 MS
EKG QTC CALCULATION (BAZETT): 437 MS
EKG R AXIS: 5 DEGREES
EKG T AXIS: 55 DEGREES
EKG VENTRICULAR RATE: 74 BPM
EOSINOPHIL NFR BLD AUTO: 1.9 %
EOSINOPHILS ABSOLUTE: 0.1 THOU/MM3 (ref 0–0.4)
ERYTHROCYTE [DISTWIDTH] IN BLOOD BY AUTOMATED COUNT: 12.6 % (ref 11.5–14.5)
GFR SERPL CREATININE-BSD FRML MDRD: > 60 ML/MIN/1.73M2
GLUCOSE SERPL-MCNC: 96 MG/DL (ref 70–108)
GLUCOSE URINE: NEGATIVE MG/DL
HCT VFR BLD AUTO: 38.8 % (ref 37–47)
HGB BLD-MCNC: 12.7 GM/DL (ref 12–16)
IMM GRANULOCYTES # BLD AUTO: 0.01 THOU/MM3 (ref 0–0.07)
IMM GRANULOCYTES NFR BLD AUTO: 0.2 %
KETONES, URINE: NEGATIVE
LEUKOCYTE CLUMPS, URINE: ABNORMAL
LIPASE SERPL-CCNC: 15.4 U/L (ref 5.6–51.3)
LYMPHOCYTES ABSOLUTE: 1.9 THOU/MM3 (ref 1–4.8)
LYMPHOCYTES NFR BLD AUTO: 35.5 %
MCH RBC QN AUTO: 28.9 PG (ref 26–33)
MCHC RBC AUTO-ENTMCNC: 32.7 GM/DL (ref 32.2–35.5)
MCV RBC AUTO: 88.4 FL (ref 81–99)
MONOCYTES ABSOLUTE: 0.3 THOU/MM3 (ref 0.4–1.3)
MONOCYTES NFR BLD AUTO: 5.4 %
NEUTROPHILS NFR BLD AUTO: 56.4 %
NITRITE, URINE: NEGATIVE
NRBC BLD AUTO-RTO: 0 /100 WBC
NT-PROBNP SERPL IA-MCNC: 202.6 PG/ML (ref 0–124)
OSMOLALITY SERPL CALC.SUM OF ELEC: 282.9 MOSMOL/KG (ref 275–300)
PH, URINE: 7 (ref 5–9)
PLATELET # BLD AUTO: 270 THOU/MM3 (ref 130–400)
PMV BLD AUTO: 10.6 FL (ref 9.4–12.4)
POTASSIUM SERPL-SCNC: 3.7 MEQ/L (ref 3.5–5.2)
PROT SERPL-MCNC: 6.6 G/DL (ref 6.1–8)
PROTEIN, URINE: NEGATIVE MG/DL
RBC # BLD AUTO: 4.39 MILL/MM3 (ref 4.2–5.4)
SEGMENTED NEUTROPHILS ABSOLUTE COUNT: 3 THOU/MM3 (ref 1.8–7.7)
SODIUM SERPL-SCNC: 140 MEQ/L (ref 135–145)
SPECIFIC GRAVITY, URINE: 1.01 (ref 1–1.03)
TROPONIN T: < 0.01 NG/ML
UROBILINOGEN, URINE: 0.2 EU/DL (ref 0–1)
WBC # BLD AUTO: 5.4 THOU/MM3 (ref 4.8–10.8)

## 2023-03-28 PROCEDURE — G8484 FLU IMMUNIZE NO ADMIN: HCPCS | Performed by: STUDENT IN AN ORGANIZED HEALTH CARE EDUCATION/TRAINING PROGRAM

## 2023-03-28 PROCEDURE — 81003 URINALYSIS AUTO W/O SCOPE: CPT | Performed by: STUDENT IN AN ORGANIZED HEALTH CARE EDUCATION/TRAINING PROGRAM

## 2023-03-28 PROCEDURE — 83880 ASSAY OF NATRIURETIC PEPTIDE: CPT

## 2023-03-28 PROCEDURE — G0444 DEPRESSION SCREEN ANNUAL: HCPCS | Performed by: STUDENT IN AN ORGANIZED HEALTH CARE EDUCATION/TRAINING PROGRAM

## 2023-03-28 PROCEDURE — 6360000004 HC RX CONTRAST MEDICATION: Performed by: EMERGENCY MEDICINE

## 2023-03-28 PROCEDURE — 3017F COLORECTAL CA SCREEN DOC REV: CPT | Performed by: STUDENT IN AN ORGANIZED HEALTH CARE EDUCATION/TRAINING PROGRAM

## 2023-03-28 PROCEDURE — G8419 CALC BMI OUT NRM PARAM NOF/U: HCPCS | Performed by: STUDENT IN AN ORGANIZED HEALTH CARE EDUCATION/TRAINING PROGRAM

## 2023-03-28 PROCEDURE — 99285 EMERGENCY DEPT VISIT HI MDM: CPT

## 2023-03-28 PROCEDURE — 4004F PT TOBACCO SCREEN RCVD TLK: CPT | Performed by: STUDENT IN AN ORGANIZED HEALTH CARE EDUCATION/TRAINING PROGRAM

## 2023-03-28 PROCEDURE — 6360000002 HC RX W HCPCS: Performed by: EMERGENCY MEDICINE

## 2023-03-28 PROCEDURE — G8427 DOCREV CUR MEDS BY ELIG CLIN: HCPCS | Performed by: STUDENT IN AN ORGANIZED HEALTH CARE EDUCATION/TRAINING PROGRAM

## 2023-03-28 PROCEDURE — 71275 CT ANGIOGRAPHY CHEST: CPT

## 2023-03-28 PROCEDURE — 96375 TX/PRO/DX INJ NEW DRUG ADDON: CPT

## 2023-03-28 PROCEDURE — 80053 COMPREHEN METABOLIC PANEL: CPT

## 2023-03-28 PROCEDURE — 96374 THER/PROPH/DIAG INJ IV PUSH: CPT

## 2023-03-28 PROCEDURE — 93005 ELECTROCARDIOGRAM TRACING: CPT | Performed by: EMERGENCY MEDICINE

## 2023-03-28 PROCEDURE — 99214 OFFICE O/P EST MOD 30 MIN: CPT | Performed by: STUDENT IN AN ORGANIZED HEALTH CARE EDUCATION/TRAINING PROGRAM

## 2023-03-28 PROCEDURE — 83690 ASSAY OF LIPASE: CPT

## 2023-03-28 PROCEDURE — 85025 COMPLETE CBC W/AUTO DIFF WBC: CPT

## 2023-03-28 PROCEDURE — 36415 COLL VENOUS BLD VENIPUNCTURE: CPT

## 2023-03-28 PROCEDURE — 84484 ASSAY OF TROPONIN QUANT: CPT

## 2023-03-28 PROCEDURE — 96376 TX/PRO/DX INJ SAME DRUG ADON: CPT

## 2023-03-28 PROCEDURE — 93010 ELECTROCARDIOGRAM REPORT: CPT | Performed by: INTERNAL MEDICINE

## 2023-03-28 RX ORDER — MORPHINE SULFATE 4 MG/ML
4 INJECTION, SOLUTION INTRAMUSCULAR; INTRAVENOUS ONCE
Status: COMPLETED | OUTPATIENT
Start: 2023-03-28 | End: 2023-03-28

## 2023-03-28 RX ORDER — ONDANSETRON 2 MG/ML
4 INJECTION INTRAMUSCULAR; INTRAVENOUS ONCE
Status: COMPLETED | OUTPATIENT
Start: 2023-03-28 | End: 2023-03-28

## 2023-03-28 RX ORDER — BUPRENORPHINE 15 UG/H
1 PATCH TRANSDERMAL WEEKLY
COMMUNITY

## 2023-03-28 RX ORDER — HYDROCODONE BITARTRATE AND ACETAMINOPHEN 5; 325 MG/1; MG/1
1 TABLET ORAL EVERY 8 HOURS PRN
Qty: 6 TABLET | Refills: 0 | Status: SHIPPED | OUTPATIENT
Start: 2023-03-28 | End: 2023-03-31

## 2023-03-28 RX ORDER — DOCUSATE SODIUM 100 MG/1
100 CAPSULE, LIQUID FILLED ORAL 2 TIMES DAILY
COMMUNITY

## 2023-03-28 RX ORDER — KETOROLAC TROMETHAMINE 30 MG/ML
30 INJECTION, SOLUTION INTRAMUSCULAR; INTRAVENOUS ONCE
Status: COMPLETED | OUTPATIENT
Start: 2023-03-28 | End: 2023-03-28

## 2023-03-28 RX ADMIN — KETOROLAC TROMETHAMINE 30 MG: 30 INJECTION, SOLUTION INTRAMUSCULAR at 13:47

## 2023-03-28 RX ADMIN — ONDANSETRON 4 MG: 2 INJECTION INTRAMUSCULAR; INTRAVENOUS at 13:47

## 2023-03-28 RX ADMIN — IOPAMIDOL 80 ML: 755 INJECTION, SOLUTION INTRAVENOUS at 14:01

## 2023-03-28 RX ADMIN — MORPHINE SULFATE 4 MG: 4 INJECTION, SOLUTION INTRAMUSCULAR; INTRAVENOUS at 15:53

## 2023-03-28 RX ADMIN — MORPHINE SULFATE 4 MG: 4 INJECTION, SOLUTION INTRAMUSCULAR; INTRAVENOUS at 13:47

## 2023-03-28 RX ADMIN — ONDANSETRON 4 MG: 2 INJECTION INTRAMUSCULAR; INTRAVENOUS at 15:51

## 2023-03-28 ASSESSMENT — ENCOUNTER SYMPTOMS
CHEST TIGHTNESS: 1
COUGH: 0
VOMITING: 0
CONSTIPATION: 1
ABDOMINAL PAIN: 0
SHORTNESS OF BREATH: 1
NAUSEA: 0
DIARRHEA: 0

## 2023-03-28 ASSESSMENT — PATIENT HEALTH QUESTIONNAIRE - PHQ9
4. FEELING TIRED OR HAVING LITTLE ENERGY: 2
3. TROUBLE FALLING OR STAYING ASLEEP: 2
1. LITTLE INTEREST OR PLEASURE IN DOING THINGS: 1
SUM OF ALL RESPONSES TO PHQ QUESTIONS 1-9: 7
6. FEELING BAD ABOUT YOURSELF - OR THAT YOU ARE A FAILURE OR HAVE LET YOURSELF OR YOUR FAMILY DOWN: 0
9. THOUGHTS THAT YOU WOULD BE BETTER OFF DEAD, OR OF HURTING YOURSELF: 0
10. IF YOU CHECKED OFF ANY PROBLEMS, HOW DIFFICULT HAVE THESE PROBLEMS MADE IT FOR YOU TO DO YOUR WORK, TAKE CARE OF THINGS AT HOME, OR GET ALONG WITH OTHER PEOPLE: 0
2. FEELING DOWN, DEPRESSED OR HOPELESS: 1
5. POOR APPETITE OR OVEREATING: 1
7. TROUBLE CONCENTRATING ON THINGS, SUCH AS READING THE NEWSPAPER OR WATCHING TELEVISION: 0
SUM OF ALL RESPONSES TO PHQ QUESTIONS 1-9: 7
SUM OF ALL RESPONSES TO PHQ9 QUESTIONS 1 & 2: 2
8. MOVING OR SPEAKING SO SLOWLY THAT OTHER PEOPLE COULD HAVE NOTICED. OR THE OPPOSITE, BEING SO FIGETY OR RESTLESS THAT YOU HAVE BEEN MOVING AROUND A LOT MORE THAN USUAL: 0

## 2023-03-28 ASSESSMENT — PAIN SCALES - GENERAL
PAINLEVEL_OUTOF10: 6
PAINLEVEL_OUTOF10: 8

## 2023-03-28 ASSESSMENT — PAIN DESCRIPTION - LOCATION
LOCATION: BACK
LOCATION: BACK

## 2023-03-28 ASSESSMENT — PAIN - FUNCTIONAL ASSESSMENT
PAIN_FUNCTIONAL_ASSESSMENT: 0-10

## 2023-03-28 ASSESSMENT — PAIN DESCRIPTION - ORIENTATION: ORIENTATION: MID;LEFT

## 2023-03-28 NOTE — ED NOTES
Discharge instructions and new medications discussed and explained with Pt. Pt. Verbalized understanding and has no further questions or needs at this time.         Lillian Howe RN  03/28/23 [id number]

## 2023-03-28 NOTE — PROGRESS NOTES
S: 65 y.o. female with No chief complaint on file. Chief complaint, Grayling, and all pertinent details of the case reviewed with the resident. Please see resident's note for specific details discussed at today's visit. BP Readings from Last 3 Encounters:   03/28/23 110/80   10/15/21 120/86   10/11/21 102/64     Wt Readings from Last 3 Encounters:   03/28/23 166 lb 6.4 oz (75.5 kg)   10/15/21 133 lb (60.3 kg)   10/11/21 135 lb 8 oz (61.5 kg)       O: VS:  height is 5' 5\" (1.651 m) and weight is 166 lb 6.4 oz (75.5 kg). Her skin temperature is 97.3 °F (36.3 °C). Her blood pressure is 110/80 and her pulse is 98. Her respiration is 18 and oxygen saturation is 98%. AAO, appropriate affect for mood,  appears somewhat short of breath with talking  Left leg: swollen (chronic) but nontender    A: concerned about blood clot/PE possibility   Diagnosis Orders   1. May-Thurner syndrome            Plan:  Take to Er right now by Joseph Streeter who will give report in person and discuss  Referral back to  and specialist for the future  Check Ua because of recurrent UTI    Health Maintenance Due   Topic Date Due    COVID-19 Vaccine (1) Never done    Depression Monitoring  Never done    DTaP/Tdap/Td vaccine (1 - Tdap) Never done    Colorectal Cancer Screen  Never done    Annual Wellness Visit (AWV)  Never done    Shingles vaccine (2 of 2) 11/03/2021    Lipids  07/15/2022    Flu vaccine (1) 08/01/2022    Pneumococcal 0-64 years Vaccine (2 - PCV) 09/17/2022       Attending Physician Statement  I have discussed the case, including pertinent history and exam findings with the resident. I also have seen the patient and performed key portions of the examination. I agree with the documented assessment and plan as documented by the resident.         Kianna Dickson MD 3/28/2023 12:31 PM

## 2023-03-28 NOTE — PROGRESS NOTES
[address] [facility] [address] [facility] [address]  Dept: [phone]  Loc: [phone]     Allison Kelly is a 65 y.o. female who presents today for:  No chief complaint on file. Goals    None         HPI:     HPI  81-year-old female with multiple chronic morbidities here today for follow-up of virtual visit yesterday. Patient would like to discuss shortness of breath, referrals and pain. Patient recently moved back to [place] from [place]  She was not getting transportation, cleaning  She is broke now after buying a camper. Has no car. She is using electric heat. Water heater is not working so she can't shower, which is making her pain worse. Shortness of breath: started about 3 days ago with acute onset dyspnea, worse with exertion, but now having conversational dyspnea. She is also reporting inspirational chest pain and posterior chest pressure. She feels chest tightness. Has history of May-Thurner Syndrome. Denies previous PE or DVT. She is not on DOAC. Was recently in car from [place] to [place] 3/9. She has been living in a mobile home and not been very active. She is having urinary symptoms - dysuria, increased frequency and urgency. Patient reports recurrence of UTI x last 6 months on frequent antibiotics. Did have growth of Morganella per review of records. States she has sores right now due to inability to shower well with no hot water or wipe due to pain. She is also having bilateral flank pain. Denies any fevers or chills. Right shoulder - no injury. Did have prior acromioplasty. Having issues trying to get up. Having trouble trying to get up from a seated position. Has bilateral hip pain.    Does have prescription for Butrans - was on it for a few weeks (Dr. Rod Pepper MD - [phone] prescribed it)    Magda-Danchicos new diagnosis since moving to [place] - was told she should not take muscle relaxants, but

## 2023-03-28 NOTE — ED NOTES
Pt. Resting in bed with even and unlabored respirations. Pt. States pain is a 8/10 at this time. Pt. Updated about plan for CTA. Dr. Isrrael Sim at bedside. Pt. Has no further concerns, questions or needs at this time. Call light within reach.         Marlo Germain RN  03/28/23 7503

## 2023-03-28 NOTE — PROGRESS NOTES
Health Maintenance Due   Topic Date Due    COVID-19 Vaccine (1) Never done    Depression Monitoring  Never done    DTaP/Tdap/Td vaccine (1 - Tdap) Never done    Colorectal Cancer Screen  Never done    Annual Wellness Visit (AWV)  Never done    Shingles vaccine (2 of 2) 11/03/2021    Lipids  07/15/2022    Flu vaccine (1) 08/01/2022    Pneumococcal 0-64 years Vaccine (2 - PCV) 09/17/2022

## 2023-03-28 NOTE — ED PROVIDER NOTES
Davina Rosario [address] [place] [address] [address] [address] EMERGENCY DEPT  [address] [facility] [address]  Phone: [phone]      CHIEF COMPLAINT       Chief Complaint   Patient presents with    Shortness of Breath       Nurses Notes reviewed and I agree except as noted in the HPI. HISTORY OF PRESENT ILLNESS    Yeni Diaz is a 65 y.o. female. Patient presents through triage for pain that is in the posterior chest.  It hurts to take a deep breath, it makes her have what she describes as shortness of breath. The patient reports that she just concluded a long ride on the road from [place] back here. She has a history of May Thurner syndrome -has had previous vascular intervention with Dr. Geovanna Goldman. This is also a DVT risk factor, in addition to the long drive. Also has a history of Magda-Danlos apparently    The patient reports some pain with movement she does have some degenerative disease of her thoracic lumbar spine. He does not have any numbness or weakness in the upper or lower extremities. No recent falls or injuries or syncope      HEART score = 3      REVIEW OF SYSTEMS         No fever     Remainder of review of systems is otherwise reviewed as negative. PAST MEDICAL HISTORY    has a past medical history of Adrenal abnormality ([address] Angina at rest [facility]), Anxiety, Arthritis, Asthma, Cerebral artery occlusion with cerebral infarction [facility]), Chronic sinusitis, Cognitive impairment, Headache, History of degenerative disc disease, Hyperlipidemia, Lymphedema, May-Thurner syndrome, Peripheral vascular disease ([address] PONV (postoperative nausea and vomiting), Positive BJ (antinuclear antibody), and PTSD (post-traumatic stress disorder). SURGICAL HISTORY      has a past surgical history that includes Tubal ligation; shoulder surgery; Tonsillectomy; sinus surgery; shoulder surgery (Left); Neck surgery (12/2020); Septoplasty (Right, 6/14/2021); Pain management procedure (Bilateral, 6/29/2021);  Pain management procedure (Bilateral,

## 2023-03-28 NOTE — ED NOTES
Pt arrives to the ED for chest pain and upper back pain that has been occurring for the last week. Pt states she was in [place] and moved to [facility] in a camper in early march. Pt states she has not been active and is concerned for blood clots. Pt states she has had sob and rates her pain a 8/10. Respirations are unlabored.  DAVID Stoddard RN  03/28/23 3655

## 2023-03-29 NOTE — TELEPHONE ENCOUNTER
From: Estephania Laguna  To: Isabella Zuleta MD  Sent: 3/28/2023 6:04 PM EDT  Subject: Antibiotic for UTI and Diflucan    Hi Dr Shima Curtis, thank you for all of your help. I thought we had discussed calling in an antibiotic for the UTI. I went to  pain medication and there was no antibiotic. If you decide to call one in , please call in Diflucan as well because I always get a yeast infection, which I think I may have one already.      I see I have more thyroid nodules which I only had the one before so I definitely need to see my ENT handy but can't remember his name

## 2023-03-30 RX ORDER — FLUCONAZOLE 150 MG/1
150 TABLET ORAL DAILY
Qty: 3 TABLET | Refills: 0 | Status: SHIPPED | OUTPATIENT
Start: 2023-03-30 | End: 2023-04-02

## 2023-03-30 RX ORDER — NALOXONE HYDROCHLORIDE 4 MG/.1ML
1 SPRAY NASAL PRN
Qty: 1 EACH | Refills: 1 | Status: SHIPPED | OUTPATIENT
Start: 2023-03-30

## 2023-03-30 RX ORDER — BUPRENORPHINE 15 UG/H
1 PATCH TRANSDERMAL WEEKLY
Qty: 4 PATCH | Refills: 0 | Status: SHIPPED | OUTPATIENT
Start: 2023-03-30 | End: 2023-04-29

## 2023-03-30 RX ORDER — CIPROFLOXACIN 500 MG/1
500 TABLET, FILM COATED ORAL 2 TIMES DAILY
Qty: 14 TABLET | Refills: 0 | Status: SHIPPED | OUTPATIENT
Start: 2023-03-30 | End: 2023-04-06

## 2023-03-30 NOTE — TELEPHONE ENCOUNTER
Discussed with Dr. Mike Thomas and Dr. Jessica Marrero. Reviewed pharmacy dispense history. Since patient is unable to see pain management until May 10, 2023 - will fill Butrans patch x 4 with no refills. Narcan prescription also sent to pharmacy. Patient has upcoming appt with me 4/17/23.

## 2023-04-03 ENCOUNTER — OFFICE VISIT (OUTPATIENT)
Dept: FAMILY MEDICINE CLINIC | Age: 63
End: 2023-04-03
Payer: MEDICARE

## 2023-04-03 ENCOUNTER — OFFICE VISIT (OUTPATIENT)
Dept: RHEUMATOLOGY | Age: 63
End: 2023-04-03
Payer: MEDICARE

## 2023-04-03 ENCOUNTER — NURSE ONLY (OUTPATIENT)
Dept: LAB | Age: 63
End: 2023-04-03

## 2023-04-03 VITALS
HEIGHT: 65 IN | HEART RATE: 71 BPM | SYSTOLIC BLOOD PRESSURE: 128 MMHG | RESPIRATION RATE: 16 BRPM | WEIGHT: 164.6 LBS | OXYGEN SATURATION: 98 % | TEMPERATURE: 98.5 F | DIASTOLIC BLOOD PRESSURE: 74 MMHG | BODY MASS INDEX: 27.42 KG/M2

## 2023-04-03 VITALS
WEIGHT: 166 LBS | HEART RATE: 86 BPM | HEIGHT: 65 IN | DIASTOLIC BLOOD PRESSURE: 80 MMHG | SYSTOLIC BLOOD PRESSURE: 122 MMHG | OXYGEN SATURATION: 98 % | BODY MASS INDEX: 27.66 KG/M2

## 2023-04-03 DIAGNOSIS — Z51.81 MEDICATION MONITORING ENCOUNTER: ICD-10-CM

## 2023-04-03 DIAGNOSIS — M54.50 CHRONIC BILATERAL LOW BACK PAIN WITHOUT SCIATICA: ICD-10-CM

## 2023-04-03 DIAGNOSIS — Z87.39 H/O MIXED CONNECTIVE TISSUE DISEASE: Primary | ICD-10-CM

## 2023-04-03 DIAGNOSIS — G89.29 CHRONIC BILATERAL LOW BACK PAIN WITHOUT SCIATICA: ICD-10-CM

## 2023-04-03 DIAGNOSIS — Z87.39 H/O MIXED CONNECTIVE TISSUE DISEASE: ICD-10-CM

## 2023-04-03 DIAGNOSIS — N39.0 RECURRENT UTI: Primary | ICD-10-CM

## 2023-04-03 DIAGNOSIS — M54.2 CERVICALGIA: ICD-10-CM

## 2023-04-03 DIAGNOSIS — G89.4 CHRONIC PAIN SYNDROME: ICD-10-CM

## 2023-04-03 PROBLEM — G43.409 HEMIPLEGIC MIGRAINE: Status: ACTIVE | Noted: 2020-06-24

## 2023-04-03 PROBLEM — E04.9 NONTOXIC GOITER, UNSPECIFIED: Status: ACTIVE | Noted: 2020-10-28

## 2023-04-03 PROBLEM — I25.119 ATHEROSCLEROTIC HEART DISEASE OF NATIVE CORONARY ARTERY WITH UNSPECIFIED ANGINA PECTORIS (HCC): Status: ACTIVE | Noted: 2023-04-03

## 2023-04-03 PROBLEM — F31.9 BIPOLAR I DISORDER (HCC): Status: ACTIVE | Noted: 2020-09-16

## 2023-04-03 PROBLEM — E78.00 HYPERCHOLESTEROLEMIA: Status: ACTIVE | Noted: 2023-04-03

## 2023-04-03 PROBLEM — J32.9 CHRONIC SINUSITIS: Status: ACTIVE | Noted: 2021-02-23

## 2023-04-03 PROBLEM — Q79.60 EHLERS-DANLOS SYNDROME: Status: ACTIVE | Noted: 2023-04-03

## 2023-04-03 PROBLEM — R41.89 COGNITIVE IMPAIRMENT: Status: ACTIVE | Noted: 2020-06-24

## 2023-04-03 PROBLEM — R41.2 RETROGRADE AMNESIA: Status: ACTIVE | Noted: 2023-04-03

## 2023-04-03 PROBLEM — I95.1 DYSAUTONOMIA ORTHOSTATIC HYPOTENSION SYNDROME: Status: ACTIVE | Noted: 2023-04-03

## 2023-04-03 PROBLEM — I20.9 ANGINA PECTORIS, UNSPECIFIED (HCC): Status: ACTIVE | Noted: 2023-04-03

## 2023-04-03 PROBLEM — F43.10 PTSD (POST-TRAUMATIC STRESS DISORDER): Status: ACTIVE | Noted: 2023-04-03

## 2023-04-03 PROBLEM — R41.3 AMNESIA: Status: ACTIVE | Noted: 2021-04-07

## 2023-04-03 PROBLEM — G20 PARKINSON'S DISEASE (HCC): Status: ACTIVE | Noted: 2023-04-03

## 2023-04-03 PROBLEM — I89.0 LYMPHEDEMA: Status: ACTIVE | Noted: 2023-04-03

## 2023-04-03 PROBLEM — G20.A1 PARKINSON'S DISEASE: Status: ACTIVE | Noted: 2023-04-03

## 2023-04-03 LAB
ALBUMIN SERPL BCG-MCNC: 4.5 G/DL (ref 3.5–5.1)
ALP SERPL-CCNC: 86 U/L (ref 38–126)
ALT SERPL W/O P-5'-P-CCNC: 11 U/L (ref 11–66)
ANION GAP SERPL CALC-SCNC: 12 MEQ/L (ref 8–16)
AST SERPL-CCNC: 11 U/L (ref 5–40)
BASOPHILS ABSOLUTE: 0.1 THOU/MM3 (ref 0–0.1)
BASOPHILS NFR BLD AUTO: 0.9 %
BILIRUB SERPL-MCNC: 0.2 MG/DL (ref 0.3–1.2)
BILIRUB UR QL STRIP: NEGATIVE
BUN SERPL-MCNC: 17 MG/DL (ref 7–22)
CALCIUM SERPL-MCNC: 9.2 MG/DL (ref 8.5–10.5)
CHARACTER UR: CLEAR
CHLORIDE SERPL-SCNC: 107 MEQ/L (ref 98–111)
CO2 SERPL-SCNC: 23 MEQ/L (ref 23–33)
COLOR UR: YELLOW
CREAT SERPL-MCNC: 0.7 MG/DL (ref 0.4–1.2)
CREAT UR-MCNC: 121.9 MG/DL
CRP SERPL-MCNC: < 0.3 MG/DL (ref 0–1)
DEPRECATED RDW RBC AUTO: 43.3 FL (ref 35–45)
EOSINOPHIL NFR BLD AUTO: 1.9 %
EOSINOPHILS ABSOLUTE: 0.1 THOU/MM3 (ref 0–0.4)
ERYTHROCYTE [DISTWIDTH] IN BLOOD BY AUTOMATED COUNT: 13.2 % (ref 11.5–14.5)
ERYTHROCYTE [SEDIMENTATION RATE] IN BLOOD BY WESTERGREN METHOD: 14 MM/HR (ref 0–20)
GFR SERPL CREATININE-BSD FRML MDRD: > 60 ML/MIN/1.73M2
GLUCOSE SERPL-MCNC: 93 MG/DL (ref 70–108)
GLUCOSE UR QL STRIP.AUTO: NEGATIVE MG/DL
HCT VFR BLD AUTO: 39.3 % (ref 37–47)
HGB BLD-MCNC: 12.5 GM/DL (ref 12–16)
HGB UR QL STRIP.AUTO: NEGATIVE
IMM GRANULOCYTES # BLD AUTO: 0.02 THOU/MM3 (ref 0–0.07)
IMM GRANULOCYTES NFR BLD AUTO: 0.3 %
KETONES UR QL STRIP.AUTO: NEGATIVE
LEUKOCYTE ESTERASE UR QL STRIP.AUTO: NEGATIVE
LYMPHOCYTES ABSOLUTE: 2.6 THOU/MM3 (ref 1–4.8)
LYMPHOCYTES NFR BLD AUTO: 37.5 %
MCH RBC QN AUTO: 29.1 PG (ref 26–33)
MCHC RBC AUTO-ENTMCNC: 31.8 GM/DL (ref 32.2–35.5)
MCV RBC AUTO: 91.4 FL (ref 81–99)
MONOCYTES ABSOLUTE: 0.3 THOU/MM3 (ref 0.4–1.3)
MONOCYTES NFR BLD AUTO: 4.7 %
NEUTROPHILS NFR BLD AUTO: 54.7 %
NITRITE UR QL STRIP.AUTO: NEGATIVE
NRBC BLD AUTO-RTO: 0 /100 WBC
PH UR STRIP.AUTO: 5.5 [PH] (ref 5–9)
PLATELET # BLD AUTO: 339 THOU/MM3 (ref 130–400)
PMV BLD AUTO: 11.2 FL (ref 9.4–12.4)
POTASSIUM SERPL-SCNC: 3.4 MEQ/L (ref 3.5–5.2)
PROT SERPL-MCNC: 6.9 G/DL (ref 6.1–8)
PROT UR STRIP.AUTO-MCNC: NEGATIVE MG/DL
PROT UR-MCNC: 13.3 MG/DL
PROT/CREAT 24H UR: 0.11 MG/G{CREAT}
RBC # BLD AUTO: 4.3 MILL/MM3 (ref 4.2–5.4)
SEGMENTED NEUTROPHILS ABSOLUTE COUNT: 3.7 THOU/MM3 (ref 1.8–7.7)
SODIUM SERPL-SCNC: 142 MEQ/L (ref 135–145)
SP GR UR REFRACT.AUTO: 1.02 (ref 1–1.03)
UROBILINOGEN UR QL STRIP.AUTO: 0.2 EU/DL (ref 0–1)
WBC # BLD AUTO: 6.8 THOU/MM3 (ref 4.8–10.8)

## 2023-04-03 PROCEDURE — G8427 DOCREV CUR MEDS BY ELIG CLIN: HCPCS | Performed by: STUDENT IN AN ORGANIZED HEALTH CARE EDUCATION/TRAINING PROGRAM

## 2023-04-03 PROCEDURE — 3017F COLORECTAL CA SCREEN DOC REV: CPT | Performed by: STUDENT IN AN ORGANIZED HEALTH CARE EDUCATION/TRAINING PROGRAM

## 2023-04-03 PROCEDURE — 99214 OFFICE O/P EST MOD 30 MIN: CPT | Performed by: NURSE PRACTITIONER

## 2023-04-03 PROCEDURE — G8419 CALC BMI OUT NRM PARAM NOF/U: HCPCS | Performed by: STUDENT IN AN ORGANIZED HEALTH CARE EDUCATION/TRAINING PROGRAM

## 2023-04-03 PROCEDURE — G8419 CALC BMI OUT NRM PARAM NOF/U: HCPCS | Performed by: NURSE PRACTITIONER

## 2023-04-03 PROCEDURE — 3017F COLORECTAL CA SCREEN DOC REV: CPT | Performed by: NURSE PRACTITIONER

## 2023-04-03 PROCEDURE — G8427 DOCREV CUR MEDS BY ELIG CLIN: HCPCS | Performed by: NURSE PRACTITIONER

## 2023-04-03 PROCEDURE — 4004F PT TOBACCO SCREEN RCVD TLK: CPT | Performed by: NURSE PRACTITIONER

## 2023-04-03 PROCEDURE — 4004F PT TOBACCO SCREEN RCVD TLK: CPT | Performed by: STUDENT IN AN ORGANIZED HEALTH CARE EDUCATION/TRAINING PROGRAM

## 2023-04-03 PROCEDURE — 99214 OFFICE O/P EST MOD 30 MIN: CPT | Performed by: STUDENT IN AN ORGANIZED HEALTH CARE EDUCATION/TRAINING PROGRAM

## 2023-04-03 RX ORDER — OXYCODONE HYDROCHLORIDE AND ACETAMINOPHEN 5; 325 MG/1; MG/1
1 TABLET ORAL EVERY 6 HOURS PRN
Qty: 12 TABLET | Refills: 0 | Status: SHIPPED | OUTPATIENT
Start: 2023-04-03 | End: 2023-04-06 | Stop reason: SDUPTHER

## 2023-04-03 ASSESSMENT — ENCOUNTER SYMPTOMS
CONSTIPATION: 0
COUGH: 0
VOMITING: 0
SHORTNESS OF BREATH: 1
SHORTNESS OF BREATH: 1
NAUSEA: 0
ABDOMINAL PAIN: 0
EYE PAIN: 0
COUGH: 0
TROUBLE SWALLOWING: 0
NAUSEA: 1
EYE ITCHING: 0
DIARRHEA: 0
ABDOMINAL PAIN: 0
BACK PAIN: 0
DIARRHEA: 0
BACK PAIN: 1
CONSTIPATION: 0

## 2023-04-03 NOTE — PROGRESS NOTES
[address] [facility] [address] [address]  Dept: [phone]  Loc: [phone]      Please see Resident note for complete HPI. Here for follow-up of recurrent UTI. Haydee Ambrose PMH of debby danlos, recurrent UTI, hypothyroidism, and CVA. Recurrent UTI:  started on cipro last week after having positive UA. Cultures unable to be obtained. .  Still having symptoms today with CVA tenderness. Afebrile, vital signs stable. Will repeat UA today and refer to urology. Having significant pain and does not see pain management until next month establish.       ROS per Resident    Lab Results   Component Value Date    WBC 5.4 03/28/2023    HGB 12.7 03/28/2023    HCT 38.8 03/28/2023    MCV 88.4 03/28/2023     03/28/2023     Lab Results   Component Value Date     03/28/2023    K 3.7 03/28/2023     03/28/2023    CO2 24 03/28/2023    BUN 23 (H) 03/28/2023    CREATININE 0.7 03/28/2023    GLUCOSE 96 03/28/2023    CALCIUM 9.1 03/28/2023    PROT 6.6 03/28/2023    LABALBU 4.0 03/28/2023    BILITOT 0.2 (L) 03/28/2023    ALKPHOS 74 03/28/2023    AST 13 03/28/2023    ALT 12 03/28/2023    LABGLOM >60 03/28/2023    GFRAA >60 02/19/2021    AGRATIO 2.9 (H) 02/05/2021     No results found for: LABA1C  No results found for: EAG  No results found for: LABMICR, [id number]  Lab Results   Component Value Date    TSH 0.750 07/15/2021    FT3 2.56 07/15/2021    T4FREE 1.28 07/15/2021     Lab Results   Component Value Date    CHOL 210 (H) 04/15/2021    CHOL 284 (H) 09/16/2020     Lab Results   Component Value Date    TRIG 122 04/15/2021    TRIG 135 09/16/2020     Lab Results   Component Value Date    HDL 43 07/15/2021    HDL 46 04/15/2021    HDL 64 09/16/2020     Lab Results   Component Value Date    LDLCHOLESTEROL 193 (H) 09/16/2020    LDLCALC 160 07/15/2021    LDLCALC 140 04/15/2021     Lab Results   Component Value Date    VLDL NOT REPORTED
next month as a new patient to get established for chronic pain control. PDMP reviewed and appropriate. Health Maintenance Due   Topic Date Due    COVID-19 Vaccine (1) Never done    Colorectal Cancer Screen  Never done    Annual Wellness Visit (AWV)  Never done    Lipids  07/15/2022    Pneumococcal 0-64 years Vaccine (2 - PCV) 09/17/2022       Attending Physician Statement  I have discussed the case, including pertinent history and exam findings with the resident. I also have seen the patient and performed key portions of the examination. I agree with the documented assessment and plan as documented by the resident.   Geraldine Corley 89., DO 4/3/2023 4:47 PM
motion and neck supple.      Left lower leg: Edema present.      Comments: LLE edema, chronically   Skin:     General: Skin is warm and dry.      Findings: No rash.   Neurological:      General: No focal deficit present.      Mental Status: She is alert.   Psychiatric:         Mood and Affect: Mood normal.         Behavior: Behavior normal.       Assessment / Plan:     1. Recurrent UTI  - patient with recurrent UTI (at least 7 in last year) currently on Cipro with flank pain and CVA tenderness on exam. Will repeat UA with culture. Refer to urology. Will need suppressive therapy and see if there is a structural abnormality.   - Ruel Callahan MD, Urology, [place]  - Culture, Urine    2. Chronic pain syndrome  - Chronic, uncontrolled. Will do Percocet 5-325 mg QID PRN until Butrans patch is in stock. Follow up with pain management as planned. PDMP reviewed and appropriate.  - oxyCODONE-acetaminophen (PERCOCET) 5-325 MG per tablet; Take 1 tablet by mouth every 6 hours as needed for Pain for up to 3 days. Intended supply: 3 days. Take lowest dose possible to manage pain Max Daily Amount: 4 tablets  Dispense: 12 tablet; Refill: 0      Health Maintenance Due   Topic Date Due    COVID-19 Vaccine (1) Never done    Colorectal Cancer Screen  Never done    Annual Wellness Visit (AWV)  Never done    Lipids  07/15/2022    Pneumococcal 0-64 years Vaccine (2 - PCV) 09/17/2022           No follow-ups on file.      Medications Prescribed:  Orders Placed This Encounter   Medications    oxyCODONE-acetaminophen (PERCOCET) 5-325 MG per tablet     Sig: Take 1 tablet by mouth every 6 hours as needed for Pain for up to 3 days. Intended supply: 3 days. Take lowest dose possible to manage pain Max Daily Amount: 4 tablets     Dispense:  12 tablet     Refill:  0     Reduce doses taken as pain becomes manageable       Future Appointments   Date Time Provider Department Center   4/17/2023  8:00 AM Isha Gar MD [facility]

## 2023-04-03 NOTE — PROGRESS NOTES
Respiratory:  Positive for shortness of breath. Negative for cough. Cardiovascular:  Positive for leg swelling (left leg). Negative for chest pain. Gastrointestinal:  Negative for abdominal pain, constipation, diarrhea and nausea. Endocrine: Negative for cold intolerance and heat intolerance. Genitourinary:  Positive for flank pain. Negative for difficulty urinating, frequency and urgency. Musculoskeletal:  Positive for arthralgias and joint swelling. Negative for back pain. Skin:  Negative for rash. Neurological:  Positive for numbness (feet). Negative for dizziness, weakness and headaches. Psychiatric/Behavioral:  The patient is not nervous/anxious.       PAST MEDICAL HISTORY      Past Medical History:   Diagnosis Date    Adrenal abnormality ([facility]     Angina at rest [facility])     MICROVASCULAR CARDIAC DISEASE    Anxiety     Arthritis     Asthma     Cerebral artery occlusion with cerebral infarction (HCC)     Chronic sinusitis     Cognitive impairment     with retograde amnesia    Headache     Migraines Hemiplegic    History of degenerative disc disease     Hyperlipidemia     Lymphedema     Left leg    May-Thurner syndrome     Peripheral vascular disease (HCC)     PONV (postoperative nausea and vomiting)     Positive BJ (antinuclear antibody)     PTSD (post-traumatic stress disorder)        PAST SURGICAL HISTORY      Past Surgical History:   Procedure Laterality Date    BREAST SURGERY Right     Seroma removed [id number]? done in [place]    HYSTERECTOMY (CERVIX STATUS UNKNOWN)  2010    APPLE STEROTACTIC LOC BREAST BIOPSY LEFT Left     Benign , done in [facility] [id number]    APPLE STEROTACTIC LOC BREAST BIOPSY RIGHT Right     Benign , done in [facility] [id number]    NECK SURGERY  12/2020    ACDF    OVARY REMOVAL  2012    PAIN MANAGEMENT PROCEDURE Bilateral 6/29/2021    medial branch blocks at bilateral L4/L5 and L5/S1 performed by Ricky Waite DO at [facility] Bilateral 7/20/2021

## 2023-04-04 LAB
C3C SERPL-MCNC: 135 MG/DL (ref 90–180)
C4 SERPL-MCNC: 25 MG/DL (ref 10–40)

## 2023-04-05 LAB
BACTERIA UR CULT: ABNORMAL
ORGANISM: ABNORMAL

## 2023-04-06 DIAGNOSIS — R10.11 RIGHT UPPER QUADRANT ABDOMINAL PAIN: Primary | ICD-10-CM

## 2023-04-06 DIAGNOSIS — K82.8 GALLBLADDER SLUDGE: ICD-10-CM

## 2023-04-06 DIAGNOSIS — G89.4 CHRONIC PAIN SYNDROME: ICD-10-CM

## 2023-04-06 RX ORDER — OXYCODONE HYDROCHLORIDE AND ACETAMINOPHEN 5; 325 MG/1; MG/1
1 TABLET ORAL EVERY 6 HOURS PRN
Qty: 16 TABLET | Refills: 0 | Status: SHIPPED | OUTPATIENT
Start: 2023-04-06 | End: 2023-04-11

## 2023-04-06 NOTE — PROGRESS NOTES
Patient still with a lot of pain - RUQ/right posterior rib pain. Previous history of gallbladder sludge. Patient unable to afford Butrans patch until 4/12/23. Will refill Percocet for 5 days. Order for RUQ ultrasound placed.

## 2023-04-17 ENCOUNTER — OFFICE VISIT (OUTPATIENT)
Dept: FAMILY MEDICINE CLINIC | Age: 63
End: 2023-04-17

## 2023-04-17 ENCOUNTER — NURSE ONLY (OUTPATIENT)
Dept: LAB | Age: 63
End: 2023-04-17

## 2023-04-17 VITALS
OXYGEN SATURATION: 98 % | SYSTOLIC BLOOD PRESSURE: 114 MMHG | WEIGHT: 161 LBS | DIASTOLIC BLOOD PRESSURE: 78 MMHG | HEIGHT: 65 IN | BODY MASS INDEX: 26.82 KG/M2 | TEMPERATURE: 97.3 F | HEART RATE: 70 BPM | RESPIRATION RATE: 16 BRPM

## 2023-04-17 DIAGNOSIS — I69.30 HISTORY OF CVA WITH RESIDUAL DEFICIT: ICD-10-CM

## 2023-04-17 DIAGNOSIS — M25.511 CHRONIC RIGHT SHOULDER PAIN: ICD-10-CM

## 2023-04-17 DIAGNOSIS — M12.9 ARTHROPATHY, UNSPECIFIED: ICD-10-CM

## 2023-04-17 DIAGNOSIS — N76.1 CHRONIC VAGINITIS: ICD-10-CM

## 2023-04-17 DIAGNOSIS — G43.409 HEMIPLEGIC MIGRAINE WITHOUT STATUS MIGRAINOSUS, NOT INTRACTABLE: ICD-10-CM

## 2023-04-17 DIAGNOSIS — G43.909 MIGRAINE WITHOUT STATUS MIGRAINOSUS, NOT INTRACTABLE, UNSPECIFIED MIGRAINE TYPE: ICD-10-CM

## 2023-04-17 DIAGNOSIS — F33.9 EPISODE OF RECURRENT MAJOR DEPRESSIVE DISORDER, UNSPECIFIED DEPRESSION EPISODE SEVERITY (HCC): ICD-10-CM

## 2023-04-17 DIAGNOSIS — G25.81 RESTLESS LEGS: ICD-10-CM

## 2023-04-17 DIAGNOSIS — R07.81 RIB PAIN ON RIGHT SIDE: ICD-10-CM

## 2023-04-17 DIAGNOSIS — M89.9 DISORDER OF BONE, UNSPECIFIED: ICD-10-CM

## 2023-04-17 DIAGNOSIS — Z78.0 ASYMPTOMATIC MENOPAUSAL STATE: ICD-10-CM

## 2023-04-17 DIAGNOSIS — Z00.01 ENCOUNTER FOR WELL ADULT EXAM WITH ABNORMAL FINDINGS: Primary | ICD-10-CM

## 2023-04-17 DIAGNOSIS — I25.119 ATHEROSCLEROSIS OF NATIVE CORONARY ARTERY OF NATIVE HEART WITH ANGINA PECTORIS (HCC): ICD-10-CM

## 2023-04-17 DIAGNOSIS — G89.29 CHRONIC RIGHT SHOULDER PAIN: ICD-10-CM

## 2023-04-17 DIAGNOSIS — Z92.29 HISTORY OF HIGH RISK MEDICATION TREATMENT: ICD-10-CM

## 2023-04-17 DIAGNOSIS — E04.1 THYROID NODULE: ICD-10-CM

## 2023-04-17 DIAGNOSIS — Z23 NEED FOR VACCINATION AGAINST STREPTOCOCCUS PNEUMONIAE: ICD-10-CM

## 2023-04-17 DIAGNOSIS — E55.9 VITAMIN D INSUFFICIENCY: ICD-10-CM

## 2023-04-17 DIAGNOSIS — Z87.39: ICD-10-CM

## 2023-04-17 PROBLEM — G20 PARKINSON'S DISEASE (HCC): Status: RESOLVED | Noted: 2023-04-03 | Resolved: 2023-04-17

## 2023-04-17 PROBLEM — F31.9 BIPOLAR I DISORDER (HCC): Status: RESOLVED | Noted: 2020-09-16 | Resolved: 2023-04-17

## 2023-04-17 PROBLEM — G20.A1 PARKINSON'S DISEASE: Status: RESOLVED | Noted: 2023-04-03 | Resolved: 2023-04-17

## 2023-04-17 LAB
25(OH)D3 SERPL-MCNC: 29 NG/ML (ref 30–100)
CHOLESTEROL, FASTING: 261 MG/DL (ref 100–199)
FERRITIN SERPL IA-MCNC: 34 NG/ML (ref 10–291)
HDLC SERPL-MCNC: 49 MG/DL
IRON SERPL-MCNC: 74 UG/DL (ref 50–170)
LDLC SERPL CALC-MCNC: 168 MG/DL
T4 FREE SERPL-MCNC: 1.43 NG/DL (ref 0.93–1.76)
TRIGLYCERIDE, FASTING: 222 MG/DL (ref 0–199)
TSH SERPL DL<=0.005 MIU/L-ACNC: 1.44 UIU/ML (ref 0.4–4.2)

## 2023-04-17 RX ORDER — ONDANSETRON 4 MG/1
4 TABLET, FILM COATED ORAL 3 TIMES DAILY PRN
Qty: 15 TABLET | Refills: 0 | Status: SHIPPED | OUTPATIENT
Start: 2023-04-17

## 2023-04-17 RX ORDER — UBROGEPANT 100 MG/1
100 TABLET ORAL PRN
Qty: 16 TABLET | Refills: 1 | Status: SHIPPED | OUTPATIENT
Start: 2023-04-17

## 2023-04-17 RX ORDER — PRAVASTATIN SODIUM 40 MG
40 TABLET ORAL DAILY
Qty: 30 TABLET | Refills: 3 | Status: CANCELLED | OUTPATIENT
Start: 2023-04-17

## 2023-04-17 RX ORDER — ROSUVASTATIN CALCIUM 10 MG/1
10 TABLET, COATED ORAL NIGHTLY
Qty: 30 TABLET | Refills: 3 | Status: SHIPPED | OUTPATIENT
Start: 2023-04-17

## 2023-04-17 RX ORDER — ESTRADIOL 10 UG/1
10 INSERT VAGINAL DAILY
Qty: 8 TABLET | Refills: 0 | Status: SHIPPED | OUTPATIENT
Start: 2023-04-17 | End: 2023-04-19 | Stop reason: DRUGHIGH

## 2023-04-17 NOTE — PROGRESS NOTES
Immunizations Administered       Name Date Dose Route    Pneumococcal, PCV20, PREVNAR 21, (age 18y+), IM, 0.5mL 4/17/2023 0.5 mL Intramuscular    Site: Deltoid- Left    Lot: [id number]    NDC: 9626-4935-05         After obtaining consent, and per orders of Dr. Stephanie Lamb, injection of [id number]  0.5mL given in Left deltoid by Ha Prieto CMA([facility]). Patient instructed to report any adverse reaction to me immediately. Patient tolerated injection well.
Medicine (OMT/OMM), [place]      13. Need for vaccination against Streptococcus pneumoniae  Pneumococcal, PCV20, PREVNAR 20, (age 25 yrs+), IM, PF      14. Asymptomatic menopausal state  DEXA BONE DENSITY AXIAL SKELETON      15. Chronic right shoulder pain  Iron      16. Arthropathy, unspecified  Ferritin      17. Disorder of bone, unspecified  Vitamin D 25 Hydroxy    vitamin D (ERGOCALCIFEROL) 1.25 MG (00198 UT) CAPS capsule      18. Vitamin D insufficiency  vitamin D (ERGOCALCIFEROL) 1.25 MG (35088 UT) CAPS capsule          Plan    Refilled medications above. May try crestor, instead of lipitor -- due to secondary prevention with statin that has more evidence. If that is not tolerated, pravastatin. Willing to try OMT for right back pain     Return in about 4 weeks (around 5/15/2023).     Orders Placed:  Orders Placed This Encounter   Procedures    DEXA BONE DENSITY AXIAL SKELETON    Pneumococcal, PCV20, PREVNAR 20, (age 25 yrs+), IM, PF    Iron    Ferritin    Lipid, Fasting    Vitamin D 25 Hydroxy    T4, Free    TSH    External Referral To Neurology    [address] [place] [address] (OMT/OMM)[facility]     Medications Prescribed:  Orders Placed This Encounter   Medications    ondansetron (ZOFRAN) 4 MG tablet     Sig: Take 1 tablet by mouth 3 times daily as needed for Nausea or Vomiting     Dispense:  15 tablet     Refill:  0    Estradiol (VAGIFEM) 10 MCG TABS vaginal tablet     Sig: Place 1 tablet vaginally daily     Dispense:  8 tablet     Refill:  0    Ubrogepant (UBRELVY) 100 MG TABS     Sig: Take 100 mg by mouth as needed (headache)     Dispense:  16 tablet     Refill:  1    rosuvastatin (CRESTOR) 10 MG tablet     Sig: Take 1 tablet by mouth nightly     Dispense:  30 tablet     Refill:  3       Future Appointments   Date Time Provider Bharat Veliz   5/10/2023  2:40 PM DO JAIMIE Fine SRPX Pain [facility]   6/23/2023 10:15 AM MD JAIMIE Barry ENT [facility]   7/5/2023  2:40 PM DO JAIMIE Renteria
with Topamax 100 mg BID and refill Ubrelvy PRN and Zofran PRN for migraines. Pt to continue to see pain management. Refer to Dr. Zaina Padilla.   - ondansetron (ZOFRAN) 4 MG tablet; Take 1 tablet by mouth 3 times daily as needed for Nausea or Vomiting  Dispense: 15 tablet; Refill: 0  - Ubrogepant (UBRELVY) 100 MG TABS; Take 100 mg by mouth as needed (headache)  Dispense: 16 tablet; Refill: 1  - External Referral To Neurology    6. Migraine without status migrainosus, not intractable, unspecified migraine type  - chronic, controlled. Will continue with Topamax 100 mg BID and refill Ubrelvy PRN and Zofran PRN for migraines. Pt to continue to see pain management. Refer to Dr. Zaina Padilla.   - ondansetron (ZOFRAN) 4 MG tablet; Take 1 tablet by mouth 3 times daily as needed for Nausea or Vomiting  Dispense: 15 tablet; Refill: 0  - Ubrogepant (UBRELVY) 100 MG TABS; Take 100 mg by mouth as needed (headache)  Dispense: 16 tablet; Refill: 1  - External Referral To Neurology    7. Chronic vaginitis  - Refill Vagifem PRN  - Estradiol (VAGIFEM) 10 MCG TABS vaginal tablet; Place 1 tablet vaginally daily  Dispense: 8 tablet; Refill: 0    8. History of CVA with residual deficit  - chronic, obtain labs. D/c Lipitor and start Crestor 10 mg PO daily. Will also refer to Dr. Zaina Padilla.   - External Referral To Neurology  - Lipid, Fasting; Future  - rosuvastatin (CRESTOR) 10 MG tablet; Take 1 tablet by mouth nightly  Dispense: 30 tablet; Refill: 3    9. Restless legs  - Chronic, controlled. Will check iron studies and trial iron therapy in hopes of stopping Mirapex. - Iron; Future  - Ferritin; Future  - Ferrous Fumarate 150 MG TABS; Take 150 mg by mouth daily  Dispense: 30 tablet; Refill: 1    10. Thyroid nodule  - Obtain thyroid function. Follow up with ENT as planned. - T4, Free; Future  - TSH;  Future    11. Episode of recurrent major depressive disorder, unspecified depression episode severity ([address]  - Continue

## 2023-04-18 RX ORDER — ERGOCALCIFEROL 1.25 MG/1
50000 CAPSULE ORAL WEEKLY
Qty: 4 CAPSULE | Refills: 1 | Status: SHIPPED | OUTPATIENT
Start: 2023-04-18

## 2023-04-18 NOTE — RESULT ENCOUNTER NOTE
Remy Chaudhari, I reviewed your labs. Your lipid levels are slightly elevated from last year - since we switched your Lipitor to Crestor, I suspect the levels will improve with the new medication and should help with the muscle aches. I would continue to work on diet. Your vit D levels are low - I can send in prescription Vit D 50,000 units every week x 8 weeks. Thyroid levels are normal.   Your iron and ferritin levels are low-normal - I think we can supplement and that should help with the restless legs.

## 2023-04-19 ENCOUNTER — PATIENT MESSAGE (OUTPATIENT)
Dept: FAMILY MEDICINE CLINIC | Age: 63
End: 2023-04-19

## 2023-04-19 DIAGNOSIS — N76.1 CHRONIC VAGINITIS: Primary | ICD-10-CM

## 2023-04-19 PROBLEM — I26.99 PULMONARY EMBOLISM (HCC): Status: ACTIVE | Noted: 2023-04-19

## 2023-04-19 PROBLEM — G25.81 RESTLESS LEGS SYNDROME: Status: ACTIVE | Noted: 2020-09-16

## 2023-04-19 PROBLEM — I25.10 CORONARY ATHEROSCLEROSIS: Status: ACTIVE | Noted: 2022-02-03

## 2023-04-19 PROBLEM — E27.1 PRIMARY ADRENOCORTICAL INSUFFICIENCY (HCC): Status: ACTIVE | Noted: 2020-10-28

## 2023-04-19 PROBLEM — E78.5 HYPERLIPIDEMIA: Status: ACTIVE | Noted: 2022-04-05

## 2023-04-19 PROBLEM — M35.9 CONNECTIVE TISSUE DISORDER (HCC): Status: ACTIVE | Noted: 2020-04-09

## 2023-04-19 PROBLEM — E04.9 NONTOXIC NODULAR GOITER: Status: ACTIVE | Noted: 2018-09-26

## 2023-04-19 RX ORDER — ESTRADIOL 10 UG/1
INSERT VAGINAL
Qty: 30 TABLET | Refills: 1 | Status: SHIPPED | OUTPATIENT
Start: 2023-04-19

## 2023-04-19 ASSESSMENT — ENCOUNTER SYMPTOMS
DIARRHEA: 0
CONSTIPATION: 0
COUGH: 0
SHORTNESS OF BREATH: 1
VOMITING: 0
ABDOMINAL PAIN: 0
NAUSEA: 0

## 2023-04-19 NOTE — TELEPHONE ENCOUNTER
From: Severiano Lank  To: Aravind Herman MD  Sent: 4/19/2023 3:19 PM EDT  Subject: Vagifem days     Hi Dr Elsy Figueroa, I Remember when starting and using Vagifem before dosing days were different so I checked back through [facility] , it was everyday for 2 weeks and then just Monday Thursday going forward would you like me to do that same regimrn.

## 2023-04-28 DIAGNOSIS — E87.6 HYPOKALEMIA: ICD-10-CM

## 2023-04-28 RX ORDER — POTASSIUM CHLORIDE 1500 MG/1
TABLET, EXTENDED RELEASE ORAL
Qty: 30 TABLET | Refills: 1 | Status: SHIPPED | OUTPATIENT
Start: 2023-04-28

## 2023-04-28 NOTE — TELEPHONE ENCOUNTER
Patient's last appointment was : 04/17/2023  Patient's next appointment is :  None scheduled at this time  Last refilled: 04/06/2023  Yes Pharmacy Verified    No results found for: LABA1C  Lab Results   Component Value Date    CHOL 210 (H) 04/15/2021    TRIG 122 04/15/2021    HDL 49 04/17/2023    LDLCALC 168 04/17/2023     Lab Results   Component Value Date     04/03/2023    K 3.4 (L) 04/03/2023     04/03/2023    CO2 23 04/03/2023    BUN 17 04/03/2023    CREATININE 0.7 04/03/2023    GLUCOSE 93 04/03/2023    CALCIUM 9.2 04/03/2023    PROT 6.9 04/03/2023    LABALBU 4.5 04/03/2023    BILITOT 0.2 (L) 04/03/2023    ALKPHOS 86 04/03/2023    AST 11 04/03/2023    ALT 11 04/03/2023    LABGLOM >60 04/03/2023    GFRAA >60 02/19/2021    AGRATIO 2.9 (H) 02/05/2021     Lab Results   Component Value Date    TSH 1.440 04/17/2023    FT3 2.56 07/15/2021    T4FREE 1.43 04/17/2023     Lab Results   Component Value Date    WBC 6.8 04/03/2023    HGB 12.5 04/03/2023    HCT 39.3 04/03/2023    MCV 91.4 04/03/2023     04/03/2023

## 2023-05-03 ENCOUNTER — PATIENT MESSAGE (OUTPATIENT)
Dept: FAMILY MEDICINE CLINIC | Age: 63
End: 2023-05-03

## 2023-05-03 DIAGNOSIS — G43.909 MIGRAINE WITHOUT STATUS MIGRAINOSUS, NOT INTRACTABLE, UNSPECIFIED MIGRAINE TYPE: ICD-10-CM

## 2023-05-03 DIAGNOSIS — G43.409 HEMIPLEGIC MIGRAINE WITHOUT STATUS MIGRAINOSUS, NOT INTRACTABLE: ICD-10-CM

## 2023-05-03 DIAGNOSIS — G25.81 RESTLESS LEGS: Primary | ICD-10-CM

## 2023-05-04 DIAGNOSIS — I69.30 HISTORY OF CVA WITH RESIDUAL DEFICIT: ICD-10-CM

## 2023-05-04 DIAGNOSIS — I25.119 ATHEROSCLEROSIS OF NATIVE CORONARY ARTERY OF NATIVE HEART WITH ANGINA PECTORIS (HCC): ICD-10-CM

## 2023-05-04 DIAGNOSIS — G25.81 RESTLESS LEGS: ICD-10-CM

## 2023-05-04 DIAGNOSIS — G43.909 MIGRAINE WITHOUT STATUS MIGRAINOSUS, NOT INTRACTABLE, UNSPECIFIED MIGRAINE TYPE: ICD-10-CM

## 2023-05-04 DIAGNOSIS — G43.409 HEMIPLEGIC MIGRAINE WITHOUT STATUS MIGRAINOSUS, NOT INTRACTABLE: ICD-10-CM

## 2023-05-04 RX ORDER — BUTALBITAL, ACETAMINOPHEN AND CAFFEINE 50; 325; 40 MG/1; MG/1; MG/1
1 TABLET ORAL EVERY 4 HOURS PRN
Qty: 180 TABLET | Refills: 1 | Status: SHIPPED | OUTPATIENT
Start: 2023-05-04

## 2023-05-04 RX ORDER — ROSUVASTATIN CALCIUM 10 MG/1
10 TABLET, COATED ORAL NIGHTLY
Qty: 30 TABLET | Refills: 3 | Status: CANCELLED | OUTPATIENT
Start: 2023-05-04

## 2023-05-04 NOTE — TELEPHONE ENCOUNTER
Patient's last appointment was : 4/17/23  Patient's next appointment is :  none  Last refilled: 10/15/21  Pharmacy Verified    No results found for: LABA1C  Lab Results   Component Value Date    CHOL 210 (H) 04/15/2021    TRIG 122 04/15/2021    HDL 49 04/17/2023    LDLCALC 168 04/17/2023     Lab Results   Component Value Date     04/03/2023    K 3.4 (L) 04/03/2023     04/03/2023    CO2 23 04/03/2023    BUN 17 04/03/2023    CREATININE 0.7 04/03/2023    GLUCOSE 93 04/03/2023    CALCIUM 9.2 04/03/2023    PROT 6.9 04/03/2023    LABALBU 4.5 04/03/2023    BILITOT 0.2 (L) 04/03/2023    ALKPHOS 86 04/03/2023    AST 11 04/03/2023    ALT 11 04/03/2023    LABGLOM >60 04/03/2023    GFRAA >60 02/19/2021    AGRATIO 2.9 (H) 02/05/2021     Lab Results   Component Value Date    TSH 1.440 04/17/2023    FT3 2.56 07/15/2021    T4FREE 1.43 04/17/2023     Lab Results   Component Value Date    WBC 6.8 04/03/2023    HGB 12.5 04/03/2023    HCT 39.3 04/03/2023    MCV 91.4 04/03/2023     04/03/2023

## 2023-05-08 RX ORDER — ONDANSETRON 4 MG/1
4 TABLET, FILM COATED ORAL 3 TIMES DAILY PRN
Qty: 15 TABLET | Refills: 0 | Status: SHIPPED | OUTPATIENT
Start: 2023-05-08

## 2023-05-08 NOTE — TELEPHONE ENCOUNTER
Patient's last appointment was : 4/17/23  Patient's next appointment is :  none  Last refilled: 4/17/23  Pharmacy Verified    No results found for: LABA1C  Lab Results   Component Value Date    CHOL 210 (H) 04/15/2021    TRIG 122 04/15/2021    HDL 49 04/17/2023    LDLCALC 168 04/17/2023     Lab Results   Component Value Date     04/03/2023    K 3.4 (L) 04/03/2023     04/03/2023    CO2 23 04/03/2023    BUN 17 04/03/2023    CREATININE 0.7 04/03/2023    GLUCOSE 93 04/03/2023    CALCIUM 9.2 04/03/2023    PROT 6.9 04/03/2023    LABALBU 4.5 04/03/2023    BILITOT 0.2 (L) 04/03/2023    ALKPHOS 86 04/03/2023    AST 11 04/03/2023    ALT 11 04/03/2023    LABGLOM >60 04/03/2023    GFRAA >60 02/19/2021    AGRATIO 2.9 (H) 02/05/2021     Lab Results   Component Value Date    TSH 1.440 04/17/2023    FT3 2.56 07/15/2021    T4FREE 1.43 04/17/2023     Lab Results   Component Value Date    WBC 6.8 04/03/2023    HGB 12.5 04/03/2023    HCT 39.3 04/03/2023    MCV 91.4 04/03/2023     04/03/2023

## 2023-05-09 DIAGNOSIS — M89.9 DISORDER OF BONE, UNSPECIFIED: ICD-10-CM

## 2023-05-09 DIAGNOSIS — I69.30 HISTORY OF CVA WITH RESIDUAL DEFICIT: ICD-10-CM

## 2023-05-09 DIAGNOSIS — E55.9 VITAMIN D INSUFFICIENCY: ICD-10-CM

## 2023-05-09 DIAGNOSIS — I25.119 ATHEROSCLEROSIS OF NATIVE CORONARY ARTERY OF NATIVE HEART WITH ANGINA PECTORIS (HCC): ICD-10-CM

## 2023-05-09 RX ORDER — ERGOCALCIFEROL 1.25 MG/1
CAPSULE ORAL
Qty: 4 CAPSULE | Refills: 1 | OUTPATIENT
Start: 2023-05-09

## 2023-05-09 RX ORDER — ROSUVASTATIN CALCIUM 10 MG/1
10 TABLET, COATED ORAL NIGHTLY
Qty: 30 TABLET | Refills: 3 | OUTPATIENT
Start: 2023-05-09

## 2023-05-10 ENCOUNTER — HOSPITAL ENCOUNTER (OUTPATIENT)
Dept: GENERAL RADIOLOGY | Age: 63
Discharge: HOME | End: 2023-05-10
Payer: MEDICARE

## 2023-05-10 ENCOUNTER — HOSPITAL ENCOUNTER (OUTPATIENT)
Age: 63
Discharge: HOME | End: 2023-05-10
Payer: MEDICARE

## 2023-05-10 ENCOUNTER — OFFICE VISIT (OUTPATIENT)
Dept: RHEUMATOLOGY | Age: 63
End: 2023-05-10
Payer: MEDICARE

## 2023-05-10 ENCOUNTER — OFFICE VISIT (OUTPATIENT)
Dept: PHYSICAL MEDICINE AND REHAB | Age: 63
End: 2023-05-10

## 2023-05-10 VITALS
SYSTOLIC BLOOD PRESSURE: 112 MMHG | BODY MASS INDEX: 26.66 KG/M2 | HEART RATE: 83 BPM | OXYGEN SATURATION: 96 % | HEIGHT: 65 IN | WEIGHT: 160 LBS | DIASTOLIC BLOOD PRESSURE: 76 MMHG

## 2023-05-10 VITALS
SYSTOLIC BLOOD PRESSURE: 114 MMHG | HEIGHT: 65 IN | DIASTOLIC BLOOD PRESSURE: 86 MMHG | BODY MASS INDEX: 26.66 KG/M2 | WEIGHT: 160 LBS

## 2023-05-10 DIAGNOSIS — M54.6 PAIN IN THORACIC SPINE: ICD-10-CM

## 2023-05-10 DIAGNOSIS — S22.000A COMPRESSION FRACTURE OF BODY OF THORACIC VERTEBRA (HCC): ICD-10-CM

## 2023-05-10 DIAGNOSIS — Z87.39 H/O MIXED CONNECTIVE TISSUE DISEASE: ICD-10-CM

## 2023-05-10 DIAGNOSIS — M79.18 MYOFASCIAL PAIN: ICD-10-CM

## 2023-05-10 DIAGNOSIS — G89.29 OTHER CHRONIC PAIN: ICD-10-CM

## 2023-05-10 DIAGNOSIS — M80.08XA AGE-RELATED OSTEOPOROSIS WITH CURRENT PATHOLOGICAL FRACTURE, VERTEBRA(E), INITIAL ENCOUNTER FOR FRACTURE (HCC): ICD-10-CM

## 2023-05-10 DIAGNOSIS — M79.7 FIBROMYALGIA: ICD-10-CM

## 2023-05-10 DIAGNOSIS — G89.29 CHRONIC BILATERAL LOW BACK PAIN WITHOUT SCIATICA: ICD-10-CM

## 2023-05-10 DIAGNOSIS — M54.50 CHRONIC BILATERAL LOW BACK PAIN WITHOUT SCIATICA: ICD-10-CM

## 2023-05-10 DIAGNOSIS — M47.814 THORACIC SPONDYLOSIS: Primary | ICD-10-CM

## 2023-05-10 DIAGNOSIS — Z87.39 H/O MIXED CONNECTIVE TISSUE DISEASE: Primary | ICD-10-CM

## 2023-05-10 LAB
CRP SERPL-MCNC: < 0.3 MG/DL (ref 0–1)
ERYTHROCYTE [SEDIMENTATION RATE] IN BLOOD BY WESTERGREN METHOD: 15 MM/HR (ref 0–20)

## 2023-05-10 PROCEDURE — G8427 DOCREV CUR MEDS BY ELIG CLIN: HCPCS | Performed by: INTERNAL MEDICINE

## 2023-05-10 PROCEDURE — 86140 C-REACTIVE PROTEIN: CPT

## 2023-05-10 PROCEDURE — 36415 COLL VENOUS BLD VENIPUNCTURE: CPT

## 2023-05-10 PROCEDURE — G8419 CALC BMI OUT NRM PARAM NOF/U: HCPCS | Performed by: INTERNAL MEDICINE

## 2023-05-10 PROCEDURE — 72070 X-RAY EXAM THORAC SPINE 2VWS: CPT

## 2023-05-10 PROCEDURE — 4004F PT TOBACCO SCREEN RCVD TLK: CPT | Performed by: INTERNAL MEDICINE

## 2023-05-10 PROCEDURE — 85651 RBC SED RATE NONAUTOMATED: CPT

## 2023-05-10 PROCEDURE — 3017F COLORECTAL CA SCREEN DOC REV: CPT | Performed by: INTERNAL MEDICINE

## 2023-05-10 PROCEDURE — 99214 OFFICE O/P EST MOD 30 MIN: CPT | Performed by: INTERNAL MEDICINE

## 2023-05-10 RX ORDER — SODIUM CHLORIDE 9 MG/ML
INJECTION, SOLUTION INTRAVENOUS CONTINUOUS
Status: CANCELLED | OUTPATIENT
Start: 2023-05-10

## 2023-05-10 RX ORDER — BUPRENORPHINE 15 UG/H
1 PATCH TRANSDERMAL WEEKLY
COMMUNITY
End: 2023-05-11 | Stop reason: DRUGHIGH

## 2023-05-10 RX ORDER — DIPHENHYDRAMINE HYDROCHLORIDE 50 MG/ML
50 INJECTION INTRAMUSCULAR; INTRAVENOUS
Status: CANCELLED | OUTPATIENT
Start: 2023-05-10

## 2023-05-10 RX ORDER — ALBUTEROL SULFATE 90 UG/1
4 AEROSOL, METERED RESPIRATORY (INHALATION) PRN
Status: CANCELLED | OUTPATIENT
Start: 2023-05-10

## 2023-05-10 RX ORDER — ONDANSETRON 2 MG/ML
8 INJECTION INTRAMUSCULAR; INTRAVENOUS
Status: CANCELLED | OUTPATIENT
Start: 2023-05-10

## 2023-05-10 RX ORDER — ACETAMINOPHEN 325 MG/1
650 TABLET ORAL
Status: CANCELLED | OUTPATIENT
Start: 2023-05-10

## 2023-05-10 RX ORDER — GABAPENTIN 300 MG/1
CAPSULE ORAL
Qty: 90 CAPSULE | Refills: 1 | Status: SHIPPED | OUTPATIENT
Start: 2023-05-10 | End: 2023-07-16

## 2023-05-10 RX ORDER — FERROUS SULFATE 325(65) MG
325 TABLET ORAL
Qty: 30 TABLET | Refills: 1 | Status: SHIPPED | OUTPATIENT
Start: 2023-05-10 | End: 2023-07-09

## 2023-05-10 RX ORDER — FAMOTIDINE 10 MG/ML
20 INJECTION, SOLUTION INTRAVENOUS
Status: CANCELLED | OUTPATIENT
Start: 2023-05-10

## 2023-05-10 RX ORDER — EPINEPHRINE 1 MG/ML
0.3 INJECTION, SOLUTION, CONCENTRATE INTRAVENOUS PRN
Status: CANCELLED | OUTPATIENT
Start: 2023-05-10

## 2023-05-10 ASSESSMENT — ENCOUNTER SYMPTOMS
CONSTIPATION: 0
NAUSEA: 0
SHORTNESS OF BREATH: 1
COUGH: 0
DIARRHEA: 0
EYE PAIN: 0
BACK PAIN: 0
TROUBLE SWALLOWING: 0
EYE ITCHING: 0
ABDOMINAL PAIN: 0

## 2023-05-10 NOTE — PROGRESS NOTES
Chronic Pain/PM&R Clinic Note     Encounter Date: 5/10/23    Subjective:   Chief Complaint:   Chief Complaint   Patient presents with    Follow-up     Thoracic back pain        History of Present Illness:   Lorri Holm is a 65 y.o. female seen in the office initially on 03/05/21 for her management of migraines. She has medical history of previous TIA with residual cognitive deficits, and ACDF C3-C7 with Dr Teddy Guevara. She has longstanding history of migraine headaches as well as of the last 35 years. She describes 2 different types of migraine headaches. Her standard migraine headaches she reports started on the right side the neck and wrap around the entire side of the front of the head. She has associated phonophobia and photophobia. She also notices these migraines to begin with an aura where her nose starts to burn. She has associated nausea/vomiting when the migraines are severe. Her migraines last greater than 4 hours in duration interfering everyday activities. In addition she reports greater than 15 migraine attacks last month. She has failed multiple medications in the past including Fioricet, Ubrelvy, nortriptyline, and is currently on Aimovig and Topamax. 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 In addition, she does have axial low back pain that contributes to a lot of her debilitating pain. She denies any radiation down the legs, focal leg weakness, leg paresthesias, saddle anesthesia, bowel/bladder incontinence. Today, 5/10/2023, patient presents for planned follow-up for chronic pain. She states that she was lost to follow-up but she moved to [place] but then subsequently moved back to [place] due to financial reasons. She states that in the past she was being treated for her neck and back pain but states that over the last couple months she has noticed worsening thoracic spine pain.   She states that she does not have any inciting event or injury leading

## 2023-05-10 NOTE — PROGRESS NOTES
[facility] NOTE       Date Of Service: 5/10/2023  Provider: Cresencio Epstein DO    Name: Herson Jensen   MRN: [id number]    Chief Complaint(s)     Chief Complaint   Patient presents with    Follow-up     4 week f/u H/O mixed connective tissue disease    Bilateral ribs/back, tightness, SOB. Throbbing on spine when sitting      May turners syndrome   History of Present Illness (HPI)     Herson Jensen  is a(n)62 y.o. female with a hx of adrenal abnormalities, arthritis, asthma, CVA, headaches, hyperlipidemia, h/o myositis, bipolar disorder, dry eye, fatty liver here for the f/u evaluation of MCTD/ systemic lupus. -- moved back from [facility] march 2019  -- diagnosed with pulm nodules 3.5 cm    -- chest wall pain/tightness, reported pleuritic pain . -- burning throbbing pain along the back, posterior chest wall. Aggravated with direcet pressure, lifting, pulling, bumps in the road. Alleviating: -- nothing. Some relief w/ prednisone from family member. Short term relief with muscle relaxer. Butrans patch with some relief. -- pain affecting the medial elbow , knees , shoulders (traps) -- reported swelling of knees. Most severe in the mornings. Alleviating: burtrans patch. Alleviating\":   -- Weakness in legs reported , walking dog 3 times per day. Difficulty getting up from seated position. -- numbness of the hands and feet typically occurring int he evenings. osteoporosis - one shot of prolia about 6 months ago. Using a cane to ambulate. REVIEW OF SYSTEMS: (ROS)    Review of Systems   Constitutional:  Positive for fatigue. Negative for fever and unexpected weight change. HENT:  Negative for congestion and trouble swallowing. Eyes:  Negative for pain and itching. Respiratory:  Positive for shortness of breath. Negative for cough. Cardiovascular:  Positive for leg swelling (left leg). Negative for chest pain.    Gastrointestinal:  Negative for abdominal pain, constipation,

## 2023-05-11 ENCOUNTER — TELEPHONE (OUTPATIENT)
Dept: PHYSICAL MEDICINE AND REHAB | Age: 63
End: 2023-05-11

## 2023-05-11 RX ORDER — BUPRENORPHINE 20 UG/H
1 PATCH TRANSDERMAL
Qty: 4 PATCH | Refills: 0 | Status: SHIPPED | OUTPATIENT
Start: 2023-05-11 | End: 2023-06-10

## 2023-05-11 NOTE — TELEPHONE ENCOUNTER
PA Sent to Plan today    Your demographic data has been sent to [facility] successfully! [facility] typically takes 5-10 minutes to respond, but it may take a little longer in some cases.     Key: [id number] - Rx #: [id number]    Buprenorphine 20MCG/HR weekly patches    [facility] Electronic PA Form

## 2023-05-12 ENCOUNTER — TELEPHONE (OUTPATIENT)
Dept: RHEUMATOLOGY | Age: 63
End: 2023-05-12

## 2023-05-12 ENCOUNTER — TELEPHONE (OUTPATIENT)
Dept: PHYSICAL MEDICINE AND REHAB | Age: 63
End: 2023-05-12

## 2023-05-12 DIAGNOSIS — S22.070A COMPRESSION FRACTURE OF T9 VERTEBRA, INITIAL ENCOUNTER (HCC): ICD-10-CM

## 2023-05-12 DIAGNOSIS — S22.060A COMPRESSION FRACTURE OF T7 VERTEBRA, INITIAL ENCOUNTER (HCC): ICD-10-CM

## 2023-05-12 RX ORDER — FAMOTIDINE 10 MG/ML
20 INJECTION, SOLUTION INTRAVENOUS
OUTPATIENT
Start: 2023-05-12

## 2023-05-12 RX ORDER — ACETAMINOPHEN 325 MG/1
650 TABLET ORAL
OUTPATIENT
Start: 2023-05-12

## 2023-05-12 RX ORDER — ONDANSETRON 2 MG/ML
8 INJECTION INTRAMUSCULAR; INTRAVENOUS
OUTPATIENT
Start: 2023-05-12

## 2023-05-12 RX ORDER — ALBUTEROL SULFATE 90 UG/1
4 AEROSOL, METERED RESPIRATORY (INHALATION) PRN
OUTPATIENT
Start: 2023-05-12

## 2023-05-12 RX ORDER — DIPHENHYDRAMINE HYDROCHLORIDE 50 MG/ML
50 INJECTION INTRAMUSCULAR; INTRAVENOUS
OUTPATIENT
Start: 2023-05-12

## 2023-05-12 RX ORDER — SODIUM CHLORIDE 9 MG/ML
INJECTION, SOLUTION INTRAVENOUS CONTINUOUS
OUTPATIENT
Start: 2023-05-12

## 2023-05-12 RX ORDER — EPINEPHRINE 1 MG/ML
0.3 INJECTION, SOLUTION, CONCENTRATE INTRAVENOUS PRN
OUTPATIENT
Start: 2023-05-12

## 2023-05-12 NOTE — TELEPHONE ENCOUNTER
# osteoporosis : T-score of -3.4 (total Right hip)     # T9, T7 compression fractures (new T7)    - severe osteoporosis w/ 2 compression fractures in the thoracic spine. Patient with prior hx of CVA , but recent Cardiac evaluation and dobuatmine stress were without significant abnormalities. - discussed tx option such as evenity and forteo. She was intersted in evenity and reported understanding of the potential side effects including  but not limited to allergic reaction, increased risk of CAD events.    -- will pursue evenity and STOP prolia (started oct-nov 2022) -- orders placed 5/12/23

## 2023-05-15 ENCOUNTER — TELEPHONE (OUTPATIENT)
Dept: PHYSICAL MEDICINE AND REHAB | Age: 63
End: 2023-05-15

## 2023-05-15 ENCOUNTER — HOSPITAL ENCOUNTER (OUTPATIENT)
Dept: MRI IMAGING | Age: 63
Discharge: HOME | End: 2023-05-15
Payer: MEDICARE

## 2023-05-15 DIAGNOSIS — S22.000A COMPRESSION FRACTURE OF BODY OF THORACIC VERTEBRA (HCC): ICD-10-CM

## 2023-05-15 PROCEDURE — 72146 MRI CHEST SPINE W/O DYE: CPT

## 2023-05-15 NOTE — TELEPHONE ENCOUNTER
Pt. Called and was added onto MRI schedule for today and you was suppose to order something for anxiety.  Needs ordered stat by 2:30pm

## 2023-05-15 NOTE — DISCHARGE INSTRUCTIONS
Post procedure Instructions:    No driving or making significant decisions for the remainder of the day. Be cautions with walking and activity for 24 hours, do not over exert yourself. Ok to resume pre-procedure activity level today. Apply ice to site of injection site if you have pain or discomfort. Do not submerge sit of injection during bath or pool activities for 48 hours post-procedure. Resume blood thinning medications in 24 hours. Call office [phone] if you have:  Temperature greater than 100.4  Persistent nausea and vomiting  Severe uncontrolled pain  Redness, tenderness, or signs of infection (pain, swelling, redness, odor or green/yellow discharge around the site)  Difficulty breathing, headache or visual disturbances  Hives  Persistent dizziness or light-headedness  Extreme fatigue  Any other questions or concerns you may have after discharge    In an emergency, call 911 or go to an Emergency Department at a nearby hospital    Surgical Site Infections      How can we work together to prevent Surgical Site Infections? We would like to thank you for choosing [facility] for your Surgical Care. Below you will find helpful information on how we can work together to prevent Surgical Site Infections. What is a Surgical Site Infection (SSI)? A surgical site infection is an infection that occurs after surgery in the part of the body where the surgery took place. Most patients who have surgery do not develop an infection. However, infections develop in about 1 to 3 out of every 100 patients who have surgery. Some of the common symptoms of a surgical site infection are:  Redness and pain around the area where you had surgery  Drainage of cloudy fluid from your surgical wound  Fever    Can SSIs be treated? Yes. Most surgical site infections can be treated with antibiotics. The antibiotic given to you depends on the bacteria (germs) causing the infection.  Sometimes patients with

## 2023-05-15 NOTE — TELEPHONE ENCOUNTER
Tasia Claude is calling to request medication prior to her MRI, her MRI is TODAY 05-15 at 0400. She has to lay on her back, and doesn't like enclosed areas, and it is a closed MRI, Per Victor Manuel Zamudio stated he would call a medication in, but didn't say if was pain medicine or med to relax her.  She uses [address]

## 2023-05-16 ENCOUNTER — APPOINTMENT (OUTPATIENT)
Dept: GENERAL RADIOLOGY | Age: 63
End: 2023-05-16
Attending: ANESTHESIOLOGY
Payer: MEDICARE

## 2023-05-16 ENCOUNTER — HOSPITAL ENCOUNTER (OUTPATIENT)
Age: 63
Setting detail: OUTPATIENT SURGERY
Discharge: HOME | End: 2023-05-16
Attending: ANESTHESIOLOGY | Admitting: ANESTHESIOLOGY
Payer: MEDICARE

## 2023-05-16 VITALS
RESPIRATION RATE: 16 BRPM | SYSTOLIC BLOOD PRESSURE: 134 MMHG | WEIGHT: 163.4 LBS | HEART RATE: 61 BPM | HEIGHT: 65 IN | OXYGEN SATURATION: 97 % | TEMPERATURE: 96.5 F | DIASTOLIC BLOOD PRESSURE: 79 MMHG | BODY MASS INDEX: 27.22 KG/M2

## 2023-05-16 PROCEDURE — 99152 MOD SED SAME PHYS/QHP 5/>YRS: CPT | Performed by: ANESTHESIOLOGY

## 2023-05-16 PROCEDURE — 64490 INJ PARAVERT F JNT C/T 1 LEV: CPT | Performed by: ANESTHESIOLOGY

## 2023-05-16 PROCEDURE — 64491 INJ PARAVERT F JNT C/T 2 LEV: CPT | Performed by: ANESTHESIOLOGY

## 2023-05-16 PROCEDURE — 2709999900 HC NON-CHARGEABLE SUPPLY: Performed by: ANESTHESIOLOGY

## 2023-05-16 PROCEDURE — 7100000011 HC PHASE II RECOVERY - ADDTL 15 MIN: Performed by: ANESTHESIOLOGY

## 2023-05-16 PROCEDURE — 3600000056 HC PAIN LEVEL 4 BASE: Performed by: ANESTHESIOLOGY

## 2023-05-16 PROCEDURE — 7100000010 HC PHASE II RECOVERY - FIRST 15 MIN: Performed by: ANESTHESIOLOGY

## 2023-05-16 PROCEDURE — 3209999900 FLUORO FOR SURGICAL PROCEDURES

## 2023-05-16 PROCEDURE — 2500000003 HC RX 250 WO HCPCS: Performed by: ANESTHESIOLOGY

## 2023-05-16 PROCEDURE — 6360000002 HC RX W HCPCS: Performed by: ANESTHESIOLOGY

## 2023-05-16 RX ORDER — BUPIVACAINE HYDROCHLORIDE 2.5 MG/ML
INJECTION, SOLUTION EPIDURAL; INFILTRATION; INTRACAUDAL PRN
Status: DISCONTINUED | OUTPATIENT
Start: 2023-05-16 | End: 2023-05-16 | Stop reason: ALTCHOICE

## 2023-05-16 RX ORDER — LIDOCAINE HYDROCHLORIDE 10 MG/ML
INJECTION, SOLUTION EPIDURAL; INFILTRATION; INTRACAUDAL; PERINEURAL PRN
Status: DISCONTINUED | OUTPATIENT
Start: 2023-05-16 | End: 2023-05-16 | Stop reason: ALTCHOICE

## 2023-05-16 RX ORDER — MIDAZOLAM HYDROCHLORIDE 1 MG/ML
INJECTION INTRAMUSCULAR; INTRAVENOUS PRN
Status: DISCONTINUED | OUTPATIENT
Start: 2023-05-16 | End: 2023-05-16 | Stop reason: ALTCHOICE

## 2023-05-16 RX ORDER — FENTANYL CITRATE 50 UG/ML
INJECTION, SOLUTION INTRAMUSCULAR; INTRAVENOUS PRN
Status: DISCONTINUED | OUTPATIENT
Start: 2023-05-16 | End: 2023-05-16 | Stop reason: ALTCHOICE

## 2023-05-16 ASSESSMENT — PAIN SCALES - GENERAL: PAINLEVEL_OUTOF10: 0

## 2023-05-16 ASSESSMENT — PAIN - FUNCTIONAL ASSESSMENT: PAIN_FUNCTIONAL_ASSESSMENT: 0-10

## 2023-05-16 ASSESSMENT — PAIN DESCRIPTION - DESCRIPTORS: DESCRIPTORS: ACHING

## 2023-05-16 NOTE — PROGRESS NOTES
1317 To phase 2 recovery via cart. Report received from [facility]. Pt is awake and A & O x 3. Denies pain. Injection site is clean, dry and intact without drainage noted. Moving all 4 extremities without difficulty. Skin is warm and dry and respirations are easy & non-labored. VS WNL and INT is clean, dry and intact. 1325 Pt given snack and drink. Call light in reach. Denies needs at present. 1342 Pt ate and drank 100%. Continues to deny pain or nausea. INT removed and pt dressing in bed. Son Coretta Hamman called for ride home. Pt updated that family member would be at surgery center in approximately 15 minutes. Pt resting quietly in bed with call light in reach denies needs at present. 1350 To private vehicle x 1 assist via ambulation. Gait steady. Respirations are easy & non-labored.

## 2023-05-17 NOTE — POST SEDATION
[facility]  Sedation/Analgesia Post Sedation Record    Pt Name: Kit Carter  MRN: [id number]  YOB: 1960  Procedure Performed By: Celestina Bradley DO  Primary Care Physician: Angelica Cormier MD    POST-PROCEDURE    Physicians/Assistants: Celestina Bradley DO  Procedure Performed: See Procedure Note   Sedation/Anesthesia: Versed and Fentanyl (See procedure note for amount and duration)  Estimated Blood Loss:     0  ml  Specimens Removed: None        Complications: None           Leandro Walsh DO  Electronically signed 5/16/2023 at 10:10 PM

## 2023-05-17 NOTE — PROCEDURES
Pre-operative Diagnosis: Lumbar facet pain     Post-operative Diagnosis: Lumbar facet pain     Procedure: Bilateral thoracic medial branch blocks targeting facet joints of T7/T8 and T8/T9     Procedure Description:  After consent was obtained, the patient was placed in the prone position. The lower thoracic back was prepped with chloraprep and draped in a sterile fashion. Then, 0.5 cc of 1 % lidocaine was used for local anesthesia of the skin and superficial subcutaneous tissues. Three 22-gauge 3.5-inch spinal needles were advanced under fluoroscopy in an AP view: at the junction of the right superior articular process and the transverse process of the T7, T8, and T9 vertebrae. There was no paresthesias, heme, or CSF obtained. Needle placement was confirmed using AP and oblique views. Then, 0.5 cc of 0.5% bupivacaine was injected at each site without complications. The procedure was repeated on the contralateral side. The patient tolerated the procedure well, transported to the recovery room, and discharged in ambulatory fashion.      Procedural Complications: None  Estimated Blood Loss: 0 mL      IV sedation was used during the procedure:  - Moderate intravenous conscious sedation was supervised by Dr. Hugo Lazcano  - The patient was independently monitored by a Registered Nurse assigned to the procedure room  - Monitoring included automated blood pressure, continuous EKG, and continuous pulse oximetry  - The detailed conscious record is permanently stored in the [place] [id number]  - The following is the conscious sedation record:  Start Time: 13:11  End Time : 13:26  Duration: 15 minutes   Medications Administered: 1 mg Versed, 50 mcg Fentanyl     Leandro Powell DO  Interventional Pain Management/PM&R   [place]

## 2023-05-17 NOTE — PRE SEDATION
[facility]  Pre-Sedation/Analgesia History & Physical    Pt Name: Jonny Wheeler  MRN: [id number]  YOB: 1960  Provider Performing Procedure: Kimberly Grace DO   Primary Care Physician: Diandra Virgen MD      MEDICAL HISTORY       has a past medical history of Adrenal abnormality ([facility] [address] Angina at rest [facility]), Anxiety, Arthritis, Asthma, Cerebral artery occlusion with cerebral infarction [facility]), Chronic sinusitis, Cognitive impairment, Headache, History of degenerative disc disease, Hyperlipidemia, Lymphedema, May-Thurner syndrome, Peripheral vascular disease ([facility] [address] PONV (postoperative nausea and vomiting), Positive BJ (antinuclear antibody), and PTSD (post-traumatic stress disorder). SURGICAL HISTORY   has a past surgical history that includes Tubal ligation; shoulder surgery; Tonsillectomy; sinus surgery; shoulder surgery (Left); Neck surgery (12/2020); Septoplasty (Right, 6/14/2021); Pain management procedure (Bilateral, 6/29/2021); Pain management procedure (Bilateral, 7/20/2021); Pain management procedure (Right, 7/27/2021); Pain management procedure (Left, 8/3/2021); US ASP/INJ THYROID CYST (8/6/2021); Hysterectomy (2010); APPLE STEREO BREAST BX W LOC DEVICE 1ST LESION LEFT (Left); APPLE STEREO BREAST BX W LOC DEVICE 1ST LESION RIGHT (Right); Breast surgery (Right); and Ovary removal (2012). ALLERGIES   Allergies as of 05/10/2023 - Fully Reviewed 05/10/2023   Allergen Reaction Noted    Amoxil [amoxicillin]  11/22/2020    Biaxin [clarithromycin] Swelling 05/27/2021    Doxycycline Hives 11/22/2020    Penicillins  09/13/2021    Triamcinolone acetonide  09/13/2021    Morphine Nausea And Vomiting 07/31/2020    Sulfa antibiotics Rash 07/31/2020       MEDICATIONS   Prior to Admission medications    Medication Sig Start Date End Date Taking? Authorizing Provider   LORazepam (ATIVAN) 0.5 MG tablet Take 1 tablet by mouth as needed (For claustrophobia) for up to 1 day.   Patient not taking:

## 2023-05-22 ENCOUNTER — APPOINTMENT (OUTPATIENT)
Dept: CT IMAGING | Age: 63
End: 2023-05-22
Payer: MEDICARE

## 2023-05-22 ENCOUNTER — HOSPITAL ENCOUNTER (EMERGENCY)
Age: 63
Discharge: HOME | End: 2023-05-22
Attending: EMERGENCY MEDICINE
Payer: MEDICARE

## 2023-05-22 VITALS
HEART RATE: 59 BPM | HEIGHT: 65 IN | BODY MASS INDEX: 26.66 KG/M2 | RESPIRATION RATE: 20 BRPM | TEMPERATURE: 98.1 F | OXYGEN SATURATION: 99 % | DIASTOLIC BLOOD PRESSURE: 85 MMHG | SYSTOLIC BLOOD PRESSURE: 160 MMHG | WEIGHT: 160 LBS

## 2023-05-22 DIAGNOSIS — N12 PYELONEPHRITIS: Primary | ICD-10-CM

## 2023-05-22 DIAGNOSIS — M54.50 CHRONIC BILATERAL LOW BACK PAIN WITHOUT SCIATICA: ICD-10-CM

## 2023-05-22 DIAGNOSIS — G89.29 CHRONIC BILATERAL LOW BACK PAIN WITHOUT SCIATICA: ICD-10-CM

## 2023-05-22 LAB
ALBUMIN SERPL BCG-MCNC: 4.1 G/DL (ref 3.5–5.1)
ALP SERPL-CCNC: 82 U/L (ref 38–126)
ALT SERPL W/O P-5'-P-CCNC: 12 U/L (ref 11–66)
ANION GAP SERPL CALC-SCNC: 10 MEQ/L (ref 8–16)
AST SERPL-CCNC: 12 U/L (ref 5–40)
BACTERIA URNS QL MICRO: ABNORMAL /HPF
BASOPHILS ABSOLUTE: 0 THOU/MM3 (ref 0–0.1)
BASOPHILS NFR BLD AUTO: 0.5 %
BILIRUB SERPL-MCNC: 0.3 MG/DL (ref 0.3–1.2)
BILIRUB UR QL STRIP.AUTO: NEGATIVE
BUN SERPL-MCNC: 14 MG/DL (ref 7–22)
CALCIUM SERPL-MCNC: 9 MG/DL (ref 8.5–10.5)
CASTS #/AREA URNS LPF: ABNORMAL /LPF
CASTS 2: ABNORMAL /LPF
CHARACTER UR: CLEAR
CHLORIDE SERPL-SCNC: 106 MEQ/L (ref 98–111)
CO2 SERPL-SCNC: 25 MEQ/L (ref 23–33)
COLOR: YELLOW
CREAT SERPL-MCNC: 0.7 MG/DL (ref 0.4–1.2)
CRYSTALS URNS MICRO: ABNORMAL
DEPRECATED RDW RBC AUTO: 43.5 FL (ref 35–45)
EOSINOPHIL NFR BLD AUTO: 1 %
EOSINOPHILS ABSOLUTE: 0.1 THOU/MM3 (ref 0–0.4)
EPITHELIAL CELLS, UA: ABNORMAL /HPF
ERYTHROCYTE [DISTWIDTH] IN BLOOD BY AUTOMATED COUNT: 12.7 % (ref 11.5–14.5)
GFR SERPL CREATININE-BSD FRML MDRD: > 60 ML/MIN/1.73M2
GLUCOSE SERPL-MCNC: 92 MG/DL (ref 70–108)
GLUCOSE UR QL STRIP.AUTO: NEGATIVE MG/DL
HCT VFR BLD AUTO: 38.6 % (ref 37–47)
HGB BLD-MCNC: 11.8 GM/DL (ref 12–16)
HGB UR QL STRIP.AUTO: ABNORMAL
IMM GRANULOCYTES # BLD AUTO: 0.01 THOU/MM3 (ref 0–0.07)
IMM GRANULOCYTES NFR BLD AUTO: 0.2 %
KETONES UR QL STRIP.AUTO: NEGATIVE
LYMPHOCYTES ABSOLUTE: 1.7 THOU/MM3 (ref 1–4.8)
LYMPHOCYTES NFR BLD AUTO: 27.6 %
MCH RBC QN AUTO: 28.4 PG (ref 26–33)
MCHC RBC AUTO-ENTMCNC: 30.6 GM/DL (ref 32.2–35.5)
MCV RBC AUTO: 93 FL (ref 81–99)
MISCELLANEOUS 2: ABNORMAL
MONOCYTES ABSOLUTE: 0.4 THOU/MM3 (ref 0.4–1.3)
MONOCYTES NFR BLD AUTO: 6.3 %
NEUTROPHILS NFR BLD AUTO: 64.4 %
NITRITE UR QL STRIP: NEGATIVE
NRBC BLD AUTO-RTO: 0 /100 WBC
OSMOLALITY SERPL CALC.SUM OF ELEC: 281.4 MOSMOL/KG (ref 275–300)
PH UR STRIP.AUTO: 5.5 [PH] (ref 5–9)
PLATELET # BLD AUTO: 236 THOU/MM3 (ref 130–400)
PMV BLD AUTO: 10.6 FL (ref 9.4–12.4)
POTASSIUM SERPL-SCNC: 3.8 MEQ/L (ref 3.5–5.2)
PROT SERPL-MCNC: 5.7 G/DL (ref 6.1–8)
PROT UR STRIP.AUTO-MCNC: NEGATIVE MG/DL
RBC # BLD AUTO: 4.15 MILL/MM3 (ref 4.2–5.4)
RBC URINE: ABNORMAL /HPF
RENAL EPI CELLS #/AREA URNS HPF: ABNORMAL /[HPF]
SEGMENTED NEUTROPHILS ABSOLUTE COUNT: 3.9 THOU/MM3 (ref 1.8–7.7)
SODIUM SERPL-SCNC: 141 MEQ/L (ref 135–145)
SP GR UR REFRACT.AUTO: 1.02 (ref 1–1.03)
UROBILINOGEN, URINE: 0.2 EU/DL (ref 0–1)
WBC # BLD AUTO: 6.1 THOU/MM3 (ref 4.8–10.8)
WBC #/AREA URNS HPF: ABNORMAL /HPF
WBC #/AREA URNS HPF: ABNORMAL /[HPF]
YEAST LIKE FUNGI URNS QL MICRO: ABNORMAL

## 2023-05-22 PROCEDURE — 6360000002 HC RX W HCPCS

## 2023-05-22 PROCEDURE — 96366 THER/PROPH/DIAG IV INF ADDON: CPT

## 2023-05-22 PROCEDURE — 2580000003 HC RX 258: Performed by: EMERGENCY MEDICINE

## 2023-05-22 PROCEDURE — 96375 TX/PRO/DX INJ NEW DRUG ADDON: CPT

## 2023-05-22 PROCEDURE — 81001 URINALYSIS AUTO W/SCOPE: CPT

## 2023-05-22 PROCEDURE — 6370000000 HC RX 637 (ALT 250 FOR IP): Performed by: EMERGENCY MEDICINE

## 2023-05-22 PROCEDURE — 6360000004 HC RX CONTRAST MEDICATION

## 2023-05-22 PROCEDURE — 87086 URINE CULTURE/COLONY COUNT: CPT

## 2023-05-22 PROCEDURE — 74177 CT ABD & PELVIS W/CONTRAST: CPT

## 2023-05-22 PROCEDURE — 6360000002 HC RX W HCPCS: Performed by: EMERGENCY MEDICINE

## 2023-05-22 PROCEDURE — 99285 EMERGENCY DEPT VISIT HI MDM: CPT

## 2023-05-22 PROCEDURE — 80053 COMPREHEN METABOLIC PANEL: CPT

## 2023-05-22 PROCEDURE — 36415 COLL VENOUS BLD VENIPUNCTURE: CPT

## 2023-05-22 PROCEDURE — 85025 COMPLETE CBC W/AUTO DIFF WBC: CPT

## 2023-05-22 PROCEDURE — 96365 THER/PROPH/DIAG IV INF INIT: CPT

## 2023-05-22 RX ORDER — MORPHINE SULFATE 2 MG/ML
2 INJECTION, SOLUTION INTRAMUSCULAR; INTRAVENOUS ONCE
Status: COMPLETED | OUTPATIENT
Start: 2023-05-22 | End: 2023-05-22

## 2023-05-22 RX ORDER — ONDANSETRON 2 MG/ML
INJECTION INTRAMUSCULAR; INTRAVENOUS
Status: COMPLETED
Start: 2023-05-22 | End: 2023-05-22

## 2023-05-22 RX ORDER — MORPHINE SULFATE 2 MG/ML
2 INJECTION, SOLUTION INTRAMUSCULAR; INTRAVENOUS ONCE
Status: DISCONTINUED | OUTPATIENT
Start: 2023-05-22 | End: 2023-05-22 | Stop reason: HOSPADM

## 2023-05-22 RX ORDER — ONDANSETRON 2 MG/ML
4 INJECTION INTRAMUSCULAR; INTRAVENOUS ONCE
Status: COMPLETED | OUTPATIENT
Start: 2023-05-22 | End: 2023-05-22

## 2023-05-22 RX ORDER — KETOROLAC TROMETHAMINE 30 MG/ML
30 INJECTION, SOLUTION INTRAMUSCULAR; INTRAVENOUS ONCE
Status: COMPLETED | OUTPATIENT
Start: 2023-05-22 | End: 2023-05-22

## 2023-05-22 RX ORDER — CIPROFLOXACIN 500 MG/1
500 TABLET, FILM COATED ORAL EVERY 12 HOURS SCHEDULED
Status: DISCONTINUED | OUTPATIENT
Start: 2023-05-22 | End: 2023-05-22 | Stop reason: HOSPADM

## 2023-05-22 RX ORDER — CIPROFLOXACIN 500 MG/1
500 TABLET, FILM COATED ORAL 2 TIMES DAILY
Qty: 14 TABLET | Refills: 0 | Status: SHIPPED | OUTPATIENT
Start: 2023-05-22 | End: 2023-05-22

## 2023-05-22 RX ADMIN — KETOROLAC TROMETHAMINE 30 MG: 30 INJECTION, SOLUTION INTRAMUSCULAR; INTRAVENOUS at 10:06

## 2023-05-22 RX ADMIN — ONDANSETRON 4 MG: 2 INJECTION INTRAMUSCULAR; INTRAVENOUS at 10:06

## 2023-05-22 RX ADMIN — CEFTRIAXONE SODIUM 1000 MG: 1 INJECTION, POWDER, FOR SOLUTION INTRAMUSCULAR; INTRAVENOUS at 11:04

## 2023-05-22 RX ADMIN — MORPHINE SULFATE 2 MG: 2 INJECTION, SOLUTION INTRAMUSCULAR; INTRAVENOUS at 10:06

## 2023-05-22 RX ADMIN — CIPROFLOXACIN 500 MG: 500 TABLET, FILM COATED ORAL at 12:16

## 2023-05-22 RX ADMIN — IOPAMIDOL 80 ML: 755 INJECTION, SOLUTION INTRAVENOUS at 10:25

## 2023-05-22 ASSESSMENT — PAIN SCALES - GENERAL
PAINLEVEL_OUTOF10: 10

## 2023-05-22 ASSESSMENT — PAIN - FUNCTIONAL ASSESSMENT
PAIN_FUNCTIONAL_ASSESSMENT: 0-10
PAIN_FUNCTIONAL_ASSESSMENT: 0-10

## 2023-05-22 ASSESSMENT — PAIN DESCRIPTION - LOCATION: LOCATION: BACK

## 2023-05-22 NOTE — ED NOTES
Patient to ED for lower back pain that started Friday that has continued to worsen. Patient denies any injury. Patient has known fx in her back. Patient had injections last week from pain management. Patient unable to get comfortable and also thinks she may have a UTI.  Patient took motrin and a muscle relaxer around 0530 that did not help     Carla Porter RN  05/22/23 [facility] [facility] Quintanilla RN  05/22/23 [id number]

## 2023-05-22 NOTE — DISCHARGE INSTRUCTIONS
Please follow up with your primary care physician this week for further care and management of your pyelonephritis. Take the antibiotic as prescribed through completion. Please return to the ED if you notice blood in your urine, have worsening symptoms, notice numbness or tingling in your legs or loss of bowel/bladder control. Thank you for giving us the opportunity to care for you today.

## 2023-05-22 NOTE — CARE COORDINATION
Spoke with patient who advised has no money until disability check at the beginning of the month. Just moved back from [place] and bought used RV and car. Lives in [address] [facility] next to son and daughter in law in [address] [facility]. Son and daughter in law make meals daily, help patient with laundry and other needs. ATB and anticonvulsant [facility] actioned. Denied concerns or questions.

## 2023-05-22 NOTE — ED TRIAGE NOTES
Pt present to the ED with complaint of lower back pain. Pt states she is concerned for a UTI. Pt has known T7-T9 fractures. Pt was given medial branch block on Tuesday. Pt state the lower back pain began on Friday. Pt denies loss of bowel or bladder. Pt states she is experiencing burning down her legs.

## 2023-05-22 NOTE — ED PROVIDER NOTES
[facility] [address] [address] EMERGENCY DEPT      EMERGENCY MEDICINE     Pt Name: Alina Turner  MRN: [id number]  [place] 1960  Date of evaluation: 5/22/2023  Provider[facility]jon [facility]       Chief Complaint   Patient presents with    Back Pain     HISTORY OF PRESENT ILLNESS   Alina Turner is a pleasant 65 y.o. female who presents to the emergency department for evaluation of back pain. Per the patient, she has a history of osteoporosis and was recently found to have a T7 and T9 compression fracture on the 15th of this month. Patient states she has been having significant back pain since then. Patient is also complaining of urinary dysuria and frequency and says her urine \"smells like it has an infection\". Patient denies any loss of bowel or bladder control, no saddle anesthesia, no paresthesias in either lower extremity. Denies any numbness or tingling, no recent spinal hardware or surgeries. Patient states she feels like she cannot lay flat. Denies any fevers or chills. Says she tried taking Flexeril and ibuprofen which did not help, says she is on gabapentin. Patient states she has no chest pain or shortness of breath, no abdominal pain. Patient has no other acute complaints at this time.     PASTMEDICAL HISTORY/Co-Morbid Conditions:     Past Medical History:   Diagnosis Date    Adrenal abnormality (HCC)     Angina at rest [facility])     MICROVASCULAR CARDIAC DISEASE    Anxiety     Arthritis     Asthma     Cerebral artery occlusion with cerebral infarction (HCC)     Chronic sinusitis     Cognitive impairment     with retograde amnesia    Headache     Migraines Hemiplegic    History of degenerative disc disease     Hyperlipidemia     Lymphedema     Left leg    May-Thurner syndrome     Peripheral vascular disease (HCC)     PONV (postoperative nausea and vomiting)     Positive BJ (antinuclear antibody)     PTSD (post-traumatic stress disorder)        Patient Active Problem List

## 2023-05-24 ENCOUNTER — TELEPHONE (OUTPATIENT)
Dept: FAMILY MEDICINE CLINIC | Age: 63
End: 2023-05-24

## 2023-05-24 ENCOUNTER — OFFICE VISIT (OUTPATIENT)
Dept: PHYSICAL MEDICINE AND REHAB | Age: 63
End: 2023-05-24

## 2023-05-24 VITALS
WEIGHT: 160 LBS | BODY MASS INDEX: 26.66 KG/M2 | SYSTOLIC BLOOD PRESSURE: 116 MMHG | HEIGHT: 65 IN | DIASTOLIC BLOOD PRESSURE: 94 MMHG

## 2023-05-24 DIAGNOSIS — S22.000A COMPRESSION FRACTURE OF BODY OF THORACIC VERTEBRA (HCC): ICD-10-CM

## 2023-05-24 DIAGNOSIS — G89.4 CHRONIC PAIN SYNDROME: ICD-10-CM

## 2023-05-24 DIAGNOSIS — M47.816 LUMBAR SPONDYLOSIS: ICD-10-CM

## 2023-05-24 DIAGNOSIS — M47.814 THORACIC SPONDYLOSIS: Primary | ICD-10-CM

## 2023-05-24 DIAGNOSIS — M79.18 MYOFASCIAL PAIN: ICD-10-CM

## 2023-05-24 LAB
BACTERIA UR CULT: ABNORMAL
ORGANISM: ABNORMAL

## 2023-05-24 NOTE — TELEPHONE ENCOUNTER
----- Message from Christina Novoa sent at 5/23/2023  2:08 PM EDT -----  Subject: Appointment Request    Reason for Call: Established Patient Appointment needed: Routine ED Follow   Up Visit    QUESTIONS    Reason for appointment request? No appointments available during search     Additional Information for Provider? Romy was seen in ED for kidney   infection and fractured vertebrae and needs a follow up. please call   patient to schedule.  nothing on my end. not available Friday June 2nd.  ---------------------------------------------------------------------------  --------------  [address]  [phone]; OK to leave message on voicemail  ---------------------------------------------------------------------------  --------------  SCRIPT ANSWERS  COVID Screen: Darleen Sun

## 2023-05-24 NOTE — RESULT ENCOUNTER NOTE
Juve Mccoy, your urine culture was negative for any specific bacterial cause. Hope your symptoms are improved. No need to continue antibiotics.

## 2023-05-24 NOTE — TELEPHONE ENCOUNTER
Spoke with Derick Valladares and she states that she is just going to hold off on the Follow-up visit at this time due to the ED called her today and informed her that she could stop taking the antibiotic for UTI at this time due to she does not have a UTI currently. Derick Valladares states that she will call back if she has any other concerns.

## 2023-05-24 NOTE — PROGRESS NOTES
waiting to get the butrans patch, which is #38, once she gets paid. I will touch base with Dr. Ole Andrade about treatment of her osteoporosis as we discussed the importance of follow that treatment plan in order to prevent new fractures. Plan: The following treatment recommendations and plan were discussed in detail with Satnam Bowman. Imaging:  I have reviewed her thoracic MRI and her abdominal CT scan and discussed these with the patient. Analgesics:  Patient has not yet picked up the 20 mcg/h patch but will be starting either Friday or the following Tuesday, when she gets paid. She would like to stick with this medication as it is effective for her. The risks of this medication were discussed with the patient. Patient wants to proceed with the medication adjustment. OAS reviewed  Pain contract signed 05/10/2023    Interventions: In presence of thoracic back pain and with physical exam consistent for facetal pain and with new compression fractures at T7 and T9 and now T11 vertebral bodies, the option of medial branch blocks at bilateral T9/10 and T10/11 was chosen. The risks and benefits were discussed in detail with the patient. Patient wants to proceed with the injection. Anticoagulation/NPO Recommendations:   Patient will need to hold NSAIDs 2 days prior to the procedure. Patient will need to be NPO x 8 hours prior to the procedure. Plan to start an IV prior to the procedure. Follow-up: Thoracic MBBs    I spent more than 50 minutes with this patient as she is new to me. We discussed her thoracic MRI that was ordered by Dr. Jacqueline Fink last visit. We discussed her abdominal CT scan which shows a T11 fracture. We discussed medication management and treatment options. I have set her up for a procedure and talked about managing her osteoporosis.      Len Song, APRN - CNP  Interventional Pain Management/PM&R   [place]

## 2023-05-31 DIAGNOSIS — G43.409 HEMIPLEGIC MIGRAINE WITHOUT STATUS MIGRAINOSUS, NOT INTRACTABLE: ICD-10-CM

## 2023-05-31 DIAGNOSIS — G43.909 MIGRAINE WITHOUT STATUS MIGRAINOSUS, NOT INTRACTABLE, UNSPECIFIED MIGRAINE TYPE: ICD-10-CM

## 2023-06-01 ENCOUNTER — PREP FOR PROCEDURE (OUTPATIENT)
Dept: PHYSICAL MEDICINE AND REHAB | Age: 63
End: 2023-06-01

## 2023-06-01 DIAGNOSIS — G43.409 HEMIPLEGIC MIGRAINE WITHOUT STATUS MIGRAINOSUS, NOT INTRACTABLE: ICD-10-CM

## 2023-06-01 DIAGNOSIS — E87.6 HYPOKALEMIA: ICD-10-CM

## 2023-06-01 DIAGNOSIS — G25.81 RESTLESS LEGS: ICD-10-CM

## 2023-06-01 DIAGNOSIS — G43.909 MIGRAINE WITHOUT STATUS MIGRAINOSUS, NOT INTRACTABLE, UNSPECIFIED MIGRAINE TYPE: ICD-10-CM

## 2023-06-02 RX ORDER — ONDANSETRON 4 MG/1
4 TABLET, FILM COATED ORAL 3 TIMES DAILY PRN
Qty: 15 TABLET | Refills: 0 | Status: SHIPPED | OUTPATIENT
Start: 2023-06-02

## 2023-06-02 NOTE — TELEPHONE ENCOUNTER
Patient's last appointment was : 4-17-23  Patient's next appointment is :  6-19-23  Last refilled: 5-8-23   Pharmacy Verified    No results found for: LABA1C  Lab Results   Component Value Date    CHOL 210 (H) 04/15/2021    TRIG 122 04/15/2021    HDL 49 04/17/2023    LDLCALC 168 04/17/2023     Lab Results   Component Value Date     05/22/2023    K 3.8 05/22/2023     05/22/2023    CO2 25 05/22/2023    BUN 14 05/22/2023    CREATININE 0.7 05/22/2023    GLUCOSE 92 05/22/2023    CALCIUM 9.0 05/22/2023    PROT 5.7 (L) 05/22/2023    LABALBU 4.1 05/22/2023    BILITOT 0.3 05/22/2023    ALKPHOS 82 05/22/2023    AST 12 05/22/2023    ALT 12 05/22/2023    LABGLOM >60 05/22/2023    GFRAA >60 02/19/2021    AGRATIO 2.9 (H) 02/05/2021     Lab Results   Component Value Date    TSH 1.440 04/17/2023    FT3 2.56 07/15/2021    T4FREE 1.43 04/17/2023     Lab Results   Component Value Date    WBC 6.1 05/22/2023    HGB 11.8 (L) 05/22/2023    HCT 38.6 05/22/2023    MCV 93.0 05/22/2023     05/22/2023

## 2023-06-04 DIAGNOSIS — E55.9 VITAMIN D INSUFFICIENCY: ICD-10-CM

## 2023-06-04 DIAGNOSIS — M89.9 DISORDER OF BONE, UNSPECIFIED: ICD-10-CM

## 2023-06-05 ENCOUNTER — TELEPHONE (OUTPATIENT)
Dept: OBGYN CLINIC | Age: 63
End: 2023-06-05

## 2023-06-05 ENCOUNTER — TELEPHONE (OUTPATIENT)
Dept: PHYSICAL MEDICINE AND REHAB | Age: 63
End: 2023-06-05

## 2023-06-05 RX ORDER — POTASSIUM CHLORIDE 1500 MG/1
TABLET, EXTENDED RELEASE ORAL
Qty: 30 TABLET | Refills: 1 | Status: SHIPPED | OUTPATIENT
Start: 2023-06-05

## 2023-06-05 RX ORDER — ONDANSETRON 4 MG/1
4 TABLET, FILM COATED ORAL 3 TIMES DAILY PRN
Qty: 15 TABLET | Refills: 0 | OUTPATIENT
Start: 2023-06-05

## 2023-06-05 RX ORDER — FERROUS SULFATE 325(65) MG
TABLET ORAL
Qty: 30 TABLET | Refills: 1 | OUTPATIENT
Start: 2023-06-05

## 2023-06-05 RX ORDER — ERGOCALCIFEROL 1.25 MG/1
CAPSULE ORAL
Qty: 4 CAPSULE | Refills: 1 | Status: SHIPPED | OUTPATIENT
Start: 2023-06-05

## 2023-06-05 NOTE — TELEPHONE ENCOUNTER
Pt called and wanted to cancel her procedure on 6/8 and is wanting to still keep appointment with indy to talk to her about proceeding different options.

## 2023-06-07 ENCOUNTER — HOSPITAL ENCOUNTER (OUTPATIENT)
Dept: WOMENS IMAGING | Age: 63
Discharge: HOME | End: 2023-06-07
Payer: MEDICARE

## 2023-06-07 ENCOUNTER — NURSE ONLY (OUTPATIENT)
Dept: LAB | Age: 63
End: 2023-06-07

## 2023-06-07 ENCOUNTER — OFFICE VISIT (OUTPATIENT)
Dept: FAMILY MEDICINE CLINIC | Age: 63
End: 2023-06-07
Payer: MEDICARE

## 2023-06-07 VITALS
DIASTOLIC BLOOD PRESSURE: 90 MMHG | WEIGHT: 159.2 LBS | OXYGEN SATURATION: 96 % | TEMPERATURE: 97.9 F | BODY MASS INDEX: 26.52 KG/M2 | SYSTOLIC BLOOD PRESSURE: 138 MMHG | HEIGHT: 65 IN | RESPIRATION RATE: 12 BRPM | HEART RATE: 70 BPM

## 2023-06-07 DIAGNOSIS — Z92.29 HISTORY OF HIGH RISK MEDICATION TREATMENT: ICD-10-CM

## 2023-06-07 DIAGNOSIS — Z87.39: ICD-10-CM

## 2023-06-07 DIAGNOSIS — M80.00XS OSTEOPOROSIS WITH CURRENT PATHOLOGICAL FRACTURE, UNSPECIFIED OSTEOPOROSIS TYPE, SEQUELA: ICD-10-CM

## 2023-06-07 DIAGNOSIS — G43.409 HEMIPLEGIC MIGRAINE WITHOUT STATUS MIGRAINOSUS, NOT INTRACTABLE: ICD-10-CM

## 2023-06-07 DIAGNOSIS — R42 DIZZINESS: ICD-10-CM

## 2023-06-07 DIAGNOSIS — M80.00XS OSTEOPOROSIS WITH CURRENT PATHOLOGICAL FRACTURE, UNSPECIFIED OSTEOPOROSIS TYPE, SEQUELA: Primary | ICD-10-CM

## 2023-06-07 DIAGNOSIS — E55.9 VITAMIN D INSUFFICIENCY: ICD-10-CM

## 2023-06-07 DIAGNOSIS — G47.00 INSOMNIA, UNSPECIFIED TYPE: ICD-10-CM

## 2023-06-07 DIAGNOSIS — G43.909 MIGRAINE WITHOUT STATUS MIGRAINOSUS, NOT INTRACTABLE, UNSPECIFIED MIGRAINE TYPE: ICD-10-CM

## 2023-06-07 DIAGNOSIS — Z78.0 ASYMPTOMATIC MENOPAUSAL STATE: ICD-10-CM

## 2023-06-07 LAB
25(OH)D3 SERPL-MCNC: 55 NG/ML (ref 30–100)
PTH-INTACT SERPL-MCNC: 45.5 PG/ML (ref 15–65)

## 2023-06-07 PROCEDURE — G8419 CALC BMI OUT NRM PARAM NOF/U: HCPCS

## 2023-06-07 PROCEDURE — 77080 DXA BONE DENSITY AXIAL: CPT

## 2023-06-07 PROCEDURE — 3017F COLORECTAL CA SCREEN DOC REV: CPT

## 2023-06-07 PROCEDURE — G8427 DOCREV CUR MEDS BY ELIG CLIN: HCPCS

## 2023-06-07 PROCEDURE — 99214 OFFICE O/P EST MOD 30 MIN: CPT

## 2023-06-07 PROCEDURE — 1036F TOBACCO NON-USER: CPT

## 2023-06-07 RX ORDER — ONDANSETRON 4 MG/1
4 TABLET, FILM COATED ORAL 3 TIMES DAILY PRN
Qty: 30 TABLET | Refills: 0 | Status: SHIPPED | OUTPATIENT
Start: 2023-06-07

## 2023-06-07 RX ORDER — TRAZODONE HYDROCHLORIDE 150 MG/1
225 TABLET ORAL NIGHTLY
Qty: 135 TABLET | Refills: 0 | Status: SHIPPED | OUTPATIENT
Start: 2023-06-07

## 2023-06-07 RX ORDER — GALCANEZUMAB 120 MG/ML
1 INJECTION, SOLUTION SUBCUTANEOUS
Qty: 1 ADJUSTABLE DOSE PRE-FILLED PEN SYRINGE | Refills: 2 | Status: SHIPPED | OUTPATIENT
Start: 2023-06-07 | End: 2023-06-08

## 2023-06-07 RX ORDER — GALCANEZUMAB 120 MG/ML
INJECTION, SOLUTION SUBCUTANEOUS
COMMUNITY
End: 2023-06-07 | Stop reason: SDUPTHER

## 2023-06-07 RX ORDER — TIZANIDINE 2 MG/1
2 TABLET ORAL NIGHTLY PRN
Qty: 30 TABLET | Refills: 0 | Status: SHIPPED | OUTPATIENT
Start: 2023-06-07

## 2023-06-07 RX ORDER — MECLIZINE HYDROCHLORIDE 25 MG/1
25 TABLET ORAL 2 TIMES DAILY PRN
Qty: 30 TABLET | Refills: 0 | Status: SHIPPED | OUTPATIENT
Start: 2023-06-07

## 2023-06-07 ASSESSMENT — ENCOUNTER SYMPTOMS
PHOTOPHOBIA: 1
SHORTNESS OF BREATH: 0
BACK PAIN: 1
ABDOMINAL PAIN: 0

## 2023-06-07 NOTE — PATIENT INSTRUCTIONS
Thank you   Thank you for trusting us with your healthcare needs. You may receive a survey regarding today's visit. It would help us out if you would take a few moments to provide your feedback. We value your input. Please bring in ALL medications BOTTLES, including inhalers, herbal supplements, over the counter, prescribed & non-prescribed medicine. The office would like actual medication bottles and a list.   Please note our OFFICE POLICIES:   Prior to getting your labs drawn, please check with your insurance company for benefits and eligibility of lab services. Often, insurance companies cover certain tests for preventative visits only. It is patient's responsibility to see what is covered. We are unable to change a diagnosis after the test has been performed. Lab orders will not be re-printed. Please hold onto your original lab orders and take them to your lab to be completed. If you no show your scheduled appointment three times, you will be dismissed from this practice. Reschedules must be completed 24 hours prior to your schedule appointment. If the list below has been completed, PLEASE FAX RECORDS TO OUR OFFICE @ [phone].  Once the records have been received we will update your records at our office:  Health Maintenance Due   Topic Date Due    Colorectal Cancer Screen  Never done    COVID-19 Vaccine (4 - Booster for [employer] series) 12/16/2021    Annual Wellness Visit (AWV)  Never done

## 2023-06-07 NOTE — PROGRESS NOTES
Attending Physician Note    I saw and evaluated the patient, performing the key elements of the service. I discussed the findings, assessment and plan with the resident and agree with the resident's findings and plan as documented in the resident's note. GC modifier added. Brief summary:  5/5 muscle strength RUE throughout. 4/5 right hip flexion and right knee extension. 5/5 knee flexion, plantar flexion and dorsiflexion. Mild facial asymmetry noted without facial weakness. Osteoporosis with multiple vertebral compression fractures - Prolia treatment planned. Check intact PTH, serum protein electrophoresis, vitamin D level. Hemiplegic migraines - controlled. Continue current medication regimen. No new neurologic sx. Does note gradual decline in memory and word-finding difficulty. Definitely consider further evaluation with brain imaging if any new weakness or if worsening headaches.   Follow up 6/19/23 as previously scheduled
Health Maintenance Due   Topic Date Due    Colorectal Cancer Screen  Never done    COVID-19 Vaccine (4 - Booster for Moderna series) 12/16/2021    Annual Wellness Visit (AWV)  Never done
unspecified type  -     traZODone (DESYREL) 150 MG tablet; Take 1.5 tablets by mouth nightly, Disp-135 tablet, R-0Normal  7. Dizziness  -     meclizine (ANTIVERT) 25 MG tablet; Take 1 tablet by mouth 2 times daily as needed for Dizziness or Nausea, Disp-30 tablet, R-0Normal    Will add Tizanidine to pain regimen for short term and refill other meds for migraines, insomnia, and nausea. Having some dizziness that responds to meclizine, will send a refill in. Given the relatively young age of patient multiple myeloma cannot be totally excluded, nor hyperparathyroidism, nor worsening vitamin D deficiency. Will check corresponding lab work. DEXA completd today, results pending. Return in 12 days (on 6/19/2023) for scheduled follow up.       Electronically signed by Alcon Woodson DO on 6/7/2023 at 4:51 PM

## 2023-06-08 ENCOUNTER — TELEPHONE (OUTPATIENT)
Dept: FAMILY MEDICINE CLINIC | Age: 63
End: 2023-06-08

## 2023-06-08 NOTE — TELEPHONE ENCOUNTER
----- Message from Michael Rush MD sent at 6/8/2023 12:06 PM EDT -----  Gurinder Face, your DEXA scan shows continued osteoporosis and places you at continued high risk for fracture. It appears that Dr. Neli Degroot has you on treatment with Evenity. Please let me know if you have any questions.

## 2023-06-08 NOTE — RESULT ENCOUNTER NOTE
Jose L Both, your DEXA scan shows continued osteoporosis and places you at continued high risk for fracture. It appears that Dr. Ros Sutton has you on treatment with Evenity. Please let me know if you have any questions.

## 2023-06-12 ENCOUNTER — HOSPITAL ENCOUNTER (OUTPATIENT)
Dept: NURSING | Age: 63
Discharge: HOME | End: 2023-06-12

## 2023-06-22 ENCOUNTER — OFFICE VISIT (OUTPATIENT)
Dept: PHYSICAL MEDICINE AND REHAB | Age: 63
End: 2023-06-22
Payer: MEDICARE

## 2023-06-22 VITALS
DIASTOLIC BLOOD PRESSURE: 86 MMHG | BODY MASS INDEX: 26.49 KG/M2 | WEIGHT: 159 LBS | HEIGHT: 65 IN | SYSTOLIC BLOOD PRESSURE: 124 MMHG

## 2023-06-22 DIAGNOSIS — S22.000A COMPRESSION FRACTURE OF BODY OF THORACIC VERTEBRA (HCC): Primary | ICD-10-CM

## 2023-06-22 DIAGNOSIS — M79.18 MYOFASCIAL PAIN: ICD-10-CM

## 2023-06-22 DIAGNOSIS — G89.4 CHRONIC PAIN SYNDROME: ICD-10-CM

## 2023-06-22 DIAGNOSIS — M47.814 THORACIC SPONDYLOSIS: ICD-10-CM

## 2023-06-22 PROCEDURE — G8419 CALC BMI OUT NRM PARAM NOF/U: HCPCS | Performed by: NURSE PRACTITIONER

## 2023-06-22 PROCEDURE — 99214 OFFICE O/P EST MOD 30 MIN: CPT | Performed by: NURSE PRACTITIONER

## 2023-06-22 PROCEDURE — G8427 DOCREV CUR MEDS BY ELIG CLIN: HCPCS | Performed by: NURSE PRACTITIONER

## 2023-06-22 PROCEDURE — 1036F TOBACCO NON-USER: CPT | Performed by: NURSE PRACTITIONER

## 2023-06-22 PROCEDURE — 3017F COLORECTAL CA SCREEN DOC REV: CPT | Performed by: NURSE PRACTITIONER

## 2023-06-22 RX ORDER — BUPRENORPHINE 15 UG/H
1 PATCH TRANSDERMAL WEEKLY
Qty: 4 PATCH | Refills: 0 | Status: SHIPPED | OUTPATIENT
Start: 2023-06-27 | End: 2023-07-27

## 2023-06-22 NOTE — PROGRESS NOTES
ULTRASOUND       Family History   Adopted: Yes   Problem Relation Age of Onset    Cancer Father         Stomach Cancer    Other Father         mitral valve prolapse    Cancer Paternal Uncle         Colon Cancer    Stroke Maternal Grandmother     Rheum Arthritis Maternal Grandmother     Breast Cancer Half-Sister 39         Medications & Allergies:   Current Outpatient Medications   Medication Instructions    aspirin EC 81 mg, Oral, DAILY    [START ON 6/27/2023] buprenorphine (BUPRENEX) 15 MCG/HR PTWK 1 patch, TransDERmal, WEEKLY    buPROPion (WELLBUTRIN XL) 300 MG extended release tablet TAKE 1 TABLET BY MOUTH EVERY DAY IN THE MORNING    butalbital-acetaminophen-caffeine (FIORICET, ESGIC) -40 MG per tablet 1 tablet, Oral, EVERY 4 HOURS PRN    clotrimazole-betamethasone (LOTRISONE) 1-0.05 % cream Topical, PRN    docusate sodium (COLACE) 100 mg, Oral, 2 TIMES DAILY    esomeprazole (NEXIUM) 40 mg, Oral, DAILY BEFORE BREAKFAST    Estradiol (VAGIFEM) 10 MCG TABS vaginal tablet Insert 1 tablet vaginally daily for 14 days, then reduce to twice weekly on Tuesday and Friday. Ferrous Fumarate 150 mg, Oral, DAILY    ferrous sulfate (IRON 325) 325 mg, Oral, DAILY WITH BREAKFAST    furosemide (LASIX) 20 MG tablet PRN    gabapentin (NEURONTIN) 300 MG capsule Take 1 capsule by mouth nightly for 7 days, THEN 1 capsule in the morning and at bedtime for 60 days. Intended supply: 90 days. KLOR-CON M20 20 MEQ extended release tablet TAKE 1 TABLET BY MOUTH EVERY DAY    meclizine (ANTIVERT) 25 mg, Oral, 2 TIMES DAILY PRN    naloxone 4 MG/0.1ML LIQD nasal spray 1 spray, Nasal, PRN    nitroglycerin (NITROSTAT) 0.6 mg, SubLINGual, EVERY 5 MIN PRN, up to max of 3 total doses. If no relief after 1 dose, call 911.      Onabotulinumtoxin A (BOTOX) 200 Units, EVERY 3 MONTHS    ondansetron (ZOFRAN) 4 mg, Oral, 3 TIMES DAILY PRN    potassium chloride (KLOR-CON) 10 MEQ extended release tablet PRN    pramipexole (MIRAPEX) 0.25 mg, Oral, 2

## 2023-06-23 ENCOUNTER — OFFICE VISIT (OUTPATIENT)
Dept: ENT CLINIC | Age: 63
End: 2023-06-23
Payer: MEDICARE

## 2023-06-23 ENCOUNTER — TELEPHONE (OUTPATIENT)
Dept: PHYSICAL MEDICINE AND REHAB | Age: 63
End: 2023-06-23

## 2023-06-23 VITALS
OXYGEN SATURATION: 98 % | HEART RATE: 72 BPM | WEIGHT: 159.2 LBS | HEIGHT: 63 IN | DIASTOLIC BLOOD PRESSURE: 74 MMHG | BODY MASS INDEX: 28.21 KG/M2 | RESPIRATION RATE: 20 BRPM | SYSTOLIC BLOOD PRESSURE: 128 MMHG | TEMPERATURE: 97.4 F

## 2023-06-23 DIAGNOSIS — E04.2 MULTIPLE THYROID NODULES: Primary | ICD-10-CM

## 2023-06-23 DIAGNOSIS — S22.000A COMPRESSION FRACTURE OF BODY OF THORACIC VERTEBRA (HCC): Primary | ICD-10-CM

## 2023-06-23 PROCEDURE — 1036F TOBACCO NON-USER: CPT | Performed by: OTOLARYNGOLOGY

## 2023-06-23 PROCEDURE — 99213 OFFICE O/P EST LOW 20 MIN: CPT | Performed by: OTOLARYNGOLOGY

## 2023-06-23 PROCEDURE — 3017F COLORECTAL CA SCREEN DOC REV: CPT | Performed by: OTOLARYNGOLOGY

## 2023-06-23 PROCEDURE — G8427 DOCREV CUR MEDS BY ELIG CLIN: HCPCS | Performed by: OTOLARYNGOLOGY

## 2023-06-23 PROCEDURE — G8419 CALC BMI OUT NRM PARAM NOF/U: HCPCS | Performed by: OTOLARYNGOLOGY

## 2023-06-23 ASSESSMENT — ENCOUNTER SYMPTOMS
CHEST TIGHTNESS: 0
ABDOMINAL PAIN: 0
VOICE CHANGE: 0
COLOR CHANGE: 0
COUGH: 0
WHEEZING: 0
DIARRHEA: 0
SHORTNESS OF BREATH: 0
SINUS PRESSURE: 0
FACIAL SWELLING: 0
NAUSEA: 0
CHOKING: 0
STRIDOR: 0
RHINORRHEA: 0
SORE THROAT: 0
TROUBLE SWALLOWING: 0
VOMITING: 0
APNEA: 0

## 2023-06-23 NOTE — PATIENT INSTRUCTIONS
Call the office the day after your scan and I will look at the results. May or may not need repeat biopsy. If not, we will order another ultrasound to keep tabs on this 6 to 12 months from now.

## 2023-06-23 NOTE — TELEPHONE ENCOUNTER
Reviewed imaging with Dr. Louis Stone. She will need an updated thoracic MRI due to the new fracture at T11. Then we can set up the kyphoplasty with Dr. Madhuri Persaud. She had a CBC and CMP 05/22/2023  EKG 03/28/2023    I called and discussed this with the patient and she will have the MRI done at [place] [address]. I gave her the number to schedule the MRI. Order placed.

## 2023-06-23 NOTE — PROGRESS NOTES
Mariolencho Davis had a CTA of the chest 2 or 3 months ago and they noticed that she had multiple nodules in her left thyroid gland. 2 years ago she only had 1. Exam shows enlarged left lobe of thyroid. Diagnosis Orders   1. Multiple thyroid nodules  US THYROID        We will get an ultrasound to compare to her last 1. Patient instructions:    Call the office the day after your scan and I will look at the results. May or may not need repeat biopsy. If not, we will order another ultrasound to keep tabs on this 6 to 12 months from now.     Electronically signed by Froilan Cuellar MD on 6/23/2023 at 11:00 AM
first performed by Holden Sahni DO at [facility] Left 2021    thermal radiofrequency ablation medial branches L4/L5 and L5/S1 left performed by Holden Sahni DO at [place] [address] Right 2021    SEPTOPLASTY, SUBMUCOUS RESECTION TURBINATE, RIGHT PARTIAL INFERIOR, NASAL ENDOSCOPY WITH PARTIAL RESECTION OF RIGHT MIDDLE JHON BULLOSA performed by Nathalia Josue MD at [address] Left     SINUS SURGERY      TONSILLECTOMY      TUBAL LIGATION      US THYROID CYST ASPIRATION AND OR INJECTION  2021    US THYROID CYST ASPIRATION AND OR INJECTION 2021 [facility]TRASOUND     Family History   Adopted: Yes   Problem Relation Age of Onset    Cancer Father         Stomach Cancer    Other Father         mitral valve prolapse    Cancer Paternal Uncle         Colon Cancer    Stroke Maternal Grandmother     Rheum Arthritis Maternal Grandmother     Breast Cancer Half-Sister 39     Social History     Tobacco Use    Smoking status: Former     Packs/day: 1.00     Years: 12.00     Pack years: 12.00     Types: Cigarettes     Quit date: 2019     Years since quittin.4    Smokeless tobacco: Never   Substance Use Topics    Alcohol use: Not Currently     Comment: stopped in 2020 due to fatty liver       Subjective:      Review of Systems   Constitutional:  Negative for activity change, appetite change, chills, diaphoresis, fatigue, fever and unexpected weight change. HENT:  Negative for congestion, dental problem, ear discharge, ear pain, facial swelling, hearing loss, mouth sores, nosebleeds, postnasal drip, rhinorrhea, sinus pressure, sneezing, sore throat, tinnitus, trouble swallowing and voice change. Eyes:  Negative for visual disturbance. Respiratory:  Negative for apnea, cough, choking, chest tightness, shortness of breath, wheezing and stridor.     Cardiovascular:  Negative

## 2023-06-26 ENCOUNTER — HOSPITAL ENCOUNTER (OUTPATIENT)
Dept: NURSING | Age: 63
Discharge: HOME | End: 2023-06-26
Payer: MEDICARE

## 2023-06-26 VITALS
RESPIRATION RATE: 16 BRPM | SYSTOLIC BLOOD PRESSURE: 115 MMHG | WEIGHT: 160 LBS | TEMPERATURE: 97.3 F | OXYGEN SATURATION: 97 % | HEART RATE: 80 BPM | HEIGHT: 63 IN | BODY MASS INDEX: 28.35 KG/M2 | DIASTOLIC BLOOD PRESSURE: 54 MMHG

## 2023-06-26 DIAGNOSIS — S22.060A COMPRESSION FRACTURE OF T7 VERTEBRA, INITIAL ENCOUNTER (HCC): Primary | ICD-10-CM

## 2023-06-26 DIAGNOSIS — M80.08XA AGE-RELATED OSTEOPOROSIS WITH CURRENT PATHOLOGICAL FRACTURE, VERTEBRA(E), INITIAL ENCOUNTER FOR FRACTURE (HCC): ICD-10-CM

## 2023-06-26 DIAGNOSIS — S22.070A COMPRESSION FRACTURE OF T9 VERTEBRA, INITIAL ENCOUNTER (HCC): ICD-10-CM

## 2023-06-26 PROCEDURE — 6360000002 HC RX W HCPCS: Performed by: INTERNAL MEDICINE

## 2023-06-26 PROCEDURE — 96372 THER/PROPH/DIAG INJ SC/IM: CPT

## 2023-06-26 RX ORDER — DIPHENHYDRAMINE HYDROCHLORIDE 50 MG/ML
50 INJECTION INTRAMUSCULAR; INTRAVENOUS
OUTPATIENT
Start: 2023-07-24

## 2023-06-26 RX ORDER — ALBUTEROL SULFATE 90 UG/1
4 AEROSOL, METERED RESPIRATORY (INHALATION) PRN
OUTPATIENT
Start: 2023-07-24

## 2023-06-26 RX ORDER — ONDANSETRON 2 MG/ML
8 INJECTION INTRAMUSCULAR; INTRAVENOUS
OUTPATIENT
Start: 2023-07-24

## 2023-06-26 RX ORDER — SODIUM CHLORIDE 9 MG/ML
INJECTION, SOLUTION INTRAVENOUS CONTINUOUS
OUTPATIENT
Start: 2023-07-24

## 2023-06-26 RX ORDER — EPINEPHRINE 1 MG/ML
0.3 INJECTION, SOLUTION INTRAMUSCULAR; SUBCUTANEOUS PRN
OUTPATIENT
Start: 2023-07-24

## 2023-06-26 RX ORDER — ACETAMINOPHEN 325 MG/1
650 TABLET ORAL
OUTPATIENT
Start: 2023-07-24

## 2023-06-26 RX ADMIN — ROMOSOZUMAB-AQQG 105 MG: 105 INJECTION, SOLUTION SUBCUTANEOUS at 11:24

## 2023-06-26 ASSESSMENT — PAIN SCALES - GENERAL: PAINLEVEL_OUTOF10: 8

## 2023-06-26 ASSESSMENT — PAIN DESCRIPTION - LOCATION: LOCATION: HEAD

## 2023-06-26 ASSESSMENT — PAIN DESCRIPTION - PAIN TYPE: TYPE: CHRONIC PAIN

## 2023-06-26 ASSESSMENT — PAIN DESCRIPTION - DESCRIPTORS: DESCRIPTORS: ACHING

## 2023-06-26 ASSESSMENT — PAIN - FUNCTIONAL ASSESSMENT: PAIN_FUNCTIONAL_ASSESSMENT: PREVENTS OR INTERFERES WITH MANY ACTIVE NOT PASSIVE ACTIVITIES

## 2023-07-02 DIAGNOSIS — M80.00XS OSTEOPOROSIS WITH CURRENT PATHOLOGICAL FRACTURE, UNSPECIFIED OSTEOPOROSIS TYPE, SEQUELA: ICD-10-CM

## 2023-07-03 DIAGNOSIS — M79.7 FIBROMYALGIA: ICD-10-CM

## 2023-07-03 DIAGNOSIS — G89.29 CHRONIC BILATERAL LOW BACK PAIN WITHOUT SCIATICA: ICD-10-CM

## 2023-07-03 DIAGNOSIS — M54.50 CHRONIC BILATERAL LOW BACK PAIN WITHOUT SCIATICA: ICD-10-CM

## 2023-07-03 RX ORDER — GABAPENTIN 300 MG/1
300 CAPSULE ORAL 2 TIMES DAILY
Qty: 180 CAPSULE | Refills: 0 | Status: SHIPPED | OUTPATIENT
Start: 2023-07-03 | End: 2023-07-11

## 2023-07-03 RX ORDER — TIZANIDINE 2 MG/1
2 TABLET ORAL NIGHTLY PRN
Qty: 10 TABLET | Refills: 0 | Status: SHIPPED | OUTPATIENT
Start: 2023-07-03

## 2023-07-03 NOTE — TELEPHONE ENCOUNTER
Patient's last appointment was : 06/07/23  Patient's next appointment is :  07/18/23  Last refilled: 6/7/2023  Pharmacy Verified    No results found for: LABA1C  Lab Results   Component Value Date    CHOL 210 (H) 04/15/2021    TRIG 122 04/15/2021    HDL 49 04/17/2023    LDLCALC 168 04/17/2023     Lab Results   Component Value Date     05/22/2023    K 3.8 05/22/2023     05/22/2023    CO2 25 05/22/2023    BUN 14 05/22/2023    CREATININE 0.7 05/22/2023    GLUCOSE 92 05/22/2023    CALCIUM 9.0 05/22/2023    PROT 5.7 (L) 05/22/2023    LABALBU 4.1 05/22/2023    BILITOT 0.3 05/22/2023    ALKPHOS 82 05/22/2023    AST 12 05/22/2023    ALT 12 05/22/2023    LABGLOM >60 05/22/2023    GFRAA >60 02/19/2021    AGRATIO 2.9 (H) 02/05/2021     Lab Results   Component Value Date    TSH 1.440 04/17/2023    FT3 2.56 07/15/2021    T4FREE 1.43 04/17/2023     Lab Results   Component Value Date    WBC 6.1 05/22/2023    HGB 11.8 (L) 05/22/2023    HCT 38.6 05/22/2023    MCV 93.0 05/22/2023     05/22/2023

## 2023-07-11 ENCOUNTER — TELEPHONE (OUTPATIENT)
Dept: ENT CLINIC | Age: 63
End: 2023-07-11

## 2023-07-11 ENCOUNTER — OFFICE VISIT (OUTPATIENT)
Dept: RHEUMATOLOGY | Age: 63
End: 2023-07-11

## 2023-07-11 ENCOUNTER — HOSPITAL ENCOUNTER (OUTPATIENT)
Dept: ULTRASOUND IMAGING | Age: 63
Discharge: HOME | End: 2023-07-11
Attending: OTOLARYNGOLOGY
Payer: MEDICARE

## 2023-07-11 VITALS
BODY MASS INDEX: 28.36 KG/M2 | DIASTOLIC BLOOD PRESSURE: 80 MMHG | OXYGEN SATURATION: 99 % | SYSTOLIC BLOOD PRESSURE: 120 MMHG | HEIGHT: 63 IN | WEIGHT: 160.05 LBS | HEART RATE: 87 BPM

## 2023-07-11 DIAGNOSIS — Z87.39 H/O MIXED CONNECTIVE TISSUE DISEASE: Primary | ICD-10-CM

## 2023-07-11 DIAGNOSIS — E04.2 MULTIPLE THYROID NODULES: ICD-10-CM

## 2023-07-11 DIAGNOSIS — M79.7 FIBROMYALGIA: ICD-10-CM

## 2023-07-11 DIAGNOSIS — M54.50 CHRONIC BILATERAL LOW BACK PAIN WITHOUT SCIATICA: ICD-10-CM

## 2023-07-11 DIAGNOSIS — G89.29 CHRONIC BILATERAL LOW BACK PAIN WITHOUT SCIATICA: ICD-10-CM

## 2023-07-11 DIAGNOSIS — M80.08XA AGE-RELATED OSTEOPOROSIS WITH CURRENT PATHOLOGICAL FRACTURE, VERTEBRA(E), INITIAL ENCOUNTER FOR FRACTURE (HCC): ICD-10-CM

## 2023-07-11 DIAGNOSIS — S22.060A COMPRESSION FRACTURE OF T7 VERTEBRA, INITIAL ENCOUNTER (HCC): ICD-10-CM

## 2023-07-11 PROCEDURE — 76536 US EXAM OF HEAD AND NECK: CPT

## 2023-07-11 RX ORDER — PREGABALIN 25 MG/1
25 CAPSULE ORAL 2 TIMES DAILY
Qty: 60 CAPSULE | Refills: 2 | Status: SHIPPED | OUTPATIENT
Start: 2023-07-11 | End: 2023-07-11

## 2023-07-11 RX ORDER — PREGABALIN 25 MG/1
25 CAPSULE ORAL 2 TIMES DAILY
Qty: 60 CAPSULE | Refills: 2 | Status: SHIPPED | OUTPATIENT
Start: 2023-07-11 | End: 2023-10-09

## 2023-07-11 ASSESSMENT — ENCOUNTER SYMPTOMS
EYE PAIN: 0
NAUSEA: 0
DIARRHEA: 0
COUGH: 0
CONSTIPATION: 1
SHORTNESS OF BREATH: 1
BACK PAIN: 0
ABDOMINAL PAIN: 0
EYE ITCHING: 0
TROUBLE SWALLOWING: 0

## 2023-07-11 NOTE — TELEPHONE ENCOUNTER
Pt called and left a message stating she was to let Dr. Manuela Mir know when her Ultrasound was complete. She had done today, 7/11/23. Printed to let Dr. Manuela Mir review.

## 2023-07-12 ENCOUNTER — OFFICE VISIT (OUTPATIENT)
Dept: PHYSICAL MEDICINE AND REHAB | Age: 63
End: 2023-07-12
Payer: MEDICARE

## 2023-07-12 VITALS
HEIGHT: 63 IN | BODY MASS INDEX: 28.35 KG/M2 | WEIGHT: 160 LBS | DIASTOLIC BLOOD PRESSURE: 80 MMHG | SYSTOLIC BLOOD PRESSURE: 120 MMHG

## 2023-07-12 DIAGNOSIS — G89.4 CHRONIC PAIN SYNDROME: Primary | ICD-10-CM

## 2023-07-12 DIAGNOSIS — S22.000A COMPRESSION FRACTURE OF BODY OF THORACIC VERTEBRA (HCC): ICD-10-CM

## 2023-07-12 DIAGNOSIS — M47.814 THORACIC SPONDYLOSIS: ICD-10-CM

## 2023-07-12 DIAGNOSIS — M79.18 MYOFASCIAL PAIN: ICD-10-CM

## 2023-07-12 DIAGNOSIS — G43.019 INTRACTABLE MIGRAINE WITHOUT AURA AND WITHOUT STATUS MIGRAINOSUS: ICD-10-CM

## 2023-07-12 PROCEDURE — 99214 OFFICE O/P EST MOD 30 MIN: CPT | Performed by: ANESTHESIOLOGY

## 2023-07-12 PROCEDURE — 64615 CHEMODENERV MUSC MIGRAINE: CPT | Performed by: ANESTHESIOLOGY

## 2023-07-12 PROCEDURE — 1036F TOBACCO NON-USER: CPT | Performed by: ANESTHESIOLOGY

## 2023-07-12 PROCEDURE — 3017F COLORECTAL CA SCREEN DOC REV: CPT | Performed by: ANESTHESIOLOGY

## 2023-07-12 PROCEDURE — G8427 DOCREV CUR MEDS BY ELIG CLIN: HCPCS | Performed by: ANESTHESIOLOGY

## 2023-07-12 PROCEDURE — G8419 CALC BMI OUT NRM PARAM NOF/U: HCPCS | Performed by: ANESTHESIOLOGY

## 2023-07-12 RX ORDER — PROMETHAZINE HYDROCHLORIDE 25 MG/1
25 TABLET ORAL 4 TIMES DAILY PRN
Qty: 60 TABLET | Refills: 0 | Status: SHIPPED | OUTPATIENT
Start: 2023-07-12 | End: 2023-07-27

## 2023-07-12 RX ORDER — OXYCODONE HYDROCHLORIDE AND ACETAMINOPHEN 5; 325 MG/1; MG/1
1 TABLET ORAL 2 TIMES DAILY PRN
Qty: 60 TABLET | Refills: 0 | Status: SHIPPED | OUTPATIENT
Start: 2023-07-12 | End: 2023-08-11

## 2023-07-12 NOTE — TELEPHONE ENCOUNTER
Per Dr Katelynn Guillermo, repeat Thyroid US in 2 years. He said if patient would like they could have PCP order and follow at this time.

## 2023-07-12 NOTE — PROGRESS NOTES
[address] [facility]  Dept: [phone]  Dept Fax: [phone]: [phone]    Visit Date: 7/12/2023    Functionality Assessment/Goals Worksheet     On a scale of 0 (Does not Interfere) to 10 (Completely Interferes)     1. Which number describes how during the past week pain has interfered with       the following:  A. General Activity:  10  B. Mood: 7  C. Walking Ability:  10  D. Normal Work (Includes both work outside the home and housework):  0  E. Relations with Other People:   0  F. Sleep:   10  G. Enjoyment of Life:   10    2. Patient Prefers to Take their Pain Medications:     [x]  On a regular basis   []  Only when necessary    []  Does not take pain medications    3. What are the Patient's Goals/Expectations for Visiting Pain Management?      []  Learn about my pain    [x]  Receive Medication   []  Physical Therapy     []  Treat Depression   []  Receive Injections    []  Treat Sleep   []  Deal with Anxiety and Stress   []  Treat Opoid Dependence/Addiction   []  Other:
Pre-operative Diagnosis: Chronic Migraine Headaches     Post-operative Diagnosis: Chronic Migraine Headaches     Procedure: Botox for migraine headaches     Procedure Description:  Patient was reclined on the examination table. Botox was drawn up into a tuberculin syringes, to a concentration of 5 units per 0.1 mL with a 30-gauge needle. Skin was prepped with alcohol wipes. The following muscles were injected after negative aspiration; The frontalis muscle bilaterally a total of 4 sites (20 units), The  muscle bilaterally a total of 2 sites (10 units), the procerus one site (5 units), the occipitalis bilaterally a total of 6 sites (30 units), The temporalis muscle bilaterally a total of 8 sites (40 units), the trapezius bilaterally a total of 6 sites (30 units), and cervical paraspinal muscle groups a total of 4 sites (20 units). This was a total of 155 units at 31 sites. The patient tolerated the procedure well.     Medications: 4 mL in 200 U Botox drawn up in 4 separate 1 mL TB syringes = 5 U per 0.1 mL syringe    Amount medication wasted: 45 units        Procedural Complications: None        Leandro Powell DO  Interventional Pain Management/PM&R   [address]
prescribed as taking more than prescribed can lead to the development of tolerance, physical dependency, and addiction. Patient is advised to store and lock the medication in a safe and secure location. If the medication is lost or stolen we will not provide early refills on this medication. Patient understands that they must stay compliant with treatment plan outlined above in order to stay compliant with opioid therapy and any deviation from treatment plan will result in cessation of opioid therapy. Risks of long-term opioid therapy were discussed in extensive detail with the patient and patient understands the risks involved with chronic, continuous opioids. Patient has been compliant with office visits, rehabilitation plan including attending therapy as warranted, and injection therapy. Patient has not demonstrated any aberrant behavior with medication. Pain contract signed and reviewed by patient. Patient understands if the contract is violated in any way opioid therapy will be discontinued immediately. OARRS reviewed and is appropriate. Naloxone offered to patient. Last UDS: We will obtain next visit       Interventions: In the light of patient's clinical history and suboptimal response to multiple treatment modalities as detailed above, we discussed the option of using onabotulinumtoxinA (Botox) injection for better management of chronic migraine headaches. The risks and benefits were discussed in detail with the patient. Patient underwent repeat Botox injections today.   (Please see attached procedure note)    Anticoagulation/NPO Recommendations:   None    Prescriptions:  Percocet 5 mg (#60, 0 refills)  Phenergan    Follow-up: 4 weeks (discuss MRI Thoracic Spine)      Leandro Berry,   Interventional Pain Management/PM&R   [address]

## 2023-07-13 NOTE — TELEPHONE ENCOUNTER
I called the patient and informed her of what Dr. Tamera Whitley stated. Patient verbalized understanding and thanked me.

## 2023-07-18 ENCOUNTER — OFFICE VISIT (OUTPATIENT)
Dept: FAMILY MEDICINE CLINIC | Age: 63
End: 2023-07-18
Payer: MEDICARE

## 2023-07-18 VITALS
WEIGHT: 164.8 LBS | RESPIRATION RATE: 16 BRPM | DIASTOLIC BLOOD PRESSURE: 84 MMHG | HEART RATE: 74 BPM | HEIGHT: 62 IN | BODY MASS INDEX: 30.33 KG/M2 | OXYGEN SATURATION: 98 % | TEMPERATURE: 98.4 F | SYSTOLIC BLOOD PRESSURE: 128 MMHG

## 2023-07-18 DIAGNOSIS — D64.9 ANEMIA, UNSPECIFIED TYPE: ICD-10-CM

## 2023-07-18 DIAGNOSIS — G43.909 MIGRAINE WITHOUT STATUS MIGRAINOSUS, NOT INTRACTABLE, UNSPECIFIED MIGRAINE TYPE: ICD-10-CM

## 2023-07-18 DIAGNOSIS — N76.1 CHRONIC VAGINITIS: ICD-10-CM

## 2023-07-18 DIAGNOSIS — R39.9 LOWER URINARY TRACT SYMPTOMS (LUTS): ICD-10-CM

## 2023-07-18 DIAGNOSIS — G43.409 HEMIPLEGIC MIGRAINE WITHOUT STATUS MIGRAINOSUS, NOT INTRACTABLE: ICD-10-CM

## 2023-07-18 DIAGNOSIS — K21.9 GASTROESOPHAGEAL REFLUX DISEASE, UNSPECIFIED WHETHER ESOPHAGITIS PRESENT: Primary | ICD-10-CM

## 2023-07-18 DIAGNOSIS — G25.81 RESTLESS LEGS: ICD-10-CM

## 2023-07-18 DIAGNOSIS — R09.89 GLOBUS SENSATION: ICD-10-CM

## 2023-07-18 LAB
BILIRUBIN, POC: NORMAL
BLOOD URINE, POC: NORMAL
CLARITY, POC: CLEAR
COLOR, POC: YELLOW
GLUCOSE URINE, POC: NORMAL
KETONES, POC: NORMAL
LEUKOCYTE EST, POC: NORMAL
NITRITE, POC: NORMAL
PH, POC: 7.5
PROTEIN, POC: NORMAL
SPECIFIC GRAVITY, POC: 1.02
UROBILINOGEN, POC: 0.2

## 2023-07-18 PROCEDURE — 1036F TOBACCO NON-USER: CPT

## 2023-07-18 PROCEDURE — G8417 CALC BMI ABV UP PARAM F/U: HCPCS

## 2023-07-18 PROCEDURE — 3017F COLORECTAL CA SCREEN DOC REV: CPT

## 2023-07-18 PROCEDURE — G8427 DOCREV CUR MEDS BY ELIG CLIN: HCPCS

## 2023-07-18 PROCEDURE — 81003 URINALYSIS AUTO W/O SCOPE: CPT

## 2023-07-18 PROCEDURE — 99214 OFFICE O/P EST MOD 30 MIN: CPT

## 2023-07-18 RX ORDER — ESOMEPRAZOLE MAGNESIUM 40 MG/1
40 CAPSULE, DELAYED RELEASE ORAL
Qty: 90 CAPSULE | Refills: 1 | Status: SHIPPED | OUTPATIENT
Start: 2023-07-18

## 2023-07-18 RX ORDER — ESTRADIOL 10 UG/1
INSERT VAGINAL
Qty: 30 TABLET | Refills: 1 | Status: SHIPPED | OUTPATIENT
Start: 2023-07-18

## 2023-07-18 RX ORDER — BUTALBITAL, ACETAMINOPHEN AND CAFFEINE 50; 325; 40 MG/1; MG/1; MG/1
1 TABLET ORAL EVERY 4 HOURS PRN
Qty: 20 TABLET | Refills: 0 | Status: SHIPPED | OUTPATIENT
Start: 2023-07-18

## 2023-07-18 ASSESSMENT — ENCOUNTER SYMPTOMS
HEARTBURN: 1
PHOTOPHOBIA: 1
ANAL BLEEDING: 0
COUGH: 0
BACK PAIN: 1

## 2023-07-18 NOTE — PROGRESS NOTES
Patient:Isha Fox  YOB: 1960   SBI:[id number]    Subjective   65 y.o. female who has a past medical history of Adrenal abnormality ([address] [facility]), Allergic rhinitis, Angina at rest [facility]), Anxiety, Arthritis, Asthma, Cerebral artery occlusion with cerebral infarction [facility]), Cerebrovascular disease, Chronic back pain, Chronic sinusitis, Cognitive impairment, Depression, Fibromyalgia, Fracture, GERD (gastroesophageal reflux disease), Headache, History of degenerative disc disease, Hyperlipidemia, Irritable bowel syndrome, Lymphedema, May-Thurner syndrome, Neuropathy, Osteoarthritis, Peripheral vascular disease ([address]), PONV (postoperative nausea and vomiting), Positive BJ (antinuclear antibody), PTSD (post-traumatic stress disorder), and Restless legs syndrome. She presents for the following: Follow-up (Pt is here for a follow up on fractures. ), Urinary Tract Infection (Pt was taking vagifem in [place], she is wondering if she should take that. Blood in underwear, she does have odor issues, urination urgency, some tenderness in the pelvic region, no fever. ), Medication Refill ([facility]. ), Gastroesophageal Reflux (GI referral), and Knee Pain (Bilateral pain, having issues standing up and using the stairs)    Medication refills and chronic condition follow up    Gastroesophageal Reflux  She complains of dysphagia and heartburn. She reports no chest pain or no coughing. This is a chronic problem. The current episode started more than 1 year ago. The problem occurs occasionally. The problem has been waxing and waning. The heartburn is of moderate intensity. The symptoms are aggravated by certain foods. Associated symptoms include anemia. Pertinent negatives include no weight loss. She has tried a PPI for the symptoms. The treatment provided significant relief. Past procedures include an EGD. Review of Systems   Constitutional:  Negative for fever and weight loss. HENT:  Negative for hearing loss.

## 2023-07-24 ENCOUNTER — HOSPITAL ENCOUNTER (OUTPATIENT)
Dept: NURSING | Age: 63
Discharge: HOME | End: 2023-07-24
Payer: MEDICARE

## 2023-07-24 VITALS
HEART RATE: 86 BPM | OXYGEN SATURATION: 95 % | RESPIRATION RATE: 16 BRPM | SYSTOLIC BLOOD PRESSURE: 106 MMHG | DIASTOLIC BLOOD PRESSURE: 61 MMHG | TEMPERATURE: 96.4 F

## 2023-07-24 DIAGNOSIS — M80.08XA AGE-RELATED OSTEOPOROSIS WITH CURRENT PATHOLOGICAL FRACTURE, VERTEBRA(E), INITIAL ENCOUNTER FOR FRACTURE (HCC): ICD-10-CM

## 2023-07-24 DIAGNOSIS — S22.070A COMPRESSION FRACTURE OF T9 VERTEBRA, INITIAL ENCOUNTER (HCC): ICD-10-CM

## 2023-07-24 DIAGNOSIS — S22.060A COMPRESSION FRACTURE OF T7 VERTEBRA, INITIAL ENCOUNTER (HCC): Primary | ICD-10-CM

## 2023-07-24 PROCEDURE — 6360000002 HC RX W HCPCS: Performed by: INTERNAL MEDICINE

## 2023-07-24 PROCEDURE — 96372 THER/PROPH/DIAG INJ SC/IM: CPT

## 2023-07-24 RX ORDER — ACETAMINOPHEN 325 MG/1
650 TABLET ORAL
OUTPATIENT
Start: 2023-08-21

## 2023-07-24 RX ORDER — DIPHENHYDRAMINE HYDROCHLORIDE 50 MG/ML
50 INJECTION INTRAMUSCULAR; INTRAVENOUS
OUTPATIENT
Start: 2023-08-21

## 2023-07-24 RX ORDER — ONDANSETRON 2 MG/ML
8 INJECTION INTRAMUSCULAR; INTRAVENOUS
OUTPATIENT
Start: 2023-08-21

## 2023-07-24 RX ORDER — SODIUM CHLORIDE 9 MG/ML
INJECTION, SOLUTION INTRAVENOUS CONTINUOUS
OUTPATIENT
Start: 2023-08-21

## 2023-07-24 RX ORDER — ALBUTEROL SULFATE 90 UG/1
4 AEROSOL, METERED RESPIRATORY (INHALATION) PRN
OUTPATIENT
Start: 2023-08-21

## 2023-07-24 RX ORDER — EPINEPHRINE 1 MG/ML
0.3 INJECTION, SOLUTION INTRAMUSCULAR; SUBCUTANEOUS PRN
OUTPATIENT
Start: 2023-08-21

## 2023-07-24 RX ADMIN — ROMOSOZUMAB-AQQG 105 MG: 105 INJECTION, SOLUTION SUBCUTANEOUS at 11:42

## 2023-07-24 RX ADMIN — ROMOSOZUMAB-AQQG 105 MG: 105 INJECTION, SOLUTION SUBCUTANEOUS at 11:41

## 2023-07-24 NOTE — DISCHARGE INSTRUCTIONS
Next appointment is scheduled for August 21 at 11:30    Please call outpatient nursing the morning of your appointment at [phone]    [facility] instructions    Notify your doctor with any:   signs of jaw swelling, jaw pain  Increased pain in hips, groin, thighs,   Any signs of stroke or heart attack    Activity: continue usual care with your doctor.   Call your doctor immediately with any severe problems or go to the nearest emergency room

## 2023-07-24 NOTE — PROGRESS NOTES
1129 Patient arrived to [facility] ambulatory for evenity injection. Oriented to room and call light  PT RIGHTS AND RESPONSIBILITIES OFFERED TO PT.     1142 Injections given and she denies complaints. Discharge instructions given and explained and she denies questions.   Discharged ambulatory       _M___ Safety:       (Environmental)  [place] to environment  Ensure ID band is correct and in place/ allergy band as needed  Assess for fall risk  Initiate fall precautions as applicable (fall band, side rails, etc.)  Call light within reach  Bed in low position/ wheels locked    _M___ Pain:       Assess pain level and characteristics  Administer analgesics as ordered  Assess effectiveness of pain management and report to MD as needed    _M___ Knowledge Deficit:  Assess baseline knowledge  Provide teaching at level of understanding  Provide teaching via preferred learning method  Evaluate teaching effectiveness    _M___ Hemodynamic/Respiratory Status:       (Pre and Post Procedure Monitoring)  Assess/Monitor vital signs and LOC  Assess Baseline SpO2 prior to any sedation  Obtain weight/height  Assess vital signs/ LOC until patient meets discharge criteria  Monitor procedure site and notify MD of any issues

## 2023-07-28 ENCOUNTER — HOSPITAL ENCOUNTER (OUTPATIENT)
Dept: MRI IMAGING | Age: 63
Discharge: HOME | End: 2023-07-28
Payer: MEDICARE

## 2023-07-28 DIAGNOSIS — S22.000A COMPRESSION FRACTURE OF BODY OF THORACIC VERTEBRA (HCC): ICD-10-CM

## 2023-07-28 PROCEDURE — 72146 MRI CHEST SPINE W/O DYE: CPT

## 2023-08-03 ENCOUNTER — PATIENT MESSAGE (OUTPATIENT)
Dept: FAMILY MEDICINE CLINIC | Age: 63
End: 2023-08-03

## 2023-08-03 DIAGNOSIS — G25.81 RESTLESS LEGS: ICD-10-CM

## 2023-08-03 RX ORDER — PRAMIPEXOLE DIHYDROCHLORIDE 0.12 MG/1
0.25 TABLET ORAL 2 TIMES DAILY
Qty: 120 TABLET | Refills: 3 | Status: SHIPPED | OUTPATIENT
Start: 2023-08-03

## 2023-08-03 RX ORDER — CLOTRIMAZOLE AND BETAMETHASONE DIPROPIONATE 10; .64 MG/G; MG/G
CREAM TOPICAL PRN
Qty: 15 G | Refills: 1 | Status: SHIPPED | OUTPATIENT
Start: 2023-08-03

## 2023-08-03 NOTE — TELEPHONE ENCOUNTER
From: Ruslan Stevens  To: Amalia Hernandez DO  Sent: 8/3/2023 1:39 PM EDT  Subject: Need refills 2 medications    Hi I need 2 refills called , Mirapex and clobetasol cream to my pharmacy on file .  Thank you

## 2023-08-07 ENCOUNTER — TELEPHONE (OUTPATIENT)
Dept: RHEUMATOLOGY | Age: 63
End: 2023-08-07

## 2023-08-07 NOTE — TELEPHONE ENCOUNTER
Where is pain located? Hands, wrist, feet, Lt knee  When did the pain start? Has gotten worse since last visit. Rate the pain 1-10. Ten being the worst  8  Describe the pain. (Dull, Aching, Stabbing, radiating, etc)  aching  Has this pain occurred in the past?  yes  What have you used to alleviate this pain? Percocet due to fractures in thoracic spine  What aggravates it? Movement and is there constantly  Joint swelling or redness? Swelling. Pt has been schedule for Wednesday, she was very persistent to be seen. Last appointment 7/11/23. Pain with spreading fingers and toes.

## 2023-08-09 ENCOUNTER — HOSPITAL ENCOUNTER (OUTPATIENT)
Age: 63
Discharge: HOME | End: 2023-08-09
Payer: MEDICARE

## 2023-08-09 ENCOUNTER — HOSPITAL ENCOUNTER (OUTPATIENT)
Dept: GENERAL RADIOLOGY | Age: 63
Discharge: HOME | End: 2023-08-09
Payer: MEDICARE

## 2023-08-09 ENCOUNTER — OFFICE VISIT (OUTPATIENT)
Dept: RHEUMATOLOGY | Age: 63
End: 2023-08-09
Payer: MEDICARE

## 2023-08-09 VITALS
WEIGHT: 164.9 LBS | SYSTOLIC BLOOD PRESSURE: 126 MMHG | OXYGEN SATURATION: 99 % | BODY MASS INDEX: 30.35 KG/M2 | DIASTOLIC BLOOD PRESSURE: 84 MMHG | HEIGHT: 62 IN | HEART RATE: 81 BPM

## 2023-08-09 DIAGNOSIS — G89.4 CHRONIC PAIN SYNDROME: ICD-10-CM

## 2023-08-09 DIAGNOSIS — M25.50 POLYARTHRALGIA: ICD-10-CM

## 2023-08-09 DIAGNOSIS — M25.50 POLYARTHRALGIA: Primary | ICD-10-CM

## 2023-08-09 DIAGNOSIS — M80.08XA AGE-RELATED OSTEOPOROSIS WITH CURRENT PATHOLOGICAL FRACTURE, VERTEBRA(E), INITIAL ENCOUNTER FOR FRACTURE (HCC): ICD-10-CM

## 2023-08-09 DIAGNOSIS — M79.7 FIBROMYALGIA: ICD-10-CM

## 2023-08-09 DIAGNOSIS — M54.50 CHRONIC BILATERAL LOW BACK PAIN WITHOUT SCIATICA: ICD-10-CM

## 2023-08-09 DIAGNOSIS — S22.000A COMPRESSION FRACTURE OF BODY OF THORACIC VERTEBRA (HCC): ICD-10-CM

## 2023-08-09 DIAGNOSIS — G89.29 CHRONIC BILATERAL LOW BACK PAIN WITHOUT SCIATICA: ICD-10-CM

## 2023-08-09 DIAGNOSIS — Z51.81 MEDICATION MONITORING ENCOUNTER: ICD-10-CM

## 2023-08-09 DIAGNOSIS — Z87.39 H/O MIXED CONNECTIVE TISSUE DISEASE: ICD-10-CM

## 2023-08-09 PROCEDURE — 73130 X-RAY EXAM OF HAND: CPT

## 2023-08-09 PROCEDURE — 1036F TOBACCO NON-USER: CPT | Performed by: NURSE PRACTITIONER

## 2023-08-09 PROCEDURE — G8427 DOCREV CUR MEDS BY ELIG CLIN: HCPCS | Performed by: NURSE PRACTITIONER

## 2023-08-09 PROCEDURE — 3017F COLORECTAL CA SCREEN DOC REV: CPT | Performed by: NURSE PRACTITIONER

## 2023-08-09 PROCEDURE — 99214 OFFICE O/P EST MOD 30 MIN: CPT | Performed by: NURSE PRACTITIONER

## 2023-08-09 PROCEDURE — G8417 CALC BMI ABV UP PARAM F/U: HCPCS | Performed by: NURSE PRACTITIONER

## 2023-08-09 PROCEDURE — 73630 X-RAY EXAM OF FOOT: CPT

## 2023-08-09 ASSESSMENT — ENCOUNTER SYMPTOMS
DIARRHEA: 0
EYE ITCHING: 0
BACK PAIN: 0
CONSTIPATION: 1
EYE PAIN: 0
SHORTNESS OF BREATH: 1
TROUBLE SWALLOWING: 0
NAUSEA: 0
COUGH: 0
ABDOMINAL PAIN: 0

## 2023-08-09 NOTE — PROGRESS NOTES
CENTER OR    PAIN MANAGEMENT PROCEDURE Bilateral 07/20/2021    confirmatory medial branch blocks at bilateral L4/L5 and L5/S1 performed by Marline Harmon DO at [address] Right 07/27/2021    thermal radiofrequency ablation medial branches L4/L5 and L5/S1 right side first performed by Marline Harmon DO at [address] Left 08/03/2021    thermal radiofrequency ablation medial branches L4/L5 and L5/S1 left performed by Marline Harmon DO at [address] Right 06/14/2021    SEPTOPLASTY, SUBMUCOUS RESECTION TURBINATE, RIGHT PARTIAL INFERIOR, NASAL ENDOSCOPY WITH PARTIAL RESECTION OF RIGHT MIDDLE JHON BULLOSA performed by Alexandre Armenta MD at [address] Left     SINUS SURGERY      TONSILLECTOMY      TUBAL LIGATION      US THYROID CYST ASPIRATION AND OR INJECTION  08/06/2021    US THYROID CYST ASPIRATION AND OR INJECTION 8/6/2021 [facility] ULTRASOUND       FAMILY HISTORY      Family History   Adopted: Yes   Problem Relation Age of Onset    Cancer Father         Stomach Cancer    Other Father         mitral valve prolapse    Cancer Paternal Uncle         Colon Cancer    Stroke Maternal Grandmother     Rheum Arthritis Maternal Grandmother     Breast Cancer Half-Sister 39       SOCIAL HISTORY      Social History       Tobacco History       Smoking Status  Former Smoker Quit date  1/1/2019 Smoking Frequency  1 pack/day for 12 years (12 pk yrs) Smoking Tobacco Type  Cigarettes      Smokeless Tobacco Use  Never Used              Alcohol History       Alcohol Use Status  Not Currently Comment  stopped in August 2020 due to fatty liver              Drug Use       Drug Use Status  Never              Sexual Activity       Sexually Active  Never                    ALLERGIES     Allergies   Allergen Reactions    Biaxin [Clarithromycin] Swelling     Lips swelled

## 2023-08-10 RX ORDER — OXYCODONE HYDROCHLORIDE AND ACETAMINOPHEN 5; 325 MG/1; MG/1
1 TABLET ORAL 2 TIMES DAILY PRN
Qty: 60 TABLET | Refills: 0 | Status: SHIPPED | OUTPATIENT
Start: 2023-08-11 | End: 2023-09-10

## 2023-08-11 DIAGNOSIS — G47.00 INSOMNIA, UNSPECIFIED TYPE: ICD-10-CM

## 2023-08-15 RX ORDER — TRAZODONE HYDROCHLORIDE 150 MG/1
TABLET ORAL
Qty: 135 TABLET | Refills: 0 | Status: SHIPPED | OUTPATIENT
Start: 2023-08-15

## 2023-08-17 ENCOUNTER — TELEPHONE (OUTPATIENT)
Dept: RHEUMATOLOGY | Age: 63
End: 2023-08-17

## 2023-08-17 NOTE — TELEPHONE ENCOUNTER
ESTEFANIA Fitzpatrick - CNP   Sent: Judy Almanza August 17, 2023  1:29 PM   To: [facility] Clinical Staff                Message    Can you please call and request the MRI lumbar spine results from the neurosurgeon office? Called neurosurgeon's office and left a voicemail with information for them to fax result of MRI.

## 2023-08-21 ENCOUNTER — HOSPITAL ENCOUNTER (OUTPATIENT)
Dept: NURSING | Age: 63
Discharge: HOME | End: 2023-08-21
Payer: MEDICARE

## 2023-08-21 VITALS
HEART RATE: 96 BPM | WEIGHT: 164 LBS | RESPIRATION RATE: 18 BRPM | OXYGEN SATURATION: 98 % | DIASTOLIC BLOOD PRESSURE: 83 MMHG | TEMPERATURE: 98.1 F | SYSTOLIC BLOOD PRESSURE: 127 MMHG | BODY MASS INDEX: 29.99 KG/M2

## 2023-08-21 DIAGNOSIS — S22.070A COMPRESSION FRACTURE OF T9 VERTEBRA, INITIAL ENCOUNTER (HCC): ICD-10-CM

## 2023-08-21 DIAGNOSIS — S22.060A COMPRESSION FRACTURE OF T7 VERTEBRA, INITIAL ENCOUNTER (HCC): Primary | ICD-10-CM

## 2023-08-21 DIAGNOSIS — M80.08XA AGE-RELATED OSTEOPOROSIS WITH CURRENT PATHOLOGICAL FRACTURE, VERTEBRA(E), INITIAL ENCOUNTER FOR FRACTURE (HCC): ICD-10-CM

## 2023-08-21 PROCEDURE — 96372 THER/PROPH/DIAG INJ SC/IM: CPT

## 2023-08-21 PROCEDURE — 6360000002 HC RX W HCPCS: Performed by: INTERNAL MEDICINE

## 2023-08-21 RX ORDER — ACETAMINOPHEN 325 MG/1
650 TABLET ORAL
OUTPATIENT
Start: 2023-09-18

## 2023-08-21 RX ORDER — SODIUM CHLORIDE 9 MG/ML
INJECTION, SOLUTION INTRAVENOUS CONTINUOUS
OUTPATIENT
Start: 2023-09-18

## 2023-08-21 RX ORDER — ONDANSETRON 2 MG/ML
8 INJECTION INTRAMUSCULAR; INTRAVENOUS
OUTPATIENT
Start: 2023-09-18

## 2023-08-21 RX ORDER — DIPHENHYDRAMINE HYDROCHLORIDE 50 MG/ML
50 INJECTION INTRAMUSCULAR; INTRAVENOUS
OUTPATIENT
Start: 2023-09-18

## 2023-08-21 RX ORDER — ALBUTEROL SULFATE 90 UG/1
4 AEROSOL, METERED RESPIRATORY (INHALATION) PRN
OUTPATIENT
Start: 2023-09-18

## 2023-08-21 RX ORDER — EPINEPHRINE 1 MG/ML
0.3 INJECTION, SOLUTION INTRAMUSCULAR; SUBCUTANEOUS PRN
OUTPATIENT
Start: 2023-09-18

## 2023-08-21 RX ADMIN — ROMOSOZUMAB-AQQG 105 MG: 105 INJECTION, SOLUTION SUBCUTANEOUS at 12:18

## 2023-08-21 ASSESSMENT — PAIN - FUNCTIONAL ASSESSMENT: PAIN_FUNCTIONAL_ASSESSMENT: 0-10

## 2023-08-21 NOTE — PROGRESS NOTES
__m__ Safety:       (Environmental)  [place] to environment  Ensure ID band is correct and in place/ allergy band as needed  Assess for fall risk  Initiate fall precautions as applicable (fall band, side rails, etc.)  Call light within reach  Bed in low position/ wheels locked    __m__ Pain:       Assess pain level and characteristics  Administer analgesics as ordered  Assess effectiveness of pain management and report to MD as needed    __m__ Knowledge Deficit:  Assess baseline knowledge  Provide teaching at level of understanding  Provide teaching via preferred learning method  Evaluate teaching effectiveness    _m___ Hemodynamic/Respiratory Status:       (Pre and Post Procedure Monitoring)  Assess/Monitor vital signs and LOC  Assess Baseline SpO2 prior to any sedation  Obtain weight/height  Assess vital signs/ LOC until patient meets discharge criteria  Monitor procedure site and notify MD of any issues    _ Satisfactory

## 2023-08-21 NOTE — DISCHARGE INSTRUCTIONS
Evenity injection discharge instructions:    Side effects: headache, joint pain, rash, swelling    Next Evenity injection on: Monday September 18th 11:30 am.    Please call [phone] with a any questions or concerns.

## 2023-08-23 ENCOUNTER — HOSPITAL ENCOUNTER (OUTPATIENT)
Dept: MRI IMAGING | Age: 63
Discharge: HOME | End: 2023-08-23
Attending: ORTHOPAEDIC SURGERY
Payer: MEDICARE

## 2023-08-23 ENCOUNTER — OFFICE VISIT (OUTPATIENT)
Dept: PHYSICAL MEDICINE AND REHAB | Age: 63
End: 2023-08-23
Payer: MEDICARE

## 2023-08-23 VITALS
BODY MASS INDEX: 29.06 KG/M2 | WEIGHT: 164 LBS | DIASTOLIC BLOOD PRESSURE: 70 MMHG | HEIGHT: 63 IN | SYSTOLIC BLOOD PRESSURE: 98 MMHG

## 2023-08-23 DIAGNOSIS — G89.4 CHRONIC PAIN SYNDROME: ICD-10-CM

## 2023-08-23 DIAGNOSIS — M54.12 RADICULOPATHY, CERVICAL REGION: ICD-10-CM

## 2023-08-23 DIAGNOSIS — M47.814 THORACIC SPONDYLOSIS: ICD-10-CM

## 2023-08-23 DIAGNOSIS — M79.18 MYOFASCIAL PAIN: ICD-10-CM

## 2023-08-23 DIAGNOSIS — M54.16 RADICULOPATHY, LUMBAR REGION: ICD-10-CM

## 2023-08-23 DIAGNOSIS — S22.000A COMPRESSION FRACTURE OF BODY OF THORACIC VERTEBRA (HCC): Primary | ICD-10-CM

## 2023-08-23 DIAGNOSIS — M47.12 OTHER SPONDYLOSIS WITH MYELOPATHY, CERVICAL REGION: ICD-10-CM

## 2023-08-23 PROCEDURE — G8427 DOCREV CUR MEDS BY ELIG CLIN: HCPCS | Performed by: ANESTHESIOLOGY

## 2023-08-23 PROCEDURE — G8417 CALC BMI ABV UP PARAM F/U: HCPCS | Performed by: ANESTHESIOLOGY

## 2023-08-23 PROCEDURE — 99214 OFFICE O/P EST MOD 30 MIN: CPT | Performed by: ANESTHESIOLOGY

## 2023-08-23 PROCEDURE — 72141 MRI NECK SPINE W/O DYE: CPT

## 2023-08-23 PROCEDURE — 72148 MRI LUMBAR SPINE W/O DYE: CPT

## 2023-08-23 PROCEDURE — 3017F COLORECTAL CA SCREEN DOC REV: CPT | Performed by: ANESTHESIOLOGY

## 2023-08-23 PROCEDURE — 1036F TOBACCO NON-USER: CPT | Performed by: ANESTHESIOLOGY

## 2023-08-23 NOTE — PROGRESS NOTES
Functionality Assessment/Goals Worksheet     On a scale of 0 (Does not Interfere) to 10 (Completely Interferes)     1. Which number describes how during the past week pain has interfered with           the following:  A. General Activity:  10  B. Mood: 7  C. Walking Ability:  10  D. Normal Work (Includes both work outside the home and housework):  10  E. Relations with Other People:  10  F. Sleep:   10  G. Enjoyment of Life:   10    2. Patient Prefers to Take their Pain Medications:     [x]  On a regular basis   []  Only when necessary    []  Does not take pain medications    3. What are the Patient's Goals/Expectations for Visiting Pain Management?      []  Learn about my pain    []  Receive Medication   []  Physical Therapy     []  Treat Depression   [x]  Receive Injections    []  Treat Sleep   []  Deal with Anxiety and Stress   []  Treat Opoid Dependence/Addiction   [x]  Other: Get referral to neurosurgeon/plan

## 2023-08-23 NOTE — PROGRESS NOTES
Chronic Pain/PM&R Clinic Note     Encounter Date: 8/23/23    Subjective:   Chief Complaint:   Chief Complaint   Patient presents with    Follow-up     Follow up - pt not sure about botox. History of Present Illness:   Lilliam Singh is a 65 y.o. female seen in the office initially on 03/05/21 for her management of migraines. She has medical history of previous TIA with residual cognitive deficits, and ACDF C3-C7 with Dr Oz Figueroa. She has longstanding history of migraine headaches as well as of the last 35 years. She describes 2 different types of migraine headaches. Her standard migraine headaches she reports started on the right side the neck and wrap around the entire side of the front of the head. She has associated phonophobia and photophobia. She also notices these migraines to begin with an aura where her nose starts to burn. She has associated nausea/vomiting when the migraines are severe. Her migraines last greater than 4 hours in duration interfering everyday activities. In addition she reports greater than 15 migraine attacks last month. She has failed multiple medications in the past including Fioricet, Ubrelvy, nortriptyline, and is currently on Aimovig and Topamax. 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 In addition, she does have axial low back pain that contributes to a lot of her debilitating pain. She denies any radiation down the legs, focal leg weakness, leg paresthesias, saddle anesthesia, bowel/bladder incontinence. Today, 8/23/2023, patient presents for planned follow-up for chronic thoracic back pain. She feels like her back pain is progressively getting worse since her last visit. She is more about the results of her MRI of the thoracic spine. She states that she has met with the orthopedic spine surgeons over at [facility]. She also is interested in meeting with the neurosurgeons here to talk about kyphoplasty.   She states that she has

## 2023-08-24 RX ORDER — OXYCODONE HYDROCHLORIDE AND ACETAMINOPHEN 5; 325 MG/1; MG/1
1 TABLET ORAL 2 TIMES DAILY PRN
Qty: 72 TABLET | Refills: 0 | Status: SHIPPED | OUTPATIENT
Start: 2023-09-10 | End: 2023-10-10

## 2023-08-28 ENCOUNTER — HOSPITAL ENCOUNTER (OUTPATIENT)
Age: 63
Discharge: HOME | End: 2023-08-28
Payer: MEDICARE

## 2023-08-28 ENCOUNTER — OFFICE VISIT (OUTPATIENT)
Dept: NEUROSURGERY | Age: 63
End: 2023-08-28
Payer: MEDICARE

## 2023-08-28 ENCOUNTER — HOSPITAL ENCOUNTER (OUTPATIENT)
Dept: GENERAL RADIOLOGY | Age: 63
Discharge: HOME | End: 2023-08-28
Payer: MEDICARE

## 2023-08-28 ENCOUNTER — TELEPHONE (OUTPATIENT)
Dept: NEUROSURGERY | Age: 63
End: 2023-08-28

## 2023-08-28 VITALS
BODY MASS INDEX: 29.06 KG/M2 | DIASTOLIC BLOOD PRESSURE: 82 MMHG | SYSTOLIC BLOOD PRESSURE: 117 MMHG | WEIGHT: 164.02 LBS | HEART RATE: 102 BPM | HEIGHT: 63 IN

## 2023-08-28 DIAGNOSIS — S22.080A COMPRESSION FRACTURE OF T11 VERTEBRA, INITIAL ENCOUNTER (HCC): Primary | ICD-10-CM

## 2023-08-28 DIAGNOSIS — Z01.818 PRE-OP TESTING: ICD-10-CM

## 2023-08-28 DIAGNOSIS — Z51.81 ENCOUNTER FOR THERAPEUTIC DRUG MONITORING: ICD-10-CM

## 2023-08-28 DIAGNOSIS — S22.080A COMPRESSION FRACTURE OF T11 VERTEBRA, INITIAL ENCOUNTER (HCC): ICD-10-CM

## 2023-08-28 LAB
ANION GAP SERPL CALC-SCNC: 17 MEQ/L (ref 8–16)
APTT PPP: 29.2 SECONDS (ref 22–38)
BASOPHILS ABSOLUTE: 0.1 THOU/MM3 (ref 0–0.1)
BASOPHILS NFR BLD AUTO: 0.6 %
BUN SERPL-MCNC: 14 MG/DL (ref 7–22)
CALCIUM SERPL-MCNC: 9.3 MG/DL (ref 8.5–10.5)
CHLORIDE SERPL-SCNC: 107 MEQ/L (ref 98–111)
CO2 SERPL-SCNC: 19 MEQ/L (ref 23–33)
CREAT SERPL-MCNC: 0.8 MG/DL (ref 0.4–1.2)
DEPRECATED RDW RBC AUTO: 44.3 FL (ref 35–45)
EOSINOPHIL NFR BLD AUTO: 1.2 %
EOSINOPHILS ABSOLUTE: 0.1 THOU/MM3 (ref 0–0.4)
ERYTHROCYTE [DISTWIDTH] IN BLOOD BY AUTOMATED COUNT: 13.5 % (ref 11.5–14.5)
GFR SERPL CREATININE-BSD FRML MDRD: > 60 ML/MIN/1.73M2
GLUCOSE SERPL-MCNC: 110 MG/DL (ref 70–108)
HCT VFR BLD AUTO: 43.4 % (ref 37–47)
HGB BLD-MCNC: 14 GM/DL (ref 12–16)
IMM GRANULOCYTES # BLD AUTO: 0.03 THOU/MM3 (ref 0–0.07)
IMM GRANULOCYTES NFR BLD AUTO: 0.3 %
INR PPP: 0.93 (ref 0.85–1.13)
LYMPHOCYTES ABSOLUTE: 3.4 THOU/MM3 (ref 1–4.8)
LYMPHOCYTES NFR BLD AUTO: 37.1 %
MCH RBC QN AUTO: 29 PG (ref 26–33)
MCHC RBC AUTO-ENTMCNC: 32.3 GM/DL (ref 32.2–35.5)
MCV RBC AUTO: 90 FL (ref 81–99)
MONOCYTES ABSOLUTE: 0.3 THOU/MM3 (ref 0.4–1.3)
MONOCYTES NFR BLD AUTO: 3.6 %
MRSA DNA SPEC QL NAA+PROBE: NEGATIVE
NEUTROPHILS NFR BLD AUTO: 57.2 %
NRBC BLD AUTO-RTO: 0 /100 WBC
PLATELET # BLD AUTO: 287 THOU/MM3 (ref 130–400)
PMV BLD AUTO: 10.8 FL (ref 9.4–12.4)
POTASSIUM SERPL-SCNC: 3.4 MEQ/L (ref 3.5–5.2)
RBC # BLD AUTO: 4.82 MILL/MM3 (ref 4.2–5.4)
S AUREUS DNA SPEC QL NAA+PROBE: NEGATIVE
SEGMENTED NEUTROPHILS ABSOLUTE COUNT: 5.3 THOU/MM3 (ref 1.8–7.7)
SODIUM SERPL-SCNC: 143 MEQ/L (ref 135–145)
WBC # BLD AUTO: 9.2 THOU/MM3 (ref 4.8–10.8)

## 2023-08-28 PROCEDURE — 3017F COLORECTAL CA SCREEN DOC REV: CPT | Performed by: PHYSICIAN ASSISTANT

## 2023-08-28 PROCEDURE — G8427 DOCREV CUR MEDS BY ELIG CLIN: HCPCS | Performed by: PHYSICIAN ASSISTANT

## 2023-08-28 PROCEDURE — 1036F TOBACCO NON-USER: CPT | Performed by: PHYSICIAN ASSISTANT

## 2023-08-28 PROCEDURE — G8417 CALC BMI ABV UP PARAM F/U: HCPCS | Performed by: PHYSICIAN ASSISTANT

## 2023-08-28 PROCEDURE — 87641 MR-STAPH DNA AMP PROBE: CPT

## 2023-08-28 PROCEDURE — 71046 X-RAY EXAM CHEST 2 VIEWS: CPT

## 2023-08-28 PROCEDURE — 36415 COLL VENOUS BLD VENIPUNCTURE: CPT

## 2023-08-28 PROCEDURE — 80048 BASIC METABOLIC PNL TOTAL CA: CPT

## 2023-08-28 PROCEDURE — 85610 PROTHROMBIN TIME: CPT

## 2023-08-28 PROCEDURE — 99204 OFFICE O/P NEW MOD 45 MIN: CPT | Performed by: PHYSICIAN ASSISTANT

## 2023-08-28 PROCEDURE — 85730 THROMBOPLASTIN TIME PARTIAL: CPT

## 2023-08-28 PROCEDURE — 87640 STAPH A DNA AMP PROBE: CPT

## 2023-08-28 PROCEDURE — 85025 COMPLETE CBC W/AUTO DIFF WBC: CPT

## 2023-08-28 NOTE — TELEPHONE ENCOUNTER
SURGERY [address] [facility], [address] [facility],[address]      Phone *[phone] *[phone]   Surgical Scheduling Direct Line Phone *[phone] Fax *[phone]      Skyler Crossfranko   1960   female    [address] [address]           Home Phone: [phone]    Cell Phone:    Telephone Information:   Mobile [phone]          Surgeon: Bill Toscano   Surgery Date: 8/5/23     Time: following JG    Procedure: Kyphoplasty of T11     Diagnosis: Compression fracture T11     Important Medical History:       Special Inst/Equip: Position: Prone, Teresaraghu Soria, 2 fluoro, Metronic Rep Terry Evans)     Anesthesia:  Gen            Admission Type: Inpatient     Case Location:      Main OR         Need Preop Cardiac Clearance:       Does Patient have Cardiologist/physician?          Surgery Confirmation #: __________________________________________________    Albert Pillow: ________________________   Date: __________________________     [employer] Name: Tiajuana Tyesha [place] PPO  CPT: [id number]

## 2023-08-28 NOTE — PROGRESS NOTES
[facility]  Ofe Barnhart [address] [address]  Dept: [phone]  Dept Fax: [phone]      Patient Name:  Carito Morejon  Visit Date:  8/28/2023    HPI:     Ms. Ghislaine Collins is a 65 y.o. female that presents today at [facility] Neurosurgery for evaluation of the following: A referral to our service by [facility] pain management for evaluation of imaging consistent with compression fracture at thoracic 11. Chief Complaint   Patient presents with    Consultation     Compression fracture of Thoracic        HPI   Mrs. Ghislaine Collins is a pleasant, active 55-year-old female who arrives to our service as a referral from [facility] pain management with image findings consistent with vertebral body fracture of thoracic 11. 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   She arrives as a referral from [facility] pain management/Dr. Wilda Greene, for evaluation of back pain with findings on MRI confirming acute vertebral

## 2023-08-29 ENCOUNTER — PATIENT MESSAGE (OUTPATIENT)
Dept: FAMILY MEDICINE CLINIC | Age: 63
End: 2023-08-29

## 2023-08-29 DIAGNOSIS — J30.2 SEASONAL ALLERGIES: Primary | ICD-10-CM

## 2023-08-29 RX ORDER — PROMETHAZINE HYDROCHLORIDE 25 MG/1
TABLET ORAL
Status: ON HOLD | COMMUNITY
End: 2023-09-05

## 2023-08-29 RX ORDER — LORATADINE 10 MG/1
10 TABLET ORAL DAILY
Qty: 30 TABLET | Refills: 0 | Status: SHIPPED | OUTPATIENT
Start: 2023-08-29 | End: 2023-09-28

## 2023-08-29 RX ORDER — GALCANEZUMAB 120 MG/ML
INJECTION, SOLUTION SUBCUTANEOUS
COMMUNITY
Start: 2023-08-21

## 2023-08-29 RX ORDER — FLUTICASONE PROPIONATE 50 MCG
1 SPRAY, SUSPENSION (ML) NASAL PRN
Qty: 16 G | Refills: 1 | Status: SHIPPED | OUTPATIENT
Start: 2023-08-29

## 2023-08-29 NOTE — TELEPHONE ENCOUNTER
From: Skyler Jernigan  To: Shila Alonso DO  Sent: 8/29/2023 8:45 AM EDT  Subject: Allergy Medications     Hi, I normally take loratidine and Flonase for my allergies but they are not on my list as my allergies have not acted up in awhile. However, I am struggling with allergies now, could you please send in these 2 medications to my pharmacy.  Thank you in advance

## 2023-08-29 NOTE — PROGRESS NOTES
Follow all instructions given by your physician  NPO after midnight  Sips of water am of surgery with allowed medications  Bring insurance info and 's license  Wear clean comfortable , loose-fitting clothing  No jewelry or contact lenses to be worn day of surgery  No glue on dentures morning of surgery; you will be asked to remove them for surgery. Case for glasses. Shower the night before and the morning of surgery with a liquid antibacterial soap, dry with new fresh clean towel after each shower, no lotions, creams or powder. Clean sheets and pillowcase on bed night before surgery  Bring medications in original bottles  Bring CPAP/BIPAP machine if you have one ( you may be charged if one is needed in recovery room )    Our pharmacy has a Meds to [facility] program where they will deliver any new prescriptions you may have to your room before you leave. Our Pharmacy will clear it through your insurance; for example (same co pay). This enables you to take your new RX as soon as you need when you get home and avoids stop/wait delays on the way home. Please have a form of payment with you and have someone designated as your Pharmacy contact with their phone # as you may not feel well or still be under the influence of anesthesia. Please refer to the SSI-Surgical Site Infection Flyer you hopefully received in the mail-together we can prevent infections; signs and symptoms reviewed. When discharged be sure you understand how to care for your wound and that you have the Dr./office phone # to call if you have any concerns or questions about your wound.      needed at discharge and someone over 18 to stay with you for 24 hours overnight (surgery may be cancelled if you don't have this)  Report to [facility] on 2nd floor  If you would become ill prior to surgery, please call the surgeon  May have a visitor with you, we request that you limit to 2 visitors in pre-op area  Masks are recommended but not required, new

## 2023-08-31 ENCOUNTER — PATIENT MESSAGE (OUTPATIENT)
Dept: PHYSICAL MEDICINE AND REHAB | Age: 63
End: 2023-08-31

## 2023-08-31 DIAGNOSIS — G89.4 CHRONIC PAIN SYNDROME: ICD-10-CM

## 2023-09-01 DIAGNOSIS — G25.81 RESTLESS LEGS: ICD-10-CM

## 2023-09-01 RX ORDER — PRAMIPEXOLE DIHYDROCHLORIDE 0.12 MG/1
TABLET ORAL
Qty: 120 TABLET | Refills: 3 | OUTPATIENT
Start: 2023-09-01

## 2023-09-04 NOTE — H&P
normal   Neck: Supple, symmetrical   Respiration: Non-labored breathing   Cardiovascular: Limbs warm and well perfused   Musculoskeletal:              Lumbar -ROM reduced in extension. Increased pain with facet loading bilaterally. Tenderness palpation along bilateral lumbar paraspinals. Neuro: Alert, oriented. CN II-XII appear grossly intact. No focal motor deficits appreciated in lower limbs. Skin: no skin rashes or lesions visible on face  Psychological: Cooperative, no exaggerated pain behaviors      Reviewed July MRI of the thoracic spine with STIR sequencing confirming acute fracture of the vertebral body of thoracic 11. Assessment:  Thoracic 11 compression fracture    Plan: We have described the procedure for kyphoplasty to address compression fracture confirmed on MRI of the thoracic spine with STIR sequencing at thoracic 11. The procedure was described in detail along with expectations for recovery and the associated risks which include, but are not limited to; bleeding, infection, anesthetic complication, neurologic injury, incomplete relief of symptoms, excavation of cement, the need for future surgery and even death. Understanding the risk associated the procedure the patient is anxious and amicable to move forward with a consent offered for signature and the surgery to be scheduled at [facility] with Dr. Phi Barraza on September 5, 2023.     Jamie Calvin PA-C  9/4/2023

## 2023-09-05 ENCOUNTER — TELEPHONE (OUTPATIENT)
Dept: RHEUMATOLOGY | Age: 63
End: 2023-09-05

## 2023-09-05 ENCOUNTER — TELEPHONE (OUTPATIENT)
Dept: PHYSICAL MEDICINE AND REHAB | Age: 63
End: 2023-09-05

## 2023-09-05 ENCOUNTER — ANESTHESIA EVENT (OUTPATIENT)
Dept: OPERATING ROOM | Age: 63
End: 2023-09-05
Payer: MEDICARE

## 2023-09-05 ENCOUNTER — HOSPITAL ENCOUNTER (OUTPATIENT)
Age: 63
Setting detail: OUTPATIENT SURGERY
Discharge: HOME | End: 2023-09-05
Attending: NEUROLOGICAL SURGERY | Admitting: NEUROLOGICAL SURGERY
Payer: MEDICARE

## 2023-09-05 ENCOUNTER — ANESTHESIA (OUTPATIENT)
Dept: OPERATING ROOM | Age: 63
End: 2023-09-05
Payer: MEDICARE

## 2023-09-05 ENCOUNTER — APPOINTMENT (OUTPATIENT)
Dept: GENERAL RADIOLOGY | Age: 63
End: 2023-09-05
Attending: NEUROLOGICAL SURGERY
Payer: MEDICARE

## 2023-09-05 VITALS
TEMPERATURE: 96.9 F | BODY MASS INDEX: 30.18 KG/M2 | HEART RATE: 72 BPM | HEIGHT: 62 IN | WEIGHT: 164 LBS | SYSTOLIC BLOOD PRESSURE: 123 MMHG | OXYGEN SATURATION: 94 % | RESPIRATION RATE: 16 BRPM | DIASTOLIC BLOOD PRESSURE: 82 MMHG

## 2023-09-05 DIAGNOSIS — N76.1 CHRONIC VAGINITIS: ICD-10-CM

## 2023-09-05 DIAGNOSIS — S22.080A COMPRESSION FRACTURE OF T11 VERTEBRA, INITIAL ENCOUNTER (HCC): ICD-10-CM

## 2023-09-05 LAB
ABO: NORMAL
ANTIBODY SCREEN: NORMAL
RH FACTOR: NORMAL

## 2023-09-05 PROCEDURE — 2500000003 HC RX 250 WO HCPCS: Performed by: NEUROLOGICAL SURGERY

## 2023-09-05 PROCEDURE — 7100000001 HC PACU RECOVERY - ADDTL 15 MIN: Performed by: NEUROLOGICAL SURGERY

## 2023-09-05 PROCEDURE — 86901 BLOOD TYPING SEROLOGIC RH(D): CPT

## 2023-09-05 PROCEDURE — 86850 RBC ANTIBODY SCREEN: CPT

## 2023-09-05 PROCEDURE — 6360000002 HC RX W HCPCS: Performed by: ANESTHESIOLOGY

## 2023-09-05 PROCEDURE — 6360000004 HC RX CONTRAST MEDICATION: Performed by: NEUROLOGICAL SURGERY

## 2023-09-05 PROCEDURE — 6360000002 HC RX W HCPCS: Performed by: PHYSICIAN ASSISTANT

## 2023-09-05 PROCEDURE — 6360000002 HC RX W HCPCS: Performed by: NURSE ANESTHETIST, CERTIFIED REGISTERED

## 2023-09-05 PROCEDURE — 7100000011 HC PHASE II RECOVERY - ADDTL 15 MIN: Performed by: NEUROLOGICAL SURGERY

## 2023-09-05 PROCEDURE — 3600000013 HC SURGERY LEVEL 3 ADDTL 15MIN: Performed by: NEUROLOGICAL SURGERY

## 2023-09-05 PROCEDURE — 7100000010 HC PHASE II RECOVERY - FIRST 15 MIN: Performed by: NEUROLOGICAL SURGERY

## 2023-09-05 PROCEDURE — 3700000000 HC ANESTHESIA ATTENDED CARE: Performed by: NEUROLOGICAL SURGERY

## 2023-09-05 PROCEDURE — 86900 BLOOD TYPING SEROLOGIC ABO: CPT

## 2023-09-05 PROCEDURE — 6370000000 HC RX 637 (ALT 250 FOR IP): Performed by: PHYSICIAN ASSISTANT

## 2023-09-05 PROCEDURE — 2709999900 HC NON-CHARGEABLE SUPPLY: Performed by: NEUROLOGICAL SURGERY

## 2023-09-05 PROCEDURE — C1894 INTRO/SHEATH, NON-LASER: HCPCS | Performed by: NEUROLOGICAL SURGERY

## 2023-09-05 PROCEDURE — 36415 COLL VENOUS BLD VENIPUNCTURE: CPT

## 2023-09-05 PROCEDURE — 72072 X-RAY EXAM THORAC SPINE 3VWS: CPT

## 2023-09-05 PROCEDURE — 2720000010 HC SURG SUPPLY STERILE: Performed by: NEUROLOGICAL SURGERY

## 2023-09-05 PROCEDURE — 88307 TISSUE EXAM BY PATHOLOGIST: CPT

## 2023-09-05 PROCEDURE — 3700000001 HC ADD 15 MINUTES (ANESTHESIA): Performed by: NEUROLOGICAL SURGERY

## 2023-09-05 PROCEDURE — 2500000003 HC RX 250 WO HCPCS: Performed by: NURSE ANESTHETIST, CERTIFIED REGISTERED

## 2023-09-05 PROCEDURE — 88311 DECALCIFY TISSUE: CPT

## 2023-09-05 PROCEDURE — 3600000003 HC SURGERY LEVEL 3 BASE: Performed by: NEUROLOGICAL SURGERY

## 2023-09-05 PROCEDURE — 7100000000 HC PACU RECOVERY - FIRST 15 MIN: Performed by: NEUROLOGICAL SURGERY

## 2023-09-05 PROCEDURE — 6360000002 HC RX W HCPCS

## 2023-09-05 PROCEDURE — C1713 ANCHOR/SCREW BN/BN,TIS/BN: HCPCS | Performed by: NEUROLOGICAL SURGERY

## 2023-09-05 PROCEDURE — C9399 UNCLASSIFIED DRUGS OR BIOLOG: HCPCS | Performed by: NURSE ANESTHETIST, CERTIFIED REGISTERED

## 2023-09-05 PROCEDURE — 2580000003 HC RX 258: Performed by: PHYSICIAN ASSISTANT

## 2023-09-05 PROCEDURE — 6370000000 HC RX 637 (ALT 250 FOR IP)

## 2023-09-05 DEVICE — BONE CEMENT CX01A XPEDE US
Type: IMPLANTABLE DEVICE | Status: FUNCTIONAL
Brand: KYPHON® XPEDE™ BONE CEMENT

## 2023-09-05 RX ORDER — DEXAMETHASONE SODIUM PHOSPHATE 10 MG/ML
INJECTION, EMULSION INTRAMUSCULAR; INTRAVENOUS PRN
Status: DISCONTINUED | OUTPATIENT
Start: 2023-09-05 | End: 2023-09-05 | Stop reason: SDUPTHER

## 2023-09-05 RX ORDER — FENTANYL CITRATE 50 UG/ML
INJECTION, SOLUTION INTRAMUSCULAR; INTRAVENOUS PRN
Status: DISCONTINUED | OUTPATIENT
Start: 2023-09-05 | End: 2023-09-05 | Stop reason: SDUPTHER

## 2023-09-05 RX ORDER — PROPOFOL 10 MG/ML
INJECTION, EMULSION INTRAVENOUS PRN
Status: DISCONTINUED | OUTPATIENT
Start: 2023-09-05 | End: 2023-09-05 | Stop reason: SDUPTHER

## 2023-09-05 RX ORDER — SCOLOPAMINE TRANSDERMAL SYSTEM 1 MG/1
PATCH, EXTENDED RELEASE TRANSDERMAL
Status: DISCONTINUED
Start: 2023-09-05 | End: 2023-09-05 | Stop reason: HOSPADM

## 2023-09-05 RX ORDER — ONDANSETRON 2 MG/ML
INJECTION INTRAMUSCULAR; INTRAVENOUS PRN
Status: DISCONTINUED | OUTPATIENT
Start: 2023-09-05 | End: 2023-09-05 | Stop reason: SDUPTHER

## 2023-09-05 RX ORDER — ESTRADIOL 10 UG/1
INSERT VAGINAL
Qty: 16 TABLET | Refills: 3 | Status: SHIPPED | OUTPATIENT
Start: 2023-09-05

## 2023-09-05 RX ORDER — SCOLOPAMINE TRANSDERMAL SYSTEM 1 MG/1
1 PATCH, EXTENDED RELEASE TRANSDERMAL
Status: DISCONTINUED | OUTPATIENT
Start: 2023-09-05 | End: 2023-09-05 | Stop reason: HOSPADM

## 2023-09-05 RX ORDER — ROCURONIUM BROMIDE 10 MG/ML
INJECTION, SOLUTION INTRAVENOUS PRN
Status: DISCONTINUED | OUTPATIENT
Start: 2023-09-05 | End: 2023-09-05 | Stop reason: SDUPTHER

## 2023-09-05 RX ORDER — OXYCODONE HYDROCHLORIDE AND ACETAMINOPHEN 5; 325 MG/1; MG/1
1 TABLET ORAL EVERY 4 HOURS PRN
Status: DISCONTINUED | OUTPATIENT
Start: 2023-09-05 | End: 2023-09-05 | Stop reason: HOSPADM

## 2023-09-05 RX ORDER — LIDOCAINE HCL/PF 100 MG/5ML
SYRINGE (ML) INJECTION PRN
Status: DISCONTINUED | OUTPATIENT
Start: 2023-09-05 | End: 2023-09-05 | Stop reason: SDUPTHER

## 2023-09-05 RX ORDER — SODIUM CHLORIDE 0.9 % (FLUSH) 0.9 %
5-40 SYRINGE (ML) INJECTION EVERY 12 HOURS SCHEDULED
Status: DISCONTINUED | OUTPATIENT
Start: 2023-09-05 | End: 2023-09-05 | Stop reason: HOSPADM

## 2023-09-05 RX ORDER — PROMETHAZINE HYDROCHLORIDE 25 MG/1
25 TABLET ORAL EVERY 6 HOURS PRN
Qty: 60 TABLET | Refills: 0 | Status: SHIPPED | OUTPATIENT
Start: 2023-09-05 | End: 2023-10-05

## 2023-09-05 RX ORDER — SODIUM CHLORIDE 0.9 % (FLUSH) 0.9 %
5-40 SYRINGE (ML) INJECTION PRN
Status: DISCONTINUED | OUTPATIENT
Start: 2023-09-05 | End: 2023-09-05 | Stop reason: HOSPADM

## 2023-09-05 RX ORDER — SODIUM CHLORIDE 9 MG/ML
INJECTION, SOLUTION INTRAVENOUS PRN
Status: DISCONTINUED | OUTPATIENT
Start: 2023-09-05 | End: 2023-09-05 | Stop reason: HOSPADM

## 2023-09-05 RX ORDER — LIDOCAINE HYDROCHLORIDE 10 MG/ML
INJECTION, SOLUTION EPIDURAL; INFILTRATION; INTRACAUDAL; PERINEURAL PRN
Status: DISCONTINUED | OUTPATIENT
Start: 2023-09-05 | End: 2023-09-05 | Stop reason: ALTCHOICE

## 2023-09-05 RX ADMIN — Medication 100 MG: at 10:37

## 2023-09-05 RX ADMIN — SODIUM CHLORIDE: 9 INJECTION, SOLUTION INTRAVENOUS at 07:56

## 2023-09-05 RX ADMIN — HYDROMORPHONE HYDROCHLORIDE 0.5 MG: 1 INJECTION, SOLUTION INTRAMUSCULAR; INTRAVENOUS; SUBCUTANEOUS at 12:10

## 2023-09-05 RX ADMIN — ROCURONIUM BROMIDE 50 MG: 10 INJECTION INTRAVENOUS at 10:37

## 2023-09-05 RX ADMIN — SUGAMMADEX 200 MG: 100 INJECTION, SOLUTION INTRAVENOUS at 11:36

## 2023-09-05 RX ADMIN — HYDROMORPHONE HYDROCHLORIDE 0.5 MG: 1 INJECTION, SOLUTION INTRAMUSCULAR; INTRAVENOUS; SUBCUTANEOUS at 11:46

## 2023-09-05 RX ADMIN — FENTANYL CITRATE 100 MCG: 50 INJECTION, SOLUTION INTRAMUSCULAR; INTRAVENOUS at 10:32

## 2023-09-05 RX ADMIN — ONDANSETRON 4 MG: 2 INJECTION INTRAMUSCULAR; INTRAVENOUS at 10:46

## 2023-09-05 RX ADMIN — HYDROMORPHONE HYDROCHLORIDE 0.5 MG: 1 INJECTION, SOLUTION INTRAMUSCULAR; INTRAVENOUS; SUBCUTANEOUS at 12:05

## 2023-09-05 RX ADMIN — OXYCODONE AND ACETAMINOPHEN 1 TABLET: 5; 325 TABLET ORAL at 13:10

## 2023-09-05 RX ADMIN — Medication 2000 MG: at 10:43

## 2023-09-05 RX ADMIN — PROPOFOL 160 MG: 10 INJECTION, EMULSION INTRAVENOUS at 10:37

## 2023-09-05 RX ADMIN — DEXAMETHASONE SODIUM PHOSPHATE 8 MG: 10 INJECTION, EMULSION INTRAMUSCULAR; INTRAVENOUS at 10:46

## 2023-09-05 ASSESSMENT — PAIN DESCRIPTION - LOCATION
LOCATION: BACK

## 2023-09-05 ASSESSMENT — PAIN SCALES - GENERAL
PAINLEVEL_OUTOF10: 8
PAINLEVEL_OUTOF10: 8
PAINLEVEL_OUTOF10: 10
PAINLEVEL_OUTOF10: 5
PAINLEVEL_OUTOF10: 7
PAINLEVEL_OUTOF10: 7
PAINLEVEL_OUTOF10: 4

## 2023-09-05 ASSESSMENT — PAIN DESCRIPTION - PAIN TYPE
TYPE: SURGICAL PAIN;CHRONIC PAIN
TYPE: SURGICAL PAIN

## 2023-09-05 ASSESSMENT — PAIN DESCRIPTION - FREQUENCY: FREQUENCY: CONTINUOUS

## 2023-09-05 ASSESSMENT — PAIN - FUNCTIONAL ASSESSMENT: PAIN_FUNCTIONAL_ASSESSMENT: 0-10

## 2023-09-05 ASSESSMENT — PAIN DESCRIPTION - ORIENTATION
ORIENTATION: MID;LOWER
ORIENTATION: MID;LOWER

## 2023-09-05 ASSESSMENT — PAIN DESCRIPTION - DESCRIPTORS
DESCRIPTORS: ACHING
DESCRIPTORS: ACHING

## 2023-09-05 NOTE — PROGRESS NOTES
Up in room, gait steady. States she stills feels slightly nauseated but wants to go home. She has zofran for home.

## 2023-09-05 NOTE — PROGRESS NOTES
1141: Pt arrives to pacu, awakens to verbal stimuli. Pt on room air, respirations easy and unlabored. VSS  1146: Pt states pain 10/10, 0.5mg of dilaudid administered  1155: No change in pain, 0.5mg of dilaudid given  1205: Pt states pain 8/10, 0.5mg of dilaudid administered  1210: Pt states she noted some heaviness and tingling in the left upper extremity. Slightly weaker on the left upper arm then right, no n/t or weakness noted in lower extremities or other deficits. Notified Veronica MCKEON. No change in pain, 0.5mg of dilaudid given  1213: Veronica MCKEON at bedside to assess patient, states he would like to have patient monitored for a couple extra hours to make sure resolves. No other orders given  1220: Pt resting off and on with eyes closed, easily awakens to voice. States pain tolerable   1230: Pt meets criteria for discharge from pacu, transported back to [facility] in stable condition.  VS

## 2023-09-05 NOTE — ANESTHESIA PRE PROCEDURE
Department of Anesthesiology  Preprocedure Note       Name:  Suleiman Stephenson   Age:  65 y.o.  :  1960                                          MRN:  [id number]         Date:  2023      Surgeon: Suzanne Ball):  Judy Verdugo MD    Procedure: Procedure(s):  KYPHOPLASTY of T11    Medications prior to admission:   Prior to Admission medications    Medication Sig Start Date End Date Taking? Authorizing Provider   promethazine (PHENERGAN) 25 MG tablet Take 1 tablet by mouth every 6 hours as needed for Nausea 9/5/23 10/5/23  Leandro Powell DO   fluticasone (FLONASE) 50 MCG/ACT nasal spray 1 spray by Nasal route as needed for Allergies 23   Douglas Nguyen DO   loratadine (CLARITIN) 10 MG tablet Take 1 tablet by mouth daily 23  Douglas Nguyen DO   EMGALITY 120 MG/ML SOAJ INJECT 1 PEN INTO THE SKIN EVERY 30 DAYS FOR 1 DOSE 23   Historical Provider, MD   oxyCODONE-acetaminophen (PERCOCET) 5-325 MG per tablet Take 1 tablet by mouth 2 times daily as needed for Pain for up to 30 days. Max Daily Amount: 2 tablets 9/10/23 10/10/23  Leandro Powell DO   traZODone (DESYREL) 150 MG tablet TAKE 1 AND 1/2 TABLETS BY MOUTH NIGHTLY 8/15/23   Douglas Nguyen DO   pramipexole (MIRAPEX) 0.125 MG tablet Take 2 tablets by mouth 2 times daily 8/3/23   Douglas Nguyen DO   clotrimazole-betamethasone (LOTRISONE) 1-0.05 % cream Apply topically as needed (Apply topically as needed) 8/3/23   Ishan Ramon DO   butalbital-acetaminophen-caffeine (FIORICET, ESGIC) -74 MG per tablet Take 1 tablet by mouth every 4 hours as needed for Headaches 23   Douglas Nguyen DO   esomeprazole (NEXIUM) 40 MG delayed release capsule Take 1 capsule by mouth every morning (before breakfast) 23   Ishan Ramon DO   Estradiol (VAGIFEM) 10 MCG TABS vaginal tablet Insert 1 tablet vaginally daily for 14 days, then reduce to twice weekly on Tuesday and Friday.

## 2023-09-05 NOTE — TELEPHONE ENCOUNTER
Pt .insists you oked giving her xtra 12 tabs with her bid dose Percocet when last saw on 8/28. The script you sent in on that day was for a refill not till 9/10 and has the xtra 12 tabs. She is having surgery 9/5 and thought you would give before that to get her thru the surgery. Wanting the xtra 12 tab now .  Please advise

## 2023-09-05 NOTE — PROGRESS NOTES
Return from PACU, . Eating and drinking without problems. Pt states she has a heavy feeling in left arm which she had in PACU.

## 2023-09-05 NOTE — PROGRESS NOTES
Pt admitted to [facility] room 1 and oriented to unit. SCD sleeves applied. Nares swabbed. Pt verbalized permission for first name, last initial and physicians name on white board. SDS board and discharge criteria explained, pt verbalized understanding. Pt denies thoughts of harming self or others. Call light in reach.  Son will be transporting patient home at d. dylan [phone]

## 2023-09-05 NOTE — PROGRESS NOTES
Patient was seen and examined in the pre op area in conjunction with neurosurgery LINDA SHETTY. There are no changes in patient symptoms and neurological exam since the last time seen in neurosurgery outpatient clinic. Patient stated that she continues to experience severe back pain that up to 10/10 especially with standing and movement. This is a 61-year-old F with a progressive history of severe back pain started a couple of months ago when she tried to push heavy object. Patient continues to have severe back pain that up to 10/10 despite conservative treatment modalities. Patient underwent spine imaging studies that showed findings more consistent with acute osteoporotic compression  fracture at the level of T11. For this reason surgical intervention in the form of T11  kyphoplasty is     recommended for the patient. Today I discussed with patient and her/his family again today planned/recommended surgical intervention as well as the associated risks and benefit sand alternative. All questions and concerns were addressed and answered. Patient elected again to go with today planned surgical intervention.

## 2023-09-05 NOTE — TELEPHONE ENCOUNTER
Patient's last appointment was : 7-18-23  Patient's next appointment is :  10-6-23  Last refilled: 7-18-23  Pharmacy Verified    No results found for: LABA1C  Lab Results   Component Value Date    CHOL 210 (H) 04/15/2021    TRIG 122 04/15/2021    HDL 49 04/17/2023    LDLCALC 168 04/17/2023     Lab Results   Component Value Date     08/28/2023    K 3.4 (L) 08/28/2023     08/28/2023    CO2 19 (L) 08/28/2023    BUN 14 08/28/2023    CREATININE 0.8 08/28/2023    GLUCOSE 110 (H) 08/28/2023    CALCIUM 9.3 08/28/2023    PROT 5.7 (L) 05/22/2023    LABALBU 4.1 05/22/2023    BILITOT 0.3 05/22/2023    ALKPHOS 82 05/22/2023    AST 12 05/22/2023    ALT 12 05/22/2023    LABGLOM >60 08/28/2023    GFRAA >60 02/19/2021    AGRATIO 2.9 (H) 02/05/2021     Lab Results   Component Value Date    TSH 1.440 04/17/2023    FT3 2.56 07/15/2021    T4FREE 1.43 04/17/2023     Lab Results   Component Value Date    WBC 9.2 08/28/2023    HGB 14.0 08/28/2023    HCT 43.4 08/28/2023    MCV 90.0 08/28/2023     08/28/2023

## 2023-09-05 NOTE — TELEPHONE ENCOUNTER
Patient calling stating that Dr. Roger Fenton discussed he would prescribe her 12 tablets of Percocet 5-325 MG for her surgery today for (4) fractures to hold her over until her regular prescription is refilled 9-10-23. Patient says she has 2 tablets left. Please send to [facility] on [address].  Follow up with patient to advise

## 2023-09-05 NOTE — PROGRESS NOTES
DISCHARGE INSTRUCTIONS REVIEWED WITH PATIENT AND FAMILY. STERI STRIPS ON BACK REMAIN DRY AND INTACT. DISCHARGED BY WHEELCHAIR TO CAR.

## 2023-09-05 NOTE — DISCHARGE INSTRUCTIONS
Keep incision sites clean and dry and to maintain Steri-Strips for 3 to 5 days removing any residual Steri-Strips after 7 days. Patient may shower postoperative day 2 taking care not to saturate the incision sites. Take pain medications as needed with the application of ice to the insertion sites for additional relief. A follow-up with neurosurgery at 12 days postop for reevaluation is scheduled. No additional restrictions on activity moving forward.

## 2023-09-05 NOTE — OP NOTE
inserted and maintained. Next, we flipped the patient on Edgardo table in supine position  given 2 grams of Ancef/ IV prior to the procedure for antibiotic prophylaxis. To facilitate the procedure biplanar fluoroscopy was used and by adjusting the angles of fluoroscopy both AP and lateral views of corresponding vertebral body of T11was optimized. Skin over the back was prepped with antiseptic solution and the procedure was done under strict aspetic precautions. Then, draped and prepped patient in the standard fashion  Next, by using #11 blade a small skin incission was made. The the working canula with trochar in place was inserted such that it lies over the lateral aspect of the pedicle. Then it was tapped slowly through the pedicle in both sides under constant fluoroscopic surveillance making sure that the medial border of the pedicle was not violated at any time. Then the Kyphon bone tamp was inserted therough the working canula in both sides(15mm x 2mm ballon tamps). Good placements were confirmed with fluoroscopy. In the meantime polymethylmethacrylate was mixed as per the protocol and Kyphon bone fillers were filled with it. Then they were immersed in warm water to obtain adequate consistency. The Balloons were inflated f in both sides to achieve adequate fracture reduction. Then the balloon was removed. Next,  the bone cement was placed in both balloon cavities, using fluoroscopic guidance. A total 4 cc of pmma was filling the void completely with acceptable interdigitation of the bone cement       Waited 3 minutes for the pmma to harden then removed all instrumentation and closed. Once the cement had set and was seen to be in good position by fluoroscopy, the working canula and bone fillers were removed. Final hemostasis was secured by applying pressure. The incisions were closed with staples, followed by sterile dressings.    Sponge, needle, and instrument counts were correct at the

## 2023-09-05 NOTE — BRIEF OP NOTE
Brief Postoperative Note      Patient: Johnna Rivas  YOB: 1960  MRN: [id number]    Date of Procedure: 9/5/2023    Pre-Op Diagnosis Codes:     * Compression fracture of T11 vertebra, initial encounter ([facility]) [N93.488B]    Post-Op Diagnosis: Same       Procedure(s):  KYPHOPLASTY of T11    Surgeon(s):  Christina Simeon MD    Assistant:  Physician Assistant: Sonia York PA-C    Anesthesia: General    Estimated Blood Loss (mL): Minimal    Complications: None    Specimens:   ID Type Source Tests Collected by Time Destination   A : T11  Bone Spine [address] [place] [address], MD 9/5/2023 1058        Implants:  Implant Name Type Inv.  Item Serial No.  Lot No. LRB No. Used Action   CEMENT BNE Lara Pipes  CEMENT  [facility] [id number] N/A 1 Implanted         Drains: * No LDAs found *    Findings: Post kyphoplasty thoracic 11      Electronically signed by Sonia York PA-C on 9/5/2023 at 11:40 AM

## 2023-09-05 NOTE — TELEPHONE ENCOUNTER
----- Message from ESTEFANIA Meade CNP sent at 8/23/2023  4:11 PM EDT -----  Can we please request her MRI/CT records of her spine from her neurosurgeon Dr. Murel Cockayne in [facility]? I don't see that we received these yet.   ----- Message -----  From: ESTEFANIA Meade CNP  Sent: 8/23/2023   8:00 AM EDT  To: ESTEFANIA Meade CNP    Check if we received MRi results.

## 2023-09-05 NOTE — ANESTHESIA POSTPROCEDURE EVALUATION
Department of Anesthesiology  Postprocedure Note    Patient: Elizabeth Seymour  MRN: [id number]  YOB: 1960  Date of evaluation: 9/5/2023      Procedure Summary     Date: 09/05/23 Room / Location: [address] [place] [address] 12 / [address] [place] [address]    Anesthesia Start: 1032 Anesthesia Stop: 1143    Procedure: KYPHOPLASTY of T11 Diagnosis:       Compression fracture of T11 vertebra, initial encounter ([address])      (Compression fracture of T11 vertebra, initial encounter ([address]) [O49.572I])    Surgeons: Norris Mondragon MD Responsible Provider: Dwayne Sherman DO    Anesthesia Type: general ASA Status: 2          Anesthesia Type: No value filed.     Sean Phase I: Sean Score: 9    Sean Phase II:        Anesthesia Post Evaluation    Patient location during evaluation: PACU  Patient participation: complete - patient participated  Level of consciousness: awake  Airway patency: patent  Nausea & Vomiting: no nausea  Complications: no  Cardiovascular status: hemodynamically stable  Respiratory status: acceptable  Hydration status: stable  Pain management: adequate

## 2023-09-06 RX ORDER — OXYCODONE HYDROCHLORIDE AND ACETAMINOPHEN 5; 325 MG/1; MG/1
1 TABLET ORAL 2 TIMES DAILY PRN
Qty: 5 TABLET | Refills: 0 | Status: SHIPPED | OUTPATIENT
Start: 2023-09-10 | End: 2023-09-12

## 2023-09-08 ENCOUNTER — HOSPITAL ENCOUNTER (OUTPATIENT)
Age: 63
Discharge: HOME | End: 2023-09-08
Payer: MEDICARE

## 2023-09-08 ENCOUNTER — HOSPITAL ENCOUNTER (OUTPATIENT)
Dept: GENERAL RADIOLOGY | Age: 63
Discharge: HOME | End: 2023-09-08
Payer: MEDICARE

## 2023-09-08 ENCOUNTER — OFFICE VISIT (OUTPATIENT)
Dept: RHEUMATOLOGY | Age: 63
End: 2023-09-08
Payer: MEDICARE

## 2023-09-08 VITALS
HEART RATE: 78 BPM | WEIGHT: 165.8 LBS | BODY MASS INDEX: 30.51 KG/M2 | SYSTOLIC BLOOD PRESSURE: 124 MMHG | DIASTOLIC BLOOD PRESSURE: 78 MMHG | OXYGEN SATURATION: 98 % | HEIGHT: 62 IN

## 2023-09-08 DIAGNOSIS — S22.000A COMPRESSION FRACTURE OF BODY OF THORACIC VERTEBRA (HCC): ICD-10-CM

## 2023-09-08 DIAGNOSIS — G89.29 CHRONIC BILATERAL LOW BACK PAIN WITHOUT SCIATICA: Primary | ICD-10-CM

## 2023-09-08 DIAGNOSIS — L40.50 PSORIATIC ARTHRITIS (HCC): ICD-10-CM

## 2023-09-08 DIAGNOSIS — R53.83 OTHER FATIGUE: ICD-10-CM

## 2023-09-08 DIAGNOSIS — M80.08XA AGE-RELATED OSTEOPOROSIS WITH CURRENT PATHOLOGICAL FRACTURE, VERTEBRA(E), INITIAL ENCOUNTER FOR FRACTURE (HCC): ICD-10-CM

## 2023-09-08 DIAGNOSIS — Z51.81 MEDICATION MONITORING ENCOUNTER: ICD-10-CM

## 2023-09-08 DIAGNOSIS — R07.89 CHEST WALL PAIN: ICD-10-CM

## 2023-09-08 DIAGNOSIS — M54.50 CHRONIC BILATERAL LOW BACK PAIN WITHOUT SCIATICA: Primary | ICD-10-CM

## 2023-09-08 DIAGNOSIS — M79.7 FIBROMYALGIA: ICD-10-CM

## 2023-09-08 LAB
HAV IGM SER QL: NEGATIVE
HBV CORE IGM SERPL QL IA: NEGATIVE
HBV SURFACE AG SERPL QL IA: NEGATIVE
HCV IGG SERPL QL IA: NEGATIVE

## 2023-09-08 PROCEDURE — 99215 OFFICE O/P EST HI 40 MIN: CPT | Performed by: INTERNAL MEDICINE

## 2023-09-08 PROCEDURE — G8427 DOCREV CUR MEDS BY ELIG CLIN: HCPCS | Performed by: INTERNAL MEDICINE

## 2023-09-08 PROCEDURE — 71101 X-RAY EXAM UNILAT RIBS/CHEST: CPT

## 2023-09-08 PROCEDURE — 86480 TB TEST CELL IMMUN MEASURE: CPT

## 2023-09-08 PROCEDURE — 3017F COLORECTAL CA SCREEN DOC REV: CPT | Performed by: INTERNAL MEDICINE

## 2023-09-08 PROCEDURE — 1036F TOBACCO NON-USER: CPT | Performed by: INTERNAL MEDICINE

## 2023-09-08 PROCEDURE — 36415 COLL VENOUS BLD VENIPUNCTURE: CPT

## 2023-09-08 PROCEDURE — 80074 ACUTE HEPATITIS PANEL: CPT

## 2023-09-08 PROCEDURE — G8417 CALC BMI ABV UP PARAM F/U: HCPCS | Performed by: INTERNAL MEDICINE

## 2023-09-08 RX ORDER — FOLIC ACID 1 MG/1
1 TABLET ORAL DAILY
Qty: 90 TABLET | Refills: 1 | Status: SHIPPED | OUTPATIENT
Start: 2023-09-08

## 2023-09-08 ASSESSMENT — ENCOUNTER SYMPTOMS
DIARRHEA: 0
SHORTNESS OF BREATH: 1
NAUSEA: 0
ABDOMINAL PAIN: 0
EYE PAIN: 0
EYE ITCHING: 0
BACK PAIN: 0
COUGH: 0
TROUBLE SWALLOWING: 0
CONSTIPATION: 1

## 2023-09-12 ENCOUNTER — PATIENT MESSAGE (OUTPATIENT)
Dept: RHEUMATOLOGY | Age: 63
End: 2023-09-12

## 2023-09-12 LAB
GAMMA INTERFERON BACKGROUND BLD IA-ACNC: 0.01 IU/ML
M TB IFN-G BLD-IMP: NEGATIVE
M TB IFN-G CD4+ BCKGRND COR BLD-ACNC: 0 IU/ML (ref 0–0.34)
M TB IFN-G CD4+CD8+ BCKGRND COR BLD-ACNC: 0 IU/ML (ref 0–0.34)
MITOGEN IGNF BCKGRD COR BLD-ACNC: 9.95 IU/ML

## 2023-09-13 NOTE — TELEPHONE ENCOUNTER
From: Alia Garcia  To: Dr. Pavon Gave: 9/12/2023 5:28 PM EDT  Subject: Humira answer and Wendy Bruner, I am fine with Humira or the other injectable medication you listed. Will you be calling in my labs to get in the next 2 to 4 weeks. Thanks!  Courtney Tom

## 2023-09-13 NOTE — PROGRESS NOTES
NEUROLOGY OUT PATIENT FOLLOW UP NOTE:  7/30/202111:32 AM    Shelley Santos is here for follow up for   Patient Active Problem List   Diagnosis    May-Thurner syndrome    Deviated nasal septum    Hypertrophy of right inferior turbinate    Latisha bullosa right middle    Rhinogenic headache    Migraine without status migrainosus, not intractable    Postoperative state    Follow up for migraine. She is here to go over plan going forward. ROS:  Respiratory : no cough, no shortness of breath  Cardiac: no chest pain. No palpitations.   Renal : no flank pain, no hematuria    Skin: no rash      Allergies   Allergen Reactions    Amoxil [Amoxicillin]      rash    Biaxin [Clarithromycin] Swelling     Lips swelled    Doxycycline      rash    Morphine Nausea And Vomiting    Sulfa Antibiotics Rash       Current Outpatient Medications:     traZODone (DESYREL) 150 MG tablet, Take 1.5 tablets by mouth nightly, Disp: 45 tablet, Rfl: 2    buPROPion (WELLBUTRIN XL) 150 MG extended release tablet, Take 1 tablet by mouth every morning Take with 300 mg tablet for total 450 mg once daily in the morning, Disp: 90 tablet, Rfl: 0    buPROPion (WELLBUTRIN XL) 300 MG extended release tablet, TAKE 1 TABLET BY MOUTH EVERY DAY IN THE MORNING, Disp: 90 tablet, Rfl: 0    Erenumab-aooe (AIMOVIG) 140 MG/ML SOAJ, Inject 140 mg into the skin every 30 days, Disp: 1 pen, Rfl: 5    butalbital-acetaminophen-caffeine (FIORICET, ESGIC) -40 MG per tablet, Take 1 tablet by mouth every 4 hours as needed for Headaches, Disp: 180 tablet, Rfl: 1    esomeprazole (NEXIUM) 40 MG delayed release capsule, Take 1 capsule by mouth every morning (before breakfast), Disp: 90 capsule, Rfl: 1    pramipexole (MIRAPEX) 0.125 MG tablet, Take 2 tablets by mouth 2 times daily, Disp: 120 tablet, Rfl: 3    topiramate (TOPAMAX) 100 MG tablet, TAKE 1 TABLET BY MOUTH TWICE A DAY (Patient taking differently: Take 100 mg by mouth daily ), Disp: 180 tablet, Rfl: Per Dr. Garcia patient needs appointment.   1    memantine (NAMENDA) 5 MG tablet, Take 5 mg by mouth daily , Disp: , Rfl:     clotrimazole-betamethasone (LOTRISONE) 1-0.05 % cream, Apply topically as needed , Disp: , Rfl:     cyclobenzaprine (FLEXERIL) 10 MG tablet, 2 times daily as needed , Disp: , Rfl:     furosemide (LASIX) 20 MG tablet, as needed , Disp: , Rfl:     potassium chloride (KLOR-CON) 10 MEQ extended release tablet, as needed , Disp: , Rfl:     Onabotulinumtoxin A (BOTOX, COSMETIC,) 200 units injection, 200 Units every 3 months , Disp: , Rfl:     Ubrogepant (UBRELVY) 100 MG TABS, Take 100 mg by mouth as needed, Disp: , Rfl:     hydroxychloroquine (PLAQUENIL) 200 MG tablet, Take 200 mg by mouth 2 times daily , Disp: , Rfl:     nitroglycerin (NITROSTAT) 0.6 MG SL tablet, Place 0.6 mg under the tongue every 5 minutes as needed for Chest pain up to max of 3 total doses. If no relief after 1 dose, call 911., Disp: , Rfl:     ondansetron (ZOFRAN) 4 MG tablet, Take 1 tablet by mouth 3 times daily as needed for Nausea or Vomiting, Disp: 15 tablet, Rfl: 0    I reviewed the past medical history, allergies, medications, social history and family history. PE:   Vitals:    07/30/21 1124   BP: 122/68   Site: Left Upper Arm   Position: Sitting   Cuff Size: Medium Adult   Pulse: 62   SpO2: 99%   Weight: 142 lb (64.4 kg)   Height: 5' 5\" (1.651 m)     General Appearance:  alert and cooperative, she is wearing a mask. She has LROM of the neck to both sides. Skin:  Skin color, texture, turgor normal. No rashes or lesions. Gen: NAD, Language is Intact. Skin: no rash, lesion, moist to touch. warm  Head: no rash, no icterus  Neck: There is no carotid bruits. The Neck is supple. There is no neck lymphadenopathy. Neuro: CN 2-12 grossly intact with no focal deficits. Power 5/5 Throughout symmetric, Reflexes are +2 symmetric. Long tracts are intact. Cerebellar exam is Intact. Sensory exam is intact to light touch. Gait is intact.   Musculoskeletal: Has no hand arthritis, no limitation of ROM in any of the four extremities. Lower extremities no edema  The abdomen is soft,  intact bowel sounds.          DATA:        Results for orders placed or performed in visit on 07/15/21   T3, Free   Result Value Ref Range    T3, Free 2.56 2.02 - 4.43 pg/mL   T4, Free   Result Value Ref Range    T4 Free 1.28 0.93 - 1.76 ng/dL   Lipid, Fasting   Result Value Ref Range    Cholesterol, Fasting 229 (H) 100 - 199 mg/dl    Triglyceride, Fasting 131 0 - 199 mg/dl    HDL 43 mg/dL    LDL Calculated 160 mg/dL   CBC Auto Differential   Result Value Ref Range    WBC 5.3 4.8 - 10.8 thou/mm3    RBC 4.19 (L) 4.20 - 5.40 mill/mm3    Hemoglobin 12.0 12.0 - 16.0 gm/dl    Hematocrit 38.7 37.0 - 47.0 %    MCV 92.4 81.0 - 99.0 fL    MCH 28.6 26.0 - 33.0 pg    MCHC 31.0 (L) 32.2 - 35.5 gm/dl    RDW-CV 13.6 11.5 - 14.5 %    RDW-SD 46.2 (H) 35.0 - 45.0 fL    Platelets 071 279 - 700 thou/mm3    MPV 12.8 (H) 9.4 - 12.4 fL    Seg Neutrophils 54.7 %    Lymphocytes 37.6 %    Monocytes 5.8 %    Eosinophils 1.1 %    Basophils 0.6 %    Immature Granulocytes 0.2 %    Segs Absolute 2.9 1 - 7 thou/mm3    Lymphocytes Absolute 2.0 1.0 - 4.8 thou/mm3    Monocytes Absolute 0.3 (L) 0.4 - 1.3 thou/mm3    Eosinophils Absolute 0.1 0.0 - 0.4 thou/mm3    Basophils Absolute 0.0 0.0 - 0.1 thou/mm3    Immature Grans (Abs) 0.01 0.00 - 0.07 thou/mm3    nRBC 0 /100 wbc   Comprehensive Metabolic Panel   Result Value Ref Range    Glucose 92 70 - 108 mg/dL    CREATININE 0.7 0.4 - 1.2 mg/dL    BUN 22 7 - 22 mg/dL    Sodium 139 135 - 145 meq/L    Potassium 3.5 3.5 - 5.2 meq/L    Chloride 105 98 - 111 meq/L    CO2 21 (L) 23 - 33 meq/L    Calcium 9.4 8.5 - 10.5 mg/dL    AST 15 5 - 40 U/L    Alkaline Phosphatase 85 38 - 126 U/L    Total Protein 6.6 6.1 - 8.0 g/dL    Albumin 4.2 3.5 - 5.1 g/dL    Total Bilirubin 0.2 (L) 0.3 - 1.2 mg/dL    ALT 16 11 - 66 U/L   TSH without Reflex   Result Value Ref Range    TSH 0.750 0.400 - 4.200 uIU/mL   Anion Gap   Result Value Ref Range    Anion Gap 13.0 8.0 - 16.0 meq/L   Glomerular Filtration Rate, Estimated   Result Value Ref Range    Est, Glom Filt Rate 85 (A) ml/min/1.73m2            MRI BRAIN WO CONTRAST  Narrative  PROCEDURE: MRI BRAIN WO CONTRAST  CLINICAL INFORMATION Hemiplegic migraine, not intractable, 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 TIA/stroke 6 years ago. Headaches. Worsening short-term memory. COMPARISON: Brain MRI 5/19/2018. TECHNIQUE: Multiplanar and multiple spin echo MRI images were obtained of the brain without contrast.  FINDINGS:  The diffusion-weighted images are normal.  The brain volume is normal. There is minimal signal hyperintensity scattered in the white matter of the brain suggestive of minimal severity chronic small vessel ischemic changes. There are no intra-or extra-axial collections. There is no hydrocephalus, midline shift or mass effect. There is no susceptibility artifact in the brain. The major intracranial vascular flow voids are present. The midline craniocervical junction structures are normal.  The pituitary gland and brainstem are normal.  There is some mucosal thickening in the maxillary sinuses and ethmoid air cells. There is a small amount of fluid signal in the mastoid air cells. There is no change in the appearance the brain compared to the prior study. Impression  1. Minimal signal hyperintensity foci in the white matter the brain suggesting chronic small vessel ischemic changes. These foci are stable. 2. No acute findings. **This report has been created using voice recognition software. It may contain minor errors which are inherent in voice recognition technology. **    Final report electronically signed by Dr. Maritza Villarreal on 10/5/2020 3:23 PM             Assessment:     Diagnosis Orders   1. Migraine with aura and without status migrainosus, not intractable        Follow-up for migraine headaches.   The patient reports her symptoms are not controlled, she is currently on Aimovig 140 mg injection monthly she reports benefit to the medication in addition to Topamax however she is having frequent headaches that are interfering with her level of activity. The patient is compliant, she denies any side effects to medication. She has tried multiple medications the past without benefit. I reviewed the MRI Brain and agree with interpretation, I also reviewed the patient pertinent labs and records in the EHR and from other providers. We discussed placing her on Botox for migraine prevention. She was agreeable. After detailed discussion with patient we agreed on the following plan. Plan:  1. Continue with Aimovig 140 mg/ml injection monthly. Refills given  2. Continue with Topamax at current dose. Refills given   3. Referral to PM&R  for Botox injections  4. Keep headache diary  5. Follow up in 5 months or sooner if needed. 6. Call if any questions or concerns. Total time 22 min.      Darren Lin MD

## 2023-09-14 RX ORDER — ADALIMUMAB 40MG/0.4ML
40 KIT SUBCUTANEOUS
Qty: 2 EACH | Refills: 5 | Status: SHIPPED | OUTPATIENT
Start: 2023-09-14

## 2023-09-18 ENCOUNTER — HOSPITAL ENCOUNTER (OUTPATIENT)
Dept: NURSING | Age: 63
Discharge: HOME | End: 2023-09-18
Payer: MEDICARE

## 2023-09-18 VITALS
RESPIRATION RATE: 16 BRPM | SYSTOLIC BLOOD PRESSURE: 137 MMHG | OXYGEN SATURATION: 98 % | DIASTOLIC BLOOD PRESSURE: 92 MMHG | TEMPERATURE: 97.6 F | HEART RATE: 78 BPM

## 2023-09-18 DIAGNOSIS — S22.070A COMPRESSION FRACTURE OF T9 VERTEBRA, INITIAL ENCOUNTER (HCC): ICD-10-CM

## 2023-09-18 DIAGNOSIS — M80.08XA AGE-RELATED OSTEOPOROSIS WITH CURRENT PATHOLOGICAL FRACTURE, VERTEBRA(E), INITIAL ENCOUNTER FOR FRACTURE (HCC): ICD-10-CM

## 2023-09-18 DIAGNOSIS — S22.060A COMPRESSION FRACTURE OF T7 VERTEBRA, INITIAL ENCOUNTER (HCC): Primary | ICD-10-CM

## 2023-09-18 PROCEDURE — 96372 THER/PROPH/DIAG INJ SC/IM: CPT

## 2023-09-18 PROCEDURE — 6360000002 HC RX W HCPCS: Performed by: INTERNAL MEDICINE

## 2023-09-18 RX ORDER — ALBUTEROL SULFATE 90 UG/1
4 AEROSOL, METERED RESPIRATORY (INHALATION) PRN
OUTPATIENT
Start: 2023-10-16

## 2023-09-18 RX ORDER — ACETAMINOPHEN 325 MG/1
650 TABLET ORAL
OUTPATIENT
Start: 2023-10-16

## 2023-09-18 RX ORDER — SODIUM CHLORIDE 9 MG/ML
INJECTION, SOLUTION INTRAVENOUS CONTINUOUS
OUTPATIENT
Start: 2023-10-16

## 2023-09-18 RX ORDER — ONDANSETRON 2 MG/ML
8 INJECTION INTRAMUSCULAR; INTRAVENOUS
OUTPATIENT
Start: 2023-10-16

## 2023-09-18 RX ORDER — EPINEPHRINE 1 MG/ML
0.3 INJECTION, SOLUTION INTRAMUSCULAR; SUBCUTANEOUS PRN
OUTPATIENT
Start: 2023-10-16

## 2023-09-18 RX ORDER — DIPHENHYDRAMINE HYDROCHLORIDE 50 MG/ML
50 INJECTION INTRAMUSCULAR; INTRAVENOUS
OUTPATIENT
Start: 2023-10-16

## 2023-09-18 RX ADMIN — ROMOSOZUMAB-AQQG 105 MG: 105 INJECTION, SOLUTION SUBCUTANEOUS at 11:31

## 2023-09-18 ASSESSMENT — PAIN - FUNCTIONAL ASSESSMENT: PAIN_FUNCTIONAL_ASSESSMENT: 0-10

## 2023-09-18 NOTE — DISCHARGE INSTRUCTIONS
Next appointment is scheduled for October 16 at 11:30    Please call outpatient nursing the morning of your appointment at [phone]    [facility] instructions    Notify your doctor with any:   signs of jaw swelling, jaw pain  Increased pain in hips, groin, thighs,   Any signs of stroke or heart attack    Activity: continue usual care with your doctor.   Call your doctor immediately with any severe problems or go to the nearest emergency room

## 2023-09-18 NOTE — PROGRESS NOTES
1123 Patient arrived to [facility] ambulatory for evenity. Oriented to room and call light  PT RIGHTS AND RESPONSIBILITIES OFFERED TO PT.     1131 Injections given and she denies complaints. Discharge instructions given and explained and she denies questions.   Discharged ambulatory       _M___ Safety:       (Environmental)  Louisa to environment  Ensure ID band is correct and in place/ allergy band as needed  Assess for fall risk  Initiate fall precautions as applicable (fall band, side rails, etc.)  Call light within reach  Bed in low position/ wheels locked    __M__ Pain:       Assess pain level and characteristics  Administer analgesics as ordered  Assess effectiveness of pain management and report to MD as needed    _M___ Knowledge Deficit:  Assess baseline knowledge  Provide teaching at level of understanding  Provide teaching via preferred learning method  Evaluate teaching effectiveness    _M___ Hemodynamic/Respiratory Status:       (Pre and Post Procedure Monitoring)  Assess/Monitor vital signs and LOC  Assess Baseline SpO2 prior to any sedation  Obtain weight/height  Assess vital signs/ LOC until patient meets discharge criteria  Monitor procedure site and notify MD of any issues

## 2023-09-19 ENCOUNTER — OFFICE VISIT (OUTPATIENT)
Dept: FAMILY MEDICINE CLINIC | Age: 63
End: 2023-09-19
Payer: MEDICARE

## 2023-09-19 VITALS
RESPIRATION RATE: 12 BRPM | WEIGHT: 164.2 LBS | DIASTOLIC BLOOD PRESSURE: 70 MMHG | HEART RATE: 85 BPM | HEIGHT: 62 IN | OXYGEN SATURATION: 97 % | TEMPERATURE: 98.2 F | BODY MASS INDEX: 30.22 KG/M2 | SYSTOLIC BLOOD PRESSURE: 118 MMHG

## 2023-09-19 DIAGNOSIS — R73.9 HYPERGLYCEMIA: ICD-10-CM

## 2023-09-19 DIAGNOSIS — M89.9 DISORDER OF BONE, UNSPECIFIED: ICD-10-CM

## 2023-09-19 DIAGNOSIS — B00.9 RECURRENT HERPES SIMPLEX: ICD-10-CM

## 2023-09-19 DIAGNOSIS — Z23 NEED FOR INFLUENZA VACCINATION: ICD-10-CM

## 2023-09-19 DIAGNOSIS — Z92.25 PERSONAL HISTORY OF IMMUNOSUPRESSION THERAPY: ICD-10-CM

## 2023-09-19 DIAGNOSIS — Z00.00 INITIAL MEDICARE ANNUAL WELLNESS VISIT: Primary | ICD-10-CM

## 2023-09-19 DIAGNOSIS — E55.9 VITAMIN D INSUFFICIENCY: ICD-10-CM

## 2023-09-19 DIAGNOSIS — Z12.31 ENCOUNTER FOR SCREENING MAMMOGRAM FOR BREAST CANCER: ICD-10-CM

## 2023-09-19 PROBLEM — I26.99 PULMONARY EMBOLISM (HCC): Status: RESOLVED | Noted: 2023-04-19 | Resolved: 2023-09-19

## 2023-09-19 PROCEDURE — G0438 PPPS, INITIAL VISIT: HCPCS

## 2023-09-19 PROCEDURE — 3017F COLORECTAL CA SCREEN DOC REV: CPT

## 2023-09-19 RX ORDER — ERGOCALCIFEROL 1.25 MG/1
50000 CAPSULE ORAL WEEKLY
Qty: 4 CAPSULE | Refills: 1 | Status: SHIPPED | OUTPATIENT
Start: 2023-09-19

## 2023-09-19 RX ORDER — VALACYCLOVIR HYDROCHLORIDE 500 MG/1
TABLET, FILM COATED ORAL
Qty: 120 TABLET | Refills: 0 | Status: SHIPPED | OUTPATIENT
Start: 2023-09-19 | End: 2024-01-07

## 2023-09-19 ASSESSMENT — PATIENT HEALTH QUESTIONNAIRE - PHQ9
5. POOR APPETITE OR OVEREATING: 0
SUM OF ALL RESPONSES TO PHQ QUESTIONS 1-9: 9
7. TROUBLE CONCENTRATING ON THINGS, SUCH AS READING THE NEWSPAPER OR WATCHING TELEVISION: 3
8. MOVING OR SPEAKING SO SLOWLY THAT OTHER PEOPLE COULD HAVE NOTICED. OR THE OPPOSITE, BEING SO FIGETY OR RESTLESS THAT YOU HAVE BEEN MOVING AROUND A LOT MORE THAN USUAL: 0
SUM OF ALL RESPONSES TO PHQ QUESTIONS 1-9: 9
3. TROUBLE FALLING OR STAYING ASLEEP: 0
4. FEELING TIRED OR HAVING LITTLE ENERGY: 3
SUM OF ALL RESPONSES TO PHQ QUESTIONS 1-9: 9
1. LITTLE INTEREST OR PLEASURE IN DOING THINGS: 3
9. THOUGHTS THAT YOU WOULD BE BETTER OFF DEAD, OR OF HURTING YOURSELF: 0
SUM OF ALL RESPONSES TO PHQ9 QUESTIONS 1 & 2: 3
SUM OF ALL RESPONSES TO PHQ QUESTIONS 1-9: 9
2. FEELING DOWN, DEPRESSED OR HOPELESS: 0
6. FEELING BAD ABOUT YOURSELF - OR THAT YOU ARE A FAILURE OR HAVE LET YOURSELF OR YOUR FAMILY DOWN: 0
10. IF YOU CHECKED OFF ANY PROBLEMS, HOW DIFFICULT HAVE THESE PROBLEMS MADE IT FOR YOU TO DO YOUR WORK, TAKE CARE OF THINGS AT HOME, OR GET ALONG WITH OTHER PEOPLE: 0

## 2023-09-19 ASSESSMENT — LIFESTYLE VARIABLES
HOW MANY STANDARD DRINKS CONTAINING ALCOHOL DO YOU HAVE ON A TYPICAL DAY: PATIENT DOES NOT DRINK
HOW OFTEN DO YOU HAVE A DRINK CONTAINING ALCOHOL: NEVER

## 2023-09-19 NOTE — PROGRESS NOTES
Medicare Annual Wellness Visit    Silke Phelps is here for Medicare AWV    Assessment & Plan   Initial Medicare annual wellness visit  Disorder of bone, unspecified  -     vitamin D (ERGOCALCIFEROL) 1.25 MG (38201 UT) CAPS capsule; Take 1 capsule by mouth once a week, Disp-4 capsule, R-1Normal  Vitamin D insufficiency  -     vitamin D (ERGOCALCIFEROL) 1.25 MG (61967 UT) CAPS capsule; Take 1 capsule by mouth once a week, Disp-4 capsule, R-1Normal  Encounter for screening mammogram for breast cancer  -     [facility] Digital Screen Bilateral [[id number]]; Future  Hyperglycemia  -     Hemoglobin A1C; Future  Need for influenza vaccination  Recurrent herpes simplex  -     valACYclovir (VALTREX) 500 MG tablet; Take 1 tablet by mouth 2 times daily for 10 days, THEN 1 tablet daily. , Disp-120 tablet, R-0Normal  Personal history of immunosupression therapy  -     valACYclovir (VALTREX) 500 MG tablet; Take 1 tablet by mouth 2 times daily for 10 days, THEN 1 tablet daily. , Disp-120 tablet, R-0Normal    Recommendations for Preventive Services Due: see orders and patient instructions/AVS.  Recommended screening schedule for the next 5-10 years is provided to the patient in written form: see Patient Instructions/AVS.     Return if symptoms worsen or fail to improve. Keep scheduled follow up on 10/6/2023 with PCP. Subjective   The following acute and/or chronic problems were also addressed today: Flare of HSV-2 genital. Chronic concerns including vit d def and bone disorder, mammogram screening needs. Patient's complete Health Risk Assessment and screening values have been reviewed and are found in Flowsheets. The following problems were reviewed today and where indicated follow up appointments were made and/or referrals ordered.     Positive Risk Factor Screenings with Interventions:    Fall Risk:  Do you feel unsteady or are you worried about falling? : (!) yes  2 or more falls in past year?: (!) yes  Fall with injury in past year?:

## 2023-09-19 NOTE — PROGRESS NOTES
Pharmacy Drug Information Request Note    Provided drug information on the following medications by request of the care team. Reviewed indication, dosing, directions and most common side effects. Valtrex    Question- what is the treatment and maintenance dose for Valtrex for treatment of genital herpes in an immunocompromised patient on chronic methotrexate? Treatment dosing is valacyclovir 1 g PO BID x 7-10 days  Suppressive therapy is valacyclovir 500 mg- 1 g PO once daily.       Derrick Schwartz, AbimaelD  PGY1 Resident

## 2023-09-19 NOTE — PROGRESS NOTES
Health Maintenance Due   Topic Date Due    Diabetes screen  Never done    Colorectal Cancer Screen  Never done    COVID-19 Vaccine (4 - Mervat Medley series) 12/16/2021    Annual Wellness Visit (AWV)  Never done    Flu vaccine (1) 08/01/2023    Breast cancer screen  10/18/2023

## 2023-09-19 NOTE — PROGRESS NOTES
After pharmacist chart review, the following recommendations are made:    1) Pharmacy asked for dosing on Valtrex for treatment and suppressive therapy. Recommended valacyclovir 1 g PO BID x 7-10 days followed by 500 mg PO once daily.     For Pharmacy Admin Tracking Only    Program: Medical Group  CPA in place:  Yes  Recommendation Provided To: Provider: 1 via Verbally to provider  Intervention Detail: New Rx: 1, reason: Needs Additional Therapy  Intervention Accepted By: Provider: 1  Gap Closed?: No   Time Spent (min): [address], PharmD  PGY1 Resident

## 2023-09-26 DIAGNOSIS — G89.29 CHRONIC BILATERAL LOW BACK PAIN WITHOUT SCIATICA: ICD-10-CM

## 2023-09-26 DIAGNOSIS — L40.50 PSORIATIC ARTHRITIS (HCC): Primary | ICD-10-CM

## 2023-09-26 DIAGNOSIS — M54.50 CHRONIC BILATERAL LOW BACK PAIN WITHOUT SCIATICA: ICD-10-CM

## 2023-09-26 DIAGNOSIS — Z51.81 MEDICATION MONITORING ENCOUNTER: ICD-10-CM

## 2023-09-26 RX ORDER — ADALIMUMAB-BWWD 40 MG/.4ML
40 SOLUTION SUBCUTANEOUS
Qty: 2 ML | Refills: 11 | Status: ACTIVE | OUTPATIENT
Start: 2023-09-26

## 2023-09-26 NOTE — PROGRESS NOTES
Diagnosis Orders   1. Psoriatic arthritis ([address])  Adalimumab-bwwd (Rosangela Hews) 40 MG/0.4ML SOAJ      2. Chronic bilateral low back pain without sciatica  Adalimumab-bwwd (HADLIMA PUSHTOUCH) 40 MG/0.4ML SOAJ      3.  Medication monitoring encounter  Adalimumab-bwwd (Rosangela Hews) 40 MG/0.4ML SOAJ

## 2023-09-28 ENCOUNTER — TELEPHONE (OUTPATIENT)
Dept: NEUROLOGY | Age: 63
End: 2023-09-28

## 2023-09-28 ENCOUNTER — NURSE ONLY (OUTPATIENT)
Dept: LAB | Age: 63
End: 2023-09-28

## 2023-09-28 DIAGNOSIS — L40.50 PSORIATIC ARTHRITIS (HCC): ICD-10-CM

## 2023-09-28 DIAGNOSIS — R73.9 HYPERGLYCEMIA: ICD-10-CM

## 2023-09-28 DIAGNOSIS — Z51.81 MEDICATION MONITORING ENCOUNTER: ICD-10-CM

## 2023-09-28 LAB
ALBUMIN SERPL BCG-MCNC: 4.3 G/DL (ref 3.5–5.1)
ALP SERPL-CCNC: 167 U/L (ref 38–126)
ALT SERPL W/O P-5'-P-CCNC: 17 U/L (ref 11–66)
ANION GAP SERPL CALC-SCNC: 11 MEQ/L (ref 8–16)
AST SERPL-CCNC: 15 U/L (ref 5–40)
BASOPHILS ABSOLUTE: 0.1 THOU/MM3 (ref 0–0.1)
BASOPHILS NFR BLD AUTO: 0.9 %
BILIRUB SERPL-MCNC: 0.2 MG/DL (ref 0.3–1.2)
BUN SERPL-MCNC: 17 MG/DL (ref 7–22)
CALCIUM SERPL-MCNC: 9.2 MG/DL (ref 8.5–10.5)
CHLORIDE SERPL-SCNC: 105 MEQ/L (ref 98–111)
CO2 SERPL-SCNC: 22 MEQ/L (ref 23–33)
CREAT SERPL-MCNC: 0.7 MG/DL (ref 0.4–1.2)
CRP SERPL-MCNC: < 0.3 MG/DL (ref 0–1)
DEPRECATED MEAN GLUCOSE BLD GHB EST-ACNC: 108 MG/DL (ref 70–126)
DEPRECATED RDW RBC AUTO: 46.4 FL (ref 35–45)
EOSINOPHIL NFR BLD AUTO: 1.4 %
EOSINOPHILS ABSOLUTE: 0.1 THOU/MM3 (ref 0–0.4)
ERYTHROCYTE [DISTWIDTH] IN BLOOD BY AUTOMATED COUNT: 14 % (ref 11.5–14.5)
ERYTHROCYTE [SEDIMENTATION RATE] IN BLOOD BY WESTERGREN METHOD: 12 MM/HR (ref 0–20)
GFR SERPL CREATININE-BSD FRML MDRD: > 60 ML/MIN/1.73M2
GLUCOSE SERPL-MCNC: 92 MG/DL (ref 70–108)
HBA1C MFR BLD HPLC: 5.6 % (ref 4.4–6.4)
HCT VFR BLD AUTO: 41.6 % (ref 37–47)
HGB BLD-MCNC: 13.5 GM/DL (ref 12–16)
IMM GRANULOCYTES # BLD AUTO: 0.01 THOU/MM3 (ref 0–0.07)
IMM GRANULOCYTES NFR BLD AUTO: 0.2 %
LYMPHOCYTES ABSOLUTE: 2.3 THOU/MM3 (ref 1–4.8)
LYMPHOCYTES NFR BLD AUTO: 33.6 %
MCH RBC QN AUTO: 29.7 PG (ref 26–33)
MCHC RBC AUTO-ENTMCNC: 32.5 GM/DL (ref 32.2–35.5)
MCV RBC AUTO: 91.4 FL (ref 81–99)
MONOCYTES ABSOLUTE: 0.2 THOU/MM3 (ref 0.4–1.3)
MONOCYTES NFR BLD AUTO: 3.6 %
NEUTROPHILS NFR BLD AUTO: 60.3 %
NRBC BLD AUTO-RTO: 0 /100 WBC
PLATELET # BLD AUTO: 302 THOU/MM3 (ref 130–400)
PMV BLD AUTO: 10.9 FL (ref 9.4–12.4)
POTASSIUM SERPL-SCNC: 3.8 MEQ/L (ref 3.5–5.2)
PROT SERPL-MCNC: 6.6 G/DL (ref 6.1–8)
RBC # BLD AUTO: 4.55 MILL/MM3 (ref 4.2–5.4)
SEGMENTED NEUTROPHILS ABSOLUTE COUNT: 4 THOU/MM3 (ref 1.8–7.7)
SODIUM SERPL-SCNC: 138 MEQ/L (ref 135–145)
WBC # BLD AUTO: 6.7 THOU/MM3 (ref 4.8–10.8)

## 2023-09-29 DIAGNOSIS — G43.409 HEMIPLEGIC MIGRAINE WITHOUT STATUS MIGRAINOSUS, NOT INTRACTABLE: ICD-10-CM

## 2023-09-29 DIAGNOSIS — G43.909 MIGRAINE WITHOUT STATUS MIGRAINOSUS, NOT INTRACTABLE, UNSPECIFIED MIGRAINE TYPE: ICD-10-CM

## 2023-09-29 DIAGNOSIS — G43.109 MIGRAINE WITH AURA AND WITHOUT STATUS MIGRAINOSUS, NOT INTRACTABLE: ICD-10-CM

## 2023-09-29 DIAGNOSIS — G25.81 RESTLESS LEGS: ICD-10-CM

## 2023-09-29 RX ORDER — TOPIRAMATE 100 MG/1
TABLET, FILM COATED ORAL
Qty: 180 TABLET | Refills: 1 | OUTPATIENT
Start: 2023-09-29

## 2023-10-02 ENCOUNTER — PATIENT MESSAGE (OUTPATIENT)
Dept: PHYSICAL MEDICINE AND REHAB | Age: 63
End: 2023-10-02

## 2023-10-02 ENCOUNTER — PATIENT MESSAGE (OUTPATIENT)
Dept: FAMILY MEDICINE CLINIC | Age: 63
End: 2023-10-02

## 2023-10-02 DIAGNOSIS — S22.000A COMPRESSION FRACTURE OF BODY OF THORACIC VERTEBRA (HCC): ICD-10-CM

## 2023-10-02 DIAGNOSIS — G43.109 MIGRAINE WITH AURA AND WITHOUT STATUS MIGRAINOSUS, NOT INTRACTABLE: ICD-10-CM

## 2023-10-02 RX ORDER — ONDANSETRON 4 MG/1
4 TABLET, FILM COATED ORAL 3 TIMES DAILY PRN
Qty: 30 TABLET | Refills: 0 | Status: SHIPPED | OUTPATIENT
Start: 2023-10-02

## 2023-10-02 RX ORDER — TOPIRAMATE 100 MG/1
TABLET, FILM COATED ORAL
Qty: 180 TABLET | Refills: 1 | Status: SHIPPED | OUTPATIENT
Start: 2023-10-02

## 2023-10-02 RX ORDER — BUPRENORPHINE 15 UG/H
1 PATCH TRANSDERMAL WEEKLY
Qty: 4 PATCH | Refills: 0 | Status: SHIPPED | OUTPATIENT
Start: 2023-10-02 | End: 2023-11-01

## 2023-10-02 RX ORDER — PRAMIPEXOLE DIHYDROCHLORIDE 0.12 MG/1
0.25 TABLET ORAL 2 TIMES DAILY
Qty: 120 TABLET | Refills: 3 | Status: SHIPPED | OUTPATIENT
Start: 2023-10-02

## 2023-10-02 NOTE — TELEPHONE ENCOUNTER
OARRS reviewed. UDS: + for  no data. Last seen: 8/23/2023.  Follow-up:   Future Appointments   Date Time Provider [address]   10/6/2023 10:00 AM DO ROMELIA EvangelistaX FM RES [facility]   10/11/2023 11:00 AM ESTEFANIA Bernstein CNP N SRPX Rheum [facility]   10/16/2023 11:30 AM STR RECOVERY  ROOM C STRZ OP NURS [place] [facility] [place] [facility]\""   11/16/2023  2:00 PM DO JAIMIE Fish SRPX Pain [facility]

## 2023-10-02 NOTE — TELEPHONE ENCOUNTER
From: Aletha De La Fuente  To: Keo Bah DO  Sent: 10/2/2023 10:01 AM EDT  Subject: Topiramate refill    Hi, I need a refill of topiramate . .. I had it filled here once when I was in the hospital and it was filled by Dr Kary White,   Neurologist, he will not treat my complicated/hemiplegic migraines. I don't see him for neurology. Dr. Malik Galicia referred me to Moraima Hagen in [facility] and I have an appointment with her on October 17th, have waited 6 months for appointment, so if you would kindly fill my topiramate to last me until then It wouldl really be appreciated.  Thanks much

## 2023-10-02 NOTE — TELEPHONE ENCOUNTER
Patient's last appointment was : 9/19/2023 with our   Patient's next appointment is : 10/6/2023 with our . Jeanne Garg does the script need sent to: [facility]/pharmacy #[id number]- Closed - [place], [place] - [address] -     Lab Results   Component Value Date    LABA1C 5.6 09/28/2023     Lab Results   Component Value Date    CHOL 210 (H) 04/15/2021    TRIG 122 04/15/2021    HDL 49 04/17/2023    LDLCALC 168 04/17/2023     Lab Results   Component Value Date     09/28/2023    K 3.8 09/28/2023     09/28/2023    CO2 22 (L) 09/28/2023    BUN 17 09/28/2023    CREATININE 0.7 09/28/2023    GLUCOSE 92 09/28/2023    CALCIUM 9.2 09/28/2023    PROT 6.6 09/28/2023    LABALBU 4.3 09/28/2023    BILITOT 0.2 (L) 09/28/2023    ALKPHOS 167 (H) 09/28/2023    AST 15 09/28/2023    ALT 17 09/28/2023    LABGLOM >60 09/28/2023    GFRAA >60 02/19/2021    AGRATIO 2.9 (H) 02/05/2021     Lab Results   Component Value Date    TSH 1.440 04/17/2023    FT3 2.56 07/15/2021    T4FREE 1.43 04/17/2023     Lab Results   Component Value Date    WBC 6.7 09/28/2023    HGB 13.5 09/28/2023    HCT 41.6 09/28/2023    MCV 91.4 09/28/2023     09/28/2023

## 2023-10-02 NOTE — TELEPHONE ENCOUNTER
Patient's last appointment was : 9/19/2023 with our   Patient's next appointment is : 10/6/2023 with our Dr. Sharpe   Last refilled on: 7/30/21        Lab Results   Component Value Date    LABA1C 5.6 09/28/2023     Lab Results   Component Value Date    CHOL 210 (H) 04/15/2021    TRIG 122 04/15/2021    HDL 49 04/17/2023    LDLCALC 168 04/17/2023     Lab Results   Component Value Date     09/28/2023    K 3.8 09/28/2023     09/28/2023    CO2 22 (L) 09/28/2023    BUN 17 09/28/2023    CREATININE 0.7 09/28/2023    GLUCOSE 92 09/28/2023    CALCIUM 9.2 09/28/2023    PROT 6.6 09/28/2023    LABALBU 4.3 09/28/2023    BILITOT 0.2 (L) 09/28/2023    ALKPHOS 167 (H) 09/28/2023    AST 15 09/28/2023    ALT 17 09/28/2023    LABGLOM >60 09/28/2023    GFRAA >60 02/19/2021    AGRATIO 2.9 (H) 02/05/2021     Lab Results   Component Value Date    TSH 1.440 04/17/2023    FT3 2.56 07/15/2021    T4FREE 1.43 04/17/2023     Lab Results   Component Value Date    WBC 6.7 09/28/2023    HGB 13.5 09/28/2023    HCT 41.6 09/28/2023    MCV 91.4 09/28/2023     09/28/2023

## 2023-10-06 ENCOUNTER — TELEPHONE (OUTPATIENT)
Dept: FAMILY MEDICINE CLINIC | Age: 63
End: 2023-10-06

## 2023-10-06 NOTE — TELEPHONE ENCOUNTER
----- Message from Rosyflo sent at 10/6/2023  8:23 AM EDT -----  Subject: Appointment Request    Reason for Call: Established Patient Appointment needed: Routine Existing   Condition Follow Up    QUESTIONS    Reason for appointment request? No appointments available during search     Additional Information for Provider? Patient needs to reschedule her f/u   appt for disability paperwork and needs an appt next week due to the   deadline of her paperwork that needs to be submitted before 10/17. No   appts were available and she said Dr. Aniyah Lao is the one who knows her   history and what's going on.  Please call to r/s  ---------------------------------------------------------------------------  --------------  [address]  [phone]; OK to leave message on voicemail  ---------------------------------------------------------------------------  --------------  SCRIPT ANSWERS

## 2023-10-06 NOTE — TELEPHONE ENCOUNTER
Subject: Appointment Request     Reason for Call: Established Patient Appointment needed: Routine Existing   Condition Follow Up     QUESTIONS     Reason for appointment request? No appointments available during search       Additional Information for Provider? Patient needs to reschedule her f/u   appt for disability paperwork and needs an appt next week due to the   deadline of her paperwork that needs to be submitted before 10/17. No   appts were available and she said Dr. Alban Claudio is the one who knows her   history and what's going on.  Please call to r/s       Patient scheduled for 10/13

## 2023-10-08 ENCOUNTER — HOSPITAL ENCOUNTER (EMERGENCY)
Age: 63
Discharge: HOME | End: 2023-10-08
Attending: EMERGENCY MEDICINE
Payer: MEDICARE

## 2023-10-08 ENCOUNTER — APPOINTMENT (OUTPATIENT)
Dept: GENERAL RADIOLOGY | Age: 63
End: 2023-10-08
Payer: MEDICARE

## 2023-10-08 ENCOUNTER — APPOINTMENT (OUTPATIENT)
Dept: CT IMAGING | Age: 63
End: 2023-10-08
Payer: MEDICARE

## 2023-10-08 VITALS
OXYGEN SATURATION: 98 % | SYSTOLIC BLOOD PRESSURE: 139 MMHG | DIASTOLIC BLOOD PRESSURE: 83 MMHG | TEMPERATURE: 98.1 F | BODY MASS INDEX: 29.8 KG/M2 | WEIGHT: 163 LBS | HEART RATE: 82 BPM | RESPIRATION RATE: 16 BRPM

## 2023-10-08 DIAGNOSIS — R11.2 NAUSEA VOMITING AND DIARRHEA: ICD-10-CM

## 2023-10-08 DIAGNOSIS — R51.9 ACUTE NONINTRACTABLE HEADACHE, UNSPECIFIED HEADACHE TYPE: Primary | ICD-10-CM

## 2023-10-08 DIAGNOSIS — R19.7 NAUSEA VOMITING AND DIARRHEA: ICD-10-CM

## 2023-10-08 LAB
ALBUMIN SERPL BCG-MCNC: 4.3 G/DL (ref 3.5–5.1)
ALP SERPL-CCNC: 173 U/L (ref 38–126)
ALT SERPL W/O P-5'-P-CCNC: 18 U/L (ref 11–66)
ANION GAP SERPL CALC-SCNC: 13 MEQ/L (ref 8–16)
AST SERPL-CCNC: 16 U/L (ref 5–40)
BACTERIA URNS QL MICRO: ABNORMAL /HPF
BASOPHILS ABSOLUTE: 0 THOU/MM3 (ref 0–0.1)
BASOPHILS NFR BLD AUTO: 0.4 %
BILIRUB SERPL-MCNC: 0.3 MG/DL (ref 0.3–1.2)
BILIRUB UR QL STRIP.AUTO: NEGATIVE
BUN SERPL-MCNC: 12 MG/DL (ref 7–22)
CALCIUM SERPL-MCNC: 9.3 MG/DL (ref 8.5–10.5)
CASTS #/AREA URNS LPF: ABNORMAL /LPF
CASTS 2: ABNORMAL /LPF
CHARACTER UR: CLEAR
CHLORIDE SERPL-SCNC: 110 MEQ/L (ref 98–111)
CO2 SERPL-SCNC: 20 MEQ/L (ref 23–33)
COLOR: YELLOW
CREAT SERPL-MCNC: 0.7 MG/DL (ref 0.4–1.2)
CRYSTALS URNS MICRO: ABNORMAL
DEPRECATED RDW RBC AUTO: 47.8 FL (ref 35–45)
EOSINOPHIL NFR BLD AUTO: 0.4 %
EOSINOPHILS ABSOLUTE: 0 THOU/MM3 (ref 0–0.4)
EPITHELIAL CELLS, UA: ABNORMAL /HPF
ERYTHROCYTE [DISTWIDTH] IN BLOOD BY AUTOMATED COUNT: 14.1 % (ref 11.5–14.5)
FLUAV RNA RESP QL NAA+PROBE: NOT DETECTED
FLUBV RNA RESP QL NAA+PROBE: NOT DETECTED
GFR SERPL CREATININE-BSD FRML MDRD: > 60 ML/MIN/1.73M2
GLUCOSE SERPL-MCNC: 136 MG/DL (ref 70–108)
GLUCOSE UR QL STRIP.AUTO: NEGATIVE MG/DL
HCT VFR BLD AUTO: 43.6 % (ref 37–47)
HGB BLD-MCNC: 14 GM/DL (ref 12–16)
HGB UR QL STRIP.AUTO: ABNORMAL
IMM GRANULOCYTES # BLD AUTO: 0.01 THOU/MM3 (ref 0–0.07)
IMM GRANULOCYTES NFR BLD AUTO: 0.2 %
KETONES UR QL STRIP.AUTO: 40
LIPASE SERPL-CCNC: 26.2 U/L (ref 5.6–51.3)
LYMPHOCYTES ABSOLUTE: 1.3 THOU/MM3 (ref 1–4.8)
LYMPHOCYTES NFR BLD AUTO: 26.1 %
MAGNESIUM SERPL-MCNC: 2 MG/DL (ref 1.6–2.4)
MCH RBC QN AUTO: 29.7 PG (ref 26–33)
MCHC RBC AUTO-ENTMCNC: 32.1 GM/DL (ref 32.2–35.5)
MCV RBC AUTO: 92.6 FL (ref 81–99)
MISCELLANEOUS 2: ABNORMAL
MONOCYTES ABSOLUTE: 0.2 THOU/MM3 (ref 0.4–1.3)
MONOCYTES NFR BLD AUTO: 3.5 %
MUCOUS THREADS URNS QL MICRO: ABNORMAL
NEUTROPHILS NFR BLD AUTO: 69.4 %
NITRITE UR QL STRIP: NEGATIVE
NRBC BLD AUTO-RTO: 0 /100 WBC
OSMOLALITY SERPL CALC.SUM OF ELEC: 286.8 MOSMOL/KG (ref 275–300)
PH UR STRIP.AUTO: 7 [PH] (ref 5–9)
PLATELET # BLD AUTO: 277 THOU/MM3 (ref 130–400)
PMV BLD AUTO: 10.4 FL (ref 9.4–12.4)
POTASSIUM SERPL-SCNC: 3.8 MEQ/L (ref 3.5–5.2)
PROT SERPL-MCNC: 6.8 G/DL (ref 6.1–8)
PROT UR STRIP.AUTO-MCNC: 30 MG/DL
RBC # BLD AUTO: 4.71 MILL/MM3 (ref 4.2–5.4)
RBC URINE: ABNORMAL /HPF
RENAL EPI CELLS #/AREA URNS HPF: ABNORMAL /[HPF]
SARS-COV-2 RNA RESP QL NAA+PROBE: NOT DETECTED
SEGMENTED NEUTROPHILS ABSOLUTE COUNT: 3.5 THOU/MM3 (ref 1.8–7.7)
SODIUM SERPL-SCNC: 143 MEQ/L (ref 135–145)
SP GR UR REFRACT.AUTO: 1.02 (ref 1–1.03)
UROBILINOGEN, URINE: 0.2 EU/DL (ref 0–1)
WBC # BLD AUTO: 5.1 THOU/MM3 (ref 4.8–10.8)
WBC #/AREA URNS HPF: ABNORMAL /HPF
WBC #/AREA URNS HPF: NEGATIVE /[HPF]
YEAST LIKE FUNGI URNS QL MICRO: ABNORMAL

## 2023-10-08 PROCEDURE — 36415 COLL VENOUS BLD VENIPUNCTURE: CPT

## 2023-10-08 PROCEDURE — 83735 ASSAY OF MAGNESIUM: CPT

## 2023-10-08 PROCEDURE — 80053 COMPREHEN METABOLIC PANEL: CPT

## 2023-10-08 PROCEDURE — 96375 TX/PRO/DX INJ NEW DRUG ADDON: CPT

## 2023-10-08 PROCEDURE — 96361 HYDRATE IV INFUSION ADD-ON: CPT

## 2023-10-08 PROCEDURE — 87636 SARSCOV2 & INF A&B AMP PRB: CPT

## 2023-10-08 PROCEDURE — 2500000003 HC RX 250 WO HCPCS: Performed by: EMERGENCY MEDICINE

## 2023-10-08 PROCEDURE — 83690 ASSAY OF LIPASE: CPT

## 2023-10-08 PROCEDURE — 85025 COMPLETE CBC W/AUTO DIFF WBC: CPT

## 2023-10-08 PROCEDURE — 6370000000 HC RX 637 (ALT 250 FOR IP): Performed by: EMERGENCY MEDICINE

## 2023-10-08 PROCEDURE — 70450 CT HEAD/BRAIN W/O DYE: CPT

## 2023-10-08 PROCEDURE — 6360000002 HC RX W HCPCS: Performed by: EMERGENCY MEDICINE

## 2023-10-08 PROCEDURE — 71045 X-RAY EXAM CHEST 1 VIEW: CPT

## 2023-10-08 PROCEDURE — 2580000003 HC RX 258: Performed by: EMERGENCY MEDICINE

## 2023-10-08 PROCEDURE — A4216 STERILE WATER/SALINE, 10 ML: HCPCS | Performed by: EMERGENCY MEDICINE

## 2023-10-08 PROCEDURE — 99284 EMERGENCY DEPT VISIT MOD MDM: CPT

## 2023-10-08 PROCEDURE — 96374 THER/PROPH/DIAG INJ IV PUSH: CPT

## 2023-10-08 PROCEDURE — 81001 URINALYSIS AUTO W/SCOPE: CPT

## 2023-10-08 RX ORDER — PROCHLORPERAZINE EDISYLATE 5 MG/ML
10 INJECTION INTRAMUSCULAR; INTRAVENOUS ONCE
Status: COMPLETED | OUTPATIENT
Start: 2023-10-08 | End: 2023-10-08

## 2023-10-08 RX ORDER — 0.9 % SODIUM CHLORIDE 0.9 %
1000 INTRAVENOUS SOLUTION INTRAVENOUS ONCE
Status: COMPLETED | OUTPATIENT
Start: 2023-10-08 | End: 2023-10-08

## 2023-10-08 RX ORDER — HYDROCODONE BITARTRATE AND ACETAMINOPHEN 5; 325 MG/1; MG/1
1 TABLET ORAL ONCE
Status: DISCONTINUED | OUTPATIENT
Start: 2023-10-08 | End: 2023-10-08

## 2023-10-08 RX ORDER — ACETAMINOPHEN 500 MG
1000 TABLET ORAL ONCE
Status: COMPLETED | OUTPATIENT
Start: 2023-10-08 | End: 2023-10-08

## 2023-10-08 RX ADMIN — ACETAMINOPHEN 1000 MG: 500 TABLET ORAL at 17:22

## 2023-10-08 RX ADMIN — FAMOTIDINE 20 MG: 10 INJECTION, SOLUTION INTRAVENOUS at 17:25

## 2023-10-08 RX ADMIN — PROCHLORPERAZINE EDISYLATE 10 MG: 5 INJECTION INTRAMUSCULAR; INTRAVENOUS at 17:24

## 2023-10-08 RX ADMIN — SODIUM CHLORIDE 1000 ML: 9 INJECTION, SOLUTION INTRAVENOUS at 17:24

## 2023-10-08 ASSESSMENT — PAIN - FUNCTIONAL ASSESSMENT: PAIN_FUNCTIONAL_ASSESSMENT: 0-10

## 2023-10-08 ASSESSMENT — PAIN SCALES - GENERAL
PAINLEVEL_OUTOF10: 9
PAINLEVEL_OUTOF10: 2

## 2023-10-08 NOTE — DISCHARGE INSTRUCTIONS
You were seen at [facility] emergency department for diarrhea, nausea and vomiting, and headache. We did a CT scan of your head, which was normal.  You most likely have a viral GI infection. This should pass with time, it is important that you stay well-hydrated over the next few days. Please rest at home and if your symptoms get worse, do not hesitate to return to the emergency department. Follow-up with your primary doctor within the next week.

## 2023-10-08 NOTE — ED TRIAGE NOTES
Pt to ED with a headache and emesis. Pt states that she got a sudden headache a few days ago on the right side of her head and then projectile vomited. Pt states that she is now puking up bile. Pt reports feeling more tired than usual. VSS.

## 2023-10-09 ENCOUNTER — TELEPHONE (OUTPATIENT)
Dept: FAMILY MEDICINE CLINIC | Age: 63
End: 2023-10-09

## 2023-10-11 ENCOUNTER — OFFICE VISIT (OUTPATIENT)
Dept: RHEUMATOLOGY | Age: 63
End: 2023-10-11
Payer: MEDICARE

## 2023-10-11 VITALS
HEART RATE: 82 BPM | WEIGHT: 161.4 LBS | BODY MASS INDEX: 29.7 KG/M2 | HEIGHT: 62 IN | DIASTOLIC BLOOD PRESSURE: 84 MMHG | OXYGEN SATURATION: 96 % | SYSTOLIC BLOOD PRESSURE: 122 MMHG

## 2023-10-11 DIAGNOSIS — L40.50 PSORIATIC ARTHRITIS (HCC): Primary | ICD-10-CM

## 2023-10-11 DIAGNOSIS — S22.000A COMPRESSION FRACTURE OF BODY OF THORACIC VERTEBRA (HCC): ICD-10-CM

## 2023-10-11 DIAGNOSIS — M54.50 CHRONIC BILATERAL LOW BACK PAIN WITHOUT SCIATICA: ICD-10-CM

## 2023-10-11 DIAGNOSIS — Z51.81 MEDICATION MONITORING ENCOUNTER: ICD-10-CM

## 2023-10-11 DIAGNOSIS — M79.7 FIBROMYALGIA: ICD-10-CM

## 2023-10-11 DIAGNOSIS — G89.29 CHRONIC BILATERAL LOW BACK PAIN WITHOUT SCIATICA: ICD-10-CM

## 2023-10-11 DIAGNOSIS — M80.08XA AGE-RELATED OSTEOPOROSIS WITH CURRENT PATHOLOGICAL FRACTURE, VERTEBRA(E), INITIAL ENCOUNTER FOR FRACTURE (HCC): ICD-10-CM

## 2023-10-11 PROCEDURE — G8417 CALC BMI ABV UP PARAM F/U: HCPCS | Performed by: NURSE PRACTITIONER

## 2023-10-11 PROCEDURE — 99214 OFFICE O/P EST MOD 30 MIN: CPT | Performed by: NURSE PRACTITIONER

## 2023-10-11 PROCEDURE — 1036F TOBACCO NON-USER: CPT | Performed by: NURSE PRACTITIONER

## 2023-10-11 PROCEDURE — G8484 FLU IMMUNIZE NO ADMIN: HCPCS | Performed by: NURSE PRACTITIONER

## 2023-10-11 PROCEDURE — 3017F COLORECTAL CA SCREEN DOC REV: CPT | Performed by: NURSE PRACTITIONER

## 2023-10-11 PROCEDURE — G8427 DOCREV CUR MEDS BY ELIG CLIN: HCPCS | Performed by: NURSE PRACTITIONER

## 2023-10-11 RX ORDER — FOLIC ACID 1 MG/1
1 TABLET ORAL DAILY
Qty: 90 TABLET | Refills: 1 | Status: SHIPPED | OUTPATIENT
Start: 2023-10-11

## 2023-10-11 ASSESSMENT — ENCOUNTER SYMPTOMS
CONSTIPATION: 1
DIARRHEA: 0
EYE ITCHING: 0
TROUBLE SWALLOWING: 0
NAUSEA: 0
BACK PAIN: 0
SHORTNESS OF BREATH: 1
ABDOMINAL PAIN: 0
COUGH: 0
EYE PAIN: 0

## 2023-10-13 ENCOUNTER — OFFICE VISIT (OUTPATIENT)
Dept: FAMILY MEDICINE CLINIC | Age: 63
End: 2023-10-13

## 2023-10-13 VITALS
WEIGHT: 161 LBS | RESPIRATION RATE: 16 BRPM | HEART RATE: 76 BPM | DIASTOLIC BLOOD PRESSURE: 70 MMHG | BODY MASS INDEX: 29.63 KG/M2 | HEIGHT: 62 IN | TEMPERATURE: 98.2 F | OXYGEN SATURATION: 90 % | SYSTOLIC BLOOD PRESSURE: 116 MMHG

## 2023-10-13 DIAGNOSIS — I69.30 HISTORY OF CVA WITH RESIDUAL DEFICIT: ICD-10-CM

## 2023-10-13 DIAGNOSIS — J30.2 SEASONAL ALLERGIES: ICD-10-CM

## 2023-10-13 DIAGNOSIS — K59.03 THERAPEUTIC OPIOID INDUCED CONSTIPATION: Primary | ICD-10-CM

## 2023-10-13 DIAGNOSIS — L40.50 PSORIATIC ARTHRITIS (HCC): ICD-10-CM

## 2023-10-13 DIAGNOSIS — M89.9 DISORDER OF BONE, UNSPECIFIED: ICD-10-CM

## 2023-10-13 DIAGNOSIS — E55.9 VITAMIN D INSUFFICIENCY: ICD-10-CM

## 2023-10-13 DIAGNOSIS — R11.2 NAUSEA AND VOMITING, UNSPECIFIED VOMITING TYPE: ICD-10-CM

## 2023-10-13 DIAGNOSIS — G43.409 HEMIPLEGIC MIGRAINE WITHOUT STATUS MIGRAINOSUS, NOT INTRACTABLE: ICD-10-CM

## 2023-10-13 DIAGNOSIS — G43.909 MIGRAINE WITHOUT STATUS MIGRAINOSUS, NOT INTRACTABLE, UNSPECIFIED MIGRAINE TYPE: ICD-10-CM

## 2023-10-13 DIAGNOSIS — M80.00XS OSTEOPOROSIS WITH CURRENT PATHOLOGICAL FRACTURE, UNSPECIFIED OSTEOPOROSIS TYPE, SEQUELA: ICD-10-CM

## 2023-10-13 DIAGNOSIS — B00.9 RECURRENT HERPES SIMPLEX: ICD-10-CM

## 2023-10-13 DIAGNOSIS — Z92.25 PERSONAL HISTORY OF IMMUNOSUPRESSION THERAPY: ICD-10-CM

## 2023-10-13 DIAGNOSIS — T40.2X5A THERAPEUTIC OPIOID INDUCED CONSTIPATION: Primary | ICD-10-CM

## 2023-10-13 DIAGNOSIS — I25.119 ATHEROSCLEROSIS OF NATIVE CORONARY ARTERY OF NATIVE HEART WITH ANGINA PECTORIS (HCC): ICD-10-CM

## 2023-10-13 PROBLEM — E27.1 PRIMARY ADRENOCORTICAL INSUFFICIENCY (HCC): Status: RESOLVED | Noted: 2020-10-28 | Resolved: 2023-10-13

## 2023-10-13 RX ORDER — DOCUSATE SODIUM 100 MG/1
100 CAPSULE, LIQUID FILLED ORAL 2 TIMES DAILY
Qty: 180 CAPSULE | Refills: 0 | Status: SHIPPED | OUTPATIENT
Start: 2023-10-13 | End: 2024-01-11

## 2023-10-13 RX ORDER — ERGOCALCIFEROL 1.25 MG/1
50000 CAPSULE ORAL WEEKLY
Qty: 4 CAPSULE | Refills: 1 | Status: SHIPPED | OUTPATIENT
Start: 2023-10-13

## 2023-10-13 RX ORDER — GALCANEZUMAB 120 MG/ML
INJECTION, SOLUTION SUBCUTANEOUS
Status: CANCELLED | OUTPATIENT
Start: 2023-10-13

## 2023-10-13 RX ORDER — BUPROPION HYDROCHLORIDE 300 MG/1
TABLET ORAL
COMMUNITY
End: 2023-10-13

## 2023-10-13 RX ORDER — BUTALBITAL, ACETAMINOPHEN AND CAFFEINE 50; 325; 40 MG/1; MG/1; MG/1
1 TABLET ORAL EVERY 4 HOURS PRN
Qty: 20 TABLET | Refills: 0 | Status: SHIPPED | OUTPATIENT
Start: 2023-10-13

## 2023-10-13 RX ORDER — TIZANIDINE 2 MG/1
2 TABLET ORAL NIGHTLY PRN
Qty: 10 TABLET | Refills: 0 | Status: SHIPPED | OUTPATIENT
Start: 2023-10-13

## 2023-10-13 RX ORDER — CLOTRIMAZOLE AND BETAMETHASONE DIPROPIONATE 10; .64 MG/G; MG/G
CREAM TOPICAL PRN
Qty: 15 G | Refills: 1 | Status: SHIPPED | OUTPATIENT
Start: 2023-10-13

## 2023-10-13 RX ORDER — PROMETHAZINE HYDROCHLORIDE 25 MG/1
25 SUPPOSITORY RECTAL EVERY 6 HOURS PRN
Qty: 15 SUPPOSITORY | Refills: 0 | Status: SHIPPED | OUTPATIENT
Start: 2023-10-13 | End: 2023-10-13

## 2023-10-13 RX ORDER — ROSUVASTATIN CALCIUM 10 MG/1
10 TABLET, COATED ORAL NIGHTLY
Qty: 30 TABLET | Refills: 3 | Status: SHIPPED | OUTPATIENT
Start: 2023-10-13

## 2023-10-13 RX ORDER — PROMETHAZINE HYDROCHLORIDE 25 MG/1
25 SUPPOSITORY RECTAL EVERY 6 HOURS PRN
Qty: 30 SUPPOSITORY | Refills: 2 | Status: SHIPPED | OUTPATIENT
Start: 2023-10-13 | End: 2024-01-11

## 2023-10-13 RX ORDER — OMEPRAZOLE 40 MG/1
40 CAPSULE, DELAYED RELEASE ORAL
COMMUNITY

## 2023-10-13 RX ORDER — LORATADINE 10 MG/1
10 TABLET ORAL DAILY
Qty: 30 TABLET | Refills: 0 | Status: SHIPPED | OUTPATIENT
Start: 2023-10-13 | End: 2023-11-12

## 2023-10-13 NOTE — PROGRESS NOTES
I reviewed with the resident the medical history and the resident's findings on the physical examination. I discussed with the resident the patient's diagnosis and concur with the plan. GE Modifier added.

## 2023-10-13 NOTE — PROGRESS NOTES
Patient:Isha Durbin  YOB: 1960   YAM:[id number]    Subjective   65 y.o. female who has a past medical history of Adrenal abnormality ([address]), Allergic rhinitis, Angina at rest, Anxiety, Arthritis, Asthma, Cerebral artery occlusion with cerebral infarction [facility]), Cerebrovascular disease, Chronic back pain, Chronic sinusitis, Cognitive impairment, Depression, Fibromyalgia, Fracture, GERD (gastroesophageal reflux disease), Headache, History of degenerative disc disease, Hyperlipidemia, Irritable bowel syndrome, Lymphedema, May-Thurner syndrome, Neuropathy, Osteoarthritis, Peripheral vascular disease ([address]), PONV (postoperative nausea and vomiting), Positive BJ (antinuclear antibody), Psoriatic arthritis ([address]), PTSD (post-traumatic stress disorder), Restless legs syndrome, and Rheumatoid arteritis ([address]). She presents for the following: Follow-Up from Hospital (Wants to discuss phenergan suppositories for her headaches, due to tablets making her nausea. Wants 90 day )    ER follow up for hemiplegic migraines, needs paper work for disability completed. Of note wants Colace for constipation form Buprenorphine, as well daily vitamin D and crestor refills. PRN medication refills requested include Fioricet for hemiplegic migraines, and phenergan suppositories due to pill GI upset, requesting tizanidine refill for back pain s/p kyphoplasty in September . Medication Refill  This is a recurrent problem. The problem has been waxing and waning. The treatment provided significant relief. Review of Systems  Constitutional:  Negative for fever and weight loss. HENT:  Negative for hearing loss. Globus sensation   Eyes:  Positive for photophobia. Respiratory:  Negative for cough. Cardiovascular:  Negative for chest pain. Gastrointestinal:  Positive for dysphagia and heartburn. Negative for anal bleeding. Acid reflux   Genitourinary:  Negative for difficulty urinating.    Musculoskeletal:

## 2023-10-16 ENCOUNTER — HOSPITAL ENCOUNTER (OUTPATIENT)
Dept: NURSING | Age: 63
Discharge: HOME | End: 2023-10-16
Payer: MEDICARE

## 2023-10-16 VITALS
SYSTOLIC BLOOD PRESSURE: 122 MMHG | DIASTOLIC BLOOD PRESSURE: 58 MMHG | TEMPERATURE: 97.5 F | RESPIRATION RATE: 16 BRPM | HEART RATE: 77 BPM | OXYGEN SATURATION: 97 %

## 2023-10-16 DIAGNOSIS — S22.070A COMPRESSION FRACTURE OF T9 VERTEBRA, INITIAL ENCOUNTER (HCC): ICD-10-CM

## 2023-10-16 DIAGNOSIS — M80.08XA AGE-RELATED OSTEOPOROSIS WITH CURRENT PATHOLOGICAL FRACTURE, VERTEBRA(E), INITIAL ENCOUNTER FOR FRACTURE (HCC): ICD-10-CM

## 2023-10-16 DIAGNOSIS — S22.060A COMPRESSION FRACTURE OF T7 VERTEBRA, INITIAL ENCOUNTER (HCC): Primary | ICD-10-CM

## 2023-10-16 PROCEDURE — 6360000002 HC RX W HCPCS: Performed by: INTERNAL MEDICINE

## 2023-10-16 PROCEDURE — 96372 THER/PROPH/DIAG INJ SC/IM: CPT

## 2023-10-16 RX ORDER — ONDANSETRON 2 MG/ML
8 INJECTION INTRAMUSCULAR; INTRAVENOUS
OUTPATIENT
Start: 2023-11-13

## 2023-10-16 RX ORDER — SODIUM CHLORIDE 9 MG/ML
INJECTION, SOLUTION INTRAVENOUS CONTINUOUS
OUTPATIENT
Start: 2023-11-13

## 2023-10-16 RX ORDER — ALBUTEROL SULFATE 90 UG/1
4 AEROSOL, METERED RESPIRATORY (INHALATION) PRN
OUTPATIENT
Start: 2023-11-13

## 2023-10-16 RX ORDER — DIPHENHYDRAMINE HYDROCHLORIDE 50 MG/ML
50 INJECTION INTRAMUSCULAR; INTRAVENOUS
OUTPATIENT
Start: 2023-11-13

## 2023-10-16 RX ORDER — EPINEPHRINE 1 MG/ML
0.3 INJECTION, SOLUTION INTRAMUSCULAR; SUBCUTANEOUS PRN
OUTPATIENT
Start: 2023-11-13

## 2023-10-16 RX ORDER — ACETAMINOPHEN 325 MG/1
650 TABLET ORAL
OUTPATIENT
Start: 2023-11-13

## 2023-10-16 RX ADMIN — ROMOSOZUMAB-AQQG 105 MG: 105 INJECTION, SOLUTION SUBCUTANEOUS at 11:33

## 2023-10-16 ASSESSMENT — PAIN - FUNCTIONAL ASSESSMENT: PAIN_FUNCTIONAL_ASSESSMENT: 0-10

## 2023-10-16 NOTE — DISCHARGE INSTRUCTIONS
Next appointment is scheduled for November 13 at 11:30    Please call outpatient nursing the morning of your appointment at [phone]    [facility] instructions    Notify your doctor with any:   signs of jaw swelling, jaw pain  Increased pain in hips, groin, thighs,   Any signs of stroke or heart attack    Activity: continue usual care with your doctor.   Call your doctor immediately with any severe problems or go to the nearest emergency room

## 2023-10-29 DIAGNOSIS — Z51.81 MEDICATION MONITORING ENCOUNTER: ICD-10-CM

## 2023-10-29 DIAGNOSIS — L40.50 PSORIATIC ARTHRITIS (HCC): ICD-10-CM

## 2023-10-30 ENCOUNTER — PATIENT MESSAGE (OUTPATIENT)
Dept: FAMILY MEDICINE CLINIC | Age: 63
End: 2023-10-30

## 2023-10-30 DIAGNOSIS — B00.9 RECURRENT HERPES SIMPLEX: Primary | ICD-10-CM

## 2023-10-31 RX ORDER — ACYCLOVIR 400 MG/1
400 TABLET ORAL 2 TIMES DAILY
Qty: 180 TABLET | Refills: 1 | Status: SHIPPED | OUTPATIENT
Start: 2023-10-31 | End: 2024-04-28

## 2023-10-31 NOTE — TELEPHONE ENCOUNTER
DOLV: 10/11/23  DONV: 12/12/23  LAST LAB DRAW: 10/30/23  LAST TB TEST: 9/8/23    Lab Results   Component Value Date     10/30/2023    K 3.9 10/30/2023     10/30/2023    CO2 24 10/30/2023    BUN 7 10/30/2023    CREATININE 0.6 10/30/2023    GLUCOSE 87 10/30/2023    CALCIUM 9.1 10/30/2023    PROT 6.8 10/30/2023    LABALBU 4.4 10/30/2023    BILITOT 0.3 10/30/2023    ALKPHOS 160 (H) 10/30/2023    AST 30 10/30/2023    ALT 25 10/30/2023    LABGLOM >60 10/30/2023    GFRAA >60 02/19/2021    AGRATIO 2.9 (H) 02/05/2021       Recent Labs     10/30/23  1227 10/25/23  1552 10/08/23  1659   WBC 5.0 6.8 5.1   HGB 13.1 12.7 14.0   HCT 41.2 40.0 43.6   MCV 95.4 95.9 92.6    321 277       Lab Results   Component Value Date    SEDRATE 12 09/28/2023       Lab Results   Component Value Date    CRP < 0.30 09/28/2023

## 2023-10-31 NOTE — TELEPHONE ENCOUNTER
From: Marilu Breeden  To: Geraldine Dodson DO  Sent: 10/30/2023 4:02 PM EDT  Subject: Valcyclovir versus acyclovir     Hi, my insurance only covers a 30 day supply of Valtrex. can we chance to acyclovir instead, I need to take every day due to methotrexate and now Hadlima injection immunosuppressive. I only have 2 days worth right now    Thank you!

## 2023-10-31 NOTE — TELEPHONE ENCOUNTER
Called patient today ~2:46 PM on 10/31/2023. I confirmed that Silver [place] will have insurance coverage for the proposed switch from Valacyclovir to Acyclovir. I then explained that this dosing regimen is different:    Instructed to take 400 mg BID for chronic suppressive therapy and for flare ups take either 800 mg three times daily for two days; or 800 mg twice daily for five days. Patient voiced understanding and was agreeable.       Electronically signed by Prerna Simmons DO on 10/31/2023 at 2:48 PM

## 2023-11-05 DIAGNOSIS — M80.00XS OSTEOPOROSIS WITH CURRENT PATHOLOGICAL FRACTURE, UNSPECIFIED OSTEOPOROSIS TYPE, SEQUELA: ICD-10-CM

## 2023-11-05 DIAGNOSIS — L40.50 PSORIATIC ARTHRITIS (HCC): ICD-10-CM

## 2023-11-05 DIAGNOSIS — G47.00 INSOMNIA, UNSPECIFIED TYPE: ICD-10-CM

## 2023-11-06 DIAGNOSIS — G43.409 HEMIPLEGIC MIGRAINE WITHOUT STATUS MIGRAINOSUS, NOT INTRACTABLE: ICD-10-CM

## 2023-11-06 DIAGNOSIS — G43.909 MIGRAINE WITHOUT STATUS MIGRAINOSUS, NOT INTRACTABLE, UNSPECIFIED MIGRAINE TYPE: ICD-10-CM

## 2023-11-06 DIAGNOSIS — T40.2X5A THERAPEUTIC OPIOID INDUCED CONSTIPATION: ICD-10-CM

## 2023-11-06 DIAGNOSIS — K59.03 THERAPEUTIC OPIOID INDUCED CONSTIPATION: ICD-10-CM

## 2023-11-06 RX ORDER — DOCUSATE SODIUM 100 MG/1
100 CAPSULE, LIQUID FILLED ORAL 2 TIMES DAILY
Qty: 180 CAPSULE | Refills: 0 | Status: CANCELLED | OUTPATIENT
Start: 2023-11-06 | End: 2024-02-04

## 2023-11-06 RX ORDER — CLOTRIMAZOLE AND BETAMETHASONE DIPROPIONATE 10; .64 MG/G; MG/G
CREAM TOPICAL PRN
Qty: 15 G | Refills: 1 | Status: SHIPPED | OUTPATIENT
Start: 2023-11-06

## 2023-11-06 RX ORDER — TRAZODONE HYDROCHLORIDE 150 MG/1
150 TABLET ORAL NIGHTLY
Qty: 90 TABLET | Refills: 1 | Status: SHIPPED | OUTPATIENT
Start: 2023-11-06 | End: 2024-05-04

## 2023-11-06 RX ORDER — TIZANIDINE 2 MG/1
2 TABLET ORAL NIGHTLY PRN
Qty: 30 TABLET | Refills: 1 | Status: SHIPPED | OUTPATIENT
Start: 2023-11-06

## 2023-11-06 NOTE — TELEPHONE ENCOUNTER
Patient's last appointment was : 10/13/2023 with our   Patient's next appointment is : 1/23/2024 with our Dr. Jeb Hallman  Last refilled on:    Which pharmacy does the script need sent to: [facility]      Lab Results   Component Value Date    LABA1C 5.3 10/30/2023     Lab Results   Component Value Date    CHOL 210 (H) 04/15/2021    TRIG 122 04/15/2021    HDL 49 04/17/2023    LDLCALC 168 04/17/2023     Lab Results   Component Value Date     10/30/2023    K 3.9 10/30/2023     10/30/2023    CO2 24 10/30/2023    BUN 7 10/30/2023    CREATININE 0.6 10/30/2023    GLUCOSE 87 10/30/2023    CALCIUM 9.1 10/30/2023    PROT 6.8 10/30/2023    LABALBU 4.4 10/30/2023    BILITOT 0.3 10/30/2023    ALKPHOS 160 (H) 10/30/2023    AST 30 10/30/2023    ALT 25 10/30/2023    LABGLOM >60 10/30/2023    GFRAA >60 02/19/2021    AGRATIO 2.9 (H) 02/05/2021     Lab Results   Component Value Date    TSH 1.440 04/17/2023    FT3 2.56 07/15/2021    T4FREE 1.43 04/17/2023     Lab Results   Component Value Date    WBC 5.0 10/30/2023    HGB 13.1 10/30/2023    HCT 41.2 10/30/2023    MCV 95.4 10/30/2023     10/30/2023

## 2023-11-07 RX ORDER — PROMETHAZINE HYDROCHLORIDE 25 MG/1
TABLET ORAL
COMMUNITY
Start: 2023-10-25

## 2023-11-07 RX ORDER — BUPRENORPHINE 10 UG/H
PATCH TRANSDERMAL
COMMUNITY
Start: 2023-10-11 | End: 2023-11-16 | Stop reason: SINTOL

## 2023-11-07 RX ORDER — NALOXONE HYDROCHLORIDE 4 MG/.1ML
4 SPRAY NASAL
COMMUNITY

## 2023-11-07 RX ORDER — BUTALBITAL, ACETAMINOPHEN AND CAFFEINE 50; 325; 40 MG/1; MG/1; MG/1
1 TABLET ORAL EVERY 4 HOURS PRN
Qty: 20 TABLET | Refills: 0 | OUTPATIENT
Start: 2023-11-07

## 2023-11-07 RX ORDER — ATOGEPANT 60 MG/1
TABLET ORAL
COMMUNITY
Start: 2023-10-17 | End: 2024-01-14

## 2023-11-07 RX ORDER — ATOGEPANT 60 MG/1
1 TABLET ORAL
COMMUNITY
Start: 2023-10-18

## 2023-11-07 NOTE — TELEPHONE ENCOUNTER
Patient's last appointment was : 10/13/2023 with our   Patient's next appointment is : 1/23/2024 with our Dr. Nguyen  Which pharmacy does the script need sent to: [id number] - [place], [place] - [address] [facility]      Lab Results   Component Value Date    LABA1C 5.3 10/30/2023     Lab Results   Component Value Date    CHOL 210 (H) 04/15/2021    TRIG 122 04/15/2021    HDL 49 04/17/2023    LDLCALC 168 04/17/2023     Lab Results   Component Value Date     10/30/2023    K 3.9 10/30/2023     10/30/2023    CO2 24 10/30/2023    BUN 7 10/30/2023    CREATININE 0.6 10/30/2023    GLUCOSE 87 10/30/2023    CALCIUM 9.1 10/30/2023    PROT 6.8 10/30/2023    LABALBU 4.4 10/30/2023    BILITOT 0.3 10/30/2023    ALKPHOS 160 (H) 10/30/2023    AST 30 10/30/2023    ALT 25 10/30/2023    LABGLOM >60 10/30/2023    GFRAA >60 02/19/2021    AGRATIO 2.9 (H) 02/05/2021     Lab Results   Component Value Date    TSH 1.440 04/17/2023    FT3 2.56 07/15/2021    T4FREE 1.43 04/17/2023     Lab Results   Component Value Date    WBC 5.0 10/30/2023    HGB 13.1 10/30/2023    HCT 41.2 10/30/2023    MCV 95.4 10/30/2023     10/30/2023

## 2023-11-08 ENCOUNTER — HOSPITAL ENCOUNTER (OUTPATIENT)
Dept: MRI IMAGING | Age: 63
Discharge: HOME | End: 2023-11-08
Payer: MEDICARE

## 2023-11-08 DIAGNOSIS — R41.3 IMPAIRED MEMORY: ICD-10-CM

## 2023-11-08 DIAGNOSIS — G43.909 SICK HEADACHE: ICD-10-CM

## 2023-11-08 DIAGNOSIS — R20.0 NUMBNESS: ICD-10-CM

## 2023-11-08 PROCEDURE — 6360000004 HC RX CONTRAST MEDICATION: Performed by: PSYCHIATRY & NEUROLOGY

## 2023-11-08 PROCEDURE — 70549 MR ANGIOGRAPH NECK W/O&W/DYE: CPT

## 2023-11-08 PROCEDURE — A9579 GAD-BASE MR CONTRAST NOS,1ML: HCPCS | Performed by: PSYCHIATRY & NEUROLOGY

## 2023-11-08 PROCEDURE — 70551 MRI BRAIN STEM W/O DYE: CPT

## 2023-11-08 PROCEDURE — 70544 MR ANGIOGRAPHY HEAD W/O DYE: CPT

## 2023-11-08 RX ADMIN — GADOTERIDOL 15 ML: 279.3 INJECTION, SOLUTION INTRAVENOUS at 19:21

## 2023-11-13 ENCOUNTER — HOSPITAL ENCOUNTER (OUTPATIENT)
Dept: NURSING | Age: 63
Discharge: HOME | End: 2023-11-13
Payer: MEDICARE

## 2023-11-13 VITALS — TEMPERATURE: 97.3 F | OXYGEN SATURATION: 97 % | RESPIRATION RATE: 16 BRPM | HEART RATE: 80 BPM

## 2023-11-13 DIAGNOSIS — M80.08XA AGE-RELATED OSTEOPOROSIS WITH CURRENT PATHOLOGICAL FRACTURE, VERTEBRA(E), INITIAL ENCOUNTER FOR FRACTURE (HCC): ICD-10-CM

## 2023-11-13 DIAGNOSIS — S22.070A COMPRESSION FRACTURE OF T9 VERTEBRA, INITIAL ENCOUNTER (HCC): ICD-10-CM

## 2023-11-13 DIAGNOSIS — M89.9 DISORDER OF BONE, UNSPECIFIED: ICD-10-CM

## 2023-11-13 DIAGNOSIS — G43.409 HEMIPLEGIC MIGRAINE WITHOUT STATUS MIGRAINOSUS, NOT INTRACTABLE: ICD-10-CM

## 2023-11-13 DIAGNOSIS — E55.9 VITAMIN D INSUFFICIENCY: ICD-10-CM

## 2023-11-13 DIAGNOSIS — G43.909 MIGRAINE WITHOUT STATUS MIGRAINOSUS, NOT INTRACTABLE, UNSPECIFIED MIGRAINE TYPE: ICD-10-CM

## 2023-11-13 DIAGNOSIS — S22.060A COMPRESSION FRACTURE OF T7 VERTEBRA, INITIAL ENCOUNTER (HCC): Primary | ICD-10-CM

## 2023-11-13 PROCEDURE — 6360000002 HC RX W HCPCS: Performed by: INTERNAL MEDICINE

## 2023-11-13 PROCEDURE — 96372 THER/PROPH/DIAG INJ SC/IM: CPT

## 2023-11-13 RX ORDER — ONDANSETRON 2 MG/ML
8 INJECTION INTRAMUSCULAR; INTRAVENOUS
OUTPATIENT
Start: 2023-12-11

## 2023-11-13 RX ORDER — EPINEPHRINE 1 MG/ML
0.3 INJECTION, SOLUTION INTRAMUSCULAR; SUBCUTANEOUS PRN
OUTPATIENT
Start: 2023-12-11

## 2023-11-13 RX ORDER — BUTALBITAL, ACETAMINOPHEN AND CAFFEINE 50; 325; 40 MG/1; MG/1; MG/1
1 TABLET ORAL
Qty: 15 TABLET | Refills: 0 | Status: SHIPPED | OUTPATIENT
Start: 2023-11-13 | End: 2023-12-13

## 2023-11-13 RX ORDER — SODIUM CHLORIDE 9 MG/ML
INJECTION, SOLUTION INTRAVENOUS CONTINUOUS
OUTPATIENT
Start: 2023-12-11

## 2023-11-13 RX ORDER — DIPHENHYDRAMINE HYDROCHLORIDE 50 MG/ML
50 INJECTION INTRAMUSCULAR; INTRAVENOUS
OUTPATIENT
Start: 2023-12-11

## 2023-11-13 RX ORDER — ALBUTEROL SULFATE 90 UG/1
4 AEROSOL, METERED RESPIRATORY (INHALATION) PRN
OUTPATIENT
Start: 2023-12-11

## 2023-11-13 RX ORDER — ERGOCALCIFEROL 1.25 MG/1
50000 CAPSULE ORAL WEEKLY
Qty: 4 CAPSULE | Refills: 1 | Status: CANCELLED | OUTPATIENT
Start: 2023-11-13

## 2023-11-13 RX ORDER — ACETAMINOPHEN 325 MG/1
650 TABLET ORAL
OUTPATIENT
Start: 2023-12-11

## 2023-11-13 RX ADMIN — ROMOSOZUMAB-AQQG 105 MG: 105 INJECTION, SOLUTION SUBCUTANEOUS at 11:40

## 2023-11-13 RX ADMIN — ROMOSOZUMAB-AQQG 105 MG: 105 INJECTION, SOLUTION SUBCUTANEOUS at 11:41

## 2023-11-13 ASSESSMENT — PAIN SCALES - GENERAL: PAINLEVEL_OUTOF10: 7

## 2023-11-13 ASSESSMENT — PAIN DESCRIPTION - LOCATION: LOCATION: GENERALIZED

## 2023-11-13 NOTE — DISCHARGE INSTRUCTIONS
Evenity injection discharge instructions:    Side effects: headache, joint pain, rash, swelling    Next Evenity injection on: December 11th at 11:30 AM      Please call [phone] with a any questions or concerns.

## 2023-11-13 NOTE — PROGRESS NOTES
1130: Patient arrived ambulatory with cane for Evenity injections. Patient denies any antibiotic usage or infection at this time. Patient rights and responsibilities offered to patient. Evenity injections administered, patient tolerated well. AVS reviewed with patient, voiced understanding. Patient discharged ambulatory.             _m___ Safety:       (Environmental)  Hope to environment  Ensure ID band is correct and in place/ allergy band as needed  Assess for fall risk  Initiate fall precautions as applicable (fall band, side rails, etc.)  Call light within reach  Bed in low position/ wheels locked    _m___ Pain:       Assess pain level and characteristics  Administer analgesics as ordered  Assess effectiveness of pain management and report to MD as needed    __m__ Knowledge Deficit:  Assess baseline knowledge  Provide teaching at level of understanding  Provide teaching via preferred learning method  Evaluate teaching effectiveness    _m___ Hemodynamic/Respiratory Status:       (Pre and Post Procedure Monitoring)  Assess/Monitor vital signs and LOC  Assess Baseline SpO2 prior to any sedation  Obtain weight/height  Assess vital signs/ LOC until patient meets discharge criteria  Monitor procedure site and notify MD of any issues

## 2023-11-14 RX ORDER — BUPROPION HYDROCHLORIDE 300 MG/1
TABLET ORAL
Qty: 90 TABLET | Refills: 0 | OUTPATIENT
Start: 2023-11-14

## 2023-11-16 ENCOUNTER — OFFICE VISIT (OUTPATIENT)
Dept: PHYSICAL MEDICINE AND REHAB | Age: 63
End: 2023-11-16
Payer: MEDICARE

## 2023-11-16 VITALS
WEIGHT: 160.94 LBS | DIASTOLIC BLOOD PRESSURE: 86 MMHG | BODY MASS INDEX: 29.62 KG/M2 | SYSTOLIC BLOOD PRESSURE: 116 MMHG | HEIGHT: 62 IN

## 2023-11-16 DIAGNOSIS — S22.000A COMPRESSION FRACTURE OF BODY OF THORACIC VERTEBRA (HCC): ICD-10-CM

## 2023-11-16 DIAGNOSIS — M47.814 THORACIC SPONDYLOSIS: ICD-10-CM

## 2023-11-16 DIAGNOSIS — G89.4 CHRONIC PAIN SYNDROME: Primary | ICD-10-CM

## 2023-11-16 DIAGNOSIS — M79.18 MYOFASCIAL PAIN: ICD-10-CM

## 2023-11-16 PROCEDURE — G8417 CALC BMI ABV UP PARAM F/U: HCPCS | Performed by: ANESTHESIOLOGY

## 2023-11-16 PROCEDURE — 99214 OFFICE O/P EST MOD 30 MIN: CPT | Performed by: ANESTHESIOLOGY

## 2023-11-16 PROCEDURE — G8484 FLU IMMUNIZE NO ADMIN: HCPCS | Performed by: ANESTHESIOLOGY

## 2023-11-16 PROCEDURE — 3017F COLORECTAL CA SCREEN DOC REV: CPT | Performed by: ANESTHESIOLOGY

## 2023-11-16 PROCEDURE — G8427 DOCREV CUR MEDS BY ELIG CLIN: HCPCS | Performed by: ANESTHESIOLOGY

## 2023-11-16 PROCEDURE — 1036F TOBACCO NON-USER: CPT | Performed by: ANESTHESIOLOGY

## 2023-11-16 NOTE — PROGRESS NOTES
Functionality Assessment/Goals Worksheet     On a scale of 0 (Does not Interfere) to 10 (Completely Interferes)     1. Which number describes how during the past week pain has interfered with           the following:  A. General Activity:  8  B. Mood: 6  C. Walking Ability:  10  D. Normal Work (Includes both work outside the home and housework):  10  E. Relations with Other People:   5  F. Sleep:   10  G. Enjoyment of Life:   9    2. Patient Prefers to Take their Pain Medications:     [x]  On a regular basis   []  Only when necessary    []  Does not take pain medications    3. What are the Patient's Goals/Expectations for Visiting Pain Management?      []  Learn about my pain    [x]  Receive Medication   []  Physical Therapy     []  Treat Depression   []  Receive Injections    []  Treat Sleep   []  Deal with Anxiety and Stress   []  Treat Opoid Dependence/Addiction   []  Other:
control. Plan: The following treatment recommendations and plan were discussed in detail with Lilliam Singh. Imaging:  Patient is slated for MRI of the thoracic spine tomorrow we will await the results of this to discuss neck steps for pain management. Analgesics:  I have switched her over to a fentanyl patch at 12 mcg/h. Patient is advised to take the medication as prescribed as taking more than prescribed can lead to the development of tolerance, physical dependency, and addiction. Patient is advised to store and lock the medication in a safe and secure location. If the medication is lost or stolen we will not provide early refills on this medication. Patient understands that they must stay compliant with treatment plan outlined above in order to stay compliant with opioid therapy and any deviation from treatment plan will result in cessation of opioid therapy. Risks of long-term opioid therapy were discussed in extensive detail with the patient and patient understands the risks involved with chronic, continuous opioids. Patient has been compliant with office visits, rehabilitation plan including attending therapy as warranted, and injection therapy. Patient has not demonstrated any aberrant behavior with medication. Pain contract signed and reviewed by patient. Patient understands if the contract is violated in any way opioid therapy will be discontinued immediately. OARRS reviewed and is appropriate. Naloxone offered to patient.   Last UDS: We will obtain next visit       Interventions:  None    Anticoagulation/NPO Recommendations:   None    Referrals:  None    Prescriptions:  Fentanyl patch 12 mcg/hr    Follow-up: 12 weeks      Leandro Shoemaker DO  Interventional Pain Management/PM&R   [facility] and [facility]

## 2023-11-17 RX ORDER — BUPROPION HYDROCHLORIDE 300 MG/1
TABLET ORAL
Qty: 90 TABLET | Refills: 0 | Status: SHIPPED | OUTPATIENT
Start: 2023-11-17

## 2023-11-17 NOTE — TELEPHONE ENCOUNTER
----- Message from Angie Patten sent at 11/16/2023  5:04 PM EST -----  Regarding: buPROPion 300 MG extended release tablet, need refill  Contact: [phone]  Hi, I need a refill of buPROPion 300 mg extended release that I take every morning. They sent the refill to [facility] instead. I had seen her 3 years ago, but you and I had discussed you refilling that instead as I do not need to go to psychiatry as I know if I am getting depressed and need something additional added which is typically Prozac. This has not happened in a few years. I am very cognizant of when I need help.  Thank you!!

## 2023-11-17 NOTE — TELEPHONE ENCOUNTER
Patient's last appointment was : 10/13/2023 with our   Patient's next appointment is : 1/23/2024 with our Dr. Nafisa Hernandez  Last refilled on: 09/20/2021  Which pharmacy does the script need sent to: [facility]      Lab Results   Component Value Date    LABA1C 5.3 10/30/2023     Lab Results   Component Value Date    CHOL 210 (H) 04/15/2021    TRIG 122 04/15/2021    HDL 49 04/17/2023    LDLCALC 168 04/17/2023     Lab Results   Component Value Date     10/30/2023    K 3.9 10/30/2023     10/30/2023    CO2 24 10/30/2023    BUN 7 10/30/2023    CREATININE 0.6 10/30/2023    GLUCOSE 87 10/30/2023    CALCIUM 9.1 10/30/2023    PROT 6.8 10/30/2023    LABALBU 4.4 10/30/2023    BILITOT 0.3 10/30/2023    ALKPHOS 160 (H) 10/30/2023    AST 30 10/30/2023    ALT 25 10/30/2023    LABGLOM >60 10/30/2023    GFRAA >60 02/19/2021    AGRATIO 2.9 (H) 02/05/2021     Lab Results   Component Value Date    TSH 1.440 04/17/2023    FT3 2.56 07/15/2021    T4FREE 1.43 04/17/2023     Lab Results   Component Value Date    WBC 5.0 10/30/2023    HGB 13.1 10/30/2023    HCT 41.2 10/30/2023    MCV 95.4 10/30/2023     10/30/2023

## 2023-11-18 RX ORDER — FENTANYL 12.5 UG/1
1 PATCH TRANSDERMAL
Qty: 10 PATCH | Refills: 0 | Status: SHIPPED | OUTPATIENT
Start: 2023-11-18 | End: 2023-12-18

## 2023-11-27 ENCOUNTER — OFFICE VISIT (OUTPATIENT)
Dept: NEUROSURGERY | Age: 63
End: 2023-11-27
Payer: MEDICARE

## 2023-11-27 VITALS
SYSTOLIC BLOOD PRESSURE: 130 MMHG | HEART RATE: 83 BPM | WEIGHT: 160 LBS | BODY MASS INDEX: 29.44 KG/M2 | HEIGHT: 62 IN | DIASTOLIC BLOOD PRESSURE: 85 MMHG

## 2023-11-27 DIAGNOSIS — M51.9 LUMBAR DISC DISEASE: Primary | ICD-10-CM

## 2023-11-27 DIAGNOSIS — S22.080A COMPRESSION FRACTURE OF T11 VERTEBRA, INITIAL ENCOUNTER (HCC): ICD-10-CM

## 2023-11-27 PROCEDURE — G8417 CALC BMI ABV UP PARAM F/U: HCPCS | Performed by: PHYSICIAN ASSISTANT

## 2023-11-27 PROCEDURE — 99214 OFFICE O/P EST MOD 30 MIN: CPT | Performed by: PHYSICIAN ASSISTANT

## 2023-11-27 PROCEDURE — G8428 CUR MEDS NOT DOCUMENT: HCPCS | Performed by: PHYSICIAN ASSISTANT

## 2023-11-27 PROCEDURE — 1036F TOBACCO NON-USER: CPT | Performed by: PHYSICIAN ASSISTANT

## 2023-11-27 PROCEDURE — G8484 FLU IMMUNIZE NO ADMIN: HCPCS | Performed by: PHYSICIAN ASSISTANT

## 2023-11-27 PROCEDURE — 3017F COLORECTAL CA SCREEN DOC REV: CPT | Performed by: PHYSICIAN ASSISTANT

## 2023-11-27 NOTE — PROGRESS NOTES
of the defined types were placed in this encounter. Assessment/Plan:  Status Post kyphoplasty of thoracic 11  Doing adequately overall  Encouraged gradual increase in physical and mental activity. Fall precaution and home safety education provided to patient. Follow-up: Based on abnormal findings on prior MRI and updated MRI of the lumbar spine is ordered as a comparison to rule out evolution of lumbar disc herniations along with a flexion extension x-ray of the lumbar spine to rule out gross instability. Return to office follow-up appointment in 2 weeks as ascertained to review these findings and to review findings consistent with EMG/NCV's previously performed.       Electronically signed by Pamela Lynn PA-C on 11/27/23 at 9:50 AM EST

## 2023-11-28 ENCOUNTER — HOSPITAL ENCOUNTER (OUTPATIENT)
Age: 63
Setting detail: OUTPATIENT SURGERY
Discharge: HOME | End: 2023-11-28
Attending: INTERNAL MEDICINE | Admitting: INTERNAL MEDICINE
Payer: MEDICARE

## 2023-11-28 VITALS
HEART RATE: 76 BPM | OXYGEN SATURATION: 96 % | RESPIRATION RATE: 17 BRPM | SYSTOLIC BLOOD PRESSURE: 166 MMHG | BODY MASS INDEX: 29.26 KG/M2 | TEMPERATURE: 96.1 F | HEIGHT: 62 IN | DIASTOLIC BLOOD PRESSURE: 77 MMHG

## 2023-11-28 DIAGNOSIS — G47.00 INSOMNIA, UNSPECIFIED TYPE: ICD-10-CM

## 2023-11-28 PROCEDURE — 6370000000 HC RX 637 (ALT 250 FOR IP): Performed by: INTERNAL MEDICINE

## 2023-11-28 PROCEDURE — 3609015500 HC GASTRIC/DUODENAL MOTILITY &/OR MANOMETRY STUDY: Performed by: INTERNAL MEDICINE

## 2023-11-28 RX ORDER — LIDOCAINE HYDROCHLORIDE 20 MG/ML
JELLY TOPICAL PRN
Status: DISCONTINUED | OUTPATIENT
Start: 2023-11-28 | End: 2023-11-28 | Stop reason: HOSPADM

## 2023-11-28 RX ORDER — TRAZODONE HYDROCHLORIDE 150 MG/1
225 TABLET ORAL NIGHTLY
Qty: 120 TABLET | Refills: 1 | Status: SHIPPED | OUTPATIENT
Start: 2023-11-28 | End: 2024-05-06

## 2023-11-28 RX ADMIN — LIDOCAINE HYDROCHLORIDE: 20 JELLY TOPICAL at 09:24

## 2023-11-28 ASSESSMENT — PAIN - FUNCTIONAL ASSESSMENT: PAIN_FUNCTIONAL_ASSESSMENT: NONE - DENIES PAIN

## 2023-11-28 NOTE — PROGRESS NOTES
Patient admitted to endo dept for [facility]  Consent signed. Manometry probe attempted to be passed through the left nare but patient does not tolerate. Pt states \"she can't do this\". Discharge instructions given to patient   Understanding verbalized   Pt discharged home per self      Office notified.

## 2023-12-02 ENCOUNTER — PATIENT MESSAGE (OUTPATIENT)
Dept: RHEUMATOLOGY | Age: 63
End: 2023-12-02

## 2023-12-02 DIAGNOSIS — Z51.81 MEDICATION MONITORING ENCOUNTER: Primary | ICD-10-CM

## 2023-12-04 NOTE — TELEPHONE ENCOUNTER
From: Josefina Mead  To: Gus Ray  Sent: 12/2/2023 11:20 AM EST  Subject: Need methotrexate refill    Hi, I just took this week's methotrexate and will need a refill by th end of next week. Thanks so much!

## 2023-12-07 ENCOUNTER — HOSPITAL ENCOUNTER (OUTPATIENT)
Dept: GENERAL RADIOLOGY | Age: 63
Discharge: HOME | End: 2023-12-07
Payer: MEDICARE

## 2023-12-07 ENCOUNTER — NURSE ONLY (OUTPATIENT)
Dept: LAB | Age: 63
End: 2023-12-07

## 2023-12-07 DIAGNOSIS — R13.10 DYSPHAGIA, UNSPECIFIED TYPE: ICD-10-CM

## 2023-12-07 DIAGNOSIS — Z51.81 MEDICATION MONITORING ENCOUNTER: ICD-10-CM

## 2023-12-07 DIAGNOSIS — M51.9 LUMBAR DISC DISEASE: ICD-10-CM

## 2023-12-07 DIAGNOSIS — R09.A2 GLOBUS SENSATION: ICD-10-CM

## 2023-12-07 LAB
ALBUMIN SERPL BCG-MCNC: 4.2 G/DL (ref 3.5–5.1)
ALP SERPL-CCNC: 157 U/L (ref 38–126)
ALT SERPL W/O P-5'-P-CCNC: 84 U/L (ref 11–66)
ANION GAP SERPL CALC-SCNC: 10 MEQ/L (ref 8–16)
AST SERPL-CCNC: 32 U/L (ref 5–40)
BASOPHILS ABSOLUTE: 0.1 THOU/MM3 (ref 0–0.1)
BASOPHILS NFR BLD AUTO: 1.1 %
BILIRUB SERPL-MCNC: 0.2 MG/DL (ref 0.3–1.2)
BUN SERPL-MCNC: 15 MG/DL (ref 7–22)
CALCIUM SERPL-MCNC: 9.6 MG/DL (ref 8.5–10.5)
CHLORIDE SERPL-SCNC: 108 MEQ/L (ref 98–111)
CO2 SERPL-SCNC: 24 MEQ/L (ref 23–33)
CREAT SERPL-MCNC: 0.8 MG/DL (ref 0.4–1.2)
CRP SERPL-MCNC: < 0.3 MG/DL (ref 0–1)
DEPRECATED RDW RBC AUTO: 48.9 FL (ref 35–45)
EOSINOPHIL NFR BLD AUTO: 1.6 %
EOSINOPHILS ABSOLUTE: 0.1 THOU/MM3 (ref 0–0.4)
ERYTHROCYTE [DISTWIDTH] IN BLOOD BY AUTOMATED COUNT: 13.5 % (ref 11.5–14.5)
ERYTHROCYTE [SEDIMENTATION RATE] IN BLOOD BY WESTERGREN METHOD: 12 MM/HR (ref 0–20)
GFR SERPL CREATININE-BSD FRML MDRD: > 60 ML/MIN/1.73M2
GLUCOSE SERPL-MCNC: 92 MG/DL (ref 70–108)
HCT VFR BLD AUTO: 44.2 % (ref 37–47)
HGB BLD-MCNC: 13.7 GM/DL (ref 12–16)
IMM GRANULOCYTES # BLD AUTO: 0.02 THOU/MM3 (ref 0–0.07)
IMM GRANULOCYTES NFR BLD AUTO: 0.3 %
LYMPHOCYTES ABSOLUTE: 2.2 THOU/MM3 (ref 1–4.8)
LYMPHOCYTES NFR BLD AUTO: 35.1 %
MCH RBC QN AUTO: 30.4 PG (ref 26–33)
MCHC RBC AUTO-ENTMCNC: 31 GM/DL (ref 32.2–35.5)
MCV RBC AUTO: 98 FL (ref 81–99)
MONOCYTES ABSOLUTE: 0.3 THOU/MM3 (ref 0.4–1.3)
MONOCYTES NFR BLD AUTO: 4.2 %
NEUTROPHILS NFR BLD AUTO: 57.7 %
NRBC BLD AUTO-RTO: 0 /100 WBC
PLATELET # BLD AUTO: 282 THOU/MM3 (ref 130–400)
PMV BLD AUTO: 11 FL (ref 9.4–12.4)
POTASSIUM SERPL-SCNC: 4.5 MEQ/L (ref 3.5–5.2)
PROT SERPL-MCNC: 6.8 G/DL (ref 6.1–8)
RBC # BLD AUTO: 4.51 MILL/MM3 (ref 4.2–5.4)
SEGMENTED NEUTROPHILS ABSOLUTE COUNT: 3.6 THOU/MM3 (ref 1.8–7.7)
SODIUM SERPL-SCNC: 142 MEQ/L (ref 135–145)
WBC # BLD AUTO: 6.2 THOU/MM3 (ref 4.8–10.8)

## 2023-12-07 PROCEDURE — 2500000003 HC RX 250 WO HCPCS: Performed by: NURSE PRACTITIONER

## 2023-12-07 PROCEDURE — 72120 X-RAY BEND ONLY L-S SPINE: CPT

## 2023-12-07 PROCEDURE — 6370000000 HC RX 637 (ALT 250 FOR IP): Performed by: NURSE PRACTITIONER

## 2023-12-07 PROCEDURE — 74220 X-RAY XM ESOPHAGUS 1CNTRST: CPT

## 2023-12-07 RX ADMIN — ANTACID/ANTIFLATULENT 1 EACH: 380; 550; 10; 10 GRANULE, EFFERVESCENT ORAL at 09:23

## 2023-12-07 RX ADMIN — BARIUM SULFATE 140 ML: 980 POWDER, FOR SUSPENSION ORAL at 09:23

## 2023-12-07 RX ADMIN — BARIUM SULFATE 100 ML: 0.6 SUSPENSION ORAL at 09:23

## 2023-12-12 DIAGNOSIS — B00.9 RECURRENT HERPES SIMPLEX: ICD-10-CM

## 2023-12-12 DIAGNOSIS — G43.409 HEMIPLEGIC MIGRAINE WITHOUT STATUS MIGRAINOSUS, NOT INTRACTABLE: ICD-10-CM

## 2023-12-12 DIAGNOSIS — K59.03 THERAPEUTIC OPIOID INDUCED CONSTIPATION: ICD-10-CM

## 2023-12-12 DIAGNOSIS — T40.2X5A THERAPEUTIC OPIOID INDUCED CONSTIPATION: ICD-10-CM

## 2023-12-12 DIAGNOSIS — G43.909 MIGRAINE WITHOUT STATUS MIGRAINOSUS, NOT INTRACTABLE, UNSPECIFIED MIGRAINE TYPE: ICD-10-CM

## 2023-12-12 RX ORDER — BUTALBITAL, ACETAMINOPHEN AND CAFFEINE 50; 325; 40 MG/1; MG/1; MG/1
1 TABLET ORAL
Qty: 15 TABLET | Refills: 0 | Status: SHIPPED | OUTPATIENT
Start: 2023-12-12 | End: 2024-01-11

## 2023-12-12 RX ORDER — ACYCLOVIR 400 MG/1
400 TABLET ORAL 2 TIMES DAILY
Qty: 180 TABLET | Refills: 1 | OUTPATIENT
Start: 2023-12-12 | End: 2024-06-09

## 2023-12-12 RX ORDER — DOCUSATE SODIUM 100 MG/1
100 CAPSULE, LIQUID FILLED ORAL 2 TIMES DAILY
Qty: 180 CAPSULE | Refills: 0 | OUTPATIENT
Start: 2023-12-12 | End: 2024-03-11

## 2023-12-12 RX ORDER — ONDANSETRON 4 MG/1
4 TABLET, FILM COATED ORAL 3 TIMES DAILY PRN
Qty: 30 TABLET | Refills: 0 | Status: SHIPPED | OUTPATIENT
Start: 2023-12-12

## 2023-12-12 NOTE — TELEPHONE ENCOUNTER
PDMP Monitoring:    Last PDMP Raymundo Balbuena as Reviewed:  Review User Review Instant Review Result   Deanna Butcher 12/12/2023  2:12 PM Reviewed PDMP [1]

## 2023-12-13 ENCOUNTER — HOSPITAL ENCOUNTER (OUTPATIENT)
Dept: MRI IMAGING | Age: 63
Discharge: HOME | End: 2023-12-13
Payer: COMMERCIAL

## 2023-12-13 DIAGNOSIS — M51.9 LUMBAR DISC DISEASE: ICD-10-CM

## 2023-12-13 PROCEDURE — 72148 MRI LUMBAR SPINE W/O DYE: CPT

## 2023-12-14 ENCOUNTER — OFFICE VISIT (OUTPATIENT)
Dept: RHEUMATOLOGY | Age: 63
End: 2023-12-14
Payer: MEDICARE

## 2023-12-14 VITALS
OXYGEN SATURATION: 98 % | DIASTOLIC BLOOD PRESSURE: 86 MMHG | WEIGHT: 160 LBS | SYSTOLIC BLOOD PRESSURE: 140 MMHG | HEART RATE: 86 BPM | HEIGHT: 62 IN | BODY MASS INDEX: 29.44 KG/M2

## 2023-12-14 DIAGNOSIS — G89.29 CHRONIC BILATERAL LOW BACK PAIN WITHOUT SCIATICA: ICD-10-CM

## 2023-12-14 DIAGNOSIS — M80.08XA AGE-RELATED OSTEOPOROSIS WITH CURRENT PATHOLOGICAL FRACTURE, VERTEBRA(E), INITIAL ENCOUNTER FOR FRACTURE (HCC): ICD-10-CM

## 2023-12-14 DIAGNOSIS — S22.000A COMPRESSION FRACTURE OF BODY OF THORACIC VERTEBRA (HCC): ICD-10-CM

## 2023-12-14 DIAGNOSIS — Z51.81 MEDICATION MONITORING ENCOUNTER: ICD-10-CM

## 2023-12-14 DIAGNOSIS — Z87.39 H/O MIXED CONNECTIVE TISSUE DISEASE: ICD-10-CM

## 2023-12-14 DIAGNOSIS — M79.7 FIBROMYALGIA: ICD-10-CM

## 2023-12-14 DIAGNOSIS — M54.50 CHRONIC BILATERAL LOW BACK PAIN WITHOUT SCIATICA: ICD-10-CM

## 2023-12-14 DIAGNOSIS — L40.50 PSORIATIC ARTHRITIS (HCC): Primary | ICD-10-CM

## 2023-12-14 PROCEDURE — 99214 OFFICE O/P EST MOD 30 MIN: CPT | Performed by: NURSE PRACTITIONER

## 2023-12-14 PROCEDURE — G8417 CALC BMI ABV UP PARAM F/U: HCPCS | Performed by: NURSE PRACTITIONER

## 2023-12-14 PROCEDURE — 3017F COLORECTAL CA SCREEN DOC REV: CPT | Performed by: NURSE PRACTITIONER

## 2023-12-14 PROCEDURE — 1036F TOBACCO NON-USER: CPT | Performed by: NURSE PRACTITIONER

## 2023-12-14 PROCEDURE — G8427 DOCREV CUR MEDS BY ELIG CLIN: HCPCS | Performed by: NURSE PRACTITIONER

## 2023-12-14 PROCEDURE — G8484 FLU IMMUNIZE NO ADMIN: HCPCS | Performed by: NURSE PRACTITIONER

## 2023-12-14 NOTE — PROGRESS NOTES
Results   Component Value Date/Time    CALCIUM 9.6 12/07/2023 10:04 AM    LABALBU 4.2 12/07/2023 10:04 AM    PROT 6.8 12/07/2023 10:04 AM     12/07/2023 10:04 AM    K 4.5 12/07/2023 10:04 AM    CO2 24 12/07/2023 10:04 AM     12/07/2023 10:04 AM    BUN 15 12/07/2023 10:04 AM    CREATININE 0.8 12/07/2023 10:04 AM    ALKPHOS 157 12/07/2023 10:04 AM    ALT 84 12/07/2023 10:04 AM    AST 32 12/07/2023 10:04 AM    BILIDIR 0.1 02/20/2021 04:02 PM       HgBA1c: No components found for: \"HGBA1C\"    Lab Results   Component Value Date/Time    VITD25 55 06/07/2023 04:48 PM         No results found for: \"ANASCRN\"  No results found for: \"SSA\"  No results found for: \"SSB\"  No results found for: \"ANTI-SMITH\"  No results found for: \"DSDNAAB\"   No results found for: \"ANTIRNP\"  Lab Results   Component Value Date    C3 135 04/03/2023    C4 25 04/03/2023     No results found for: \"CCPAB\"  Lab Results   Component Value Date    RF <10 09/16/2020       No components found for: \"CANCASCRN\", \"APANCASCRN\"  Lab Results   Component Value Date    SEDRATE 12 12/07/2023     Lab Results   Component Value Date    CRP < 0.30 12/07/2023       RADIOLOGY:         ASSESSMENT/PLAN    Assessment   Plan     Inflammatory arthritis- suspected psoriatic arthritis- h/o psoriasis w/ knee and elbow involvement, nail pitting, onycholysis and active synovitis. MRI with ? Facet joint effusions. Fmhx unknown given adopted status. H/o psoriasis- in childhood  H/o mixed connective tissue disease  - reported hx of inflammatory arthritis, malar rash, nasal sores, oral sores, muscle inflammation diagnosed greater than 10 years ago. Reviewed records from rheumatologist in 2020- records report patient having been diagnosed with lupus like syndrome (records unavailable)- blood testing w/ negative AVISE panel and serologies at that time. Diagnosed with \"systemic involvement of connective tissue\" and fibromyalgia. Treated with plaquenil with some improvement.    -

## 2023-12-18 ENCOUNTER — HOSPITAL ENCOUNTER (OUTPATIENT)
Dept: NURSING | Age: 63
Discharge: HOME | End: 2023-12-18

## 2023-12-25 DIAGNOSIS — M89.9 DISORDER OF BONE, UNSPECIFIED: ICD-10-CM

## 2023-12-25 DIAGNOSIS — E55.9 VITAMIN D INSUFFICIENCY: ICD-10-CM

## 2023-12-26 RX ORDER — ERGOCALCIFEROL 1.25 MG/1
50000 CAPSULE ORAL WEEKLY
Qty: 4 CAPSULE | Refills: 5 | Status: SHIPPED | OUTPATIENT
Start: 2023-12-26

## 2023-12-26 NOTE — TELEPHONE ENCOUNTER
Patient's last appointment was : 10/13/2023 with our   Patient's next appointment is : 1/23/2024 with our Dr. Aniyah Lao  Last refilled on: 10/13/2023  Which pharmacy does the script need sent to: [facility] [address]      Lab Results   Component Value Date    LABA1C 5.3 10/30/2023     Lab Results   Component Value Date    CHOL 210 (H) 04/15/2021    TRIG 122 04/15/2021    HDL 49 04/17/2023    LDLCALC 168 04/17/2023     Lab Results   Component Value Date     12/07/2023    K 4.5 12/07/2023     12/07/2023    CO2 24 12/07/2023    BUN 15 12/07/2023    CREATININE 0.8 12/07/2023    GLUCOSE 92 12/07/2023    CALCIUM 9.6 12/07/2023    PROT 6.8 12/07/2023    LABALBU 4.2 12/07/2023    BILITOT 0.2 (L) 12/07/2023    ALKPHOS 157 (H) 12/07/2023    AST 32 12/07/2023    ALT 84 (H) 12/07/2023    LABGLOM >60 12/07/2023    GFRAA >60 02/19/2021    AGRATIO 2.9 (H) 02/05/2021     Lab Results   Component Value Date    TSH 1.440 04/17/2023    FT3 2.56 07/15/2021    T4FREE 1.43 04/17/2023     Lab Results   Component Value Date    WBC 6.2 12/07/2023    HGB 13.7 12/07/2023    HCT 44.2 12/07/2023    MCV 98.0 12/07/2023     12/07/2023

## 2023-12-26 NOTE — TELEPHONE ENCOUNTER
ED F/U letter sent. Remote reviewed of dual chamber ICD  AP 11%;  2%  SVT episode noted 12/22 lasting 2 minutes. Tracings appear to be ST  No other events or therapies   Patient on amiodarone, coreg, valsartan, digoxin, and Eliquis   Will monitor

## 2024-01-10 ENCOUNTER — NURSE ONLY (OUTPATIENT)
Dept: LAB | Age: 64
End: 2024-01-10

## 2024-01-10 DIAGNOSIS — Z51.81 MEDICATION MONITORING ENCOUNTER: ICD-10-CM

## 2024-01-10 LAB
ALBUMIN SERPL BCG-MCNC: 4.5 G/DL (ref 3.5–5.1)
ALP SERPL-CCNC: 117 U/L (ref 38–126)
ALT SERPL W/O P-5'-P-CCNC: 58 U/L (ref 11–66)
ANION GAP SERPL CALC-SCNC: 11 MEQ/L (ref 8–16)
AST SERPL-CCNC: 33 U/L (ref 5–40)
BASOPHILS ABSOLUTE: 0.1 THOU/MM3 (ref 0–0.1)
BASOPHILS NFR BLD AUTO: 0.9 %
BILIRUB SERPL-MCNC: 0.3 MG/DL (ref 0.3–1.2)
BUN SERPL-MCNC: 17 MG/DL (ref 7–22)
CALCIUM SERPL-MCNC: 9.5 MG/DL (ref 8.5–10.5)
CHLORIDE SERPL-SCNC: 105 MEQ/L (ref 98–111)
CO2 SERPL-SCNC: 24 MEQ/L (ref 23–33)
CREAT SERPL-MCNC: 0.7 MG/DL (ref 0.4–1.2)
CRP SERPL-MCNC: < 0.3 MG/DL (ref 0–1)
DEPRECATED RDW RBC AUTO: 46.1 FL (ref 35–45)
EOSINOPHIL NFR BLD AUTO: 1 %
EOSINOPHILS ABSOLUTE: 0.1 THOU/MM3 (ref 0–0.4)
ERYTHROCYTE [DISTWIDTH] IN BLOOD BY AUTOMATED COUNT: 13.2 % (ref 11.5–14.5)
ERYTHROCYTE [SEDIMENTATION RATE] IN BLOOD BY WESTERGREN METHOD: 8 MM/HR (ref 0–20)
GFR SERPL CREATININE-BSD FRML MDRD: > 60 ML/MIN/1.73M2
GLUCOSE SERPL-MCNC: 96 MG/DL (ref 70–108)
HCT VFR BLD AUTO: 42.7 % (ref 37–47)
HGB BLD-MCNC: 13.8 GM/DL (ref 12–16)
IMM GRANULOCYTES # BLD AUTO: 0.02 THOU/MM3 (ref 0–0.07)
IMM GRANULOCYTES NFR BLD AUTO: 0.3 %
LYMPHOCYTES ABSOLUTE: 2.7 THOU/MM3 (ref 1–4.8)
LYMPHOCYTES NFR BLD AUTO: 40.1 %
MCH RBC QN AUTO: 31 PG (ref 26–33)
MCHC RBC AUTO-ENTMCNC: 32.3 GM/DL (ref 32.2–35.5)
MCV RBC AUTO: 96 FL (ref 81–99)
MONOCYTES ABSOLUTE: 0.3 THOU/MM3 (ref 0.4–1.3)
MONOCYTES NFR BLD AUTO: 4.5 %
NEUTROPHILS NFR BLD AUTO: 53.2 %
NRBC BLD AUTO-RTO: 0 /100 WBC
PLATELET # BLD AUTO: 273 THOU/MM3 (ref 130–400)
PMV BLD AUTO: 11.3 FL (ref 9.4–12.4)
POTASSIUM SERPL-SCNC: 4 MEQ/L (ref 3.5–5.2)
PROT SERPL-MCNC: 6.9 G/DL (ref 6.1–8)
RBC # BLD AUTO: 4.45 MILL/MM3 (ref 4.2–5.4)
SEGMENTED NEUTROPHILS ABSOLUTE COUNT: 3.6 THOU/MM3 (ref 1.8–7.7)
SODIUM SERPL-SCNC: 140 MEQ/L (ref 135–145)
WBC # BLD AUTO: 6.7 THOU/MM3 (ref 4.8–10.8)

## 2024-01-11 ENCOUNTER — TELEPHONE (OUTPATIENT)
Dept: RHEUMATOLOGY | Age: 64
End: 2024-01-11

## 2024-01-11 RX ORDER — METHOTREXATE 2.5 MG/1
10 TABLET ORAL WEEKLY
Qty: 16 TABLET | Refills: 0 | Status: SHIPPED | OUTPATIENT
Start: 2024-01-11 | End: 2024-02-10

## 2024-01-11 NOTE — TELEPHONE ENCOUNTER
----- Message from Isha Ramsey sent at 1/11/2024  3:07 PM EST -----  Regarding: Methotrexate refill  Contact: [phone]  Blood work was completed yesterday

## 2024-01-15 DIAGNOSIS — G43.909 MIGRAINE WITHOUT STATUS MIGRAINOSUS, NOT INTRACTABLE, UNSPECIFIED MIGRAINE TYPE: ICD-10-CM

## 2024-01-15 DIAGNOSIS — G43.409 HEMIPLEGIC MIGRAINE WITHOUT STATUS MIGRAINOSUS, NOT INTRACTABLE: ICD-10-CM

## 2024-01-16 NOTE — TELEPHONE ENCOUNTER
This medication refill is regarding a MyChart request. Refill requested by patient.    Requested Prescriptions     Pending Prescriptions Disp Refills    butalbital-acetaminophen-caffeine (FIORICET, ESGIC) -40 MG per tablet 15 tablet 0     Sig: Take 1 tablet by mouth every 48 hours as needed for Headaches       Date of last visit: 10/13/2023   Date of next visit: 1/23/2024  Date of last refill: 12/12/2023  15/0    Last Lipid Panel:    Lab Results   Component Value Date/Time    CHOL 210 04/15/2021 11:42 AM    TRIG 122 04/15/2021 11:42 AM    HDL 49 04/17/2023 09:19 AM    LDLCALC 168 04/17/2023 09:19 AM     Last CMP:   Lab Results   Component Value Date     01/10/2024    K 4.0 01/10/2024     01/10/2024    CO2 24 01/10/2024    BUN 17 01/10/2024    CREATININE 0.7 01/10/2024    GLUCOSE 96 01/10/2024    CALCIUM 9.5 01/10/2024    PROT 6.9 01/10/2024    LABALBU 4.5 01/10/2024    BILITOT 0.3 01/10/2024    ALKPHOS 117 01/10/2024    AST 33 01/10/2024    ALT 58 01/10/2024    LABGLOM >60 01/10/2024    GFRAA >60 02/19/2021    AGRATIO 2.9 (H) 02/05/2021       Last Thyroid:    Lab Results   Component Value Date    TSH 1.440 04/17/2023    FT3 2.56 07/15/2021    T4FREE 1.43 04/17/2023     Last Hemoglobin A1C:    Lab Results   Component Value Date/Time    LABA1C 5.3 10/30/2023 12:29 PM    AVGG 99 10/30/2023 12:29 PM       Rx verified, ordered and set to EP.

## 2024-01-17 RX ORDER — BUTALBITAL, ACETAMINOPHEN AND CAFFEINE 50; 325; 40 MG/1; MG/1; MG/1
1 TABLET ORAL
Qty: 15 TABLET | Refills: 0 | Status: SHIPPED | OUTPATIENT
Start: 2024-01-17 | End: 2024-02-16

## 2024-01-18 ENCOUNTER — OFFICE VISIT (OUTPATIENT)
Dept: PHYSICAL MEDICINE AND REHAB | Age: 64
End: 2024-01-18
Payer: MEDICARE

## 2024-01-18 VITALS
BODY MASS INDEX: 29.44 KG/M2 | DIASTOLIC BLOOD PRESSURE: 84 MMHG | SYSTOLIC BLOOD PRESSURE: 122 MMHG | WEIGHT: 160 LBS | HEIGHT: 62 IN

## 2024-01-18 DIAGNOSIS — M47.814 THORACIC SPONDYLOSIS: ICD-10-CM

## 2024-01-18 DIAGNOSIS — G89.4 CHRONIC PAIN SYNDROME: ICD-10-CM

## 2024-01-18 DIAGNOSIS — M79.18 MYOFASCIAL PAIN: ICD-10-CM

## 2024-01-18 DIAGNOSIS — S22.000A COMPRESSION FRACTURE OF BODY OF THORACIC VERTEBRA (HCC): ICD-10-CM

## 2024-01-18 DIAGNOSIS — M54.17 RADICULOPATHY, LUMBOSACRAL REGION: Primary | ICD-10-CM

## 2024-01-18 PROCEDURE — G8427 DOCREV CUR MEDS BY ELIG CLIN: HCPCS | Performed by: ANESTHESIOLOGY

## 2024-01-18 PROCEDURE — 99215 OFFICE O/P EST HI 40 MIN: CPT | Performed by: ANESTHESIOLOGY

## 2024-01-18 PROCEDURE — 3017F COLORECTAL CA SCREEN DOC REV: CPT | Performed by: ANESTHESIOLOGY

## 2024-01-18 PROCEDURE — G8417 CALC BMI ABV UP PARAM F/U: HCPCS | Performed by: ANESTHESIOLOGY

## 2024-01-18 PROCEDURE — 1036F TOBACCO NON-USER: CPT | Performed by: ANESTHESIOLOGY

## 2024-01-18 PROCEDURE — G8484 FLU IMMUNIZE NO ADMIN: HCPCS | Performed by: ANESTHESIOLOGY

## 2024-01-18 NOTE — PROGRESS NOTES
Functionality Assessment/Goals Worksheet     On a scale of 0 (Does not Interfere) to 10 (Completely Interferes)     1.  Which number describes how during the past week pain has interfered with           the following:  A.  General Activity:  8  B.  Mood: 6  C.  Walking Ability:  9  D.  Normal Work (Includes both work outside the home and housework):  10  E.  Relations with Other People:   2  F.  Sleep:   9  G.  Enjoyment of Life:   9    2.  Patient Prefers to Take their Pain Medications:     [x]  On a regular basis   []  Only when necessary    []  Does not take pain medications    3.  What are the Patient's Goals/Expectations for Visiting Pain Management?     [x]  Learn about my pain    []  Receive Medication   []  Physical Therapy     []  Treat Depression   []  Receive Injections    []  Treat Sleep   []  Deal with Anxiety and Stress   []  Treat Opoid Dependence/Addiction   []  Other:                                
NEEDLE/CATH SPINE/PARASPINAL DX/THER ADDON    CT NJX DX/THER SBST INTRLMNR LMBR/SAC W/IMG GDN      2. Chronic pain syndrome  fentaNYL (DURAGESIC) 25 MCG/HR      3. Thoracic spondylosis        4. Compression fracture of body of thoracic vertebra (HCC)        5. Myofascial pain              Isha Ramsey is a 63 y.o.female presenting to the pain clinic for evaluation of migraines. She underwent Botox injections for management of migraine headaches today.  In addition, she has lumbar facet mediated pain.  She has completed her lumbar radiofrequency ablation for her chronic low back pain.  She would benefit from physical therapy revisitation for core stability exercise program.      She has new onset thoracic compression fractures on her x-ray at T7 and T9 her MRI shows numerous compression fractures in her thoracic spine.  We discussed her lumbar spine x-rays and MRI of the lumbar spine which does show worsening stenosis at L3/L4 with patient having new radicular symptoms.  I set her up for lumbar epidural steroid injection L3/L4 to address this and increased her fentanyl patch.    Plan:   The following treatment recommendations and plan were discussed in detail with Isha Ramsey.      Imaging:  I reviewed patient's x-ray of the lumbar spine flexion/extension views and MRI of the lumbar spine results were discussed needed with the patient today.    Analgesics:  For her ongoing chronic pain secondary to vertebral body compression fractures, I have increased the patient's fentanyl patch from 12 mcg/h to 25 mcg/h.  Patient is advised to take the medication as prescribed as taking more than prescribed can lead to the development of tolerance, physical dependency, and addiction.  Patient is advised to store and lock the medication in a safe and secure location.  If the medication is lost or stolen we will not provide early refills on this medication.  Patient understands that they must stay compliant with treatment plan

## 2024-01-19 ENCOUNTER — PATIENT MESSAGE (OUTPATIENT)
Dept: RHEUMATOLOGY | Age: 64
End: 2024-01-19

## 2024-01-19 DIAGNOSIS — L40.50 PSORIATIC ARTHRITIS (HCC): ICD-10-CM

## 2024-01-19 DIAGNOSIS — Z51.81 MEDICATION MONITORING ENCOUNTER: ICD-10-CM

## 2024-01-19 RX ORDER — FENTANYL 25 UG/1
1 PATCH TRANSDERMAL
Qty: 10 PATCH | Refills: 0 | Status: SHIPPED | OUTPATIENT
Start: 2024-01-20 | End: 2024-02-19

## 2024-01-19 RX ORDER — FOLIC ACID 1 MG/1
1 TABLET ORAL DAILY
Qty: 90 TABLET | Refills: 1 | Status: CANCELLED | OUTPATIENT
Start: 2024-01-19

## 2024-01-22 RX ORDER — FOLIC ACID 1 MG/1
1 TABLET ORAL DAILY
Qty: 90 TABLET | Refills: 1 | Status: SHIPPED | OUTPATIENT
Start: 2024-01-22

## 2024-01-22 NOTE — TELEPHONE ENCOUNTER
From: Isha Ramsey  To: Liat Hallman  Sent: 1/19/2024 2:07 PM EST  Subject: Folic acid need refill    Hi, somehow I have misplaced my folic acid and I have looked everywhere. can I please get it refilled. many thanks!

## 2024-02-01 DIAGNOSIS — G25.81 RESTLESS LEGS: ICD-10-CM

## 2024-02-01 RX ORDER — PRAMIPEXOLE DIHYDROCHLORIDE 0.12 MG/1
0.25 TABLET ORAL 2 TIMES DAILY
Qty: 120 TABLET | Refills: 3 | Status: SHIPPED | OUTPATIENT
Start: 2024-02-01

## 2024-02-01 NOTE — TELEPHONE ENCOUNTER
This medication refill is regarding a electronic request. Refill requested by  [facility] .    Requested Prescriptions     Pending Prescriptions Disp Refills    pramipexole (MIRAPEX) 0.125 MG tablet [Pharmacy Med Name: PRAMIPEXOLE 0.125 MG TABLET] 120 tablet 3     Sig: take 2 tablets by mouth twice a day       Date of last visit: 10/13/2023   Date of next visit: 2/7/2024  Date of last refill: 10/2/2023   120/3    Last Lipid Panel:    Lab Results   Component Value Date/Time    CHOL 210 04/15/2021 11:42 AM    TRIG 122 04/15/2021 11:42 AM    HDL 49 04/17/2023 09:19 AM    LDLCALC 168 04/17/2023 09:19 AM     Last CMP:   Lab Results   Component Value Date     01/10/2024    K 4.0 01/10/2024     01/10/2024    CO2 24 01/10/2024    BUN 17 01/10/2024    CREATININE 0.7 01/10/2024    GLUCOSE 96 01/10/2024    CALCIUM 9.5 01/10/2024    PROT 6.9 01/10/2024    LABALBU 4.5 01/10/2024    BILITOT 0.3 01/10/2024    ALKPHOS 117 01/10/2024    AST 33 01/10/2024    ALT 58 01/10/2024    LABGLOM >60 01/10/2024    GFRAA >60 02/19/2021    AGRATIO 2.9 (H) 02/05/2021       Last Thyroid:    Lab Results   Component Value Date    TSH 1.440 04/17/2023    FT3 2.56 07/15/2021    T4FREE 1.43 04/17/2023     Last Hemoglobin A1C:    Lab Results   Component Value Date/Time    LABA1C 5.3 10/30/2023 12:29 PM       Rx verified, ordered and set to EP.

## 2024-02-07 ENCOUNTER — OFFICE VISIT (OUTPATIENT)
Dept: FAMILY MEDICINE CLINIC | Age: 64
End: 2024-02-07
Payer: MEDICARE

## 2024-02-07 ENCOUNTER — HOSPITAL ENCOUNTER (OUTPATIENT)
Dept: INTERVENTIONAL RADIOLOGY/VASCULAR | Age: 64
Discharge: HOME | End: 2024-02-09
Payer: MEDICARE

## 2024-02-07 VITALS
HEIGHT: 62 IN | BODY MASS INDEX: 31.32 KG/M2 | OXYGEN SATURATION: 94 % | TEMPERATURE: 98.4 F | SYSTOLIC BLOOD PRESSURE: 122 MMHG | WEIGHT: 170.2 LBS | RESPIRATION RATE: 16 BRPM | HEART RATE: 78 BPM | DIASTOLIC BLOOD PRESSURE: 80 MMHG

## 2024-02-07 DIAGNOSIS — I25.119 ATHEROSCLEROSIS OF NATIVE CORONARY ARTERY OF NATIVE HEART WITH ANGINA PECTORIS (HCC): ICD-10-CM

## 2024-02-07 DIAGNOSIS — K21.9 GASTROESOPHAGEAL REFLUX DISEASE, UNSPECIFIED WHETHER ESOPHAGITIS PRESENT: ICD-10-CM

## 2024-02-07 DIAGNOSIS — R60.0 UNILATERAL EDEMA OF LOWER EXTREMITY: ICD-10-CM

## 2024-02-07 DIAGNOSIS — Z86.69 HX OF IMPACTED CERUMEN: ICD-10-CM

## 2024-02-07 DIAGNOSIS — E78.5 DYSLIPIDEMIA: ICD-10-CM

## 2024-02-07 DIAGNOSIS — M79.605 PAIN OF LEFT LOWER EXTREMITY: ICD-10-CM

## 2024-02-07 DIAGNOSIS — I69.30 HISTORY OF CVA WITH RESIDUAL DEFICIT: ICD-10-CM

## 2024-02-07 DIAGNOSIS — G47.00 INSOMNIA, UNSPECIFIED TYPE: ICD-10-CM

## 2024-02-07 DIAGNOSIS — G43.109 MIGRAINE WITH AURA AND WITHOUT STATUS MIGRAINOSUS, NOT INTRACTABLE: Primary | ICD-10-CM

## 2024-02-07 DIAGNOSIS — G43.409 HEMIPLEGIC MIGRAINE WITHOUT STATUS MIGRAINOSUS, NOT INTRACTABLE: ICD-10-CM

## 2024-02-07 DIAGNOSIS — G43.909 MIGRAINE WITHOUT STATUS MIGRAINOSUS, NOT INTRACTABLE, UNSPECIFIED MIGRAINE TYPE: ICD-10-CM

## 2024-02-07 PROCEDURE — 93971 EXTREMITY STUDY: CPT

## 2024-02-07 PROCEDURE — 1036F TOBACCO NON-USER: CPT

## 2024-02-07 PROCEDURE — 3017F COLORECTAL CA SCREEN DOC REV: CPT

## 2024-02-07 PROCEDURE — G8417 CALC BMI ABV UP PARAM F/U: HCPCS

## 2024-02-07 PROCEDURE — 99214 OFFICE O/P EST MOD 30 MIN: CPT

## 2024-02-07 PROCEDURE — G8427 DOCREV CUR MEDS BY ELIG CLIN: HCPCS

## 2024-02-07 PROCEDURE — G8484 FLU IMMUNIZE NO ADMIN: HCPCS

## 2024-02-07 RX ORDER — TRAZODONE HYDROCHLORIDE 150 MG/1
225 TABLET ORAL NIGHTLY
Qty: 120 TABLET | Refills: 1 | Status: SHIPPED | OUTPATIENT
Start: 2024-02-07 | End: 2024-07-16

## 2024-02-07 RX ORDER — CHOLESTYRAMINE 4 G/9G
1 POWDER, FOR SUSPENSION ORAL
COMMUNITY

## 2024-02-07 RX ORDER — POTASSIUM CHLORIDE 1.5 G/1.58G
POWDER, FOR SOLUTION ORAL
COMMUNITY

## 2024-02-07 RX ORDER — ZAVEGEPANT 10 MG/.1ML
SPRAY NASAL
COMMUNITY
Start: 2023-12-18

## 2024-02-07 RX ORDER — ESOMEPRAZOLE MAGNESIUM 40 MG/1
40 CAPSULE, DELAYED RELEASE ORAL
Qty: 90 CAPSULE | Refills: 0 | Status: SHIPPED | OUTPATIENT
Start: 2024-02-07 | End: 2024-05-07

## 2024-02-07 RX ORDER — BUTALBITAL, ACETAMINOPHEN AND CAFFEINE 50; 325; 40 MG/1; MG/1; MG/1
1 TABLET ORAL
Qty: 15 TABLET | Refills: 0 | Status: SHIPPED | OUTPATIENT
Start: 2024-02-07 | End: 2024-03-08

## 2024-02-07 RX ORDER — ATORVASTATIN CALCIUM 40 MG/1
40 TABLET, FILM COATED ORAL
Qty: 90 TABLET | Refills: 0 | Status: SHIPPED | OUTPATIENT
Start: 2024-02-07 | End: 2024-09-04

## 2024-02-07 NOTE — PROGRESS NOTES
Attending Physician Note    I saw and evaluated the patient, performing the key elements of the service.  I discussed the findings, assessment and plan with the resident and agree with the resident's findings and plan as documented in the resident's note.  GC modifier added.    Brief summary:  Unilateral left LE edema, calf pain - check doppler to r/o DVT.    Migraine syndrome, hemiplegic migraines - rare use of Fioricet.  Refilled.    GERD - continue Nexium.  Had esophageal motility/manometry study 11/28/23 with Dr. Chandler.    
Pneumococcal 0-64 years Vaccine  Aged Out    Diabetes screen  Discontinued     The 10-year ASCVD risk score (Jane LEE, et al., 2019) is: 4.7%    Values used to calculate the score:      Age: 63 years      Sex: Female      Is Non- : No      Diabetic: No      Tobacco smoker: No      Systolic Blood Pressure: 122 mmHg      Is BP treated: No      HDL Cholesterol: 49 mg/dL      Total Cholesterol: 261 mg/dl        9/19/2023     3:00 PM 3/28/2023    11:29 AM   PHQ Scores   PHQ2 Score 3 2   PHQ9 Score 9 7     Interpretation of Total Score Depression Severity: 1-4 = Minimal depression, 5-9 = Mild depression, 10-14 = Moderate depression, 15-19 = Moderately severe depression, 20-27 = Severe depression     PDMP Monitoring: Controlled Substance Agreement Signed: 2/7/2024      Last PDMP Shreyas as Reviewed:  Review User Review Instant Review Result   LASHONDA LIMA 2/7/2024  3:49 PM Reviewed PDMP [1]     Assessment & Plan:      1. Migraine with aura and without status migrainosus, not intractable  2. Hemiplegic migraine without status migrainosus, not intractable  -     butalbital-acetaminophen-caffeine (FIORICET, ESGIC) -40 MG per tablet; Take 1 tablet by mouth every 48 hours as needed for Headaches, Disp-15 tablet, R-0Normal  3. Migraine without status migrainosus, not intractable, unspecified migraine type  -     butalbital-acetaminophen-caffeine (FIORICET, ESGIC) -40 MG per tablet; Take 1 tablet by mouth every 48 hours as needed for Headaches, Disp-15 tablet, R-0Normal  4. Atherosclerosis of native coronary artery of native heart with angina pectoris (HCC)  -     atorvastatin (LIPITOR) 40 MG tablet; Take 1 tablet by mouth three times a week, Disp-90 tablet, R-0Normal  -     Lipid Panel; Future  5. History of CVA with residual deficit  -     atorvastatin (LIPITOR) 40 MG tablet; Take 1 tablet by mouth three times a week, Disp-90 tablet, R-0Normal  -     Lipid Panel; Future  6.

## 2024-02-08 ENCOUNTER — PATIENT MESSAGE (OUTPATIENT)
Dept: RHEUMATOLOGY | Age: 64
End: 2024-02-08

## 2024-02-08 NOTE — TELEPHONE ENCOUNTER
From: Isha Ramsey  To: Liat Hallman  Sent: 2/8/2024 12:01 PM EST  Subject: methotrexate refill    hi, I need to refill my methotrexate, thanks

## 2024-02-12 ENCOUNTER — PATIENT MESSAGE (OUTPATIENT)
Dept: RHEUMATOLOGY | Age: 64
End: 2024-02-12

## 2024-02-12 ENCOUNTER — PATIENT MESSAGE (OUTPATIENT)
Dept: FAMILY MEDICINE CLINIC | Age: 64
End: 2024-02-12

## 2024-02-12 ENCOUNTER — NURSE ONLY (OUTPATIENT)
Dept: LAB | Age: 64
End: 2024-02-12

## 2024-02-12 DIAGNOSIS — Z51.81 MEDICATION MONITORING ENCOUNTER: ICD-10-CM

## 2024-02-12 DIAGNOSIS — N60.09 CYST OF BREAST, UNSPECIFIED LATERALITY: Primary | ICD-10-CM

## 2024-02-12 DIAGNOSIS — E78.5 DYSLIPIDEMIA: ICD-10-CM

## 2024-02-12 DIAGNOSIS — N63.0 MASS OF BREAST, UNSPECIFIED LATERALITY: ICD-10-CM

## 2024-02-12 DIAGNOSIS — I69.30 HISTORY OF CVA WITH RESIDUAL DEFICIT: ICD-10-CM

## 2024-02-12 DIAGNOSIS — I25.119 ATHEROSCLEROSIS OF NATIVE CORONARY ARTERY OF NATIVE HEART WITH ANGINA PECTORIS (HCC): ICD-10-CM

## 2024-02-12 DIAGNOSIS — Z71.1 CONCERN ABOUT BREAST CANCER IN FEMALE WITHOUT DIAGNOSIS: ICD-10-CM

## 2024-02-12 LAB
ALBUMIN SERPL BCG-MCNC: 4.1 G/DL (ref 3.5–5.1)
ALP SERPL-CCNC: 108 U/L (ref 38–126)
ALT SERPL W/O P-5'-P-CCNC: 21 U/L (ref 11–66)
ANION GAP SERPL CALC-SCNC: 10 MEQ/L (ref 8–16)
AST SERPL-CCNC: 16 U/L (ref 5–40)
BASOPHILS ABSOLUTE: 0 THOU/MM3 (ref 0–0.1)
BASOPHILS NFR BLD AUTO: 0.7 %
BILIRUB SERPL-MCNC: 0.2 MG/DL (ref 0.3–1.2)
CALCIUM SERPL-MCNC: 9.6 MG/DL (ref 8.5–10.5)
CHLORIDE SERPL-SCNC: 108 MEQ/L (ref 98–111)
CHOLEST SERPL-MCNC: 194 MG/DL (ref 100–199)
CO2 SERPL-SCNC: 25 MEQ/L (ref 23–33)
CREAT SERPL-MCNC: 0.9 MG/DL (ref 0.4–1.2)
CRP SERPL-MCNC: 4.14 MG/DL (ref 0–1)
DEPRECATED RDW RBC AUTO: 51.3 FL (ref 35–45)
EOSINOPHIL NFR BLD AUTO: 1.9 %
EOSINOPHILS ABSOLUTE: 0.1 THOU/MM3 (ref 0–0.4)
ERYTHROCYTE [DISTWIDTH] IN BLOOD BY AUTOMATED COUNT: 14.3 % (ref 11.5–14.5)
ERYTHROCYTE [SEDIMENTATION RATE] IN BLOOD BY WESTERGREN METHOD: 33 MM/HR (ref 0–20)
GFR SERPL CREATININE-BSD FRML MDRD: > 60 ML/MIN/1.73M2
GLUCOSE SERPL-MCNC: 101 MG/DL (ref 70–108)
HCT VFR BLD AUTO: 41.1 % (ref 37–47)
HDLC SERPL-MCNC: 48 MG/DL
HGB BLD-MCNC: 12.9 GM/DL (ref 12–16)
IMM GRANULOCYTES # BLD AUTO: 0.03 THOU/MM3 (ref 0–0.07)
IMM GRANULOCYTES NFR BLD AUTO: 0.4 %
LDLC SERPL CALC-MCNC: 116 MG/DL
LYMPHOCYTES ABSOLUTE: 2.9 THOU/MM3 (ref 1–4.8)
LYMPHOCYTES NFR BLD AUTO: 41.4 %
MCH RBC QN AUTO: 30.6 PG (ref 26–33)
MCHC RBC AUTO-ENTMCNC: 31.4 GM/DL (ref 32.2–35.5)
MCV RBC AUTO: 97.6 FL (ref 81–99)
MONOCYTES ABSOLUTE: 0.5 THOU/MM3 (ref 0.4–1.3)
MONOCYTES NFR BLD AUTO: 7.4 %
NEUTROPHILS NFR BLD AUTO: 48.2 %
NRBC BLD AUTO-RTO: 0 /100 WBC
PLATELET # BLD AUTO: 250 THOU/MM3 (ref 130–400)
PMV BLD AUTO: 11.7 FL (ref 9.4–12.4)
POTASSIUM SERPL-SCNC: 3.9 MEQ/L (ref 3.5–5.2)
PROT SERPL-MCNC: 6.7 G/DL (ref 6.1–8)
RBC # BLD AUTO: 4.21 MILL/MM3 (ref 4.2–5.4)
SEGMENTED NEUTROPHILS ABSOLUTE COUNT: 3.4 THOU/MM3 (ref 1.8–7.7)
SODIUM SERPL-SCNC: 143 MEQ/L (ref 135–145)
TRIGL SERPL-MCNC: 149 MG/DL (ref 0–199)
WBC # BLD AUTO: 7 THOU/MM3 (ref 4.8–10.8)

## 2024-02-12 NOTE — TELEPHONE ENCOUNTER
From: Isha Ramsey  To: Dr. Lisa Thorne  Sent: 2/12/2024 12:15 PM EST  Subject: Followup visit     Dr. Alberto, I am so sorry I missed my visit with you, I actually thought it was tomorrow and had it on my calendar as such. I did get my blood work done this morning. I will call and reschedule. again, I apologize

## 2024-02-12 NOTE — TELEPHONE ENCOUNTER
From: Isha Ramsey  To: Mack Nguyen DO  Sent: 2/12/2024 1:53 PM EST  Subject: Need order for a diagnostic mammogram    I called to schedule my mammogram and they asked if I have had problems with my breasts and I said I have fibrocystic disease, and I feel a cyst, as well as 2 stereotactic biopsies in the past. Could you please put in for the diagnostic mammogram so they can schedule that is what they need. Thanks.

## 2024-02-13 ENCOUNTER — TELEPHONE (OUTPATIENT)
Dept: FAMILY MEDICINE CLINIC | Age: 64
End: 2024-02-13

## 2024-02-13 ENCOUNTER — OFFICE VISIT (OUTPATIENT)
Dept: RHEUMATOLOGY | Age: 64
End: 2024-02-13
Payer: MEDICARE

## 2024-02-13 VITALS
OXYGEN SATURATION: 94 % | BODY MASS INDEX: 31.28 KG/M2 | HEIGHT: 62 IN | SYSTOLIC BLOOD PRESSURE: 94 MMHG | HEART RATE: 77 BPM | DIASTOLIC BLOOD PRESSURE: 60 MMHG | WEIGHT: 170 LBS

## 2024-02-13 DIAGNOSIS — M54.50 CHRONIC BILATERAL LOW BACK PAIN WITHOUT SCIATICA: ICD-10-CM

## 2024-02-13 DIAGNOSIS — M80.08XA AGE-RELATED OSTEOPOROSIS WITH CURRENT PATHOLOGICAL FRACTURE, VERTEBRA(E), INITIAL ENCOUNTER FOR FRACTURE (HCC): ICD-10-CM

## 2024-02-13 DIAGNOSIS — G89.29 CHRONIC BILATERAL LOW BACK PAIN WITHOUT SCIATICA: ICD-10-CM

## 2024-02-13 DIAGNOSIS — Z51.81 MEDICATION MONITORING ENCOUNTER: ICD-10-CM

## 2024-02-13 DIAGNOSIS — L40.50 PSORIATIC ARTHRITIS (HCC): Primary | ICD-10-CM

## 2024-02-13 PROCEDURE — 99214 OFFICE O/P EST MOD 30 MIN: CPT | Performed by: INTERNAL MEDICINE

## 2024-02-13 PROCEDURE — 3017F COLORECTAL CA SCREEN DOC REV: CPT | Performed by: INTERNAL MEDICINE

## 2024-02-13 PROCEDURE — G8417 CALC BMI ABV UP PARAM F/U: HCPCS | Performed by: INTERNAL MEDICINE

## 2024-02-13 PROCEDURE — 1036F TOBACCO NON-USER: CPT | Performed by: INTERNAL MEDICINE

## 2024-02-13 PROCEDURE — G8427 DOCREV CUR MEDS BY ELIG CLIN: HCPCS | Performed by: INTERNAL MEDICINE

## 2024-02-13 PROCEDURE — G8484 FLU IMMUNIZE NO ADMIN: HCPCS | Performed by: INTERNAL MEDICINE

## 2024-02-13 RX ORDER — METHOTREXATE 2.5 MG/1
10 TABLET ORAL WEEKLY
Qty: 16 TABLET | Refills: 0 | Status: SHIPPED | OUTPATIENT
Start: 2024-02-13 | End: 2024-03-14

## 2024-02-13 RX ORDER — METHOTREXATE 2.5 MG/1
10 TABLET ORAL WEEKLY
Qty: 16 TABLET | Refills: 0 | Status: SHIPPED | OUTPATIENT
Start: 2024-02-13 | End: 2024-02-13 | Stop reason: SDUPTHER

## 2024-02-13 RX ORDER — SECUKINUMAB 150 MG/ML
INJECTION SUBCUTANEOUS
Qty: 5 ML | Refills: 0 | Status: SHIPPED | OUTPATIENT
Start: 2024-02-13

## 2024-02-13 NOTE — TELEPHONE ENCOUNTER
----- Message from Irlanda Warren sent at 2/13/2024  9:46 AM EST -----  Subject: Referral Request    Reason for referral request? DIAGNOTIC BILATERAL MAMMOGRAM WITH PASSING   CODES  Provider patient wants to be referred to(if known):     Provider Phone Number(if known):    Additional Information for Provider? Spoke with Douglas from central   scheduling, she states that patient is needing an new order for this   mammogram, please return call to assist, thank you.  ---------------------------------------------------------------------------  --------------  CALL BACK INFO    [phone]; OK to leave message on voicemail  ---------------------------------------------------------------------------  --------------

## 2024-02-13 NOTE — TELEPHONE ENCOUNTER
Patient needs a new order placed in Epic for her mammogram. The previous order was triggering for ABN. Please advise.

## 2024-02-13 NOTE — PROGRESS NOTES
1 spray in 1 nostril AS A ONE TIME DOSE (MAX OF 1 SPRAY PER 24HOURS)           PHYSICAL EXAMINATION / OBJECTIVE   Objective:  BP 94/60 (Site: Left Upper Arm, Position: Sitting, Cuff Size: Small Adult)   Pulse 77   Ht 1.575 m (5' 2.01\")   Wt 77.1 kg (170 lb)   SpO2 94%   BMI 31.09 kg/m²     Physical Exam  Vitals reviewed.   Constitutional:       Appearance: She is well-developed.   Cardiovascular:      Rate and Rhythm: Normal rate and regular rhythm.   Pulmonary:      Effort: Pulmonary effort is normal.      Breath sounds: Normal breath sounds.   Musculoskeletal:      Cervical back: Normal range of motion and neck supple.   Skin:     General: Skin is warm and dry.      Findings: No rash.   Neurological:      Mental Status: She is alert and oriented to person, place, and time.   Psychiatric:         Thought Content: Thought content normal.       Upper extremities:    SHOULDERS + tender bilat ,   ELBOWS tender left medial epicondyle   WRISTS   HANDS/FINGERS tender pips right 3rd, left 2,3,4. + finkelstein test bilateral.     Lower extremities:  HIPS tender bilat  KNEES t  ANKLES tender right   FEET : tender and mild fullness bilat MTPs , tender left retrocalcaneal heal       RAPID 3:   2/13/2024 --- RAPID 3: 5.7 + 9 + 9 = 23.7     Remission: <3  Low Disease Activity: <6  Moderate Disease Activity: >=6 and <=12  High Disease Activity: >12     LABS    CBC  Lab Results   Component Value Date/Time    WBC 7.0 02/12/2024 11:08 AM    RBC 4.21 02/12/2024 11:08 AM    HGB 12.9 02/12/2024 11:08 AM    HCT 41.1 02/12/2024 11:08 AM    MCV 97.6 02/12/2024 11:08 AM    MCH 30.6 02/12/2024 11:08 AM    MCHC 31.4 02/12/2024 11:08 AM    RDW 13.7 02/05/2021 02:47 PM     02/12/2024 11:08 AM    MPV 11.7 02/12/2024 11:08 AM       CMP  Lab Results   Component Value Date/Time    CALCIUM 9.6 02/12/2024 11:07 AM    LABALBU 4.1 02/12/2024 11:07 AM    PROT 6.7 02/12/2024 11:07 AM     02/12/2024 11:07 AM    K 3.9 02/12/2024 11:07 AM

## 2024-02-14 ENCOUNTER — PATIENT MESSAGE (OUTPATIENT)
Dept: FAMILY MEDICINE CLINIC | Age: 64
End: 2024-02-14

## 2024-02-14 NOTE — TELEPHONE ENCOUNTER
From: Isha Ramsey  To: Mack Nguyen DO  Sent: 2/14/2024 12:02 PM EST  Subject: Still unable to get mammogram scheduled    Just letting you know central scheduling will be contacting you again as they are still unable to schedule the diagnostic mammogram as they need a specific CPT code. I need to get this done so thanks for your help

## 2024-02-14 NOTE — TELEPHONE ENCOUNTER
Will need to reach out to [facility]. There exist two orders for screening or diagnostic mammogram, both appear to be valid and associated with the correct diagnostic codes. Will need to investigate further.    Electronically signed by Mack Nguyen DO on 2/14/2024 at 2:06 PM

## 2024-02-14 NOTE — TELEPHONE ENCOUNTER
Mack Nguyen, DO  I spoke with [facility] and she states that the order does have to be a Diagnostic Bilateral Mammogram with Ultra Sound of affected breast and she did state that the N63.0 should be covered by Medicare but the other diagnosis placed on order were not covered. Also left message on Isha Ramsey voicemail due to needing to schedule an appointment to discuss breast lump and symptoms associated to place the correct order.

## 2024-02-16 ENCOUNTER — TELEPHONE (OUTPATIENT)
Dept: RHEUMATOLOGY | Age: 64
End: 2024-02-16

## 2024-02-16 ENCOUNTER — PATIENT MESSAGE (OUTPATIENT)
Dept: FAMILY MEDICINE CLINIC | Age: 64
End: 2024-02-16

## 2024-02-16 NOTE — TELEPHONE ENCOUNTER
I personally called Isha Ramsey on 2/16/2024 at approximately 2:02 PM.    In summary, the issue is thus:    Isha Ramsey is concerned of cyst/mass/abnormal inflammatory markers; this was made known after my last office visit with this patient 2/7/2024 (we discussed routine mammography and past mammographic results but no breast exam was completed). On 2/12/24 Isha Ramsey informed our office that a new order for a diagnostic mammogram was needed (screening mammogram ordered prior) Orders placed that same afternoon (see patient message encounter of 2/12/24) within office policy window for patient messages.    2/13/24 Office staff spoke \"with Douglas from central scheduling, she states that patient is needing an new order for this mammogram\" as insurance was rejecting diagnostic codes. Please note coverage is through [employer]. The codes used were as follows:    CPT code:  63837 ([id number] - APPLE DIGITAL DIAGNOSTIC W OR WO CAD BILATERAL)  Diagnosis   N60.09 (ICD-10-CM) - Cyst of breast, unspecified laterality   Z71.1 (ICD-10-CM) - Concern about breast cancer in female without diagnosis   N63.0 (ICD-10-CM) - Mass of breast, unspecified laterality     2/14/2024 The rejected codes were brought to my attention; together with office staff we decided to investigate by reaching out to [facility] who confirmed the code N63.0 at least should be covered by medicare. A voicemail was left with patient (please see 2/14/2024 encounter for details). As far as I can tell, given the discussion with multiple departments and this ongoing insurance issue, the most effective solution would be to have the patient come in for an appointment so that a new physical exam complete with breast exam may be performed, the findings documented, and sent to patient's insurance with a novel 3rd order for mammography with previously valid codes and/or new codes attached to the exam findings and submitted together to [employer].

## 2024-02-16 NOTE — TELEPHONE ENCOUNTER
PAP application has been started for [employer]. Will need pt signature, household size and a copy of 1040 or any there source of income.

## 2024-02-19 ENCOUNTER — TELEPHONE (OUTPATIENT)
Dept: FAMILY MEDICINE CLINIC | Age: 64
End: 2024-02-19

## 2024-02-19 RX ORDER — BUPROPION HYDROCHLORIDE 300 MG/1
TABLET ORAL
Qty: 90 TABLET | Refills: 0 | Status: SHIPPED | OUTPATIENT
Start: 2024-02-19

## 2024-02-19 ASSESSMENT — PATIENT HEALTH QUESTIONNAIRE - PHQ9
6. FEELING BAD ABOUT YOURSELF - OR THAT YOU ARE A FAILURE OR HAVE LET YOURSELF OR YOUR FAMILY DOWN: NOT AT ALL
SUM OF ALL RESPONSES TO PHQ QUESTIONS 1-9: 3
9. THOUGHTS THAT YOU WOULD BE BETTER OFF DEAD, OR OF HURTING YOURSELF: NOT AT ALL
5. POOR APPETITE OR OVEREATING: NOT AT ALL
SUM OF ALL RESPONSES TO PHQ QUESTIONS 1-9: 3
SUM OF ALL RESPONSES TO PHQ9 QUESTIONS 1 & 2: 1
3. TROUBLE FALLING OR STAYING ASLEEP: 1
8. MOVING OR SPEAKING SO SLOWLY THAT OTHER PEOPLE COULD HAVE NOTICED. OR THE OPPOSITE, BEING SO FIGETY OR RESTLESS THAT YOU HAVE BEEN MOVING AROUND A LOT MORE THAN USUAL: 0
10. IF YOU CHECKED OFF ANY PROBLEMS, HOW DIFFICULT HAVE THESE PROBLEMS MADE IT FOR YOU TO DO YOUR WORK, TAKE CARE OF THINGS AT HOME, OR GET ALONG WITH OTHER PEOPLE: SOMEWHAT DIFFICULT
1. LITTLE INTEREST OR PLEASURE IN DOING THINGS: 1
10. IF YOU CHECKED OFF ANY PROBLEMS, HOW DIFFICULT HAVE THESE PROBLEMS MADE IT FOR YOU TO DO YOUR WORK, TAKE CARE OF THINGS AT HOME, OR GET ALONG WITH OTHER PEOPLE: 1
1. LITTLE INTEREST OR PLEASURE IN DOING THINGS: SEVERAL DAYS
4. FEELING TIRED OR HAVING LITTLE ENERGY: 1
2. FEELING DOWN, DEPRESSED OR HOPELESS: NOT AT ALL
7. TROUBLE CONCENTRATING ON THINGS, SUCH AS READING THE NEWSPAPER OR WATCHING TELEVISION: 0
8. MOVING OR SPEAKING SO SLOWLY THAT OTHER PEOPLE COULD HAVE NOTICED. OR THE OPPOSITE - BEING SO FIDGETY OR RESTLESS THAT YOU HAVE BEEN MOVING AROUND A LOT MORE THAN USUAL: NOT AT ALL
7. TROUBLE CONCENTRATING ON THINGS, SUCH AS READING THE NEWSPAPER OR WATCHING TELEVISION: NOT AT ALL
2. FEELING DOWN, DEPRESSED OR HOPELESS: 0
SUM OF ALL RESPONSES TO PHQ QUESTIONS 1-9: 3
6. FEELING BAD ABOUT YOURSELF - OR THAT YOU ARE A FAILURE OR HAVE LET YOURSELF OR YOUR FAMILY DOWN: 0
9. THOUGHTS THAT YOU WOULD BE BETTER OFF DEAD, OR OF HURTING YOURSELF: 0
5. POOR APPETITE OR OVEREATING: 0
3. TROUBLE FALLING OR STAYING ASLEEP: SEVERAL DAYS
4. FEELING TIRED OR HAVING LITTLE ENERGY: SEVERAL DAYS

## 2024-02-19 NOTE — TELEPHONE ENCOUNTER
Regarding: Important, Mammogram !!!  Contact: [phone]  ----- Message from Mack Nguyen DO sent at 2/16/2024  2:40 PM EST -----  Please see my note for details. Office should expect a call to set up appointment for this patient for breast exam with diagnostic mammogram order.     ----- Message from Isha Ramsey to Mack Nguyen DO sent at 2/16/2024 12:36 PM -----   I am beyond frustrated right now.  I spoke to you on my visit on February 7, 9 days ago, as well as have sent messages regarding the reasons for diagnostic mammogram as well as central scheduling has called your office a number of times for the correct codes to be put in so I can get this scheduled and these have yet to be provided. Then, I get a message that I need to make an appointment to come in to discuss after just being in your office and discussing this as well as followup messages.  This is ridiculous, I have never had to beg to get a mammogram done.  I am hoping I do not need to escalate this issue to upper management and this is taken care of ASAP. Thank you for your prompt attention to this matter.

## 2024-02-19 NOTE — TELEPHONE ENCOUNTER
Patient's last appointment was : 2/7/2024 with our   Patient's next appointment is : 5/7/2024 with our Dr. Velázquez  Last refilled on: 11-17-23  Which pharmacy does the script need sent to: [facility] [address]      Lab Results   Component Value Date    LABA1C 5.3 10/30/2023     Lab Results   Component Value Date    CHOL 194 02/12/2024    TRIG 149 02/12/2024    HDL 48 02/12/2024    LDLCALC 116 02/12/2024     Lab Results   Component Value Date     02/12/2024    K 3.9 02/12/2024     02/12/2024    CO2 25 02/12/2024    BUN 14 02/12/2024    CREATININE 0.9 02/12/2024    GLUCOSE 101 02/12/2024    CALCIUM 9.6 02/12/2024    PROT 6.7 02/12/2024    LABALBU 4.1 02/12/2024    BILITOT 0.2 (L) 02/12/2024    ALKPHOS 108 02/12/2024    AST 16 02/12/2024    ALT 21 02/12/2024    LABGLOM >60 02/12/2024    GFRAA >60 02/19/2021    AGRATIO 2.9 (H) 02/05/2021     Lab Results   Component Value Date    TSH 1.440 04/17/2023    FT3 2.56 07/15/2021    T4FREE 1.43 04/17/2023     Lab Results   Component Value Date    WBC 7.0 02/12/2024    HGB 12.9 02/12/2024    HCT 41.1 02/12/2024    MCV 97.6 02/12/2024     02/12/2024

## 2024-02-20 ENCOUNTER — OFFICE VISIT (OUTPATIENT)
Dept: FAMILY MEDICINE CLINIC | Age: 64
End: 2024-02-20
Payer: MEDICARE

## 2024-02-20 VITALS
OXYGEN SATURATION: 99 % | TEMPERATURE: 97.7 F | BODY MASS INDEX: 31.39 KG/M2 | DIASTOLIC BLOOD PRESSURE: 74 MMHG | SYSTOLIC BLOOD PRESSURE: 116 MMHG | WEIGHT: 170.6 LBS | HEART RATE: 81 BPM | HEIGHT: 62 IN

## 2024-02-20 DIAGNOSIS — N60.19 FIBROCYSTIC BREAST CHANGES, UNSPECIFIED LATERALITY: ICD-10-CM

## 2024-02-20 DIAGNOSIS — J01.00 ACUTE NON-RECURRENT MAXILLARY SINUSITIS: ICD-10-CM

## 2024-02-20 DIAGNOSIS — R35.0 URINARY FREQUENCY: ICD-10-CM

## 2024-02-20 DIAGNOSIS — N64.4 BREAST PAIN, LEFT: Primary | ICD-10-CM

## 2024-02-20 LAB
BILIRUBIN, POC: NEGATIVE
BLOOD URINE, POC: NEGATIVE
CLARITY, POC: CLEAR
COLOR, POC: YELLOW
GLUCOSE URINE, POC: NEGATIVE
KETONES, POC: NEGATIVE
LEUKOCYTE EST, POC: NEGATIVE
NITRITE, POC: NEGATIVE
PH, POC: 6
PROTEIN, POC: NEGATIVE
SPECIFIC GRAVITY, POC: 1.02
UROBILINOGEN, POC: 0.2

## 2024-02-20 PROCEDURE — G8417 CALC BMI ABV UP PARAM F/U: HCPCS | Performed by: STUDENT IN AN ORGANIZED HEALTH CARE EDUCATION/TRAINING PROGRAM

## 2024-02-20 PROCEDURE — 3017F COLORECTAL CA SCREEN DOC REV: CPT | Performed by: STUDENT IN AN ORGANIZED HEALTH CARE EDUCATION/TRAINING PROGRAM

## 2024-02-20 PROCEDURE — 81003 URINALYSIS AUTO W/O SCOPE: CPT | Performed by: STUDENT IN AN ORGANIZED HEALTH CARE EDUCATION/TRAINING PROGRAM

## 2024-02-20 PROCEDURE — 99214 OFFICE O/P EST MOD 30 MIN: CPT | Performed by: STUDENT IN AN ORGANIZED HEALTH CARE EDUCATION/TRAINING PROGRAM

## 2024-02-20 PROCEDURE — G8484 FLU IMMUNIZE NO ADMIN: HCPCS | Performed by: STUDENT IN AN ORGANIZED HEALTH CARE EDUCATION/TRAINING PROGRAM

## 2024-02-20 PROCEDURE — 1036F TOBACCO NON-USER: CPT | Performed by: STUDENT IN AN ORGANIZED HEALTH CARE EDUCATION/TRAINING PROGRAM

## 2024-02-20 PROCEDURE — G8427 DOCREV CUR MEDS BY ELIG CLIN: HCPCS | Performed by: STUDENT IN AN ORGANIZED HEALTH CARE EDUCATION/TRAINING PROGRAM

## 2024-02-20 RX ORDER — CEFDINIR 300 MG/1
300 CAPSULE ORAL 2 TIMES DAILY
Qty: 14 CAPSULE | Refills: 0 | Status: SHIPPED | OUTPATIENT
Start: 2024-02-20 | End: 2024-02-27

## 2024-02-20 RX ORDER — BUTALBITAL, ACETAMINOPHEN AND CAFFEINE 50; 325; 40 MG/1; MG/1; MG/1
1 TABLET ORAL
Qty: 15 TABLET | Refills: 0 | Status: CANCELLED | OUTPATIENT
Start: 2024-02-20 | End: 2024-03-21

## 2024-02-21 ASSESSMENT — ENCOUNTER SYMPTOMS
WHEEZING: 0
VOMITING: 0
CONSTIPATION: 0
SORE THROAT: 0
DIARRHEA: 0
NAUSEA: 0
ABDOMINAL PAIN: 0
COUGH: 0
BACK PAIN: 0
SINUS PRESSURE: 1
SHORTNESS OF BREATH: 0

## 2024-02-26 ENCOUNTER — TELEPHONE (OUTPATIENT)
Dept: FAMILY MEDICINE CLINIC | Age: 64
End: 2024-02-26

## 2024-02-26 DIAGNOSIS — N60.19 FIBROCYSTIC BREAST CHANGES, UNSPECIFIED LATERALITY: ICD-10-CM

## 2024-02-26 DIAGNOSIS — N64.4 BREAST PAIN, LEFT: Primary | ICD-10-CM

## 2024-02-26 NOTE — TELEPHONE ENCOUNTER
Received call from [facility] regarding patient's US that Dr. Drake ordered. Stated that order is not correct, stated that it cannot have the word \"screening\" in the order name. States that Dx codes and everything else are correct, just needs to have \"screening\" removed. Patient has appointment tomorrow to have US done.

## 2024-02-27 ENCOUNTER — HOSPITAL ENCOUNTER (OUTPATIENT)
Dept: WOMENS IMAGING | Age: 64
Discharge: HOME | End: 2024-02-27
Payer: MEDICARE

## 2024-02-27 VITALS — WEIGHT: 170 LBS | HEIGHT: 62 IN | BODY MASS INDEX: 31.28 KG/M2

## 2024-02-27 DIAGNOSIS — N60.19 FIBROCYSTIC BREAST CHANGES, UNSPECIFIED LATERALITY: ICD-10-CM

## 2024-02-27 DIAGNOSIS — N64.4 BREAST PAIN, LEFT: ICD-10-CM

## 2024-02-27 DIAGNOSIS — G89.4 CHRONIC PAIN SYNDROME: ICD-10-CM

## 2024-02-27 PROCEDURE — 77066 DX MAMMO INCL CAD BI: CPT

## 2024-02-27 PROCEDURE — 76642 ULTRASOUND BREAST LIMITED: CPT

## 2024-02-27 RX ORDER — FENTANYL 25 UG/1
1 PATCH TRANSDERMAL
Qty: 10 PATCH | Refills: 0 | Status: SHIPPED | OUTPATIENT
Start: 2024-02-27 | End: 2024-03-28

## 2024-02-27 NOTE — TELEPHONE ENCOUNTER
OARRS reviewed. UDS: + for    Last seen: 1/18/2024. Follow-up:   Future Appointments   Date Time Provider Department Center   3/4/2024 10:30 AM STR WOMEN [facility] RM 1 STRZ WOMEN STR Rad/Card   4/15/2024  1:40 PM Leandro Powell DO N SRPX Pain [facility] [place]   4/22/2024 11:40 AM Liat Hallman, APRN - CNP N SRPX Rheum [facility] [place]   5/7/2024  3:20 PM Mack Nguyen DO SRPX FM RES [facility]      
Average

## 2024-03-01 ENCOUNTER — TELEPHONE (OUTPATIENT)
Dept: FAMILY MEDICINE CLINIC | Age: 64
End: 2024-03-01

## 2024-03-01 NOTE — DISCHARGE INSTRUCTIONS
Post procedure Instructions:    No driving or making significant decisions for the remainder of the day.   Be cautions with walking and activity for 24 hours, do not over exert yourself.  Ok to resume pre-procedure activity level today.  Apply ice to site of injection site if you have pain or discomfort.  Do not submerge sit of injection during bath or pool activities for 48 hours post-procedure.  Resume blood thinning medications in 24 hours.     Call office [phone] if you have:  Temperature greater than 100.4  Persistent nausea and vomiting  Severe uncontrolled pain  Redness, tenderness, or signs of infection (pain, swelling, redness, odor or green/yellow discharge around the site)  Difficulty breathing, headache or visual disturbances  Hives  Persistent dizziness or light-headedness  Extreme fatigue  Any other questions or concerns you may have after discharge    In an emergency, call 911 or go to an Emergency Department at a nearby hospital    “Surgical Site Infections”      How can we work together to prevent Surgical Site Infections?   We would like to thank you for choosing [facility] for your Surgical Care.  Below you will find helpful information on how we can work together to prevent Surgical Site Infections.    What is a Surgical Site Infection (SSI)?  A surgical site infection is an infection that occurs after surgery in the part of the body where the surgery took place. Most patients who have surgery do not develop an infection. However, infections develop in about 1 to 3 out of every 100 patients who have surgery.  Some of the common symptoms of a surgical site infection are:  Redness and pain around the area where you had surgery  Drainage of cloudy fluid from your surgical wound  Fever    Can SSIs be treated?  Yes. Most surgical site infections can be treated with antibiotics. The antibiotic given to you depends on the bacteria (germs) causing the infection. Sometimes patients with

## 2024-03-01 NOTE — TELEPHONE ENCOUNTER
----- Message from Yeny Drake MD sent at 3/1/2024  8:39 AM EST -----  Isha,    I reviewed your mammogram and ultrasound. It does show a suspicious lesion in your left breast. It looks like they are planning to proceed with a biopsy on 3/4/24. Please plan to proceed with this as scheduled for further investigation. Thanks!

## 2024-03-04 ENCOUNTER — HOSPITAL ENCOUNTER (OUTPATIENT)
Dept: WOMENS IMAGING | Age: 64
Discharge: HOME | End: 2024-03-04
Payer: MEDICARE

## 2024-03-04 DIAGNOSIS — N63.21 MASS OF UPPER OUTER QUADRANT OF LEFT BREAST: ICD-10-CM

## 2024-03-04 PROCEDURE — 2709999900 MAM US GUID NDL BIOPSY LEFT

## 2024-03-04 PROCEDURE — 88305 TISSUE EXAM BY PATHOLOGIST: CPT

## 2024-03-04 PROCEDURE — 77065 DX MAMMO INCL CAD UNI: CPT

## 2024-03-04 NOTE — PROGRESS NOTES
[facility]  Pre-Biopsy Assessment      Patient Education    Written information about procedure Yes  left   Procedural steps explained Yes Ultrasound Biopsy   Post-op potential: bruising, hematoma, pain Yes    Self-care: activity, care of dressing Yes    Patient verbalized understanding Yes    Consent signed and witnessed Yes      Hormone Therapy Status: has premarin cream and uses off and on but not often    Recent Medication: N/A Last Dose: none                                     Hormone Replacement Therapy: yes - see above notes    Previous Breast Biopsy: yes - in [place], years ago, bilateral stereotactic biopsies , benign    Previous Diagnosis Cancer: no    Hysterectomy:yes - uterus removed at 50 and ovaries at 52    Emotional Status: Calm    Language or Physical Barriers: she uses a cane, she has thoracic spinal compression x 4, she is seen at pain clinic    Comments: none      Electronically signed by Teresa Perea on 3/4/2024 at 10:55 AM

## 2024-03-04 NOTE — PROGRESS NOTES
Breast Biopsy Flowsheet/Post-Operative Care    Date of Procedure: 3/4/2024  Physician: Dr. Vee  Technologist: Gonzalo Villegas RDMS    Biopsy:ultrasound guided breast biopsy  Lesion type: Non-palpable - tender area  Breast: left    Clock face position: Site #1: UOQ        Primary Method of Detection: Mammogram      Microcalcification's: no   Distribution: N/A    Asymmetry: asymmetric    Biopsy Method:   Sertera:    Site # 1    Gauge: 14    # of Passes: 4     Clip: Fahad        Pre-Op Assessment: (BI-RADS)   4. Suspicious Abnormality    Patient Tolerated Procedure: good  Complications: none  Comments: none    Post Operative Care  Steri strips: Yes  Dressing: Gauze, Tape   Ice Applied to Site:  Yes  Evidence of Bleeding:  No    Pain Verbalized: No      Written Discharge Instructions: Yes  Condition at Discharge: good  Time of Discharge: 1135    Electronically signed by Teresa Perea on 3/4/2024 at 11:33 AM

## 2024-03-05 ENCOUNTER — CLINICAL DOCUMENTATION (OUTPATIENT)
Dept: WOMENS IMAGING | Age: 64
End: 2024-03-05

## 2024-03-05 ENCOUNTER — APPOINTMENT (OUTPATIENT)
Dept: GENERAL RADIOLOGY | Age: 64
End: 2024-03-05
Attending: ANESTHESIOLOGY
Payer: MEDICARE

## 2024-03-05 ENCOUNTER — HOSPITAL ENCOUNTER (OUTPATIENT)
Age: 64
Setting detail: OUTPATIENT SURGERY
Discharge: HOME | End: 2024-03-05
Attending: ANESTHESIOLOGY | Admitting: ANESTHESIOLOGY
Payer: MEDICARE

## 2024-03-05 VITALS
OXYGEN SATURATION: 95 % | BODY MASS INDEX: 30.47 KG/M2 | RESPIRATION RATE: 16 BRPM | HEART RATE: 64 BPM | TEMPERATURE: 97.6 F | SYSTOLIC BLOOD PRESSURE: 105 MMHG | DIASTOLIC BLOOD PRESSURE: 78 MMHG | WEIGHT: 165.6 LBS | HEIGHT: 62 IN

## 2024-03-05 PROCEDURE — 6360000004 HC RX CONTRAST MEDICATION: Performed by: ANESTHESIOLOGY

## 2024-03-05 PROCEDURE — 6360000002 HC RX W HCPCS: Performed by: ANESTHESIOLOGY

## 2024-03-05 PROCEDURE — 7100000011 HC PHASE II RECOVERY - ADDTL 15 MIN: Performed by: ANESTHESIOLOGY

## 2024-03-05 PROCEDURE — 7100000010 HC PHASE II RECOVERY - FIRST 15 MIN: Performed by: ANESTHESIOLOGY

## 2024-03-05 PROCEDURE — 3600000054 HC PAIN LEVEL 3 BASE: Performed by: ANESTHESIOLOGY

## 2024-03-05 PROCEDURE — 2709999900 HC NON-CHARGEABLE SUPPLY: Performed by: ANESTHESIOLOGY

## 2024-03-05 PROCEDURE — 99152 MOD SED SAME PHYS/QHP 5/>YRS: CPT | Performed by: ANESTHESIOLOGY

## 2024-03-05 PROCEDURE — 2500000003 HC RX 250 WO HCPCS: Performed by: ANESTHESIOLOGY

## 2024-03-05 PROCEDURE — 62323 NJX INTERLAMINAR LMBR/SAC: CPT | Performed by: ANESTHESIOLOGY

## 2024-03-05 RX ORDER — MIDAZOLAM HYDROCHLORIDE 1 MG/ML
INJECTION INTRAMUSCULAR; INTRAVENOUS PRN
Status: DISCONTINUED | OUTPATIENT
Start: 2024-03-05 | End: 2024-03-05 | Stop reason: ALTCHOICE

## 2024-03-05 RX ORDER — DEXAMETHASONE SODIUM PHOSPHATE 4 MG/ML
INJECTION, SOLUTION INTRA-ARTICULAR; INTRALESIONAL; INTRAMUSCULAR; INTRAVENOUS; SOFT TISSUE PRN
Status: DISCONTINUED | OUTPATIENT
Start: 2024-03-05 | End: 2024-03-05 | Stop reason: ALTCHOICE

## 2024-03-05 RX ORDER — BUPIVACAINE HYDROCHLORIDE 5 MG/ML
INJECTION, SOLUTION PERINEURAL PRN
Status: DISCONTINUED | OUTPATIENT
Start: 2024-03-05 | End: 2024-03-05 | Stop reason: ALTCHOICE

## 2024-03-05 RX ORDER — ONDANSETRON 2 MG/ML
INJECTION INTRAMUSCULAR; INTRAVENOUS PRN
Status: DISCONTINUED | OUTPATIENT
Start: 2024-03-05 | End: 2024-03-05 | Stop reason: ALTCHOICE

## 2024-03-05 RX ORDER — LIDOCAINE HYDROCHLORIDE 10 MG/ML
INJECTION, SOLUTION EPIDURAL; INFILTRATION; INTRACAUDAL; PERINEURAL PRN
Status: DISCONTINUED | OUTPATIENT
Start: 2024-03-05 | End: 2024-03-05 | Stop reason: ALTCHOICE

## 2024-03-05 RX ORDER — FENTANYL CITRATE 50 UG/ML
INJECTION, SOLUTION INTRAMUSCULAR; INTRAVENOUS PRN
Status: DISCONTINUED | OUTPATIENT
Start: 2024-03-05 | End: 2024-03-05 | Stop reason: ALTCHOICE

## 2024-03-05 RX ORDER — ACYCLOVIR 200 MG/1
CAPSULE ORAL PRN
Status: DISCONTINUED | OUTPATIENT
Start: 2024-03-05 | End: 2024-03-05 | Stop reason: ALTCHOICE

## 2024-03-05 ASSESSMENT — PAIN - FUNCTIONAL ASSESSMENT
PAIN_FUNCTIONAL_ASSESSMENT: 0-10
PAIN_FUNCTIONAL_ASSESSMENT: NONE - DENIES PAIN

## 2024-03-05 NOTE — PRE SEDATION
[facility]  Pre-Sedation/Analgesia History & Physical    Pt Name: Isha Ramsey  MRN: [id number]  YOB: 1960  Provider Performing Procedure: Leandro Powell DO   Primary Care Physician: Mack Nguyen DO      MEDICAL HISTORY       has a past medical history of Adrenal abnormality (HCC), Allergic rhinitis, Angina at rest, Anxiety, Arthritis, Asthma, Carpal tunnel syndrome, Cerebral artery occlusion with cerebral infarction (HCC), Cerebrovascular disease, Chronic back pain, Chronic sinusitis, Cognitive impairment, Coronary atherosclerosis, Depression, Fibromyalgia, Fracture, GERD (gastroesophageal reflux disease), Headache, History of degenerative disc disease, Hyperlipidemia, Hypothyroidism, Irritable bowel syndrome, Lymphedema, May-Thurner syndrome, Neuropathy, Osteoarthritis, Peripheral vascular disease (HCC), PONV (postoperative nausea and vomiting), Positive BJ (antinuclear antibody), Psoriatic arthritis (HCC), PTSD (post-traumatic stress disorder), Restless legs syndrome, and Rheumatoid arteritis (HCC).  SURGICAL HISTORY   has a past surgical history that includes Tubal ligation; shoulder surgery; Tonsillectomy; sinus surgery; shoulder surgery (Left); Neck surgery (12/2020); Septoplasty (Right, 06/14/2021); Pain management procedure (Bilateral, 06/29/2021); Pain management procedure (Bilateral, 07/20/2021); Pain management procedure (Right, 07/27/2021); Pain management procedure (Left, 08/03/2021); US ASP/INJ THYROID CYST (08/06/2021); Hysterectomy (2010); APPLE STEREO BREAST BX W LOC DEVICE 1ST LESION LEFT (Left); APPLE STEREO BREAST BX W LOC DEVICE 1ST LESION RIGHT (Right); Breast surgery (Right); Ovary removal (2012); Facet joint injection (Bilateral, 05/16/2023); Hysterectomy, total abdominal; Spine surgery (N/A, 09/05/2023); Kyphosis surgery (2023); esophageal motility study (N/A, 11/28/2023); and Pain management procedure (N/A, 3/5/2024).    ALLERGIES   Allergies as of

## 2024-03-05 NOTE — PROGRESS NOTES
9737 Patient arrives to recovery room via cart.  Spontaneous respirations, vss, report received from surgical RN.  Patient denies pain, numbness, tingling , nausea.  Injection site clean, dry, intact.  Call light within reach.    1015 IV discontinued. Up to dress self  1035 Discharge to  home in stable ambulatory condition with friend

## 2024-03-05 NOTE — PROCEDURES
Pre-operative Diagnosis: Radicular leg pain     Post-operative Diagnosis: Radicular leg pain     Procedure: Lumbar epidural steroid injection    Procedure Description:  After having obtained a signed informed consent, the patient was taken to the fluoroscopy suite and placed in the prone position. The patient's back was prepped with chloraprep and draped in a sterile fashion.  A total of 1.5 cc of 1 % lidocaine was used to anesthetize the skin and underlying tissues.  Under fluoroscopic guidance, a single 20G Tuohy needle was advanced using midline approach at the L3/L4 interspace until gaining the epidural space using the loss of resistance to saline syringe technique.  There were no paresthesias, heme, or CSF aspiration.  A total of 0.25 cc of Omnipaque 300 were injected having had adequate dye spread within the epidural space. Needle placement and contrast spread was confirmed using the AP and contralateral views.  10 mg of Dexamethasone with 1 cc of saline solution were injected in the epidural space. The needle was flushed and removed without any complication.  The patient tolerated the procedure well and was transported to the recovery room. The patient was observed for 15 minutes to then discharged in an ambulatory fashion.    Procedural Complications: None  Estimated Blood Loss: 0 mL      IV sedation was used during the procedure:  - Moderate intravenous conscious sedation was supervised by Dr. Powell  - The patient was independently monitored by a Registered Nurse assigned to the procedure room  - Monitoring included automated blood pressure, continuous EKG, and continuous pulse oximetry  - The detailed conscious record is permanently stored in the Hospital Information System  - The following is the conscious sedation record:  Start Time: 09:46  End Time : 10:01  Duration: 15 minutes   Medications Administered: 2 mg Versed, 50 mcg Fentanyl       Leandro Powell DO  Interventional Pain Management/PM&R

## 2024-03-05 NOTE — POST SEDATION
[facility]  Sedation/Analgesia Post Sedation Record    Pt Name: Isha Ramsey  MRN: [id number]  YOB: 1960  Procedure Performed By: Leandro Powell DO  Primary Care Physician: Mack Nguyen DO    POST-PROCEDURE    Physicians/Assistants: Leandro Powell DO  Procedure Performed: See Procedure Note   Sedation/Anesthesia: Versed and Fentanyl (See procedure note for amount and duration)  Estimated Blood Loss:     0  ml  Specimens Removed: None        Complications: None           Leandro Powell DO  Electronically signed 3/5/2024 at 1:44 PM

## 2024-03-05 NOTE — PROGRESS NOTES
Contact Type :    Telephone  Notes :  After consulting with Dr. Vee,  Isha was contacted by telephone.  She was informed of the negative biopsy results and the need to return for a 6 month follow up.  She voiced understanding.    Biopsy site: Fine, not pain at all     Results faxed to: Dr. Nguyen

## 2024-03-05 NOTE — PROGRESS NOTES
Discharge instructions reviewed with patient.  All questions were addressed and answered.  Patient verbalized understanding of discharge plan.

## 2024-03-11 DIAGNOSIS — G43.409 HEMIPLEGIC MIGRAINE WITHOUT STATUS MIGRAINOSUS, NOT INTRACTABLE: ICD-10-CM

## 2024-03-11 DIAGNOSIS — G43.909 MIGRAINE WITHOUT STATUS MIGRAINOSUS, NOT INTRACTABLE, UNSPECIFIED MIGRAINE TYPE: ICD-10-CM

## 2024-03-11 DIAGNOSIS — Z09 FOLLOW-UP EXAM: Primary | ICD-10-CM

## 2024-03-11 DIAGNOSIS — G89.4 CHRONIC PAIN SYNDROME: ICD-10-CM

## 2024-03-11 DIAGNOSIS — N63.21 MASS OF UPPER OUTER QUADRANT OF LEFT BREAST: ICD-10-CM

## 2024-03-11 RX ORDER — FENTANYL 25 UG/1
1 PATCH TRANSDERMAL
Qty: 10 PATCH | Refills: 0 | Status: SHIPPED | OUTPATIENT
Start: 2024-03-11 | End: 2024-04-10

## 2024-03-11 NOTE — TELEPHONE ENCOUNTER
Last script sent in for fentanyl not filled and  Needs met new  [place] reviewed. UDS: no data .   Last seen: 2024. Follow-up:   Future Appointments   Date Time Provider Department Center   4/15/2024  1:40 PM Leandro Powell DO N SRPX Pain P - [place]   2024 11:40 AM Liat Hallman, ESTEFANIA - CNP N SRPX Rheum [facility] [place]   2024  3:20 PM Mack Nguyen DO SRPX FM RES [facility]

## 2024-03-11 NOTE — TELEPHONE ENCOUNTER
Patient's last appointment was : 2/20/2024 with our   Patient's next appointment is : 5/7/2024 with our Dr. Nguyen  Last refilled on: 02/07/2024  Which pharmacy does the script need sent to: [facility] on [address]      Lab Results   Component Value Date    LABA1C 5.3 10/30/2023     Lab Results   Component Value Date    CHOL 194 02/12/2024    TRIG 149 02/12/2024    HDL 48 02/12/2024    LDLCALC 116 02/12/2024     Lab Results   Component Value Date     02/12/2024    K 3.9 02/12/2024     02/12/2024    CO2 25 02/12/2024    BUN 14 02/12/2024    CREATININE 0.9 02/12/2024    GLUCOSE 101 02/12/2024    CALCIUM 9.6 02/12/2024    PROT 6.7 02/12/2024    LABALBU 4.1 02/12/2024    BILITOT 0.2 (L) 02/12/2024    ALKPHOS 108 02/12/2024    AST 16 02/12/2024    ALT 21 02/12/2024    LABGLOM >60 02/12/2024    GFRAA >60 02/19/2021    AGRATIO 2.9 (H) 02/05/2021     Lab Results   Component Value Date    TSH 1.440 04/17/2023    FT3 2.56 07/15/2021    T4FREE 1.43 04/17/2023     Lab Results   Component Value Date    WBC 7.0 02/12/2024    HGB 12.9 02/12/2024    HCT 41.1 02/12/2024    MCV 97.6 02/12/2024     02/12/2024

## 2024-03-12 RX ORDER — BUTALBITAL, ACETAMINOPHEN AND CAFFEINE 50; 325; 40 MG/1; MG/1; MG/1
1 TABLET ORAL
Qty: 15 TABLET | Refills: 0 | Status: SHIPPED | OUTPATIENT
Start: 2024-03-12 | End: 2024-04-11

## 2024-03-12 NOTE — TELEPHONE ENCOUNTER
PDMP Monitoring: Appropriate     Last PDMP Shreyas as Reviewed:  Review User Review Instant Review Result   LASHONDA LIMA 3/12/2024  9:43 AM Reviewed PDMP [1]     Refill sent. Patient has upcoming appointment. No further action needed. Thanks.

## 2024-03-15 ENCOUNTER — NURSE ONLY (OUTPATIENT)
Dept: LAB | Age: 64
End: 2024-03-15

## 2024-03-15 DIAGNOSIS — Z51.81 MEDICATION MONITORING ENCOUNTER: ICD-10-CM

## 2024-03-15 LAB
ALBUMIN SERPL BCG-MCNC: 4.4 G/DL (ref 3.5–5.1)
ALP SERPL-CCNC: 123 U/L (ref 38–126)
ALT SERPL W/O P-5'-P-CCNC: 39 U/L (ref 11–66)
ANION GAP SERPL CALC-SCNC: 15 MEQ/L (ref 8–16)
AST SERPL-CCNC: 24 U/L (ref 5–40)
BASOPHILS ABSOLUTE: 0.1 THOU/MM3 (ref 0–0.1)
BASOPHILS NFR BLD AUTO: 0.9 %
BILIRUB SERPL-MCNC: 0.2 MG/DL (ref 0.3–1.2)
BUN SERPL-MCNC: 26 MG/DL (ref 7–22)
CALCIUM SERPL-MCNC: 9.7 MG/DL (ref 8.5–10.5)
CHLORIDE SERPL-SCNC: 104 MEQ/L (ref 98–111)
CO2 SERPL-SCNC: 21 MEQ/L (ref 23–33)
CREAT SERPL-MCNC: 0.8 MG/DL (ref 0.4–1.2)
CRP SERPL-MCNC: < 0.3 MG/DL (ref 0–1)
DEPRECATED RDW RBC AUTO: 49.7 FL (ref 35–45)
EOSINOPHIL NFR BLD AUTO: 1.7 %
EOSINOPHILS ABSOLUTE: 0.1 THOU/MM3 (ref 0–0.4)
ERYTHROCYTE [DISTWIDTH] IN BLOOD BY AUTOMATED COUNT: 14 % (ref 11.5–14.5)
ERYTHROCYTE [SEDIMENTATION RATE] IN BLOOD BY WESTERGREN METHOD: 16 MM/HR (ref 0–20)
GFR SERPL CREATININE-BSD FRML MDRD: > 60 ML/MIN/1.73M2
GLUCOSE SERPL-MCNC: 96 MG/DL (ref 70–108)
HCT VFR BLD AUTO: 40.9 % (ref 37–47)
HGB BLD-MCNC: 13.2 GM/DL (ref 12–16)
IMM GRANULOCYTES # BLD AUTO: 0.02 THOU/MM3 (ref 0–0.07)
IMM GRANULOCYTES NFR BLD AUTO: 0.3 %
LYMPHOCYTES ABSOLUTE: 2.9 THOU/MM3 (ref 1–4.8)
LYMPHOCYTES NFR BLD AUTO: 43.3 %
MCH RBC QN AUTO: 31.5 PG (ref 26–33)
MCHC RBC AUTO-ENTMCNC: 32.3 GM/DL (ref 32.2–35.5)
MCV RBC AUTO: 97.6 FL (ref 81–99)
MONOCYTES ABSOLUTE: 0.4 THOU/MM3 (ref 0.4–1.3)
MONOCYTES NFR BLD AUTO: 6.4 %
NEUTROPHILS NFR BLD AUTO: 47.4 %
NRBC BLD AUTO-RTO: 0 /100 WBC
PLATELET # BLD AUTO: 206 THOU/MM3 (ref 130–400)
PMV BLD AUTO: 13.1 FL (ref 9.4–12.4)
POTASSIUM SERPL-SCNC: 3.9 MEQ/L (ref 3.5–5.2)
PROT SERPL-MCNC: 6.9 G/DL (ref 6.1–8)
RBC # BLD AUTO: 4.19 MILL/MM3 (ref 4.2–5.4)
SCAN OF BLOOD SMEAR: NORMAL
SEGMENTED NEUTROPHILS ABSOLUTE COUNT: 3.1 THOU/MM3 (ref 1.8–7.7)
SODIUM SERPL-SCNC: 140 MEQ/L (ref 135–145)
WBC # BLD AUTO: 6.6 THOU/MM3 (ref 4.8–10.8)

## 2024-03-18 ENCOUNTER — PATIENT MESSAGE (OUTPATIENT)
Dept: RHEUMATOLOGY | Age: 64
End: 2024-03-18

## 2024-03-18 RX ORDER — METHOTREXATE 2.5 MG/1
15 TABLET ORAL WEEKLY
Qty: 24 TABLET | Refills: 1 | Status: SHIPPED | OUTPATIENT
Start: 2024-03-18 | End: 2024-04-17

## 2024-03-18 NOTE — TELEPHONE ENCOUNTER
From: Isha Ramsey  To: Liat Hallman  Sent: 3/18/2024 9:51 AM EDT  Subject: Methotrexate and blood tests    Hi, I need a methotrexate refill by Friday. I did have my blood tests done and the results are in the chart.

## 2024-04-03 ENCOUNTER — TELEPHONE (OUTPATIENT)
Dept: FAMILY MEDICINE CLINIC | Age: 64
End: 2024-04-03

## 2024-04-03 NOTE — TELEPHONE ENCOUNTER
Spoke with Pt and voiced understanding. Pt will bring paper work with her at next appointment 5-7-24. At meeting on 4-29-24 will have Medicare / [employer] fax over paper work, in Care of Dr. Nguyen.

## 2024-04-03 NOTE — TELEPHONE ENCOUNTER
Regarding: [facility] Medicaid   Contact: [phone]  ----- Message from Mack Nguyen DO sent at 4/2/2024  4:45 PM EDT -----       ----- Message sent from Mack Nguyen DO to Isha Ramsey at 4/2/2024  4:44 PM -----   Isha,    Are there any specific forms that Medicare / [employer] need filled out? If so you could drop those off to us and we could fax it to [employer] with my signature for relevant records or diagnosis codes. Let us know.    Mack Nguyen DO       ----- Message -----       From:Isha Ramsey       Sent:3/28/2024 12:39 PM EDT         To:Patient Medical Advice Request Message List    Subject:[facility] Medicaid     Here is the name, phone, fax to access my records, I will also give to the [facility] representative.  I saw STEFAN Green  for many, many years .     [facility]  [address]  [place] [address]    Phone. [phone]  Fax     Let me know if I need to come in to sign a release      ----- Message -----       From:Isha Ramsey       Sent:3/28/2024 12:50 AM EDT         To:Mack Nguyen DO    Subject:[facility] Medicaid     Hi, on April 29th, a representative of [employer] med[employer] is coming to help me fill out paperwork.  I am having trouble paying for medical bills and certain medications with [employer]  [employer] . As I am just above the guidelines for regular medicaid they suggested this route.  The coverage is based on mental health issues and a few other diagnoses it appears dont pertain to me.    They will need to know about my diagnoses of major depression, recurrent and PTSD. I am on the Wellbutrin for the depression and trazodone in relation to insomnia and PTSD.  I have an appointment with you May 7th.  I tried to make one with you earlier, but it showed no  April availability in my chart. Major depression,recurrent, since early 1990s and the PTSD since 2019.  I want to make sure that I do this right to get the medicaid.

## 2024-04-11 DIAGNOSIS — G89.4 CHRONIC PAIN SYNDROME: ICD-10-CM

## 2024-04-11 RX ORDER — FENTANYL 25 UG/1
1 PATCH TRANSDERMAL
Qty: 10 PATCH | Refills: 0 | Status: SHIPPED | OUTPATIENT
Start: 2024-04-11 | End: 2024-05-11

## 2024-04-11 NOTE — TELEPHONE ENCOUNTER
[place] reviewed. UDS:  no data  Last seen: 1/18/2024. Follow-up:   Future Appointments   Date Time Provider Department Center   4/15/2024  1:40 PM Leandro Powell DO N SRPX Pain [facility] [place]   4/22/2024 11:40 AM Liat Hallman APRN - CNP N SRPX Rheum [facility]   5/7/2024  3:20 PM Mack Nguyen DO SRPX FM RES [facility]

## 2024-04-15 ENCOUNTER — OFFICE VISIT (OUTPATIENT)
Dept: PHYSICAL MEDICINE AND REHAB | Age: 64
End: 2024-04-15
Payer: MEDICARE

## 2024-04-15 ENCOUNTER — NURSE ONLY (OUTPATIENT)
Dept: LAB | Age: 64
End: 2024-04-15

## 2024-04-15 ENCOUNTER — TELEPHONE (OUTPATIENT)
Dept: RHEUMATOLOGY | Age: 64
End: 2024-04-15

## 2024-04-15 VITALS
BODY MASS INDEX: 30.36 KG/M2 | WEIGHT: 165 LBS | HEIGHT: 62 IN | SYSTOLIC BLOOD PRESSURE: 122 MMHG | DIASTOLIC BLOOD PRESSURE: 84 MMHG

## 2024-04-15 DIAGNOSIS — Z51.81 MEDICATION MONITORING ENCOUNTER: ICD-10-CM

## 2024-04-15 DIAGNOSIS — Z51.81 MEDICATION MONITORING ENCOUNTER: Primary | ICD-10-CM

## 2024-04-15 DIAGNOSIS — G89.4 CHRONIC PAIN SYNDROME: ICD-10-CM

## 2024-04-15 DIAGNOSIS — S22.000A COMPRESSION FRACTURE OF BODY OF THORACIC VERTEBRA (HCC): Primary | ICD-10-CM

## 2024-04-15 DIAGNOSIS — M54.17 RADICULOPATHY, LUMBOSACRAL REGION: ICD-10-CM

## 2024-04-15 DIAGNOSIS — M47.814 THORACIC SPONDYLOSIS: ICD-10-CM

## 2024-04-15 LAB
ALBUMIN SERPL BCG-MCNC: 4.6 G/DL (ref 3.5–5.1)
ALP SERPL-CCNC: 98 U/L (ref 38–126)
ALT SERPL W/O P-5'-P-CCNC: 25 U/L (ref 11–66)
ANION GAP SERPL CALC-SCNC: 14 MEQ/L (ref 8–16)
AST SERPL-CCNC: 20 U/L (ref 5–40)
BASOPHILS ABSOLUTE: 0 THOU/MM3 (ref 0–0.1)
BASOPHILS NFR BLD AUTO: 0.7 %
BILIRUB SERPL-MCNC: 0.3 MG/DL (ref 0.3–1.2)
BUN SERPL-MCNC: 20 MG/DL (ref 7–22)
CALCIUM SERPL-MCNC: 9.2 MG/DL (ref 8.5–10.5)
CHLORIDE SERPL-SCNC: 106 MEQ/L (ref 98–111)
CO2 SERPL-SCNC: 21 MEQ/L (ref 23–33)
CREAT SERPL-MCNC: 0.8 MG/DL (ref 0.4–1.2)
CRP SERPL-MCNC: < 0.3 MG/DL (ref 0–1)
DEPRECATED RDW RBC AUTO: 47.7 FL (ref 35–45)
EOSINOPHIL NFR BLD AUTO: 0.9 %
EOSINOPHILS ABSOLUTE: 0.1 THOU/MM3 (ref 0–0.4)
ERYTHROCYTE [DISTWIDTH] IN BLOOD BY AUTOMATED COUNT: 13.4 % (ref 11.5–14.5)
ERYTHROCYTE [SEDIMENTATION RATE] IN BLOOD BY WESTERGREN METHOD: 9 MM/HR (ref 0–20)
GFR SERPL CREATININE-BSD FRML MDRD: 83 ML/MIN/1.73M2
GLUCOSE SERPL-MCNC: 101 MG/DL (ref 70–108)
HCT VFR BLD AUTO: 40.3 % (ref 37–47)
HGB BLD-MCNC: 13.1 GM/DL (ref 12–16)
IMM GRANULOCYTES # BLD AUTO: 0.01 THOU/MM3 (ref 0–0.07)
IMM GRANULOCYTES NFR BLD AUTO: 0.2 %
LYMPHOCYTES ABSOLUTE: 2.8 THOU/MM3 (ref 1–4.8)
LYMPHOCYTES NFR BLD AUTO: 48.2 %
MCH RBC QN AUTO: 31.4 PG (ref 26–33)
MCHC RBC AUTO-ENTMCNC: 32.5 GM/DL (ref 32.2–35.5)
MCV RBC AUTO: 96.6 FL (ref 81–99)
MONOCYTES ABSOLUTE: 0.2 THOU/MM3 (ref 0.4–1.3)
MONOCYTES NFR BLD AUTO: 3.8 %
NEUTROPHILS NFR BLD AUTO: 46.2 %
NRBC BLD AUTO-RTO: 0 /100 WBC
PLATELET # BLD AUTO: 272 THOU/MM3 (ref 130–400)
PMV BLD AUTO: 11.8 FL (ref 9.4–12.4)
POTASSIUM SERPL-SCNC: 3.5 MEQ/L (ref 3.5–5.2)
PROT SERPL-MCNC: 6.9 G/DL (ref 6.1–8)
RBC # BLD AUTO: 4.17 MILL/MM3 (ref 4.2–5.4)
SEGMENTED NEUTROPHILS ABSOLUTE COUNT: 2.7 THOU/MM3 (ref 1.8–7.7)
SODIUM SERPL-SCNC: 141 MEQ/L (ref 135–145)
WBC # BLD AUTO: 5.8 THOU/MM3 (ref 4.8–10.8)

## 2024-04-15 PROCEDURE — 3017F COLORECTAL CA SCREEN DOC REV: CPT | Performed by: ANESTHESIOLOGY

## 2024-04-15 PROCEDURE — 99214 OFFICE O/P EST MOD 30 MIN: CPT | Performed by: ANESTHESIOLOGY

## 2024-04-15 PROCEDURE — G8427 DOCREV CUR MEDS BY ELIG CLIN: HCPCS | Performed by: ANESTHESIOLOGY

## 2024-04-15 PROCEDURE — G8417 CALC BMI ABV UP PARAM F/U: HCPCS | Performed by: ANESTHESIOLOGY

## 2024-04-15 PROCEDURE — 1036F TOBACCO NON-USER: CPT | Performed by: ANESTHESIOLOGY

## 2024-04-15 RX ORDER — FENTANYL 25 UG/1
1 PATCH TRANSDERMAL
Qty: 10 PATCH | Refills: 0 | Status: SHIPPED | OUTPATIENT
Start: 2024-05-11 | End: 2024-06-10

## 2024-04-15 NOTE — PROGRESS NOTES
Chronic Pain/PM&R Clinic Note     Encounter Date: 4/15/24    Subjective:   Chief Complaint:   Chief Complaint   Patient presents with    Follow Up After Procedure     Lumbar epidural steroid injection 3/5/24    Discuss Medications     Butalbital       History of Present Illness:   Isha Ramsey is a 63 y.o. female seen in the office initially on 03/05/21 for her management of migraines.  She has medical history of previous TIA with residual cognitive deficits, and ACDF C3-C7 with Dr Puri. She has longstanding history of migraine headaches as well as of the last 35 years.  She describes 2 different types of migraine headaches.  Her standard migraine headaches she reports started on the right side the neck and wrap around the entire side of the front of the head.  She has associated phonophobia and photophobia.  She also notices these migraines to begin with an aura where her nose starts to burn.  She has associated nausea/vomiting when the migraines are severe.  Her migraines last greater than 4 hours in duration interfering everyday activities.  In addition she reports greater than 15 migraine attacks last month.  She has failed multiple medications in the past including Fioricet, Ubrelvy, nortriptyline, and is currently on Aimovig and Topamax.  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    In addition, she does have axial low back pain that contributes to a lot of her debilitating pain.  She denies any radiation down the legs, focal leg weakness, leg paresthesias, saddle anesthesia, bowel/bladder incontinence.    Today, 4/15/2024, patient presents for planned follow-up for chronic thoracic back pain.  She states that she responded significantly to her lumbar epidural steroid injection but only for 3 weeks.  She felt like this has helped with her posture somewhat but she still has a lot of issues with standing upright.  She states that she is looking at getting new insurance and

## 2024-04-15 NOTE — PROGRESS NOTES
Functionality Assessment/Goals Worksheet     On a scale of 0 (Does not Interfere) to 10 (Completely Interferes)     1.  Which number describes how during the past week pain has interfered with           the following:  A.  General Activity:  10  B.  Mood: 9  C.  Walking Ability:  10  D.  Normal Work (Includes both work outside the home and housework):  10  E.  Relations with Other People:   8  F.  Sleep:   8  G.  Enjoyment of Life:   9    2.  Patient Prefers to Take their Pain Medications:     [x]  On a regular basis   []  Only when necessary    []  Does not take pain medications    3.  What are the Patient's Goals/Expectations for Visiting Pain Management?     []  Learn about my pain    [x]  Receive Medication   []  Physical Therapy     []  Treat Depression   []  Receive Injections    []  Treat Sleep   []  Deal with Anxiety and Stress   []  Treat Opoid Dependence/Addiction   []  Other:

## 2024-04-16 RX ORDER — METHOTREXATE 2.5 MG/1
15 TABLET ORAL WEEKLY
Qty: 24 TABLET | Refills: 1 | Status: SHIPPED | OUTPATIENT
Start: 2024-04-16 | End: 2024-05-16

## 2024-04-18 DIAGNOSIS — G43.409 HEMIPLEGIC MIGRAINE WITHOUT STATUS MIGRAINOSUS, NOT INTRACTABLE: ICD-10-CM

## 2024-04-18 DIAGNOSIS — G43.909 MIGRAINE WITHOUT STATUS MIGRAINOSUS, NOT INTRACTABLE, UNSPECIFIED MIGRAINE TYPE: ICD-10-CM

## 2024-04-18 RX ORDER — BUTALBITAL, ACETAMINOPHEN AND CAFFEINE 50; 325; 40 MG/1; MG/1; MG/1
1 TABLET ORAL
Qty: 15 TABLET | Refills: 0 | Status: SHIPPED | OUTPATIENT
Start: 2024-04-18 | End: 2024-05-18

## 2024-04-18 NOTE — TELEPHONE ENCOUNTER
Patient's last appointment was : 2/20/2024 with our   Patient's next appointment is : 5/7/2024 with our Dr. Nguyen  Last refilled on: 03/12/2024  Which pharmacy does the script need sent to: [facility] [address]      Lab Results   Component Value Date    LABA1C 5.3 10/30/2023     Lab Results   Component Value Date    CHOL 194 02/12/2024    TRIG 149 02/12/2024    HDL 48 02/12/2024    LDLCALC 116 02/12/2024     Lab Results   Component Value Date     04/15/2024    K 3.5 04/15/2024     04/15/2024    CO2 21 (L) 04/15/2024    BUN 20 04/15/2024    CREATININE 0.8 04/15/2024    GLUCOSE 101 04/15/2024    CALCIUM 9.2 04/15/2024    PROT 6.9 04/15/2024    BILITOT 0.3 04/15/2024    ALKPHOS 98 04/15/2024    AST 20 04/15/2024    ALT 25 04/15/2024    LABGLOM 83 04/15/2024    GFRAA >60 02/19/2021    AGRATIO 2.9 (H) 02/05/2021     Lab Results   Component Value Date    TSH 1.440 04/17/2023    FT3 2.56 07/15/2021    T4FREE 1.43 04/17/2023     Lab Results   Component Value Date    WBC 5.8 04/15/2024    HGB 13.1 04/15/2024    HCT 40.3 04/15/2024    MCV 96.6 04/15/2024     04/15/2024

## 2024-04-22 ENCOUNTER — PATIENT MESSAGE (OUTPATIENT)
Dept: FAMILY MEDICINE CLINIC | Age: 64
End: 2024-04-22

## 2024-04-22 ENCOUNTER — OFFICE VISIT (OUTPATIENT)
Dept: RHEUMATOLOGY | Age: 64
End: 2024-04-22
Payer: MEDICARE

## 2024-04-22 VITALS
HEART RATE: 87 BPM | HEIGHT: 62 IN | WEIGHT: 163 LBS | BODY MASS INDEX: 30 KG/M2 | OXYGEN SATURATION: 99 % | SYSTOLIC BLOOD PRESSURE: 132 MMHG | DIASTOLIC BLOOD PRESSURE: 66 MMHG

## 2024-04-22 DIAGNOSIS — M79.7 FIBROMYALGIA: ICD-10-CM

## 2024-04-22 DIAGNOSIS — L40.50 PSORIATIC ARTHRITIS (HCC): Primary | ICD-10-CM

## 2024-04-22 DIAGNOSIS — Z51.81 MEDICATION MONITORING ENCOUNTER: ICD-10-CM

## 2024-04-22 DIAGNOSIS — M54.50 CHRONIC BILATERAL LOW BACK PAIN WITHOUT SCIATICA: ICD-10-CM

## 2024-04-22 DIAGNOSIS — M80.08XA AGE-RELATED OSTEOPOROSIS WITH CURRENT PATHOLOGICAL FRACTURE, VERTEBRA(E), INITIAL ENCOUNTER FOR FRACTURE (HCC): ICD-10-CM

## 2024-04-22 DIAGNOSIS — G89.29 CHRONIC BILATERAL LOW BACK PAIN WITHOUT SCIATICA: ICD-10-CM

## 2024-04-22 DIAGNOSIS — J30.2 SEASONAL ALLERGIES: Primary | ICD-10-CM

## 2024-04-22 DIAGNOSIS — S22.000A COMPRESSION FRACTURE OF BODY OF THORACIC VERTEBRA (HCC): ICD-10-CM

## 2024-04-22 PROCEDURE — 3017F COLORECTAL CA SCREEN DOC REV: CPT | Performed by: NURSE PRACTITIONER

## 2024-04-22 PROCEDURE — 1036F TOBACCO NON-USER: CPT | Performed by: NURSE PRACTITIONER

## 2024-04-22 PROCEDURE — 99214 OFFICE O/P EST MOD 30 MIN: CPT | Performed by: NURSE PRACTITIONER

## 2024-04-22 PROCEDURE — G8427 DOCREV CUR MEDS BY ELIG CLIN: HCPCS | Performed by: NURSE PRACTITIONER

## 2024-04-22 PROCEDURE — G8417 CALC BMI ABV UP PARAM F/U: HCPCS | Performed by: NURSE PRACTITIONER

## 2024-04-22 RX ORDER — SODIUM CHLORIDE 9 MG/ML
INJECTION, SOLUTION INTRAVENOUS CONTINUOUS
OUTPATIENT
Start: 2024-04-22

## 2024-04-22 RX ORDER — ALBUTEROL SULFATE 90 UG/1
4 AEROSOL, METERED RESPIRATORY (INHALATION) PRN
OUTPATIENT
Start: 2024-04-22

## 2024-04-22 RX ORDER — EPINEPHRINE 1 MG/ML
0.3 INJECTION, SOLUTION, CONCENTRATE INTRAVENOUS PRN
OUTPATIENT
Start: 2024-04-22

## 2024-04-22 RX ORDER — DIPHENHYDRAMINE HYDROCHLORIDE 50 MG/ML
50 INJECTION INTRAMUSCULAR; INTRAVENOUS
OUTPATIENT
Start: 2024-04-22

## 2024-04-22 RX ORDER — FAMOTIDINE 10 MG/ML
20 INJECTION, SOLUTION INTRAVENOUS
OUTPATIENT
Start: 2024-04-22

## 2024-04-22 RX ORDER — ONDANSETRON 2 MG/ML
8 INJECTION INTRAMUSCULAR; INTRAVENOUS
OUTPATIENT
Start: 2024-04-22

## 2024-04-22 RX ORDER — ACETAMINOPHEN 325 MG/1
650 TABLET ORAL
OUTPATIENT
Start: 2024-04-22

## 2024-04-22 ASSESSMENT — ENCOUNTER SYMPTOMS
ABDOMINAL PAIN: 0
EYE PAIN: 0
BACK PAIN: 0
NAUSEA: 0
TROUBLE SWALLOWING: 0
COUGH: 0
CONSTIPATION: 1
SHORTNESS OF BREATH: 1
EYE ITCHING: 0
DIARRHEA: 0

## 2024-04-22 NOTE — PROGRESS NOTES
[facility] RHEUMATOLOGY FOLLOW UP NOTE       Date Of Service: 4/22/2024  Provider: ESTEFANIA Driver - CNP    Name: Isha Ramsey   MRN: [id number]    Chief Complaint(s)     Chief Complaint   Patient presents with    Follow-up     2 month follow up PSA        History of Present Illness (HPI)     Isha Ramsey  is a(n)63 y.o. female with a hx of adrenal abnormalities, arthritis, asthma, CVA, headaches, hyperlipidemia, h/o myositis, bipolar disorder, may thurner syndrome, dry eye, fatty liver here for the f/u evaluation of h/o MCTD, inflammatory arthritis     Interval hx:    - started cosentyx 4 weeks ago    pain affecting the fingers, elbows, shoulders, neck, back, left hip, knees, ankles, feet  Pain on a scale 0-10: 8.5/10  Type of pain: constant aching, stiffness  Timing: mornings  Aggravating factors: weather changes, back: getting up from seated position, walking long distances  Alleviating factors: tylenol hands    Associated symptoms:  + swelling/  Redness/ warmth (left ankle), + AM stiffness lasting several hours     REVIEW OF SYSTEMS: (ROS)    Review of Systems   Constitutional:  Positive for fatigue. Negative for fever and unexpected weight change.   HENT:  Negative for congestion and trouble swallowing.         Dry mouth    Eyes:  Negative for pain and itching.   Respiratory:  Positive for shortness of breath. Negative for cough.    Cardiovascular:  Negative for chest pain and leg swelling.   Gastrointestinal:  Positive for constipation. Negative for abdominal pain, diarrhea and nausea.   Endocrine: Negative for cold intolerance and heat intolerance.   Genitourinary:  Negative for difficulty urinating, frequency and urgency.   Musculoskeletal:  Positive for arthralgias and joint swelling. Negative for back pain.   Skin:  Negative for rash.   Neurological:  Positive for numbness (intermittent hands and feet). Negative for dizziness, weakness and headaches.   Psychiatric/Behavioral:  The patient is not

## 2024-04-24 RX ORDER — FLUTICASONE PROPIONATE 50 MCG
2 SPRAY, SUSPENSION (ML) NASAL DAILY
Qty: 16 G | Refills: 0 | Status: SHIPPED | OUTPATIENT
Start: 2024-04-24

## 2024-04-24 RX ORDER — LORATADINE 10 MG/1
10 TABLET ORAL DAILY
Qty: 30 TABLET | Refills: 0 | Status: SHIPPED | OUTPATIENT
Start: 2024-04-24 | End: 2024-05-24

## 2024-04-24 NOTE — TELEPHONE ENCOUNTER
Requested Prescriptions     Signed Prescriptions Disp Refills    fluticasone (FLONASE) 50 MCG/ACT nasal spray 16 g 0     Si sprays by Each Nostril route daily     Authorizing Provider: MACK LIMA    loratadine (CLARITIN) 10 MG tablet 30 tablet 0     Sig: Take 1 tablet by mouth daily     Authorizing Provider: MACK LIMA     Seasonal allergies  -     fluticasone (FLONASE) 50 MCG/ACT nasal spray; 2 sprays by Each Nostril route daily, Disp-16 g, R-0Normal  -     loratadine (CLARITIN) 10 MG tablet; Take 1 tablet by mouth daily, Disp-30 tablet, R-0Normal    Future Appointments   Date Time Provider Department Center   2024  3:20 PM Mack [place] DO SRPX FM RES [facility] [place]   2024 11:40 AM Liat Hallman, APRN - CNP N [facility]   2024  3:00 PM Leandro Powell DO N SRPX Pain P - [place]   2024 11:40 AM Lisa Thorne DO N SRPX Rheum P - [place]   Safety Precautions: not applicable    Refill sent. Patient has upcoming appointment. No further action needed. Thanks.      Electronically signed by Mack [place] DO on 2024 at 5:28 PM

## 2024-05-06 SDOH — ECONOMIC STABILITY: FOOD INSECURITY: WITHIN THE PAST 12 MONTHS, THE FOOD YOU BOUGHT JUST DIDN'T LAST AND YOU DIDN'T HAVE MONEY TO GET MORE.: SOMETIMES TRUE

## 2024-05-06 SDOH — ECONOMIC STABILITY: FOOD INSECURITY: WITHIN THE PAST 12 MONTHS, YOU WORRIED THAT YOUR FOOD WOULD RUN OUT BEFORE YOU GOT MONEY TO BUY MORE.: SOMETIMES TRUE

## 2024-05-06 SDOH — ECONOMIC STABILITY: INCOME INSECURITY: HOW HARD IS IT FOR YOU TO PAY FOR THE VERY BASICS LIKE FOOD, HOUSING, MEDICAL CARE, AND HEATING?: SOMEWHAT HARD

## 2024-05-06 SDOH — ECONOMIC STABILITY: TRANSPORTATION INSECURITY
IN THE PAST 12 MONTHS, HAS LACK OF TRANSPORTATION KEPT YOU FROM MEETINGS, WORK, OR FROM GETTING THINGS NEEDED FOR DAILY LIVING?: NO

## 2024-05-07 ENCOUNTER — OFFICE VISIT (OUTPATIENT)
Dept: FAMILY MEDICINE CLINIC | Age: 64
End: 2024-05-07
Payer: MEDICARE

## 2024-05-07 VITALS
DIASTOLIC BLOOD PRESSURE: 82 MMHG | OXYGEN SATURATION: 97 % | TEMPERATURE: 97.5 F | BODY MASS INDEX: 30.18 KG/M2 | RESPIRATION RATE: 16 BRPM | SYSTOLIC BLOOD PRESSURE: 128 MMHG | HEIGHT: 62 IN | WEIGHT: 164 LBS | HEART RATE: 84 BPM

## 2024-05-07 DIAGNOSIS — F43.10 PTSD (POST-TRAUMATIC STRESS DISORDER): ICD-10-CM

## 2024-05-07 DIAGNOSIS — H91.93 BILATERAL HEARING LOSS, UNSPECIFIED HEARING LOSS TYPE: ICD-10-CM

## 2024-05-07 DIAGNOSIS — K21.9 GASTROESOPHAGEAL REFLUX DISEASE, UNSPECIFIED WHETHER ESOPHAGITIS PRESENT: ICD-10-CM

## 2024-05-07 DIAGNOSIS — M79.605 PAIN OF LEFT LOWER EXTREMITY: ICD-10-CM

## 2024-05-07 DIAGNOSIS — I25.119 ATHEROSCLEROSIS OF NATIVE CORONARY ARTERY OF NATIVE HEART WITH ANGINA PECTORIS (HCC): ICD-10-CM

## 2024-05-07 DIAGNOSIS — G43.909 MIGRAINE WITHOUT STATUS MIGRAINOSUS, NOT INTRACTABLE, UNSPECIFIED MIGRAINE TYPE: ICD-10-CM

## 2024-05-07 DIAGNOSIS — R60.0 UNILATERAL EDEMA OF LOWER EXTREMITY: ICD-10-CM

## 2024-05-07 DIAGNOSIS — J30.2 SEASONAL ALLERGIES: ICD-10-CM

## 2024-05-07 DIAGNOSIS — F33.41 MDD (MAJOR DEPRESSIVE DISORDER), RECURRENT, IN PARTIAL REMISSION (HCC): ICD-10-CM

## 2024-05-07 DIAGNOSIS — I69.30 HISTORY OF CVA WITH RESIDUAL DEFICIT: ICD-10-CM

## 2024-05-07 DIAGNOSIS — G43.409 HEMIPLEGIC MIGRAINE WITHOUT STATUS MIGRAINOSUS, NOT INTRACTABLE: Primary | ICD-10-CM

## 2024-05-07 DIAGNOSIS — R30.0 DYSURIA: ICD-10-CM

## 2024-05-07 DIAGNOSIS — E78.5 HYPERLIPIDEMIA, UNSPECIFIED HYPERLIPIDEMIA TYPE: ICD-10-CM

## 2024-05-07 DIAGNOSIS — N30.01 ACUTE CYSTITIS WITH HEMATURIA: ICD-10-CM

## 2024-05-07 DIAGNOSIS — I87.1 MAY-THURNER SYNDROME: ICD-10-CM

## 2024-05-07 LAB
BILIRUBIN, URINE: NEGATIVE
BLOOD URINE, POC: ABNORMAL
CHARACTER, URINE: ABNORMAL
COLOR: ABNORMAL
GLUCOSE URINE: NEGATIVE MG/DL
KETONES, URINE: NEGATIVE
LEUKOCYTE CLUMPS, URINE: ABNORMAL
NITRITE, URINE: POSITIVE
PH, URINE: 5.5 (ref 5–9)
PROTEIN, URINE: 30 MG/DL
SPECIFIC GRAVITY UA: >= 1.03 (ref 1–1.03)
UROBILINOGEN, URINE: 0.2 EU/DL (ref 0–1)

## 2024-05-07 PROCEDURE — G8417 CALC BMI ABV UP PARAM F/U: HCPCS

## 2024-05-07 PROCEDURE — 3017F COLORECTAL CA SCREEN DOC REV: CPT

## 2024-05-07 PROCEDURE — 1036F TOBACCO NON-USER: CPT

## 2024-05-07 PROCEDURE — 99214 OFFICE O/P EST MOD 30 MIN: CPT

## 2024-05-07 PROCEDURE — G8427 DOCREV CUR MEDS BY ELIG CLIN: HCPCS

## 2024-05-07 RX ORDER — BUTALBITAL, ACETAMINOPHEN AND CAFFEINE 50; 325; 40 MG/1; MG/1; MG/1
1 TABLET ORAL
Qty: 15 TABLET | Refills: 0 | Status: SHIPPED | OUTPATIENT
Start: 2024-05-19 | End: 2024-06-18

## 2024-05-07 RX ORDER — CEPHALEXIN 500 MG/1
500 CAPSULE ORAL 2 TIMES DAILY
Qty: 14 CAPSULE | Refills: 0 | Status: SHIPPED | OUTPATIENT
Start: 2024-05-07 | End: 2024-05-14

## 2024-05-07 RX ORDER — TIZANIDINE 2 MG/1
2 TABLET ORAL NIGHTLY PRN
Qty: 30 TABLET | Refills: 1 | Status: SHIPPED | OUTPATIENT
Start: 2024-05-07

## 2024-05-07 RX ORDER — BUPROPION HYDROCHLORIDE 300 MG/1
TABLET ORAL
Qty: 90 TABLET | Refills: 0 | Status: SHIPPED | OUTPATIENT
Start: 2024-05-07

## 2024-05-07 RX ORDER — LORATADINE 10 MG/1
10 TABLET ORAL DAILY
Qty: 90 TABLET | Refills: 0 | Status: SHIPPED | OUTPATIENT
Start: 2024-05-07 | End: 2024-08-05

## 2024-05-07 ASSESSMENT — ENCOUNTER SYMPTOMS
SHORTNESS OF BREATH: 1
BACK PAIN: 1

## 2024-05-07 NOTE — PROGRESS NOTES
S: 63 y.o. female with   Chief Complaint   Patient presents with    Follow-up     3 month follow up  Patient states her ear hasn't gotten better she can't even hear out of it  Burning when urinating she states it looks cloudy  Anxiety and frustration  Left ankle is causing problems   CBC showed low rbc and monocytes wants to know if there is something to worry about       HPI: please see resident note for HPI and ROS.    BP Readings from Last 3 Encounters:   05/07/24 128/82   04/22/24 132/66   04/15/24 122/84     Wt Readings from Last 3 Encounters:   05/07/24 74.4 kg (164 lb)   04/22/24 73.9 kg (163 lb)   04/15/24 74.8 kg (165 lb)       O: VS:  height is 1.575 m (5' 2.01\") and weight is 74.4 kg (164 lb). Her oral temperature is 97.5 °F (36.4 °C). Her blood pressure is 128/82 and her pulse is 84. Her respiration is 16 and oxygen saturation is 97%.   AAO/NAD, appropriate affect for mood     Diagnosis Orders   1. Hemiplegic migraine without status migrainosus, not intractable  butalbital-acetaminophen-caffeine (FIORICET, ESGIC) -40 MG per tablet      2. Migraine without status migrainosus, not intractable, unspecified migraine type  butalbital-acetaminophen-caffeine (FIORICET, ESGIC) -40 MG per tablet      3. Gastroesophageal reflux disease, unspecified whether esophagitis present  esomeprazole (NEXIUM) 20 MG delayed release capsule      4. Seasonal allergies  loratadine (CLARITIN) 10 MG tablet      5. May-Thurner syndrome        6. Unilateral edema of lower extremity        7. Pain of left lower extremity  tiZANidine (ZANAFLEX) 2 MG tablet      8. Bilateral hearing loss, unspecified hearing loss type  [facility]      9. Dysuria  Culture, Urine      10. Acute cystitis with hematuria  cephALEXin (KEFLEX) 500 MG capsule    Culture, Urine      11. MDD (major depressive disorder), recurrent, in partial remission (HCC)  buPROPion (WELLBUTRIN XL) 300 MG extended release tablet      12. PTSD

## 2024-05-07 NOTE — PROGRESS NOTES
Patient:Isha Ramsey  YOB: 1960   MRN:[id number]    Subjective   63 y.o. female who presents for the following: Follow-up (3 month follow up/Patient states her ear hasn't gotten better she can't even hear out of it/Burning when urinating she states it looks cloudy/Anxiety and frustration/Left ankle is causing problems /CBC showed low rbc and monocytes wants to know if there is something to worry about)    Follow up on chronic conditions.    Patient notes slow/gradual bilateral hearing loss worse on left.    Patient also note new dysuria today with cloudy urine.    Medication Refill  This is a recurrent problem. The problem has been waxing and waning. Associated symptoms include arthralgias, congestion, fatigue, headaches, joint swelling, myalgias and neck pain. Pertinent negatives include no chest pain, chills or fever. The treatment provided significant relief.   Leg Pain   The incident occurred more than 1 week ago (recurrent issue). The incident occurred at home. There was no injury mechanism (very stationary at home due to pain and unsteady gait). The pain is present in the left knee. The pain is moderate. The pain has been Worsening since onset. Associated symptoms include an inability to bear weight and muscle weakness. The symptoms are aggravated by weight bearing and movement. She has tried rest and non-weight bearing for the symptoms. The treatment provided no relief.     Review of Systems   Constitutional:  Positive for fatigue. Negative for chills and fever.   HENT:  Positive for congestion.    Respiratory:  Positive for shortness of breath (exertional only).    Cardiovascular:  Positive for leg swelling (LEFT > RIGHT). Negative for chest pain.   Genitourinary:  Negative for difficulty urinating.   Musculoskeletal:  Positive for arthralgias, back pain, gait problem, joint swelling, myalgias and neck pain.   Skin:  Negative for wound.   Allergic/Immunologic: Positive for environmental

## 2024-05-09 ENCOUNTER — TELEPHONE (OUTPATIENT)
Dept: PSYCHIATRY | Age: 64
End: 2024-05-09

## 2024-05-09 LAB
BACTERIA UR CULT: ABNORMAL
ORGANISM: ABNORMAL

## 2024-05-09 NOTE — TELEPHONE ENCOUNTER
Patient called in wanting to make a appointment with the provider. Patient had moved to [place], [place] and has since moved back to [place]. Patient was first seen on 02/17/21 as a new patient and was last seen on 09/20/21.    Can the patient be set up to be seen and for how long?    Pending your advice

## 2024-05-12 NOTE — TELEPHONE ENCOUNTER
We can schedule her a 60 minute follow up visit since technically she's still within 3 years of her last appointment with me.   Electronically signed by Sangita Malik MD on 5/12/2024 at 8:29 AM

## 2024-05-13 ENCOUNTER — TELEPHONE (OUTPATIENT)
Dept: FAMILY MEDICINE CLINIC | Age: 64
End: 2024-05-13

## 2024-05-13 NOTE — TELEPHONE ENCOUNTER
----- Message from Mack Nguyen DO sent at 5/13/2024  3:48 PM EDT -----  Concern for resistance of this E.coli strain to keflex. Attempted to call patient by cell, no answer. Will send in omnicef. Please attempt to reach patient and instruct them to switch antibiotics if still having UTI symptoms.

## 2024-05-16 DIAGNOSIS — G89.4 CHRONIC PAIN SYNDROME: ICD-10-CM

## 2024-05-16 RX ORDER — FENTANYL 25 UG/1
1 PATCH TRANSDERMAL
Qty: 10 PATCH | Refills: 0 | Status: ON HOLD | OUTPATIENT
Start: 2024-05-16 | End: 2024-06-15

## 2024-05-16 NOTE — TELEPHONE ENCOUNTER
OAR[place] reviewed. UDS: none found   Last seen: 4/15/2024. Follow-up:   Future Appointments   Date Time Provider Department Center   5/21/2024  2:40 PM Clau Benavidez APRN - CNP N ENT [facility] [place]   6/25/2024 11:40 AM Liat Hallman APRN - CNP N SRPX Rheum [facility] [place]   6/26/2024 12:30 PM Sangita Malik MD N SRPX PSYCH [facility] [place]   7/8/2024  3:00 PM Leandro Powell DO N SRPX Pain [facility] [place]   8/13/2024  1:40 PM Mack Nguyen DO SRPX FM RES [facility] [place]   8/22/2024 11:40 AM Lisa Thorne DO N SRPX Rheum [facility] [place]   11/12/2024  1:20 PM Mack [facility] DO [facility]

## 2024-05-19 ENCOUNTER — HOSPITAL ENCOUNTER (INPATIENT)
Age: 64
LOS: 8 days | Discharge: NURSING, REHAB OR LONG-TERM CARE | DRG: 062 | End: 2024-05-29
Attending: EMERGENCY MEDICINE | Admitting: INTERNAL MEDICINE
Payer: MEDICARE

## 2024-05-19 ENCOUNTER — APPOINTMENT (OUTPATIENT)
Dept: CT IMAGING | Age: 64
DRG: 062 | End: 2024-05-19
Payer: MEDICARE

## 2024-05-19 DIAGNOSIS — N39.0 URINARY TRACT INFECTION IN FEMALE: Primary | ICD-10-CM

## 2024-05-19 DIAGNOSIS — Z16.20 THERAPY FAILURE DUE TO ANTIBIOTIC RESISTANCE: ICD-10-CM

## 2024-05-19 PROBLEM — R10.30 LOWER ABDOMINAL PAIN: Status: ACTIVE | Noted: 2024-05-19

## 2024-05-19 LAB
ALBUMIN SERPL BCG-MCNC: 4.1 G/DL (ref 3.5–5.1)
ALP SERPL-CCNC: 99 U/L (ref 38–126)
ALT SERPL W/O P-5'-P-CCNC: 144 U/L (ref 11–66)
ANION GAP SERPL CALC-SCNC: 13 MEQ/L (ref 8–16)
AST SERPL-CCNC: 47 U/L (ref 5–40)
BACTERIA: ABNORMAL
BASOPHILS ABSOLUTE: 0.1 THOU/MM3 (ref 0–0.1)
BASOPHILS NFR BLD AUTO: 0.8 %
BILIRUB CONJ SERPL-MCNC: < 0.2 MG/DL (ref 0–0.3)
BILIRUB SERPL-MCNC: 0.2 MG/DL (ref 0.3–1.2)
BILIRUB UR QL STRIP: NEGATIVE
BUN SERPL-MCNC: 13 MG/DL (ref 7–22)
CALCIUM SERPL-MCNC: 9 MG/DL (ref 8.5–10.5)
CASTS #/AREA URNS LPF: ABNORMAL /LPF
CASTS #/AREA URNS LPF: ABNORMAL /LPF
CHARACTER UR: CLEAR
CHARCOAL URNS QL MICRO: ABNORMAL
CHLORIDE SERPL-SCNC: 105 MEQ/L (ref 98–111)
CO2 SERPL-SCNC: 23 MEQ/L (ref 23–33)
COLOR UR: YELLOW
CREAT SERPL-MCNC: 0.8 MG/DL (ref 0.4–1.2)
CRP SERPL-MCNC: < 0.3 MG/DL (ref 0–1)
CRYSTALS URNS QL MICRO: ABNORMAL
DEPRECATED RDW RBC AUTO: 45.8 FL (ref 35–45)
EOSINOPHIL NFR BLD AUTO: 1.2 %
EOSINOPHILS ABSOLUTE: 0.1 THOU/MM3 (ref 0–0.4)
EPITHELIAL CELLS, UA: ABNORMAL /HPF
ERYTHROCYTE [DISTWIDTH] IN BLOOD BY AUTOMATED COUNT: 13.6 % (ref 11.5–14.5)
ERYTHROCYTE [SEDIMENTATION RATE] IN BLOOD BY WESTERGREN METHOD: 10 MM/HR (ref 0–20)
GFR SERPL CREATININE-BSD FRML MDRD: 83 ML/MIN/1.73M2
GLUCOSE SERPL-MCNC: 103 MG/DL (ref 70–108)
GLUCOSE UR QL STRIP.AUTO: NEGATIVE MG/DL
HCT VFR BLD AUTO: 40.9 % (ref 37–47)
HGB BLD-MCNC: 13.7 GM/DL (ref 12–16)
HGB UR QL STRIP.AUTO: NEGATIVE
IMM GRANULOCYTES # BLD AUTO: 0.02 THOU/MM3 (ref 0–0.07)
IMM GRANULOCYTES NFR BLD AUTO: 0.3 %
KETONES UR QL STRIP.AUTO: ABNORMAL
LACTIC ACID, SEPSIS: 2.7 MMOL/L (ref 0.5–1.9)
LACTIC ACID, SEPSIS: 2.7 MMOL/L (ref 0.5–1.9)
LEUKOCYTE ESTERASE UR QL STRIP.AUTO: ABNORMAL
LYMPHOCYTES ABSOLUTE: 2.7 THOU/MM3 (ref 1–4.8)
LYMPHOCYTES NFR BLD AUTO: 41.2 %
MAGNESIUM SERPL-MCNC: 1.8 MG/DL (ref 1.6–2.4)
MCH RBC QN AUTO: 31.4 PG (ref 26–33)
MCHC RBC AUTO-ENTMCNC: 33.5 GM/DL (ref 32.2–35.5)
MCV RBC AUTO: 93.8 FL (ref 81–99)
MONOCYTES ABSOLUTE: 0.4 THOU/MM3 (ref 0.4–1.3)
MONOCYTES NFR BLD AUTO: 6.1 %
NEUTROPHILS ABSOLUTE: 3.3 THOU/MM3 (ref 1.8–7.7)
NEUTROPHILS NFR BLD AUTO: 50.4 %
NITRITE UR QL STRIP.AUTO: NEGATIVE
NRBC BLD AUTO-RTO: 0 /100 WBC
OSMOLALITY SERPL CALC.SUM OF ELEC: 281.6 MOSMOL/KG (ref 275–300)
PH UR STRIP.AUTO: 6.5 [PH] (ref 5–9)
PLATELET # BLD AUTO: 252 THOU/MM3 (ref 130–400)
PMV BLD AUTO: 10.7 FL (ref 9.4–12.4)
POTASSIUM SERPL-SCNC: 3.4 MEQ/L (ref 3.5–5.2)
PROCALCITONIN SERPL IA-MCNC: 0.04 NG/ML (ref 0.01–0.09)
PROT SERPL-MCNC: 6.5 G/DL (ref 6.1–8)
PROT UR STRIP.AUTO-MCNC: NEGATIVE MG/DL
RBC # BLD AUTO: 4.36 MILL/MM3 (ref 4.2–5.4)
RBC #/AREA URNS HPF: ABNORMAL /HPF
RENAL EPI CELLS #/AREA URNS HPF: ABNORMAL /[HPF]
SODIUM SERPL-SCNC: 141 MEQ/L (ref 135–145)
SPECIFIC GRAVITY UA: 1.02 (ref 1–1.03)
UROBILINOGEN, URINE: 0.2 EU/DL (ref 0–1)
WBC # BLD AUTO: 6.6 THOU/MM3 (ref 4.8–10.8)
WBC #/AREA URNS HPF: ABNORMAL /HPF
YEAST LIKE FUNGI URNS QL MICRO: ABNORMAL

## 2024-05-19 PROCEDURE — 84145 PROCALCITONIN (PCT): CPT

## 2024-05-19 PROCEDURE — 96375 TX/PRO/DX INJ NEW DRUG ADDON: CPT

## 2024-05-19 PROCEDURE — 6360000002 HC RX W HCPCS: Performed by: STUDENT IN AN ORGANIZED HEALTH CARE EDUCATION/TRAINING PROGRAM

## 2024-05-19 PROCEDURE — 81001 URINALYSIS AUTO W/SCOPE: CPT

## 2024-05-19 PROCEDURE — 85025 COMPLETE CBC W/AUTO DIFF WBC: CPT

## 2024-05-19 PROCEDURE — 99223 1ST HOSP IP/OBS HIGH 75: CPT

## 2024-05-19 PROCEDURE — 6360000004 HC RX CONTRAST MEDICATION: Performed by: STUDENT IN AN ORGANIZED HEALTH CARE EDUCATION/TRAINING PROGRAM

## 2024-05-19 PROCEDURE — 96365 THER/PROPH/DIAG IV INF INIT: CPT

## 2024-05-19 PROCEDURE — 86140 C-REACTIVE PROTEIN: CPT

## 2024-05-19 PROCEDURE — 83605 ASSAY OF LACTIC ACID: CPT

## 2024-05-19 PROCEDURE — 36415 COLL VENOUS BLD VENIPUNCTURE: CPT

## 2024-05-19 PROCEDURE — 99285 EMERGENCY DEPT VISIT HI MDM: CPT

## 2024-05-19 PROCEDURE — 2580000003 HC RX 258: Performed by: STUDENT IN AN ORGANIZED HEALTH CARE EDUCATION/TRAINING PROGRAM

## 2024-05-19 PROCEDURE — 96361 HYDRATE IV INFUSION ADD-ON: CPT

## 2024-05-19 PROCEDURE — G0378 HOSPITAL OBSERVATION PER HR: HCPCS

## 2024-05-19 PROCEDURE — 87086 URINE CULTURE/COLONY COUNT: CPT

## 2024-05-19 PROCEDURE — 80076 HEPATIC FUNCTION PANEL: CPT

## 2024-05-19 PROCEDURE — 74177 CT ABD & PELVIS W/CONTRAST: CPT

## 2024-05-19 PROCEDURE — 6370000000 HC RX 637 (ALT 250 FOR IP)

## 2024-05-19 PROCEDURE — 83735 ASSAY OF MAGNESIUM: CPT

## 2024-05-19 PROCEDURE — 85651 RBC SED RATE NONAUTOMATED: CPT

## 2024-05-19 PROCEDURE — 80048 BASIC METABOLIC PNL TOTAL CA: CPT

## 2024-05-19 PROCEDURE — 2580000003 HC RX 258

## 2024-05-19 PROCEDURE — 87040 BLOOD CULTURE FOR BACTERIA: CPT

## 2024-05-19 RX ORDER — PHENAZOPYRIDINE HYDROCHLORIDE 200 MG/1
200 TABLET, FILM COATED ORAL
Status: DISCONTINUED | OUTPATIENT
Start: 2024-05-19 | End: 2024-05-22

## 2024-05-19 RX ORDER — SODIUM CHLORIDE, SODIUM LACTATE, POTASSIUM CHLORIDE, AND CALCIUM CHLORIDE .6; .31; .03; .02 G/100ML; G/100ML; G/100ML; G/100ML
1000 INJECTION, SOLUTION INTRAVENOUS ONCE
Status: COMPLETED | OUTPATIENT
Start: 2024-05-19 | End: 2024-05-20

## 2024-05-19 RX ORDER — TIZANIDINE 4 MG/1
2 TABLET ORAL NIGHTLY PRN
Status: DISCONTINUED | OUTPATIENT
Start: 2024-05-19 | End: 2024-05-29 | Stop reason: HOSPADM

## 2024-05-19 RX ORDER — ACYCLOVIR 200 MG/1
CAPSULE ORAL
COMMUNITY

## 2024-05-19 RX ORDER — ENOXAPARIN SODIUM 100 MG/ML
40 INJECTION SUBCUTANEOUS DAILY
Status: DISCONTINUED | OUTPATIENT
Start: 2024-05-20 | End: 2024-05-21

## 2024-05-19 RX ORDER — MAGNESIUM SULFATE IN WATER 40 MG/ML
2000 INJECTION, SOLUTION INTRAVENOUS PRN
Status: DISCONTINUED | OUTPATIENT
Start: 2024-05-19 | End: 2024-05-29 | Stop reason: HOSPADM

## 2024-05-19 RX ORDER — ONDANSETRON 2 MG/ML
4 INJECTION INTRAMUSCULAR; INTRAVENOUS EVERY 6 HOURS PRN
Status: DISCONTINUED | OUTPATIENT
Start: 2024-05-19 | End: 2024-05-29 | Stop reason: HOSPADM

## 2024-05-19 RX ORDER — POTASSIUM CHLORIDE 20 MEQ/1
40 TABLET, EXTENDED RELEASE ORAL ONCE
Status: DISCONTINUED | OUTPATIENT
Start: 2024-05-19 | End: 2024-05-29 | Stop reason: HOSPADM

## 2024-05-19 RX ORDER — PANTOPRAZOLE SODIUM 40 MG/1
40 TABLET, DELAYED RELEASE ORAL
Status: DISCONTINUED | OUTPATIENT
Start: 2024-05-20 | End: 2024-05-29 | Stop reason: HOSPADM

## 2024-05-19 RX ORDER — BUPROPION HYDROCHLORIDE 300 MG/1
300 TABLET ORAL DAILY
Status: DISCONTINUED | OUTPATIENT
Start: 2024-05-20 | End: 2024-05-29 | Stop reason: HOSPADM

## 2024-05-19 RX ORDER — 0.9 % SODIUM CHLORIDE 0.9 %
1000 INTRAVENOUS SOLUTION INTRAVENOUS ONCE
Status: COMPLETED | OUTPATIENT
Start: 2024-05-19 | End: 2024-05-19

## 2024-05-19 RX ORDER — FENTANYL CITRATE 50 UG/ML
25 INJECTION, SOLUTION INTRAMUSCULAR; INTRAVENOUS ONCE
Status: COMPLETED | OUTPATIENT
Start: 2024-05-19 | End: 2024-05-19

## 2024-05-19 RX ORDER — POTASSIUM CHLORIDE 7.45 MG/ML
10 INJECTION INTRAVENOUS PRN
Status: DISCONTINUED | OUTPATIENT
Start: 2024-05-19 | End: 2024-05-29 | Stop reason: HOSPADM

## 2024-05-19 RX ORDER — SODIUM CHLORIDE 9 MG/ML
INJECTION, SOLUTION INTRAVENOUS PRN
Status: DISCONTINUED | OUTPATIENT
Start: 2024-05-19 | End: 2024-05-29 | Stop reason: HOSPADM

## 2024-05-19 RX ORDER — ATORVASTATIN CALCIUM 40 MG/1
40 TABLET, FILM COATED ORAL
Status: DISCONTINUED | OUTPATIENT
Start: 2024-05-20 | End: 2024-05-29 | Stop reason: HOSPADM

## 2024-05-19 RX ORDER — TOPIRAMATE 100 MG/1
100 TABLET, FILM COATED ORAL 2 TIMES DAILY
Status: DISCONTINUED | OUTPATIENT
Start: 2024-05-19 | End: 2024-05-29 | Stop reason: HOSPADM

## 2024-05-19 RX ORDER — POLYETHYLENE GLYCOL 3350 17 G/17G
17 POWDER, FOR SOLUTION ORAL DAILY PRN
Status: DISCONTINUED | OUTPATIENT
Start: 2024-05-19 | End: 2024-05-21

## 2024-05-19 RX ORDER — SODIUM CHLORIDE 0.9 % (FLUSH) 0.9 %
5-40 SYRINGE (ML) INJECTION EVERY 12 HOURS SCHEDULED
Status: DISCONTINUED | OUTPATIENT
Start: 2024-05-19 | End: 2024-05-29 | Stop reason: HOSPADM

## 2024-05-19 RX ORDER — ONDANSETRON 4 MG/1
4 TABLET, ORALLY DISINTEGRATING ORAL EVERY 8 HOURS PRN
Status: DISCONTINUED | OUTPATIENT
Start: 2024-05-19 | End: 2024-05-29 | Stop reason: HOSPADM

## 2024-05-19 RX ORDER — PRAMIPEXOLE DIHYDROCHLORIDE 0.25 MG/1
0.25 TABLET ORAL 2 TIMES DAILY
Status: DISCONTINUED | OUTPATIENT
Start: 2024-05-19 | End: 2024-05-29 | Stop reason: HOSPADM

## 2024-05-19 RX ORDER — CETIRIZINE HYDROCHLORIDE 10 MG/1
10 TABLET ORAL DAILY
Status: DISCONTINUED | OUTPATIENT
Start: 2024-05-20 | End: 2024-05-29 | Stop reason: HOSPADM

## 2024-05-19 RX ORDER — FLUTICASONE PROPIONATE 50 MCG
1 SPRAY, SUSPENSION (ML) NASAL DAILY PRN
Status: DISCONTINUED | OUTPATIENT
Start: 2024-05-19 | End: 2024-05-29 | Stop reason: HOSPADM

## 2024-05-19 RX ORDER — FOLIC ACID 1 MG/1
1 TABLET ORAL DAILY
Status: DISCONTINUED | OUTPATIENT
Start: 2024-05-20 | End: 2024-05-29 | Stop reason: HOSPADM

## 2024-05-19 RX ORDER — SODIUM CHLORIDE 0.9 % (FLUSH) 0.9 %
5-40 SYRINGE (ML) INJECTION PRN
Status: DISCONTINUED | OUTPATIENT
Start: 2024-05-19 | End: 2024-05-29 | Stop reason: HOSPADM

## 2024-05-19 RX ORDER — ACETAMINOPHEN 325 MG/1
650 TABLET ORAL EVERY 6 HOURS PRN
Status: DISCONTINUED | OUTPATIENT
Start: 2024-05-19 | End: 2024-05-29 | Stop reason: HOSPADM

## 2024-05-19 RX ORDER — ONDANSETRON 2 MG/ML
4 INJECTION INTRAMUSCULAR; INTRAVENOUS ONCE
Status: COMPLETED | OUTPATIENT
Start: 2024-05-19 | End: 2024-05-19

## 2024-05-19 RX ORDER — ACETAMINOPHEN 650 MG/1
650 SUPPOSITORY RECTAL EVERY 6 HOURS PRN
Status: DISCONTINUED | OUTPATIENT
Start: 2024-05-19 | End: 2024-05-29 | Stop reason: HOSPADM

## 2024-05-19 RX ORDER — FENTANYL 25 UG/1
1 PATCH TRANSDERMAL
Status: DISCONTINUED | OUTPATIENT
Start: 2024-05-22 | End: 2024-05-29 | Stop reason: HOSPADM

## 2024-05-19 RX ORDER — POTASSIUM CHLORIDE 20 MEQ/1
40 TABLET, EXTENDED RELEASE ORAL PRN
Status: DISCONTINUED | OUTPATIENT
Start: 2024-05-19 | End: 2024-05-29 | Stop reason: HOSPADM

## 2024-05-19 RX ADMIN — CEFTRIAXONE SODIUM 1000 MG: 1 INJECTION, POWDER, FOR SOLUTION INTRAMUSCULAR; INTRAVENOUS at 18:24

## 2024-05-19 RX ADMIN — FENTANYL CITRATE 25 MCG: 50 INJECTION INTRAMUSCULAR; INTRAVENOUS at 18:04

## 2024-05-19 RX ADMIN — ONDANSETRON 4 MG: 2 INJECTION INTRAMUSCULAR; INTRAVENOUS at 18:04

## 2024-05-19 RX ADMIN — POTASSIUM BICARBONATE 40 MEQ: 782 TABLET, EFFERVESCENT ORAL at 22:30

## 2024-05-19 RX ADMIN — SODIUM CHLORIDE, POTASSIUM CHLORIDE, SODIUM LACTATE AND CALCIUM CHLORIDE 1000 ML: 600; 310; 30; 20 INJECTION, SOLUTION INTRAVENOUS at 21:49

## 2024-05-19 RX ADMIN — IOPAMIDOL 80 ML: 755 INJECTION, SOLUTION INTRAVENOUS at 18:12

## 2024-05-19 RX ADMIN — SODIUM CHLORIDE 1000 ML: 9 INJECTION, SOLUTION INTRAVENOUS at 18:54

## 2024-05-19 ASSESSMENT — PAIN - FUNCTIONAL ASSESSMENT
PAIN_FUNCTIONAL_ASSESSMENT: ACTIVITIES ARE NOT PREVENTED
PAIN_FUNCTIONAL_ASSESSMENT: ACTIVITIES ARE NOT PREVENTED

## 2024-05-19 ASSESSMENT — PAIN DESCRIPTION - DESCRIPTORS
DESCRIPTORS: DISCOMFORT;PRESSURE
DESCRIPTORS: DISCOMFORT;PRESSURE

## 2024-05-19 ASSESSMENT — PAIN DESCRIPTION - LOCATION
LOCATION: ABDOMEN
LOCATION: ABDOMEN

## 2024-05-19 ASSESSMENT — PAIN DESCRIPTION - ORIENTATION
ORIENTATION: LOWER;MID
ORIENTATION: MID;LOWER

## 2024-05-19 ASSESSMENT — PAIN DESCRIPTION - ONSET
ONSET: ON-GOING
ONSET: ON-GOING

## 2024-05-19 ASSESSMENT — PAIN SCALES - GENERAL
PAINLEVEL_OUTOF10: 4
PAINLEVEL_OUTOF10: 6
PAINLEVEL_OUTOF10: 5
PAINLEVEL_OUTOF10: 5
PAINLEVEL_OUTOF10: 2

## 2024-05-19 ASSESSMENT — PAIN DESCRIPTION - PAIN TYPE
TYPE: ACUTE PAIN
TYPE: ACUTE PAIN

## 2024-05-19 ASSESSMENT — PAIN DESCRIPTION - FREQUENCY
FREQUENCY: INTERMITTENT
FREQUENCY: INTERMITTENT

## 2024-05-19 NOTE — ED NOTES
Pt ambulatory to BR for CCUA.   
Pt off the floor for testing.     
Pt to rm 14 per intake states she has been on 2 different abx for a UTI but was sent in for IV abx. Reports e coli in urine, and doesn't feel like she's getting any better. Pt appears in no acute distress, VSS.   
   TONSILLECTOMY      TUBAL LIGATION      UPPER GASTROINTESTINAL ENDOSCOPY      Many times due to strictures    US THYROID CYST ASPIRATION AND OR INJECTION  08/06/2021    US THYROID CYST ASPIRATION AND OR INJECTION 8/6/2021 [facility] ULTRASOUND       PAST MEDICAL HISTORY       Past Medical History:   Diagnosis Date    Adrenal abnormality ([facility])     Allergic rhinitis     Angina at rest ([facility])     MICROVASCULAR CARDIAC DISEASE    Anxiety     Arthritis     back, neck    Asthma     Carpal tunnel syndrome     Cerebral artery occlusion with cerebral infarction (HCC)     Cerebrovascular disease     Chronic back pain     Chronic sinusitis     Cognitive impairment     with retograde amnesia    Coronary atherosclerosis 02/03/2022    Depression     Fibromyalgia     Fracture     T-7, T-9, and T-11    GERD (gastroesophageal reflux disease)     Headache     Migraines Hemiplegic    History of degenerative disc disease     Hyperlipidemia     Hypothyroidism 03/10/2009    Irritable bowel syndrome     Lymphedema     Left leg    May-Thurner syndrome     Neuropathy     Osteoarthritis     Peripheral vascular disease ([facility])     PONV (postoperative nausea and vomiting)     Positive BJ (antinuclear antibody)     Psoriatic arthritis ([facility]) 2023    PTSD (post-traumatic stress disorder)     Restless legs syndrome     Rheumatoid arteritis ([facility])            Electronically signed by PATRICK DINH RN on 5/19/2024 at 7:31 PM

## 2024-05-19 NOTE — ED PROVIDER NOTES

## 2024-05-19 NOTE — ED PROVIDER NOTES
[facility]  EMERGENCY DEPARTMENT ENCOUNTER          Pt Name: Isha Ramsey  MRN: [id number]  Birthdate 1960  Date of evaluation: 5/19/2024  Emergency Physician: Chance Sauer MD    CHIEF COMPLAINT       Chief Complaint   Patient presents with    Dysuria     History obtained from the patient.      HISTORY OF PRESENT ILLNESS    HPI  Isha Ramsey is a 63 y.o. female with past medical history of Magda-Danlos syndrome, GERD, rheumatoid arthritis, CVA, depression, hyperlipidemia who presents to the emergency department for evaluation of dysuria.   Medical chart reviewed show patient had PCP visit on 5/7/2024 with some complaint of dysuria.  Urinalysis was grossly positive.  Review of urine culture showing growth of E. coli with multiple drug resistance. Patient was started on Keflex and switched to Omnicef. Patient reports worsening dysuria since this time and now urine is very cloud and dark tea colored. Patient also had diarrhea and nausea last week. Has continued to feel nauseous. Patient reports subjective fevers and sweats. Reports suprapubic pain. Reports worsening left flank pain. Patient is on immunomodulators.   The patient has no other acute complaints at this time.          REVIEW OF SYSTEMS   Review of Systems    See HPI. 12 point ROS performed, pertinent positives listed above otherwise negative  PAST MEDICAL AND SURGICAL HISTORY     Past Medical History:   Diagnosis Date    Adrenal abnormality (HCC)     Allergic rhinitis     Angina at rest (HCC)     MICROVASCULAR CARDIAC DISEASE    Anxiety     Arthritis     back, neck    Asthma     Carpal tunnel syndrome     Cerebral artery occlusion with cerebral infarction (HCC)     Cerebrovascular disease     Chronic back pain     Chronic sinusitis     Cognitive impairment     with retograde amnesia    Coronary atherosclerosis 02/03/2022    Depression     Fibromyalgia     Fracture     T-7, T-9, and T-11    GERD (gastroesophageal

## 2024-05-20 ENCOUNTER — TELEPHONE (OUTPATIENT)
Dept: FAMILY MEDICINE CLINIC | Age: 64
End: 2024-05-20

## 2024-05-20 LAB
ALBUMIN SERPL BCG-MCNC: 3.6 G/DL (ref 3.5–5.1)
ALP SERPL-CCNC: 84 U/L (ref 38–126)
ALT SERPL W/O P-5'-P-CCNC: 99 U/L (ref 11–66)
ANION GAP SERPL CALC-SCNC: 9 MEQ/L (ref 8–16)
AST SERPL-CCNC: 29 U/L (ref 5–40)
BASOPHILS ABSOLUTE: 0 THOU/MM3 (ref 0–0.1)
BASOPHILS NFR BLD AUTO: 0.8 %
BILIRUB CONJ SERPL-MCNC: < 0.2 MG/DL (ref 0–0.3)
BILIRUB SERPL-MCNC: 0.2 MG/DL (ref 0.3–1.2)
BUN SERPL-MCNC: 9 MG/DL (ref 7–22)
CALCIUM SERPL-MCNC: 8.6 MG/DL (ref 8.5–10.5)
CHLORIDE SERPL-SCNC: 111 MEQ/L (ref 98–111)
CO2 SERPL-SCNC: 21 MEQ/L (ref 23–33)
CREAT SERPL-MCNC: 0.6 MG/DL (ref 0.4–1.2)
DEPRECATED RDW RBC AUTO: 48.4 FL (ref 35–45)
EOSINOPHIL NFR BLD AUTO: 2.3 %
EOSINOPHILS ABSOLUTE: 0.1 THOU/MM3 (ref 0–0.4)
ERYTHROCYTE [DISTWIDTH] IN BLOOD BY AUTOMATED COUNT: 13.7 % (ref 11.5–14.5)
GFR SERPL CREATININE-BSD FRML MDRD: > 90 ML/MIN/1.73M2
GLUCOSE SERPL-MCNC: 85 MG/DL (ref 70–108)
HCT VFR BLD AUTO: 36.2 % (ref 37–47)
HGB BLD-MCNC: 11.7 GM/DL (ref 12–16)
IMM GRANULOCYTES # BLD AUTO: 0.01 THOU/MM3 (ref 0–0.07)
IMM GRANULOCYTES NFR BLD AUTO: 0.2 %
LACTATE SERPL-SCNC: 1.1 MMOL/L (ref 0.5–2)
LYMPHOCYTES ABSOLUTE: 3.4 THOU/MM3 (ref 1–4.8)
LYMPHOCYTES NFR BLD AUTO: 56.2 %
MAGNESIUM SERPL-MCNC: 1.9 MG/DL (ref 1.6–2.4)
MCH RBC QN AUTO: 31.3 PG (ref 26–33)
MCHC RBC AUTO-ENTMCNC: 32.3 GM/DL (ref 32.2–35.5)
MCV RBC AUTO: 96.8 FL (ref 81–99)
MONOCYTES ABSOLUTE: 0.4 THOU/MM3 (ref 0.4–1.3)
MONOCYTES NFR BLD AUTO: 6.8 %
NEUTROPHILS ABSOLUTE: 2.1 THOU/MM3 (ref 1.8–7.7)
NEUTROPHILS NFR BLD AUTO: 33.7 %
NRBC BLD AUTO-RTO: 0 /100 WBC
PLATELET # BLD AUTO: 210 THOU/MM3 (ref 130–400)
PMV BLD AUTO: 11 FL (ref 9.4–12.4)
POTASSIUM SERPL-SCNC: 3.9 MEQ/L (ref 3.5–5.2)
PROT SERPL-MCNC: 5.4 G/DL (ref 6.1–8)
RBC # BLD AUTO: 3.74 MILL/MM3 (ref 4.2–5.4)
SODIUM SERPL-SCNC: 141 MEQ/L (ref 135–145)
WBC # BLD AUTO: 6.1 THOU/MM3 (ref 4.8–10.8)

## 2024-05-20 PROCEDURE — 6360000002 HC RX W HCPCS

## 2024-05-20 PROCEDURE — 82248 BILIRUBIN DIRECT: CPT

## 2024-05-20 PROCEDURE — 2580000003 HC RX 258

## 2024-05-20 PROCEDURE — G0378 HOSPITAL OBSERVATION PER HR: HCPCS

## 2024-05-20 PROCEDURE — 83605 ASSAY OF LACTIC ACID: CPT

## 2024-05-20 PROCEDURE — 85025 COMPLETE CBC W/AUTO DIFF WBC: CPT

## 2024-05-20 PROCEDURE — 36415 COLL VENOUS BLD VENIPUNCTURE: CPT

## 2024-05-20 PROCEDURE — 96366 THER/PROPH/DIAG IV INF ADDON: CPT

## 2024-05-20 PROCEDURE — 96372 THER/PROPH/DIAG INJ SC/IM: CPT

## 2024-05-20 PROCEDURE — 80053 COMPREHEN METABOLIC PANEL: CPT

## 2024-05-20 PROCEDURE — 83735 ASSAY OF MAGNESIUM: CPT

## 2024-05-20 PROCEDURE — 6370000000 HC RX 637 (ALT 250 FOR IP)

## 2024-05-20 PROCEDURE — 99232 SBSQ HOSP IP/OBS MODERATE 35: CPT

## 2024-05-20 PROCEDURE — 96376 TX/PRO/DX INJ SAME DRUG ADON: CPT

## 2024-05-20 RX ORDER — FLUCONAZOLE 100 MG/1
150 TABLET ORAL
Status: COMPLETED | OUTPATIENT
Start: 2024-05-20 | End: 2024-05-23

## 2024-05-20 RX ADMIN — ENOXAPARIN SODIUM 40 MG: 100 INJECTION SUBCUTANEOUS at 07:57

## 2024-05-20 RX ADMIN — PRAMIPEXOLE DIHYDROCHLORIDE 0.25 MG: 0.25 TABLET ORAL at 00:00

## 2024-05-20 RX ADMIN — PHENAZOPYRIDINE 200 MG: 200 TABLET ORAL at 12:01

## 2024-05-20 RX ADMIN — PHENAZOPYRIDINE 200 MG: 200 TABLET ORAL at 00:00

## 2024-05-20 RX ADMIN — FOLIC ACID 1 MG: 1 TABLET ORAL at 07:58

## 2024-05-20 RX ADMIN — SODIUM CHLORIDE, PRESERVATIVE FREE 10 ML: 5 INJECTION INTRAVENOUS at 07:58

## 2024-05-20 RX ADMIN — ACETAMINOPHEN 650 MG: 325 TABLET ORAL at 10:19

## 2024-05-20 RX ADMIN — CETIRIZINE HYDROCHLORIDE 10 MG: 10 TABLET, FILM COATED ORAL at 07:58

## 2024-05-20 RX ADMIN — BUPROPION HYDROCHLORIDE 300 MG: 300 TABLET, FILM COATED, EXTENDED RELEASE ORAL at 07:58

## 2024-05-20 RX ADMIN — TOPIRAMATE 100 MG: 100 TABLET, FILM COATED ORAL at 00:00

## 2024-05-20 RX ADMIN — ACETAMINOPHEN 650 MG: 325 TABLET ORAL at 20:46

## 2024-05-20 RX ADMIN — TOPIRAMATE 100 MG: 100 TABLET, FILM COATED ORAL at 20:46

## 2024-05-20 RX ADMIN — ONDANSETRON 4 MG: 2 INJECTION INTRAMUSCULAR; INTRAVENOUS at 17:31

## 2024-05-20 RX ADMIN — TRAZODONE HYDROCHLORIDE 225 MG: 100 TABLET ORAL at 00:00

## 2024-05-20 RX ADMIN — TIZANIDINE 2 MG: 4 TABLET ORAL at 20:46

## 2024-05-20 RX ADMIN — FLUCONAZOLE 150 MG: 100 TABLET ORAL at 12:00

## 2024-05-20 RX ADMIN — PRAMIPEXOLE DIHYDROCHLORIDE 0.25 MG: 0.25 TABLET ORAL at 07:58

## 2024-05-20 RX ADMIN — ONDANSETRON 4 MG: 2 INJECTION INTRAMUSCULAR; INTRAVENOUS at 10:22

## 2024-05-20 RX ADMIN — ATORVASTATIN CALCIUM 40 MG: 40 TABLET, FILM COATED ORAL at 20:46

## 2024-05-20 RX ADMIN — SODIUM CHLORIDE, PRESERVATIVE FREE 10 ML: 5 INJECTION INTRAVENOUS at 20:47

## 2024-05-20 RX ADMIN — TOPIRAMATE 100 MG: 100 TABLET, FILM COATED ORAL at 07:58

## 2024-05-20 RX ADMIN — PANTOPRAZOLE SODIUM 40 MG: 40 TABLET, DELAYED RELEASE ORAL at 06:09

## 2024-05-20 RX ADMIN — PRAMIPEXOLE DIHYDROCHLORIDE 0.25 MG: 0.25 TABLET ORAL at 20:46

## 2024-05-20 RX ADMIN — TRAZODONE HYDROCHLORIDE 225 MG: 100 TABLET ORAL at 20:46

## 2024-05-20 RX ADMIN — PHENAZOPYRIDINE 200 MG: 200 TABLET ORAL at 18:11

## 2024-05-20 RX ADMIN — PHENAZOPYRIDINE 200 MG: 200 TABLET ORAL at 07:58

## 2024-05-20 RX ADMIN — CEFTRIAXONE SODIUM 1000 MG: 1 INJECTION, POWDER, FOR SOLUTION INTRAMUSCULAR; INTRAVENOUS at 17:34

## 2024-05-20 ASSESSMENT — PAIN DESCRIPTION - DESCRIPTORS
DESCRIPTORS: ACHING
DESCRIPTORS: DISCOMFORT;PRESSURE
DESCRIPTORS: ACHING

## 2024-05-20 ASSESSMENT — PAIN SCALES - GENERAL
PAINLEVEL_OUTOF10: 4
PAINLEVEL_OUTOF10: 6
PAINLEVEL_OUTOF10: 8
PAINLEVEL_OUTOF10: 0
PAINLEVEL_OUTOF10: 4
PAINLEVEL_OUTOF10: 8
PAINLEVEL_OUTOF10: 4
PAINLEVEL_OUTOF10: 0

## 2024-05-20 ASSESSMENT — PAIN DESCRIPTION - PAIN TYPE: TYPE: ACUTE PAIN

## 2024-05-20 ASSESSMENT — PAIN DESCRIPTION - LOCATION
LOCATION: ABDOMEN

## 2024-05-20 ASSESSMENT — PAIN DESCRIPTION - ORIENTATION
ORIENTATION: MID;LOWER
ORIENTATION: LOWER

## 2024-05-20 ASSESSMENT — PAIN DESCRIPTION - ONSET: ONSET: ON-GOING

## 2024-05-20 ASSESSMENT — PAIN DESCRIPTION - FREQUENCY: FREQUENCY: INTERMITTENT

## 2024-05-20 NOTE — TELEPHONE ENCOUNTER
Patient is currently admitted  
              ----- Message -----       From:Isha Ramsey       Sent:5/16/2024  1:56 PM EDT         To:Mack Nguyen,     Subject:UTI symptoms update, still having issues    Hi, I started on the Cefdnir a few days ago.  The burning has never stopped, now urine color of tea, feeling very nauseous, used a promethazine suppository  And Zofran today, sleeping a lot yesterday 6 pm to 10 am.   I was tested in [place] in 2022 for allergies and they tested for amoxicillin as people outgrow allergy, tested negative,  which I am no longer allergic to penicillin group.  Just wanted to let you know, still having symptoms and if need antibiotic change that other options are open.  I have taken Cipro and Macrobid in the past with no issues.

## 2024-05-21 ENCOUNTER — APPOINTMENT (OUTPATIENT)
Dept: GENERAL RADIOLOGY | Age: 64
DRG: 062 | End: 2024-05-21
Payer: MEDICARE

## 2024-05-21 ENCOUNTER — APPOINTMENT (OUTPATIENT)
Dept: MRI IMAGING | Age: 64
DRG: 062 | End: 2024-05-21
Payer: MEDICARE

## 2024-05-21 ENCOUNTER — APPOINTMENT (OUTPATIENT)
Dept: CT IMAGING | Age: 64
DRG: 062 | End: 2024-05-21
Payer: MEDICARE

## 2024-05-21 PROBLEM — N39.0 URINARY TRACT INFECTION IN FEMALE: Status: ACTIVE | Noted: 2024-05-21

## 2024-05-21 PROBLEM — I63.9 ISCHEMIC STROKE (HCC): Status: ACTIVE | Noted: 2024-05-21

## 2024-05-21 PROBLEM — R53.1 LEFT-SIDED WEAKNESS: Status: ACTIVE | Noted: 2024-05-21

## 2024-05-21 LAB
ALBUMIN SERPL BCG-MCNC: 3 G/DL (ref 3.5–5.1)
ALP SERPL-CCNC: 74 U/L (ref 38–126)
ALT SERPL W/O P-5'-P-CCNC: 70 U/L (ref 11–66)
ANION GAP SERPL CALC-SCNC: 10 MEQ/L (ref 8–16)
ANION GAP SERPL CALC-SCNC: 8 MEQ/L (ref 8–16)
AST SERPL-CCNC: 19 U/L (ref 5–40)
BASOPHILS ABSOLUTE: 0 THOU/MM3 (ref 0–0.1)
BASOPHILS NFR BLD AUTO: 0.9 %
BILIRUB SERPL-MCNC: < 0.2 MG/DL (ref 0.3–1.2)
BUN SERPL-MCNC: 8 MG/DL (ref 7–22)
BUN SERPL-MCNC: 8 MG/DL (ref 7–22)
CALCIUM SERPL-MCNC: 8.2 MG/DL (ref 8.5–10.5)
CALCIUM SERPL-MCNC: 9 MG/DL (ref 8.5–10.5)
CHLORIDE SERPL-SCNC: 106 MEQ/L (ref 98–111)
CHLORIDE SERPL-SCNC: 109 MEQ/L (ref 98–111)
CO2 SERPL-SCNC: 22 MEQ/L (ref 23–33)
CO2 SERPL-SCNC: 24 MEQ/L (ref 23–33)
CREAT SERPL-MCNC: 0.6 MG/DL (ref 0.4–1.2)
CREAT SERPL-MCNC: 0.9 MG/DL (ref 0.4–1.2)
DEPRECATED RDW RBC AUTO: 49 FL (ref 35–45)
EKG ATRIAL RATE: 73 BPM
EKG P AXIS: 76 DEGREES
EKG P-R INTERVAL: 232 MS
EKG Q-T INTERVAL: 474 MS
EKG QRS DURATION: 78 MS
EKG QTC CALCULATION (BAZETT): 522 MS
EKG R AXIS: -9 DEGREES
EKG T AXIS: 5 DEGREES
EKG VENTRICULAR RATE: 73 BPM
EOSINOPHIL NFR BLD AUTO: 2 %
EOSINOPHILS ABSOLUTE: 0.1 THOU/MM3 (ref 0–0.4)
ERYTHROCYTE [DISTWIDTH] IN BLOOD BY AUTOMATED COUNT: 13.7 % (ref 11.5–14.5)
GFR SERPL CREATININE-BSD FRML MDRD: 72 ML/MIN/1.73M2
GFR SERPL CREATININE-BSD FRML MDRD: > 90 ML/MIN/1.73M2
GLUCOSE BLD STRIP.AUTO-MCNC: 107 MG/DL (ref 70–108)
GLUCOSE SERPL-MCNC: 107 MG/DL (ref 70–108)
GLUCOSE SERPL-MCNC: 86 MG/DL (ref 70–108)
HCT VFR BLD AUTO: 36 % (ref 37–47)
HGB BLD-MCNC: 11.5 GM/DL (ref 12–16)
IMM GRANULOCYTES # BLD AUTO: 0.01 THOU/MM3 (ref 0–0.07)
IMM GRANULOCYTES NFR BLD AUTO: 0.2 %
INR PPP: 0.99 (ref 0.85–1.13)
LYMPHOCYTES ABSOLUTE: 3.3 THOU/MM3 (ref 1–4.8)
LYMPHOCYTES NFR BLD AUTO: 60.6 %
MAGNESIUM SERPL-MCNC: 2 MG/DL (ref 1.6–2.4)
MCH RBC QN AUTO: 31.3 PG (ref 26–33)
MCHC RBC AUTO-ENTMCNC: 31.9 GM/DL (ref 32.2–35.5)
MCV RBC AUTO: 98.1 FL (ref 81–99)
MONOCYTES ABSOLUTE: 0.4 THOU/MM3 (ref 0.4–1.3)
MONOCYTES NFR BLD AUTO: 6.7 %
MRSA DNA SPEC QL NAA+PROBE: NEGATIVE
NEUTROPHILS ABSOLUTE: 1.6 THOU/MM3 (ref 1.8–7.7)
NEUTROPHILS NFR BLD AUTO: 29.6 %
NRBC BLD AUTO-RTO: 0 /100 WBC
PLATELET # BLD AUTO: 217 THOU/MM3 (ref 130–400)
PMV BLD AUTO: 11 FL (ref 9.4–12.4)
POTASSIUM SERPL-SCNC: 3.7 MEQ/L (ref 3.5–5.2)
POTASSIUM SERPL-SCNC: 3.8 MEQ/L (ref 3.5–5.2)
PROT SERPL-MCNC: 4.7 G/DL (ref 6.1–8)
RBC # BLD AUTO: 3.67 MILL/MM3 (ref 4.2–5.4)
SODIUM SERPL-SCNC: 138 MEQ/L (ref 135–145)
SODIUM SERPL-SCNC: 141 MEQ/L (ref 135–145)
TROPONIN, HIGH SENSITIVITY: < 6 NG/L (ref 0–12)
WBC # BLD AUTO: 5.4 THOU/MM3 (ref 4.8–10.8)

## 2024-05-21 PROCEDURE — 94761 N-INVAS EAR/PLS OXIMETRY MLT: CPT

## 2024-05-21 PROCEDURE — 96372 THER/PROPH/DIAG INJ SC/IM: CPT

## 2024-05-21 PROCEDURE — 6370000000 HC RX 637 (ALT 250 FOR IP)

## 2024-05-21 PROCEDURE — 3E03317 INTRODUCTION OF OTHER THROMBOLYTIC INTO PERIPHERAL VEIN, PERCUTANEOUS APPROACH: ICD-10-PCS

## 2024-05-21 PROCEDURE — 93010 ELECTROCARDIOGRAM REPORT: CPT | Performed by: INTERNAL MEDICINE

## 2024-05-21 PROCEDURE — 2000000000 HC ICU R&B

## 2024-05-21 PROCEDURE — 70498 CT ANGIOGRAPHY NECK: CPT

## 2024-05-21 PROCEDURE — 6360000004 HC RX CONTRAST MEDICATION

## 2024-05-21 PROCEDURE — 82948 REAGENT STRIP/BLOOD GLUCOSE: CPT

## 2024-05-21 PROCEDURE — 85610 PROTHROMBIN TIME: CPT

## 2024-05-21 PROCEDURE — 93005 ELECTROCARDIOGRAM TRACING: CPT

## 2024-05-21 PROCEDURE — 84484 ASSAY OF TROPONIN QUANT: CPT

## 2024-05-21 PROCEDURE — 96376 TX/PRO/DX INJ SAME DRUG ADON: CPT

## 2024-05-21 PROCEDURE — 70496 CT ANGIOGRAPHY HEAD: CPT

## 2024-05-21 PROCEDURE — 51798 US URINE CAPACITY MEASURE: CPT

## 2024-05-21 PROCEDURE — 2580000003 HC RX 258

## 2024-05-21 PROCEDURE — 83735 ASSAY OF MAGNESIUM: CPT

## 2024-05-21 PROCEDURE — 99291 CRITICAL CARE FIRST HOUR: CPT | Performed by: INTERNAL MEDICINE

## 2024-05-21 PROCEDURE — 6360000002 HC RX W HCPCS

## 2024-05-21 PROCEDURE — 71045 X-RAY EXAM CHEST 1 VIEW: CPT

## 2024-05-21 PROCEDURE — 36415 COLL VENOUS BLD VENIPUNCTURE: CPT

## 2024-05-21 PROCEDURE — 99291 CRITICAL CARE FIRST HOUR: CPT

## 2024-05-21 PROCEDURE — 4A03X5D MEASUREMENT OF ARTERIAL FLOW, INTRACRANIAL, EXTERNAL APPROACH: ICD-10-PCS

## 2024-05-21 PROCEDURE — 70450 CT HEAD/BRAIN W/O DYE: CPT

## 2024-05-21 PROCEDURE — 87641 MR-STAPH DNA AMP PROBE: CPT

## 2024-05-21 PROCEDURE — 99233 SBSQ HOSP IP/OBS HIGH 50: CPT | Performed by: PHYSICIAN ASSISTANT

## 2024-05-21 PROCEDURE — 70551 MRI BRAIN STEM W/O DYE: CPT

## 2024-05-21 PROCEDURE — 85025 COMPLETE CBC W/AUTO DIFF WBC: CPT

## 2024-05-21 PROCEDURE — 80053 COMPREHEN METABOLIC PANEL: CPT

## 2024-05-21 RX ORDER — SODIUM CHLORIDE 0.9 % (FLUSH) 0.9 %
5-40 SYRINGE (ML) INJECTION EVERY 12 HOURS SCHEDULED
Status: DISCONTINUED | OUTPATIENT
Start: 2024-05-21 | End: 2024-05-29 | Stop reason: HOSPADM

## 2024-05-21 RX ORDER — TRAZODONE HYDROCHLORIDE 100 MG/1
100 TABLET ORAL NIGHTLY
Status: DISCONTINUED | OUTPATIENT
Start: 2024-05-21 | End: 2024-05-29 | Stop reason: HOSPADM

## 2024-05-21 RX ORDER — SODIUM CHLORIDE 9 MG/ML
INJECTION, SOLUTION INTRAVENOUS PRN
Status: DISCONTINUED | OUTPATIENT
Start: 2024-05-21 | End: 2024-05-29 | Stop reason: HOSPADM

## 2024-05-21 RX ORDER — DOCUSATE SODIUM 100 MG/1
100 CAPSULE, LIQUID FILLED ORAL DAILY
Status: DISCONTINUED | OUTPATIENT
Start: 2024-05-21 | End: 2024-05-29 | Stop reason: HOSPADM

## 2024-05-21 RX ORDER — TRAZODONE HYDROCHLORIDE 100 MG/1
100 TABLET ORAL NIGHTLY
Status: DISCONTINUED | OUTPATIENT
Start: 2024-05-22 | End: 2024-05-21

## 2024-05-21 RX ORDER — SODIUM CHLORIDE 0.9 % (FLUSH) 0.9 %
5-40 SYRINGE (ML) INJECTION PRN
Status: DISCONTINUED | OUTPATIENT
Start: 2024-05-21 | End: 2024-05-29 | Stop reason: HOSPADM

## 2024-05-21 RX ORDER — POLYETHYLENE GLYCOL 3350 17 G/17G
17 POWDER, FOR SOLUTION ORAL DAILY
Status: DISCONTINUED | OUTPATIENT
Start: 2024-05-21 | End: 2024-05-21

## 2024-05-21 RX ORDER — SODIUM CHLORIDE 0.9 % (FLUSH) 0.9 %
10 SYRINGE (ML) INJECTION ONCE
Status: COMPLETED | OUTPATIENT
Start: 2024-05-21 | End: 2024-05-21

## 2024-05-21 RX ADMIN — ONDANSETRON 4 MG: 2 INJECTION INTRAMUSCULAR; INTRAVENOUS at 08:04

## 2024-05-21 RX ADMIN — CEFTRIAXONE SODIUM 1000 MG: 1 INJECTION, POWDER, FOR SOLUTION INTRAMUSCULAR; INTRAVENOUS at 18:22

## 2024-05-21 RX ADMIN — SODIUM CHLORIDE, PRESERVATIVE FREE 10 ML: 5 INJECTION INTRAVENOUS at 11:49

## 2024-05-21 RX ADMIN — IOPAMIDOL 85 ML: 755 INJECTION, SOLUTION INTRAVENOUS at 11:43

## 2024-05-21 RX ADMIN — ACETAMINOPHEN 650 MG: 325 TABLET ORAL at 21:10

## 2024-05-21 RX ADMIN — FOLIC ACID 1 MG: 1 TABLET ORAL at 08:03

## 2024-05-21 RX ADMIN — SODIUM CHLORIDE, PRESERVATIVE FREE 10 ML: 5 INJECTION INTRAVENOUS at 11:51

## 2024-05-21 RX ADMIN — TOPIRAMATE 100 MG: 100 TABLET, FILM COATED ORAL at 08:03

## 2024-05-21 RX ADMIN — BUPROPION HYDROCHLORIDE 300 MG: 300 TABLET, FILM COATED, EXTENDED RELEASE ORAL at 08:03

## 2024-05-21 RX ADMIN — TOPIRAMATE 100 MG: 100 TABLET, FILM COATED ORAL at 21:11

## 2024-05-21 RX ADMIN — SODIUM CHLORIDE, PRESERVATIVE FREE 10 ML: 5 INJECTION INTRAVENOUS at 08:01

## 2024-05-21 RX ADMIN — PHENAZOPYRIDINE 200 MG: 200 TABLET ORAL at 08:03

## 2024-05-21 RX ADMIN — TENECTEPLASE 19 MG: KIT at 11:50

## 2024-05-21 RX ADMIN — PHENAZOPYRIDINE 200 MG: 200 TABLET ORAL at 19:09

## 2024-05-21 RX ADMIN — ENOXAPARIN SODIUM 40 MG: 100 INJECTION SUBCUTANEOUS at 08:03

## 2024-05-21 RX ADMIN — CETIRIZINE HYDROCHLORIDE 10 MG: 10 TABLET, FILM COATED ORAL at 08:03

## 2024-05-21 RX ADMIN — PRAMIPEXOLE DIHYDROCHLORIDE 0.25 MG: 0.25 TABLET ORAL at 21:11

## 2024-05-21 RX ADMIN — TIZANIDINE 2 MG: 4 TABLET ORAL at 08:03

## 2024-05-21 RX ADMIN — ACETAMINOPHEN 650 MG: 325 TABLET ORAL at 08:05

## 2024-05-21 RX ADMIN — PANTOPRAZOLE SODIUM 40 MG: 40 TABLET, DELAYED RELEASE ORAL at 05:47

## 2024-05-21 RX ADMIN — PRAMIPEXOLE DIHYDROCHLORIDE 0.25 MG: 0.25 TABLET ORAL at 08:03

## 2024-05-21 ASSESSMENT — PAIN DESCRIPTION - DESCRIPTORS
DESCRIPTORS: ACHING;PRESSURE
DESCRIPTORS: CRUSHING;STABBING

## 2024-05-21 ASSESSMENT — PAIN SCALES - GENERAL
PAINLEVEL_OUTOF10: 8
PAINLEVEL_OUTOF10: 9
PAINLEVEL_OUTOF10: 4

## 2024-05-21 ASSESSMENT — PAIN DESCRIPTION - ONSET: ONSET: ON-GOING

## 2024-05-21 ASSESSMENT — PAIN DESCRIPTION - FREQUENCY: FREQUENCY: CONTINUOUS

## 2024-05-21 ASSESSMENT — PAIN DESCRIPTION - LOCATION
LOCATION: NECK;SHOULDER
LOCATION: BACK
LOCATION: ABDOMEN

## 2024-05-21 ASSESSMENT — PAIN DESCRIPTION - PAIN TYPE
TYPE: CHRONIC PAIN
TYPE: CHRONIC PAIN

## 2024-05-21 ASSESSMENT — PAIN DESCRIPTION - ORIENTATION: ORIENTATION: LOWER

## 2024-05-21 ASSESSMENT — PAIN - FUNCTIONAL ASSESSMENT: PAIN_FUNCTIONAL_ASSESSMENT: PREVENTS OR INTERFERES SOME ACTIVE ACTIVITIES AND ADLS

## 2024-05-21 ASSESSMENT — PAIN DESCRIPTION - DIRECTION: RADIATING_TOWARDS: RIB CAGE

## 2024-05-21 NOTE — PROCEDURES
PROCEDURE NOTE  Date: 5/21/2024   Name: Isha Ramsey  YOB: 1960    Procedures  EKG completed in code stroke

## 2024-05-21 NOTE — H&P
Hospitalist History and Physical          Patient Info:   Name: Isha Ramsey, : 1960, PCP: Mack Nguyen DO  [facility]      Admitted on: 2024  Date of Service: Pt seen/examined on 24 and Admitted to Observation with expected LOS less than two midnights due to medical therapy.       Assessment and Plan:  Query acute cystitis, POA: See CT findings below  Will defer further antibiotics at this time as findings are not consistent with failed outpatient treatment. Most likely 2/2 #2.     Query Vulvovaginal candidiasis, POA likely 2/2 antibiotic use for #1: Will start on metronidazole cream at this time. Consider PO therapy pending clinical response    Dysuria: Likely 2/2 #2 as urine was fairly clean per UA findings    Elevated lactic acid: LA 2.7  Trend and continue IVF at this time.    Hypokalemia: K 3.4. replete with Potassium 40 meq PO. Recheck in am. No signs of arrhythmia at this time. Low threshold to place on telemetry pending clinical course. Mag level pending    CAD with angina pectoris, currently asymptomatic: continue home medications    Noted history of CVA: resume home medications    HLD: resume home statin    MDD: resume home buproprion    PTSD: resume home buproprion    Chronic lower extremity pain: resume home medications    Noted history of unilateral edema of lower extremity: Patient takes Lovenox PRN while at home.    Noted history of RA      Data reviewed  EKG:  I have reviewed the EKG with the following interpretation: pending  Imaging: I have reviewed CT abdomen with the following interpretation: constipation and questionable acute cystitis      ===================================================================      Chief Complaint:  dysuria    History Of Present Illness:  Isha Ramsey is a 63 y.o. female with PMHx of migraines, GERD, May-Thurner syndrome, chronic pain, bilateral hearing loss, MDD, PTSD, HLD, CAD, CVA who presents to [facility] 
12-pack-year smoking history quit in 2019..    ROS   Patient describes mild headache, but denies fever, chills, chest pain, shortness of breath.  Describes moderate improvement in left arm and left leg motor strength since arriving to the ICU.      Scheduled Meds:   docusate sodium  100 mg Oral Daily    sodium chloride flush  5-40 mL IntraVENous 2 times per day    fluconazole  150 mg Oral Q3 Days    cefTRIAXone (ROCEPHIN) IV  1,000 mg IntraVENous Q24H    phenazopyridine  200 mg Oral TID WC    potassium chloride  40 mEq Oral Once    sodium chloride flush  5-40 mL IntraVENous 2 times per day    enoxaparin  40 mg SubCUTAneous Daily    atorvastatin  40 mg Oral Once per day on Mon Wed Fri    buPROPion  300 mg Oral Daily    pantoprazole  40 mg Oral QAM AC    [START ON 5/22/2024] fentaNYL  1 patch TransDERmal Q72H    folic acid  1 mg Oral Daily    cetirizine  10 mg Oral Daily    pramipexole  0.25 mg Oral BID    topiramate  100 mg Oral BID    traZODone  225 mg Oral Nightly     Continuous Infusions:   sodium chloride      sodium chloride         PHYSICAL EXAMINATION:  T:  98 oral.  P:  64. RR:  22. B/P:  143/71.  FiO2:  RA. O2 Sat:  98.  I/O:  NPO  Body mass index is 30.52 kg/m².   GCS:   15  General:   acutely ill appearing female.  HEENT:  normocephalic and atraumatic.  No scleral icterus. PERR  Neck: supple.  No Thyromegaly.  Lungs: clear to auscultation.  No retractions  Cardiac: RRR.  No JVD.  Abdomen: soft.  Nontender.  Extremities:  No clubbing, cyanosis, or edema x 4.    Vasculature: capillary refill < 3 seconds. Palpable dorsalis pedis pulses.  Skin:  warm and dry.  Psych:  Alert and oriented x3.  Affect appropriate  Lymph:  No supraclavicular adenopathy.  Neurologic:  See NIHSS above.        Data: (All radiographs, tracings, PFTs, and imaging are personally viewed and interpreted unless otherwise noted).   Sodium 138, potassium 3.7, chloride 106, bicarb 24, BUN 8, creatinine 0.9 glucose 107 troponin less than 6 ALT

## 2024-05-21 NOTE — NURSE NAVIGATOR
Code Stroke    Patient- Isha Ramsey   6K10  2024   Attending Physician- LINDA Swan    Code stroke called for sudden left sided weakness.    Physician arrival- Dr. Irby, Dr. John, and Dr. Pat arrived to CT with patient at 1132  Team members   Primary RN-Eliza   Neuro Team-Gema, Dr. Joyce, Kiki    Team-\[facility] [place] [facility]\"" [facility]     Last Known Well- 1113     Chem stick- 113     Time to CT scan- 1132    Time of lab draws-1200    Time of EKG-1140     INITIAL NIH STROKE SCALE    Time Performed:  1134    1a.  Level of consciousness:  0 - alert; keenly responsive  1b.  Level of consciousness questions:  0 - answers both questions correctly  1c.  Level of consciousness questions:  0 - performs both tasks correctly  2.    Best Gaze:  0 - normal  3.    Visual:  0 - no visual loss  4.    Facial Palsy:  1 - minor paralysis (flattened nasolabial fold, asymmetric on smiling)  5a.  Motor left arm:  1 - drift, limb holds 90 (or 45) degrees but drifts down before full 10 seconds: does not hit bed  5b.  Motor right arm:  0 - no drift, limb holds 90 (or 45) degrees for full 10 seconds  6a.  Motor left le - some effort against gravity; leg falls to bed by 5 seconds but has some effort against gravity  6b.  Motor right le - some effort against gravity; leg falls to bed by 5 seconds but has some effort against gravity  7.    Limb Ataxia:  1 - present in one limb  8.    Sensory:  1 - mild to moderate sensory loss; patient feels pinprick is less sharp or is dull on the affected side; there is a loss of superficial pain with pinprick but patient is aware of being touched   9.    Best Language:  0 - no aphasia, normal  10.  Dysarthria:  0 - normal  11.  Extinction and Inattention:  0 - no abnormality    TOTAL:  8    1140-2nd IV attempted, EKG completed, Dr. Joyce speaking to patient regarding risk and benefits of TNK, and obtaining consent.   1143-2nd IV attempted   1145-2nd IV attempted  1148-2nd IV

## 2024-05-21 NOTE — PROCEDURES
PROCEDURE NOTE  Date: 5/21/2024   Name: Isha Ramsey  YOB: 1960    Procedures  Bladder scan was completed and the amount of 54ML was resulted to Eliza QUIJANO.

## 2024-05-21 NOTE — CARE COORDINATION
Case Management Assessment Initial Evaluation    Date/Time of Evaluation: 2024 9:47 AM  Assessment Completed by: Iraj Segovia RN    If patient is discharged prior to next notation, then this note serves as note for discharge by case management.    Patient Name: Isha Ramsey                   YOB: 1960  Diagnosis: Lower abdominal pain [R10.30]  Therapy failure due to antibiotic resistance [Z16.20]  Urinary tract infection in female [N39.0]                   Date / Time: 2024  4:35 PM  Location: [address] [place] [address]     Patient Admission Status: Observation   Readmission Risk Low 0-14, Mod 15-19), High > 20: No data recorded  Current PCP: Mack Nguyen, DO    Additional Case Management Notes: Awaiting urine cx. IV rocephin. Code stroke at 1130, patient transferred to ICU.     Procedures: n/a    Imagin/19 CT abd: constipation, possible cystitis   CT head, CTA head and neck- negative for acute processes    Patient Goals/Plan/Treatment Preferences: Met with Isha in ICU. She is from home alone with her son and daughter-in-law living next door. They do her housework and provide her with meals (she has hx of prior stroke and psoriatic arthritis which make these tasks difficult). Discussed that PT/OT will be ordered and make recommendations for discharge needs, she feels she may benefit from therapy at discharge. Handoff report given to Alice THOMPSON.            [phone]   Service Assessment   Patient Orientation Alert and Oriented   Cognition Alert   History Provided By Patient   Primary Caregiver Self   Support Systems Children;Family Members   Patient's Healthcare Decision Maker is: Legal Next of Kin   PCP Verified by CM Yes   Prior Functional Level Assistance with the following:;Housework;Cooking   Current Functional Level Assistance with the following:;Housework;Cooking   Can patient return to prior living arrangement Yes   Ability to make needs known: Good   Family able to assist with

## 2024-05-22 ENCOUNTER — APPOINTMENT (OUTPATIENT)
Dept: GENERAL RADIOLOGY | Age: 64
DRG: 062 | End: 2024-05-22
Payer: MEDICARE

## 2024-05-22 ENCOUNTER — APPOINTMENT (OUTPATIENT)
Dept: CT IMAGING | Age: 64
DRG: 062 | End: 2024-05-22
Payer: MEDICARE

## 2024-05-22 LAB
ANION GAP SERPL CALC-SCNC: 9 MEQ/L (ref 8–16)
BACTERIA UR CULT: NORMAL
BASOPHILS ABSOLUTE: 0 THOU/MM3 (ref 0–0.1)
BASOPHILS NFR BLD AUTO: 0.7 %
BUN SERPL-MCNC: 7 MG/DL (ref 7–22)
CALCIUM SERPL-MCNC: 9.1 MG/DL (ref 8.5–10.5)
CHLORIDE SERPL-SCNC: 107 MEQ/L (ref 98–111)
CHOLEST SERPL-MCNC: 210 MG/DL (ref 100–199)
CO2 SERPL-SCNC: 23 MEQ/L (ref 23–33)
CREAT SERPL-MCNC: 0.6 MG/DL (ref 0.4–1.2)
DEPRECATED MEAN GLUCOSE BLD GHB EST-ACNC: 102 MG/DL (ref 70–126)
DEPRECATED RDW RBC AUTO: 47.6 FL (ref 35–45)
EOSINOPHIL NFR BLD AUTO: 2 %
EOSINOPHILS ABSOLUTE: 0.1 THOU/MM3 (ref 0–0.4)
ERYTHROCYTE [DISTWIDTH] IN BLOOD BY AUTOMATED COUNT: 13.5 % (ref 11.5–14.5)
FERRITIN SERPL IA-MCNC: 62 NG/ML (ref 10–291)
GFR SERPL CREATININE-BSD FRML MDRD: > 90 ML/MIN/1.73M2
GLUCOSE SERPL-MCNC: 87 MG/DL (ref 70–108)
HBA1C MFR BLD HPLC: 5.4 % (ref 4.4–6.4)
HCT VFR BLD AUTO: 36.2 % (ref 37–47)
HDLC SERPL-MCNC: 31 MG/DL
HGB BLD-MCNC: 11.9 GM/DL (ref 12–16)
IMM GRANULOCYTES # BLD AUTO: 0.01 THOU/MM3 (ref 0–0.07)
IMM GRANULOCYTES NFR BLD AUTO: 0.2 %
IRON SATN MFR SERPL: 22 % (ref 20–50)
IRON SERPL-MCNC: 50 UG/DL (ref 50–170)
LDLC SERPL CALC-MCNC: 126 MG/DL
LYMPHOCYTES ABSOLUTE: 2.6 THOU/MM3 (ref 1–4.8)
LYMPHOCYTES NFR BLD AUTO: 46.4 %
MAGNESIUM SERPL-MCNC: 1.9 MG/DL (ref 1.6–2.4)
MCH RBC QN AUTO: 31.6 PG (ref 26–33)
MCHC RBC AUTO-ENTMCNC: 32.9 GM/DL (ref 32.2–35.5)
MCV RBC AUTO: 96.3 FL (ref 81–99)
MONOCYTES ABSOLUTE: 0.3 THOU/MM3 (ref 0.4–1.3)
MONOCYTES NFR BLD AUTO: 6.3 %
NEUTROPHILS ABSOLUTE: 2.4 THOU/MM3 (ref 1.8–7.7)
NEUTROPHILS NFR BLD AUTO: 44.4 %
NRBC BLD AUTO-RTO: 0 /100 WBC
PLATELET # BLD AUTO: 193 THOU/MM3 (ref 130–400)
PLATELET BLD QL SMEAR: ADEQUATE
PMV BLD AUTO: 12.1 FL (ref 9.4–12.4)
POTASSIUM SERPL-SCNC: 3.7 MEQ/L (ref 3.5–5.2)
RBC # BLD AUTO: 3.76 MILL/MM3 (ref 4.2–5.4)
SCAN OF BLOOD SMEAR: NORMAL
SODIUM SERPL-SCNC: 139 MEQ/L (ref 135–145)
TIBC SERPL-MCNC: 224 UG/DL (ref 171–450)
TRIGL SERPL-MCNC: 263 MG/DL (ref 0–199)
WBC # BLD AUTO: 5.5 THOU/MM3 (ref 4.8–10.8)

## 2024-05-22 PROCEDURE — 2580000003 HC RX 258

## 2024-05-22 PROCEDURE — 6370000000 HC RX 637 (ALT 250 FOR IP)

## 2024-05-22 PROCEDURE — 92526 ORAL FUNCTION THERAPY: CPT

## 2024-05-22 PROCEDURE — 99233 SBSQ HOSP IP/OBS HIGH 50: CPT | Performed by: INTERNAL MEDICINE

## 2024-05-22 PROCEDURE — 97166 OT EVAL MOD COMPLEX 45 MIN: CPT

## 2024-05-22 PROCEDURE — 6360000002 HC RX W HCPCS

## 2024-05-22 PROCEDURE — 82746 ASSAY OF FOLIC ACID SERUM: CPT

## 2024-05-22 PROCEDURE — 94761 N-INVAS EAR/PLS OXIMETRY MLT: CPT

## 2024-05-22 PROCEDURE — 97110 THERAPEUTIC EXERCISES: CPT

## 2024-05-22 PROCEDURE — 92610 EVALUATE SWALLOWING FUNCTION: CPT

## 2024-05-22 PROCEDURE — 82607 VITAMIN B-12: CPT

## 2024-05-22 PROCEDURE — 97163 PT EVAL HIGH COMPLEX 45 MIN: CPT

## 2024-05-22 PROCEDURE — 2060000000 HC ICU INTERMEDIATE R&B

## 2024-05-22 PROCEDURE — 83036 HEMOGLOBIN GLYCOSYLATED A1C: CPT

## 2024-05-22 PROCEDURE — 99232 SBSQ HOSP IP/OBS MODERATE 35: CPT

## 2024-05-22 PROCEDURE — 97530 THERAPEUTIC ACTIVITIES: CPT

## 2024-05-22 PROCEDURE — 85025 COMPLETE CBC W/AUTO DIFF WBC: CPT

## 2024-05-22 PROCEDURE — 36415 COLL VENOUS BLD VENIPUNCTURE: CPT

## 2024-05-22 PROCEDURE — 97535 SELF CARE MNGMENT TRAINING: CPT

## 2024-05-22 PROCEDURE — 70450 CT HEAD/BRAIN W/O DYE: CPT

## 2024-05-22 PROCEDURE — 92523 SPEECH SOUND LANG COMPREHEN: CPT

## 2024-05-22 PROCEDURE — 80061 LIPID PANEL: CPT

## 2024-05-22 PROCEDURE — 83540 ASSAY OF IRON: CPT

## 2024-05-22 PROCEDURE — 80048 BASIC METABOLIC PNL TOTAL CA: CPT

## 2024-05-22 PROCEDURE — 99222 1ST HOSP IP/OBS MODERATE 55: CPT | Performed by: STUDENT IN AN ORGANIZED HEALTH CARE EDUCATION/TRAINING PROGRAM

## 2024-05-22 PROCEDURE — 72100 X-RAY EXAM L-S SPINE 2/3 VWS: CPT

## 2024-05-22 PROCEDURE — 83550 IRON BINDING TEST: CPT

## 2024-05-22 PROCEDURE — 82728 ASSAY OF FERRITIN: CPT

## 2024-05-22 PROCEDURE — 83735 ASSAY OF MAGNESIUM: CPT

## 2024-05-22 RX ORDER — SENNOSIDES A AND B 8.6 MG/1
1 TABLET, FILM COATED ORAL 2 TIMES DAILY
Status: DISCONTINUED | OUTPATIENT
Start: 2024-05-22 | End: 2024-05-29 | Stop reason: HOSPADM

## 2024-05-22 RX ORDER — ENOXAPARIN SODIUM 100 MG/ML
40 INJECTION SUBCUTANEOUS NIGHTLY
Status: DISCONTINUED | OUTPATIENT
Start: 2024-05-22 | End: 2024-05-29 | Stop reason: HOSPADM

## 2024-05-22 RX ORDER — 0.9 % SODIUM CHLORIDE 0.9 %
500 INTRAVENOUS SOLUTION INTRAVENOUS ONCE
Status: COMPLETED | OUTPATIENT
Start: 2024-05-22 | End: 2024-05-22

## 2024-05-22 RX ORDER — MAGNESIUM SULFATE IN WATER 40 MG/ML
2000 INJECTION, SOLUTION INTRAVENOUS ONCE
Status: COMPLETED | OUTPATIENT
Start: 2024-05-22 | End: 2024-05-22

## 2024-05-22 RX ADMIN — CETIRIZINE HYDROCHLORIDE 10 MG: 10 TABLET, FILM COATED ORAL at 10:44

## 2024-05-22 RX ADMIN — ONDANSETRON 4 MG: 2 INJECTION INTRAMUSCULAR; INTRAVENOUS at 20:23

## 2024-05-22 RX ADMIN — TRAZODONE HYDROCHLORIDE 100 MG: 100 TABLET ORAL at 20:17

## 2024-05-22 RX ADMIN — ENOXAPARIN SODIUM 40 MG: 100 INJECTION SUBCUTANEOUS at 20:17

## 2024-05-22 RX ADMIN — SODIUM CHLORIDE 500 ML: 9 INJECTION, SOLUTION INTRAVENOUS at 02:32

## 2024-05-22 RX ADMIN — ATORVASTATIN CALCIUM 40 MG: 40 TABLET, FILM COATED ORAL at 20:17

## 2024-05-22 RX ADMIN — TRAZODONE HYDROCHLORIDE 100 MG: 100 TABLET ORAL at 00:29

## 2024-05-22 RX ADMIN — ONDANSETRON 4 MG: 2 INJECTION INTRAMUSCULAR; INTRAVENOUS at 10:14

## 2024-05-22 RX ADMIN — MAGNESIUM SULFATE HEPTAHYDRATE 2000 MG: 40 INJECTION, SOLUTION INTRAVENOUS at 18:03

## 2024-05-22 RX ADMIN — FOLIC ACID 1 MG: 1 TABLET ORAL at 10:44

## 2024-05-22 RX ADMIN — BUPROPION HYDROCHLORIDE 300 MG: 300 TABLET, FILM COATED, EXTENDED RELEASE ORAL at 10:44

## 2024-05-22 RX ADMIN — PANTOPRAZOLE SODIUM 40 MG: 40 TABLET, DELAYED RELEASE ORAL at 07:00

## 2024-05-22 RX ADMIN — TOPIRAMATE 100 MG: 100 TABLET, FILM COATED ORAL at 20:19

## 2024-05-22 RX ADMIN — SODIUM CHLORIDE, PRESERVATIVE FREE 10 ML: 5 INJECTION INTRAVENOUS at 09:30

## 2024-05-22 RX ADMIN — SODIUM CHLORIDE, PRESERVATIVE FREE 10 ML: 5 INJECTION INTRAVENOUS at 01:59

## 2024-05-22 RX ADMIN — TOPIRAMATE 100 MG: 100 TABLET, FILM COATED ORAL at 10:45

## 2024-05-22 RX ADMIN — SENNOSIDES 8.6 MG: 8.6 TABLET, FILM COATED ORAL at 20:17

## 2024-05-22 RX ADMIN — PRAMIPEXOLE DIHYDROCHLORIDE 0.25 MG: 0.25 TABLET ORAL at 20:19

## 2024-05-22 RX ADMIN — ACETAMINOPHEN 650 MG: 325 TABLET ORAL at 12:27

## 2024-05-22 RX ADMIN — PHENAZOPYRIDINE 200 MG: 200 TABLET ORAL at 12:27

## 2024-05-22 RX ADMIN — CEFTRIAXONE SODIUM 1000 MG: 1 INJECTION, POWDER, FOR SOLUTION INTRAMUSCULAR; INTRAVENOUS at 20:10

## 2024-05-22 RX ADMIN — PRAMIPEXOLE DIHYDROCHLORIDE 0.25 MG: 0.25 TABLET ORAL at 10:46

## 2024-05-22 RX ADMIN — PHENAZOPYRIDINE 200 MG: 200 TABLET ORAL at 10:03

## 2024-05-22 ASSESSMENT — PAIN DESCRIPTION - PAIN TYPE: TYPE: CHRONIC PAIN

## 2024-05-22 ASSESSMENT — PAIN - FUNCTIONAL ASSESSMENT: PAIN_FUNCTIONAL_ASSESSMENT: PREVENTS OR INTERFERES SOME ACTIVE ACTIVITIES AND ADLS

## 2024-05-22 ASSESSMENT — PAIN SCALES - GENERAL
PAINLEVEL_OUTOF10: 0
PAINLEVEL_OUTOF10: 2
PAINLEVEL_OUTOF10: 3

## 2024-05-22 ASSESSMENT — PAIN DESCRIPTION - ONSET: ONSET: ON-GOING

## 2024-05-22 ASSESSMENT — PAIN DESCRIPTION - LOCATION
LOCATION: BACK
LOCATION: HEAD

## 2024-05-22 ASSESSMENT — PAIN DESCRIPTION - ORIENTATION
ORIENTATION: LOWER
ORIENTATION: RIGHT

## 2024-05-22 ASSESSMENT — PAIN DESCRIPTION - DESCRIPTORS
DESCRIPTORS: ACHING
DESCRIPTORS: DISCOMFORT

## 2024-05-22 ASSESSMENT — PAIN DESCRIPTION - FREQUENCY: FREQUENCY: CONTINUOUS

## 2024-05-22 NOTE — CONSULTS
Neurology Stroke Alert Note    Date:5/21/2024       Room:[address] [place] [address]-  Patient Name:Isha Ramsey     YOB: 1960     Age:63 y.o.  Chief Complaint   Patient presents with    Dysuria        Requesting Physician: Brendan Chong MD     Reason for Consult:  Evaluate for stroke alert    Subjective       HPI: Isha Ramsey who is a 63 y.o. female with a history of anxiety, asthma, previous CVA in 2016, back pain, depression, fibromyalgia, GERD, headaches, PTSD, and RA who is currently admitted to [facility] for a UTI. Patient suddenly developed left sided weakness to which a code stroke was activated. Last known well was 11:13 this morning. Code stroke activated at 11:26 AM. Initial NIH found to be a 8 for left sided facial droop, left arm drift, bilateral leg drifts, left upper extremity ataxia, and left sided numbness. Stat CT head obtained and negative for hemorrhage, mass effect, or midline shift. Given patient's symptoms were in the time window to receive TNK and patient had no contraindications to receive TNK, TNK was administered at 11:50 AM. Stat CTA head and neck obtained and negative for LVO. Given this, patient deemed not a candidate for neurointervention. Patient will be admitted to the ICU for close monitoring.     Last known well:  11:13 AM.  Time of stroke alert:  11:26 AM.  Time of neurology arrival:  11:28 AM.  Vascular risk factors:  previous CVA.  Initial glucose: 113 .  Old deficits from prior stroke:  left sided facial droop.  INR in ED if anticoagulated:  not applicable.  Initial blood pressure:  123/81.  Initial NIHSS: 8.  Pre-morbid Modified [place] Scale:  1.   Time of initial imaging read:  11:49 AM.  Thrombolytic administered:  not indicated/contraindicated.  Thrombectomy performed:  not indicated.  Puncture time:  not applicable.       Review of Systems   Review of Systems   Neurological:  Positive for facial asymmetry, weakness and numbness.       Medications   Scheduled Meds:    docusate 
03/05/2024    lumbar 3-4 epidural steroid injection performed by Leandro Powell DO at [facility] OR    PTCA      Catheterizations    SEPTOPLASTY Right 06/14/2021    SEPTOPLASTY, SUBMUCOUS RESECTION TURBINATE, RIGHT PARTIAL INFERIOR, NASAL ENDOSCOPY WITH PARTIAL RESECTION OF RIGHT MIDDLE JHON BULLOSA performed by Jaspal Delgado MD at [facility] OR    SHOULDER SURGERY      SHOULDER SURGERY Left     SINUS SURGERY      SPINE SURGERY N/A 09/05/2023    KYPHOPLASTY of T11 performed by Alfreda Leon MD at [facility] OR    TONSILLECTOMY      TUBAL LIGATION      UPPER GASTROINTESTINAL ENDOSCOPY      Many times due to strictures    US THYROID CYST ASPIRATION AND OR INJECTION  08/06/2021    US THYROID CYST ASPIRATION AND OR INJECTION 8/6/2021 [facility] ULTRASOUND       Allergies:    Allergies   Allergen Reactions    Biaxin [Clarithromycin] Anaphylaxis and Swelling     Lips swelled    Penicillins Rash and Other (See Comments)     Patient states allergist states not allergic.     Triamcinolone Other (See Comments)    Butrans [Buprenorphine] Itching     Blisters     Dilaudid [Hydromorphone] Nausea And Vomiting    Doxycycline Hives     rash    Morphine Nausea And Vomiting    Sulfa Antibiotics Rash     Other Reaction(s): rash, able to take keflex        Current Medications:   Current Facility-Administered Medications: docusate sodium (COLACE) capsule 100 mg, 100 mg, Oral, Daily  sodium chloride flush 0.9 % injection 5-40 mL, 5-40 mL, IntraVENous, 2 times per day  sodium chloride flush 0.9 % injection 5-40 mL, 5-40 mL, IntraVENous, PRN  0.9 % sodium chloride infusion, , IntraVENous, PRN  dextrose bolus 10% 125 mL, 125 mL, IntraVENous, PRN  traZODone (DESYREL) tablet 100 mg, 100 mg, Oral, Nightly  fluconazole (DIFLUCAN) tablet 150 mg, 150 mg, Oral, Q3 Days  cefTRIAXone (ROCEPHIN) 1,000 mg in sodium chloride 0.9 % 50 mL IVPB (mini-bag), 1,000 mg, IntraVENous, Q24H  phenazopyridine (PYRIDIUM) tablet 200 mg, 200 mg, Oral, TID

## 2024-05-22 NOTE — CARE COORDINATION
5/22/24, 3:31 PM EDT    DISCHARGE ON GOING EVALUATION    Isha Ramsey       [facility] day: 1  Location: -06/006-A Reason for admit: Lower abdominal pain [R10.30]  Therapy failure due to antibiotic resistance [Z16.20]  Urinary tract infection in female [N39.0]  Ischemic stroke (HCC) [I63.9]     Procedures:   5/21 Code stroke on 6K  5/21 TNK given    Imaging since last note:   5/21 CT Head: No acute process  5/21 CTA Head/Neck: Negative  5/21 CXR: 1. No interval change since previous study dated 10/8/2023, no acute  cardiopulmonary disease..  5/22 MRI Brain: Nonspecific bilateral mastoid densities  Question acute versus chronic inflammation  No acute intracranial abnormality  5/22 CT Head: No acute findings  5/22 XR Lumbar spine: 1. Slight thoracolumbar dextroscoliosis. Cannot exclude muscle spasm left side.  Old fracture T11 with evidence of prior kyphoplasty. Vascular stent in the left  common iliac vein.  2. Moderate to space narrowing L3-4. Mild lateral subluxation L3 upon L4 to the  right, 5 mm. Mild to space narrowing L2-3 and L4-5. Minimal spondylosis  scattered in the lumbar spine.  3. No acute findings. No acute fracture seen. Mild degenerative changes right  sacroiliac joint.    Barriers to Discharge: Code stroke yesterday on 6K with left sided weakness noted. Transferred to ICU and TNK given. CT and MRI negative. Transferred to stepdown today. Physiatry consulted for possible IPR.     NIH 4: facial palsy, LUE & LLE motor, limb ataxia. Ox4. Afebrile. NSR. On room air. SLP/PT/OT. Telemetry, NIHSS/ neuro checks, SCDs. IV rocephin, lovenox, fentanyl patch, po diflucan, prn zanaflex, topamax, Electrolyte replacement protocols. , total cholesterol 210, triglycerides 263.     PCP: Mack Nguyen DO  Readmission Risk Score: 12.8    Patient Goals/Plan/Treatment Preferences: From home alone with son and DIL next door.  Spoke with Isha, explained therapy recommending rehab before safely returning

## 2024-05-23 LAB
ALBUMIN SERPL BCG-MCNC: 3.4 G/DL (ref 3.5–5.1)
ALP SERPL-CCNC: 91 U/L (ref 38–126)
ALT SERPL W/O P-5'-P-CCNC: 64 U/L (ref 11–66)
ANION GAP SERPL CALC-SCNC: 10 MEQ/L (ref 8–16)
ANION GAP SERPL CALC-SCNC: 7 MEQ/L (ref 8–16)
AST SERPL-CCNC: 26 U/L (ref 5–40)
BACTERIA: NORMAL
BILIRUB SERPL-MCNC: 0.2 MG/DL (ref 0.3–1.2)
BILIRUB UR QL STRIP: NORMAL
BUN SERPL-MCNC: 14 MG/DL (ref 7–22)
BUN SERPL-MCNC: 15 MG/DL (ref 7–22)
CALCIUM SERPL-MCNC: 9.3 MG/DL (ref 8.5–10.5)
CALCIUM SERPL-MCNC: 9.4 MG/DL (ref 8.5–10.5)
CASTS #/AREA URNS LPF: NORMAL /LPF
CHARACTER UR: CLEAR
CHLORIDE SERPL-SCNC: 105 MEQ/L (ref 98–111)
CHLORIDE SERPL-SCNC: 107 MEQ/L (ref 98–111)
CO2 SERPL-SCNC: 21 MEQ/L (ref 23–33)
CO2 SERPL-SCNC: 24 MEQ/L (ref 23–33)
COLOR UR: NORMAL
CREAT SERPL-MCNC: 1.4 MG/DL (ref 0.4–1.2)
CREAT SERPL-MCNC: 1.5 MG/DL (ref 0.4–1.2)
CREAT UR-MCNC: 78 MG/DL
CRYSTALS URNS QL MICRO: NORMAL
DEPRECATED RDW RBC AUTO: 50.1 FL (ref 35–45)
EKG ATRIAL RATE: 58 BPM
EKG P AXIS: 32 DEGREES
EKG P-R INTERVAL: 202 MS
EKG Q-T INTERVAL: 448 MS
EKG QRS DURATION: 76 MS
EKG QTC CALCULATION (BAZETT): 439 MS
EKG R AXIS: -20 DEGREES
EKG T AXIS: 3 DEGREES
EKG VENTRICULAR RATE: 58 BPM
EPITHELIAL CELLS, UA: NORMAL /HPF
ERYTHROCYTE [DISTWIDTH] IN BLOOD BY AUTOMATED COUNT: 13.9 % (ref 11.5–14.5)
FOLATE SERPL-MCNC: > 20 NG/ML (ref 4.8–24.2)
GFR SERPL CREATININE-BSD FRML MDRD: 39 ML/MIN/1.73M2
GFR SERPL CREATININE-BSD FRML MDRD: 42 ML/MIN/1.73M2
GLUCOSE SERPL-MCNC: 103 MG/DL (ref 70–108)
GLUCOSE SERPL-MCNC: 112 MG/DL (ref 70–108)
GLUCOSE UR QL STRIP.AUTO: NEGATIVE MG/DL
HCT VFR BLD AUTO: 39.7 % (ref 37–47)
HGB BLD-MCNC: 12.6 GM/DL (ref 12–16)
HGB UR QL STRIP.AUTO: NORMAL
KETONES UR QL STRIP.AUTO: NORMAL
LEUKOCYTE ESTERASE UR QL STRIP.AUTO: NORMAL
MCH RBC QN AUTO: 31.2 PG (ref 26–33)
MCHC RBC AUTO-ENTMCNC: 31.7 GM/DL (ref 32.2–35.5)
MCV RBC AUTO: 98.3 FL (ref 81–99)
NITRITE UR QL STRIP.AUTO: NORMAL
PH UR STRIP.AUTO: NORMAL [PH] (ref 5–9)
PLATELET # BLD AUTO: 236 THOU/MM3 (ref 130–400)
PMV BLD AUTO: 12 FL (ref 9.4–12.4)
POTASSIUM SERPL-SCNC: 3.8 MEQ/L (ref 3.5–5.2)
POTASSIUM SERPL-SCNC: 4.1 MEQ/L (ref 3.5–5.2)
PROT SERPL-MCNC: 5.9 G/DL (ref 6.1–8)
PROT UR STRIP.AUTO-MCNC: NORMAL MG/DL
RBC # BLD AUTO: 4.04 MILL/MM3 (ref 4.2–5.4)
RBC #/AREA URNS HPF: NORMAL /HPF
RENAL EPI CELLS #/AREA URNS HPF: NORMAL /[HPF]
SODIUM SERPL-SCNC: 136 MEQ/L (ref 135–145)
SODIUM SERPL-SCNC: 138 MEQ/L (ref 135–145)
SODIUM UR-SCNC: 21 MEQ/L
SPECIFIC GRAVITY UA: NORMAL (ref 1–1.03)
UROBILINOGEN, URINE: NORMAL EU/DL (ref 0–1)
VIT B12 SERPL-MCNC: 1026 PG/ML (ref 211–911)
WBC # BLD AUTO: 7.4 THOU/MM3 (ref 4.8–10.8)
WBC #/AREA URNS HPF: NORMAL /HPF

## 2024-05-23 PROCEDURE — 80053 COMPREHEN METABOLIC PANEL: CPT

## 2024-05-23 PROCEDURE — 6370000000 HC RX 637 (ALT 250 FOR IP)

## 2024-05-23 PROCEDURE — 97129 THER IVNTJ 1ST 15 MIN: CPT

## 2024-05-23 PROCEDURE — 97110 THERAPEUTIC EXERCISES: CPT

## 2024-05-23 PROCEDURE — 2060000000 HC ICU INTERMEDIATE R&B

## 2024-05-23 PROCEDURE — 2580000003 HC RX 258

## 2024-05-23 PROCEDURE — 97535 SELF CARE MNGMENT TRAINING: CPT

## 2024-05-23 PROCEDURE — 81001 URINALYSIS AUTO W/SCOPE: CPT

## 2024-05-23 PROCEDURE — 36415 COLL VENOUS BLD VENIPUNCTURE: CPT

## 2024-05-23 PROCEDURE — 97530 THERAPEUTIC ACTIVITIES: CPT

## 2024-05-23 PROCEDURE — 85027 COMPLETE CBC AUTOMATED: CPT

## 2024-05-23 PROCEDURE — 93010 ELECTROCARDIOGRAM REPORT: CPT | Performed by: INTERNAL MEDICINE

## 2024-05-23 PROCEDURE — 82570 ASSAY OF URINE CREATININE: CPT

## 2024-05-23 PROCEDURE — 93005 ELECTROCARDIOGRAM TRACING: CPT

## 2024-05-23 PROCEDURE — 92526 ORAL FUNCTION THERAPY: CPT

## 2024-05-23 PROCEDURE — 97116 GAIT TRAINING THERAPY: CPT

## 2024-05-23 PROCEDURE — 6370000000 HC RX 637 (ALT 250 FOR IP): Performed by: PHYSICIAN ASSISTANT

## 2024-05-23 PROCEDURE — 99233 SBSQ HOSP IP/OBS HIGH 50: CPT | Performed by: INTERNAL MEDICINE

## 2024-05-23 PROCEDURE — 6360000002 HC RX W HCPCS

## 2024-05-23 PROCEDURE — 84300 ASSAY OF URINE SODIUM: CPT

## 2024-05-23 PROCEDURE — 99231 SBSQ HOSP IP/OBS SF/LOW 25: CPT | Performed by: NURSE PRACTITIONER

## 2024-05-23 RX ORDER — SODIUM CHLORIDE, SODIUM LACTATE, POTASSIUM CHLORIDE, AND CALCIUM CHLORIDE .6; .31; .03; .02 G/100ML; G/100ML; G/100ML; G/100ML
500 INJECTION, SOLUTION INTRAVENOUS ONCE
Status: COMPLETED | OUTPATIENT
Start: 2024-05-23 | End: 2024-05-23

## 2024-05-23 RX ORDER — PROMETHAZINE HYDROCHLORIDE 25 MG/1
25 TABLET ORAL EVERY 6 HOURS PRN
Status: DISCONTINUED | OUTPATIENT
Start: 2024-05-23 | End: 2024-05-29 | Stop reason: HOSPADM

## 2024-05-23 RX ORDER — SODIUM CHLORIDE, SODIUM LACTATE, POTASSIUM CHLORIDE, CALCIUM CHLORIDE 600; 310; 30; 20 MG/100ML; MG/100ML; MG/100ML; MG/100ML
INJECTION, SOLUTION INTRAVENOUS CONTINUOUS
Status: ACTIVE | OUTPATIENT
Start: 2024-05-23 | End: 2024-05-23

## 2024-05-23 RX ADMIN — PANTOPRAZOLE SODIUM 40 MG: 40 TABLET, DELAYED RELEASE ORAL at 03:18

## 2024-05-23 RX ADMIN — PRAMIPEXOLE DIHYDROCHLORIDE 0.25 MG: 0.25 TABLET ORAL at 20:29

## 2024-05-23 RX ADMIN — SENNOSIDES 8.6 MG: 8.6 TABLET, FILM COATED ORAL at 09:33

## 2024-05-23 RX ADMIN — ONDANSETRON 4 MG: 4 TABLET, ORALLY DISINTEGRATING ORAL at 06:15

## 2024-05-23 RX ADMIN — CEFTRIAXONE SODIUM 1000 MG: 1 INJECTION, POWDER, FOR SOLUTION INTRAMUSCULAR; INTRAVENOUS at 20:32

## 2024-05-23 RX ADMIN — SODIUM CHLORIDE, POTASSIUM CHLORIDE, SODIUM LACTATE AND CALCIUM CHLORIDE: 600; 310; 30; 20 INJECTION, SOLUTION INTRAVENOUS at 11:31

## 2024-05-23 RX ADMIN — SODIUM CHLORIDE, POTASSIUM CHLORIDE, SODIUM LACTATE AND CALCIUM CHLORIDE 500 ML: 600; 310; 30; 20 INJECTION, SOLUTION INTRAVENOUS at 09:24

## 2024-05-23 RX ADMIN — SENNOSIDES 8.6 MG: 8.6 TABLET, FILM COATED ORAL at 20:29

## 2024-05-23 RX ADMIN — SODIUM CHLORIDE, PRESERVATIVE FREE 10 ML: 5 INJECTION INTRAVENOUS at 20:29

## 2024-05-23 RX ADMIN — ENOXAPARIN SODIUM 40 MG: 100 INJECTION SUBCUTANEOUS at 20:29

## 2024-05-23 RX ADMIN — ONDANSETRON 4 MG: 2 INJECTION INTRAMUSCULAR; INTRAVENOUS at 18:30

## 2024-05-23 RX ADMIN — CETIRIZINE HYDROCHLORIDE 10 MG: 10 TABLET, FILM COATED ORAL at 09:32

## 2024-05-23 RX ADMIN — FOLIC ACID 1 MG: 1 TABLET ORAL at 09:33

## 2024-05-23 RX ADMIN — FLUCONAZOLE 150 MG: 100 TABLET ORAL at 09:33

## 2024-05-23 RX ADMIN — TRAZODONE HYDROCHLORIDE 100 MG: 100 TABLET ORAL at 20:29

## 2024-05-23 RX ADMIN — TOPIRAMATE 100 MG: 100 TABLET, FILM COATED ORAL at 20:29

## 2024-05-23 RX ADMIN — PRAMIPEXOLE DIHYDROCHLORIDE 0.25 MG: 0.25 TABLET ORAL at 09:34

## 2024-05-23 RX ADMIN — BUPROPION HYDROCHLORIDE 300 MG: 300 TABLET, FILM COATED, EXTENDED RELEASE ORAL at 09:31

## 2024-05-23 RX ADMIN — DOCUSATE SODIUM 100 MG: 100 CAPSULE, LIQUID FILLED ORAL at 09:32

## 2024-05-23 RX ADMIN — TOPIRAMATE 100 MG: 100 TABLET, FILM COATED ORAL at 09:34

## 2024-05-23 NOTE — PROCEDURES
PROCEDURE NOTE  Date: 5/23/2024   Name: Isha Ramsey  YOB: 1960    Procedures        EKG was completed and handed to RN

## 2024-05-23 NOTE — CARE COORDINATION
5/23/24, 11:30 AM EDT    DISCHARGE ON GOING EVALUATION    sIha Ramsey       [facility] day: 2  Location: -06/006-A Reason for admit: Lower abdominal pain [R10.30]  Therapy failure due to antibiotic resistance [Z16.20]  Urinary tract infection in female [N39.0]  Ischemic stroke (HCC) [I63.9]     Procedures:   5/21 Code stroke on 6K  5/21 TNK given    Imaging since last note: none    Barriers to Discharge: PT/OT/SLP, Neurology has signed off. Physiatry following, IV Rocephin, Lovenox, IV fluids, Zofran prn, electrolyte replacement protocols.     PCP: Mack Nguyen DO  Readmission Risk Score: 12.9    Patient Goals/Plan/Treatment Preferences: From home alone with help from son and DIL. Physiatry following for possible IPR. SW following.

## 2024-05-24 ENCOUNTER — APPOINTMENT (OUTPATIENT)
Dept: ULTRASOUND IMAGING | Age: 64
DRG: 062 | End: 2024-05-24
Payer: MEDICARE

## 2024-05-24 LAB
ANION GAP SERPL CALC-SCNC: 14 MEQ/L (ref 8–16)
BACTERIA BLD AEROBE CULT: NORMAL
BACTERIA BLD AEROBE CULT: NORMAL
BACTERIA: NORMAL
BILIRUB UR QL STRIP: NEGATIVE
BUN SERPL-MCNC: 14 MG/DL (ref 7–22)
CALCIUM SERPL-MCNC: 9.3 MG/DL (ref 8.5–10.5)
CASTS #/AREA URNS LPF: NORMAL /LPF
CASTS #/AREA URNS LPF: NORMAL /LPF
CHARACTER UR: CLEAR
CHARCOAL URNS QL MICRO: NORMAL
CHLORIDE SERPL-SCNC: 107 MEQ/L (ref 98–111)
CO2 SERPL-SCNC: 20 MEQ/L (ref 23–33)
COLOR UR: NORMAL
CREAT SERPL-MCNC: 1.4 MG/DL (ref 0.4–1.2)
CRYSTALS URNS QL MICRO: NORMAL
DEPRECATED RDW RBC AUTO: 49.3 FL (ref 35–45)
EPITHELIAL CELLS, UA: NORMAL /HPF
ERYTHROCYTE [DISTWIDTH] IN BLOOD BY AUTOMATED COUNT: 13.7 % (ref 11.5–14.5)
GFR SERPL CREATININE-BSD FRML MDRD: 42 ML/MIN/1.73M2
GLUCOSE SERPL-MCNC: 105 MG/DL (ref 70–108)
GLUCOSE UR QL STRIP.AUTO: NEGATIVE MG/DL
HCT VFR BLD AUTO: 39.5 % (ref 37–47)
HGB BLD-MCNC: 12.5 GM/DL (ref 12–16)
HGB UR QL STRIP.AUTO: NEGATIVE
KETONES UR QL STRIP.AUTO: NEGATIVE
LEUKOCYTE ESTERASE UR QL STRIP.AUTO: NEGATIVE
MAGNESIUM SERPL-MCNC: 1.9 MG/DL (ref 1.6–2.4)
MCH RBC QN AUTO: 30.9 PG (ref 26–33)
MCHC RBC AUTO-ENTMCNC: 31.6 GM/DL (ref 32.2–35.5)
MCV RBC AUTO: 97.8 FL (ref 81–99)
NITRITE UR QL STRIP.AUTO: NEGATIVE
PH UR STRIP.AUTO: 7 [PH] (ref 5–9)
PLATELET # BLD AUTO: 218 THOU/MM3 (ref 130–400)
PMV BLD AUTO: 11.1 FL (ref 9.4–12.4)
POTASSIUM SERPL-SCNC: 3.7 MEQ/L (ref 3.5–5.2)
PROT UR STRIP.AUTO-MCNC: NEGATIVE MG/DL
RBC # BLD AUTO: 4.04 MILL/MM3 (ref 4.2–5.4)
RBC #/AREA URNS HPF: NORMAL /HPF
RENAL EPI CELLS #/AREA URNS HPF: NORMAL /[HPF]
SODIUM SERPL-SCNC: 141 MEQ/L (ref 135–145)
SPECIFIC GRAVITY UA: 1.01 (ref 1–1.03)
UROBILINOGEN, URINE: 0.2 EU/DL (ref 0–1)
WBC # BLD AUTO: 5.5 THOU/MM3 (ref 4.8–10.8)
WBC #/AREA URNS HPF: NORMAL /HPF
YEAST LIKE FUNGI URNS QL MICRO: NORMAL

## 2024-05-24 PROCEDURE — 6370000000 HC RX 637 (ALT 250 FOR IP)

## 2024-05-24 PROCEDURE — 80048 BASIC METABOLIC PNL TOTAL CA: CPT

## 2024-05-24 PROCEDURE — 87086 URINE CULTURE/COLONY COUNT: CPT

## 2024-05-24 PROCEDURE — 87077 CULTURE AEROBIC IDENTIFY: CPT

## 2024-05-24 PROCEDURE — 97530 THERAPEUTIC ACTIVITIES: CPT

## 2024-05-24 PROCEDURE — 6360000002 HC RX W HCPCS

## 2024-05-24 PROCEDURE — 97116 GAIT TRAINING THERAPY: CPT

## 2024-05-24 PROCEDURE — 6370000000 HC RX 637 (ALT 250 FOR IP): Performed by: PHYSICIAN ASSISTANT

## 2024-05-24 PROCEDURE — 76770 US EXAM ABDO BACK WALL COMP: CPT

## 2024-05-24 PROCEDURE — 2580000003 HC RX 258

## 2024-05-24 PROCEDURE — 81001 URINALYSIS AUTO W/SCOPE: CPT

## 2024-05-24 PROCEDURE — 99233 SBSQ HOSP IP/OBS HIGH 50: CPT | Performed by: INTERNAL MEDICINE

## 2024-05-24 PROCEDURE — 36415 COLL VENOUS BLD VENIPUNCTURE: CPT

## 2024-05-24 PROCEDURE — 2060000000 HC ICU INTERMEDIATE R&B

## 2024-05-24 PROCEDURE — 85027 COMPLETE CBC AUTOMATED: CPT

## 2024-05-24 PROCEDURE — 97110 THERAPEUTIC EXERCISES: CPT

## 2024-05-24 PROCEDURE — 83735 ASSAY OF MAGNESIUM: CPT

## 2024-05-24 RX ORDER — SODIUM CHLORIDE, SODIUM LACTATE, POTASSIUM CHLORIDE, AND CALCIUM CHLORIDE .6; .31; .03; .02 G/100ML; G/100ML; G/100ML; G/100ML
500 INJECTION, SOLUTION INTRAVENOUS ONCE
Status: COMPLETED | OUTPATIENT
Start: 2024-05-24 | End: 2024-05-24

## 2024-05-24 RX ORDER — SODIUM CHLORIDE, SODIUM LACTATE, POTASSIUM CHLORIDE, CALCIUM CHLORIDE 600; 310; 30; 20 MG/100ML; MG/100ML; MG/100ML; MG/100ML
INJECTION, SOLUTION INTRAVENOUS CONTINUOUS
Status: DISCONTINUED | OUTPATIENT
Start: 2024-05-24 | End: 2024-05-26

## 2024-05-24 RX ADMIN — CETIRIZINE HYDROCHLORIDE 10 MG: 10 TABLET, FILM COATED ORAL at 09:34

## 2024-05-24 RX ADMIN — PRAMIPEXOLE DIHYDROCHLORIDE 0.25 MG: 0.25 TABLET ORAL at 19:55

## 2024-05-24 RX ADMIN — ONDANSETRON 4 MG: 4 TABLET, ORALLY DISINTEGRATING ORAL at 19:55

## 2024-05-24 RX ADMIN — SENNOSIDES 8.6 MG: 8.6 TABLET, FILM COATED ORAL at 09:34

## 2024-05-24 RX ADMIN — BUPROPION HYDROCHLORIDE 300 MG: 300 TABLET, FILM COATED, EXTENDED RELEASE ORAL at 09:34

## 2024-05-24 RX ADMIN — ENOXAPARIN SODIUM 40 MG: 100 INJECTION SUBCUTANEOUS at 19:55

## 2024-05-24 RX ADMIN — PRAMIPEXOLE DIHYDROCHLORIDE 0.25 MG: 0.25 TABLET ORAL at 09:34

## 2024-05-24 RX ADMIN — PANTOPRAZOLE SODIUM 40 MG: 40 TABLET, DELAYED RELEASE ORAL at 03:20

## 2024-05-24 RX ADMIN — TOPIRAMATE 100 MG: 100 TABLET, FILM COATED ORAL at 09:34

## 2024-05-24 RX ADMIN — ACETAMINOPHEN 650 MG: 325 TABLET ORAL at 20:00

## 2024-05-24 RX ADMIN — TOPIRAMATE 100 MG: 100 TABLET, FILM COATED ORAL at 19:55

## 2024-05-24 RX ADMIN — SODIUM CHLORIDE, POTASSIUM CHLORIDE, SODIUM LACTATE AND CALCIUM CHLORIDE: 600; 310; 30; 20 INJECTION, SOLUTION INTRAVENOUS at 21:24

## 2024-05-24 RX ADMIN — SENNOSIDES 8.6 MG: 8.6 TABLET, FILM COATED ORAL at 19:55

## 2024-05-24 RX ADMIN — FOLIC ACID 1 MG: 1 TABLET ORAL at 09:34

## 2024-05-24 RX ADMIN — TRAZODONE HYDROCHLORIDE 100 MG: 100 TABLET ORAL at 23:24

## 2024-05-24 RX ADMIN — ATORVASTATIN CALCIUM 40 MG: 40 TABLET, FILM COATED ORAL at 21:24

## 2024-05-24 RX ADMIN — SODIUM CHLORIDE, POTASSIUM CHLORIDE, SODIUM LACTATE AND CALCIUM CHLORIDE: 600; 310; 30; 20 INJECTION, SOLUTION INTRAVENOUS at 13:49

## 2024-05-24 RX ADMIN — DOCUSATE SODIUM 100 MG: 100 CAPSULE, LIQUID FILLED ORAL at 09:34

## 2024-05-24 RX ADMIN — ONDANSETRON 4 MG: 2 INJECTION INTRAMUSCULAR; INTRAVENOUS at 08:14

## 2024-05-24 RX ADMIN — SODIUM CHLORIDE, POTASSIUM CHLORIDE, SODIUM LACTATE AND CALCIUM CHLORIDE 500 ML: 600; 310; 30; 20 INJECTION, SOLUTION INTRAVENOUS at 10:56

## 2024-05-24 RX ADMIN — SODIUM CHLORIDE, PRESERVATIVE FREE 10 ML: 5 INJECTION INTRAVENOUS at 08:16

## 2024-05-24 ASSESSMENT — PAIN DESCRIPTION - PAIN TYPE: TYPE: CHRONIC PAIN

## 2024-05-24 ASSESSMENT — PAIN SCALES - GENERAL
PAINLEVEL_OUTOF10: 3
PAINLEVEL_OUTOF10: 2

## 2024-05-24 ASSESSMENT — PAIN DESCRIPTION - ONSET: ONSET: GRADUAL

## 2024-05-24 ASSESSMENT — PAIN DESCRIPTION - FREQUENCY: FREQUENCY: INTERMITTENT

## 2024-05-24 ASSESSMENT — PAIN DESCRIPTION - ORIENTATION: ORIENTATION: RIGHT;LEFT

## 2024-05-24 ASSESSMENT — PAIN - FUNCTIONAL ASSESSMENT: PAIN_FUNCTIONAL_ASSESSMENT: ACTIVITIES ARE NOT PREVENTED

## 2024-05-24 ASSESSMENT — PAIN DESCRIPTION - DESCRIPTORS: DESCRIPTORS: ACHING

## 2024-05-24 ASSESSMENT — PAIN DESCRIPTION - LOCATION: LOCATION: ANKLE

## 2024-05-24 NOTE — CARE COORDINATION
DISCHARGE PLANNING EVALUATION  5/24/24, 9:27 AM EDT    Reason for Referral: Placement  Decision Maker: Patient capable of making own decisions  Current Services: n/a  New Services Requested: SNF- [facility]  Family/ Social/ Home environment: From home with Son and DIL  Payment Source:[employer] Medicare  Transportation at Discharge: tbd  Post-acute (PAC) provider list was provided to patient. Patient was informed of their freedom to choose PAC provider. Discussed and offered to show the patient the relevant PAC Providers quality and resource use measures on Medicare Compare web site via computer based on patient's goals of care and treatment preferences. Questions regarding selection process were answered.      Teach Back Method used with patient regarding care plan and discharge preferences  Patient verbalized understanding of the plan of care and contribute to goal setting.       Patient preferences and discharge plan: Patient would prefer [facility], [facility]. Patient would NOT like son or dil to be updated.     Electronically signed by AUBRIE Burgess on 5/24/2024 at 9:27 AM

## 2024-05-24 NOTE — CARE COORDINATION
5/24/24, 1:16 PM EDT    DISCHARGE PLANNING EVALUATION    Spoke with Anais at [facility] currently has no beds however [facility] does and would be able to accept pending precert.     Spoke with patient, whom agreed with [facility].     3:10 PM  Spoke with Anais, patient approved pending precert. Precert started 5/24. Possible weekend discharge.     5/24/24, 3:11 PM EDT    Patient goals/plan/ treatment preferences discussed by  and .  Patient goals/plan/ treatment preferences reviewed with patient/ family.  Patient/ family verbalize understanding of discharge plan and are in agreement with goal/plan/treatment preferences.  Understanding was demonstrated using the teach back method.  AVS provided by RN at time of discharge, which includes all necessary medical information pertaining to the patients current course of illness, treatment, post-discharge goals of care, and treatment preferences.     Services At/After Discharge: Skilled Nursing Facility (SNF), Aide services, Nursing service, OT, and PT    Patient possible discharge over weekend. [facility] via [facility] transport.

## 2024-05-24 NOTE — CARE COORDINATION
5/24/24, 2:58 PM EDT    DISCHARGE ON GOING EVALUATION    Isha Ramsey       Hospital day: 3  Location: -06/006-A Reason for admit: Lower abdominal pain [R10.30]  Therapy failure due to antibiotic resistance [Z16.20]  Urinary tract infection in female [N39.0]  Ischemic stroke (HCC) [I63.9]     Procedures:   5/21 Code stroke on 6K  5/21 TNK given    Imaging since last note:   5/24 US Renal: Bilateral nephrolithiasis. Nondistended urinary bladder.    Barriers to Discharge: Hospitalist following. PT/OT/SLP. LR bolus today followed by LR@75. IV zofran prn. BP soft. Ceat 1.4 (1.5)     PCP: Mack Nguyen DO  Readmission Risk Score: 13    Patient Goals/Plan/Treatment Preferences: From home alone. Help from son and DIL. IPR denied. [facility] has no beds. Agreeable to [facility]. Precert started today.

## 2024-05-25 LAB
ANION GAP SERPL CALC-SCNC: 10 MEQ/L (ref 8–16)
BUN SERPL-MCNC: 9 MG/DL (ref 7–22)
CALCIUM SERPL-MCNC: 9 MG/DL (ref 8.5–10.5)
CHLORIDE SERPL-SCNC: 110 MEQ/L (ref 98–111)
CO2 SERPL-SCNC: 23 MEQ/L (ref 23–33)
CREAT SERPL-MCNC: 1.1 MG/DL (ref 0.4–1.2)
GFR SERPL CREATININE-BSD FRML MDRD: 57 ML/MIN/1.73M2
GLUCOSE SERPL-MCNC: 97 MG/DL (ref 70–108)
POTASSIUM SERPL-SCNC: 3.7 MEQ/L (ref 3.5–5.2)
SODIUM SERPL-SCNC: 143 MEQ/L (ref 135–145)

## 2024-05-25 PROCEDURE — 80048 BASIC METABOLIC PNL TOTAL CA: CPT

## 2024-05-25 PROCEDURE — 6360000002 HC RX W HCPCS

## 2024-05-25 PROCEDURE — 2580000003 HC RX 258

## 2024-05-25 PROCEDURE — 99232 SBSQ HOSP IP/OBS MODERATE 35: CPT | Performed by: INTERNAL MEDICINE

## 2024-05-25 PROCEDURE — 2060000000 HC ICU INTERMEDIATE R&B

## 2024-05-25 PROCEDURE — 97116 GAIT TRAINING THERAPY: CPT

## 2024-05-25 PROCEDURE — 6370000000 HC RX 637 (ALT 250 FOR IP)

## 2024-05-25 PROCEDURE — 97112 NEUROMUSCULAR REEDUCATION: CPT

## 2024-05-25 PROCEDURE — 6370000000 HC RX 637 (ALT 250 FOR IP): Performed by: PHYSICIAN ASSISTANT

## 2024-05-25 PROCEDURE — 97535 SELF CARE MNGMENT TRAINING: CPT

## 2024-05-25 PROCEDURE — 36415 COLL VENOUS BLD VENIPUNCTURE: CPT

## 2024-05-25 RX ORDER — BISACODYL 10 MG
10 SUPPOSITORY, RECTAL RECTAL ONCE
Status: COMPLETED | OUTPATIENT
Start: 2024-05-25 | End: 2024-05-25

## 2024-05-25 RX ADMIN — DOCUSATE SODIUM 100 MG: 100 CAPSULE, LIQUID FILLED ORAL at 08:55

## 2024-05-25 RX ADMIN — SENNOSIDES 8.6 MG: 8.6 TABLET, FILM COATED ORAL at 19:48

## 2024-05-25 RX ADMIN — TOPIRAMATE 100 MG: 100 TABLET, FILM COATED ORAL at 19:48

## 2024-05-25 RX ADMIN — CETIRIZINE HYDROCHLORIDE 10 MG: 10 TABLET, FILM COATED ORAL at 08:57

## 2024-05-25 RX ADMIN — PANTOPRAZOLE SODIUM 40 MG: 40 TABLET, DELAYED RELEASE ORAL at 03:08

## 2024-05-25 RX ADMIN — TRAZODONE HYDROCHLORIDE 100 MG: 100 TABLET ORAL at 23:05

## 2024-05-25 RX ADMIN — ONDANSETRON 4 MG: 4 TABLET, ORALLY DISINTEGRATING ORAL at 11:26

## 2024-05-25 RX ADMIN — SENNOSIDES 8.6 MG: 8.6 TABLET, FILM COATED ORAL at 08:55

## 2024-05-25 RX ADMIN — SODIUM CHLORIDE, PRESERVATIVE FREE 20 ML: 5 INJECTION INTRAVENOUS at 08:59

## 2024-05-25 RX ADMIN — TOPIRAMATE 100 MG: 100 TABLET, FILM COATED ORAL at 08:58

## 2024-05-25 RX ADMIN — FOLIC ACID 1 MG: 1 TABLET ORAL at 08:56

## 2024-05-25 RX ADMIN — SODIUM CHLORIDE, POTASSIUM CHLORIDE, SODIUM LACTATE AND CALCIUM CHLORIDE: 600; 310; 30; 20 INJECTION, SOLUTION INTRAVENOUS at 16:26

## 2024-05-25 RX ADMIN — ENOXAPARIN SODIUM 40 MG: 100 INJECTION SUBCUTANEOUS at 19:48

## 2024-05-25 RX ADMIN — BUPROPION HYDROCHLORIDE 300 MG: 300 TABLET, FILM COATED, EXTENDED RELEASE ORAL at 08:57

## 2024-05-25 RX ADMIN — PRAMIPEXOLE DIHYDROCHLORIDE 0.25 MG: 0.25 TABLET ORAL at 08:58

## 2024-05-25 RX ADMIN — BISACODYL 10 MG: 10 SUPPOSITORY RECTAL at 19:14

## 2024-05-25 RX ADMIN — SODIUM CHLORIDE, PRESERVATIVE FREE 10 ML: 5 INJECTION INTRAVENOUS at 09:00

## 2024-05-25 RX ADMIN — PRAMIPEXOLE DIHYDROCHLORIDE 0.25 MG: 0.25 TABLET ORAL at 19:48

## 2024-05-26 LAB
ANION GAP SERPL CALC-SCNC: 10 MEQ/L (ref 8–16)
BACTERIA UR CULT: ABNORMAL
BUN SERPL-MCNC: 11 MG/DL (ref 7–22)
CALCIUM SERPL-MCNC: 9.4 MG/DL (ref 8.5–10.5)
CHLORIDE SERPL-SCNC: 107 MEQ/L (ref 98–111)
CO2 SERPL-SCNC: 24 MEQ/L (ref 23–33)
CREAT SERPL-MCNC: 0.9 MG/DL (ref 0.4–1.2)
DEPRECATED RDW RBC AUTO: 48.1 FL (ref 35–45)
ERYTHROCYTE [DISTWIDTH] IN BLOOD BY AUTOMATED COUNT: 13.7 % (ref 11.5–14.5)
GFR SERPL CREATININE-BSD FRML MDRD: 72 ML/MIN/1.73M2
GLUCOSE SERPL-MCNC: 85 MG/DL (ref 70–108)
HCT VFR BLD AUTO: 36.4 % (ref 37–47)
HGB BLD-MCNC: 11.9 GM/DL (ref 12–16)
MCH RBC QN AUTO: 31.5 PG (ref 26–33)
MCHC RBC AUTO-ENTMCNC: 32.7 GM/DL (ref 32.2–35.5)
MCV RBC AUTO: 96.3 FL (ref 81–99)
ORGANISM: ABNORMAL
PLATELET # BLD AUTO: 235 THOU/MM3 (ref 130–400)
PMV BLD AUTO: 11.3 FL (ref 9.4–12.4)
POTASSIUM SERPL-SCNC: 3.9 MEQ/L (ref 3.5–5.2)
RBC # BLD AUTO: 3.78 MILL/MM3 (ref 4.2–5.4)
REASON FOR REJECTION: NORMAL
REJECTED TEST: NORMAL
SODIUM SERPL-SCNC: 141 MEQ/L (ref 135–145)
WBC # BLD AUTO: 6.2 THOU/MM3 (ref 4.8–10.8)

## 2024-05-26 PROCEDURE — 6370000000 HC RX 637 (ALT 250 FOR IP)

## 2024-05-26 PROCEDURE — 97530 THERAPEUTIC ACTIVITIES: CPT

## 2024-05-26 PROCEDURE — 2580000003 HC RX 258

## 2024-05-26 PROCEDURE — 36415 COLL VENOUS BLD VENIPUNCTURE: CPT

## 2024-05-26 PROCEDURE — 97110 THERAPEUTIC EXERCISES: CPT

## 2024-05-26 PROCEDURE — 6370000000 HC RX 637 (ALT 250 FOR IP): Performed by: PHYSICIAN ASSISTANT

## 2024-05-26 PROCEDURE — 99232 SBSQ HOSP IP/OBS MODERATE 35: CPT | Performed by: INTERNAL MEDICINE

## 2024-05-26 PROCEDURE — 6360000002 HC RX W HCPCS

## 2024-05-26 PROCEDURE — 97535 SELF CARE MNGMENT TRAINING: CPT

## 2024-05-26 PROCEDURE — 97116 GAIT TRAINING THERAPY: CPT

## 2024-05-26 PROCEDURE — 2060000000 HC ICU INTERMEDIATE R&B

## 2024-05-26 PROCEDURE — 85027 COMPLETE CBC AUTOMATED: CPT

## 2024-05-26 PROCEDURE — 80048 BASIC METABOLIC PNL TOTAL CA: CPT

## 2024-05-26 RX ORDER — POLYETHYLENE GLYCOL 3350 17 G/17G
17 POWDER, FOR SOLUTION ORAL DAILY
Status: DISCONTINUED | OUTPATIENT
Start: 2024-05-26 | End: 2024-05-29 | Stop reason: HOSPADM

## 2024-05-26 RX ADMIN — ENOXAPARIN SODIUM 40 MG: 100 INJECTION SUBCUTANEOUS at 19:41

## 2024-05-26 RX ADMIN — ONDANSETRON 4 MG: 4 TABLET, ORALLY DISINTEGRATING ORAL at 08:09

## 2024-05-26 RX ADMIN — FOLIC ACID 1 MG: 1 TABLET ORAL at 08:09

## 2024-05-26 RX ADMIN — PRAMIPEXOLE DIHYDROCHLORIDE 0.25 MG: 0.25 TABLET ORAL at 08:09

## 2024-05-26 RX ADMIN — CETIRIZINE HYDROCHLORIDE 10 MG: 10 TABLET, FILM COATED ORAL at 08:10

## 2024-05-26 RX ADMIN — SODIUM CHLORIDE, PRESERVATIVE FREE 10 ML: 5 INJECTION INTRAVENOUS at 08:10

## 2024-05-26 RX ADMIN — PRAMIPEXOLE DIHYDROCHLORIDE 0.25 MG: 0.25 TABLET ORAL at 19:39

## 2024-05-26 RX ADMIN — SODIUM CHLORIDE, PRESERVATIVE FREE 10 ML: 5 INJECTION INTRAVENOUS at 19:40

## 2024-05-26 RX ADMIN — DOCUSATE SODIUM 100 MG: 100 CAPSULE, LIQUID FILLED ORAL at 08:09

## 2024-05-26 RX ADMIN — SENNOSIDES 8.6 MG: 8.6 TABLET, FILM COATED ORAL at 19:39

## 2024-05-26 RX ADMIN — PANTOPRAZOLE SODIUM 40 MG: 40 TABLET, DELAYED RELEASE ORAL at 03:22

## 2024-05-26 RX ADMIN — TOPIRAMATE 100 MG: 100 TABLET, FILM COATED ORAL at 08:09

## 2024-05-26 RX ADMIN — SENNOSIDES 8.6 MG: 8.6 TABLET, FILM COATED ORAL at 08:09

## 2024-05-26 RX ADMIN — BUPROPION HYDROCHLORIDE 300 MG: 300 TABLET, FILM COATED, EXTENDED RELEASE ORAL at 08:09

## 2024-05-26 RX ADMIN — POLYETHYLENE GLYCOL 3350 17 G: 17 POWDER, FOR SOLUTION ORAL at 10:08

## 2024-05-26 RX ADMIN — SODIUM CHLORIDE, PRESERVATIVE FREE 10 ML: 5 INJECTION INTRAVENOUS at 19:39

## 2024-05-26 RX ADMIN — TRAZODONE HYDROCHLORIDE 100 MG: 100 TABLET ORAL at 23:28

## 2024-05-26 RX ADMIN — TOPIRAMATE 100 MG: 100 TABLET, FILM COATED ORAL at 19:39

## 2024-05-26 ASSESSMENT — PAIN DESCRIPTION - FREQUENCY: FREQUENCY: INTERMITTENT

## 2024-05-27 LAB
ANION GAP SERPL CALC-SCNC: 11 MEQ/L (ref 8–16)
BUN SERPL-MCNC: 12 MG/DL (ref 7–22)
CALCIUM SERPL-MCNC: 9.2 MG/DL (ref 8.5–10.5)
CHLORIDE SERPL-SCNC: 106 MEQ/L (ref 98–111)
CO2 SERPL-SCNC: 24 MEQ/L (ref 23–33)
CREAT SERPL-MCNC: 0.9 MG/DL (ref 0.4–1.2)
GFR SERPL CREATININE-BSD FRML MDRD: 72 ML/MIN/1.73M2
GLUCOSE SERPL-MCNC: 134 MG/DL (ref 70–108)
POTASSIUM SERPL-SCNC: 3.5 MEQ/L (ref 3.5–5.2)
SODIUM SERPL-SCNC: 141 MEQ/L (ref 135–145)

## 2024-05-27 PROCEDURE — 36415 COLL VENOUS BLD VENIPUNCTURE: CPT

## 2024-05-27 PROCEDURE — 99232 SBSQ HOSP IP/OBS MODERATE 35: CPT | Performed by: INTERNAL MEDICINE

## 2024-05-27 PROCEDURE — 6370000000 HC RX 637 (ALT 250 FOR IP): Performed by: PHYSICIAN ASSISTANT

## 2024-05-27 PROCEDURE — 80048 BASIC METABOLIC PNL TOTAL CA: CPT

## 2024-05-27 PROCEDURE — 6370000000 HC RX 637 (ALT 250 FOR IP)

## 2024-05-27 PROCEDURE — 2580000003 HC RX 258

## 2024-05-27 PROCEDURE — 6360000002 HC RX W HCPCS

## 2024-05-27 PROCEDURE — 2060000000 HC ICU INTERMEDIATE R&B

## 2024-05-27 RX ADMIN — SODIUM CHLORIDE, PRESERVATIVE FREE 10 ML: 5 INJECTION INTRAVENOUS at 20:52

## 2024-05-27 RX ADMIN — CETIRIZINE HYDROCHLORIDE 10 MG: 10 TABLET, FILM COATED ORAL at 09:31

## 2024-05-27 RX ADMIN — FOLIC ACID 1 MG: 1 TABLET ORAL at 09:31

## 2024-05-27 RX ADMIN — ATORVASTATIN CALCIUM 40 MG: 40 TABLET, FILM COATED ORAL at 20:51

## 2024-05-27 RX ADMIN — ENOXAPARIN SODIUM 40 MG: 100 INJECTION SUBCUTANEOUS at 20:52

## 2024-05-27 RX ADMIN — SENNOSIDES 8.6 MG: 8.6 TABLET, FILM COATED ORAL at 09:30

## 2024-05-27 RX ADMIN — SENNOSIDES 8.6 MG: 8.6 TABLET, FILM COATED ORAL at 20:51

## 2024-05-27 RX ADMIN — SODIUM CHLORIDE, PRESERVATIVE FREE 10 ML: 5 INJECTION INTRAVENOUS at 09:31

## 2024-05-27 RX ADMIN — POLYETHYLENE GLYCOL 3350 17 G: 17 POWDER, FOR SOLUTION ORAL at 09:30

## 2024-05-27 RX ADMIN — PANTOPRAZOLE SODIUM 40 MG: 40 TABLET, DELAYED RELEASE ORAL at 03:22

## 2024-05-27 RX ADMIN — BUPROPION HYDROCHLORIDE 300 MG: 300 TABLET, FILM COATED, EXTENDED RELEASE ORAL at 09:30

## 2024-05-27 RX ADMIN — TRAZODONE HYDROCHLORIDE 100 MG: 100 TABLET ORAL at 20:53

## 2024-05-27 RX ADMIN — DOCUSATE SODIUM 100 MG: 100 CAPSULE, LIQUID FILLED ORAL at 09:30

## 2024-05-27 RX ADMIN — ACETAMINOPHEN 650 MG: 325 TABLET ORAL at 09:30

## 2024-05-27 RX ADMIN — PRAMIPEXOLE DIHYDROCHLORIDE 0.25 MG: 0.25 TABLET ORAL at 20:51

## 2024-05-27 RX ADMIN — PRAMIPEXOLE DIHYDROCHLORIDE 0.25 MG: 0.25 TABLET ORAL at 09:30

## 2024-05-27 RX ADMIN — TOPIRAMATE 100 MG: 100 TABLET, FILM COATED ORAL at 09:30

## 2024-05-27 RX ADMIN — ONDANSETRON 4 MG: 4 TABLET, ORALLY DISINTEGRATING ORAL at 11:28

## 2024-05-27 RX ADMIN — TOPIRAMATE 100 MG: 100 TABLET, FILM COATED ORAL at 20:51

## 2024-05-27 ASSESSMENT — PAIN SCALES - GENERAL
PAINLEVEL_OUTOF10: 7
PAINLEVEL_OUTOF10: 0

## 2024-05-27 ASSESSMENT — PAIN DESCRIPTION - PAIN TYPE: TYPE: ACUTE PAIN

## 2024-05-27 ASSESSMENT — PAIN DESCRIPTION - DESCRIPTORS: DESCRIPTORS: DISCOMFORT;ACHING

## 2024-05-27 ASSESSMENT — PAIN DESCRIPTION - LOCATION: LOCATION: ABDOMEN

## 2024-05-27 ASSESSMENT — PAIN DESCRIPTION - ORIENTATION: ORIENTATION: UPPER

## 2024-05-28 PROCEDURE — 99233 SBSQ HOSP IP/OBS HIGH 50: CPT | Performed by: INTERNAL MEDICINE

## 2024-05-28 PROCEDURE — 2580000003 HC RX 258

## 2024-05-28 PROCEDURE — 2060000000 HC ICU INTERMEDIATE R&B

## 2024-05-28 PROCEDURE — 6370000000 HC RX 637 (ALT 250 FOR IP): Performed by: PHYSICIAN ASSISTANT

## 2024-05-28 PROCEDURE — 6360000002 HC RX W HCPCS

## 2024-05-28 PROCEDURE — 97110 THERAPEUTIC EXERCISES: CPT

## 2024-05-28 PROCEDURE — 6370000000 HC RX 637 (ALT 250 FOR IP)

## 2024-05-28 PROCEDURE — 97116 GAIT TRAINING THERAPY: CPT

## 2024-05-28 RX ORDER — SENNOSIDES A AND B 8.6 MG/1
1 TABLET, FILM COATED ORAL 2 TIMES DAILY
Qty: 60 TABLET | Refills: 0 | DISCHARGE
Start: 2024-05-28 | End: 2024-06-27

## 2024-05-28 RX ADMIN — TRAZODONE HYDROCHLORIDE 100 MG: 100 TABLET ORAL at 23:16

## 2024-05-28 RX ADMIN — TOPIRAMATE 100 MG: 100 TABLET, FILM COATED ORAL at 19:19

## 2024-05-28 RX ADMIN — DOCUSATE SODIUM 283 MG: 283 LIQUID RECTAL at 15:05

## 2024-05-28 RX ADMIN — POTASSIUM CHLORIDE 40 MEQ: 1500 TABLET, EXTENDED RELEASE ORAL at 09:03

## 2024-05-28 RX ADMIN — BUPROPION HYDROCHLORIDE 300 MG: 300 TABLET, FILM COATED, EXTENDED RELEASE ORAL at 09:03

## 2024-05-28 RX ADMIN — SENNOSIDES 8.6 MG: 8.6 TABLET, FILM COATED ORAL at 19:19

## 2024-05-28 RX ADMIN — CETIRIZINE HYDROCHLORIDE 10 MG: 10 TABLET, FILM COATED ORAL at 09:03

## 2024-05-28 RX ADMIN — DOCUSATE SODIUM 100 MG: 100 CAPSULE, LIQUID FILLED ORAL at 09:03

## 2024-05-28 RX ADMIN — TOPIRAMATE 100 MG: 100 TABLET, FILM COATED ORAL at 09:03

## 2024-05-28 RX ADMIN — SODIUM CHLORIDE, PRESERVATIVE FREE 10 ML: 5 INJECTION INTRAVENOUS at 09:04

## 2024-05-28 RX ADMIN — ONDANSETRON 4 MG: 4 TABLET, ORALLY DISINTEGRATING ORAL at 11:27

## 2024-05-28 RX ADMIN — SODIUM CHLORIDE, PRESERVATIVE FREE 10 ML: 5 INJECTION INTRAVENOUS at 19:19

## 2024-05-28 RX ADMIN — ENOXAPARIN SODIUM 40 MG: 100 INJECTION SUBCUTANEOUS at 19:18

## 2024-05-28 RX ADMIN — PANTOPRAZOLE SODIUM 40 MG: 40 TABLET, DELAYED RELEASE ORAL at 03:02

## 2024-05-28 RX ADMIN — SENNOSIDES 8.6 MG: 8.6 TABLET, FILM COATED ORAL at 09:03

## 2024-05-28 RX ADMIN — POLYETHYLENE GLYCOL 3350 17 G: 17 POWDER, FOR SOLUTION ORAL at 09:04

## 2024-05-28 RX ADMIN — PRAMIPEXOLE DIHYDROCHLORIDE 0.25 MG: 0.25 TABLET ORAL at 09:03

## 2024-05-28 RX ADMIN — PRAMIPEXOLE DIHYDROCHLORIDE 0.25 MG: 0.25 TABLET ORAL at 19:19

## 2024-05-28 RX ADMIN — FOLIC ACID 1 MG: 1 TABLET ORAL at 09:03

## 2024-05-28 ASSESSMENT — PAIN SCALES - GENERAL
PAINLEVEL_OUTOF10: 0
PAINLEVEL_OUTOF10: 0

## 2024-05-28 NOTE — DISCHARGE INSTR - DIET

## 2024-05-28 NOTE — DISCHARGE INSTR - COC
Continuity of Care Form    Patient Name: Isha Ramsey   :  1960  MRN:  [id number]    Admit date:  2024  Discharge date:  24    Code Status Order: Full Code   Advance Directives:     Admitting Physician:  Brendan Chong MD  PCP: Mack Nguyen DO    Discharging Nurse: Claire Wolff RN  Discharging Hospital Unit/Room#: 4A-06/006-A  Discharging Unit Phone Number: [phone]    Emergency Contact:   Extended Emergency Contact Information  Primary Emergency Contact: kermit Woody  Home Phone: [phone]  Mobile Phone: [phone]  Relation: Child   needed? No  Secondary Emergency Contact: NIC WOODY  Home Phone: [phone]  Mobile Phone: [phone]  Relation: Daughter-in-Law    Past Surgical History:  Past Surgical History:   Procedure Laterality Date    BREAST SURGERY Right     Seroma removed [id number]? done in [place]    ESOPHAGEAL MOTILITY STUDY N/A 2023    ESOPHAGEAL MOTILITY/MANOMETRY STUDY performed by Jh Laurent MD at [facility] ENDOSCOPY    FACET JOINT INJECTION Bilateral 2023    medial branch blocks bilateral  thoracic 7-8, 8-9 performed by Leandro Powell DO at [facility] SURGERY [place] OR    HYSTERECTOMY (CERVIX STATUS UNKNOWN)      HYSTERECTOMY, TOTAL ABDOMINAL (CERVIX REMOVED)      JOINT REPLACEMENT      ACDF, bilateral shoulder surgeries    KYPHOSIS SURGERY      T1    [facility] STEROTACTIC LOC BREAST BIOPSY LEFT Left     Benign , done in [place] 5066-8504    [facility] STEROTACTIC LOC BREAST BIOPSY RIGHT Right     Benign , done in [place] 9989-9696    [facility] US GUID NDL BIOPSY LEFT Left 2024    [facility] US GUID NDL BIOPSY LEFT 3/4/2024 Cuong Vee MD [facility]    NECK SURGERY  2020    ACDF    OVARY REMOVAL      PAIN MANAGEMENT PROCEDURE Bilateral 2021    medial branch blocks at bilateral L4/L5 and L5/S1 performed by Leandro Powell DO at [facility] SURGERY [place] OR    PAIN MANAGEMENT PROCEDURE Bilateral 2021    confirmatory medial branch

## 2024-05-28 NOTE — CARE COORDINATION
5/28/24, 3:16 PM EDT    DISCHARGE PLANNING EVALUATION       Facility stated that pre-cert is approved and they can accept.  No transport for ambulette and patient's family unable to transport.  Set up transport for 5/29 at 8:30 am.  Updated provider, RN and RN to update patient.

## 2024-05-28 NOTE — DISCHARGE SUMMARY
Medications:        Medication List        START taking these medications      senna 8.6 MG tablet  Commonly known as: SENOKOT  Take 1 tablet by mouth 2 times daily            CHANGE how you take these medications      Klor-Con M20 20 MEQ extended release tablet  Generic drug: potassium chloride  TAKE 1 TABLET BY MOUTH EVERY DAY  What changed:   how much to take  how to take this  when to take this  reasons to take this            CONTINUE taking these medications      acyclovir 200 MG capsule  Commonly known as: ZOVIRAX     atorvastatin 40 MG tablet  Commonly known as: LIPITOR  Take 1 tablet by mouth three times a week     buPROPion 300 MG extended release tablet  Commonly known as: WELLBUTRIN XL  take 1 tablet by mouth every morning     butalbital-acetaminophen-caffeine -40 MG per tablet  Commonly known as: FIORICET, ESGIC  Take 1 tablet by mouth every 48 hours as needed for Headaches     Cosentyx 150 MG/ML Sosy  Generic drug: secukinumab  Inject 150mg subcu weekly for 5 weeks then 4 weeks later start 150mg subcu every 4 weeks.     esomeprazole 20 MG delayed release capsule  Commonly known as: NexIUM  Take 1 capsule by mouth every morning (before breakfast)     fentaNYL 25 MCG/HR  Commonly known as: DURAGESIC  Place 1 patch onto the skin every 72 hours for 30 days. Max Daily Amount: 1 patch     fluticasone 50 MCG/ACT nasal spray  Commonly known as: FLONASE  1 spray by Nasal route as needed for Allergies     folic acid 1 MG tablet  Commonly known as: FOLVITE  Take 1 tablet by mouth daily     furosemide 20 MG tablet  Commonly known as: LASIX     loratadine 10 MG tablet  Commonly known as: CLARITIN  Take 1 tablet by mouth daily     meclizine 25 MG tablet  Commonly known as: ANTIVERT  Take 1 tablet by mouth 2 times daily as needed for Dizziness or Nausea     naloxone 4 MG/0.1ML Liqd nasal spray     nitroglycerin 0.6 MG SL tablet  Commonly known as: NITROSTAT  Place 1 tablet under the tongue every 5 minutes as

## 2024-05-28 NOTE — CARE COORDINATION
5/28/24, 12:09 PM EDT    DISCHARGE PLANNING EVALUATION       Spoke with Mandy at [facility] and she stated that patient's pre-cert went to a peer to peer review.  Provider updated and will complete peer to peer.      Awaiting facility to update with peer to peer results.

## 2024-05-29 VITALS
TEMPERATURE: 98 F | OXYGEN SATURATION: 98 % | BODY MASS INDEX: 30.55 KG/M2 | HEART RATE: 62 BPM | WEIGHT: 166.01 LBS | DIASTOLIC BLOOD PRESSURE: 68 MMHG | RESPIRATION RATE: 16 BRPM | SYSTOLIC BLOOD PRESSURE: 116 MMHG | HEIGHT: 62 IN

## 2024-05-29 PROCEDURE — 6370000000 HC RX 637 (ALT 250 FOR IP)

## 2024-05-29 PROCEDURE — 6370000000 HC RX 637 (ALT 250 FOR IP): Performed by: PHYSICIAN ASSISTANT

## 2024-05-29 RX ADMIN — PANTOPRAZOLE SODIUM 40 MG: 40 TABLET, DELAYED RELEASE ORAL at 03:20

## 2024-05-29 RX ADMIN — TOPIRAMATE 100 MG: 100 TABLET, FILM COATED ORAL at 07:37

## 2024-05-29 RX ADMIN — PRAMIPEXOLE DIHYDROCHLORIDE 0.25 MG: 0.25 TABLET ORAL at 07:37

## 2024-05-29 RX ADMIN — SENNOSIDES 8.6 MG: 8.6 TABLET, FILM COATED ORAL at 07:34

## 2024-05-29 RX ADMIN — CETIRIZINE HYDROCHLORIDE 10 MG: 10 TABLET, FILM COATED ORAL at 07:37

## 2024-05-29 RX ADMIN — FOLIC ACID 1 MG: 1 TABLET ORAL at 07:36

## 2024-05-29 RX ADMIN — POLYETHYLENE GLYCOL 3350 17 G: 17 POWDER, FOR SOLUTION ORAL at 07:35

## 2024-05-29 RX ADMIN — BUPROPION HYDROCHLORIDE 300 MG: 300 TABLET, FILM COATED, EXTENDED RELEASE ORAL at 07:37

## 2024-05-29 RX ADMIN — DOCUSATE SODIUM 100 MG: 100 CAPSULE, LIQUID FILLED ORAL at 07:34

## 2024-05-29 NOTE — PROGRESS NOTES
Hospitalist Progress Note      Patient:  Isha Ramsey    Unit/Bed:6K-10/010-A  YOB: 1960  MRN: [id number]   Acct: [id number]   PCP: Mack Nguyen DO  Date of Admission: 5/19/2024    Assessment/Plan:    Left sided weakness, acute onset: RR called. CODE STROKE called. LKW 1113. Headed to CT scan 1128. The decision was made to give TNK. Pt being transferred to ICU.   Acute Cystitis (POA): Associated with bilateral flank pain, CVA tenderness, suprapubic pain, nausea/vomiting. Urine culture this admission shows NGTD. Urine culture (May 2024) showed resistant E Coli but sensitive to Ceftriaxone. Pt is on IV Ceftriaxone. PVR needed to rule out retention for multiple UTIs.   Vulvovaginal candidiasis, POA likely 2/2 antibiotic: Diflucan given.   Elevated LFTs: Trending down. CT A/P shows unremarkable liver.   Lactic acid: Lactic acid 2.7 on arrival.  Received IV fluids.  Improved to 1.1.  Hypokalemia: Potassium 3.8. Resolved.   CAD with history of angina pectoris, currently asymptomatic: Continue home medications.  Noted history of CVA: Continue home meds.  Hyperlipidemia: continue statin  MDD: Continue home bupropion.  Chronic lower extremity pain: Continue home medications.    Chief Complaint: Dysuria     Initial H and P:-    Initial H&P \"Isha Ramsey is a 63 y.o. female with PMHx of migraines, GERD, May-Thurner syndrome, chronic pain, bilateral hearing loss, MDD, PTSD, HLD, CAD, CVA who presents to [facility] with dysuria. Patient describes being on 2 different courses of antibiotics in the outpatient setting due to multidrug resistant E-Coli. She admits to still having dysuria and 2/10 abdominal pain that is in the lower abdomen and non-radiating. She describes the pain as burning in nature and worsening with urination. She also admits that he perineal area is now reddened and inflamed, which is likely 2/2 yeast 
                  Hospitalist Progress Note      Patient:  Isha Ramsey    Unit/Bed:4A-06/006-A  YOB: 1960  MRN: [id number]   Acct: [id number]     PCP: Mack Nguyen DO  Date of Admission: 5/19/2024         Assessment / Plan     #L-sided weakness: Code stroke during admission. Initial NIH 9. TNK administered after negative CTH/CTA H/N. MRI brain with no acute intracranial abnormalities. Neuro consulted. S/p ICU monitoring with negative 24hr CTH.  -Lovenox resumed  -Pending SNF placement  #?Vulvovaginitis: Concern on admission per chart review. Started on metronidazole cream. Endorses improvement in urogenital symptoms with cream/CTX, will monitor - Diflucan completed  #Hx CVA: Reported stroke 2016 with R-sided/cognitive deficits at that time. Not on AP therapy at home  #Hx hemiplegic migraine: On Aimovig/Topamax OP. Noted. Denies headache  #Psoriatric arthritis: Suspected per documentation, following with Rheumatology OP. Maintained on Mtx 15mg PO weekly, folic acid supplementation, Cosentyx 150mg subcu weekly  #?UTI: Has had recurrent dysuria. Urine cx this month with E coli. Completed CTX. ?IC. Urine cx with <31936 CFU noted, low suspicion of acute infection  #PTSD/MDD: Buproprion  #HLD: Statin  #CAD: Per patient. Prior cath per chart review with coronary tortuosity, no mention of obstructive disease. No home AP. Will monitor  #Fibromyalgia: Noted  #Osteoporosis, chronic LBP: Known hx compression fractures T5/7/9/11. On Prolia injection monthly. Following with pain management/neurosurgery  #MACEY: Resolved. Unclear etiology, suspect pre-renal given negative fluid balance this admission, unclear I/O accuracy. Will obtain urine Na/Cr. Hold on renal US at this time. No ACE/ARB/loop noted. UA ordered. ?AIN developing. LR bolus/infusion today, will repeat BMP this PM. CTX course completed. Cr improved      LDA: []CVC / []PICC / []Midline / [][place] / []Drains / []Mediport / [x]None  Antibiotics: 
                  Hospitalist Progress Note      Patient:  Isha Ramsey    Unit/Bed:4A-06/006-A  YOB: 1960  MRN: [id number]   Acct: [id number]     PCP: Mack Nguyen DO  Date of Admission: 5/19/2024         Assessment / Plan     #L-sided weakness: Code stroke during admission. Initial NIH 9. TNK administered after negative CTH/CTA H/N. MRI brain with no acute intracranial abnormalities. Neuro consulted. S/p ICU monitoring with negative 24hr CTH. Suspect SNF placement. Lovenox resumed.  #MACEY: Unclear etiology, suspect pre-renal given negative fluid balance this admission, unclear I/O accuracy. Will obtain urine Na/Cr. Hold on renal US at this time. No ACE/ARB/loop noted. UA ordered. ?AIN developing. LR bolus/infusion today, will repeat BMP this PM. CTX course completed. Cr remains elevated, will continue with IVF and repeat in AM  5/25-repeat BMP today and tomorrow-renal ultrasound yesterday showed bilateral nephrolithiasis and nondistended urine bladder  #?Vulvovaginitis: Concern on admission per chart review. Started on metronidazole cream. Endorses improvement in urogenital symptoms with cream/CTX, will monitor - Diflucan completed  #Hx CVA: Reported stroke 2016 with R-sided/cognitive deficits at that time. Not on AP therapy at home  #Hx hemiplegic migraine: On Aimovig/Topamax OP. Noted. Denies headache  #Psoriatric arthritis: Suspected per documentation, following with Rheumatology OP. Maintained on Mtx 15mg PO weekly, folic acid supplementation, Cosentyx 150mg subcu weekly  #?UTI: Has had recurrent dysuria. Urine cx this month with E coli. Completed CTX. ?IC. UA sent  5/25-still complains of dysuria, hesitation-urine cultures growing less than 10^5 colony-forming units-will await final urine cultures has history of ESBL previously currently nonfermenting gram-negative organism growing  #PTSD/MDD: Buproprion  #HLD: Statin  #CAD: Per patient. Prior cath per chart review with coronary 
   05/20/24 1042   Encounter Summary   Encounter Overview/Reason Initial Encounter;Spiritual/Emotional Needs   Service Provided For Patient   Referral/Consult From Rounding   Support System Children;Family members   Last Encounter  05/20/24   Complexity of Encounter Moderate   Begin Time 1037   End Time  1042   Total Time Calculated 5 min   Spiritual/Emotional needs   Type Spiritual Support   Assessment/Intervention/Outcome   Assessment Coping   Intervention Nurtured Hope;Prayer (assurance of)/[place];Sustaining Presence/Ministry of presence   Outcome Comfort     Assessment:  In my encounter with the 63 yr old patient, while rounding the unit 6K,  I provided spiritual care to patient through conversation, I also came to assess the patient's spiritual needs present. The pt was admitted due to lower abdominal pain.     Interventions:  I provided prayer, emotional support and words of comfort.     Outcomes:  The patient was encouraged and didn't share any further spiritual needs at this time.     Plan:  Chaplains will follow-up at a later time for assessment of any spiritual care needs present.  
   05/28/24 1214   Encounter Summary   Encounter Overview/Reason Spiritual/Emotional Needs   Service Provided For Patient   Referral/Consult From Rounding   Support System Children;Family members   Last Encounter  05/28/24   Complexity of Encounter Moderate   Begin Time 1203   End Time  1214   Total Time Calculated 11 min   Spiritual/Emotional needs   Type Spiritual Support   Assessment/Intervention/Outcome   Assessment Calm   Intervention Nurtured Hope;Sustaining Presence/Ministry of presence;Prayer (assurance of)/[place]   Outcome Comfort;Coping     Assessment:  In my encounter with the 63 yr old patient, while rounding  the unit 4A,  I provided spiritual care to patient through conversation, I also came to assess the patient's spiritual needs present. The pt was admitted due to lower abdominal pain.     Interventions:  I provided prayer, emotional support and words of comfort.  provided a listening presence and encouraged pt to share their beliefs and how I can support them during their hospitalization.     Outcomes:  The patient was encouraged and didn't share any further spiritual needs at this time.     Plan:  Chaplains will follow-up at a later time for assessment of any spiritual care needs present.    
   Physical Medicine & Rehabilitation   Progress Note    Chief Complaint: Assess for rehab needs    History of Present Illness:  Isha Ramsey is a 63 y.o. female with PMH significant for anxiety, asthma, chronic back pain, he was depression, fibromyalgia, GERD, hypertension for lipidemia, hypothyroidism, IBS, and RA who was admitted to [facility] on 5/19/2024.  History obtained via: ED documentation, acute care documentation, patient    Patient initially presented to [facility] ED on 5/19/2024 with complaints of dysuria.  Of note, patient had recently been treated for a multidrug-resistant E. coli urinary tract infection as an outpatient.  Per ED report, patient's UA was negative with normal WBC, however, due to concern for failing outpatient therapy given persistent symptoms and recent urine cultures demonstrating resistance to antibiotic she was taking, decision was made to admit her for further management.    On admission, patient endorsed redness and inflammation to her perennial region.  Suspicion for vulvovaginal candidiasis and metronidazole cream was started and then transition to Diflucan..  Antibiotics were initially deferred on admission as was felt that her symptoms may be related to vulvovaginal candidiasis.  However, on the following day, IV Rocephin was started based on prior sensitivities and ongoing dysuria, flank pain, and suprapubic tenderness.    On 5/21/2024 stroke alert was called for sudden left-sided weakness.  Patient was evaluated by neurology and decision was made to give TNK.  She was subsequently transferred to ICU.  Of note, patient reported a history of a CVA back in 2016 resulting in right-sided and cognitive deficits.  She reports that she was on Eliquis for approximately 2 weeks and is supposed to be taking a baby aspirin daily but \"just stopped after a while\".  Of note, patient follows with Dr. Powell and has history of hemiplegic migraines.  CT head was reportedly 
  CRITICAL CARE PROGRESS NOTE      Patient:  Isha Ramsey    Unit/Bed:4D-12/012-A  YOB: 1960  MRN: [id number]   PCP: Mack Nguyen DO  Date of Admission: 5/19/2024  Chief Complaint:-Left-sided weakness    Assessment and Plan:    Code stroke s/p TNK adminsitration: Patient with acute left-sided deficits noted when walking back from bathroom.  NIH stroke scale 9 as documented per initial H&P.  TNK administered along with neurointerventionalist and patient admitted to ICU.  Frequent monitoring - blood pressure and neurologic assessment q15 minutes for 2 hours, q30 minutes for 6 hours, then Q1H for 16 hours.  Patient developed acute hypertension, worsening neurologic exam, severe headache, nausea, or vomiting, obtain stat head CT to monitor for hemorrhagic conversion.  Blood pressure goals -blood pressure should be less than 180/105  If hypertensive, patient may require nicardipine or labetalol drip.  Blood sugar goals -goal glucose less than 185.  Avoid hypoglycemia.  Insulin if needed.  Normothermia -if patient develops fever, antipyretics and infection management.  Avoid nitroprusside and nitroglycerin.    Avoid anticoagulation and antiplatelets within 24 hours.  MRI 5/21: No evidence of acute stroke.  Pending repeat head CT today, patient stable for transfer out of ICU.  Hx prior CVA vs TIA. Patient describes have a stroke back in 2016 that caused right-sided and cognitive deficits.  She describes that she was on eliquis for approximately 2 weeks and is supposed to be taking a baby aspirin daily but \"just stopped after a while\".    Hx of hemiplegic migraines: Patient has followed with PM&R in the past and per their notes, patient has failed multiple medications in the past including Ubrelvy, nortriptyline. Now on Aimovig and topamax.  UTI.  Patient with dysuria earlier this month, culture from 5 7 concerning E. coli with multiple resistances.  On admission to the hospital, urinalysis is 
  Medicine Progress Note    Patient:  Isha Ramsey    Unit/Bed:4A-06/006-A  YOB: 1960  MRN: [id number]   Acct: [id number]   PCP: Mack Nguyen DO  Date of Admission: 5/19/2024      Assessment / Plan     #L-sided weakness: Code stroke during admission. Initial NIH 9. TNK administered after negative CTH/CTA H/N. MRI brain with no acute intracranial abnormalities. Neuro consulted. S/p ICU monitoring with negative 24hr CTH. Pending IPR eval. Lovenox resumed.  #MACEY: Unclear etiology, suspect pre-renal given negative fluid balance this admission, unclear I/O accuracy. Will obtain urine Na/Cr. Hold on renal US at this time. No ACE/ARB/loop noted. UA ordered. ?AIN developing. LR bolus/infusion today, will repeat BMP this PM. CTX course completed  #?Vulvovaginitis: Concern on admission per chart review. Started on metronidazole cream. Endorses improvement in urogenital symptoms with cream/CTX, will monitor - Diflucan complete today  #Hx CVA: Reported stroke 2016 with R-sided/cognitive deficits at that time. Not on AP therapy at home  #Hx hemiplegic migraine: On Aimovig/Topamax OP. Noted. Denies headache  #Psoriatric arthritis: Suspected per documentation, following with Rheumatology OP. Maintained on Mtx 15mg PO weekly, folic acid supplementation, Cosentyx 150mg subcu weekly  #?UTI: Has had recurrent dysuria. Urine cx this month with E coli. Completed CTX  #PTSD/MDD: Buproprion  #HLD: Statin  #CAD: Per patient. Prior cath per chart review with coronary tortuosity, no mention of obstructive disease. No home AP. Will monitor  #Fibromyalgia: Noted  #Osteoporosis, chronic LBP: Known hx compression fractures T5/7/9/11. On Prolia injection monthly. Following with pain management/neurosurgery    LDA: []CVC / []PICC / []Midline / [][place] / []Drains / []Mediport / [x]None  Antibiotics: CTX  Steroids: No  Labs (still needed?): [x]Yes / []No  IVF (still needed?): []Yes / [x]No    Level of care: [x]Step Down / 
  Medicine Progress Note    Patient:  Isha Ramsey    Unit/Bed:4A-06/006-A  YOB: 1960  MRN: [id number]   Acct: [id number]   PCP: Mack Nguyen DO  Date of Admission: 5/19/2024      Assessment / Plan     #L-sided weakness: Code stroke during admission. Initial NIH 9. TNK administered after negative CTH/CTA H/N. MRI brain with no acute intracranial abnormalities. Neuro consulted. S/p ICU monitoring with negative 24hr CTH. Suspect SNF placement. Lovenox resumed.  #MACEY: Unclear etiology, suspect pre-renal given negative fluid balance this admission, unclear I/O accuracy. Will obtain urine Na/Cr. Hold on renal US at this time. No ACE/ARB/loop noted. UA ordered. ?AIN developing. LR bolus/infusion today, will repeat BMP this PM. CTX course completed. Cr remains elevated, will continue with IVF and repeat in AM  #?Vulvovaginitis: Concern on admission per chart review. Started on metronidazole cream. Endorses improvement in urogenital symptoms with cream/CTX, will monitor - Diflucan completed  #Hx CVA: Reported stroke 2016 with R-sided/cognitive deficits at that time. Not on AP therapy at home  #Hx hemiplegic migraine: On Aimovig/Topamax OP. Noted. Denies headache  #Psoriatric arthritis: Suspected per documentation, following with Rheumatology OP. Maintained on Mtx 15mg PO weekly, folic acid supplementation, Cosentyx 150mg subcu weekly  #?UTI: Has had recurrent dysuria. Urine cx this month with E coli. Completed CTX. ?IC. UA sent  #PTSD/MDD: Buproprion  #HLD: Statin  #CAD: Per patient. Prior cath per chart review with coronary tortuosity, no mention of obstructive disease. No home AP. Will monitor  #Fibromyalgia: Noted  #Osteoporosis, chronic LBP: Known hx compression fractures T5/7/9/11. On Prolia injection monthly. Following with pain management/neurosurgery    LDA: []CVC / []PICC / []Midline / [][place] / []Drains / []Mediport / [x]None  Antibiotics: CTX  Steroids: No  Labs (still needed?): [x]Yes 
  Physician Progress Note      PATIENT:               DAREN SIDDIQI  [facility] #:                  [id number]  :                       1960  ADMIT DATE:       2024 4:35 PM  DISCH DATE:  RESPONDING  PROVIDER #:        GENO ROY          QUERY TEXT:    Pt admitted with acute cystitis. Pt noted to have acute onset left sided   weakness and patient given TNK.  CT head and MRI brain negative. Pt also noted   to have prior CVA. If possible, please document in progress notes and   discharge summary if you are evaluating and/or treating any of the following:    The medical record reflects the following:    Risk Factors: previous CVA/TIA, HLD  Clinical Indicators: patient with sudden onset left sided weakness, numbness,   ataxia, rapid response was called and decision to give TNK, CT head and MRI   brain both show no acute process  Treatment: TNK, Neuro consult, BP monitoring    Thank you!    Clayton Limon, LANEN,RN, CRCR  RN Clinical   Options provided:  -- Aborted CVA  -- Left sided weakness is a sequela of prior CVA  -- Left sided weakness/ataxia due to (please specify), .  -- Other - I will add my own diagnosis  -- Disagree - Not applicable / Not valid  -- Disagree - Clinically unable to determine / Unknown  -- Refer to Clinical Documentation Reviewer    PROVIDER RESPONSE TEXT:    left sided weakness and ataxia due to hemiplegic migraine vs pyelonephritis vs   TIA    Query created by: Clayton Limon on 2024 1:42 PM      Electronically signed by:  GENO ROY 2024 9:43 AM          
 [facility]  INPATIENT PHYSICAL THERAPY  DAILY NOTE  [facility] 4A - 4A-06/006-A      Time In: 1037  Time Out: 1117  Timed Code Treatment Minutes: 40 Minutes  Minutes: 40          Date: 2024  Patient Name: Isha Ramsey,  Gender:  female        MRN: [id number]  : 1960  (63 y.o.)     Referring Practitioner: Sunny John MD  Diagnosis: Lower abdominal pain  Additional Pertinent Hx: Earlier this month, patient was developing dysuria.  Presented to her PCP on 2024 with complaint of dysuria and found to have a UTI with culture showing E. coli with multiple drug resistances.  Patient initially started on Keflex but switched to Omnicef.  With subjective fevers and sweats, worsening left flank pain consistent with pyelonephritis.  Patient is on immunomodulators - Cosentyx.  Patient admitted for observation.  While patient was ambulating back from the restroom, patient with left-sided leg weakness.  A rapid response was called which eventually turned into a code stroke.  Pt received TNK  at 1150 AM.     Prior Level of Function:  Lives With: Alone  Type of Home:  (RV)  Home Layout: One level  Home Access: Stairs to enter with rails  Entrance Stairs - Number of Steps: 2  Home Equipment: Cane   Bathroom Shower/Tub: Tub/Shower unit  Bathroom Toilet: Standard    Receives Help From: Family  ADL Assistance: Independent  Homemaking Assistance:  (family cooks meals in evenings & does Pt's laundry, drinks protein drink for lunch)  Homemaking Responsibilities: No  Ambulation Assistance: Independent  Transfer Assistance: Independent  Active : Yes  Additional Comments: Pt amb with [place], son lives next door that assists with cooking; has chronic back pain, sees pain management and receives injections. Pt showers 1-2x per week. Report decreased standing endurance & over all endurance with SOB during activity. Pt reports sleeping in a recliner at 
 [facility]  INPATIENT PHYSICAL THERAPY  DAILY NOTE  [facility] 4A - 4A-06/006-A      Time In: 1038  Time Out: 1101  Timed Code Treatment Minutes: 23 Minutes  Minutes: 23          Date: 2024  Patient Name: Isha Ramsey,  Gender:  female        MRN: [id number]  : 1960  (63 y.o.)     Referring Practitioner: Sunny John MD  Diagnosis: Lower abdominal pain  Additional Pertinent Hx: Earlier this month, patient was developing dysuria.  Presented to her PCP on 2024 with complaint of dysuria and found to have a UTI with culture showing E. coli with multiple drug resistances.  Patient initially started on Keflex but switched to Omnicef.  With subjective fevers and sweats, worsening left flank pain consistent with pyelonephritis.  Patient is on immunomodulators - Cosentyx.  Patient admitted for observation.  While patient was ambulating back from the restroom, patient with left-sided leg weakness.  A rapid response was called which eventually turned into a code stroke.  Pt received TNK  at 1150 AM.     Prior Level of Function:  Lives With: Alone  Type of Home:  (RV)  Home Layout: One level  Home Access: Stairs to enter with rails  Entrance Stairs - Number of Steps: 2  Home Equipment: Cane   Bathroom Shower/Tub: Tub/Shower unit  Bathroom Toilet: Standard    Receives Help From: Family  ADL Assistance: Independent  Homemaking Assistance:  (family cooks meals in evenings & does Pt's laundry, drinks protein drink for lunch)  Homemaking Responsibilities: No  Ambulation Assistance: Independent  Transfer Assistance: Independent  Active : Yes  Additional Comments: Pt amb with [place], son lives next door that assists with cooking; has chronic back pain, sees pain management and receives injections. Pt showers 1-2x per week. Report decreased standing endurance & over all endurance with SOB during activity. Pt reports sleeping in a recliner at 
 [facility]  INPATIENT PHYSICAL THERAPY  DAILY NOTE  [facility] 4A - 4A-06/006-A    Time In: 0834  Time Out: 0915  Timed Code Treatment Minutes: 41 Minutes  Minutes: 41          Date: 2024  Patient Name: Isha Ramsey,  Gender:  female        MRN: [id number]  : 1960  (63 y.o.)     Referring Practitioner: Sunny John MD  Diagnosis: Lower abdominal pain  Additional Pertinent Hx: Earlier this month, patient was developing dysuria.  Presented to her PCP on 2024 with complaint of dysuria and found to have a UTI with culture showing E. coli with multiple drug resistances.  Patient initially started on Keflex but switched to Omnicef.  With subjective fevers and sweats, worsening left flank pain consistent with pyelonephritis.  Patient is on immunomodulators - Cosentyx.  Patient admitted for observation.  While patient was ambulating back from the restroom, patient with left-sided leg weakness.  A rapid response was called which eventually turned into a code stroke.  Pt received TNK  at 1150 AM.     Prior Level of Function:  Lives With: Alone  Type of Home:  (RV)  Home Layout: One level  Home Access: Stairs to enter with rails  Entrance Stairs - Number of Steps: 2  Home Equipment: Cane   Bathroom Shower/Tub: Tub/Shower unit  Bathroom Toilet: Standard    Receives Help From: Family  ADL Assistance: Independent  Homemaking Assistance:  (family cooks meals in evenings & does Pt's laundry, drinks protein drink for lunch)  Homemaking Responsibilities: No  Ambulation Assistance: Independent  Transfer Assistance: Independent  Active : Yes  Additional Comments: Pt amb with [place], son lives next door that assists with cooking; has chronic back pain, sees pain management and receives injections. Pt showers 1-2x per week. Report decreased standing endurance & over all endurance with SOB during activity. Pt reports sleeping in a recliner at 
 [facility]  INPATIENT PHYSICAL THERAPY  DAILY NOTE  [facility] 4A - 4A-06/006-A    Time In: 07  Time Out: 0806  Timed Code Treatment Minutes: 28 Minutes  Minutes: 28          Date: 2024  Patient Name: Isha Ramsey,  Gender:  female        MRN: [id number]  : 1960  (63 y.o.)     Referring Practitioner: Sunny John MD  Diagnosis: Lower abdominal pain  Additional Pertinent Hx: Earlier this month, patient was developing dysuria.  Presented to her PCP on 2024 with complaint of dysuria and found to have a UTI with culture showing E. coli with multiple drug resistances.  Patient initially started on Keflex but switched to Omnicef.  With subjective fevers and sweats, worsening left flank pain consistent with pyelonephritis.  Patient is on immunomodulators - Cosentyx.  Patient admitted for observation.  While patient was ambulating back from the restroom, patient with left-sided leg weakness.  A rapid response was called which eventually turned into a code stroke.  Pt received TNK  at 1150 AM.     Prior Level of Function:  Lives With: Alone  Type of Home:  (RV)  Home Layout: One level  Home Access: Stairs to enter with rails  Entrance Stairs - Number of Steps: 2  Home Equipment: Cane   Bathroom Shower/Tub: Tub/Shower unit  Bathroom Toilet: Standard    Receives Help From: Family  ADL Assistance: Independent  Homemaking Assistance:  (family cooks meals in evenings & does Pt's laundry, drinks protein drink for lunch)  Homemaking Responsibilities: No  Ambulation Assistance: Independent  Transfer Assistance: Independent  Active : Yes  Additional Comments: Pt amb with [place], son lives next door that assists with cooking; has chronic back pain, sees pain management and receives injections. Pt showers 1-2x per week. Report decreased standing endurance & over all endurance with SOB during activity. Pt reports sleeping in a recliner at 
1113- Last known well.  1128- To CT scan  
[facility]  PHYSICAL THERAPY MISSED TREATMENT NOTE  STRZ NEUROSCIENCES 4A    Date: 2024  Patient Name: Isha Ramsey        MRN: [id number]   : 1960  (63 y.o.)  Gender: female   Referring Practitioner: Sunny John MD  Diagnosis: Lower abdominal pain         REASON FOR MISSED TREATMENT:  Pt leaving for renal US on arrival for PT session, did issue pt a HEP of exercises she completed the day prior, Will check back later if able  .            
[facility]  INPATIENT REHABILITATION  ADMISSIONS COORDINATOR CONSULT    Referral Type: internal    Patient Name: Isha Ramsey      MRN: [id number]    : 1960  (63 y.o.)  Gender: female   Race:White (non-)     Payor Source: Payor: HU[employer] MEDICARE / Plan: HU[employer]NA CHOICE-PPO MEDICARE / Product Type: *No Product type* /   Secondary Payor Source:      Isolation Status: No active isolations    Lives With: Alone  Type of Home:  (RV)  Home Layout: One level  Home Access: Stairs to enter with rails  Entrance Stairs - Number of Steps: 2  Receives Help From: Family  Occupation: Unemployed  Additional Comments: Pt amb with [place], son lives next door that assists with cooking; has chronic back pain, sees pain management and receives injections. Pt showers 1-2x per week. Report decreased standing endurance & over all endurance with SOB during activity.    Disciplines Required upon Admission to Inpatient Rehabilitation: Physical Therapy and Occupational Therapy  Post operative: No  Fall: No  Dialysis: No  Diet: ADULT DIET; Regular  Discussed patient with  and PM&R provider: Await medical work up completion including therapy evaluations for further determination of patient needs.     
[facility]  SPEECH THERAPY  STRZ ICU 4D  Speech - Language - Cognitive Evaluation + Clinical Swallow Evaluation + Dysphagia Therapy    SLP Individual Minutes  Time In: 0856  Time Out: 09  Minutes: 27  Timed Code Treatment Minutes: 0 Minutes     Speech, Language, Cognitive Evaluation: 11 minutes  Clinical Swallow Evaluation: 8 minutes  Dysphagia Therapy: 8 minutes     DIET ORDER RECOMMENDATIONS AFTER EVALUATION: Regular, thin liquids     Date: 2024  Patient Name: Isha Ramsey      CSN: [id number]   : 1960  (63 y.o.)  Gender: female   Referring Physician:  Sunny John MD   Diagnosis: Lower Abdominal Pain   Precautions: Fall risk   History of Present Illness/Injury: Patient admitted to [facility] for above dx. Per chart review, \"Isha Ramsey who is a 63 y.o. female with a history of anxiety, asthma, previous CVA in 2016, back pain, depression, fibromyalgia, GERD, headaches, PTSD, and RA who is currently admitted to [facility] for a UTI. Patient suddenly developed left sided weakness to which a code stroke was activated. Last known well was 11:13 this morning. Code stroke activated at 11:26 AM. Initial NIH found to be a 8 for left sided facial droop, left arm drift, bilateral leg drifts, left upper extremity ataxia, and left sided numbness. Stat CT head obtained and negative for hemorrhage, mass effect, or midline shift. Given patient's symptoms were in the time window to receive TNK and patient had no contraindications to receive TNK, TNK was administered at 11:50 AM. Stat CTA head and neck obtained and negative for LVO. Given this, patient deemed not a candidate for neurointervention. Patient will be admitted to the ICU for close monitoring.\"    CT Head   FINDINGS:  There is no hemorrhage. There are no intra-or extra-axial collections.  There is  no hydrocephalus, midline shift or mass effect.  The gray-white matter differentiation is preserved.     MRI Brain   Impression:  Nonspecific 
[facility]  INPATIENT OCCUPATIONAL THERAPY  STRZ ICU 4D  EVALUATION    Time:    Time In: 1254  Time Out: 1326  Timed Code Treatment Minutes: 23 Minutes  Minutes: 32          Date: 2024  Patient Name: Isha Ramsey,   Gender: female      MRN: [id number]  : 1960  (63 y.o.)  Referring Practitioner: Sunny John MD  Diagnosis: lower abdominal pain  Additional Pertinent Hx: Earlier this month, patient was developing dysuria.  Presented to her PCP on 2024 with complaint of dysuria and found to have a UTI with culture showing E. coli with multiple drug resistances.  Patient initially started on Keflex but switched to Omnicef.  With subjective fevers and sweats, worsening left flank pain consistent with pyelonephritis.  Patient is on immunomodulators - Cosentyx.  Patient admitted for observation.  While patient was ambulating back from the restroom, patient with left-sided leg weakness.  A rapid response was called which eventually turned into a code stroke.  Pt received TNK  at 1150 AM.    Restrictions/Precautions:  Restrictions/Precautions: Fall Risk  Position Activity Restriction  Other position/activity restrictions: hx of chronic back pain    Subjective  Chart Reviewed: Yes, Orders, Progress Notes, History and Physical  Patient assessed for rehabilitation services?: Yes  Family / Caregiver Present: No    Subjective: cooperative. Pt reports she was feeling pretty down at home & not taking as good of care of herself as she usually does. Talking during session.     Pain: no number given/10: Pt reports severe back pain that is chronic    Vitals: Blood Pressure: 125/62  Oxygen: 93% on RA  Heart Rate: 71    Social/Functional History:  Lives With: Alone  Type of Home:  (RV)  Home Layout: One level  Home Access: Stairs to enter with rails  Entrance Stairs - Number of Steps: 2  Home Equipment: Cane   Bathroom Shower/Tub: Tub/Shower unit  Bathroom Toilet: Standard    Receives Help From: 
Comprehensive Nutrition Assessment    Type and Reason for Visit:  Initial, RD Nutrition Re-Screen/LOS    Nutrition Recommendations/Plan:   Consider MVI.  ONS: Ensure Low Calorie, High Protein Shake daily. Patient reports ONS use daily at home.  Continue current diet.  Patient voiced she tries to eat lower sugar, heart healthy foods.  Discussed high fiber foods to assist with bowel regulation.      Malnutrition Assessment:  Malnutrition Status:  At risk for malnutrition (Comment) (05/29/24 1100)    Context:  Acute Illness     Findings of the 6 clinical characteristics of malnutrition:  Energy Intake:  Mild decrease in energy intake (Comment)  Weight Loss:  No significant weight loss     Body Fat Loss:  No significant body fat loss     Muscle Mass Loss:  No significant muscle mass loss    Fluid Accumulation:  Mild Extremities   Strength:  Not Performed    Nutrition Assessment:     Pt. nutritionally compromised AEB decline in usual oral intake due to altered GI function.  At risk for further nutrition compromise r/t constipation, nausea, chronic dysphagia, admit with left sided weakness, ? UTI,  and underlying medical condition (hx: CVA, HDL, cognitive impairment, lymphedema, May-Thurner syndrome, PVD, anxiety, PTSD, IBS, OA, depression, fibromyalgia, GERD, RA, hypothyroidism, EGD with dilations).       Nutrition Related Findings:    Pt. Report/Treatments/Miscellaneous: Patient seen, reports appetite has been slightly down due to constipation, nausea, feeling full. States she has had a little relief with bowel medications.  Issues with chronic nausea and dysphagia issues.  Has had multiple EGD with dilations d/t strictures, last one ~ 6/2023.  States she modifies how she eats to assist, chews well, uses gravies, margarine, etc.  States she tries to eat healthy at home, has used various programs to loose weight.  States she uses protein shake daily at home.    GI Status: BM 5/28/24  Pertinent Labs: 5/22/24: 
Discharge teaching and instructions for diagnosis/procedure of lower abdominal pain completed with patient using teachback method. AVS reviewed. Printed prescriptions given to patient. Patient voiced understanding regarding prescriptions, follow up appointments, and care of self at home. Discharged in a wheelchair to  skilled nursing per EMS transportation      Discharging to [facility] in stable condition.     This RN called report to receiving RN. Answered all questions.     Transport set up for 0830. 0955- Awaiting transport   
[facility]  STRZ NEUROSCIENCES 4A  Occupational Therapy  Daily Note  Time:   Time In: 07  Time Out: 822  Timed Code Treatment Minutes: 34 Minutes  Minutes: 34          Date: 2024  Patient Name: Isha Ramsey,   Gender: female      Room: [facility]06/006-A  MRN: [id number]  : 1960  (63 y.o.)  Referring Practitioner: Sunny John MD  Diagnosis: lower abdominal pain  Additional Pertinent Hx: Earlier this month, patient was developing dysuria.  Presented to her PCP on 2024 with complaint of dysuria and found to have a UTI with culture showing E. coli with multiple drug resistances.  Patient initially started on Keflex but switched to Omnicef.  With subjective fevers and sweats, worsening left flank pain consistent with pyelonephritis.  Patient is on immunomodulators - Cosentyx.  Patient admitted for observation.  While patient was ambulating back from the restroom, patient with left-sided leg weakness.  A rapid response was called which eventually turned into a code stroke.  Pt received TNK  at 1150 AM.    Restrictions/Precautions:  Restrictions/Precautions: Fall Risk  Position Activity Restriction  Other position/activity restrictions: hx of chronic back pain     Social/Functional History:  Lives With: Alone  Type of Home:  (RV)  Home Layout: One level  Home Access: Stairs to enter with rails  Entrance Stairs - Number of Steps: 2  Home Equipment: Cane   Bathroom Shower/Tub: Tub/Shower unit  Bathroom Toilet: Standard    Receives Help From: Family  ADL Assistance: Independent  Homemaking Assistance:  (family cooks meals in evenings & does Pt's laundry, drinks protein drink for lunch)  Homemaking Responsibilities: No  Ambulation Assistance: Independent  Transfer Assistance: Independent    Active : Yes  Occupation: Unemployed  Additional Comments: Pt amb with [place], son lives next door that assists with cooking; has chronic back pain, sees pain management and receives injections. Pt 
[facility]  INPATIENT PHYSICAL THERAPY  EVALUATION  [facility] ICU 4D - 4D-12/012-A    Time In: 0932  Time Out: 1013  Timed Code Treatment Minutes: 31 Minutes  Minutes: 41          Date: 2024  Patient Name: Isha Ramsey,  Gender:  female        MRN: [id number]  : 1960  (63 y.o.)      Referring Practitioner: Sunny John MD  Diagnosis: Lower abdominal pain  Additional Pertinent Hx: Earlier this month, patient was developing dysuria.  Presented to her PCP on 2024 with complaint of dysuria and found to have a UTI with culture showing E. coli with multiple drug resistances.  Patient initially started on Keflex but switched to Omnicef.  With subjective fevers and sweats, worsening left flank pain consistent with pyelonephritis.  Patient is on immunomodulators - Cosentyx.  Patient admitted for observation.  While patient was ambulating back from the restroom, patient with left-sided leg weakness.  A rapid response was called which eventually turned into a code stroke.  Pt received TNK  at 1150 AM.     Restrictions/Precautions:  Restrictions/Precautions: Fall Risk  Position Activity Restriction  Other position/activity restrictions: hx of chronic back pain    Subjective:  Chart Reviewed: Yes  Patient assessed for rehabilitation services?: Yes  Family / Caregiver Present: No  Subjective: RN approved session, pt is supine in bed, agreeable to PT.    General:  Overall Orientation Status: Within Functional Limits  Vision: Within Functional Limits  Hearing: Within functional limits       Pain: \"12\"/10: chronic back pain, pt has not received pain medication this AM, discussed with RN and she is reaching out to the physician; pt reports she gets injections and sees pain management     Vitals: Vitals not assessed per clinical judgement, see nursing flowsheet    Social/Functional History:    Lives With: Alone  Type of Home:  (RV)  Home Layout: One level  Home Access: Stairs to enter with 
[facility]  STRZ NEUROSCIENCES 4A  Occupational Therapy  Daily Note  Time:   Time In: 1432  Time Out: 1505  Timed Code Treatment Minutes: 33 Minutes  Minutes: 33          Date: 2024  Patient Name: Isha Ramsey,   Gender: female      Room: [facility]06/006-A  MRN: [id number]  : 1960  (63 y.o.)  Referring Practitioner: Sunny John MD  Diagnosis: lower abdominal pain  Additional Pertinent Hx: Earlier this month, patient was developing dysuria.  Presented to her PCP on 2024 with complaint of dysuria and found to have a UTI with culture showing E. coli with multiple drug resistances.  Patient initially started on Keflex but switched to Omnicef.  With subjective fevers and sweats, worsening left flank pain consistent with pyelonephritis.  Patient is on immunomodulators - Cosentyx.  Patient admitted for observation.  While patient was ambulating back from the restroom, patient with left-sided leg weakness.  A rapid response was called which eventually turned into a code stroke.  Pt received TNK  at 1150 AM.    Restrictions/Precautions:  Restrictions/Precautions: Fall Risk  Position Activity Restriction  Other position/activity restrictions: hx of chronic back pain     Social/Functional History:  Lives With: Alone  Type of Home:  (RV)  Home Layout: One level  Home Access: Stairs to enter with rails  Entrance Stairs - Number of Steps: 2  Home Equipment: Cane   Bathroom Shower/Tub: Tub/Shower unit  Bathroom Toilet: Standard    Receives Help From: Family  ADL Assistance: Independent  Homemaking Assistance:  (family cooks meals in evenings & does Pt's laundry, drinks protein drink for lunch)  Homemaking Responsibilities: No  Ambulation Assistance: Independent  Transfer Assistance: Independent    Active : Yes  Occupation: Unemployed  Additional Comments: Pt amb with [place], son lives next door that assists with cooking; has chronic back pain, sees pain management and receives injections. Pt 
Patient admitted to  Room 10 in a wheelchair and from ED  Complaint upon arrival to the room none  Vital signs obtained. Assessment and data collection initiated. Oriented to room. Policies and procedures for  explained All questions answered with no further questions at this time. Fall prevention and safety brochure discussed with patient. 2 person skin check completed.    Name & number of designated person to update during visitor restriction times:     Was swallow screen completed on admission? [x] YES or [] NO  If patient failed swallow test, obtain order for speech therapy consult and keep NPO.    
Patient arrived to unit from  via bed. Patient transferred to ICU bed and placed on continuous ICU bedside monitor. Patient admitted for Lower abdominal pain [R10.30]  Therapy failure due to antibiotic resistance [Z16.20]  Urinary tract infection in female [N39.0]  Ischemic stroke (HCC) [I63.9]. Vitals obtained. See flowsheets. Patient's IV access includes 20 gauge left hand. Current infusions and rates of infusion include none. Assessment completed by Norma QUIJANO. Two nurse skin assessment completed by Norma QUIJANO and Stephanie QUIJANO. See flowsheets for assessment details. Policies and procedures of ICU able to be explained to patient at this time. Family member(s)/representative(s) present at time of admission include none. Patient rights explained to family member(s)/representatives and patient, as able. Patient/patient's family member(s)/representative(s) Declined to have physician notified of their admission. All questions posed by patient's family member(s)/representative(s) and patient answered at this time. Family to be called.     
Patient transferred to 4A-06 in wheelchair with transport staff.  
Patient was reported by RN to have SBP consistently in the 90s. Will order 1-time bolus of IV normal saline 500 cc to increase SBP above 100.    Kaushik Valdez DO, PGY-3  
Report called to Zehra on 4A.  
[facility]   PROGRESS NOTE      Patient: Isha Ramsey  Room #: 4D-12/012-A            YOB: 1960  Age: 63 y.o.  Gender: female            Admit Date & Time: 5/19/2024  4:35 PM    Assessment:    The patient was welcomed due to a consult that the patient was in spiritual distress. The patient when exploring her condition shared that she came in for infections and back injuries. The patient shared having worked in health care and being well versed in her conditions.     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 The patient shared how she was declared disabled, moved to [place] to be near her son for care and lost contact with her friend and adrianna group based in [place]. This rapid change in life and place in the world was explored lightly with a focus on maintaining existing friendships. The patient shared the difficulty of going to a adrianna community to build relationships due to her disabilities. The patient is thus coping with multiple situations that are not native to the way she functions naturally. She was hard driving and work focused with a quick mind and strong social connections. The patient is still very capable but compared to where she was the patient is still making peace. The patient was then provided prayer to focus on healing and building of new relations.     Outcomes:  The patient was thankful for the provided prayer and visit.     Plan:  1.Spiritual care will continue to follow the patient according to [facility] spiritual care SOP.       Electronically signed by Chaplain Soren, on 5/22/2024 at 1:06 PM.  Spiritual Care Department  [facility] 
Spoke with DONAN Chen explained that the patient is not appropriate for IPR at this time. KARLEY Hoyos spoke with christiane and indicated that the patient is open to SNF vs. Home with home therapy; she would like to discuss this with her family.    
Stroke Folder given.   What is Stroke/CVA  Signs and Symptoms of stroke (BEFAST)  Treatments for Stroke  Personal Risk Factors for Stroke discussed  Education--Call 911      Patient/family has been educated on their personal risk factors of:  N/a Hypertension  X Previous stroke  X previous TIA  X Hyperlipidemia  N/a smoking cessation  N/a Atrial fibrillation  N/A Carotid Artery stenosis  N/a diabetes  N/a other(specify)    They have been given hand outs on the following medications:(  give handouts/attach to AVS)    X  TNK    Treatment for stroke includes:  Risk factor modifications  Following the medication regime prescribed by physician      Educated on FAST-Face-Arm-Speech-Time    A stroke is a brain attack.Stroke is a brain injury. It occurs when the brain's blood supply is interrupted. Blood carries oxygen and nutrients to the brain. Without oxygen and nutrients from blood, brain tissue starts to die rapidly. This can happen in less than 10 minutes.    A stroke occurs when blood flow to the brain is blocked (called ischemic stroke). This is caused by one of the following:   Sudden decreased blood flow   Damage to a blood vessel supplying blood to the brain can occur suddenly from either:   Injury   A clot that forms and breaks off from another part of the body (such as the heart or neck)   There are certain conditions which predispose people to form blood clots, such as:   Cancer   Pregnancy   Atrial fibrillation   Certain autoimmune diseases   Local blood clot   A build-up of fatty substances ( atherosclerotic plaque ) along the inner lining of the artery causes:   Narrowing of artery   Reduced elasticity   Local inflammation   Blood protein defects leading to increased clotting tendency   Decreased blood flow in the artery   Clot in an artery supplying the brain   Inflammatory conditions in the blood vessels (vasculitis)   A stroke may also occur if a blood vessel breaks and bleeds into or around the brain. 
[facility]  STRZ NEUROSCIENCES 4A  Occupational Therapy  Daily Note  Time:    Time In: 1448  Time Out: 1512  Timed Code Treatment Minutes: 24 Minutes  Minutes: 24          Date: 2024  Patient Name: Isha Ramsey,   Gender: female      Room: [facility]06/006-A  MRN: [id number]  : 1960  (63 y.o.)  Referring Practitioner: Sunny John MD  Diagnosis: lower abdominal pain  Additional Pertinent Hx: Earlier this month, patient was developing dysuria.  Presented to her PCP on 2024 with complaint of dysuria and found to have a UTI with culture showing E. coli with multiple drug resistances.  Patient initially started on Keflex but switched to Omnicef.  With subjective fevers and sweats, worsening left flank pain consistent with pyelonephritis.  Patient is on immunomodulators - Cosentyx.  Patient admitted for observation.  While patient was ambulating back from the restroom, patient with left-sided leg weakness.  A rapid response was called which eventually turned into a code stroke.  Pt received TNK  at 1150 AM.    Restrictions/Precautions:  Restrictions/Precautions: Fall Risk  Position Activity Restriction  Other position/activity restrictions: hx of chronic back pain      Social/Functional History:  Lives With: Alone  Type of Home:  (RV)  Home Layout: One level  Home Access: Stairs to enter with rails  Entrance Stairs - Number of Steps: 2  Home Equipment: Cane   Bathroom Shower/Tub: Tub/Shower unit  Bathroom Toilet: Standard    Receives Help From: Family  ADL Assistance: Independent  Homemaking Assistance:  (family cooks meals in evenings & does Pt's laundry, drinks protein drink for lunch)  Homemaking Responsibilities: No  Ambulation Assistance: Independent  Transfer Assistance: Independent    Active : Yes  Occupation: Unemployed  Additional Comments: Pt amb with [place], son lives next door that assists with cooking; has chronic back pain, sees pain management and receives injections. 
This nurse spoke with Minna, patient's daughter in law, regarding the significant change in events.  This nurse was unable to get ahold of patient's son, Fco; however, Minna stated she will make him aware.   
[facility]  STRZ NEUROSCIENCES 4A  Occupational Therapy  Daily Note  Time:    Time In: 1445  Time Out: 1502  Timed Code Treatment Minutes: 17 Minutes  Minutes: 17          Date: 2024  Patient Name: Isha Ramsey,   Gender: female      Room: [facility]06/006-A  MRN: [id number]  : 1960  (63 y.o.)  Referring Practitioner: Sunny John MD  Diagnosis: lower abdominal pain  Additional Pertinent Hx: Earlier this month, patient was developing dysuria.  Presented to her PCP on 2024 with complaint of dysuria and found to have a UTI with culture showing E. coli with multiple drug resistances.  Patient initially started on Keflex but switched to Omnicef.  With subjective fevers and sweats, worsening left flank pain consistent with pyelonephritis.  Patient is on immunomodulators - Cosentyx.  Patient admitted for observation.  While patient was ambulating back from the restroom, patient with left-sided leg weakness.  A rapid response was called which eventually turned into a code stroke.  Pt received TNK  at 1150 AM.    Restrictions/Precautions:  Restrictions/Precautions: Fall Risk  Position Activity Restriction  Other position/activity restrictions: hx of chronic back pain      Social/Functional History:  Lives With: Alone  Type of Home:  (RV)  Home Layout: One level  Home Access: Stairs to enter with rails  Entrance Stairs - Number of Steps: 2  Home Equipment: Cane   Bathroom Shower/Tub: Tub/Shower unit  Bathroom Toilet: Standard    Receives Help From: Family  ADL Assistance: Independent  Homemaking Assistance:  (family cooks meals in evenings & does Pt's laundry, drinks protein drink for lunch)  Homemaking Responsibilities: No  Ambulation Assistance: Independent  Transfer Assistance: Independent    Active : Yes  Occupation: Unemployed  Additional Comments: Pt amb with [place], son lives next door that assists with cooking; has chronic back pain, sees pain management and receives injections. 
[facility]  INPATIENT SPEECH THERAPY  [facility] NEUROSCIENCES 4A  DAILY NOTE    TIME   SLP Individual Minutes  Time In: 0834  Time Out: 0850  Minutes: 16  Timed Code Treatment Minutes: 8 Minutes   Cognitive Therapy: 8 minutes   Dysphagia Therapy: 8 minutes     Date: 2024  Patient Name: Isha Ramsey      CSN: [id number]   : 1960  (63 y.o.)  Gender: female   Referring Physician:  Sunny John MD   Diagnosis: Lower abdominal pain   Precautions: Fall risk  Current Diet: Regular, thin liquids   Respiratory Status: Room Air  Swallowing Strategies: Standard Universal Swallow Precautions  Date of Last MBS/FEES: Not Applicable    Pain:  No pain reported.    Subjective:  Spoke with MACIE Perez for approval of ST interventions. Upon arrival, patient sitting upright in recliner. Alert and cooperative.     Short-Term Goals:  SHORT TERM GOAL #1:  Goal 1: Patient will safely consume regular diet, thin liquids with stable pulmonary status and use of trained compensatory strategies to assist with nutrition/hydration GOAL MET, NO NEW GOAL  INTERVENTIONS:Patient consumed PO trials of coarse solids this date without notable difficulties. Appropriate mastication, bolus control, and manipulation observed. Patient reports frequent globus sensation in esophageal region with hiccups. She states she has had many GI interventions and would like to see them within hospitalization. MACIE Perez notified. Patient can oropharyngeally support PO intake without distress. Recommend continuation of regular diet, thin liquids. No further skilled ST services recommended for dysphagia therapy.     SHORT TERM GOAL #2:  Goal 2: Patient will complete immediate/delayed recall tasks with 80% accuracy given min cues to improve retention of novel and newly presented information.  INTERVENTIONS:DNT due to focus on other STGs.     SHORT TERM GOAL #3:  Goal 3: Patient will complete high level problem solving, visual/verbal reasoning, and 
Gait training, Neuromuscular re-education, Stair training, Endurance training, Safety education & training, Therapeutic activities, Patient/Caregiver education & training  General Plan:  (5-6x C)    Education  Education:  Learners: Patient  Patient Education: Plan of Care    Goals:  Patient Goals : to get better  Short Term Goals  Time Frame for Short Term Goals: by discharge  Short Term Goal 1: Pt to transfer supine <--> sit mod I to enable pt to get in/out of bed.  Short Term Goal 2: Pt to transfer sit <--> stand mod I for increased functional mobility.  Short Term Goal 3: Pt to ambulate >200 feet with SC mod I for community re-entry.  Short Term Goal 4: Pt to ascend/descend 2 steps with HR S for home entry.  Long Term Goals  Time Frame for Long Term Goals : NA due to short length of stay.    Following session, patient left in safe position with all fall risk precautions in place.    
Recommendations: Balance training, Functional mobility training, Safety education & training, Self-Care / ADL, Endurance training    Education:  Learners: Patient  ADL's    Goals  Short Term Goals  Time Frame for Short Term Goals: until discharge  Short Term Goal 1: Pt will complete various t/fs including toilet with S & 0-2 vcs for safety  Short Term Goal 2: Pt will tolerate dynamic standing 7-10  min with S for increased ease of sinkside grooming  Short Term Goal 3: Pt will complete BADL routine with min  & 0-2 vcs for safety & adaptive strategies prn  Short Term Goal 4: Pt will complete mobility to household distances with LRAD, S, & 0-2 vcs for safety while transporting items in prep for I/ADL engagement.  Long Term Goals  Time Frame for Long Term Goals : No LTG set d/t short ELOS    Following session, patient left in safe position with all fall risk precautions in place.        
No free fluid in the abdomen. No abnormally enlarged para-aortic lymph nodes are seen. Bones : No evidence for acute fracture or bone destruction.. Old fracture T11 with evidence of prior vertebroplasty.. Pelvis: There is moderate diffuse thickening of the wall of the urinary bladder. Cannot send cystitis. There is a stent in the left common iliac vein.     Constipation. Questionable cystitis. **This report has been created using voice recognition software.  It may contain minor errors which are inherent in voice recognition technology.**       Electronically signed by ESTEFANIA Campa CNP on 5/20/2024 at 1:39 PM       
reconstructions were performed on a dedicated 3-D workstation. These include multiplanar MPR images and multiplanar MIP images.  Centerline reconstructions were obtained of the carotid systems. Isovue intravenous contrast was given. All carotid artery measurements are performed utilizing NASCET criteria. This exam was analyzed using viz . contact CT LVO. All CT scans at this facility use dose modulation, iterative reconstruction, and/or weight-based dosing when appropriate to reduce radiation dose to as low as reasonably achievable. FINDINGS: CTA NECK Aortic arch and branches there is normal origin of the brachiocephalic, left common carotid and left subclavian arteries from the aortic arch. Right common carotid artery/ICA: There is no significant hemodynamic stenosis involving the right common and internal carotid arteries. Left common carotid artery/ICA: There is no significant hemodynamic stenosis involving the left common and internal carotid arteries. Vertebral arteries there is antegrade flow in the right and left vertebral arteries. CTA HEAD: Internal carotid arteries: There is atherosclerotic calcification in the cavernous segments of both internal carotid arteries. There is no evidence of severe stenosis.. Middle cerebral arteries: There is antegrade flow in the right and left middle cerebral arteries.. Anterior cerebral arteries: There is antegrade flow in the anterior cerebral arteries bilaterally.. Vertebral arteries: There is antegrade flow in the right and left vertebral arteries. Basilar artery: There is antegrade flow in the basilar artery. Superior cerebellar arteries there is antegrade flow in the right and left superior cerebellar arteries. Posterior cerebral arteries: There is antegrade flow in the posterior cerebral arteries bilaterally.. No aneurysms, stenoses or occlusions are noted. The superior sagittal sinus, vein of Reza, internal cerebral veins, straight sinus, transverse sinuses and 
superior cerebellar arteries. Posterior cerebral arteries: There is antegrade flow in the posterior cerebral arteries bilaterally.. No aneurysms, stenoses or occlusions are noted. The superior sagittal sinus, vein of Reza, internal cerebral veins, straight sinus, transverse sinuses and sigmoid sinuses are patent. Axial source data: Nodule in the left lobe of the thyroid gland. Postoperative changes in the cervical spine.     1. Negative CTA of the head and neck. 2. Diffusion MRI scan of the brain would be helpful for better evaluation. **This report has been created using voice recognition software. It may contain minor errors which are inherent in voice recognition technology.**    CT HEAD WO CONTRAST    Result Date: 5/21/2024  PROCEDURE: CT CODE NEURO HEAD WO CONTRAST CLINICAL INFORMATION: L sided deficits. COMPARISON: MRI scan of the brain dated 8 November 2023.. TECHNIQUE: Noncontrast 5 mm axial images were obtained through the brain. Sagittal and coronal reconstructions were obtained. All CT scans at this facility use dose modulation, iterative reconstruction, and/or weight-based dosing when appropriate to reduce radiation dose to as low as reasonably achievable. FINDINGS: There is no hemorrhage. There are no intra-or extra-axial collections.  There is no hydrocephalus, midline shift or mass effect.  The gray-white matter differentiation is preserved. There are inflammatory changes in the left mastoid air cells. The paranasal sinuses and right mastoid air cells are normally aerated.  There is no suspicious calvarial abnormality.       No evidence of an acute process. **This report has been created using voice recognition software. It may contain minor errors which are inherent in voice recognition technology.**    Micro:   Patient Infection Status       None to display                   Electronically signed by Patrick Syed MD on 5/28/2024 at 9:14 AM  
when appropriate to reduce radiation dose to as low as reasonably achievable. FINDINGS: There is no hemorrhage. There are no intra-or extra-axial collections.  There is no hydrocephalus, midline shift or mass effect.  The gray-white matter differentiation is preserved. There are inflammatory changes in the left mastoid air cells. The paranasal sinuses and right mastoid air cells are normally aerated.  There is no suspicious calvarial abnormality.       No evidence of an acute process. **This report has been created using voice recognition software. It may contain minor errors which are inherent in voice recognition technology.**    CT ABDOMEN PELVIS W IV CONTRAST Additional Contrast? None    Result Date: 5/19/2024  CT ABDOMEN AND PELVIS WITH CONTRAST ENHANCEMENT: CLINICAL INFORMATION: bilateral flank pain, MDRO UTI, on immunomodulators COMPARISON: 5/22/2023 TECHNIQUE: Multiple axial 5 mm images of the abdomen, pelvis, and lung bases were obtained following the administration of intravenous contrast material (ISOVUE). Computer generated sagittal and coronal images of the abdomen and pelvis were also reconstructed.ALL CT SCANS AT THIS FACILITY use dose modulation, iterative reconstruction, and/or weight-based dosing when appropriate to reduce radiation dose to as low as reasonably achievable. FINDINGS: Lung bases: Moderate fibrotic stranding at both lung bases posteriorly.. Liver: Liver unremarkable. Gallbladder unremarkable Pancreas, spleen and adrenal glands: Unremarkable Kidneys:  Unremarkable. No renal calculi. No cysts. No mass lesions. No hydronephrosis.. Bowel/Peritoneum: No abnormally dilated bowel loops are seen. There is a relatively large amount of fecal material throughout the colon, consistent with constipation. No free air. No free fluid in the abdomen. No abnormally enlarged para-aortic lymph nodes are seen. Bones : No evidence for acute fracture or bone destruction.. Old fracture T11 with evidence of

## 2024-05-29 NOTE — CARE COORDINATION
5/29/24, 8:03 AM EDT    Patient goals/plan/ treatment preferences discussed by  and .  Patient goals/plan/ treatment preferences reviewed with patient/ family.  Patient/ family verbalize understanding of discharge plan and are in agreement with goal/plan/treatment preferences.  Understanding was demonstrated using the teach back method.  AVS provided by RN at time of discharge, which includes all necessary medical information pertaining to the patients current course of illness, treatment, post-discharge goals of care, and treatment preferences.     Services At/After Discharge: Skilled Nursing Facility (SNF)    Spoke with [facility] and they have pulled the AVS.  RN has the number for report and transport is set up for 8:30 am. Patient planned to notify her own family.

## 2024-05-30 ENCOUNTER — TELEPHONE (OUTPATIENT)
Dept: FAMILY MEDICINE CLINIC | Age: 64
End: 2024-05-30

## 2024-05-30 NOTE — TELEPHONE ENCOUNTER
----- Message from Isha Ramsey sent at 5/30/2024 10:54 AM EDT -----  Regarding: Hospital stay  Contact: [phone]  I noticed your questions about my stay, I was not discharged home.  I would like to discuss with you.  [phone]

## 2024-05-30 NOTE — TELEPHONE ENCOUNTER
Care Transitions Initial Follow Up Call    Outreach made within 2 business days of discharge: Yes    Patient: Isha Ramsey Patient : 1960   MRN: [id number]  Reason for Admission: There are no discharge diagnoses documented for the most recent discharge.  Discharge Date: 24       Spoke with: Left voicemail for return call.    Discharge department/facility: [facility] Interactive Patient Contact:  Was patient able to fill all prescriptions:   Was patient instructed to bring all medications to the follow-up visit:   Is patient taking all medications as directed in the discharge summary?   Does patient understand their discharge instructions:   Does patient have questions or concerns that need addressed prior to 7-14 day follow up office visit:     Scheduled appointment with PCP within 7-14 days    Follow Up  Future Appointments   Date Time Provider Department Center   2024 10:30 AM Akhil Lewis PA N ENT [facility] [place]   2024 11:40 AM Liat Hallman, APRN - CNP N [facility] Rheum [facility] [place]   2024 12:30 PM Sangita Malik MD N [facility] PSYCH [facility] - [place]   2024  3:00 PM Leandro Powell DO N [facility] Pain [facility] [place]   2024  1:40 PM Mack Nguyen DO [facility] FM RES [facility] [place]   2024 11:40 AM Lisa Thorne DO N [facility] Rheum [facility] [place]   2024  1:20 PM Mack Nguyen DO SRPX FM RES [facility] [place]       Ely Quintero LPN

## 2024-06-03 ENCOUNTER — TELEPHONE (OUTPATIENT)
Dept: FAMILY MEDICINE CLINIC | Age: 64
End: 2024-06-03

## 2024-06-03 NOTE — TELEPHONE ENCOUNTER
Attempted to call pt on both numbers, they are out of ordered and disconnected. Spoke to Mehreen Ferraro at the [facility], to ensure that Pt Isha was getting her Trazadone Q night.     Nurse stated, Pt trazodone order was     Trazodone 150mg and give 1.5tabs. PO nightly to aid in sleep.      This is what we have for her here.

## 2024-06-03 NOTE — TELEPHONE ENCOUNTER
----- Message from Isha Ramsey sent at 5/31/2024  1:42 PM EDT -----  Regarding: Your office is calling me, trazodone correct dosage  Contact: [phone]  Your office just recently called me and wanted me to call back, but your office is closed and won't answer.  Please call me back , I will watch for your call.     As well , they are giving me trazodone here at 100 MG instead of 225 MG qhs.  I can't sleep at all here at the [facility], I hope this is corrected soon

## 2024-06-05 NOTE — TELEPHONE ENCOUNTER
Patient returning call, stated that she was discharged to the [facility]. Plans on getting discharged 6/7-6/12 and is in the process of getting medicaid waiver for assistance when she gets home.

## 2024-06-11 ENCOUNTER — TELEPHONE (OUTPATIENT)
Dept: FAMILY MEDICINE CLINIC | Age: 64
End: 2024-06-11

## 2024-06-11 ENCOUNTER — PATIENT MESSAGE (OUTPATIENT)
Dept: FAMILY MEDICINE CLINIC | Age: 64
End: 2024-06-11

## 2024-06-11 ENCOUNTER — OFFICE VISIT (OUTPATIENT)
Dept: FAMILY MEDICINE CLINIC | Age: 64
End: 2024-06-11

## 2024-06-11 VITALS
TEMPERATURE: 97.9 F | DIASTOLIC BLOOD PRESSURE: 86 MMHG | SYSTOLIC BLOOD PRESSURE: 126 MMHG | RESPIRATION RATE: 21 BRPM | HEIGHT: 62 IN | OXYGEN SATURATION: 97 % | WEIGHT: 163.6 LBS | BODY MASS INDEX: 30.11 KG/M2 | HEART RATE: 90 BPM

## 2024-06-11 DIAGNOSIS — J01.40 ACUTE NON-RECURRENT PANSINUSITIS: ICD-10-CM

## 2024-06-11 DIAGNOSIS — J01.40 ACUTE NON-RECURRENT PANSINUSITIS: Primary | ICD-10-CM

## 2024-06-11 RX ORDER — FLUCONAZOLE 150 MG/1
150 TABLET ORAL DAILY
Qty: 2 TABLET | Refills: 0 | Status: SHIPPED | OUTPATIENT
Start: 2024-06-11 | End: 2024-06-13

## 2024-06-11 RX ORDER — AMOXICILLIN AND CLAVULANATE POTASSIUM 875; 125 MG/1; MG/1
1 TABLET, FILM COATED ORAL 2 TIMES DAILY
Qty: 20 TABLET | Refills: 0 | Status: SHIPPED | OUTPATIENT
Start: 2024-06-11 | End: 2024-06-12 | Stop reason: SINTOL

## 2024-06-11 NOTE — TELEPHONE ENCOUNTER
Mack Nguyen, DO  P called and states they wanted to update you that they have Isha Ramsey set up with [facility].

## 2024-06-12 DIAGNOSIS — J01.40 ACUTE NON-RECURRENT PANSINUSITIS: ICD-10-CM

## 2024-06-12 RX ORDER — AMOXICILLIN AND CLAVULANATE POTASSIUM 250; 62.5 MG/5ML; MG/5ML
500 POWDER, FOR SUSPENSION ORAL 2 TIMES DAILY
Qty: 200 ML | Refills: 0 | Status: SHIPPED | OUTPATIENT
Start: 2024-06-11 | End: 2024-06-14

## 2024-06-12 NOTE — TELEPHONE ENCOUNTER
Acute non-recurrent pansinusitis  -     amoxicillin-clavulanate (AUGMENTIN) 250-62.5 MG/5ML suspension; Take 10 mLs by mouth 2 times daily for 10 days, Disp-200 mL, R-0Normal    Electronically signed by Mack Nguyen DO on 6/12/2024 at 4:33 PM

## 2024-06-13 NOTE — TELEPHONE ENCOUNTER
Patient's last appointment was : 6/11/2024 with our   Patient's next appointment is : 7/1/2024 with our Dr. Nguyen   Last refilled on: 6-11-24  Which pharmacy does the script need sent [facility]      Lab Results   Component Value Date    LABA1C 5.4 05/22/2024     Lab Results   Component Value Date    CHOL 210 (H) 05/22/2024    TRIG 263 (H) 05/22/2024    HDL 31 05/22/2024     Lab Results   Component Value Date     05/27/2024    K 3.5 05/27/2024     05/27/2024    CO2 24 05/27/2024    BUN 12 05/27/2024    CREATININE 0.9 05/27/2024    GLUCOSE 134 (H) 05/27/2024    CALCIUM 9.2 05/27/2024    BILITOT 0.2 (L) 05/23/2024    ALKPHOS 91 05/23/2024    AST 26 05/23/2024    ALT 64 05/23/2024    LABGLOM 72 05/27/2024    GFRAA >60 02/19/2021    AGRATIO 2.9 (H) 02/05/2021     Lab Results   Component Value Date    TSH 1.440 04/17/2023    FT3 2.56 07/15/2021    T4FREE 1.43 04/17/2023     Lab Results   Component Value Date    WBC 6.2 05/26/2024    HGB 11.9 (L) 05/26/2024    HCT 36.4 (L) 05/26/2024    MCV 96.3 05/26/2024     05/26/2024

## 2024-06-14 ENCOUNTER — TELEPHONE (OUTPATIENT)
Dept: FAMILY MEDICINE CLINIC | Age: 64
End: 2024-06-14

## 2024-06-14 DIAGNOSIS — J01.40 ACUTE NON-RECURRENT PANSINUSITIS: ICD-10-CM

## 2024-06-14 RX ORDER — AMOXICILLIN AND CLAVULANATE POTASSIUM 250; 62.5 MG/5ML; MG/5ML
POWDER, FOR SUSPENSION ORAL
Qty: 75 ML | Refills: 0 | Status: SHIPPED | OUTPATIENT
Start: 2024-06-14

## 2024-06-14 NOTE — TELEPHONE ENCOUNTER
Dr. Nguyen please note that the [facility] on [address]. Called and the     Augmentin you had ordered is short by 125ml. They would like a new order sent please.

## 2024-06-15 DIAGNOSIS — M89.9 DISORDER OF BONE, UNSPECIFIED: ICD-10-CM

## 2024-06-15 DIAGNOSIS — M79.605 PAIN OF LEFT LOWER EXTREMITY: ICD-10-CM

## 2024-06-15 DIAGNOSIS — G25.81 RESTLESS LEGS: ICD-10-CM

## 2024-06-15 DIAGNOSIS — E55.9 VITAMIN D INSUFFICIENCY: ICD-10-CM

## 2024-06-15 DIAGNOSIS — G47.00 INSOMNIA, UNSPECIFIED TYPE: ICD-10-CM

## 2024-06-17 NOTE — TELEPHONE ENCOUNTER
Patient's last appointment was : 6/11/2024 with our   Patient's next appointment is : 7/1/2024 with our Dr. Nguyen  Last refilled on: 02/01/2024, 05/07/2024, 02/07/2024, 12/26/2023  Which pharmacy does the script need sent to: [facility] [place]      Lab Results   Component Value Date    LABA1C 5.4 05/22/2024     Lab Results   Component Value Date    CHOL 210 (H) 05/22/2024    TRIG 263 (H) 05/22/2024    HDL 31 05/22/2024     Lab Results   Component Value Date     05/27/2024    K 3.5 05/27/2024     05/27/2024    CO2 24 05/27/2024    BUN 12 05/27/2024    CREATININE 0.9 05/27/2024    GLUCOSE 134 (H) 05/27/2024    CALCIUM 9.2 05/27/2024    BILITOT 0.2 (L) 05/23/2024    ALKPHOS 91 05/23/2024    AST 26 05/23/2024    ALT 64 05/23/2024    LABGLOM 72 05/27/2024    GFRAA >60 02/19/2021    AGRATIO 2.9 (H) 02/05/2021     Lab Results   Component Value Date    TSH 1.440 04/17/2023    FT3 2.56 07/15/2021    T4FREE 1.43 04/17/2023     Lab Results   Component Value Date    WBC 6.2 05/26/2024    HGB 11.9 (L) 05/26/2024    HCT 36.4 (L) 05/26/2024    MCV 96.3 05/26/2024     05/26/2024

## 2024-06-18 ENCOUNTER — TELEPHONE (OUTPATIENT)
Dept: FAMILY MEDICINE CLINIC | Age: 64
End: 2024-06-18

## 2024-06-18 ENCOUNTER — NURSE ONLY (OUTPATIENT)
Dept: LAB | Age: 64
End: 2024-06-18

## 2024-06-18 ENCOUNTER — OFFICE VISIT (OUTPATIENT)
Dept: FAMILY MEDICINE CLINIC | Age: 64
End: 2024-06-18

## 2024-06-18 VITALS
HEIGHT: 62 IN | BODY MASS INDEX: 29.48 KG/M2 | OXYGEN SATURATION: 97 % | TEMPERATURE: 97.8 F | DIASTOLIC BLOOD PRESSURE: 68 MMHG | RESPIRATION RATE: 20 BRPM | WEIGHT: 160.2 LBS | SYSTOLIC BLOOD PRESSURE: 120 MMHG | HEART RATE: 71 BPM

## 2024-06-18 DIAGNOSIS — R43.2 LOSS OF TASTE: Primary | ICD-10-CM

## 2024-06-18 DIAGNOSIS — Z51.81 MEDICATION MONITORING ENCOUNTER: ICD-10-CM

## 2024-06-18 DIAGNOSIS — R93.89 ABNORMAL CHEST X-RAY: ICD-10-CM

## 2024-06-18 DIAGNOSIS — N20.0 NEPHROLITHIASIS: ICD-10-CM

## 2024-06-18 DIAGNOSIS — N32.89 BLADDER SPASMS: ICD-10-CM

## 2024-06-18 DIAGNOSIS — R43.0 LOSS OF SMELL: ICD-10-CM

## 2024-06-18 DIAGNOSIS — N39.46 MIXED STRESS AND URGE URINARY INCONTINENCE: ICD-10-CM

## 2024-06-18 DIAGNOSIS — R06.02 SOB (SHORTNESS OF BREATH): ICD-10-CM

## 2024-06-18 DIAGNOSIS — J02.9 SORE THROAT: ICD-10-CM

## 2024-06-18 DIAGNOSIS — J01.40 ACUTE NON-RECURRENT PANSINUSITIS: ICD-10-CM

## 2024-06-18 LAB
ALBUMIN SERPL BCG-MCNC: 4.1 G/DL (ref 3.5–5.1)
ALP SERPL-CCNC: 83 U/L (ref 38–126)
ALT SERPL W/O P-5'-P-CCNC: 17 U/L (ref 11–66)
ANION GAP SERPL CALC-SCNC: 10 MEQ/L (ref 8–16)
AST SERPL-CCNC: 16 U/L (ref 5–40)
BASOPHILS ABSOLUTE: 0.1 THOU/MM3 (ref 0–0.1)
BASOPHILS NFR BLD AUTO: 0.9 %
BILIRUB SERPL-MCNC: 0.3 MG/DL (ref 0.3–1.2)
BUN SERPL-MCNC: 17 MG/DL (ref 7–22)
CALCIUM SERPL-MCNC: 9.6 MG/DL (ref 8.5–10.5)
CHLORIDE SERPL-SCNC: 103 MEQ/L (ref 98–111)
CO2 SERPL-SCNC: 24 MEQ/L (ref 23–33)
CREAT SERPL-MCNC: 0.9 MG/DL (ref 0.4–1.2)
CRP SERPL-MCNC: < 0.3 MG/DL (ref 0–1)
DEPRECATED RDW RBC AUTO: 45 FL (ref 35–45)
EOSINOPHIL NFR BLD AUTO: 1.9 %
EOSINOPHILS ABSOLUTE: 0.1 THOU/MM3 (ref 0–0.4)
ERYTHROCYTE [DISTWIDTH] IN BLOOD BY AUTOMATED COUNT: 13.2 % (ref 11.5–14.5)
ERYTHROCYTE [SEDIMENTATION RATE] IN BLOOD BY WESTERGREN METHOD: 16 MM/HR (ref 0–20)
GFR SERPL CREATININE-BSD FRML MDRD: 72 ML/MIN/1.73M2
GLUCOSE SERPL-MCNC: 92 MG/DL (ref 70–108)
HCT VFR BLD AUTO: 40.8 % (ref 37–47)
HGB BLD-MCNC: 13.2 GM/DL (ref 12–16)
IMM GRANULOCYTES # BLD AUTO: 0.01 THOU/MM3 (ref 0–0.07)
IMM GRANULOCYTES NFR BLD AUTO: 0.1 %
LYMPHOCYTES ABSOLUTE: 3.8 THOU/MM3 (ref 1–4.8)
LYMPHOCYTES NFR BLD AUTO: 50.9 %
Lab: NORMAL
MCH RBC QN AUTO: 30 PG (ref 26–33)
MCHC RBC AUTO-ENTMCNC: 32.4 GM/DL (ref 32.2–35.5)
MCV RBC AUTO: 92.7 FL (ref 81–99)
MONOCYTES ABSOLUTE: 0.5 THOU/MM3 (ref 0.4–1.3)
MONOCYTES NFR BLD AUTO: 6.1 %
NEUTROPHILS ABSOLUTE: 3 THOU/MM3 (ref 1.8–7.7)
NEUTROPHILS NFR BLD AUTO: 40.1 %
NRBC BLD AUTO-RTO: 0 /100 WBC
PLATELET # BLD AUTO: 292 THOU/MM3 (ref 130–400)
PMV BLD AUTO: 11.8 FL (ref 9.4–12.4)
POTASSIUM SERPL-SCNC: 3.9 MEQ/L (ref 3.5–5.2)
PROT SERPL-MCNC: 7.1 G/DL (ref 6.1–8)
QC PASS/FAIL: NORMAL
RBC # BLD AUTO: 4.4 MILL/MM3 (ref 4.2–5.4)
SARS-COV-2 RDRP RESP QL NAA+PROBE: NEGATIVE
SODIUM SERPL-SCNC: 137 MEQ/L (ref 135–145)
WBC # BLD AUTO: 7.4 THOU/MM3 (ref 4.8–10.8)

## 2024-06-18 RX ORDER — AMOXICILLIN AND CLAVULANATE POTASSIUM 250; 62.5 MG/5ML; MG/5ML
POWDER, FOR SUSPENSION ORAL
Qty: 75 ML | Refills: 0 | Status: SHIPPED | OUTPATIENT
Start: 2024-06-18

## 2024-06-18 RX ORDER — TIZANIDINE 2 MG/1
2 TABLET ORAL NIGHTLY PRN
Qty: 30 TABLET | Refills: 1 | Status: SHIPPED | OUTPATIENT
Start: 2024-06-18

## 2024-06-18 RX ORDER — TRAZODONE HYDROCHLORIDE 150 MG/1
225 TABLET ORAL NIGHTLY
Qty: 120 TABLET | Refills: 1 | Status: SHIPPED | OUTPATIENT
Start: 2024-06-18 | End: 2024-11-25

## 2024-06-18 RX ORDER — PRAMIPEXOLE DIHYDROCHLORIDE 0.12 MG/1
0.25 TABLET ORAL 2 TIMES DAILY
Qty: 120 TABLET | Refills: 3 | Status: SHIPPED | OUTPATIENT
Start: 2024-06-18

## 2024-06-18 RX ORDER — ERGOCALCIFEROL 1.25 MG/1
50000 CAPSULE ORAL WEEKLY
Qty: 4 CAPSULE | Refills: 5 | Status: SHIPPED | OUTPATIENT
Start: 2024-06-18

## 2024-06-18 NOTE — TELEPHONE ENCOUNTER
Fax from [employer] that need to be completed  and faxed back .  Forms are scanned into media, attached and placed in PCP mailbox for completion .

## 2024-06-18 NOTE — PROGRESS NOTES
Patient:Isha Ramsey  YOB: 1960  MRN: [id number]    Subjective   63 y.o. female who presents for the following: OTHER (Recap of all events and update from hospital and rehab//Need assist for Assisted living  Medicaid waiver. Wants to go to assisted living.)    Incontinence  This is a chronic problem. The current episode started more than 1 month ago. The problem has been gradually worsening since onset.  Aggravating factors include coughing, laughing, lifting, sneezing and sudden urge to void. Incontinence occurs while awake. Frequency of incontinence is most days. Incontinence negatively affects quality of life. Patient is aware of incontinence. Voiding frequency occurs at sensation of void. Protection used includes: diapers. Patient's risk factors for incontinence are history of LUTS. Prior evaluation included none. Type of incontinence is urge, stress and mixed. Drug therapy includes none.  URI   This is a new problem. The current episode started 1 to 4 weeks ago. The problem has been waxing and waning. There has been no fever. Associated symptoms include congestion, coughing and a sore throat. Pertinent negatives include no abdominal pain, chest pain or nausea. Associated symptoms comments: Loss of taste and smell. She has tried nothing for the symptoms.     Review of Systems   Review of Systems   HENT:  Positive for congestion and sore throat.    Respiratory:  Positive for cough.    Cardiovascular:  Negative for chest pain.   Gastrointestinal:  Negative for abdominal pain and nausea.   Endocrine: Positive for polyuria (mixed incontinence, stress and urge).   Genitourinary:  Positive for bladder incontinence.     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

## 2024-06-18 NOTE — TELEPHONE ENCOUNTER
Regarding: Loss of smell and most of taste  Contact: [phone]  ----- Message from Mack Nguyen DO sent at 6/18/2024  8:54 AM EDT -----  Please offer appointment to patient to address these concerns with our office as acute care ASAP. Thanks.     ----- Message from Isha Ramsey to Mack Nguyen DO sent at 6/15/2024  9:54 AM -----   Hi, just thought I should let you know that I haven't been eating much at all and I realized yesterday, I can't smell anything.  My taste is limited to only a certain spot on my tongue and that is minimal.  I am wondering about Covid, blood test and PCR as I tried the store bought and it was negative.  My biggest concern is that this sore throat began close to day of discharge from [facility] and transferred to [facility] for 12 days.  I became more sick everyday and let PA know the first Friday I was there of losing my hearing totally left, very sore throat, and beginning of laryngitis, ear pain, and bilateral mastoiditis on scan.  She did absolutely nothing. As well, I told the house doctor, Dr Piper, both Thursday and Friday a week of my concerns above and not one person even looked at my ears or throat and bloodshot left eye.  My biggest concern, due to all symptoms and those of today, Is that I just exposed a whole entire facility of elderly people, if it is COVID. Thanks

## 2024-06-19 ENCOUNTER — TELEPHONE (OUTPATIENT)
Dept: FAMILY MEDICINE CLINIC | Age: 64
End: 2024-06-19

## 2024-06-19 RX ORDER — METHOTREXATE 2.5 MG/1
15 TABLET ORAL WEEKLY
Qty: 24 TABLET | Refills: 1 | Status: SHIPPED | OUTPATIENT
Start: 2024-06-19 | End: 2024-07-19

## 2024-06-19 RX ORDER — AMOXICILLIN AND CLAVULANATE POTASSIUM 250; 62.5 MG/5ML; MG/5ML
POWDER, FOR SUSPENSION ORAL
Refills: 0 | OUTPATIENT
Start: 2024-06-19

## 2024-06-19 NOTE — TELEPHONE ENCOUNTER
Received call from [facility] asking if Augmentin was meant to be sent in again for pt since she had an rx for it 6 days ago, they also state the directions don't match the quantity. Please advise. Thank you!

## 2024-06-20 ASSESSMENT — ENCOUNTER SYMPTOMS
SORE THROAT: 1
COUGH: 1
NAUSEA: 0
ABDOMINAL PAIN: 0

## 2024-06-25 ENCOUNTER — HOSPITAL ENCOUNTER (EMERGENCY)
Age: 64
Discharge: HOME | End: 2024-06-25
Attending: EMERGENCY MEDICINE
Payer: MEDICARE

## 2024-06-25 ENCOUNTER — APPOINTMENT (OUTPATIENT)
Dept: CT IMAGING | Age: 64
End: 2024-06-25
Payer: MEDICARE

## 2024-06-25 ENCOUNTER — APPOINTMENT (OUTPATIENT)
Dept: GENERAL RADIOLOGY | Age: 64
End: 2024-06-25
Payer: MEDICARE

## 2024-06-25 VITALS
RESPIRATION RATE: 17 BRPM | OXYGEN SATURATION: 99 % | SYSTOLIC BLOOD PRESSURE: 119 MMHG | TEMPERATURE: 97.9 F | HEART RATE: 78 BPM | DIASTOLIC BLOOD PRESSURE: 82 MMHG | WEIGHT: 163 LBS | BODY MASS INDEX: 29.81 KG/M2

## 2024-06-25 DIAGNOSIS — I49.3 PVC'S (PREMATURE VENTRICULAR CONTRACTIONS): Primary | ICD-10-CM

## 2024-06-25 LAB
ALBUMIN SERPL BCG-MCNC: 4.2 G/DL (ref 3.5–5.1)
ALP SERPL-CCNC: 88 U/L (ref 38–126)
ALT SERPL W/O P-5'-P-CCNC: 15 U/L (ref 11–66)
ANION GAP SERPL CALC-SCNC: 13 MEQ/L (ref 8–16)
AST SERPL-CCNC: 17 U/L (ref 5–40)
BACTERIA URNS QL MICRO: ABNORMAL /HPF
BASOPHILS ABSOLUTE: 0 THOU/MM3 (ref 0–0.1)
BASOPHILS NFR BLD AUTO: 0.6 %
BILIRUB SERPL-MCNC: 0.4 MG/DL (ref 0.3–1.2)
BILIRUB UR QL STRIP.AUTO: NEGATIVE
BUN SERPL-MCNC: 14 MG/DL (ref 7–22)
CALCIUM SERPL-MCNC: 9.1 MG/DL (ref 8.5–10.5)
CASTS #/AREA URNS LPF: ABNORMAL /LPF
CASTS 2: ABNORMAL /LPF
CHARACTER UR: CLEAR
CHLORIDE SERPL-SCNC: 106 MEQ/L (ref 98–111)
CO2 SERPL-SCNC: 21 MEQ/L (ref 23–33)
COLOR: YELLOW
CREAT SERPL-MCNC: 0.6 MG/DL (ref 0.4–1.2)
CRYSTALS URNS MICRO: ABNORMAL
DEPRECATED RDW RBC AUTO: 41.3 FL (ref 35–45)
EKG ATRIAL RATE: 89 BPM
EKG P AXIS: 67 DEGREES
EKG P-R INTERVAL: 170 MS
EKG Q-T INTERVAL: 390 MS
EKG QRS DURATION: 68 MS
EKG QTC CALCULATION (BAZETT): 474 MS
EKG R AXIS: 1 DEGREES
EKG T AXIS: 17 DEGREES
EKG VENTRICULAR RATE: 89 BPM
EOSINOPHIL NFR BLD AUTO: 0.9 %
EOSINOPHILS ABSOLUTE: 0.1 THOU/MM3 (ref 0–0.4)
EPITHELIAL CELLS, UA: ABNORMAL /HPF
ERYTHROCYTE [DISTWIDTH] IN BLOOD BY AUTOMATED COUNT: 12.6 % (ref 11.5–14.5)
GFR SERPL CREATININE-BSD FRML MDRD: > 90 ML/MIN/1.73M2
GLUCOSE SERPL-MCNC: 92 MG/DL (ref 70–108)
GLUCOSE UR QL STRIP.AUTO: NEGATIVE MG/DL
HCT VFR BLD AUTO: 39.4 % (ref 37–47)
HGB BLD-MCNC: 13.3 GM/DL (ref 12–16)
HGB UR QL STRIP.AUTO: ABNORMAL
IMM GRANULOCYTES # BLD AUTO: 0.01 THOU/MM3 (ref 0–0.07)
IMM GRANULOCYTES NFR BLD AUTO: 0.2 %
KETONES UR QL STRIP.AUTO: 15
LACTIC ACID, SEPSIS: 1.9 MMOL/L (ref 0.5–1.9)
LIPASE SERPL-CCNC: 26 U/L (ref 5.6–51.3)
LYMPHOCYTES ABSOLUTE: 2.6 THOU/MM3 (ref 1–4.8)
LYMPHOCYTES NFR BLD AUTO: 42 %
MAGNESIUM SERPL-MCNC: 2 MG/DL (ref 1.6–2.4)
MCH RBC QN AUTO: 30.4 PG (ref 26–33)
MCHC RBC AUTO-ENTMCNC: 33.8 GM/DL (ref 32.2–35.5)
MCV RBC AUTO: 90 FL (ref 81–99)
MISCELLANEOUS 2: ABNORMAL
MONOCYTES ABSOLUTE: 0.2 THOU/MM3 (ref 0.4–1.3)
MONOCYTES NFR BLD AUTO: 3.8 %
NEUTROPHILS ABSOLUTE: 3.3 THOU/MM3 (ref 1.8–7.7)
NEUTROPHILS NFR BLD AUTO: 52.5 %
NITRITE UR QL STRIP: NEGATIVE
NRBC BLD AUTO-RTO: 0 /100 WBC
NT-PROBNP SERPL IA-MCNC: 128.9 PG/ML (ref 0–124)
OSMOLALITY SERPL CALC.SUM OF ELEC: 279.5 MOSMOL/KG (ref 275–300)
PH UR STRIP.AUTO: 5.5 [PH] (ref 5–9)
PLATELET # BLD AUTO: 243 THOU/MM3 (ref 130–400)
PMV BLD AUTO: 10.8 FL (ref 9.4–12.4)
POTASSIUM SERPL-SCNC: 3.5 MEQ/L (ref 3.5–5.2)
PROT SERPL-MCNC: 6.7 G/DL (ref 6.1–8)
PROT UR STRIP.AUTO-MCNC: NEGATIVE MG/DL
RBC # BLD AUTO: 4.38 MILL/MM3 (ref 4.2–5.4)
RBC URINE: ABNORMAL /HPF
REASON FOR REJECTION: NORMAL
REJECTED TEST: NORMAL
RENAL EPI CELLS #/AREA URNS HPF: ABNORMAL /[HPF]
SODIUM SERPL-SCNC: 140 MEQ/L (ref 135–145)
SP GR UR REFRACT.AUTO: 1.01 (ref 1–1.03)
TROPONIN, HIGH SENSITIVITY: < 6 NG/L (ref 0–12)
UROBILINOGEN, URINE: 0.2 EU/DL (ref 0–1)
WBC # BLD AUTO: 6.3 THOU/MM3 (ref 4.8–10.8)
WBC #/AREA URNS HPF: ABNORMAL /HPF
WBC #/AREA URNS HPF: NEGATIVE /[HPF]
YEAST LIKE FUNGI URNS QL MICRO: ABNORMAL

## 2024-06-25 PROCEDURE — 93010 ELECTROCARDIOGRAM REPORT: CPT | Performed by: NUCLEAR MEDICINE

## 2024-06-25 PROCEDURE — 83690 ASSAY OF LIPASE: CPT

## 2024-06-25 PROCEDURE — 93005 ELECTROCARDIOGRAM TRACING: CPT | Performed by: STUDENT IN AN ORGANIZED HEALTH CARE EDUCATION/TRAINING PROGRAM

## 2024-06-25 PROCEDURE — 80053 COMPREHEN METABOLIC PANEL: CPT

## 2024-06-25 PROCEDURE — 83735 ASSAY OF MAGNESIUM: CPT

## 2024-06-25 PROCEDURE — 99285 EMERGENCY DEPT VISIT HI MDM: CPT

## 2024-06-25 PROCEDURE — 96374 THER/PROPH/DIAG INJ IV PUSH: CPT

## 2024-06-25 PROCEDURE — 83605 ASSAY OF LACTIC ACID: CPT

## 2024-06-25 PROCEDURE — 6360000004 HC RX CONTRAST MEDICATION: Performed by: EMERGENCY MEDICINE

## 2024-06-25 PROCEDURE — 71046 X-RAY EXAM CHEST 2 VIEWS: CPT

## 2024-06-25 PROCEDURE — 87040 BLOOD CULTURE FOR BACTERIA: CPT

## 2024-06-25 PROCEDURE — 83880 ASSAY OF NATRIURETIC PEPTIDE: CPT

## 2024-06-25 PROCEDURE — 36415 COLL VENOUS BLD VENIPUNCTURE: CPT

## 2024-06-25 PROCEDURE — 84484 ASSAY OF TROPONIN QUANT: CPT

## 2024-06-25 PROCEDURE — 85025 COMPLETE CBC W/AUTO DIFF WBC: CPT

## 2024-06-25 PROCEDURE — 96361 HYDRATE IV INFUSION ADD-ON: CPT

## 2024-06-25 PROCEDURE — 74177 CT ABD & PELVIS W/CONTRAST: CPT

## 2024-06-25 PROCEDURE — 2580000003 HC RX 258: Performed by: STUDENT IN AN ORGANIZED HEALTH CARE EDUCATION/TRAINING PROGRAM

## 2024-06-25 PROCEDURE — 81001 URINALYSIS AUTO W/SCOPE: CPT

## 2024-06-25 PROCEDURE — 6360000002 HC RX W HCPCS: Performed by: STUDENT IN AN ORGANIZED HEALTH CARE EDUCATION/TRAINING PROGRAM

## 2024-06-25 RX ORDER — ONDANSETRON 2 MG/ML
4 INJECTION INTRAMUSCULAR; INTRAVENOUS ONCE
Status: COMPLETED | OUTPATIENT
Start: 2024-06-25 | End: 2024-06-25

## 2024-06-25 RX ORDER — 0.9 % SODIUM CHLORIDE 0.9 %
500 INTRAVENOUS SOLUTION INTRAVENOUS ONCE
Status: COMPLETED | OUTPATIENT
Start: 2024-06-25 | End: 2024-06-25

## 2024-06-25 RX ORDER — MECLIZINE HYDROCHLORIDE 25 MG/1
25 TABLET ORAL EVERY 12 HOURS
Qty: 20 TABLET | Refills: 0 | Status: SHIPPED | OUTPATIENT
Start: 2024-06-25 | End: 2024-06-28

## 2024-06-25 RX ADMIN — ONDANSETRON 4 MG: 2 INJECTION INTRAMUSCULAR; INTRAVENOUS at 13:06

## 2024-06-25 RX ADMIN — SODIUM CHLORIDE 500 ML: 9 INJECTION, SOLUTION INTRAVENOUS at 13:09

## 2024-06-25 RX ADMIN — IOPAMIDOL 80 ML: 755 INJECTION, SOLUTION INTRAVENOUS at 14:29

## 2024-06-25 ASSESSMENT — PAIN - FUNCTIONAL ASSESSMENT: PAIN_FUNCTIONAL_ASSESSMENT: NONE - DENIES PAIN

## 2024-06-25 NOTE — ED NOTES
Pt resting in bed on her phone at this time. Phone plugged in upon pt request. No further pt requests. Call light w/in reach.

## 2024-06-25 NOTE — ED NOTES
Pt to ED via EMS w/ report of palpitations, dizziness, weakness, and emesis. Pt reports that the palpitations started yesterday. Pt states that she was in the parking lot of her rheumatoid doctor when she began chocking on her vomit. Pt reports that she was got out of the hospital on May 19 from having sepsis and acute kidney injury. Call light w/in reach. EKG completed.

## 2024-06-25 NOTE — ED PROVIDER NOTES
[facility] EMERGENCY DEPT      EMERGENCY MEDICINE     Pt Name: Isha Ramsey  MRN: [id number]  Birthdate 1960  Date of evaluation: 6/25/2024  Provider: Joaquim Ryan MD    CHIEF COMPLAINT       Chief Complaint   Patient presents with    Palpitations     HISTORY OF PRESENT ILLNESS   Isha Ramsey is a pleasant 63 y.o. female who presents to the emergency department from from home, by private vehicle for evaluation of palpitations.  Patient presents emergency department complaining of at least 12 hours of palpitations associated with weakness, fatigue, exertional shortness of breath nausea and multiple episodes of vomiting; she also states abdominal pain located on mesogastrium and hypogastrium nonradiated 3/10 intensity that has been there since she was DC from last admission due to complicated UTI.  She denies chest pain, she denies syncope, no fever, no other symptoms.  .    PASTMEDICAL HISTORY     Past Medical History:   Diagnosis Date    Adrenal abnormality (HCC)     Allergic rhinitis     Angina at rest ([facility])     MICROVASCULAR CARDIAC DISEASE    Anxiety     Arthritis     back, neck    Asthma     Carpal tunnel syndrome     Cerebral artery occlusion with cerebral infarction (HCC)     Cerebrovascular disease     Chronic back pain     Chronic sinusitis     Cognitive impairment     with retograde amnesia    Coronary atherosclerosis 02/03/2022    Depression     Dysphagia     Fibromyalgia     Fracture     T-7, T-9, and T-11    GERD (gastroesophageal reflux disease)     Headache     Migraines Hemiplegic    History of degenerative disc disease     Hyperlipidemia     Hypothyroidism 03/10/2009    Irritable bowel syndrome     Lymphedema     Left leg    May-Thurner syndrome     Neuropathy     Osteoarthritis     Peripheral vascular disease (HCC)     PONV (postoperative nausea and vomiting)     Positive BJ (antinuclear antibody)     Psoriatic arthritis ([facility]) 2023    PTSD (post-traumatic stress disorder)     Restless

## 2024-06-25 NOTE — ED NOTES
Pt resting in bed. Pt assisted to bathroom by wheelchair upon request. Pt requesting Tylenol. Vitals updated.

## 2024-06-25 NOTE — ED NOTES
Pt medicated per MAR. Pt provided socks as requested. Pt denies any further requests. VS updated. Lab at bedside. Call light w/in reach.

## 2024-06-26 ENCOUNTER — TELEMEDICINE (OUTPATIENT)
Dept: PSYCHIATRY | Age: 64
End: 2024-06-26
Payer: MEDICARE

## 2024-06-26 ENCOUNTER — TELEPHONE (OUTPATIENT)
Dept: FAMILY MEDICINE CLINIC | Age: 64
End: 2024-06-26

## 2024-06-26 DIAGNOSIS — F43.10 PTSD (POST-TRAUMATIC STRESS DISORDER): Primary | ICD-10-CM

## 2024-06-26 DIAGNOSIS — F41.9 ANXIETY: ICD-10-CM

## 2024-06-26 DIAGNOSIS — F33.1 MODERATE EPISODE OF RECURRENT MAJOR DEPRESSIVE DISORDER (HCC): ICD-10-CM

## 2024-06-26 PROCEDURE — G8427 DOCREV CUR MEDS BY ELIG CLIN: HCPCS | Performed by: PSYCHIATRY & NEUROLOGY

## 2024-06-26 PROCEDURE — 99214 OFFICE O/P EST MOD 30 MIN: CPT | Performed by: PSYCHIATRY & NEUROLOGY

## 2024-06-26 PROCEDURE — 90833 PSYTX W PT W E/M 30 MIN: CPT | Performed by: PSYCHIATRY & NEUROLOGY

## 2024-06-26 PROCEDURE — 3017F COLORECTAL CA SCREEN DOC REV: CPT | Performed by: PSYCHIATRY & NEUROLOGY

## 2024-06-26 PROCEDURE — 1111F DSCHRG MED/CURRENT MED MERGE: CPT | Performed by: PSYCHIATRY & NEUROLOGY

## 2024-06-26 RX ORDER — ESCITALOPRAM OXALATE 10 MG/1
10 TABLET ORAL DAILY
Qty: 90 TABLET | Refills: 0 | Status: SHIPPED | OUTPATIENT
Start: 2024-06-26

## 2024-06-26 NOTE — TELEPHONE ENCOUNTER
Patient called in today to check the status of her [employer] paperwork. Scanned in on 06/18/2024. Paperwork needs completed and faxed back to Makeda Ardon at [phone]  
   ACDF, bilateral shoulder surgeries    KYPHOSIS SURGERY  2023    T11    APPLE STEROTACTIC LOC BREAST BIOPSY LEFT Left     Benign , done in [place] 7690-8997    APPLE STEROTACTIC LOC BREAST BIOPSY RIGHT Right     Benign , done in [place] 2513-3218    [facility] US GUID NDL BIOPSY LEFT Left 03/04/2024    APPLE US GUID NDL BIOPSY LEFT 3/4/2024 Cuong Vee MD [facility]    NECK SURGERY  12/2020    ACDF    OVARY REMOVAL  2012    PAIN MANAGEMENT PROCEDURE Bilateral 06/29/2021    medial branch blocks at bilateral L4/L5 and L5/S1 performed by Leandro Powell DO at [facility] OR    PAIN MANAGEMENT PROCEDURE Bilateral 07/20/2021    confirmatory medial branch blocks at bilateral L4/L5 and L5/S1 performed by Leandro Powell DO at [facility] OR    PAIN MANAGEMENT PROCEDURE Right 07/27/2021    thermal radiofrequency ablation medial branches L4/L5 and L5/S1 right side first performed by Leandro Powell DO at [facility] OR    PAIN MANAGEMENT PROCEDURE Left 08/03/2021    thermal radiofrequency ablation medial branches L4/L5 and L5/S1 left performed by Leandro Powell DO at [facility] OR    PAIN MANAGEMENT PROCEDURE N/A 03/05/2024    lumbar 3-4 epidural steroid injection performed by Leandro Powell DO at [facility] OR    PTCA      Catheterizations    SEPTOPLASTY Right 06/14/2021    SEPTOPLASTY, SUBMUCOUS RESECTION TURBINATE, RIGHT PARTIAL INFERIOR, NASAL ENDOSCOPY WITH PARTIAL RESECTION OF RIGHT MIDDLE JHON BULLOSA performed by Jaspal Delgado MD at [facility] OR    SHOULDER SURGERY      SHOULDER SURGERY Left     SINUS SURGERY      SPINE SURGERY N/A 09/05/2023    KYPHOPLASTY of T11 performed by Alfreda Leon MD at [facility] OR    TONSILLECTOMY      TUBAL LIGATION      UPPER GASTROINTESTINAL ENDOSCOPY      Many times due to strictures    US THYROID CYST ASPIRATION AND OR INJECTION  08/06/2021    US THYROID CYST ASPIRATION AND OR INJECTION 8/6/2021 [facility] ULTRASOUND      Allergies

## 2024-06-26 NOTE — PROGRESS NOTES
[facility] [place] [facility]  [facility]  [address] [id number] [phone]    Progress Note    Patient:  Isha Ramsey  YOB: 1960  PCP:  Mack Nguyen DO  Visit Date:  6/26/2024      Chief Complaint   Patient presents with    Follow-up    Depression    Anxiety    Trauma       SUBJECTIVE:    Time in: 12:39pm  Time out: 1:16pm  Documentation time: 10 min    Isha Ramsey, a 63 y.o. female, presents for a follow up visit.     Presents alone. Last saw me in 9/2021.   Had moved to [place] but moved back. Had gotten a 2nd floor apartment but mobility worsened and was unable to do stairs, go to bigger stores. Moved back to [place] 1.5 years ago into a camper next to her son's. Notes his past belligerent behavior toward her. Prior to moving back had gone into independent living facility noting she wasn't able to afford it for long. Now only has her car and camper.  Health stressors. After moving back to [place] began having serious back pains, was dx with osteoporosis, psoriatic arthritis, DDD. Now has COPD.  Has been home bound. Pays her DIL to cook for her and feels she's taking advantage of Isha. Has been out to eat with her sister but otherwise out only for appointments.  More trouble with ADLs. Trying to get into [employer], working with [facility] A[facility]/Waiver program. Notes the need to be around others, feeling isolated. Her son is worried about her life insurance.   Mood is depressed.   Was hospitalized last month with urosepsis, in ICU. Notes her son never came to see her. Was having stroke-like sxs on L side of her body with numbness, inability to walk. Notes R side affected by prior CVA. Still feeling weak from the hospitalization.  Yesterday was in ED with palpitations. Felt dizzy on drive to appointment yesterday. N/v, was in a parking lot, called EMS.   Son blames her \"everything is my fault\". Unsure if he is drinking again. Notes his

## 2024-06-27 ENCOUNTER — TELEPHONE (OUTPATIENT)
Dept: RHEUMATOLOGY | Age: 64
End: 2024-06-27

## 2024-06-27 ENCOUNTER — OFFICE VISIT (OUTPATIENT)
Dept: RHEUMATOLOGY | Age: 64
End: 2024-06-27
Payer: MEDICARE

## 2024-06-27 VITALS
HEIGHT: 62 IN | OXYGEN SATURATION: 98 % | BODY MASS INDEX: 29.22 KG/M2 | DIASTOLIC BLOOD PRESSURE: 70 MMHG | WEIGHT: 158.8 LBS | SYSTOLIC BLOOD PRESSURE: 126 MMHG | HEART RATE: 84 BPM

## 2024-06-27 DIAGNOSIS — M54.50 CHRONIC BILATERAL LOW BACK PAIN WITHOUT SCIATICA: ICD-10-CM

## 2024-06-27 DIAGNOSIS — S22.000A COMPRESSION FRACTURE OF BODY OF THORACIC VERTEBRA (HCC): ICD-10-CM

## 2024-06-27 DIAGNOSIS — L40.50 PSORIATIC ARTHRITIS (HCC): ICD-10-CM

## 2024-06-27 DIAGNOSIS — M80.08XA AGE-RELATED OSTEOPOROSIS WITH CURRENT PATHOLOGICAL FRACTURE, VERTEBRA(E), INITIAL ENCOUNTER FOR FRACTURE (HCC): Primary | ICD-10-CM

## 2024-06-27 DIAGNOSIS — M79.7 FIBROMYALGIA: ICD-10-CM

## 2024-06-27 DIAGNOSIS — Z51.81 MEDICATION MONITORING ENCOUNTER: ICD-10-CM

## 2024-06-27 DIAGNOSIS — G89.29 CHRONIC BILATERAL LOW BACK PAIN WITHOUT SCIATICA: ICD-10-CM

## 2024-06-27 PROCEDURE — 3017F COLORECTAL CA SCREEN DOC REV: CPT | Performed by: NURSE PRACTITIONER

## 2024-06-27 PROCEDURE — G8427 DOCREV CUR MEDS BY ELIG CLIN: HCPCS | Performed by: NURSE PRACTITIONER

## 2024-06-27 PROCEDURE — 1111F DSCHRG MED/CURRENT MED MERGE: CPT | Performed by: NURSE PRACTITIONER

## 2024-06-27 PROCEDURE — 1036F TOBACCO NON-USER: CPT | Performed by: NURSE PRACTITIONER

## 2024-06-27 PROCEDURE — 99214 OFFICE O/P EST MOD 30 MIN: CPT | Performed by: NURSE PRACTITIONER

## 2024-06-27 PROCEDURE — G8417 CALC BMI ABV UP PARAM F/U: HCPCS | Performed by: NURSE PRACTITIONER

## 2024-06-27 PROCEDURE — G2211 COMPLEX E/M VISIT ADD ON: HCPCS | Performed by: NURSE PRACTITIONER

## 2024-06-27 RX ORDER — SECUKINUMAB 150 MG/ML
150 INJECTION SUBCUTANEOUS
Qty: 1 ML | Refills: 3 | Status: ACTIVE | OUTPATIENT
Start: 2024-06-27

## 2024-06-27 ASSESSMENT — ENCOUNTER SYMPTOMS
SHORTNESS OF BREATH: 1
CONSTIPATION: 1
EYE PAIN: 0
EYE ITCHING: 0
BACK PAIN: 0
COUGH: 0
NAUSEA: 0
ABDOMINAL PAIN: 0
TROUBLE SWALLOWING: 0
DIARRHEA: 0

## 2024-06-27 NOTE — PROGRESS NOTES
[facility] RHEUMATOLOGY FOLLOW UP NOTE       Date Of Service: 6/27/2024  Provider: ESTEFANIA Driver - CNP    Name: Isha Ramsey   MRN: [id number]    Chief Complaint(s)     Chief Complaint   Patient presents with    Follow-up     2 month follow up PSA        History of Present Illness (HPI)     Isha Ramsey  is a(n)63 y.o. female with a hx of adrenal abnormalities, arthritis, asthma, CVA, headaches, hyperlipidemia, h/o myositis, bipolar disorder, may thurner syndrome, dry eye, fatty liver here for the f/u evaluation of h/o MCTD, inflammatory arthritis     Interval hx:    - hospitalized for UTI in May- became septic and also developed some left sided weakness- code stroke called, CT later negative or acute infarct. Was transferred to SNF for rehab. Now back home, but looking into assisted living facilities. Has been off the cosentyx and the methtorexate since hospitalization. Completed cosentyx loading dose, and is now having issues getting a refill from the pharmacy.    - left sided hearing loss starting while in the hospital in May- scheduled to see ENT tomorrow and was also referred to audiologist. Was placed on antibiotic for upper respiratory infection.    - eye redness and itching starting in both eyes over 1 month ago- reprots some improvement with OTC allergy medications. Today left eye is red, itching. Denies vision changes or eye pain. Some mild photophobia.    - lots of family stressors lately, not much family support    pain affecting the fingers, neck, back, left hip, knees, feet  Pain on a scale 0-10: 7/10  Type of pain: constant aching, stiffness  Timing: mornings  Aggravating factors: weather changes, back: getting up from seated position, walking long distances  Alleviating factors: tylenol hands    Associated symptoms:  + swelling/  Redness/ warmth (left ankle), + AM stiffness lasting several hours     REVIEW OF SYSTEMS: (ROS)    Review of Systems   Constitutional:  Positive for fatigue.

## 2024-06-27 NOTE — TELEPHONE ENCOUNTER
PROVIDER FEEDBACK LOOP CALLED 3X     Patient:Isha Ramsey  : 1960  Referring Provider: JOSE LUIS GARCIA  Referral Type:  Treatment Plan and Therapy Plan     Procedures:   - DE INJ. ROMOSOZUMAB-AQQG 1 MG  Date Service Ordered 2023        We were unable to reach Isha Ramsey to schedule the test ordered by your office after 3 outreach attempts via either text, email and/or phone call.  Please call/follow up with your patient to explain the significance of the ordered test and direct the patient to call Central Scheduling to schedule the test at their earliest convenience.     Please complete one of the following actions from Quick Actions buttons:     Route to Provider:  Route message to ordering provider to seek next steps in care plan.     Telephone Encounter:  Telephone encounter will open.  Call patient to explain significance of ordered test and direct patient to call Central Scheduling to schedule test then Document details of call.     Open Referral:  Review referral notes or details if needed.     Close Referral:  Referral will open.  Document in Notes section of referral why the referral is being closed.  Examples of referral closure:  Patient had test done outside of of an office in the [place] [facility], Patient refuses test, Patient no longer having symptoms, Unable to reach patient.  Only close the referral if you are sure the test will not proceed.     Thank you     Pre-Access Scheduling Team         Pt scheduled for infusion 7/15/24.

## 2024-06-28 ENCOUNTER — HOSPITAL ENCOUNTER (OUTPATIENT)
Dept: PULMONOLOGY | Age: 64
Discharge: HOME | End: 2024-06-28
Payer: MEDICARE

## 2024-06-28 ENCOUNTER — OFFICE VISIT (OUTPATIENT)
Dept: ENT CLINIC | Age: 64
End: 2024-06-28
Payer: MEDICARE

## 2024-06-28 VITALS
BODY MASS INDEX: 29.24 KG/M2 | RESPIRATION RATE: 18 BRPM | WEIGHT: 158.9 LBS | TEMPERATURE: 97.8 F | DIASTOLIC BLOOD PRESSURE: 58 MMHG | SYSTOLIC BLOOD PRESSURE: 100 MMHG | HEART RATE: 74 BPM | OXYGEN SATURATION: 98 % | HEIGHT: 62 IN

## 2024-06-28 DIAGNOSIS — R93.89 ABNORMAL CHEST X-RAY: ICD-10-CM

## 2024-06-28 DIAGNOSIS — H65.23 BILATERAL CHRONIC SEROUS OTITIS MEDIA: Primary | ICD-10-CM

## 2024-06-28 DIAGNOSIS — R06.02 SOB (SHORTNESS OF BREATH): ICD-10-CM

## 2024-06-28 DIAGNOSIS — E04.2 MULTIPLE THYROID NODULES: ICD-10-CM

## 2024-06-28 PROCEDURE — 94726 PLETHYSMOGRAPHY LUNG VOLUMES: CPT

## 2024-06-28 PROCEDURE — 99214 OFFICE O/P EST MOD 30 MIN: CPT | Performed by: REGISTERED NURSE

## 2024-06-28 PROCEDURE — G8427 DOCREV CUR MEDS BY ELIG CLIN: HCPCS | Performed by: REGISTERED NURSE

## 2024-06-28 PROCEDURE — 3017F COLORECTAL CA SCREEN DOC REV: CPT | Performed by: REGISTERED NURSE

## 2024-06-28 PROCEDURE — 94729 DIFFUSING CAPACITY: CPT

## 2024-06-28 PROCEDURE — 1036F TOBACCO NON-USER: CPT | Performed by: REGISTERED NURSE

## 2024-06-28 PROCEDURE — 94060 EVALUATION OF WHEEZING: CPT

## 2024-06-28 PROCEDURE — G8417 CALC BMI ABV UP PARAM F/U: HCPCS | Performed by: REGISTERED NURSE

## 2024-06-28 PROCEDURE — 1111F DSCHRG MED/CURRENT MED MERGE: CPT | Performed by: REGISTERED NURSE

## 2024-06-28 NOTE — PROGRESS NOTES
but occasionally words are mis-transcribed.)    Electronically signed by ESTEFANIA Sykes CNP on 6/28/2024 at 11:24 AM

## 2024-06-29 DIAGNOSIS — K21.9 GASTROESOPHAGEAL REFLUX DISEASE, UNSPECIFIED WHETHER ESOPHAGITIS PRESENT: ICD-10-CM

## 2024-06-30 LAB
BACTERIA BLD AEROBE CULT: NORMAL
BACTERIA BLD AEROBE CULT: NORMAL

## 2024-07-01 ENCOUNTER — OFFICE VISIT (OUTPATIENT)
Dept: FAMILY MEDICINE CLINIC | Age: 64
End: 2024-07-01

## 2024-07-01 ENCOUNTER — OFFICE VISIT (OUTPATIENT)
Dept: CARDIOLOGY CLINIC | Age: 64
End: 2024-07-01
Payer: MEDICARE

## 2024-07-01 VITALS
WEIGHT: 160.6 LBS | HEIGHT: 62 IN | RESPIRATION RATE: 16 BRPM | SYSTOLIC BLOOD PRESSURE: 102 MMHG | BODY MASS INDEX: 29.55 KG/M2 | DIASTOLIC BLOOD PRESSURE: 66 MMHG | HEART RATE: 71 BPM | OXYGEN SATURATION: 98 % | TEMPERATURE: 97.9 F

## 2024-07-01 VITALS
BODY MASS INDEX: 29.81 KG/M2 | DIASTOLIC BLOOD PRESSURE: 60 MMHG | WEIGHT: 162 LBS | HEIGHT: 62 IN | SYSTOLIC BLOOD PRESSURE: 118 MMHG | HEART RATE: 92 BPM

## 2024-07-01 DIAGNOSIS — I25.119 ATHEROSCLEROSIS OF NATIVE CORONARY ARTERY OF NATIVE HEART WITH ANGINA PECTORIS (HCC): Primary | ICD-10-CM

## 2024-07-01 DIAGNOSIS — D64.9 ANEMIA, UNSPECIFIED TYPE: ICD-10-CM

## 2024-07-01 DIAGNOSIS — G43.409 HEMIPLEGIC MIGRAINE WITHOUT STATUS MIGRAINOSUS, NOT INTRACTABLE: ICD-10-CM

## 2024-07-01 DIAGNOSIS — R06.09 DOE (DYSPNEA ON EXERTION): ICD-10-CM

## 2024-07-01 DIAGNOSIS — F43.10 PTSD (POST-TRAUMATIC STRESS DISORDER): ICD-10-CM

## 2024-07-01 DIAGNOSIS — E78.2 MIXED HYPERLIPIDEMIA: ICD-10-CM

## 2024-07-01 DIAGNOSIS — R00.2 PALPITATIONS: Primary | ICD-10-CM

## 2024-07-01 DIAGNOSIS — R94.31 ABNORMAL ELECTROCARDIOGRAM (ECG) (EKG): ICD-10-CM

## 2024-07-01 DIAGNOSIS — I20.89 MICROVASCULAR ANGINA (HCC): ICD-10-CM

## 2024-07-01 DIAGNOSIS — I73.9 PAD (PERIPHERAL ARTERY DISEASE) (HCC): ICD-10-CM

## 2024-07-01 DIAGNOSIS — R42 DIZZINESS: ICD-10-CM

## 2024-07-01 DIAGNOSIS — K21.9 GASTROESOPHAGEAL REFLUX DISEASE, UNSPECIFIED WHETHER ESOPHAGITIS PRESENT: ICD-10-CM

## 2024-07-01 DIAGNOSIS — J30.2 SEASONAL ALLERGIES: ICD-10-CM

## 2024-07-01 DIAGNOSIS — Z86.19 HX OF HERPES GENITALIS: ICD-10-CM

## 2024-07-01 DIAGNOSIS — F33.41 MDD (MAJOR DEPRESSIVE DISORDER), RECURRENT, IN PARTIAL REMISSION (HCC): ICD-10-CM

## 2024-07-01 DIAGNOSIS — I69.30 HISTORY OF CVA WITH RESIDUAL DEFICIT: ICD-10-CM

## 2024-07-01 PROBLEM — Z86.73 HISTORY OF STROKE: Status: ACTIVE | Noted: 2024-07-01

## 2024-07-01 PROCEDURE — 93000 ELECTROCARDIOGRAM COMPLETE: CPT | Performed by: INTERNAL MEDICINE

## 2024-07-01 PROCEDURE — 99204 OFFICE O/P NEW MOD 45 MIN: CPT | Performed by: INTERNAL MEDICINE

## 2024-07-01 RX ORDER — ACYCLOVIR 200 MG/1
200 CAPSULE ORAL DAILY
Qty: 90 CAPSULE | Refills: 0 | Status: SHIPPED | OUTPATIENT
Start: 2024-07-01

## 2024-07-01 RX ORDER — BUPROPION HYDROCHLORIDE 300 MG/1
TABLET ORAL
Qty: 90 TABLET | Refills: 0 | Status: SHIPPED | OUTPATIENT
Start: 2024-07-01

## 2024-07-01 RX ORDER — LORATADINE 10 MG/1
10 TABLET ORAL DAILY
Qty: 90 TABLET | Refills: 0 | Status: SHIPPED | OUTPATIENT
Start: 2024-07-01 | End: 2024-09-29

## 2024-07-01 RX ORDER — ATORVASTATIN CALCIUM 40 MG/1
40 TABLET, FILM COATED ORAL
Qty: 90 TABLET | Refills: 0 | Status: SHIPPED | OUTPATIENT
Start: 2024-07-01 | End: 2025-01-27

## 2024-07-01 RX ORDER — MECLIZINE HYDROCHLORIDE 25 MG/1
25 TABLET ORAL 2 TIMES DAILY PRN
Qty: 30 TABLET | Refills: 0 | Status: SHIPPED | OUTPATIENT
Start: 2024-07-01

## 2024-07-01 RX ORDER — TOPIRAMATE 100 MG/1
TABLET, FILM COATED ORAL
Qty: 180 TABLET | Refills: 1 | Status: SHIPPED | OUTPATIENT
Start: 2024-07-01

## 2024-07-01 RX ORDER — ONDANSETRON 4 MG/1
4 TABLET, FILM COATED ORAL 3 TIMES DAILY PRN
Qty: 30 TABLET | Refills: 0 | Status: SHIPPED | OUTPATIENT
Start: 2024-07-01

## 2024-07-01 RX ORDER — FERROUS SULFATE 325(65) MG
325 TABLET ORAL EVERY OTHER DAY
Qty: 90 TABLET | Refills: 1 | Status: SHIPPED | OUTPATIENT
Start: 2024-07-01

## 2024-07-01 ASSESSMENT — ENCOUNTER SYMPTOMS
COUGH: 1
NAUSEA: 0
SORE THROAT: 1
ABDOMINAL PAIN: 0

## 2024-07-01 NOTE — PROGRESS NOTES
[facility] [place] [facility] [place] [facility]  [facility] CARDIOLOGY  [address]  [address]  [facility] [address]  Dept: [phone]  Dept Fax: [phone]  Loc: [phone]         CHIEF COMPLAINT:  Palpitations  WEST  Microvascular angina  HLD    HISTORY OF PRESENT ILLNESS:      The patient is a 63 y.o. white female with a very extensive and complex medical history who presents for ER follow up and cardiac evaluation.  Patient has a history of microvascular angina.  She has had several cardiac caths with the last one being in [place] 2-3 years ago.  She was told she had tortuous coronary arteries, but no mention of blockages.  We do not have any reports.  She says she has not had any further chest pain since then.  She has increasing WEST for the past 2 years.  It has been getting progressively worse.  She quit smoking in 2022.  She is seeing pulmonary soon.  She had an episode of heart racing and fluttering that lasted about 15-20 minutes and was associated with dyspnea, nausea and dizziness.  She was seen in ER.  She was evaluated and released.  No syncope.    The patient denies any history of hypertension or diabetes.  She has hyperlipidemia that is being treated.  She is a former smoker quit in 2022.  There is no significant family history of CAD that she is aware of.  She is adopted and has limited knowledge of any family members.  Patient also has a history of COPD along with previous stroke with residual right sided weakness, psoriatic arthritis and migraine headaches.  She has PAD with May-Thurner syndrome and an iliac stent.  She has many other medical issues also.     Past Medical History:  Past Medical History:   Diagnosis Date    Adrenal abnormality (HCC)     Allergic rhinitis     Angina at rest (HCC)     MICROVASCULAR CARDIAC DISEASE    Anxiety     Arthritis     back, neck    Asthma     Carpal tunnel syndrome     Cerebral artery occlusion with cerebral infarction (HCC)     Cerebrovascular

## 2024-07-01 NOTE — PROGRESS NOTES
S: 63 y.o. female with   Chief Complaint   Patient presents with    Follow-up     Follow up       HPI: please see resident note for HPI and ROS.    BP Readings from Last 3 Encounters:   07/01/24 118/60   07/01/24 102/66   06/28/24 (!) 100/58     Wt Readings from Last 3 Encounters:   07/01/24 73.5 kg (162 lb)   07/01/24 72.8 kg (160 lb 9.6 oz)   06/28/24 72.1 kg (158 lb 14.4 oz)       O: VS:  height is 1.575 m (5' 2.01\") and weight is 72.8 kg (160 lb 9.6 oz). Her oral temperature is 97.9 °F (36.6 °C). Her blood pressure is 102/66 and her pulse is 71. Her respiration is 16 and oxygen saturation is 98%.   AAO/NAD, appropriate affect for mood  CV:  RRR, no murmur  Resp: CTAB     Diagnosis Orders   1. Atherosclerosis of native coronary artery of native heart with angina pectoris (HCC)  atorvastatin (LIPITOR) 40 MG tablet      2. History of CVA with residual deficit  atorvastatin (LIPITOR) 40 MG tablet      3. PTSD (post-traumatic stress disorder)  buPROPion (WELLBUTRIN XL) 300 MG extended release tablet      4. MDD (major depressive disorder), recurrent, in partial remission (HCC)  buPROPion (WELLBUTRIN XL) 300 MG extended release tablet      5. Gastroesophageal reflux disease, unspecified whether esophagitis present  esomeprazole (NEXIUM) 20 MG delayed release capsule      6. Seasonal allergies  loratadine (CLARITIN) 10 MG tablet      7. Dizziness  meclizine (ANTIVERT) 25 MG tablet      8. Hemiplegic migraine without status migrainosus, not intractable  ondansetron (ZOFRAN) 4 MG tablet    topiramate (TOPAMAX) 100 MG tablet      9. Anemia, unspecified type  ferrous sulfate (IRON 325) 325 (65 Fe) MG tablet      10. Hx of herpes genitalis  acyclovir (ZOVIRAX) 200 MG capsule          Plan:  Please refer to resident note for full plan.    63-year-old male presents the office to follow-up on multiple chronic medical conditions.    Coronary artery disease: Chronic, currently asymptomatic without angina.  Will continue to 
uropathy is visualized.    3. Chronic findings are discussed.    **This report has been created using voice recognition software.  It may contain  minor errors which are inherent in voice recognition technology.**    Electronically signed by Dr. Nancy Clark  XR CHEST (2 VW)  Narrative: PROCEDURE: XR CHEST (2 VW)    CLINICAL INFORMATION: Shortness of Breath    COMPARISON: 5/21/2024    TECHNIQUE: 2 views of the chest    FINDINGS:    Plate and screw fixation seen in the lower cervical spine. Compression  deformities are seen in the thoracic spine. Vertebroplasty material is seen in  T12.    No focal pulmonary consolidation.     Cardiac silhouette is not enlarged.     No pleural effusion. No pneumothorax.     No acute bony abnormality.  Impression: 1. No acute cardiopulmonary finding..    **This report has been created using voice recognition software.  It may contain  minor errors which are inherent in voice recognition technology.**    Electronically signed by Dr. Asha Pastor    XR CHEST PORTABLE  Order: [id number]  Status: Final result       Visible to patient: Yes (seen)       Next appt: 06/25/2024 at 11:40 AM in Rheumatology (Liat Hallman, ESTEFANIA - CNP)    0 Result Notes  Details    Reading Physician Reading Date Result Priority   Mauricio Patel MD  [phone] 5/21/2024      Narrative & Impression  PROCEDURE: XR CHEST PORTABLE     CLINICAL INFORMATION: stroke symptoms.     COMPARISON: Chest x-ray dated 10/8/2023..     TECHNIQUE: AP upright view of the chest.     FINDINGS:        The heart size is normal.The mediastinum is not widened.  There there is mild  diffuse COPD with no new pulmonary infiltrates or effusions. The pulmonary  vascularity is normal.  The patient is status post instrumentation in the lower thoracic spine. There  are postoperative changes in the lower cervical spine.     IMPRESSION:  1. No interval change since previous study dated 10/8/2023, no acute  cardiopulmonary disease..

## 2024-07-01 NOTE — PROGRESS NOTES
New to provider last saw Dr. Han in 2021. Following up from ED     EKG done today     C/o sob on exertion, palpitations and OBEY    Denies any chest pain and dizziness

## 2024-07-02 ENCOUNTER — NURSE ONLY (OUTPATIENT)
Dept: LAB | Age: 64
End: 2024-07-02

## 2024-07-02 DIAGNOSIS — E04.2 MULTIPLE THYROID NODULES: ICD-10-CM

## 2024-07-02 LAB
T4 FREE SERPL-MCNC: 1.08 NG/DL (ref 0.93–1.68)
TSH SERPL DL<=0.005 MIU/L-ACNC: 0.47 UIU/ML (ref 0.4–4.2)

## 2024-07-03 DIAGNOSIS — G43.909 MIGRAINE WITHOUT STATUS MIGRAINOSUS, NOT INTRACTABLE, UNSPECIFIED MIGRAINE TYPE: ICD-10-CM

## 2024-07-03 DIAGNOSIS — G43.409 HEMIPLEGIC MIGRAINE WITHOUT STATUS MIGRAINOSUS, NOT INTRACTABLE: ICD-10-CM

## 2024-07-03 RX ORDER — BUTALBITAL, ACETAMINOPHEN AND CAFFEINE 50; 325; 40 MG/1; MG/1; MG/1
TABLET ORAL
Qty: 15 TABLET | Refills: 0 | Status: SHIPPED | OUTPATIENT
Start: 2024-07-03 | End: 2024-08-02

## 2024-07-03 NOTE — ASSESSMENT & PLAN NOTE
Stable, chronic condition at goal on current regimen; will continue current therapies.  Nexium 20 mg/day refilled as below.

## 2024-07-03 NOTE — ASSESSMENT & PLAN NOTE
CAD is asymptomatic without angina, follows with cardiology. continue follow-up with specialty. C/w lipitor as reordered below.

## 2024-07-03 NOTE — ASSESSMENT & PLAN NOTE
Stable, chronic condition at goal on current regimen (Acyclovir 200 mg/day suppressive therapy); will continue current therapies.

## 2024-07-03 NOTE — TELEPHONE ENCOUNTER
Refill sent. Patient has upcoming appointment. No further action needed. Thanks.     PDMP Monitoring: Appropriate     Last PDMP Shreyas as Reviewed:  Review User Review Instant Review Result   MACK LIM[place] 7/3/2024  4:29 PM Reviewed PDMP [1]     Electronically signed by Mack Lima DO on 7/3/2024 at 4:31 PM

## 2024-07-03 NOTE — ASSESSMENT & PLAN NOTE
Chronic, uncontrolled. Previously on topiramate BID, inadvertently reduced to daily per patient. Will resume at BID dosing. C/w Fioricet as needed.

## 2024-07-03 NOTE — TELEPHONE ENCOUNTER
Patient's last appointment was : 7/1/2024 with our   Patient's next appointment is : 8/13/2024 with our Dr. Nguyen  Last refilled on: 05/19/2024  Which pharmacy does the script need sent to: [facility] [address]      Lab Results   Component Value Date    LABA1C 5.4 05/22/2024     Lab Results   Component Value Date    CHOL 210 (H) 05/22/2024    TRIG 263 (H) 05/22/2024    HDL 31 05/22/2024     Lab Results   Component Value Date     06/25/2024    K 3.5 06/25/2024     06/25/2024    CO2 21 (L) 06/25/2024    BUN 14 06/25/2024    CREATININE 0.6 06/25/2024    GLUCOSE 92 06/25/2024    CALCIUM 9.1 06/25/2024    BILITOT 0.4 06/25/2024    ALKPHOS 88 06/25/2024    AST 17 06/25/2024    ALT 15 06/25/2024    LABGLOM > 90 06/25/2024    GFRAA >60 02/19/2021    AGRATIO 2.9 (H) 02/05/2021     Lab Results   Component Value Date    TSH 0.470 07/02/2024    FT3 2.56 07/15/2021    T4FREE 1.08 07/02/2024     Lab Results   Component Value Date    WBC 6.3 06/25/2024    HGB 13.3 06/25/2024    HCT 39.4 06/25/2024    MCV 90.0 06/25/2024     06/25/2024

## 2024-07-08 ENCOUNTER — OFFICE VISIT (OUTPATIENT)
Dept: PHYSICAL MEDICINE AND REHAB | Age: 64
End: 2024-07-08
Payer: MEDICARE

## 2024-07-08 VITALS
DIASTOLIC BLOOD PRESSURE: 74 MMHG | WEIGHT: 162.04 LBS | SYSTOLIC BLOOD PRESSURE: 128 MMHG | BODY MASS INDEX: 29.82 KG/M2 | HEIGHT: 62 IN

## 2024-07-08 DIAGNOSIS — M47.814 THORACIC SPONDYLOSIS: ICD-10-CM

## 2024-07-08 DIAGNOSIS — S22.000A COMPRESSION FRACTURE OF BODY OF THORACIC VERTEBRA (HCC): Primary | ICD-10-CM

## 2024-07-08 DIAGNOSIS — G89.4 CHRONIC PAIN SYNDROME: ICD-10-CM

## 2024-07-08 DIAGNOSIS — M79.18 MYOFASCIAL PAIN: ICD-10-CM

## 2024-07-08 PROCEDURE — 1036F TOBACCO NON-USER: CPT | Performed by: ANESTHESIOLOGY

## 2024-07-08 PROCEDURE — 3017F COLORECTAL CA SCREEN DOC REV: CPT | Performed by: ANESTHESIOLOGY

## 2024-07-08 PROCEDURE — G8417 CALC BMI ABV UP PARAM F/U: HCPCS | Performed by: ANESTHESIOLOGY

## 2024-07-08 PROCEDURE — G8427 DOCREV CUR MEDS BY ELIG CLIN: HCPCS | Performed by: ANESTHESIOLOGY

## 2024-07-08 PROCEDURE — 99214 OFFICE O/P EST MOD 30 MIN: CPT | Performed by: ANESTHESIOLOGY

## 2024-07-08 RX ORDER — ATOGEPANT 60 MG/1
60 TABLET ORAL
Qty: 16 TABLET | Refills: 0 | Status: SHIPPED | OUTPATIENT
Start: 2024-07-08

## 2024-07-08 RX ORDER — FENTANYL 25 UG/1
1 PATCH TRANSDERMAL
Qty: 10 PATCH | Refills: 0 | Status: SHIPPED | OUTPATIENT
Start: 2024-07-08 | End: 2024-08-07

## 2024-07-08 NOTE — PROGRESS NOTES
Chronic Pain/PM&R Clinic Note     Encounter Date: 7/8/24    Subjective:   Chief Complaint:   Chief Complaint   Patient presents with    Follow-up     Discuss results from EKG, pt would also like to talk about recent hospital stay.  Discuss migraine medication        History of Present Illness:   Isha Ramsey is a 63 y.o. female seen in the office initially on 03/05/21 for her management of migraines.  She has medical history of previous TIA with residual cognitive deficits, and ACDF C3-C7 with Dr Puri. She has longstanding history of migraine headaches as well as of the last 35 years.  She describes 2 different types of migraine headaches.  Her standard migraine headaches she reports started on the right side the neck and wrap around the entire side of the front of the head.  She has associated phonophobia and photophobia.  She also notices these migraines to begin with an aura where her nose starts to burn.  She has associated nausea/vomiting when the migraines are severe.  Her migraines last greater than 4 hours in duration interfering everyday activities.  In addition she reports greater than 15 migraine attacks last month.  She has failed multiple medications in the past including Fioricet, Ubrelvy, nortriptyline, and is currently on Aimovig and Topamax.  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    In addition, she does have axial low back pain that contributes to a lot of her debilitating pain.  She denies any radiation down the legs, focal leg weakness, leg paresthesias, saddle anesthesia, bowel/bladder incontinence.    4/15/2024, patient presents for planned follow-up for chronic thoracic back pain.  She states that she responded significantly to her lumbar epidural steroid injection but only for 3 weeks.  She felt like this has helped with her posture somewhat but she still has a lot of issues with standing upright.  She states that she is looking at getting new insurance

## 2024-07-08 NOTE — PROGRESS NOTES
[facility] PROFESSIONAL SERVS  [facility] NEUROSCIENCE AND REHABILITATION [address]  [facility] [address]  Dept: [phone]  Dept Fax: [phone]  Loc: [phone]    Visit Date: 7/8/2024    Functionality Assessment/Goals Worksheet     On a scale of 0 (Does not Interfere) to 10 (Completely Interferes)     1.  Which number describes how during the past week pain has interfered with       the following:  A.  General Activity:  8  B.  Mood: 8  C.  Walking Ability:  9  D.  Normal Work (Includes both work outside the home and housework):  9  E.  Relations with Other People:   9  F.  Sleep:   8  G.  Enjoyment of Life:   9    2.  Patient Prefers to Take their Pain Medications:     [x]  On a regular basis   []  Only when necessary    []  Does not take pain medications    3.  What are the Patient's Goals/Expectations for Visiting Pain Management?     []  Learn about my pain    [x]  Receive Medication   []  Physical Therapy     []  Treat Depression   []  Receive Injections    []  Treat Sleep   []  Deal with Anxiety and Stress   []  Treat Opoid Dependence/Addiction   []  Other:

## 2024-07-10 ENCOUNTER — TELEPHONE (OUTPATIENT)
Dept: PHYSICAL MEDICINE AND REHAB | Age: 64
End: 2024-07-10

## 2024-07-10 NOTE — TELEPHONE ENCOUNTER
PA sent to plan  (Key: [id number])  PA Case ID #: [id number]  Approved today  PA Case: [id number], Status: Approved, Coverage Starts on: 1/1/2024 12:00:00 AM, Coverage Ends on: 12/31/2024 12:00:00 AM. Questions? Contact [phone].  Authorization Expiration Date: 12/30/2024  Qulipta 60MG tablets    I spoke with Mona at [facility] pharmacy who states that although the PA approval went through, there is still a $200 out of pocket co-pay for the patient.    I left a message on the patient's vm and advised her to call this office back    Dr. Powell, do you have any other recommendations for this patient?

## 2024-07-12 ENCOUNTER — HOSPITAL ENCOUNTER (OUTPATIENT)
Dept: ULTRASOUND IMAGING | Age: 64
Discharge: HOME | End: 2024-07-12
Attending: REGISTERED NURSE
Payer: MEDICARE

## 2024-07-12 DIAGNOSIS — G43.409 HEMIPLEGIC MIGRAINE WITHOUT STATUS MIGRAINOSUS, NOT INTRACTABLE: ICD-10-CM

## 2024-07-12 DIAGNOSIS — E04.2 MULTIPLE THYROID NODULES: ICD-10-CM

## 2024-07-12 DIAGNOSIS — G43.909 MIGRAINE WITHOUT STATUS MIGRAINOSUS, NOT INTRACTABLE, UNSPECIFIED MIGRAINE TYPE: ICD-10-CM

## 2024-07-12 PROCEDURE — 76536 US EXAM OF HEAD AND NECK: CPT

## 2024-07-15 ENCOUNTER — HOSPITAL ENCOUNTER (OUTPATIENT)
Dept: NURSING | Age: 64
Discharge: HOME | End: 2024-07-15
Payer: MEDICARE

## 2024-07-15 ENCOUNTER — HOSPITAL ENCOUNTER (OUTPATIENT)
Age: 64
Discharge: HOME | End: 2024-07-17
Attending: INTERNAL MEDICINE
Payer: MEDICARE

## 2024-07-15 ENCOUNTER — TELEPHONE (OUTPATIENT)
Dept: ENT CLINIC | Age: 64
End: 2024-07-15

## 2024-07-15 VITALS
BODY MASS INDEX: 31.8 KG/M2 | WEIGHT: 162 LBS | SYSTOLIC BLOOD PRESSURE: 105 MMHG | HEIGHT: 60 IN | DIASTOLIC BLOOD PRESSURE: 75 MMHG

## 2024-07-15 VITALS
OXYGEN SATURATION: 99 % | TEMPERATURE: 98 F | SYSTOLIC BLOOD PRESSURE: 105 MMHG | RESPIRATION RATE: 18 BRPM | DIASTOLIC BLOOD PRESSURE: 75 MMHG | HEART RATE: 78 BPM

## 2024-07-15 DIAGNOSIS — M80.08XA AGE-RELATED OSTEOPOROSIS WITH CURRENT PATHOLOGICAL FRACTURE, VERTEBRA(E), INITIAL ENCOUNTER FOR FRACTURE (HCC): Primary | ICD-10-CM

## 2024-07-15 DIAGNOSIS — I20.89 MICROVASCULAR ANGINA (HCC): ICD-10-CM

## 2024-07-15 DIAGNOSIS — R00.2 PALPITATIONS: ICD-10-CM

## 2024-07-15 DIAGNOSIS — S22.070A COMPRESSION FRACTURE OF T9 VERTEBRA, INITIAL ENCOUNTER (HCC): ICD-10-CM

## 2024-07-15 DIAGNOSIS — E04.2 MULTIPLE THYROID NODULES: Primary | ICD-10-CM

## 2024-07-15 DIAGNOSIS — R94.31 ABNORMAL ELECTROCARDIOGRAM (ECG) (EKG): ICD-10-CM

## 2024-07-15 DIAGNOSIS — S22.060A COMPRESSION FRACTURE OF T7 VERTEBRA, INITIAL ENCOUNTER (HCC): ICD-10-CM

## 2024-07-15 DIAGNOSIS — R06.09 DOE (DYSPNEA ON EXERTION): ICD-10-CM

## 2024-07-15 LAB
ECHO AO ASC DIAM: 3 CM
ECHO AO ASCENDING AORTA INDEX: 1.75 CM/M2
ECHO AV CUSP MM: 2.1 CM
ECHO AV PEAK GRADIENT: 6 MMHG
ECHO AV PEAK VELOCITY: 1.3 M/S
ECHO AV VELOCITY RATIO: 0.62
ECHO BSA: 1.76 M2
ECHO BSA: 1.76 M2
ECHO LA AREA 2C: 9.2 CM2
ECHO LA AREA 4C: 9.4 CM2
ECHO LA DIAMETER INDEX: 1.7 CM/M2
ECHO LA DIAMETER: 2.9 CM
ECHO LA MAJOR AXIS: 3.9 CM
ECHO LA MINOR AXIS: 3.7 CM
ECHO LA VOL BP: 18 ML (ref 22–52)
ECHO LA VOL MOD A2C: 17 ML (ref 22–52)
ECHO LA VOL MOD A4C: 18 ML (ref 22–52)
ECHO LA VOL/BSA BIPLANE: 11 ML/M2 (ref 16–34)
ECHO LA VOLUME INDEX MOD A2C: 10 ML/M2 (ref 16–34)
ECHO LA VOLUME INDEX MOD A4C: 11 ML/M2 (ref 16–34)
ECHO LV E' LATERAL VELOCITY: 11 CM/S
ECHO LV E' SEPTAL VELOCITY: 8 CM/S
ECHO LV FRACTIONAL SHORTENING: 27 % (ref 28–44)
ECHO LV INTERNAL DIMENSION DIASTOLE INDEX: 2.4 CM/M2
ECHO LV INTERNAL DIMENSION DIASTOLIC: 4.1 CM (ref 3.9–5.3)
ECHO LV INTERNAL DIMENSION SYSTOLIC INDEX: 1.75 CM/M2
ECHO LV INTERNAL DIMENSION SYSTOLIC: 3 CM
ECHO LV ISOVOLUMETRIC RELAXATION TIME (IVRT): 77 MS
ECHO LV IVSD: 0.7 CM (ref 0.6–0.9)
ECHO LV MASS 2D: 89.4 G (ref 67–162)
ECHO LV MASS INDEX 2D: 52.3 G/M2 (ref 43–95)
ECHO LV POSTERIOR WALL DIASTOLIC: 0.8 CM (ref 0.6–0.9)
ECHO LV RELATIVE WALL THICKNESS RATIO: 0.39
ECHO LVOT PEAK GRADIENT: 3 MMHG
ECHO LVOT PEAK VELOCITY: 0.8 M/S
ECHO MV A VELOCITY: 0.85 M/S
ECHO MV E DECELERATION TIME (DT): 211 MS
ECHO MV E VELOCITY: 0.49 M/S
ECHO MV E/A RATIO: 0.58
ECHO MV E/E' LATERAL: 4.45
ECHO MV E/E' RATIO (AVERAGED): 5.29
ECHO MV E/E' SEPTAL: 6.13
ECHO PULMONARY ARTERY END DIASTOLIC PRESSURE: 5 MMHG
ECHO PV MAX VELOCITY: 0.5 M/S
ECHO PV PEAK GRADIENT: 1 MMHG
ECHO PV REGURGITANT MAX VELOCITY: 1.1 M/S
ECHO RV INTERNAL DIMENSION: 3.4 CM
ECHO RV TAPSE: 1.5 CM (ref 1.7–?)
ECHO TV E WAVE: 0.4 M/S
ECHO TV REGURGITANT MAX VELOCITY: 2.54 M/S
ECHO TV REGURGITANT PEAK GRADIENT: 26 MMHG

## 2024-07-15 PROCEDURE — 6360000002 HC RX W HCPCS: Performed by: NURSE PRACTITIONER

## 2024-07-15 PROCEDURE — 93306 TTE W/DOPPLER COMPLETE: CPT | Performed by: INTERNAL MEDICINE

## 2024-07-15 PROCEDURE — 93306 TTE W/DOPPLER COMPLETE: CPT

## 2024-07-15 PROCEDURE — 96372 THER/PROPH/DIAG INJ SC/IM: CPT

## 2024-07-15 PROCEDURE — 93225 XTRNL ECG REC<48 HRS REC: CPT

## 2024-07-15 RX ORDER — BUTALBITAL, ACETAMINOPHEN AND CAFFEINE 50; 325; 40 MG/1; MG/1; MG/1
TABLET ORAL
Qty: 15 TABLET | Refills: 0 | OUTPATIENT
Start: 2024-07-15 | End: 2024-08-14

## 2024-07-15 RX ORDER — ALBUTEROL SULFATE 90 UG/1
4 AEROSOL, METERED RESPIRATORY (INHALATION) PRN
OUTPATIENT
Start: 2024-08-12

## 2024-07-15 RX ORDER — EPINEPHRINE 1 MG/ML
0.3 INJECTION, SOLUTION INTRAMUSCULAR; SUBCUTANEOUS PRN
OUTPATIENT
Start: 2024-08-12

## 2024-07-15 RX ORDER — ONDANSETRON 2 MG/ML
8 INJECTION INTRAMUSCULAR; INTRAVENOUS
OUTPATIENT
Start: 2024-08-12

## 2024-07-15 RX ORDER — ACETAMINOPHEN 325 MG/1
650 TABLET ORAL
OUTPATIENT
Start: 2024-08-12

## 2024-07-15 RX ORDER — DIPHENHYDRAMINE HYDROCHLORIDE 50 MG/ML
50 INJECTION INTRAMUSCULAR; INTRAVENOUS
OUTPATIENT
Start: 2024-08-12

## 2024-07-15 RX ORDER — SODIUM CHLORIDE 9 MG/ML
INJECTION, SOLUTION INTRAVENOUS CONTINUOUS
OUTPATIENT
Start: 2024-08-12

## 2024-07-15 RX ADMIN — ROMOSOZUMAB-AQQG 105 MG: 105 INJECTION, SOLUTION SUBCUTANEOUS at 11:45

## 2024-07-15 RX ADMIN — ROMOSOZUMAB-AQQG 105 MG: 105 INJECTION, SOLUTION SUBCUTANEOUS at 11:46

## 2024-07-15 ASSESSMENT — PAIN SCALES - GENERAL: PAINLEVEL_OUTOF10: 7

## 2024-07-15 ASSESSMENT — PAIN DESCRIPTION - FREQUENCY: FREQUENCY: CONTINUOUS

## 2024-07-15 ASSESSMENT — PAIN DESCRIPTION - DESCRIPTORS: DESCRIPTORS: ACHING

## 2024-07-15 ASSESSMENT — PAIN DESCRIPTION - ONSET: ONSET: ON-GOING

## 2024-07-15 ASSESSMENT — PAIN DESCRIPTION - PAIN TYPE: TYPE: CHRONIC PAIN

## 2024-07-15 ASSESSMENT — PAIN DESCRIPTION - LOCATION: LOCATION: GENERALIZED

## 2024-07-15 NOTE — TELEPHONE ENCOUNTER
Please call patient and inform her that her thyroid US noted one nodule on the left side recommending an ultrasound guided fine needle aspiration. If she is agreeable I will place orders for this hopefully to be obtained before follow-up with Dr. Delgado. She had been following closely with Dr. Delgado previously for her thyroid nodules before.       IMPRESSION:  1. A 3 x 1.9 x 1.7 cm hypoechoic nodule is seen in the inferior left thyroid  lobe. This is a TR 5 nodule. Ultrasound-guided fine-needle aspiration is  recommended.  2. Enlarged 2.9 x 1.7 x 0.7 cm lymph node with echogenic hilum is seen in the  left neck.  3. Sub-5 mm right thyroid nodules are seen as described above.

## 2024-07-15 NOTE — PROGRESS NOTES
1135 Patient ambulatory to OPN for Evenity injections. Patient verbalizes understanding of injections. PT RIGHTS AND RESPONSIBILITIES OFFERED TO PT.    1145 Evenity injection given. Patient tolerated well.     1146 AVS Reviewed with patient. Verbalizes understanding. Patient left ambulatory to discharge lobby.       _M___ Safety:       (Environmental)  [place] to environment  Ensure ID band is correct and in place/ allergy band as needed  Assess for fall risk  Initiate fall precautions as applicable (fall band, side rails, etc.)  Call light within reach  Bed in low position/ wheels locked    ____ Pain:       Assess pain level and characteristics  Administer analgesics as ordered  Assess effectiveness of pain management and report to MD as needed    ____ Knowledge Deficit:  Assess baseline knowledge  Provide teaching at level of understanding  Provide teaching via preferred learning method  Evaluate teaching effectiveness    ____ Hemodynamic/Respiratory Status:       (Pre and Post Procedure Monitoring)  Assess/Monitor vital signs and LOC  Assess Baseline SpO2 prior to any sedation  Obtain weight/height  Assess vital signs/ LOC until patient meets discharge criteria  Monitor procedure site and notify MD of any issues

## 2024-07-15 NOTE — TELEPHONE ENCOUNTER
I called Isha and she said that she was ok with proceeding with the FNA.   Please place ordered and hopefully it can be complete before her follow up with Dr. Delgado.

## 2024-07-15 NOTE — DISCHARGE INSTRUCTIONS
Evenity injection discharge instructions:    Side effects: headache, joint pain, rash, swelling    Next Evenity injection on: 8/12/2024 @ 11:30      Please call [phone] with a any questions or concerns.

## 2024-07-23 ENCOUNTER — HOSPITAL ENCOUNTER (OUTPATIENT)
Dept: ULTRASOUND IMAGING | Age: 64
Discharge: HOME | End: 2024-07-23
Payer: MEDICARE

## 2024-07-23 ENCOUNTER — TELEPHONE (OUTPATIENT)
Dept: PHYSICAL MEDICINE AND REHAB | Age: 64
End: 2024-07-23

## 2024-07-23 ENCOUNTER — OFFICE VISIT (OUTPATIENT)
Dept: FAMILY MEDICINE CLINIC | Age: 64
End: 2024-07-23

## 2024-07-23 VITALS
OXYGEN SATURATION: 99 % | HEIGHT: 60 IN | TEMPERATURE: 98.8 F | WEIGHT: 159 LBS | DIASTOLIC BLOOD PRESSURE: 72 MMHG | RESPIRATION RATE: 18 BRPM | BODY MASS INDEX: 31.22 KG/M2 | HEART RATE: 57 BPM | SYSTOLIC BLOOD PRESSURE: 112 MMHG

## 2024-07-23 DIAGNOSIS — I25.119 ATHEROSCLEROSIS OF NATIVE CORONARY ARTERY OF NATIVE HEART WITH ANGINA PECTORIS (HCC): Primary | ICD-10-CM

## 2024-07-23 DIAGNOSIS — F33.41 MDD (MAJOR DEPRESSIVE DISORDER), RECURRENT, IN PARTIAL REMISSION (HCC): ICD-10-CM

## 2024-07-23 DIAGNOSIS — J30.2 SEASONAL ALLERGIES: ICD-10-CM

## 2024-07-23 DIAGNOSIS — D64.9 ANEMIA, UNSPECIFIED TYPE: ICD-10-CM

## 2024-07-23 DIAGNOSIS — K21.9 GASTROESOPHAGEAL REFLUX DISEASE, UNSPECIFIED WHETHER ESOPHAGITIS PRESENT: ICD-10-CM

## 2024-07-23 DIAGNOSIS — E87.6 HYPOKALEMIA: ICD-10-CM

## 2024-07-23 DIAGNOSIS — G43.409 HEMIPLEGIC MIGRAINE WITHOUT STATUS MIGRAINOSUS, NOT INTRACTABLE: ICD-10-CM

## 2024-07-23 DIAGNOSIS — M81.0 AGE RELATED OSTEOPOROSIS, UNSPECIFIED PATHOLOGICAL FRACTURE PRESENCE: ICD-10-CM

## 2024-07-23 DIAGNOSIS — I50.32 CHRONIC DIASTOLIC HEART FAILURE (HCC): ICD-10-CM

## 2024-07-23 DIAGNOSIS — G25.81 RESTLESS LEGS: ICD-10-CM

## 2024-07-23 DIAGNOSIS — I69.30 HISTORY OF CVA WITH RESIDUAL DEFICIT: ICD-10-CM

## 2024-07-23 DIAGNOSIS — E55.9 VITAMIN D INSUFFICIENCY: ICD-10-CM

## 2024-07-23 DIAGNOSIS — R42 DIZZINESS: ICD-10-CM

## 2024-07-23 DIAGNOSIS — Z86.19 HX OF HERPES GENITALIS: ICD-10-CM

## 2024-07-23 DIAGNOSIS — S22.000A COMPRESSION FRACTURE OF BODY OF THORACIC VERTEBRA (HCC): ICD-10-CM

## 2024-07-23 DIAGNOSIS — E04.2 MULTIPLE THYROID NODULES: ICD-10-CM

## 2024-07-23 DIAGNOSIS — L40.50 PSORIATIC ARTHRITIS (HCC): ICD-10-CM

## 2024-07-23 DIAGNOSIS — E66.09 CLASS 1 OBESITY DUE TO EXCESS CALORIES WITH SERIOUS COMORBIDITY AND BODY MASS INDEX (BMI) OF 31.0 TO 31.9 IN ADULT: ICD-10-CM

## 2024-07-23 DIAGNOSIS — F43.10 PTSD (POST-TRAUMATIC STRESS DISORDER): ICD-10-CM

## 2024-07-23 DIAGNOSIS — G47.00 INSOMNIA, UNSPECIFIED TYPE: ICD-10-CM

## 2024-07-23 DIAGNOSIS — G89.4 CHRONIC PAIN SYNDROME: ICD-10-CM

## 2024-07-23 PROBLEM — Z86.73 HISTORY OF STROKE: Status: ACTIVE | Noted: 2024-07-23

## 2024-07-23 PROCEDURE — 76942 ECHO GUIDE FOR BIOPSY: CPT

## 2024-07-23 PROCEDURE — 88172 CYTP DX EVAL FNA 1ST EA SITE: CPT

## 2024-07-23 PROCEDURE — 88173 CYTOPATH EVAL FNA REPORT: CPT

## 2024-07-23 RX ORDER — CETIRIZINE HYDROCHLORIDE 10 MG/1
10 TABLET ORAL DAILY
Qty: 30 TABLET | Refills: 0 | Status: SHIPPED | OUTPATIENT
Start: 2024-07-23

## 2024-07-23 ASSESSMENT — ENCOUNTER SYMPTOMS
SORE THROAT: 0
COUGH: 1
NAUSEA: 0

## 2024-07-23 NOTE — PROGRESS NOTES
atorvastatin (LIPITOR) 40 MG tablet Take 1 tablet by mouth three times a week 90 tablet 0    buPROPion (WELLBUTRIN XL) 300 MG extended release tablet take 1 tablet by mouth every morning 90 tablet 0    esomeprazole (NEXIUM) 20 MG delayed release capsule Take 1 capsule by mouth every morning (before breakfast) 90 capsule 0    loratadine (CLARITIN) 10 MG tablet Take 1 tablet by mouth daily 90 tablet 0    meclizine (ANTIVERT) 25 MG tablet Take 1 tablet by mouth 2 times daily as needed for Dizziness or Nausea 30 tablet 0    ondansetron (ZOFRAN) 4 MG tablet Take 1 tablet by mouth 3 times daily as needed for Nausea or Vomiting 30 tablet 0    topiramate (TOPAMAX) 100 MG tablet TAKE 1 TABLET BY MOUTH TWICE A  tablet 1    ferrous sulfate (IRON 325) 325 (65 Fe) MG tablet Take 1 tablet by mouth every other day 90 tablet 1    acyclovir (ZOVIRAX) 200 MG capsule Take 1 capsule by mouth daily 90 capsule 0    secukinumab (COSENTYX) 150 MG/ML SOSY Inject 1 mL into the skin every 28 days 1 mL 3    escitalopram (LEXAPRO) 10 MG tablet Take 1 tablet by mouth daily 90 tablet 0    vitamin D (ERGOCALCIFEROL) 1.25 MG (45483 UT) CAPS capsule Take 1 capsule by mouth Once a week at 5 PM 4 capsule 5    pramipexole (MIRAPEX) 0.125 MG tablet Take 2 tablets by mouth 2 times daily 120 tablet 3    traZODone (DESYREL) 150 MG tablet Take 1.5 tablets by mouth nightly 120 tablet 1    tiZANidine (ZANAFLEX) 2 MG tablet Take 1 tablet by mouth nightly as needed (BACK PAIN) 30 tablet 1    ZAVZPRET 10 MG/ACT SOLN instill 1 spray in 1 nostril AS A ONE TIME DOSE (MAX OF 1 SPRAY PER 24HOURS)      folic acid (FOLVITE) 1 MG tablet Take 1 tablet by mouth daily 90 tablet 1    promethazine (PHENERGAN) 25 MG tablet take 1/2 to 1 tablet by mouth four times a day if needed nausea      naloxone 4 MG/0.1ML LIQD nasal spray 1 spray by Nasal route      fluticasone (FLONASE) 50 MCG/ACT nasal spray 1 spray by Nasal route as needed for Allergies 16 g 1    KLOR-CON

## 2024-07-24 DIAGNOSIS — S22.000A COMPRESSION FRACTURE OF BODY OF THORACIC VERTEBRA (HCC): ICD-10-CM

## 2024-07-24 DIAGNOSIS — G89.4 CHRONIC PAIN SYNDROME: ICD-10-CM

## 2024-07-24 RX ORDER — FENTANYL 25 UG/1
1 PATCH TRANSDERMAL
Qty: 10 PATCH | Refills: 0 | Status: SHIPPED | OUTPATIENT
Start: 2024-07-24 | End: 2024-08-23

## 2024-07-24 NOTE — TELEPHONE ENCOUNTER
Pt now at the [facility]. They need order sent to their pharmacy for the fentanyl patch  OARRS reviewed. UDS:no data .   Last seen: 7/8/2024. Follow-up:   Future Appointments   Date Time Provider Department Center   7/29/2024 11:30 AM Ruel Suarez MD N [place] Uro [facility]   8/12/2024 11:30 AM STR MINOR ROOM 6 STRZ OP NURS [facility]   8/13/2024  3:15 PM [place] 2 AUDIOLOGY [facility]   8/15/2024  1:00 PM Sangita Malik MD N SRPX PSYCH [facility]   8/20/2024  4:15 PM Jaspal Delgado MD N ENT [facility]   8/22/2024 11:40 AM Lisa Thorne DO N SRPX Rheum [facility]   9/10/2024  1:30 PM Fredis Martinez MD N SRPX Heart [facility]   9/11/2024  3:40 PM Leandro Powell DO N SRPX Pain [facility]   10/29/2024  3:40 PM Mack Nguyen DO SRPX FM RES [facility]       Abdomen soft, non-tender, no guarding. no rebound

## 2024-07-25 ENCOUNTER — TELEPHONE (OUTPATIENT)
Dept: ENT CLINIC | Age: 64
End: 2024-07-25

## 2024-07-25 DIAGNOSIS — E04.1 NODULE OF LEFT LOBE OF THYROID GLAND: Primary | ICD-10-CM

## 2024-07-25 LAB
ACQUISITION DURATION: NORMAL S
AVERAGE HEART RATE: 73 BPM
ECHO BSA: 1.76 M2
HOOKUP DATE: NORMAL
HOOKUP TIME: NORMAL
MAX HEART RATE TIME/DATE: NORMAL
MAX HEART RATE: 151 BPM
MIN HEART RATE TIME/DATE: NORMAL
MIN HEART RATE: 44 BPM
NUMBER OF QRS COMPLEXES: NORMAL
NUMBER OF SUPRAVENTRICULAR COUPLETS: 0
NUMBER OF SUPRAVENTRICULAR ECTOPICS: 34
NUMBER OF SUPRAVENTRICULAR ISOLATED BEATS: 24
NUMBER OF VENTRICULAR BIGEMINAL CYCLES: 0
NUMBER OF VENTRICULAR COUPLETS: 0
NUMBER OF VENTRICULAR ECTOPICS: 7

## 2024-07-25 NOTE — TELEPHONE ENCOUNTER
Received results of patients thyroid FNA results indicating left inferior nodule with AUS results. Order placed for Afirma testing and notified pathology secretary Yareli who is aware.    Will keep an eye for results pending follow up as scheduled with Dr. Delgado.       FINAL RESULTS:   Left inferior thyroid nodule, FNA:    ATYPIA OF UNDETERMINED SIGNIFICANCE.       Follicular cells, predominantly benign appearing, with focal   nuclear atypia.       Note:  Molecular testing or a repeat aspiration may be helpful in   clarifying these findings.         An additional sample has been collected for possible Afirma       testing, which may be performed upon request.       SOURCE:   A) FINE NEEDLE ASPIRATE THYROID, LEFT INFERIOR

## 2024-07-26 ENCOUNTER — LAB (OUTPATIENT)
Dept: LAB | Age: 64
End: 2024-07-26

## 2024-07-26 DIAGNOSIS — Z51.81 MEDICATION MONITORING ENCOUNTER: ICD-10-CM

## 2024-07-26 LAB
ALBUMIN SERPL BCG-MCNC: 4.4 G/DL (ref 3.5–5.1)
ALP SERPL-CCNC: 132 U/L (ref 38–126)
ALT SERPL W/O P-5'-P-CCNC: 13 U/L (ref 11–66)
ANION GAP SERPL CALC-SCNC: 9 MEQ/L (ref 8–16)
AST SERPL-CCNC: 13 U/L (ref 5–40)
BASOPHILS ABSOLUTE: 0 THOU/MM3 (ref 0–0.1)
BASOPHILS NFR BLD AUTO: 0.8 %
BILIRUB SERPL-MCNC: 0.2 MG/DL (ref 0.3–1.2)
BUN SERPL-MCNC: 12 MG/DL (ref 7–22)
CALCIUM SERPL-MCNC: 8.7 MG/DL (ref 8.5–10.5)
CHLORIDE SERPL-SCNC: 110 MEQ/L (ref 98–111)
CO2 SERPL-SCNC: 22 MEQ/L (ref 23–33)
CREAT SERPL-MCNC: 0.7 MG/DL (ref 0.4–1.2)
CRP SERPL-MCNC: < 0.3 MG/DL (ref 0–1)
DEPRECATED RDW RBC AUTO: 47.9 FL (ref 35–45)
EOSINOPHIL NFR BLD AUTO: 1.3 %
EOSINOPHILS ABSOLUTE: 0.1 THOU/MM3 (ref 0–0.4)
ERYTHROCYTE [DISTWIDTH] IN BLOOD BY AUTOMATED COUNT: 14.1 % (ref 11.5–14.5)
ERYTHROCYTE [SEDIMENTATION RATE] IN BLOOD BY WESTERGREN METHOD: 13 MM/HR (ref 0–20)
GFR SERPL CREATININE-BSD FRML MDRD: > 90 ML/MIN/1.73M2
GLUCOSE SERPL-MCNC: 96 MG/DL (ref 70–108)
HCT VFR BLD AUTO: 39.2 % (ref 37–47)
HGB BLD-MCNC: 12.7 GM/DL (ref 12–16)
IMM GRANULOCYTES # BLD AUTO: 0.01 THOU/MM3 (ref 0–0.07)
IMM GRANULOCYTES NFR BLD AUTO: 0.2 %
LYMPHOCYTES ABSOLUTE: 2.7 THOU/MM3 (ref 1–4.8)
LYMPHOCYTES NFR BLD AUTO: 45.5 %
MCH RBC QN AUTO: 30.5 PG (ref 26–33)
MCHC RBC AUTO-ENTMCNC: 32.4 GM/DL (ref 32.2–35.5)
MCV RBC AUTO: 94.2 FL (ref 81–99)
MONOCYTES ABSOLUTE: 0.4 THOU/MM3 (ref 0.4–1.3)
MONOCYTES NFR BLD AUTO: 6 %
NEUTROPHILS ABSOLUTE: 2.8 THOU/MM3 (ref 1.8–7.7)
NEUTROPHILS NFR BLD AUTO: 46.2 %
NRBC BLD AUTO-RTO: 0 /100 WBC
PLATELET # BLD AUTO: 273 THOU/MM3 (ref 130–400)
PMV BLD AUTO: 12.1 FL (ref 9.4–12.4)
POTASSIUM SERPL-SCNC: 3.7 MEQ/L (ref 3.5–5.2)
PROT SERPL-MCNC: 6.6 G/DL (ref 6.1–8)
RBC # BLD AUTO: 4.16 MILL/MM3 (ref 4.2–5.4)
SODIUM SERPL-SCNC: 141 MEQ/L (ref 135–145)
WBC # BLD AUTO: 6 THOU/MM3 (ref 4.8–10.8)

## 2024-07-28 RX ORDER — METHOTREXATE 2.5 MG/1
15 TABLET ORAL WEEKLY
Qty: 24 TABLET | Refills: 1 | Status: SHIPPED | OUTPATIENT
Start: 2024-07-28 | End: 2024-08-27

## 2024-08-12 ENCOUNTER — HOSPITAL ENCOUNTER (OUTPATIENT)
Dept: NURSING | Age: 64
End: 2024-08-12

## 2024-08-15 ENCOUNTER — TELEMEDICINE (OUTPATIENT)
Dept: PSYCHIATRY | Age: 64
End: 2024-08-15

## 2024-08-15 DIAGNOSIS — F41.9 ANXIETY: ICD-10-CM

## 2024-08-15 DIAGNOSIS — F33.1 MODERATE EPISODE OF RECURRENT MAJOR DEPRESSIVE DISORDER (HCC): ICD-10-CM

## 2024-08-15 DIAGNOSIS — F43.10 PTSD (POST-TRAUMATIC STRESS DISORDER): Primary | ICD-10-CM
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
## 2024-08-20 ENCOUNTER — TELEPHONE (OUTPATIENT)
Dept: RHEUMATOLOGY | Age: 64
End: 2024-08-20

## 2024-08-20 ENCOUNTER — TELEPHONE (OUTPATIENT)
Dept: CARDIOLOGY CLINIC | Age: 64
End: 2024-08-20

## 2024-08-20 ENCOUNTER — TELEPHONE (OUTPATIENT)
Dept: PHYSICAL MEDICINE AND REHAB | Age: 64
End: 2024-08-20

## 2024-08-20 ENCOUNTER — OFFICE VISIT (OUTPATIENT)
Dept: ENT CLINIC | Age: 64
End: 2024-08-20
Payer: MEDICARE

## 2024-08-20 VITALS
WEIGHT: 164.4 LBS | RESPIRATION RATE: 18 BRPM | TEMPERATURE: 98.2 F | HEIGHT: 62 IN | BODY MASS INDEX: 30.25 KG/M2 | OXYGEN SATURATION: 98 % | HEART RATE: 80 BPM | DIASTOLIC BLOOD PRESSURE: 62 MMHG | SYSTOLIC BLOOD PRESSURE: 120 MMHG

## 2024-08-20 DIAGNOSIS — E04.9 NONTOXIC NODULAR GOITER: ICD-10-CM

## 2024-08-20 DIAGNOSIS — E03.9 HYPOTHYROIDISM, UNSPECIFIED TYPE: ICD-10-CM

## 2024-08-20 DIAGNOSIS — Z51.81 MEDICATION MONITORING ENCOUNTER: Primary | ICD-10-CM

## 2024-08-20 DIAGNOSIS — E04.1 NODULE OF LEFT LOBE OF THYROID GLAND: ICD-10-CM

## 2024-08-20 DIAGNOSIS — E04.2 MULTIPLE THYROID NODULES: Primary | ICD-10-CM

## 2024-08-20 PROCEDURE — G8417 CALC BMI ABV UP PARAM F/U: HCPCS | Performed by: OTOLARYNGOLOGY

## 2024-08-20 PROCEDURE — 3017F COLORECTAL CA SCREEN DOC REV: CPT | Performed by: OTOLARYNGOLOGY

## 2024-08-20 PROCEDURE — G8427 DOCREV CUR MEDS BY ELIG CLIN: HCPCS | Performed by: OTOLARYNGOLOGY

## 2024-08-20 PROCEDURE — 1036F TOBACCO NON-USER: CPT | Performed by: OTOLARYNGOLOGY

## 2024-08-20 PROCEDURE — 99214 OFFICE O/P EST MOD 30 MIN: CPT | Performed by: OTOLARYNGOLOGY

## 2024-08-20 NOTE — PROGRESS NOTES
[facility] [place] [facility]  [facility] EAR, NOSE AND THROAT  [address]   [address]  [facility] [address]  Dept: [phone]  Dept Fax: [phone]  Loc: [phone]    Isha Ramsey is a 63 y.o. female who was referred by No ref. provider found for:  Chief Complaint   Patient presents with    Follow-up     Patient is here to review US thyroid 07/12/24 and FNA on 07/23/24. Patient states that she has been really tired and she said her eyes have been really puffy and watery.    .    HPI:     Isha Ramsey is a 63 y.o. female who presents today for review of her ultrasound guided FNA result from the nodule in the lower left thyroid lobe.  This was atypia of uncertain significance.  Afirma testing showed a 50-50 chance of malignancy.    History:     Allergies   Allergen Reactions    Biaxin [Clarithromycin] Anaphylaxis and Swelling     Lips swelled    Triamcinolone Other (See Comments)    Amoxicillin      rash    Butrans [Buprenorphine] Itching     Blisters     Dilaudid [Hydromorphone] Nausea And Vomiting    Doxycycline Hives     rash    Morphine Nausea And Vomiting    Sulfa Antibiotics Rash     Other Reaction(s): rash, able to take keflex     Current Outpatient Medications   Medication Sig Dispense Refill    fentaNYL (DURAGESIC) 25 MCG/HR Place 1 patch onto the skin every 72 hours for 30 days. Max Daily Amount: 1 patch 10 patch 0    methotrexate (RHEUMATREX) 2.5 MG chemo tablet Take 6 tablets by mouth once a week 24 tablet 1    cetirizine (ZYRTEC) 10 MG tablet Take 1 tablet by mouth daily 30 tablet 0    Atogepant (QULIPTA) 60 MG TABS Take 60 mg by mouth every 48 hours as needed (for migraine) 16 tablet 0    atorvastatin (LIPITOR) 40 MG tablet Take 1 tablet by mouth three times a week 90 tablet 0    buPROPion (WELLBUTRIN XL) 300 MG extended release tablet take 1 tablet by mouth every morning 90 tablet 0    esomeprazole (NEXIUM) 20 MG delayed release capsule Take 1 capsule by mouth every morning  excision.    Thyroidectomy, informed consent:The risks and benefits of the procedure, including but not limited to risk of anesthesia, bleeding, infection, hoarseness, hypocalcemia, and potential need for further surgery or treatment were discussed with the patient. The role of the parathyroid glands and the proximity of the recurrent laryngeal nerve were discussed. The possible consequences of not undergoing the procedures were discussed, along with any alternative treatments. They understand that no guarantees are made regarding either outcome or potential complications. All of their questions were answered. They read the patient information sheet and were given a copy to take with them. They requested we proceed.      Medications to be held prior to surgery  Methotrexate, 2 weeks before and 2 weeks after    Jaspal Delgado MD    **This report has been created using voice recognition software. It may contain minor errors which are inherent in voicerecognition technology.**

## 2024-08-20 NOTE — TELEPHONE ENCOUNTER
Patient is scheduled for partial thyroidectomy with Dr. Delgado on 09/09/2024.  We would like to request patient hold her methotrexate for 7 days pre op and 14 days post op if Dr. Thorne is agreeable.   Please advise. Thank you!

## 2024-08-20 NOTE — TELEPHONE ENCOUNTER
Patient is being scheduled for surgery with Dr Delgado 09/09/2024 for a left hemithyroidectomy.  Can you please advise how long patient should be off of her fentanyl patch?    Thank you!

## 2024-08-20 NOTE — TELEPHONE ENCOUNTER
Patient is being scheduled for a procedure with Dr Delgado and we are requesting clearance from Dr. Martinez.    DOS: 09/09/2024  Procedure: left hemithyroidectomy  Meds to hold:  methotrexate and fentanyl direction from their prescribers    Please advise. Thank you!

## 2024-08-21 NOTE — TELEPHONE ENCOUNTER
Attempted to call patient x2. Phone disconnected. Fentanyl patch should not be off longer than 24 hours to avoid withdraw

## 2024-08-22 ENCOUNTER — OFFICE VISIT (OUTPATIENT)
Dept: RHEUMATOLOGY | Age: 64
End: 2024-08-22
Payer: MEDICARE

## 2024-08-22 ENCOUNTER — TELEPHONE (OUTPATIENT)
Dept: RHEUMATOLOGY | Age: 64
End: 2024-08-22

## 2024-08-22 VITALS
SYSTOLIC BLOOD PRESSURE: 116 MMHG | WEIGHT: 166.2 LBS | HEART RATE: 76 BPM | HEIGHT: 62 IN | DIASTOLIC BLOOD PRESSURE: 72 MMHG | OXYGEN SATURATION: 97 % | BODY MASS INDEX: 30.59 KG/M2

## 2024-08-22 DIAGNOSIS — M79.7 FIBROMYALGIA: ICD-10-CM

## 2024-08-22 DIAGNOSIS — L40.50 PSORIATIC ARTHRITIS (HCC): Primary | ICD-10-CM

## 2024-08-22 DIAGNOSIS — M54.50 CHRONIC BILATERAL LOW BACK PAIN WITHOUT SCIATICA: ICD-10-CM

## 2024-08-22 DIAGNOSIS — M80.08XA AGE-RELATED OSTEOPOROSIS WITH CURRENT PATHOLOGICAL FRACTURE, VERTEBRA(E), INITIAL ENCOUNTER FOR FRACTURE (HCC): ICD-10-CM

## 2024-08-22 DIAGNOSIS — G89.29 CHRONIC BILATERAL LOW BACK PAIN WITHOUT SCIATICA: ICD-10-CM

## 2024-08-22 DIAGNOSIS — Z51.81 MEDICATION MONITORING ENCOUNTER: ICD-10-CM

## 2024-08-22 DIAGNOSIS — R79.89 LFT ELEVATION: ICD-10-CM

## 2024-08-22 PROCEDURE — 1036F TOBACCO NON-USER: CPT | Performed by: INTERNAL MEDICINE

## 2024-08-22 PROCEDURE — G8427 DOCREV CUR MEDS BY ELIG CLIN: HCPCS | Performed by: INTERNAL MEDICINE

## 2024-08-22 PROCEDURE — G2211 COMPLEX E/M VISIT ADD ON: HCPCS | Performed by: INTERNAL MEDICINE

## 2024-08-22 PROCEDURE — 3017F COLORECTAL CA SCREEN DOC REV: CPT | Performed by: INTERNAL MEDICINE

## 2024-08-22 PROCEDURE — G8417 CALC BMI ABV UP PARAM F/U: HCPCS | Performed by: INTERNAL MEDICINE

## 2024-08-22 PROCEDURE — 99215 OFFICE O/P EST HI 40 MIN: CPT | Performed by: INTERNAL MEDICINE

## 2024-08-22 RX ORDER — PROMETHAZINE HYDROCHLORIDE 12.5 MG/1
TABLET ORAL EVERY 6 HOURS PRN
COMMUNITY
Start: 2024-05-30

## 2024-08-22 RX ORDER — POTASSIUM CHLORIDE 3 G/15ML
SOLUTION ORAL
COMMUNITY
Start: 2024-08-04

## 2024-08-22 RX ORDER — PRAMIPEXOLE DIHYDROCHLORIDE 0.25 MG/1
0.25 TABLET ORAL 2 TIMES DAILY
COMMUNITY
Start: 2024-05-30

## 2024-08-22 RX ORDER — ONDANSETRON 4 MG/1
TABLET, ORALLY DISINTEGRATING ORAL EVERY 8 HOURS PRN
COMMUNITY
Start: 2024-05-30

## 2024-08-22 ASSESSMENT — ENCOUNTER SYMPTOMS
BACK PAIN: 0
ABDOMINAL PAIN: 0
CONSTIPATION: 1
EYE ITCHING: 0
DIARRHEA: 0
NAUSEA: 0
COUGH: 0
EYE PAIN: 0
TROUBLE SWALLOWING: 0
SHORTNESS OF BREATH: 1

## 2024-08-22 NOTE — TELEPHONE ENCOUNTER
Teofilom to cb. Can inform patient. She advised Dr. Thorne she was not taking cosentyx. If she is, we need to find out when was her last dose.

## 2024-08-22 NOTE — TELEPHONE ENCOUNTER
Last note and testing reviewed.  She may be cleared if she has not had any cardiac issues since seen.  Make sure she has a post op follow up visit.  Thank you,  YONAS Martinez MD FACC

## 2024-08-22 NOTE — TELEPHONE ENCOUNTER
Dr. Delgado would like to know if patient remains on the fentanyl patch during surgery, can or should she have additional pain medication?

## 2024-08-22 NOTE — PROGRESS NOTES
[facility] RHEUMATOLOGY FOLLOW UP NOTE       Date Of Service: 8/22/2024  Provider: SANDRA HUNG DO    Name: Isha Ramsey   MRN: [id number]    Chief Complaint(s)     Chief Complaint   Patient presents with    Follow-up     4-month f/u for PSA.        History of Present Illness (HPI)     Isha Ramsey  is a(n)63 y.o. female with a hx of adrenal abnormalities, arthritis, asthma, CVA, headaches, hyperlipidemia, h/o myositis, bipolar disorder, may thurner syndrome, dry eye, fatty liver here for the f/u evaluation of h/o MCTD, inflammatory arthritis     Interval hx:     -- partial thyroidectomy for suspicious nodules sept 2024    -- increased abdominal pain for the past 1.5 months - LLQ when having to have a bowel movement. + constipation (unchanged) - using senna. + bloating sensation of the abdomen. + occsaional urinary frequency. Denies brbpr, melena, urinary changes.     -- recent LFT eleation -- recent start of requip, lexapro       Inflammatory arthritis  --- off the cosentyx since the last evaluation.   --- Currently having pain in the palamr aspect of bilateral hands  (MCP joints, cmc,) let elbows, neck. Let hip, bilateral knees, right ankle. Pain up to 8/10  -- constant lower back pain.    -- Aggravating factors: weather changes, back: standing, walking long distances. Hands - increase use. -- Alleviating factors: tylenol hands, fentanyl. -- reported swelling of the left ankle  -- popping of the left ankle joints. -- hands tiffness in the hands.     osteoporosis -    - evenity x 6 months since nov. 2023 - dosed July 2024. - needs repeat dosing            REVIEW OF SYSTEMS: (ROS)    Review of Systems   Constitutional:  Positive for fatigue. Negative for fever and unexpected weight change.   HENT:  Negative for congestion and trouble swallowing.         Dry mouth    Eyes:  Negative for pain and itching.   Respiratory:  Positive for shortness of breath. Negative for cough.    Cardiovascular:  Negative for chest

## 2024-08-22 NOTE — TELEPHONE ENCOUNTER
Courtney from the [facility] needs clarification on how long the pt should be holding methotrexate. When to restart cosentyx and if there are any refills remaining. Left message with the reception requesting a call back.

## 2024-08-29 ENCOUNTER — TELEPHONE (OUTPATIENT)
Dept: ENT CLINIC | Age: 64
End: 2024-08-29

## 2024-08-29 ENCOUNTER — PATIENT MESSAGE (OUTPATIENT)
Dept: PHYSICAL MEDICINE AND REHAB | Age: 64
End: 2024-08-29

## 2024-08-29 DIAGNOSIS — S22.000A COMPRESSION FRACTURE OF BODY OF THORACIC VERTEBRA (HCC): ICD-10-CM

## 2024-08-29 DIAGNOSIS — G89.4 CHRONIC PAIN SYNDROME: ICD-10-CM

## 2024-08-29 RX ORDER — FENTANYL 25 UG/1
1 PATCH TRANSDERMAL
Qty: 10 PATCH | Refills: 0 | Status: SHIPPED | OUTPATIENT
Start: 2024-08-29 | End: 2024-09-28

## 2024-08-29 NOTE — TELEPHONE ENCOUNTER
Called pt. And LVM that fentanyl is not to be removed for more than 24 hrs for surgery and to let us know name of and number for surgeon so can contact them for this update. Pt. To still let us know if surgeon to order any other pain meds postop.

## 2024-08-29 NOTE — TELEPHONE ENCOUNTER
Jannet from Dr Elliott office called stating that Dr powell does not want the patient off of her fentanyl patch for more than 24 hours. She stated that the patient is allowed to take the patch off before surgery and whatever pain medication is prescribed after surgery DR Powell needs to be informed about. Please advise

## 2024-08-29 NOTE — TELEPHONE ENCOUNTER
Pt is is at the [facility] in [facility] and also says will be having celia thyroidectomy surgery in sept,. Reviewed to not hold fentanyl more than 24 hrs d/t risk of withdrawal. Waiting on pt. To call back so can contact surgeon and review this with them and to let us know if will order anything else.  OARRS reviewed. UDS: + for  .   Last seen: 7/8/2024. Follow-up:   Future Appointments   Date Time Provider Department Center   9/11/2024  2:30 PM STR WOMEN [facility] RM 1 STRZ WOMEN STR Rad/Card   9/11/2024  3:40 PM Leandro Powell DO N SRPX Pain [facility] [place]   9/13/2024 10:10 AM Jaspal Delgado MD N ENT [facility] [place]   9/17/2024  3:30 PM Fredis Martinez MD N [facility]X Heart [facility] [place]   10/10/2024 11:20 AM Liat Hallman, ESTEFANIA - CNP N SRPX Rheum [facility] [place]   10/15/2024  1:00 PM Sangita Malik MD N [facility]X PSYCH [facility] [place]

## 2024-08-30 RX ORDER — ALENDRONATE SODIUM 70 MG/1
70 TABLET ORAL
COMMUNITY

## 2024-08-30 RX ORDER — ROPINIROLE 0.5 MG/1
0.5 TABLET, FILM COATED ORAL NIGHTLY
COMMUNITY

## 2024-09-03 ENCOUNTER — PREP FOR PROCEDURE (OUTPATIENT)
Dept: ENT CLINIC | Age: 64
End: 2024-09-03

## 2024-09-03 DIAGNOSIS — Q79.60 EHLERS-DANLOS SYNDROME: ICD-10-CM

## 2024-09-03 DIAGNOSIS — I69.30 HISTORY OF CVA WITH RESIDUAL DEFICIT: ICD-10-CM

## 2024-09-03 DIAGNOSIS — S22.000A COMPRESSION FRACTURE OF BODY OF THORACIC VERTEBRA (HCC): ICD-10-CM

## 2024-09-03 DIAGNOSIS — E04.1 NODULE OF LEFT LOBE OF THYROID GLAND: Primary | ICD-10-CM

## 2024-09-03 DIAGNOSIS — G89.4 CHRONIC PAIN SYNDROME: ICD-10-CM

## 2024-09-03 RX ORDER — FENTANYL 25 UG/1
1 PATCH TRANSDERMAL
Qty: 10 PATCH | Refills: 0 | Status: SHIPPED | OUTPATIENT
Start: 2024-09-03 | End: 2024-10-03

## 2024-09-03 NOTE — TELEPHONE ENCOUNTER
Received  call from [facility] and they confirmed with their pharmacist.  They did not get script you sent in on 8/29 for fentanyl. Resetting up

## 2024-09-08 PROBLEM — S22.070A COMPRESSION FRACTURE OF T9 VERTEBRA (HCC): Chronic | Status: ACTIVE | Noted: 2023-05-12

## 2024-09-08 PROBLEM — S22.060A COMPRESSION FRACTURE OF T7 VERTEBRA (HCC): Chronic | Status: ACTIVE | Noted: 2023-05-12

## 2024-09-08 PROBLEM — E04.2 MULTIPLE THYROID NODULES: Status: ACTIVE | Noted: 2024-09-08

## 2024-09-08 PROBLEM — E04.1 NODULE OF LEFT LOBE OF THYROID GLAND: Status: ACTIVE | Noted: 2024-09-08

## 2024-09-08 RX ORDER — SODIUM CHLORIDE 9 MG/ML
INJECTION, SOLUTION INTRAVENOUS PRN
Status: CANCELLED | OUTPATIENT
Start: 2024-09-08

## 2024-09-08 RX ORDER — SODIUM CHLORIDE 0.9 % (FLUSH) 0.9 %
5-40 SYRINGE (ML) INJECTION EVERY 12 HOURS SCHEDULED
Status: CANCELLED | OUTPATIENT
Start: 2024-09-08

## 2024-09-08 RX ORDER — SODIUM CHLORIDE 0.9 % (FLUSH) 0.9 %
5-40 SYRINGE (ML) INJECTION PRN
Status: CANCELLED | OUTPATIENT
Start: 2024-09-08

## 2024-09-09 ENCOUNTER — ANESTHESIA (OUTPATIENT)
Dept: OPERATING ROOM | Age: 64
End: 2024-09-09
Payer: MEDICARE

## 2024-09-09 ENCOUNTER — HOSPITAL ENCOUNTER (OUTPATIENT)
Age: 64
Discharge: HOME | End: 2024-09-10
Attending: OTOLARYNGOLOGY | Admitting: OTOLARYNGOLOGY
Payer: MEDICARE

## 2024-09-09 ENCOUNTER — ANESTHESIA EVENT (OUTPATIENT)
Dept: OPERATING ROOM | Age: 64
End: 2024-09-09
Payer: MEDICARE

## 2024-09-09 DIAGNOSIS — E04.1 NODULE OF LEFT LOBE OF THYROID GLAND: ICD-10-CM

## 2024-09-09 DIAGNOSIS — E03.9 HYPOTHYROIDISM, UNSPECIFIED TYPE: ICD-10-CM

## 2024-09-09 DIAGNOSIS — G89.18 ACUTE POST-OPERATIVE PAIN: Primary | ICD-10-CM

## 2024-09-09 DIAGNOSIS — E04.9 NONTOXIC NODULAR GOITER: ICD-10-CM

## 2024-09-09 DIAGNOSIS — E04.2 MULTIPLE THYROID NODULES: ICD-10-CM

## 2024-09-09 LAB — POTASSIUM SERPL-SCNC: 4.4 MEQ/L (ref 3.5–5.2)

## 2024-09-09 PROCEDURE — 2720000010 HC SURG SUPPLY STERILE: Performed by: OTOLARYNGOLOGY

## 2024-09-09 PROCEDURE — 36415 COLL VENOUS BLD VENIPUNCTURE: CPT

## 2024-09-09 PROCEDURE — 2500000003 HC RX 250 WO HCPCS: Performed by: OTOLARYNGOLOGY

## 2024-09-09 PROCEDURE — 84132 ASSAY OF SERUM POTASSIUM: CPT

## 2024-09-09 PROCEDURE — 6370000000 HC RX 637 (ALT 250 FOR IP): Performed by: OTOLARYNGOLOGY

## 2024-09-09 PROCEDURE — 3700000001 HC ADD 15 MINUTES (ANESTHESIA): Performed by: OTOLARYNGOLOGY

## 2024-09-09 PROCEDURE — 6360000002 HC RX W HCPCS: Performed by: NURSE ANESTHETIST, CERTIFIED REGISTERED

## 2024-09-09 PROCEDURE — 2709999900 HC NON-CHARGEABLE SUPPLY: Performed by: OTOLARYNGOLOGY

## 2024-09-09 PROCEDURE — 3600000013 HC SURGERY LEVEL 3 ADDTL 15MIN: Performed by: OTOLARYNGOLOGY

## 2024-09-09 PROCEDURE — 60220 PARTIAL REMOVAL OF THYROID: CPT | Performed by: OTOLARYNGOLOGY

## 2024-09-09 PROCEDURE — 6360000002 HC RX W HCPCS

## 2024-09-09 PROCEDURE — 7100000000 HC PACU RECOVERY - FIRST 15 MIN: Performed by: OTOLARYNGOLOGY

## 2024-09-09 PROCEDURE — 3700000000 HC ANESTHESIA ATTENDED CARE: Performed by: OTOLARYNGOLOGY

## 2024-09-09 PROCEDURE — 2580000003 HC RX 258: Performed by: NURSE ANESTHETIST, CERTIFIED REGISTERED

## 2024-09-09 PROCEDURE — 3600000003 HC SURGERY LEVEL 3 BASE: Performed by: OTOLARYNGOLOGY

## 2024-09-09 PROCEDURE — 88307 TISSUE EXAM BY PATHOLOGIST: CPT

## 2024-09-09 PROCEDURE — 6370000000 HC RX 637 (ALT 250 FOR IP): Performed by: ANESTHESIOLOGY

## 2024-09-09 PROCEDURE — 2580000003 HC RX 258: Performed by: OTOLARYNGOLOGY

## 2024-09-09 PROCEDURE — 2500000003 HC RX 250 WO HCPCS: Performed by: NURSE ANESTHETIST, CERTIFIED REGISTERED

## 2024-09-09 PROCEDURE — 6360000002 HC RX W HCPCS: Performed by: OTOLARYNGOLOGY

## 2024-09-09 PROCEDURE — 7100000001 HC PACU RECOVERY - ADDTL 15 MIN: Performed by: OTOLARYNGOLOGY

## 2024-09-09 RX ORDER — SODIUM CHLORIDE 0.9 % (FLUSH) 0.9 %
5-40 SYRINGE (ML) INJECTION EVERY 12 HOURS SCHEDULED
Status: DISCONTINUED | OUTPATIENT
Start: 2024-09-09 | End: 2024-09-09 | Stop reason: HOSPADM

## 2024-09-09 RX ORDER — LIDOCAINE HYDROCHLORIDE 20 MG/ML
INJECTION, SOLUTION INFILTRATION; PERINEURAL PRN
Status: DISCONTINUED | OUTPATIENT
Start: 2024-09-09 | End: 2024-09-09 | Stop reason: SDUPTHER

## 2024-09-09 RX ORDER — SODIUM CHLORIDE 9 MG/ML
INJECTION, SOLUTION INTRAVENOUS PRN
Status: DISCONTINUED | OUTPATIENT
Start: 2024-09-09 | End: 2024-09-09 | Stop reason: HOSPADM

## 2024-09-09 RX ORDER — SODIUM CHLORIDE 0.9 % (FLUSH) 0.9 %
5-40 SYRINGE (ML) INJECTION PRN
Status: DISCONTINUED | OUTPATIENT
Start: 2024-09-09 | End: 2024-09-09 | Stop reason: HOSPADM

## 2024-09-09 RX ORDER — CETIRIZINE HYDROCHLORIDE 10 MG/1
10 TABLET ORAL DAILY
Status: DISCONTINUED | OUTPATIENT
Start: 2024-09-09 | End: 2024-09-10 | Stop reason: HOSPADM

## 2024-09-09 RX ORDER — SCOLOPAMINE TRANSDERMAL SYSTEM 1 MG/1
1 PATCH, EXTENDED RELEASE TRANSDERMAL ONCE
Status: DISCONTINUED | OUTPATIENT
Start: 2024-09-09 | End: 2024-09-10 | Stop reason: HOSPADM

## 2024-09-09 RX ORDER — POLYETHYLENE GLYCOL 3350 17 G/17G
17 POWDER, FOR SOLUTION ORAL DAILY PRN
COMMUNITY

## 2024-09-09 RX ORDER — ROPINIROLE 0.5 MG/1
0.5 TABLET, FILM COATED ORAL NIGHTLY
Status: DISCONTINUED | OUTPATIENT
Start: 2024-09-09 | End: 2024-09-10 | Stop reason: HOSPADM

## 2024-09-09 RX ORDER — NITROGLYCERIN 0.4 MG/1
0.4 TABLET SUBLINGUAL EVERY 5 MIN PRN
Status: DISCONTINUED | OUTPATIENT
Start: 2024-09-09 | End: 2024-09-10 | Stop reason: HOSPADM

## 2024-09-09 RX ORDER — NALOXONE HYDROCHLORIDE 0.4 MG/ML
INJECTION, SOLUTION INTRAMUSCULAR; INTRAVENOUS; SUBCUTANEOUS PRN
Status: DISCONTINUED | OUTPATIENT
Start: 2024-09-09 | End: 2024-09-09 | Stop reason: HOSPADM

## 2024-09-09 RX ORDER — BUPROPION HYDROCHLORIDE 300 MG/1
300 TABLET ORAL DAILY
Status: DISCONTINUED | OUTPATIENT
Start: 2024-09-10 | End: 2024-09-10 | Stop reason: HOSPADM

## 2024-09-09 RX ORDER — PROPOFOL 10 MG/ML
INJECTION, EMULSION INTRAVENOUS PRN
Status: DISCONTINUED | OUTPATIENT
Start: 2024-09-09 | End: 2024-09-09 | Stop reason: SDUPTHER

## 2024-09-09 RX ORDER — METHOTREXATE 2.5 MG/1
2.5 TABLET ORAL WEEKLY
COMMUNITY

## 2024-09-09 RX ORDER — ONDANSETRON 2 MG/ML
4 INJECTION INTRAMUSCULAR; INTRAVENOUS
Status: DISCONTINUED | OUTPATIENT
Start: 2024-09-09 | End: 2024-09-09 | Stop reason: HOSPADM

## 2024-09-09 RX ORDER — GUAIFENESIN 600 MG/1
1200 TABLET, EXTENDED RELEASE ORAL 2 TIMES DAILY
COMMUNITY

## 2024-09-09 RX ORDER — HYDRALAZINE HYDROCHLORIDE 20 MG/ML
10 INJECTION INTRAMUSCULAR; INTRAVENOUS
Status: DISCONTINUED | OUTPATIENT
Start: 2024-09-09 | End: 2024-09-09 | Stop reason: HOSPADM

## 2024-09-09 RX ORDER — FOLIC ACID 1 MG/1
1 TABLET ORAL DAILY
Status: DISCONTINUED | OUTPATIENT
Start: 2024-09-09 | End: 2024-09-10 | Stop reason: HOSPADM

## 2024-09-09 RX ORDER — ONDANSETRON 2 MG/ML
INJECTION INTRAMUSCULAR; INTRAVENOUS PRN
Status: DISCONTINUED | OUTPATIENT
Start: 2024-09-09 | End: 2024-09-09 | Stop reason: SDUPTHER

## 2024-09-09 RX ORDER — FENTANYL 25 UG/1
1 PATCH TRANSDERMAL
Status: DISCONTINUED | OUTPATIENT
Start: 2024-09-11 | End: 2024-09-10 | Stop reason: HOSPADM

## 2024-09-09 RX ORDER — SUCCINYLCHOLINE/SOD CL,ISO/PF 200MG/10ML
SYRINGE (ML) INTRAVENOUS PRN
Status: DISCONTINUED | OUTPATIENT
Start: 2024-09-09 | End: 2024-09-09 | Stop reason: SDUPTHER

## 2024-09-09 RX ORDER — ACETAMINOPHEN 325 MG/1
650 TABLET ORAL EVERY 6 HOURS PRN
Status: DISCONTINUED | OUTPATIENT
Start: 2024-09-09 | End: 2024-09-10 | Stop reason: HOSPADM

## 2024-09-09 RX ORDER — SODIUM CHLORIDE, SODIUM LACTATE, POTASSIUM CHLORIDE, CALCIUM CHLORIDE 600; 310; 30; 20 MG/100ML; MG/100ML; MG/100ML; MG/100ML
INJECTION, SOLUTION INTRAVENOUS CONTINUOUS PRN
Status: DISCONTINUED | OUTPATIENT
Start: 2024-09-09 | End: 2024-09-09 | Stop reason: SDUPTHER

## 2024-09-09 RX ORDER — LIDOCAINE HYDROCHLORIDE AND EPINEPHRINE 10; 10 MG/ML; UG/ML
INJECTION, SOLUTION INFILTRATION; PERINEURAL PRN
Status: DISCONTINUED | OUTPATIENT
Start: 2024-09-09 | End: 2024-09-09 | Stop reason: ALTCHOICE

## 2024-09-09 RX ORDER — FENTANYL CITRATE 50 UG/ML
50 INJECTION, SOLUTION INTRAMUSCULAR; INTRAVENOUS EVERY 5 MIN PRN
Status: DISCONTINUED | OUTPATIENT
Start: 2024-09-09 | End: 2024-09-09 | Stop reason: HOSPADM

## 2024-09-09 RX ORDER — DEXAMETHASONE SODIUM PHOSPHATE 4 MG/ML
INJECTION, SOLUTION INTRA-ARTICULAR; INTRALESIONAL; INTRAMUSCULAR; INTRAVENOUS; SOFT TISSUE PRN
Status: DISCONTINUED | OUTPATIENT
Start: 2024-09-09 | End: 2024-09-09 | Stop reason: SDUPTHER

## 2024-09-09 RX ORDER — LABETALOL HYDROCHLORIDE 5 MG/ML
10 INJECTION, SOLUTION INTRAVENOUS
Status: DISCONTINUED | OUTPATIENT
Start: 2024-09-09 | End: 2024-09-09 | Stop reason: HOSPADM

## 2024-09-09 RX ORDER — ESCITALOPRAM OXALATE 10 MG/1
10 TABLET ORAL DAILY
Status: DISCONTINUED | OUTPATIENT
Start: 2024-09-09 | End: 2024-09-10 | Stop reason: HOSPADM

## 2024-09-09 RX ORDER — TOPIRAMATE 100 MG/1
100 TABLET, FILM COATED ORAL 2 TIMES DAILY
Status: DISCONTINUED | OUTPATIENT
Start: 2024-09-09 | End: 2024-09-10 | Stop reason: HOSPADM

## 2024-09-09 RX ORDER — AMPICILLIN TRIHYDRATE 500 MG
500 CAPSULE ORAL 4 TIMES DAILY
COMMUNITY

## 2024-09-09 RX ORDER — BUTALBITAL, ACETAMINOPHEN AND CAFFEINE 50; 325; 40 MG/1; MG/1; MG/1
1 TABLET ORAL EVERY 6 HOURS PRN
Status: DISCONTINUED | OUTPATIENT
Start: 2024-09-09 | End: 2024-09-10 | Stop reason: HOSPADM

## 2024-09-09 RX ORDER — PANTOPRAZOLE SODIUM 40 MG/1
40 TABLET, DELAYED RELEASE ORAL
Status: DISCONTINUED | OUTPATIENT
Start: 2024-09-10 | End: 2024-09-10 | Stop reason: HOSPADM

## 2024-09-09 RX ORDER — ONDANSETRON 4 MG/1
4 TABLET, FILM COATED ORAL 3 TIMES DAILY PRN
Status: DISCONTINUED | OUTPATIENT
Start: 2024-09-09 | End: 2024-09-10 | Stop reason: HOSPADM

## 2024-09-09 RX ORDER — MIDAZOLAM HYDROCHLORIDE 1 MG/ML
INJECTION INTRAMUSCULAR; INTRAVENOUS PRN
Status: DISCONTINUED | OUTPATIENT
Start: 2024-09-09 | End: 2024-09-09 | Stop reason: SDUPTHER

## 2024-09-09 RX ORDER — FENTANYL CITRATE 50 UG/ML
INJECTION, SOLUTION INTRAMUSCULAR; INTRAVENOUS PRN
Status: DISCONTINUED | OUTPATIENT
Start: 2024-09-09 | End: 2024-09-09 | Stop reason: SDUPTHER

## 2024-09-09 RX ORDER — FENTANYL CITRATE 50 UG/ML
INJECTION, SOLUTION INTRAMUSCULAR; INTRAVENOUS
Status: COMPLETED
Start: 2024-09-09 | End: 2024-09-09

## 2024-09-09 RX ADMIN — TRAZODONE HYDROCHLORIDE 225 MG: 50 TABLET ORAL at 21:17

## 2024-09-09 RX ADMIN — PROPOFOL 120 MCG/KG/MIN: 10 INJECTION, EMULSION INTRAVENOUS at 13:49

## 2024-09-09 RX ADMIN — FENTANYL CITRATE 50 MCG: 50 INJECTION INTRAMUSCULAR; INTRAVENOUS at 13:37

## 2024-09-09 RX ADMIN — TOPIRAMATE 100 MG: 100 TABLET, FILM COATED ORAL at 21:17

## 2024-09-09 RX ADMIN — TIZANIDINE 2 MG: 4 TABLET ORAL at 21:18

## 2024-09-09 RX ADMIN — FOLIC ACID 1 MG: 1 TABLET ORAL at 21:18

## 2024-09-09 RX ADMIN — BUTALBITAL, ACETAMINOPHEN, AND CAFFEINE 1 TABLET: 50; 325; 40 TABLET ORAL at 21:16

## 2024-09-09 RX ADMIN — FENTANYL CITRATE 50 MCG: 50 INJECTION, SOLUTION INTRAMUSCULAR; INTRAVENOUS at 16:27

## 2024-09-09 RX ADMIN — Medication 140 MG: at 13:18

## 2024-09-09 RX ADMIN — FENTANYL CITRATE 50 MCG: 50 INJECTION INTRAMUSCULAR; INTRAVENOUS at 14:28

## 2024-09-09 RX ADMIN — ONDANSETRON 4 MG: 2 INJECTION INTRAMUSCULAR; INTRAVENOUS at 14:00

## 2024-09-09 RX ADMIN — LIDOCAINE HYDROCHLORIDE 75 MG: 20 INJECTION, SOLUTION INFILTRATION; PERINEURAL at 13:16

## 2024-09-09 RX ADMIN — MIDAZOLAM 2 MG: 1 INJECTION INTRAMUSCULAR; INTRAVENOUS at 13:14

## 2024-09-09 RX ADMIN — PROPOFOL 200 MG: 10 INJECTION, EMULSION INTRAVENOUS at 13:17

## 2024-09-09 RX ADMIN — CETIRIZINE HYDROCHLORIDE 10 MG: 10 TABLET, FILM COATED ORAL at 21:18

## 2024-09-09 RX ADMIN — ESCITALOPRAM OXALATE 10 MG: 10 TABLET ORAL at 21:18

## 2024-09-09 RX ADMIN — ROPINIROLE HYDROCHLORIDE 0.5 MG: 0.5 TABLET, FILM COATED ORAL at 21:17

## 2024-09-09 RX ADMIN — FENTANYL CITRATE 50 MCG: 50 INJECTION INTRAMUSCULAR; INTRAVENOUS at 15:19

## 2024-09-09 RX ADMIN — PROPOFOL 50 MG: 10 INJECTION, EMULSION INTRAVENOUS at 13:42

## 2024-09-09 RX ADMIN — SODIUM CHLORIDE: 9 INJECTION, SOLUTION INTRAVENOUS at 12:38

## 2024-09-09 RX ADMIN — CEFTRIAXONE SODIUM 1000 MG: 1 INJECTION, POWDER, FOR SOLUTION INTRAMUSCULAR; INTRAVENOUS at 13:28

## 2024-09-09 RX ADMIN — SODIUM CHLORIDE, POTASSIUM CHLORIDE, SODIUM LACTATE AND CALCIUM CHLORIDE: 600; 310; 30; 20 INJECTION, SOLUTION INTRAVENOUS at 15:24

## 2024-09-09 RX ADMIN — FENTANYL CITRATE 100 MCG: 50 INJECTION INTRAMUSCULAR; INTRAVENOUS at 13:17

## 2024-09-09 RX ADMIN — DEXAMETHASONE SODIUM PHOSPHATE 8 MG: 4 INJECTION, SOLUTION INTRAMUSCULAR; INTRAVENOUS at 14:00

## 2024-09-09 ASSESSMENT — PAIN DESCRIPTION - LOCATION
LOCATION: THROAT
LOCATION: HEAD;NECK
LOCATION: NECK
LOCATION: HEAD;THROAT
LOCATION: BACK

## 2024-09-09 ASSESSMENT — PAIN - FUNCTIONAL ASSESSMENT
PAIN_FUNCTIONAL_ASSESSMENT: ACTIVITIES ARE NOT PREVENTED
PAIN_FUNCTIONAL_ASSESSMENT: PREVENTS OR INTERFERES SOME ACTIVE ACTIVITIES AND ADLS

## 2024-09-09 ASSESSMENT — PAIN DESCRIPTION - ONSET
ONSET: ON-GOING
ONSET: ON-GOING

## 2024-09-09 ASSESSMENT — PAIN DESCRIPTION - ORIENTATION
ORIENTATION: MID
ORIENTATION: MID
ORIENTATION: ANTERIOR;LEFT

## 2024-09-09 ASSESSMENT — PAIN DESCRIPTION - FREQUENCY
FREQUENCY: CONTINUOUS
FREQUENCY: CONTINUOUS

## 2024-09-09 ASSESSMENT — PAIN SCALES - GENERAL
PAINLEVEL_OUTOF10: 8
PAINLEVEL_OUTOF10: 7
PAINLEVEL_OUTOF10: 4

## 2024-09-09 ASSESSMENT — PAIN DESCRIPTION - PAIN TYPE
TYPE: CHRONIC PAIN
TYPE: SURGICAL PAIN

## 2024-09-09 ASSESSMENT — PAIN DESCRIPTION - DESCRIPTORS
DESCRIPTORS: ACHING
DESCRIPTORS: BURNING;SORE
DESCRIPTORS: ACHING;TENDER;SORE

## 2024-09-10 ENCOUNTER — TELEPHONE (OUTPATIENT)
Dept: ENT CLINIC | Age: 64
End: 2024-09-10

## 2024-09-10 VITALS
BODY MASS INDEX: 30.46 KG/M2 | DIASTOLIC BLOOD PRESSURE: 55 MMHG | OXYGEN SATURATION: 97 % | WEIGHT: 165.5 LBS | RESPIRATION RATE: 16 BRPM | HEART RATE: 57 BPM | HEIGHT: 62 IN | SYSTOLIC BLOOD PRESSURE: 102 MMHG | TEMPERATURE: 98.4 F

## 2024-09-10 PROCEDURE — 6370000000 HC RX 637 (ALT 250 FOR IP): Performed by: OTOLARYNGOLOGY

## 2024-09-10 PROCEDURE — 99024 POSTOP FOLLOW-UP VISIT: CPT | Performed by: REGISTERED NURSE

## 2024-09-10 RX ORDER — PROMETHAZINE HYDROCHLORIDE 6.25 MG/5ML
12.5 SYRUP ORAL EVERY 6 HOURS PRN
Status: DISCONTINUED | OUTPATIENT
Start: 2024-09-10 | End: 2024-09-10 | Stop reason: HOSPADM

## 2024-09-10 RX ORDER — HYDROCODONE BITARTRATE AND ACETAMINOPHEN 5; 325 MG/1; MG/1
1 TABLET ORAL EVERY 6 HOURS PRN
Status: DISCONTINUED | OUTPATIENT
Start: 2024-09-10 | End: 2024-09-10 | Stop reason: HOSPADM

## 2024-09-10 RX ORDER — HYDROCODONE BITARTRATE AND ACETAMINOPHEN 5; 325 MG/1; MG/1
1 TABLET ORAL EVERY 6 HOURS PRN
Qty: 12 TABLET | Refills: 0 | Status: SHIPPED | OUTPATIENT
Start: 2024-09-10 | End: 2024-09-13

## 2024-09-10 RX ADMIN — TOPIRAMATE 100 MG: 100 TABLET, FILM COATED ORAL at 08:02

## 2024-09-10 RX ADMIN — FOLIC ACID 1 MG: 1 TABLET ORAL at 08:01

## 2024-09-10 RX ADMIN — HYDROCODONE BITARTRATE AND ACETAMINOPHEN 1 TABLET: 5; 325 TABLET ORAL at 04:25

## 2024-09-10 RX ADMIN — Medication 12.5 MG: at 14:58

## 2024-09-10 RX ADMIN — CETIRIZINE HYDROCHLORIDE 10 MG: 10 TABLET, FILM COATED ORAL at 08:01

## 2024-09-10 RX ADMIN — ESCITALOPRAM OXALATE 10 MG: 10 TABLET ORAL at 08:01

## 2024-09-10 RX ADMIN — Medication 12.5 MG: at 10:25

## 2024-09-10 RX ADMIN — HYDROCODONE BITARTRATE AND ACETAMINOPHEN 1 TABLET: 5; 325 TABLET ORAL at 14:57

## 2024-09-10 RX ADMIN — HYDROCODONE BITARTRATE AND ACETAMINOPHEN 1 TABLET: 5; 325 TABLET ORAL at 10:25

## 2024-09-10 RX ADMIN — PANTOPRAZOLE SODIUM 40 MG: 40 TABLET, DELAYED RELEASE ORAL at 05:29

## 2024-09-10 RX ADMIN — BUPROPION HYDROCHLORIDE 300 MG: 300 TABLET, EXTENDED RELEASE ORAL at 08:01

## 2024-09-10 ASSESSMENT — PAIN DESCRIPTION - LOCATION
LOCATION: THROAT
LOCATION: HEAD;NECK

## 2024-09-10 ASSESSMENT — PAIN DESCRIPTION - DESCRIPTORS
DESCRIPTORS: SORE;TENDER
DESCRIPTORS: SORE;TENDER
DESCRIPTORS: DISCOMFORT
DESCRIPTORS: DISCOMFORT

## 2024-09-10 ASSESSMENT — PAIN SCALES - GENERAL
PAINLEVEL_OUTOF10: 6
PAINLEVEL_OUTOF10: 8
PAINLEVEL_OUTOF10: 8
PAINLEVEL_OUTOF10: 5

## 2024-09-10 ASSESSMENT — PAIN DESCRIPTION - ORIENTATION
ORIENTATION: MID
ORIENTATION: MID

## 2024-09-10 ASSESSMENT — PAIN - FUNCTIONAL ASSESSMENT: PAIN_FUNCTIONAL_ASSESSMENT: ACTIVITIES ARE NOT PREVENTED

## 2024-09-11 ENCOUNTER — OFFICE VISIT (OUTPATIENT)
Dept: PHYSICAL MEDICINE AND REHAB | Age: 64
End: 2024-09-11
Payer: MEDICARE

## 2024-09-11 ENCOUNTER — TELEPHONE (OUTPATIENT)
Dept: FAMILY MEDICINE CLINIC | Age: 64
End: 2024-09-11

## 2024-09-11 ENCOUNTER — HOSPITAL ENCOUNTER (OUTPATIENT)
Dept: WOMENS IMAGING | Age: 64
Discharge: HOME | End: 2024-09-11
Attending: RADIOLOGY
Payer: MEDICARE

## 2024-09-11 VITALS
SYSTOLIC BLOOD PRESSURE: 82 MMHG | HEIGHT: 62 IN | WEIGHT: 165 LBS | BODY MASS INDEX: 30.36 KG/M2 | DIASTOLIC BLOOD PRESSURE: 64 MMHG

## 2024-09-11 DIAGNOSIS — Z09 FOLLOW-UP EXAM: ICD-10-CM

## 2024-09-11 DIAGNOSIS — S22.000A COMPRESSION FRACTURE OF BODY OF THORACIC VERTEBRA (HCC): Primary | ICD-10-CM

## 2024-09-11 DIAGNOSIS — N63.21 MASS OF UPPER OUTER QUADRANT OF LEFT BREAST: ICD-10-CM

## 2024-09-11 DIAGNOSIS — G89.4 CHRONIC PAIN SYNDROME: ICD-10-CM

## 2024-09-11 DIAGNOSIS — M79.18 MYOFASCIAL PAIN: ICD-10-CM

## 2024-09-11 DIAGNOSIS — M47.814 THORACIC SPONDYLOSIS: ICD-10-CM

## 2024-09-11 LAB
ALBUMIN: 3.3 G/DL
ALP BLD-CCNC: 56 U/L
ALT SERPL-CCNC: 14 U/L
ANION GAP SERPL CALCULATED.3IONS-SCNC: ABNORMAL MMOL/L
AST SERPL-CCNC: 13 U/L
BILIRUB SERPL-MCNC: 0.3 MG/DL (ref 0.1–1.4)
BUN BLDV-MCNC: 14 MG/DL
CALCIUM SERPL-MCNC: 8 MG/DL
CHLORIDE BLD-SCNC: 114 MMOL/L
CO2: 25 MMOL/L
CREAT SERPL-MCNC: 0.6 MG/DL
GFR, ESTIMATED: 101
GLUCOSE BLD-MCNC: 71 MG/DL
MAGNESIUM: 2 MG/DL
POTASSIUM SERPL-SCNC: 3.7 MMOL/L
SODIUM BLD-SCNC: 147 MMOL/L
TOTAL PROTEIN: 5.2 G/DL (ref 6.4–8.2)

## 2024-09-11 PROCEDURE — G8427 DOCREV CUR MEDS BY ELIG CLIN: HCPCS | Performed by: ANESTHESIOLOGY

## 2024-09-11 PROCEDURE — G8417 CALC BMI ABV UP PARAM F/U: HCPCS | Performed by: ANESTHESIOLOGY

## 2024-09-11 PROCEDURE — 3017F COLORECTAL CA SCREEN DOC REV: CPT | Performed by: ANESTHESIOLOGY

## 2024-09-11 PROCEDURE — 76642 ULTRASOUND BREAST LIMITED: CPT

## 2024-09-11 PROCEDURE — 1036F TOBACCO NON-USER: CPT | Performed by: ANESTHESIOLOGY

## 2024-09-11 PROCEDURE — 99214 OFFICE O/P EST MOD 30 MIN: CPT | Performed by: ANESTHESIOLOGY

## 2024-09-12 RX ORDER — FENTANYL 12.5 UG/1
1 PATCH TRANSDERMAL
Qty: 10 PATCH | Refills: 0 | Status: SHIPPED | OUTPATIENT
Start: 2024-10-03 | End: 2024-11-02

## 2024-09-12 RX ORDER — FENTANYL 25 UG/1
1 PATCH TRANSDERMAL
Qty: 10 PATCH | Refills: 0 | Status: SHIPPED | OUTPATIENT
Start: 2024-10-03 | End: 2024-11-02

## 2024-09-13 ENCOUNTER — OFFICE VISIT (OUTPATIENT)
Dept: ENT CLINIC | Age: 64
End: 2024-09-13

## 2024-09-13 ENCOUNTER — PATIENT MESSAGE (OUTPATIENT)
Dept: PHYSICAL MEDICINE AND REHAB | Age: 64
End: 2024-09-13

## 2024-09-13 VITALS
HEIGHT: 62 IN | BODY MASS INDEX: 31.62 KG/M2 | TEMPERATURE: 97.7 F | WEIGHT: 171.8 LBS | OXYGEN SATURATION: 98 % | DIASTOLIC BLOOD PRESSURE: 66 MMHG | SYSTOLIC BLOOD PRESSURE: 94 MMHG | HEART RATE: 81 BPM

## 2024-09-13 DIAGNOSIS — D49.7 FOLLICULAR NEOPLASM OF THYROID: Primary | ICD-10-CM

## 2024-09-13 DIAGNOSIS — S22.000A COMPRESSION FRACTURE OF BODY OF THORACIC VERTEBRA (HCC): ICD-10-CM

## 2024-09-13 DIAGNOSIS — E89.0 STATUS POST PARTIAL THYROIDECTOMY: ICD-10-CM

## 2024-09-13 PROCEDURE — 99024 POSTOP FOLLOW-UP VISIT: CPT | Performed by: OTOLARYNGOLOGY

## 2024-09-13 RX ORDER — LEVOTHYROXINE SODIUM 50 UG/1
50 TABLET ORAL DAILY
Qty: 90 TABLET | Refills: 0 | Status: SHIPPED | OUTPATIENT
Start: 2024-09-13 | End: 2024-12-12

## 2024-09-13 ASSESSMENT — ENCOUNTER SYMPTOMS
SORE THROAT: 0
CHEST TIGHTNESS: 0
APNEA: 0
TROUBLE SWALLOWING: 0
COLOR CHANGE: 0
SHORTNESS OF BREATH: 0
VOICE CHANGE: 0
ABDOMINAL PAIN: 0
STRIDOR: 0
WHEEZING: 0
COUGH: 0
FACIAL SWELLING: 0
VOMITING: 0
NAUSEA: 0
RHINORRHEA: 0
CHOKING: 0
DIARRHEA: 0
SINUS PRESSURE: 0

## 2024-09-16 DIAGNOSIS — S22.000A COMPRESSION FRACTURE OF BODY OF THORACIC VERTEBRA (HCC): ICD-10-CM

## 2024-09-16 RX ORDER — FENTANYL 12.5 UG/1
1 PATCH TRANSDERMAL
Qty: 10 PATCH | Refills: 0 | Status: CANCELLED | OUTPATIENT
Start: 2024-10-03 | End: 2024-11-02

## 2024-09-17 ENCOUNTER — OFFICE VISIT (OUTPATIENT)
Dept: CARDIOLOGY CLINIC | Age: 64
End: 2024-09-17
Payer: MEDICARE

## 2024-09-17 VITALS
DIASTOLIC BLOOD PRESSURE: 70 MMHG | SYSTOLIC BLOOD PRESSURE: 113 MMHG | HEART RATE: 72 BPM | HEIGHT: 67 IN | BODY MASS INDEX: 26.87 KG/M2 | WEIGHT: 171.2 LBS

## 2024-09-17 DIAGNOSIS — R94.31 ABNORMAL ELECTROCARDIOGRAM (ECG) (EKG): ICD-10-CM

## 2024-09-17 DIAGNOSIS — I44.1 MOBITZ (TYPE) I (WENCKEBACH'S) ATRIOVENTRICULAR BLOCK: ICD-10-CM

## 2024-09-17 DIAGNOSIS — R06.09 DOE (DYSPNEA ON EXERTION): Primary | ICD-10-CM

## 2024-09-17 DIAGNOSIS — I73.9 PAD (PERIPHERAL ARTERY DISEASE) (HCC): ICD-10-CM

## 2024-09-17 DIAGNOSIS — E78.2 MIXED HYPERLIPIDEMIA: ICD-10-CM

## 2024-09-17 DIAGNOSIS — I20.89 MICROVASCULAR ANGINA (HCC): ICD-10-CM

## 2024-09-17 DIAGNOSIS — R00.2 PALPITATIONS: ICD-10-CM

## 2024-09-17 PROCEDURE — 93000 ELECTROCARDIOGRAM COMPLETE: CPT | Performed by: INTERNAL MEDICINE

## 2024-09-17 PROCEDURE — 99214 OFFICE O/P EST MOD 30 MIN: CPT | Performed by: INTERNAL MEDICINE

## 2024-09-17 RX ORDER — FENTANYL 12.5 UG/1
1 PATCH TRANSDERMAL
Qty: 10 PATCH | Refills: 0 | Status: SHIPPED | OUTPATIENT
Start: 2024-10-03 | End: 2024-11-02

## 2024-09-21 ENCOUNTER — HOSPITAL ENCOUNTER (EMERGENCY)
Age: 64
Discharge: HOME | End: 2024-09-21
Attending: FAMILY MEDICINE
Payer: MEDICARE

## 2024-09-21 ENCOUNTER — APPOINTMENT (OUTPATIENT)
Dept: GENERAL RADIOLOGY | Age: 64
End: 2024-09-21
Payer: MEDICARE

## 2024-09-21 VITALS
OXYGEN SATURATION: 95 % | RESPIRATION RATE: 17 BRPM | BODY MASS INDEX: 34.04 KG/M2 | SYSTOLIC BLOOD PRESSURE: 123 MMHG | TEMPERATURE: 97.9 F | HEIGHT: 62 IN | DIASTOLIC BLOOD PRESSURE: 75 MMHG | HEART RATE: 69 BPM | WEIGHT: 185 LBS

## 2024-09-21 DIAGNOSIS — R60.0 PERIPHERAL EDEMA: ICD-10-CM

## 2024-09-21 DIAGNOSIS — R60.0 LEG EDEMA: Primary | ICD-10-CM

## 2024-09-21 LAB
ALBUMIN SERPL BCG-MCNC: 3.9 G/DL (ref 3.5–5.1)
ALP SERPL-CCNC: 90 U/L (ref 38–126)
ALT SERPL W/O P-5'-P-CCNC: 13 U/L (ref 11–66)
ANION GAP SERPL CALC-SCNC: 11 MEQ/L (ref 8–16)
AST SERPL-CCNC: 14 U/L (ref 5–40)
BASOPHILS ABSOLUTE: 0.1 THOU/MM3 (ref 0–0.1)
BASOPHILS NFR BLD AUTO: 1.2 %
BILIRUB SERPL-MCNC: 0.2 MG/DL (ref 0.3–1.2)
BILIRUB UR QL STRIP.AUTO: NEGATIVE
BUN SERPL-MCNC: 14 MG/DL (ref 7–22)
CALCIUM SERPL-MCNC: 8.4 MG/DL (ref 8.5–10.5)
CHARACTER UR: CLEAR
CHLORIDE SERPL-SCNC: 105 MEQ/L (ref 98–111)
CO2 SERPL-SCNC: 26 MEQ/L (ref 23–33)
COLOR, UA: YELLOW
CREAT SERPL-MCNC: 1 MG/DL (ref 0.4–1.2)
DEPRECATED RDW RBC AUTO: 49.8 FL (ref 35–45)
EKG ATRIAL RATE: 69 BPM
EKG P AXIS: 36 DEGREES
EKG P-R INTERVAL: 168 MS
EKG Q-T INTERVAL: 438 MS
EKG QRS DURATION: 76 MS
EKG QTC CALCULATION (BAZETT): 469 MS
EKG R AXIS: -18 DEGREES
EKG T AXIS: 22 DEGREES
EKG VENTRICULAR RATE: 69 BPM
EOSINOPHIL NFR BLD AUTO: 3 %
EOSINOPHILS ABSOLUTE: 0.2 THOU/MM3 (ref 0–0.4)
ERYTHROCYTE [DISTWIDTH] IN BLOOD BY AUTOMATED COUNT: 14.2 % (ref 11.5–14.5)
GFR SERPL CREATININE-BSD FRML MDRD: 63 ML/MIN/1.73M2
GLUCOSE SERPL-MCNC: 91 MG/DL (ref 70–108)
GLUCOSE UR QL STRIP.AUTO: NEGATIVE MG/DL
HCT VFR BLD AUTO: 37.1 % (ref 37–47)
HGB BLD-MCNC: 11.9 GM/DL (ref 12–16)
HGB UR QL STRIP.AUTO: NEGATIVE
IMM GRANULOCYTES # BLD AUTO: 0.02 THOU/MM3 (ref 0–0.07)
IMM GRANULOCYTES NFR BLD AUTO: 0.3 %
KETONES UR QL STRIP.AUTO: NEGATIVE
LYMPHOCYTES ABSOLUTE: 2.7 THOU/MM3 (ref 1–4.8)
LYMPHOCYTES NFR BLD AUTO: 46.5 %
MCH RBC QN AUTO: 30.7 PG (ref 26–33)
MCHC RBC AUTO-ENTMCNC: 32.1 GM/DL (ref 32.2–35.5)
MCV RBC AUTO: 95.6 FL (ref 81–99)
MONOCYTES ABSOLUTE: 0.5 THOU/MM3 (ref 0.4–1.3)
MONOCYTES NFR BLD AUTO: 7.8 %
NEUTROPHILS ABSOLUTE: 2.4 THOU/MM3 (ref 1.8–7.7)
NEUTROPHILS NFR BLD AUTO: 41.2 %
NITRITE UR QL STRIP: NEGATIVE
NRBC BLD AUTO-RTO: 0 /100 WBC
NT-PROBNP SERPL IA-MCNC: 255.6 PG/ML (ref 0–124)
OSMOLALITY SERPL CALC.SUM OF ELEC: 283.2 MOSMOL/KG (ref 275–300)
PH UR STRIP.AUTO: 6 [PH] (ref 5–9)
PLATELET # BLD AUTO: 228 THOU/MM3 (ref 130–400)
PMV BLD AUTO: 11.9 FL (ref 9.4–12.4)
POTASSIUM SERPL-SCNC: 3.7 MEQ/L (ref 3.5–5.2)
PROT SERPL-MCNC: 6 G/DL (ref 6.1–8)
PROT UR STRIP.AUTO-MCNC: NEGATIVE MG/DL
RBC # BLD AUTO: 3.88 MILL/MM3 (ref 4.2–5.4)
SODIUM SERPL-SCNC: 142 MEQ/L (ref 135–145)
SP GR UR REFRACT.AUTO: 1.01 (ref 1–1.03)
TROPONIN, HIGH SENSITIVITY: < 6 NG/L (ref 0–12)
UROBILINOGEN, URINE: 0.2 EU/DL (ref 0–1)
WBC # BLD AUTO: 5.9 THOU/MM3 (ref 4.8–10.8)
WBC #/AREA URNS HPF: NEGATIVE /[HPF]

## 2024-09-21 PROCEDURE — 85025 COMPLETE CBC W/AUTO DIFF WBC: CPT

## 2024-09-21 PROCEDURE — 93010 ELECTROCARDIOGRAM REPORT: CPT | Performed by: INTERNAL MEDICINE

## 2024-09-21 PROCEDURE — 83880 ASSAY OF NATRIURETIC PEPTIDE: CPT

## 2024-09-21 PROCEDURE — 84484 ASSAY OF TROPONIN QUANT: CPT

## 2024-09-21 PROCEDURE — 80053 COMPREHEN METABOLIC PANEL: CPT

## 2024-09-21 PROCEDURE — 99285 EMERGENCY DEPT VISIT HI MDM: CPT

## 2024-09-21 PROCEDURE — 93005 ELECTROCARDIOGRAM TRACING: CPT | Performed by: FAMILY MEDICINE

## 2024-09-21 PROCEDURE — 36415 COLL VENOUS BLD VENIPUNCTURE: CPT

## 2024-09-21 PROCEDURE — 6370000000 HC RX 637 (ALT 250 FOR IP): Performed by: FAMILY MEDICINE

## 2024-09-21 PROCEDURE — 71045 X-RAY EXAM CHEST 1 VIEW: CPT

## 2024-09-21 PROCEDURE — 81003 URINALYSIS AUTO W/O SCOPE: CPT

## 2024-09-21 RX ORDER — DOCUSATE SODIUM 100 MG/1
100 CAPSULE, LIQUID FILLED ORAL DAILY PRN
COMMUNITY

## 2024-09-21 RX ORDER — FLUCONAZOLE 200 MG/1
200 TABLET ORAL ONCE
Status: COMPLETED | OUTPATIENT
Start: 2024-09-21 | End: 2024-09-21

## 2024-09-21 RX ORDER — ROMOSOZUMAB-AQQG 105 MG/1.17ML
105 INJECTION, SOLUTION SUBCUTANEOUS
COMMUNITY

## 2024-09-21 RX ORDER — POTASSIUM CHLORIDE 1500 MG/1
40 TABLET, EXTENDED RELEASE ORAL
COMMUNITY

## 2024-09-21 RX ORDER — POLYETHYLENE GLYCOL 3350 17 G/17G
17 POWDER, FOR SOLUTION ORAL DAILY PRN
COMMUNITY

## 2024-09-21 RX ORDER — SENNOSIDES A AND B 8.6 MG/1
1 TABLET, FILM COATED ORAL DAILY PRN
COMMUNITY

## 2024-09-21 RX ADMIN — FLUCONAZOLE 200 MG: 200 TABLET ORAL at 17:14

## 2024-09-21 ASSESSMENT — PAIN - FUNCTIONAL ASSESSMENT: PAIN_FUNCTIONAL_ASSESSMENT: NONE - DENIES PAIN

## 2024-09-23 ENCOUNTER — TELEPHONE (OUTPATIENT)
Dept: ENT CLINIC | Age: 64
End: 2024-09-23

## 2024-09-23 ENCOUNTER — TELEPHONE (OUTPATIENT)
Dept: PHYSICAL MEDICINE AND REHAB | Age: 64
End: 2024-09-23

## 2024-09-27 ENCOUNTER — LAB (OUTPATIENT)
Dept: LAB | Age: 64
End: 2024-09-27

## 2024-09-27 DIAGNOSIS — Z51.81 MEDICATION MONITORING ENCOUNTER: ICD-10-CM

## 2024-09-27 LAB
ALBUMIN SERPL BCG-MCNC: 4 G/DL (ref 3.5–5.1)
ALP SERPL-CCNC: 94 U/L (ref 38–126)
ALT SERPL W/O P-5'-P-CCNC: 12 U/L (ref 11–66)
ANION GAP SERPL CALC-SCNC: 13 MEQ/L (ref 8–16)
ANION GAP SERPL CALC-SCNC: 9 MEQ/L (ref 8–16)
AST SERPL-CCNC: 14 U/L (ref 5–40)
BACTERIA: ABNORMAL
BASOPHILS ABSOLUTE: 0.1 THOU/MM3 (ref 0–0.1)
BASOPHILS NFR BLD AUTO: 0.9 %
BILIRUB SERPL-MCNC: < 0.2 MG/DL (ref 0.3–1.2)
BILIRUB UR QL STRIP: NEGATIVE
BUN SERPL-MCNC: 13 MG/DL (ref 7–22)
BUN SERPL-MCNC: 13 MG/DL (ref 7–22)
CALCIUM SERPL-MCNC: 8.5 MG/DL (ref 8.5–10.5)
CALCIUM SERPL-MCNC: 8.5 MG/DL (ref 8.5–10.5)
CASTS #/AREA URNS LPF: ABNORMAL /LPF
CASTS #/AREA URNS LPF: ABNORMAL /LPF
CHARACTER UR: ABNORMAL
CHARCOAL URNS QL MICRO: ABNORMAL
CHLORIDE SERPL-SCNC: 106 MEQ/L (ref 98–111)
CHLORIDE SERPL-SCNC: 107 MEQ/L (ref 98–111)
CO2 SERPL-SCNC: 27 MEQ/L (ref 23–33)
CO2 SERPL-SCNC: 28 MEQ/L (ref 23–33)
COLOR UR: YELLOW
CREAT SERPL-MCNC: 0.7 MG/DL (ref 0.4–1.2)
CREAT SERPL-MCNC: 0.7 MG/DL (ref 0.4–1.2)
CRP SERPL-MCNC: < 0.3 MG/DL (ref 0–1)
CRYSTALS URNS QL MICRO: ABNORMAL
DEPRECATED RDW RBC AUTO: 51.9 FL (ref 35–45)
EOSINOPHIL NFR BLD AUTO: 3.3 %
EOSINOPHILS ABSOLUTE: 0.3 THOU/MM3 (ref 0–0.4)
EPITHELIAL CELLS, UA: ABNORMAL /HPF
ERYTHROCYTE [DISTWIDTH] IN BLOOD BY AUTOMATED COUNT: 14.3 % (ref 11.5–14.5)
ERYTHROCYTE [SEDIMENTATION RATE] IN BLOOD BY WESTERGREN METHOD: 13 MM/HR (ref 0–20)
GFR SERPL CREATININE-BSD FRML MDRD: > 90 ML/MIN/1.73M2
GFR SERPL CREATININE-BSD FRML MDRD: > 90 ML/MIN/1.73M2
GLUCOSE SERPL-MCNC: 101 MG/DL (ref 70–108)
GLUCOSE SERPL-MCNC: 96 MG/DL (ref 70–108)
GLUCOSE UR QL STRIP.AUTO: NEGATIVE MG/DL
HCT VFR BLD AUTO: 38.6 % (ref 37–47)
HGB BLD-MCNC: 12 GM/DL (ref 12–16)
HGB UR QL STRIP.AUTO: NEGATIVE
IMM GRANULOCYTES # BLD AUTO: 0.03 THOU/MM3 (ref 0–0.07)
IMM GRANULOCYTES NFR BLD AUTO: 0.4 %
KETONES UR QL STRIP.AUTO: ABNORMAL
LEUKOCYTE ESTERASE UR QL STRIP.AUTO: NEGATIVE
LYMPHOCYTES ABSOLUTE: 2.4 THOU/MM3 (ref 1–4.8)
LYMPHOCYTES NFR BLD AUTO: 30.9 %
MCH RBC QN AUTO: 30.5 PG (ref 26–33)
MCHC RBC AUTO-ENTMCNC: 31.1 GM/DL (ref 32.2–35.5)
MCV RBC AUTO: 98.2 FL (ref 81–99)
MONOCYTES ABSOLUTE: 0.5 THOU/MM3 (ref 0.4–1.3)
MONOCYTES NFR BLD AUTO: 5.7 %
NEUTROPHILS ABSOLUTE: 4.6 THOU/MM3 (ref 1.8–7.7)
NEUTROPHILS NFR BLD AUTO: 58.8 %
NITRITE UR QL STRIP.AUTO: NEGATIVE
NRBC BLD AUTO-RTO: 0 /100 WBC
NT-PROBNP SERPL IA-MCNC: 286.8 PG/ML (ref 0–124)
PH UR STRIP.AUTO: 5.5 [PH] (ref 5–9)
PLATELET # BLD AUTO: 245 THOU/MM3 (ref 130–400)
PMV BLD AUTO: 12.7 FL (ref 9.4–12.4)
POTASSIUM SERPL-SCNC: 3.7 MEQ/L (ref 3.5–5.2)
POTASSIUM SERPL-SCNC: 3.7 MEQ/L (ref 3.5–5.2)
PROT SERPL-MCNC: 6.2 G/DL (ref 6.1–8)
PROT UR STRIP.AUTO-MCNC: ABNORMAL MG/DL
RBC # BLD AUTO: 3.93 MILL/MM3 (ref 4.2–5.4)
RBC #/AREA URNS HPF: ABNORMAL /HPF
RENAL EPI CELLS #/AREA URNS HPF: ABNORMAL /[HPF]
SODIUM SERPL-SCNC: 144 MEQ/L (ref 135–145)
SODIUM SERPL-SCNC: 146 MEQ/L (ref 135–145)
SP GR UR REFRACT.AUTO: 1.02 (ref 1–1.03)
UROBILINOGEN UR QL STRIP.AUTO: 1 EU/DL (ref 0–1)
WBC # BLD AUTO: 7.9 THOU/MM3 (ref 4.8–10.8)
WBC #/AREA URNS HPF: ABNORMAL /HPF
YEAST LIKE FUNGI URNS QL MICRO: ABNORMAL

## 2024-10-07 ENCOUNTER — OFFICE VISIT (OUTPATIENT)
Dept: PAIN MANAGEMENT | Age: 64
End: 2024-10-07
Payer: COMMERCIAL

## 2024-10-07 VITALS
BODY MASS INDEX: 34.04 KG/M2 | SYSTOLIC BLOOD PRESSURE: 108 MMHG | WEIGHT: 185 LBS | HEIGHT: 62 IN | DIASTOLIC BLOOD PRESSURE: 86 MMHG

## 2024-10-07 DIAGNOSIS — G89.4 CHRONIC PAIN SYNDROME: ICD-10-CM

## 2024-10-07 DIAGNOSIS — M79.2 NEUROPATHIC PAIN: ICD-10-CM

## 2024-10-07 DIAGNOSIS — M79.18 MYOFASCIAL PAIN: ICD-10-CM

## 2024-10-07 DIAGNOSIS — S22.070S COMPRESSION FRACTURE OF T9 VERTEBRA, SEQUELA: Primary | Chronic | ICD-10-CM

## 2024-10-07 PROCEDURE — 99214 OFFICE O/P EST MOD 30 MIN: CPT | Performed by: ANESTHESIOLOGY

## 2024-10-07 NOTE — PROGRESS NOTES
(PHENERGAN) 25 MG tablet Take 12-50 mg by mouth every 6 hours as needed for Nausea    rOPINIRole (REQUIP) 0.5 mg, Oral, NIGHTLY    senna (SENOKOT) 8.6 MG tablet 1 tablet, Oral, DAILY PRN    tiZANidine (ZANAFLEX) 2 mg, Oral, NIGHTLY PRN    topiramate (TOPAMAX) 100 MG tablet TAKE 1 TABLET BY MOUTH TWICE A DAY    traZODone (DESYREL) 225 mg, Oral, NIGHTLY    vitamin D3 (CHOLECALCIFEROL) 5,000 Units, Oral, DAILY       Allergies   Allergen Reactions    Biaxin [Clarithromycin] Anaphylaxis and Swelling     Lips swelled    Triamcinolone Other (See Comments)    Butrans [Buprenorphine] Itching     Blisters     Dilaudid [Hydromorphone] Nausea And Vomiting    Doxycycline Hives     rash    Morphine Nausea And Vomiting    Sulfa Antibiotics Rash     Other Reaction(s): rash, able to take keflex       Review of Systems:   Constitutional: negative for fevers  Musculoskeletal: Positive for low back pain and left hip pain  Neurological: positive for migraines but negative for leg numbness/tingling or weakness  Behavioral/Psych: negative for anxiety/depression   All other systems reviewed and are negative    Objective:     Vitals:    10/07/24 1453   BP: 108/86       Constitutional: Pleasant, no acute distress   Head: Normocephalic, atraumatic   Eyes: Conjunctivae normal   Neck: Supple, symmetrical   Respiration: Non-labored breathing   Cardiovascular: Limbs warm and well perfused   Musculoskeletal:   Lumbar -ROM reduced in extension.  Increased pain with facet loading bilaterally.  Positive SLR on left.  Tenderness palpation along bilateral lumbar paraspinals.  Neuro: Alert, oriented. CN II-XII appear grossly intact.  No focal motor deficits appreciated in lower limbs.  Skin: no skin rashes or lesions visible on face  Psychological: Cooperative, no exaggerated pain behaviors       Assessment:    Diagnosis Orders   1. Compression fracture of T9 vertebra, sequela        2. Chronic pain syndrome        3. Myofascial pain        4.

## 2024-10-08 ENCOUNTER — TRANSCRIBE ORDERS (OUTPATIENT)
Dept: ADMINISTRATIVE | Age: 64
End: 2024-10-08

## 2024-10-08 DIAGNOSIS — R14.0 BLOATED ABDOMEN: Primary | ICD-10-CM

## 2024-10-09 DIAGNOSIS — G89.4 CHRONIC PAIN SYNDROME: Primary | ICD-10-CM

## 2024-10-09 DIAGNOSIS — G89.18 ACUTE POST-OPERATIVE PAIN: ICD-10-CM

## 2024-10-09 RX ORDER — HYDROCODONE BITARTRATE AND ACETAMINOPHEN 5; 325 MG/1; MG/1
1 TABLET ORAL EVERY 6 HOURS PRN
Qty: 12 TABLET | Refills: 0 | OUTPATIENT
Start: 2024-10-13 | End: 2024-10-16

## 2024-10-09 RX ORDER — OXYCODONE AND ACETAMINOPHEN 5; 325 MG/1; MG/1
1 TABLET ORAL EVERY 12 HOURS PRN
Qty: 60 TABLET | Refills: 0 | Status: SHIPPED | OUTPATIENT
Start: 2024-10-09 | End: 2024-11-08

## 2024-10-09 NOTE — TELEPHONE ENCOUNTER
OARRS reviewed. UDS: no data last year  .   Last seen: 10/7/2024. Follow-up:   Future Appointments   Date Time Provider Department Center   10/14/2024  8:00 AM STR FLUORO ROOM 4 STRZ RAD STR Rad/Card   10/16/2024 11:00 AM STR DELPHOS CT IMAGING STRZ DEL CT STR [place]   10/21/2024 11:20 AM Liat Hallman, ESTEFANIA - CNP N SRPX Rheum P - [place]   10/24/2024 12:45 PM Ruel Suarez MD N [place] [facility] [place]   11/12/2024  1:30 PM Sangita Malik MD N SRPX PSYCH P - [place]   1/6/2025  2:00 PM Leandro Powell DO N [place] [facility] - [place]   3/18/2025  3:00 PM Fredis Martinez MD N SRPX Heart [facility] [place]    [facility] at [facility]

## 2024-10-09 NOTE — TELEPHONE ENCOUNTER
Please clarify denial on percocet as your last office note indicates would keep on fentanyl 25mcg and percocet 1 bid prn for breakthru. Your denial reason said (change not appropiate). Fentanyl not due for fill till 10/28 and percocet does not show in OARRS or in med list but listed as script sent in your office note on 10/7. Pt. At [facility]

## 2024-10-11 ENCOUNTER — HOSPITAL ENCOUNTER (EMERGENCY)
Age: 64
Discharge: HOME | End: 2024-10-11
Attending: EMERGENCY MEDICINE
Payer: COMMERCIAL

## 2024-10-11 ENCOUNTER — APPOINTMENT (OUTPATIENT)
Dept: CT IMAGING | Age: 64
End: 2024-10-11
Payer: COMMERCIAL

## 2024-10-11 VITALS
SYSTOLIC BLOOD PRESSURE: 123 MMHG | HEIGHT: 62 IN | HEART RATE: 72 BPM | TEMPERATURE: 98 F | OXYGEN SATURATION: 97 % | DIASTOLIC BLOOD PRESSURE: 65 MMHG | WEIGHT: 172 LBS | RESPIRATION RATE: 16 BRPM | BODY MASS INDEX: 31.65 KG/M2

## 2024-10-11 DIAGNOSIS — N30.00 ACUTE CYSTITIS WITHOUT HEMATURIA: Primary | ICD-10-CM

## 2024-10-11 LAB
ALBUMIN SERPL BCG-MCNC: 3.8 G/DL (ref 3.5–5.1)
ALP SERPL-CCNC: 92 U/L (ref 38–126)
ALT SERPL W/O P-5'-P-CCNC: 11 U/L (ref 11–66)
ANION GAP SERPL CALC-SCNC: 10 MEQ/L (ref 8–16)
AST SERPL-CCNC: 15 U/L (ref 5–40)
BASOPHILS ABSOLUTE: 0.1 THOU/MM3 (ref 0–0.1)
BASOPHILS NFR BLD AUTO: 0.9 %
BILIRUB CONJ SERPL-MCNC: < 0.1 MG/DL (ref 0.1–13.8)
BILIRUB SERPL-MCNC: < 0.2 MG/DL (ref 0.3–1.2)
BILIRUB UR QL STRIP.AUTO: NEGATIVE
BUN SERPL-MCNC: 18 MG/DL (ref 7–22)
CALCIUM SERPL-MCNC: 8.8 MG/DL (ref 8.5–10.5)
CHARACTER UR: CLEAR
CHLORIDE SERPL-SCNC: 104 MEQ/L (ref 98–111)
CO2 SERPL-SCNC: 25 MEQ/L (ref 23–33)
COLOR, UA: YELLOW
CREAT SERPL-MCNC: 0.7 MG/DL (ref 0.4–1.2)
DEPRECATED RDW RBC AUTO: 46.2 FL (ref 35–45)
EOSINOPHIL NFR BLD AUTO: 2.8 %
EOSINOPHILS ABSOLUTE: 0.2 THOU/MM3 (ref 0–0.4)
ERYTHROCYTE [DISTWIDTH] IN BLOOD BY AUTOMATED COUNT: 13.3 % (ref 11.5–14.5)
GFR SERPL CREATININE-BSD FRML MDRD: > 90 ML/MIN/1.73M2
GLUCOSE SERPL-MCNC: 85 MG/DL (ref 70–108)
GLUCOSE UR QL STRIP.AUTO: NEGATIVE MG/DL
HCT VFR BLD AUTO: 36.9 % (ref 37–47)
HGB BLD-MCNC: 11.6 GM/DL (ref 12–16)
HGB UR QL STRIP.AUTO: NEGATIVE
IMM GRANULOCYTES # BLD AUTO: 0.02 THOU/MM3 (ref 0–0.07)
IMM GRANULOCYTES NFR BLD AUTO: 0.3 %
KETONES UR QL STRIP.AUTO: NEGATIVE
LIPASE SERPL-CCNC: 57.3 U/L (ref 5.6–51.3)
LYMPHOCYTES ABSOLUTE: 3.1 THOU/MM3 (ref 1–4.8)
LYMPHOCYTES NFR BLD AUTO: 45.1 %
MCH RBC QN AUTO: 29.9 PG (ref 26–33)
MCHC RBC AUTO-ENTMCNC: 31.4 GM/DL (ref 32.2–35.5)
MCV RBC AUTO: 95.1 FL (ref 81–99)
MONOCYTES ABSOLUTE: 0.5 THOU/MM3 (ref 0.4–1.3)
MONOCYTES NFR BLD AUTO: 7 %
NEUTROPHILS ABSOLUTE: 3 THOU/MM3 (ref 1.8–7.7)
NEUTROPHILS NFR BLD AUTO: 43.9 %
NITRITE UR QL STRIP: NEGATIVE
NRBC BLD AUTO-RTO: 0 /100 WBC
OSMOLALITY SERPL CALC.SUM OF ELEC: 278.7 MOSMOL/KG (ref 275–300)
PH UR STRIP.AUTO: 6.5 [PH] (ref 5–9)
PLATELET # BLD AUTO: 248 THOU/MM3 (ref 130–400)
PMV BLD AUTO: 11.4 FL (ref 9.4–12.4)
POTASSIUM SERPL-SCNC: 3.7 MEQ/L (ref 3.5–5.2)
PROT SERPL-MCNC: 6.1 G/DL (ref 6.1–8)
PROT UR STRIP.AUTO-MCNC: NEGATIVE MG/DL
RBC # BLD AUTO: 3.88 MILL/MM3 (ref 4.2–5.4)
SODIUM SERPL-SCNC: 139 MEQ/L (ref 135–145)
SP GR UR REFRACT.AUTO: 1.01 (ref 1–1.03)
UROBILINOGEN, URINE: 0.2 EU/DL (ref 0–1)
WBC # BLD AUTO: 6.9 THOU/MM3 (ref 4.8–10.8)
WBC #/AREA URNS HPF: NEGATIVE /[HPF]

## 2024-10-11 PROCEDURE — 36415 COLL VENOUS BLD VENIPUNCTURE: CPT

## 2024-10-11 PROCEDURE — 2580000003 HC RX 258: Performed by: EMERGENCY MEDICINE

## 2024-10-11 PROCEDURE — 83690 ASSAY OF LIPASE: CPT

## 2024-10-11 PROCEDURE — 96375 TX/PRO/DX INJ NEW DRUG ADDON: CPT

## 2024-10-11 PROCEDURE — 80053 COMPREHEN METABOLIC PANEL: CPT

## 2024-10-11 PROCEDURE — 96374 THER/PROPH/DIAG INJ IV PUSH: CPT

## 2024-10-11 PROCEDURE — 87040 BLOOD CULTURE FOR BACTERIA: CPT

## 2024-10-11 PROCEDURE — 82248 BILIRUBIN DIRECT: CPT

## 2024-10-11 PROCEDURE — 74177 CT ABD & PELVIS W/CONTRAST: CPT

## 2024-10-11 PROCEDURE — 6360000002 HC RX W HCPCS: Performed by: EMERGENCY MEDICINE

## 2024-10-11 PROCEDURE — 85025 COMPLETE CBC W/AUTO DIFF WBC: CPT

## 2024-10-11 PROCEDURE — 6360000004 HC RX CONTRAST MEDICATION: Performed by: EMERGENCY MEDICINE

## 2024-10-11 PROCEDURE — 99285 EMERGENCY DEPT VISIT HI MDM: CPT

## 2024-10-11 PROCEDURE — 81003 URINALYSIS AUTO W/O SCOPE: CPT

## 2024-10-11 RX ORDER — FLUCONAZOLE 100 MG/1
100 TABLET ORAL DAILY
Qty: 7 TABLET | Refills: 0 | Status: SHIPPED | OUTPATIENT
Start: 2024-10-11 | End: 2024-10-18

## 2024-10-11 RX ORDER — CEPHALEXIN 500 MG/1
500 CAPSULE ORAL 2 TIMES DAILY
Qty: 14 CAPSULE | Refills: 0 | Status: SHIPPED | OUTPATIENT
Start: 2024-10-11 | End: 2024-10-18

## 2024-10-11 RX ORDER — ONDANSETRON 2 MG/ML
4 INJECTION INTRAMUSCULAR; INTRAVENOUS ONCE
Status: COMPLETED | OUTPATIENT
Start: 2024-10-11 | End: 2024-10-11

## 2024-10-11 RX ORDER — IOPAMIDOL 755 MG/ML
80 INJECTION, SOLUTION INTRAVASCULAR
Status: COMPLETED | OUTPATIENT
Start: 2024-10-11 | End: 2024-10-11

## 2024-10-11 RX ADMIN — WATER 1000 MG: 1 INJECTION INTRAMUSCULAR; INTRAVENOUS; SUBCUTANEOUS at 18:05

## 2024-10-11 RX ADMIN — ONDANSETRON 4 MG: 2 INJECTION INTRAMUSCULAR; INTRAVENOUS at 18:05

## 2024-10-11 RX ADMIN — IOPAMIDOL 80 ML: 755 INJECTION, SOLUTION INTRAVENOUS at 18:21

## 2024-10-11 ASSESSMENT — PAIN - FUNCTIONAL ASSESSMENT: PAIN_FUNCTIONAL_ASSESSMENT: 0-10

## 2024-10-11 ASSESSMENT — PAIN DESCRIPTION - ORIENTATION: ORIENTATION: LOWER

## 2024-10-11 ASSESSMENT — PAIN DESCRIPTION - LOCATION: LOCATION: BACK

## 2024-10-11 ASSESSMENT — PAIN DESCRIPTION - DESCRIPTORS: DESCRIPTORS: ACHING

## 2024-10-11 ASSESSMENT — PAIN SCALES - GENERAL: PAINLEVEL_OUTOF10: 6

## 2024-10-11 NOTE — ED PROVIDER NOTES
[facility] EMERGENCY DEPT      EMERGENCY MEDICINE     Pt Name: Isha Ramsey  MRN: [id number]  Birthdate 1960  Date of evaluation: 10/11/2024  Provider: Sabas Peguero DO  Supervising Physician: Patrick Vigil DO    CHIEF COMPLAINT       Chief Complaint   Patient presents with    Dysuria    Bloated     HISTORY OF PRESENT ILLNESS   Isha Ramsey is a 63 y.o. female with a history of COPD, fibromyalgia, HLD,?  CVA, psoriatic arthritis on methotrexate who presents to the emergency department from [facility] for evaluation of a UTI patient reports she has been having dysuria that has been increasing in pain for the past month.  Patient having some foul-smelling urine and increasing bloating to her abdomen.  Patient also having intermittent left lower quadrant pain that radiates around to the back for a couple weeks.  Patient reports she was at her urine results come back positive and so she was advised to come in.  They were also concerned that her BUN was 30.  Patient denies fever, vomiting, diarrhea, vaginal bleeding.    PASTMEDICAL HISTORY     Past Medical History:   Diagnosis Date    Abnormal ECG O6/25/2024    Adrenal abnormality (HCC)     Allergic rhinitis     Angina at rest (HCC)     MICROVASCULAR CARDIAC DISEASE    Anxiety     Arrhythmia 06/25/2024    Arthritis     back, neck    Asthma     Carpal tunnel syndrome     Cerebral artery occlusion with cerebral infarction (HCC)     Cerebrovascular disease     Chronic back pain     Chronic sinusitis     Cognitive impairment     with retograde amnesia    COPD (chronic obstructive pulmonary disease) (HCC)     Coronary atherosclerosis 02/03/2022    Depression     Dysphagia     Fibromyalgia     Fracture     T-7, T-9, and T-11    GERD (gastroesophageal reflux disease)     Headache     Migraines Hemiplegic    History of degenerative disc disease     Hyperlipidemia     Hypothyroidism 03/10/2009    Irritable bowel syndrome     Lymphedema     Left leg    May-Thurner syndrome      CEPHALEXIN (KEFLEX) 500 MG CAPSULE    Take 1 capsule by mouth 2 times daily for 7 days    FLUCONAZOLE (DIFLUCAN) 100 MG TABLET    Take 1 tablet by mouth daily for 7 days       FINAL IMPRESSION      1. Acute cystitis without hematuria          DISPOSITION/PLAN   DISPOSITION Decision To Discharge 10/11/2024 07:18:20 PM  Condition at Disposition: Data Unavailable      OUTPATIENT FOLLOW UP THE PATIENT:  Mack Nguyen DO  [address] [address]  [phone]    In 2 days  As needed    [facility] EMERGENCY DEPT  [address] [address]  [phone]    If symptoms worsen      Sabas Peguero DO    This transcription was electronically signed. Parts of this transcriptions may have been dictated by use of voice recognition software and electronically transcribed, and parts may have been transcribed with the assistance of an ED scribe. The transcription may contain errors not detected in proofreading.  Please refer to my supervising physician's documentation if my documentation differs.    Electronically Signed: Sabas Peguero DO, 10/11/24, 7:25 PM

## 2024-10-11 NOTE — ED TRIAGE NOTES
Pt presents to ED via EMS from [facility] with dysuria and bloating. Pt states she has had dysuria and back pain for the past week. Pt had urinalysis done yesterday which showed a UTI per pt. Pt states for the past month, she has also had abdominal bloating. States it \"feels like she's pregnant.\"

## 2024-10-13 LAB
BACTERIA BLD AEROBE CULT: NORMAL
BACTERIA BLD AEROBE CULT: NORMAL

## 2024-10-16 LAB
BACTERIA BLD AEROBE CULT: NORMAL
BACTERIA BLD AEROBE CULT: NORMAL

## 2024-10-21 ENCOUNTER — OFFICE VISIT (OUTPATIENT)
Dept: RHEUMATOLOGY | Age: 64
End: 2024-10-21
Payer: COMMERCIAL

## 2024-10-21 VITALS
DIASTOLIC BLOOD PRESSURE: 70 MMHG | BODY MASS INDEX: 32.39 KG/M2 | HEART RATE: 74 BPM | OXYGEN SATURATION: 96 % | HEIGHT: 62 IN | SYSTOLIC BLOOD PRESSURE: 124 MMHG | WEIGHT: 176 LBS

## 2024-10-21 DIAGNOSIS — M79.7 FIBROMYALGIA: ICD-10-CM

## 2024-10-21 DIAGNOSIS — L40.50 PSORIATIC ARTHRITIS (HCC): Primary | ICD-10-CM

## 2024-10-21 DIAGNOSIS — G89.29 CHRONIC BILATERAL LOW BACK PAIN WITHOUT SCIATICA: ICD-10-CM

## 2024-10-21 DIAGNOSIS — M80.08XA AGE-RELATED OSTEOPOROSIS WITH CURRENT PATHOLOGICAL FRACTURE, VERTEBRA(E), INITIAL ENCOUNTER FOR FRACTURE (HCC): ICD-10-CM

## 2024-10-21 DIAGNOSIS — Z51.81 MEDICATION MONITORING ENCOUNTER: ICD-10-CM

## 2024-10-21 DIAGNOSIS — M54.50 CHRONIC BILATERAL LOW BACK PAIN WITHOUT SCIATICA: ICD-10-CM

## 2024-10-21 PROCEDURE — 99214 OFFICE O/P EST MOD 30 MIN: CPT | Performed by: NURSE PRACTITIONER

## 2024-10-21 RX ORDER — FUROSEMIDE 40 MG/1
TABLET ORAL
COMMUNITY
Start: 2024-10-10

## 2024-10-21 RX ORDER — AMOXICILLIN 500 MG/1
CAPSULE ORAL
COMMUNITY
Start: 2024-10-14

## 2024-10-21 ASSESSMENT — ENCOUNTER SYMPTOMS
TROUBLE SWALLOWING: 0
EYE ITCHING: 0
EYE PAIN: 0
ABDOMINAL PAIN: 0
DIARRHEA: 0
COUGH: 0
SHORTNESS OF BREATH: 0
BACK PAIN: 0
NAUSEA: 0

## 2024-10-21 NOTE — PROGRESS NOTES
[facility] RHEUMATOLOGY FOLLOW UP NOTE       Date Of Service: 10/21/2024  Provider: ESTEFANIA Driver - CNP    Name: Isha Ramsey   MRN: [id number]    Chief Complaint(s)     Chief Complaint   Patient presents with    Follow-up     6-8 week f/u for PSA.         History of Present Illness (HPI)     Isha Ramsey  is a(n)63 y.o. female with a hx of adrenal abnormalities, arthritis, asthma, CVA, headaches, hyperlipidemia, h/o myositis, bipolar disorder, may thurner syndrome, dry eye, fatty liver here for the f/u evaluation of h/o MCTD, inflammatory arthritis     Interval hx:    - moving out of current assisted living facility- going to [facility] in [place]. Recurrent UTI's- scheduled to see urologist. Currently on amoxicillin. Still has not restarted the cosentyx   - off the methotrexate due to LFT elevation     pain affecting the fingers, left hip, neck, knees, feet   Pain on a scale 0-10: 9/10  Type of pain: constant aching, stiffness  Timing: mornings  Aggravating factors: weather changes, back: getting up from seated position, walking long distances  Alleviating factors: tylenol hands    Associated symptoms:  + swelling/  Redness/ warmth (left thumb), + AM stiffness lasting several hours     REVIEW OF SYSTEMS: (ROS)    Review of Systems   Constitutional:  Positive for fatigue. Negative for fever and unexpected weight change.   HENT:  Negative for congestion and trouble swallowing.         Dry mouth    Eyes:  Negative for pain and itching.   Respiratory:  Negative for cough and shortness of breath.    Cardiovascular:  Negative for chest pain and leg swelling.   Gastrointestinal:  Negative for abdominal pain, diarrhea and nausea.   Endocrine: Negative for cold intolerance and heat intolerance.   Genitourinary:  Positive for dysuria. Negative for difficulty urinating and urgency.   Musculoskeletal:  Positive for arthralgias and joint swelling. Negative for back pain.   Skin:  Negative for rash.   Neurological:

## 2024-10-21 NOTE — TELEPHONE ENCOUNTER
OARRS reviewed. UDS: n/a   Last seen: 10/7/2024. Follow-up:   Future Appointments   Date Time Provider Department Center   10/24/2024 12:45 PM Ruel Suarez MD N [place] [facility] [place]   11/12/2024  1:30 PM Sangita Malik MD N [facility] PSYCH P - [place]   12/23/2024 10:40 AM Liat Hallman, APRN - CNP N [facility] Rheum [facility] - [place]   1/6/2025  2:00 PM Leandro Powell DO N [facility] [place]   2/25/2025 11:40 AM Lisa Thorne DO N [facility] Rheum [facility] [place]   3/18/2025  3:00 PM Fredis Martinez MD N [facility] Heart [facility] [place]    Awaiting pt. To let us know pharmacy

## 2024-10-22 RX ORDER — LIDOCAINE 50 MG/G
1 PATCH TOPICAL DAILY
Qty: 30 PATCH | Refills: 0 | Status: SHIPPED | OUTPATIENT
Start: 2024-10-22 | End: 2024-11-21

## 2024-10-28 DIAGNOSIS — S22.000A COMPRESSION FRACTURE OF BODY OF THORACIC VERTEBRA (HCC): ICD-10-CM

## 2024-10-28 RX ORDER — FENTANYL 25 UG/1
1 PATCH TRANSDERMAL
Qty: 10 PATCH | Refills: 0 | Status: SHIPPED | OUTPATIENT
Start: 2024-10-28 | End: 2024-11-27

## 2024-10-28 NOTE — TELEPHONE ENCOUNTER
Isha Ramsey called requesting a refill on the following medications:  Requested Prescriptions     Pending Prescriptions Disp Refills    fentaNYL (DURAGESIC) 25 MCG/HR 10 patch 0     Sig: Place 1 patch onto the skin every 3 days for 30 days. Intended supply: 30 days Max Daily Amount: 1 patch     Pharmacy verified: [facility]  .pv    PATIENT IS AT [facility] NOW AND [facility]    Date of last visit: 10/7/2024  Date of next visit (if applicable): 1/6/2025

## 2024-11-01 ENCOUNTER — TELEPHONE (OUTPATIENT)
Dept: RHEUMATOLOGY | Age: 64
End: 2024-11-01

## 2024-11-01 RX ORDER — ROMOSOZUMAB-AQQG 105 MG/1.17ML
210 INJECTION, SOLUTION SUBCUTANEOUS
Qty: 2 EACH | Refills: 4 | Status: ACTIVE | OUTPATIENT
Start: 2024-11-01 | End: 2025-02-22

## 2024-11-01 NOTE — TELEPHONE ENCOUNTER
Received call from Drea with [facility] asking if patient can get the Evenity injection through them. If so, requesting script be sent to [facility] pharmacy.     Per pharmacy liaison Sunil, Evenity can be run under the pharmacy benefit to be given in the Assisted Living facility by the nursing staff but may be cheaper under the medical benefit. Per Sunil, script can be sent to pharmacy to verify pharmacy benefit. Please advise. Thank you.     Drea cb: [phone]

## 2024-11-06 ENCOUNTER — TELEPHONE (OUTPATIENT)
Dept: RHEUMATOLOGY | Age: 64
End: 2024-11-06

## 2024-11-06 NOTE — TELEPHONE ENCOUNTER
Elo a nurse from [facility] stated they are not able to et evenity through their pharmacy. How would you like to proceed?

## 2024-11-06 NOTE — TELEPHONE ENCOUNTER
Left voice message for Drea Elias informing to call central scheduling to schedule the Evenity at [facility].

## 2024-11-12 ENCOUNTER — TELEMEDICINE (OUTPATIENT)
Dept: PSYCHIATRY | Age: 64
End: 2024-11-12

## 2024-11-12 DIAGNOSIS — F43.10 PTSD (POST-TRAUMATIC STRESS DISORDER): Primary | ICD-10-CM

## 2024-11-12 DIAGNOSIS — F33.42 RECURRENT MAJOR DEPRESSIVE DISORDER, IN FULL REMISSION (HCC): ICD-10-CM

## 2024-11-12 DIAGNOSIS — F41.9 ANXIETY: ICD-10-CM

## 2024-11-12 PROCEDURE — 99214 OFFICE O/P EST MOD 30 MIN: CPT | Performed by: PSYCHIATRY & NEUROLOGY

## 2024-11-12 ASSESSMENT — ANXIETY QUESTIONNAIRES
4. TROUBLE RELAXING: NOT AT ALL
IF YOU CHECKED OFF ANY PROBLEMS ON THIS QUESTIONNAIRE, HOW DIFFICULT HAVE THESE PROBLEMS MADE IT FOR YOU TO DO YOUR WORK, TAKE CARE OF THINGS AT HOME, OR GET ALONG WITH OTHER PEOPLE: NOT DIFFICULT AT ALL
3. WORRYING TOO MUCH ABOUT DIFFERENT THINGS: NOT AT ALL
GAD7 TOTAL SCORE: 1
7. FEELING AFRAID AS IF SOMETHING AWFUL MIGHT HAPPEN: NOT AT ALL
6. BECOMING EASILY ANNOYED OR IRRITABLE: NOT AT ALL
7. FEELING AFRAID AS IF SOMETHING AWFUL MIGHT HAPPEN: NOT AT ALL
1. FEELING NERVOUS, ANXIOUS, OR ON EDGE: SEVERAL DAYS
5. BEING SO RESTLESS THAT IT IS HARD TO SIT STILL: NOT AT ALL
5. BEING SO RESTLESS THAT IT IS HARD TO SIT STILL: NOT AT ALL
2. NOT BEING ABLE TO STOP OR CONTROL WORRYING: NOT AT ALL
IF YOU CHECKED OFF ANY PROBLEMS ON THIS QUESTIONNAIRE, HOW DIFFICULT HAVE THESE PROBLEMS MADE IT FOR YOU TO DO YOUR WORK, TAKE CARE OF THINGS AT HOME, OR GET ALONG WITH OTHER PEOPLE: NOT DIFFICULT AT ALL
2. NOT BEING ABLE TO STOP OR CONTROL WORRYING: NOT AT ALL
6. BECOMING EASILY ANNOYED OR IRRITABLE: NOT AT ALL
1. FEELING NERVOUS, ANXIOUS, OR ON EDGE: SEVERAL DAYS
3. WORRYING TOO MUCH ABOUT DIFFERENT THINGS: NOT AT ALL
4. TROUBLE RELAXING: NOT AT ALL

## 2024-11-12 ASSESSMENT — PATIENT HEALTH QUESTIONNAIRE - PHQ9
8. MOVING OR SPEAKING SO SLOWLY THAT OTHER PEOPLE COULD HAVE NOTICED. OR THE OPPOSITE - BEING SO FIDGETY OR RESTLESS THAT YOU HAVE BEEN MOVING AROUND A LOT MORE THAN USUAL: NOT AT ALL
10. IF YOU CHECKED OFF ANY PROBLEMS, HOW DIFFICULT HAVE THESE PROBLEMS MADE IT FOR YOU TO DO YOUR WORK, TAKE CARE OF THINGS AT HOME, OR GET ALONG WITH OTHER PEOPLE: NOT DIFFICULT AT ALL
3. TROUBLE FALLING OR STAYING ASLEEP: NOT AT ALL
SUM OF ALL RESPONSES TO PHQ QUESTIONS 1-9: 2
10. IF YOU CHECKED OFF ANY PROBLEMS, HOW DIFFICULT HAVE THESE PROBLEMS MADE IT FOR YOU TO DO YOUR WORK, TAKE CARE OF THINGS AT HOME, OR GET ALONG WITH OTHER PEOPLE: NOT DIFFICULT AT ALL
7. TROUBLE CONCENTRATING ON THINGS, SUCH AS READING THE NEWSPAPER OR WATCHING TELEVISION: NOT AT ALL
SUM OF ALL RESPONSES TO PHQ QUESTIONS 1-9: 2
4. FEELING TIRED OR HAVING LITTLE ENERGY: SEVERAL DAYS
1. LITTLE INTEREST OR PLEASURE IN DOING THINGS: NOT AT ALL
5. POOR APPETITE OR OVEREATING: SEVERAL DAYS
7. TROUBLE CONCENTRATING ON THINGS, SUCH AS READING THE NEWSPAPER OR WATCHING TELEVISION: NOT AT ALL
SUM OF ALL RESPONSES TO PHQ QUESTIONS 1-9: 2
5. POOR APPETITE OR OVEREATING: SEVERAL DAYS
1. LITTLE INTEREST OR PLEASURE IN DOING THINGS: NOT AT ALL
4. FEELING TIRED OR HAVING LITTLE ENERGY: SEVERAL DAYS
SUM OF ALL RESPONSES TO PHQ9 QUESTIONS 1 & 2: 0
6. FEELING BAD ABOUT YOURSELF - OR THAT YOU ARE A FAILURE OR HAVE LET YOURSELF OR YOUR FAMILY DOWN: NOT AT ALL
8. MOVING OR SPEAKING SO SLOWLY THAT OTHER PEOPLE COULD HAVE NOTICED. OR THE OPPOSITE, BEING SO FIGETY OR RESTLESS THAT YOU HAVE BEEN MOVING AROUND A LOT MORE THAN USUAL: NOT AT ALL
2. FEELING DOWN, DEPRESSED OR HOPELESS: NOT AT ALL
2. FEELING DOWN, DEPRESSED OR HOPELESS: NOT AT ALL
9. THOUGHTS THAT YOU WOULD BE BETTER OFF DEAD, OR OF HURTING YOURSELF: NOT AT ALL
6. FEELING BAD ABOUT YOURSELF - OR THAT YOU ARE A FAILURE OR HAVE LET YOURSELF OR YOUR FAMILY DOWN: NOT AT ALL
SUM OF ALL RESPONSES TO PHQ QUESTIONS 1-9: 2
SUM OF ALL RESPONSES TO PHQ QUESTIONS 1-9: 2
3. TROUBLE FALLING OR STAYING ASLEEP: NOT AT ALL
9. THOUGHTS THAT YOU WOULD BE BETTER OFF DEAD, OR OF HURTING YOURSELF: NOT AT ALL

## 2024-11-12 NOTE — PROGRESS NOTES
[facility] [place] [facility]  [facility] PSYCHIATRY  [address] [facility] [address] [address] [phone]    Progress Note    Patient:  Isha Ramsey  YOB: 1960  PCP:  Mack Nguyen DO  Visit Date:  11/12/2024      Chief Complaint   Patient presents with    Follow-up    Medication Check    Depression    Anxiety       SUBJECTIVE:    Isha Ramsey, a 64 y.o. female, presents for a follow up visit.     Presents alone.   Notes things got bad at [facility] \"horrible\". The nurses \"fought\" her about the fentanyl patch and pain medication.  Had UTI. Says she had the infection the whole time she was there. Didn't get the Abx.   Notes they were angry with her, felt they retaliated. Says their transport van hadn't gone anywhere in a year. No activities but playing cards. No other trips out or activities.   Sent a 2 page letter to the  of operations noting he was at the facility x a week.   Moved to [facility] in [place]. Going better. Likes how they handle medications. Had an intial good impression when she first went.   Feels \"so much better\" since moving to [facility]. Mood \"happier\"  Gained 15 lbs with Lexapro. Also causing b/l LE edema. Would like to change to Prozac.   Wants to stay on Wellbutrin.   Anxiety is better since being away from living on her son's property and being at [facility]. Still some residual anxiety improving over time, dealing with what she went through.   Hard to eat dinner around others, forces herself to do it.   Had thyroidectomy in Sept.   No SI or psychosis.   Discussed tx options and we agree to change Lexapro to Prozac.   I have called [facility] and spoke with nursing staff to give verbal order.       Med Trials:Cymbalta (helped but GI issues), Aricept (diarrhea, Topamax (from Neuro), Zoloft (gained weight), Buspar (made me feel crazy), Prozac, Lexapro (edema)  Trazodone, Ativan, Wellbutrin, Ambien, Valium (was given for

## 2024-11-13 ENCOUNTER — TELEPHONE (OUTPATIENT)
Dept: PHYSICAL MEDICINE AND REHAB | Age: 64
End: 2024-11-13

## 2024-11-13 NOTE — TELEPHONE ENCOUNTER
PA sent to plan(Key: [id number])  PA Case ID #: [id number]  Approved today by [employer] NCPDP 2017  Your request has been approved  Authorization Expiration Date: 11/13/2025  Drug  fentaNYL 25MCG/HR 72 hr patches

## 2024-11-14 ENCOUNTER — TELEPHONE (OUTPATIENT)
Dept: PSYCHIATRY | Age: 64
End: 2024-11-14

## 2024-11-14 ENCOUNTER — TELEPHONE (OUTPATIENT)
Dept: RHEUMATOLOGY | Age: 64
End: 2024-11-14

## 2024-11-14 NOTE — TELEPHONE ENCOUNTER
Thanks for the update. Please cancel her appt scheduled on 1/2.     Electronically signed by Sangita Malik MD on 11/14/2024 at 3:37 PM

## 2024-11-14 NOTE — TELEPHONE ENCOUNTER
Email from Christina that she is good to schedule.     I called emelyn and informed and she requested to call [facility] and schedule her herself due to transportation concerns. Number given

## 2024-11-14 NOTE — TELEPHONE ENCOUNTER
Drea with [facility] left voice message stating they are not able to schedule the Evenity injection due to needs authorization. Email sent to Christina with infusion prior auth team.       Drea cb: [phone]

## 2024-11-14 NOTE — TELEPHONE ENCOUNTER
Shravan called the office today to update, Isha has decided to continue with [facility] due to the fact they come to the facility to see her.

## 2024-11-21 DIAGNOSIS — S22.000A COMPRESSION FRACTURE OF BODY OF THORACIC VERTEBRA (HCC): ICD-10-CM

## 2024-11-21 RX ORDER — FENTANYL 25 UG/1
1 PATCH TRANSDERMAL
Qty: 10 PATCH | Refills: 0 | Status: SHIPPED | OUTPATIENT
Start: 2024-11-27 | End: 2024-12-27

## 2024-11-21 NOTE — TELEPHONE ENCOUNTER
OARRS reviewed. UDS: no data in the last yr.  .   Last seen: 10/7/2024. Follow-up:   Future Appointments   Date Time Provider Department Center   12/3/2024 11:15 AM STR MIN[facility] ROOM 6 [facility] OP NURS [facility]   12/23/2024 10:40 AM iLat Hallman APRN - CNP N [facility] Rheum [facility] - [place]   1/6/2025  2:00 PM Leandro Powell DO N [facility] - [place]   2/25/2025 11:40 AM Lisa Thorne DO N [facility] Rheum [facility] [place]   3/18/2025  3:00 PM Fredis Martinez MD N [facility] Heart P - [place]    At [facility] living and they requested refill

## 2024-11-26 ENCOUNTER — TELEPHONE (OUTPATIENT)
Dept: RHEUMATOLOGY | Age: 64
End: 2024-11-26

## 2024-11-26 NOTE — TELEPHONE ENCOUNTER
----- Message from Sunny GUPTA sent at 11/26/2024 11:17 AM EST -----  Regarding: Evenity  Hey there,    Back on 11/1 :    \"Received call from Drea with [facility] asking if patient can get the Evenity injection through them. If so, requesting script be sent to [facility] pharmacy.\"    Turns out that they have been unable to obtain the medication for the patient and according to one the CTL's there is nothing listed specialty wise that would be a good match for the patient. There was discussion at one point about getting this administered in the office instead.    Are we able to run the drug under he medical benefit instead?    Let me know - THANKS

## 2024-11-26 NOTE — TELEPHONE ENCOUNTER
Patient is receiving medication at [facility]. Nursing home was not able to get the medication. Pt is scheduled 12/3/24

## 2024-12-03 ENCOUNTER — HOSPITAL ENCOUNTER (OUTPATIENT)
Dept: NURSING | Age: 64
Discharge: HOME | End: 2024-12-03
Payer: COMMERCIAL

## 2024-12-03 VITALS
HEART RATE: 72 BPM | DIASTOLIC BLOOD PRESSURE: 55 MMHG | RESPIRATION RATE: 18 BRPM | SYSTOLIC BLOOD PRESSURE: 99 MMHG | TEMPERATURE: 97.9 F | OXYGEN SATURATION: 96 %

## 2024-12-03 DIAGNOSIS — M80.08XA AGE-RELATED OSTEOPOROSIS WITH CURRENT PATHOLOGICAL FRACTURE, VERTEBRA(E), INITIAL ENCOUNTER FOR FRACTURE (HCC): Primary | ICD-10-CM

## 2024-12-03 DIAGNOSIS — S22.070A COMPRESSION FRACTURE OF T9 VERTEBRA, INITIAL ENCOUNTER (HCC): ICD-10-CM

## 2024-12-03 DIAGNOSIS — S22.060A COMPRESSION FRACTURE OF T7 VERTEBRA, INITIAL ENCOUNTER (HCC): ICD-10-CM

## 2024-12-03 PROCEDURE — 6360000002 HC RX W HCPCS: Performed by: NURSE PRACTITIONER

## 2024-12-03 PROCEDURE — 96372 THER/PROPH/DIAG INJ SC/IM: CPT

## 2024-12-03 RX ORDER — EPINEPHRINE 1 MG/ML
0.3 INJECTION, SOLUTION INTRAMUSCULAR; SUBCUTANEOUS PRN
OUTPATIENT
Start: 2024-12-31

## 2024-12-03 RX ORDER — ONDANSETRON 2 MG/ML
8 INJECTION INTRAMUSCULAR; INTRAVENOUS
OUTPATIENT
Start: 2024-12-31

## 2024-12-03 RX ORDER — ALBUTEROL SULFATE 90 UG/1
4 INHALANT RESPIRATORY (INHALATION) PRN
OUTPATIENT
Start: 2024-12-31

## 2024-12-03 RX ORDER — DIPHENHYDRAMINE HYDROCHLORIDE 50 MG/ML
50 INJECTION INTRAMUSCULAR; INTRAVENOUS
OUTPATIENT
Start: 2024-12-31

## 2024-12-03 RX ORDER — HYDROCORTISONE SODIUM SUCCINATE 100 MG/2ML
100 INJECTION INTRAMUSCULAR; INTRAVENOUS
OUTPATIENT
Start: 2024-12-31

## 2024-12-03 RX ORDER — SODIUM CHLORIDE 9 MG/ML
INJECTION, SOLUTION INTRAVENOUS CONTINUOUS
OUTPATIENT
Start: 2024-12-31

## 2024-12-03 RX ORDER — ACETAMINOPHEN 325 MG/1
650 TABLET ORAL
OUTPATIENT
Start: 2024-12-31

## 2024-12-03 RX ADMIN — ROMOSOZUMAB-AQQG 105 MG: 105 INJECTION, SOLUTION SUBCUTANEOUS at 11:28

## 2024-12-03 ASSESSMENT — PAIN SCALES - GENERAL: PAINLEVEL_OUTOF10: 5

## 2024-12-03 ASSESSMENT — PAIN DESCRIPTION - LOCATION: LOCATION: GENERALIZED

## 2024-12-03 ASSESSMENT — PAIN DESCRIPTION - DESCRIPTORS: DESCRIPTORS: ACHING

## 2024-12-03 ASSESSMENT — PAIN DESCRIPTION - PAIN TYPE: TYPE: CHRONIC PAIN

## 2024-12-03 NOTE — PROGRESS NOTES
[facility] brought Isha today.   She was wheeled into room via lobby  wheelchair for evenity injections. Pt rights and responsibilities offered to her. Injections given and Isha tolerated well. D/c instructions discussed and Isha verbalized understanding. Isha was wheeled out via wheelchair for d/c today with [facility].   She lives at [facility] in [place].           __m__ Safety:       (Environmental)  [place] to environment  Ensure ID band is correct and in place/ allergy band as needed  Assess for fall risk  Initiate fall precautions as applicable (fall band, side rails, etc.)  Call light within reach  Bed in low position/ wheels locked    _m___ Pain:       Assess pain level and characteristics  Administer analgesics as ordered  Assess effectiveness of pain management and report to MD as needed    _m___ Knowledge Deficit:  Assess baseline knowledge  Provide teaching at level of understanding  Provide teaching via preferred learning method  Evaluate teaching effectiveness    ___m_ Hemodynamic/Respiratory Status:       (Pre and Post Procedure Monitoring)  Assess/Monitor vital signs and LOC  Assess Baseline SpO2 prior to any sedation  Obtain weight/height  Assess vital signs/ LOC until patient meets discharge criteria  Monitor procedure site and notify MD of any issues

## 2024-12-03 NOTE — DISCHARGE INSTRUCTIONS
Evenity injection discharge instructions:    Side effects: headache, joint pain, rash, swelling    Next Evenity injection on:   JANUARY 2ND  @  11:30  am      Please call [phone] with a any questions or concerns.

## 2024-12-04 ENCOUNTER — OFFICE VISIT (OUTPATIENT)
Dept: CARDIOLOGY CLINIC | Age: 64
End: 2024-12-04
Payer: COMMERCIAL

## 2024-12-04 VITALS
DIASTOLIC BLOOD PRESSURE: 50 MMHG | BODY MASS INDEX: 33.01 KG/M2 | HEART RATE: 78 BPM | HEIGHT: 62 IN | WEIGHT: 179.4 LBS | SYSTOLIC BLOOD PRESSURE: 80 MMHG

## 2024-12-04 DIAGNOSIS — E78.2 MIXED HYPERLIPIDEMIA: ICD-10-CM

## 2024-12-04 DIAGNOSIS — R55 NEAR SYNCOPE: ICD-10-CM

## 2024-12-04 DIAGNOSIS — R42 DIZZINESS: ICD-10-CM

## 2024-12-04 DIAGNOSIS — R94.31 ABNORMAL ELECTROCARDIOGRAM (ECG) (EKG): ICD-10-CM

## 2024-12-04 DIAGNOSIS — I20.89 MICROVASCULAR ANGINA (HCC): ICD-10-CM

## 2024-12-04 DIAGNOSIS — R00.2 PALPITATIONS: ICD-10-CM

## 2024-12-04 DIAGNOSIS — I44.1 MOBITZ (TYPE) I (WENCKEBACH'S) ATRIOVENTRICULAR BLOCK: ICD-10-CM

## 2024-12-04 DIAGNOSIS — I73.9 PAD (PERIPHERAL ARTERY DISEASE) (HCC): ICD-10-CM

## 2024-12-04 DIAGNOSIS — I95.9 HYPOTENSION, UNSPECIFIED HYPOTENSION TYPE: Primary | ICD-10-CM

## 2024-12-04 DIAGNOSIS — R06.09 DOE (DYSPNEA ON EXERTION): ICD-10-CM

## 2024-12-04 PROCEDURE — 99215 OFFICE O/P EST HI 40 MIN: CPT | Performed by: INTERNAL MEDICINE

## 2024-12-04 PROCEDURE — 93000 ELECTROCARDIOGRAM COMPLETE: CPT | Performed by: INTERNAL MEDICINE

## 2024-12-04 RX ORDER — MIDODRINE HYDROCHLORIDE 10 MG/1
10 TABLET ORAL 2 TIMES DAILY PRN
COMMUNITY

## 2024-12-04 RX ORDER — OXYCODONE AND ACETAMINOPHEN 5; 325 MG/1; MG/1
1 TABLET ORAL EVERY 12 HOURS PRN
COMMUNITY
Start: 2024-10-10

## 2024-12-04 RX ORDER — NYSTATIN 100000 U/G
CREAM TOPICAL
COMMUNITY
Start: 2024-10-30

## 2024-12-04 RX ORDER — FERROUS SULFATE 325(65) MG
325 TABLET ORAL
COMMUNITY
Start: 2024-11-06

## 2024-12-04 NOTE — PROGRESS NOTES
[facility] [place] [facility]  [facility] CARDIOLOGY  [address] [facility] [address]  Dept: [phone]  Dept Fax: [phone]  Loc: [phone]         CHIEF COMPLAINT:  WEST  Palpitations  Microvascular angina  HLD    HISTORY OF PRESENT ILLNESS:      The patient is a 64 y.o. white female with a very extensive and complex medical history who presents for ongoing cardiac care.  Patient has a history of microvascular angina.  She has had several cardiac caths with the last one being in [place] 2-3 years ago.  She was told she had tortuous coronary arteries, but no mention of any blockages.  We do not have any reports.       Today she denies any chest pain.  She still has WEST that is unchanged.  Occasional palpitations.  No dizziness or syncope.       The patient denies any history of hypertension or diabetes.  She has hyperlipidemia that is being treated.  She is a former smoker who quit in 2022.  There is no significant family history of CAD that she is aware of.  She is adopted and has limited knowledge of any family members.  Patient also has a history of COPD along with previous stroke with residual right sided weakness, psoriatic arthritis and migraine headaches.  She has PAD with May-Thurner syndrome and an iliac stent.  She has many other medical issues also.     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)     Allergic rhinitis     Angina at rest ([facility])     MICROVASCULAR CARDIAC DISEASE    Anxiety     Arrhythmia 06/25/2024    Arthritis     back, neck    Asthma     Carpal tunnel syndrome     Cerebral artery occlusion with cerebral infarction (HCC)     Cerebrovascular disease     Chronic back pain     Chronic sinusitis     Cognitive impairment     with retograde amnesia    COPD (chronic obstructive pulmonary disease) (HCC)     Coronary atherosclerosis 02/03/2022    Depression     Dysphagia     Fibromyalgia

## 2024-12-04 NOTE — PROGRESS NOTES
Follow up due to low BP.    EKG done today.    C/o sob with activity, OBEY, bloating, Chest tightness took 2 nitro and the tightness went away, low BP 70/40, dizziness, lightheaded and leg weakness.    Denies palpitations.

## 2024-12-05 ENCOUNTER — TELEPHONE (OUTPATIENT)
Dept: CARDIOLOGY CLINIC | Age: 64
End: 2024-12-05

## 2024-12-05 NOTE — TELEPHONE ENCOUNTER
Please set up appointment for patient to see Dr. Han or Marilin for her peripheral arterial/venous disease and swelling.  Do not need to worry about EKG at this time.  Cath a few years ago was OK in [facility]. (Per patient)  May do a Lexiscan stress test in the future, but way too many things going on at present that need taken care of first.

## 2024-12-05 NOTE — PROGRESS NOTES
[facility] [place] [facility]  [facility] CARDIOLOGY  [address] [facility] [address]  Dept: [phone]  Dept Fax: [phone]  Loc: [phone]         CHIEF COMPLAINT:  Low BP  Dizziness  Near syncope  WEST  Palpitations  Microvascular angina  HLD    HISTORY OF PRESENT ILLNESS:      The patient is a 64 y.o. white female with a very extensive and complex medical history who presents for ongoing cardiac care.  Patient has a history of microvascular angina.  She has had several cardiac caths with the last one being in [place] 2-3 years ago.  She was told she had tortuous coronary arteries, but no mention of any blockages.  We do not have any reports.       Patient was sent over for low blood pressure at the nursing home.  Apparently her blood pressures have been very low.  She has been dizzy and having near syncope.  No arlin syncope.  She was placed on midodrine 10 mg twice daily at the nursing home.  The patient tells me she has had problems with urinary tract infections and is currently being treated.  She had bloody urine today.  She says she showed the nurse.  She has had lower abdominal swelling and discomfort for the past 2-3 months.  She says she has been given Lasix for her chronic lymphedema and for her swollen abdomen but it just does not seem to work.  She reports having 2 episodes of chest pain that lasted approximately 15 minutes.  These occurred approximately 1 to 2 weeks ago.  Nitroglycerin gave her total relief within a few minutes.  She has not had any further chest pain.  She does report she has had increasing shortness of breath and increasing dyspnea on exertion over the past several months also.  Today she denies any chest pain.  She still has WEST that is unchanged.  Occasional palpitations.         The patient denies any history of hypertension or diabetes.  She has hyperlipidemia that is being treated.  She is a former smoker who quit in 2022.  There is no

## 2024-12-05 NOTE — TELEPHONE ENCOUNTER
Please see My Chart Message:    [facility] Clinical Staff (supporting Fredis Martinez MD)6 minutes ago (10:17 AM)       Sorry, forgot a couple things I do have pain deep in my left groin on movement.  Also, they just posted my abnormal ECG findings from last night.  They use MyChart as well       [facility] Clinical Staff (supporting Fredis Martinez MD)39 minutes ago (9:44 AM)       The ER Visit, the MD took some blood, urine, chest X-ray.  I told him as well about my stent.  I was sent home on antibiotic UTI.  I thought about something you said regarding my May Srtoud and my stent in my iliac vein due to compression by my iliac artery. That stent was placed in 2013.  However, about 5 years ago,  I saw your colleague Dr. Han and he had me get worked for lymphedema in my left leg and  lower abdomen from MaY Thurner, this improved with the lymphedema massage therapy, it was to hard for me to get to therapy.  He sent me to [facility] and the doctor there prescribed me a body LymphaPress suit/machine which helped me greatly; however, it broke completely in a move a few years ago.  I believe the edema could be related to that as well as my SOB from the edema or some aspect of pelvis congestion due to delivery of 10 pounds baby naturally, vaginal hysterectomy, then both ovaries removed, bilateral tubal ligation, back issues.  So, depending on if you agree, I would like to see Dr Han regarding this, so I can back into using a LymphaPress, can you refer me to him.  Again this morning, I have hematuria, and now it feels like blockage as it's very painful, but nothing is coming out.  The nurse practitioner is calling in a CT to the Hospital here in [place], if there is anything you want to add to testing  You can call there or here to [facility].     Thanks so much for all of your help and concern      Isha Ramsey

## 2024-12-06 NOTE — TELEPHONE ENCOUNTER
[facility] calling back and wants to move patients appt due to transport not being available because they only have so many drivers and because her appt was scheduled last with the nursing home she is the appt that  needed to be moved. Date beginning on Jan did not work for transport  Scheduled for 01/23/2025 as that was the next available transport time.

## 2024-12-20 ENCOUNTER — HOSPITAL ENCOUNTER (EMERGENCY)
Age: 64
Discharge: HOME | End: 2024-12-20
Attending: EMERGENCY MEDICINE
Payer: COMMERCIAL

## 2024-12-20 ENCOUNTER — APPOINTMENT (OUTPATIENT)
Dept: GENERAL RADIOLOGY | Age: 64
End: 2024-12-20
Payer: COMMERCIAL

## 2024-12-20 VITALS
TEMPERATURE: 98.1 F | SYSTOLIC BLOOD PRESSURE: 119 MMHG | HEART RATE: 65 BPM | RESPIRATION RATE: 15 BRPM | OXYGEN SATURATION: 98 % | DIASTOLIC BLOOD PRESSURE: 89 MMHG

## 2024-12-20 DIAGNOSIS — R07.9 CHEST PAIN, UNSPECIFIED TYPE: Primary | ICD-10-CM

## 2024-12-20 LAB
ALBUMIN SERPL BCG-MCNC: 3.8 G/DL (ref 3.5–5.1)
ALP SERPL-CCNC: 97 U/L (ref 38–126)
ALT SERPL W/O P-5'-P-CCNC: 26 U/L (ref 11–66)
ANION GAP SERPL CALC-SCNC: 13 MEQ/L (ref 8–16)
AST SERPL-CCNC: 24 U/L (ref 5–40)
BACTERIA: NORMAL
BASOPHILS ABSOLUTE: 0.1 THOU/MM3 (ref 0–0.1)
BASOPHILS NFR BLD AUTO: 0.9 %
BILIRUB SERPL-MCNC: 0.2 MG/DL (ref 0.3–1.2)
BILIRUB UR QL STRIP: NEGATIVE
BUN SERPL-MCNC: 18 MG/DL (ref 7–22)
CALCIUM SERPL-MCNC: 8.2 MG/DL (ref 8.5–10.5)
CASTS #/AREA URNS LPF: NORMAL /LPF
CASTS #/AREA URNS LPF: NORMAL /LPF
CHARACTER UR: CLEAR
CHARCOAL URNS QL MICRO: NORMAL
CHLORIDE SERPL-SCNC: 108 MEQ/L (ref 98–111)
CO2 SERPL-SCNC: 17 MEQ/L (ref 23–33)
COLOR UR: YELLOW
CREAT SERPL-MCNC: 0.8 MG/DL (ref 0.4–1.2)
CRYSTALS URNS QL MICRO: NORMAL
DEPRECATED RDW RBC AUTO: 42.1 FL (ref 35–45)
EKG ATRIAL RATE: 72 BPM
EKG P AXIS: 52 DEGREES
EKG P-R INTERVAL: 166 MS
EKG Q-T INTERVAL: 414 MS
EKG QRS DURATION: 74 MS
EKG QTC CALCULATION (BAZETT): 453 MS
EKG R AXIS: -9 DEGREES
EKG T AXIS: 1 DEGREES
EKG VENTRICULAR RATE: 72 BPM
EOSINOPHIL NFR BLD AUTO: 1.4 %
EOSINOPHILS ABSOLUTE: 0.1 THOU/MM3 (ref 0–0.4)
EPITHELIAL CELLS, UA: NORMAL /HPF
ERYTHROCYTE [DISTWIDTH] IN BLOOD BY AUTOMATED COUNT: 12.5 % (ref 11.5–14.5)
GFR SERPL CREATININE-BSD FRML MDRD: 83 ML/MIN/1.73M2
GLUCOSE SERPL-MCNC: 95 MG/DL (ref 70–108)
GLUCOSE UR QL STRIP.AUTO: NEGATIVE MG/DL
HCT VFR BLD AUTO: 39.8 % (ref 37–47)
HGB BLD-MCNC: 12.5 GM/DL (ref 12–16)
HGB UR QL STRIP.AUTO: NEGATIVE
IMM GRANULOCYTES # BLD AUTO: 0.02 THOU/MM3 (ref 0–0.07)
IMM GRANULOCYTES NFR BLD AUTO: 0.3 %
KETONES UR QL STRIP.AUTO: NEGATIVE
LEUKOCYTE ESTERASE UR QL STRIP.AUTO: NEGATIVE
LYMPHOCYTES ABSOLUTE: 1.9 THOU/MM3 (ref 1–4.8)
LYMPHOCYTES NFR BLD AUTO: 28.2 %
MCH RBC QN AUTO: 29.1 PG (ref 26–33)
MCHC RBC AUTO-ENTMCNC: 31.4 GM/DL (ref 32.2–35.5)
MCV RBC AUTO: 92.8 FL (ref 81–99)
MONOCYTES ABSOLUTE: 0.3 THOU/MM3 (ref 0.4–1.3)
MONOCYTES NFR BLD AUTO: 4.7 %
NEUTROPHILS ABSOLUTE: 4.3 THOU/MM3 (ref 1.8–7.7)
NEUTROPHILS NFR BLD AUTO: 64.5 %
NITRITE UR QL STRIP.AUTO: NEGATIVE
NRBC BLD AUTO-RTO: 0 /100 WBC
NT-PROBNP SERPL IA-MCNC: 197.1 PG/ML (ref 0–124)
OSMOLALITY SERPL CALC.SUM OF ELEC: 277.4 MOSMOL/KG (ref 275–300)
PH UR STRIP.AUTO: 7.5 [PH] (ref 5–9)
PLATELET # BLD AUTO: 243 THOU/MM3 (ref 130–400)
PMV BLD AUTO: 10.2 FL (ref 9.4–12.4)
POTASSIUM SERPL-SCNC: 4 MEQ/L (ref 3.5–5.2)
PROT SERPL-MCNC: 6.6 G/DL (ref 6.1–8)
PROT UR STRIP.AUTO-MCNC: NEGATIVE MG/DL
RBC # BLD AUTO: 4.29 MILL/MM3 (ref 4.2–5.4)
RBC #/AREA URNS HPF: NORMAL /HPF
RENAL EPI CELLS #/AREA URNS HPF: NORMAL /[HPF]
SODIUM SERPL-SCNC: 138 MEQ/L (ref 135–145)
SPECIFIC GRAVITY UA: 1.01 (ref 1–1.03)
TROPONIN, HIGH SENSITIVITY: < 6 NG/L (ref 0–12)
UROBILINOGEN, URINE: 0.2 EU/DL (ref 0–1)
WBC # BLD AUTO: 6.6 THOU/MM3 (ref 4.8–10.8)
WBC #/AREA URNS HPF: NORMAL /HPF
YEAST LIKE FUNGI URNS QL MICRO: NORMAL

## 2024-12-20 PROCEDURE — 93005 ELECTROCARDIOGRAM TRACING: CPT | Performed by: EMERGENCY MEDICINE

## 2024-12-20 PROCEDURE — 99285 EMERGENCY DEPT VISIT HI MDM: CPT

## 2024-12-20 PROCEDURE — 84484 ASSAY OF TROPONIN QUANT: CPT

## 2024-12-20 PROCEDURE — 83880 ASSAY OF NATRIURETIC PEPTIDE: CPT

## 2024-12-20 PROCEDURE — 36415 COLL VENOUS BLD VENIPUNCTURE: CPT

## 2024-12-20 PROCEDURE — 93010 ELECTROCARDIOGRAM REPORT: CPT | Performed by: INTERNAL MEDICINE

## 2024-12-20 PROCEDURE — 6370000000 HC RX 637 (ALT 250 FOR IP)

## 2024-12-20 PROCEDURE — 85025 COMPLETE CBC W/AUTO DIFF WBC: CPT

## 2024-12-20 PROCEDURE — 71045 X-RAY EXAM CHEST 1 VIEW: CPT

## 2024-12-20 PROCEDURE — 80053 COMPREHEN METABOLIC PANEL: CPT

## 2024-12-20 PROCEDURE — 81001 URINALYSIS AUTO W/SCOPE: CPT

## 2024-12-20 RX ORDER — SIMETHICONE 125 MG
125 TABLET,CHEWABLE ORAL EVERY 6 HOURS PRN
Qty: 60 TABLET | Refills: 3 | Status: SHIPPED | OUTPATIENT
Start: 2024-12-20

## 2024-12-20 RX ORDER — ACETAMINOPHEN 500 MG
1000 TABLET ORAL ONCE
Status: COMPLETED | OUTPATIENT
Start: 2024-12-20 | End: 2024-12-20

## 2024-12-20 RX ADMIN — ACETAMINOPHEN 1000 MG: 500 TABLET ORAL at 15:12

## 2024-12-20 ASSESSMENT — PAIN DESCRIPTION - LOCATION
LOCATION: HEAD
LOCATION: CHEST

## 2024-12-20 ASSESSMENT — PAIN SCALES - GENERAL
PAINLEVEL_OUTOF10: 7
PAINLEVEL_OUTOF10: 8

## 2024-12-20 ASSESSMENT — PAIN DESCRIPTION - DESCRIPTORS: DESCRIPTORS: TIGHTNESS;PRESSURE

## 2024-12-20 ASSESSMENT — PAIN DESCRIPTION - PAIN TYPE: TYPE: ACUTE PAIN

## 2024-12-20 ASSESSMENT — PAIN DESCRIPTION - FREQUENCY: FREQUENCY: CONTINUOUS

## 2024-12-20 ASSESSMENT — PAIN DESCRIPTION - ORIENTATION: ORIENTATION: MID

## 2024-12-20 NOTE — DISCHARGE INSTRUCTIONS
Please follow-up with PCP and cardiologist for reevaluation  Please return to ED if any concerns arise  Please continue taking medication as prescribed

## 2024-12-20 NOTE — ED PROVIDER NOTES
[facility]  EMERGENCY MEDICINE ATTENDING ATTESTATION      Evaluation of Isha Ramsey.   Case discussed and care plan developed with resident physician.   I agree with the resident physician documentation and plan as documented by her, except if my documentation differs.   Patient seen, interviewed and examined by me.  I reviewed the medical, surgical, family and social history, medications and allergies.   I have reviewed and interpreted all available lab, radiology and ekg results available at the moment.  I have reviewed the nursing documentation.     Please see the resident physician completed note for final disposition except as documented on this attestation.   I have reviewed and interpreted all available lab, radiology and ekg results available at the moment.  Diagnosis, treatment and disposition plans were discussed and agreed upon by patient.   This transcription was electronically signed. It was dictated by use of voice recognition software and electronically transcribed. The transcription may contain errors not detected in proofreading.     I performed direct supervision and was present for the critical portion following procedures: None  Critical care time on this case: None    Electronically signed by Baldo Bowen MD on 12/20/24 at 4:17 PM Baldo Romero MD  12/20/24 [id number]

## 2024-12-20 NOTE — ED NOTES
Patient presents to the Emergency Department via ems from [facility]. Patient presents with a complaint of chest pain; groin pain; pain while breathing. Patient states that she developed neck pain that radiated to chest rated 10/10 described as a pressure. Pt states her cardiologist would like her admitted due microvascular hypotension. EKG obtained and IV inserted via EMS. Patient is alert and oriented x4, patient does not appear in acute distress upon triage. Patient respirations are regular and unlabored with even rise and fall of chest. Pt given two nitro at [facility] with improvement in chest pain to 8/10. Call light within reach.

## 2024-12-20 NOTE — ED NOTES
Pt also states concern for abdominal bloating and inability to eat. Pt states she has lost 20 pounds here recently.

## 2024-12-20 NOTE — ED PROVIDER NOTES
[facility] EMERGENCY DEPT  EMERGENCY DEPARTMENT ENCOUNTER          Pt Name: Isha Ramsey  MRN: [id number]  Birthdate 1960  Date of evaluation: 12/20/2024  Physician: Emily Hunter MD  Supervising Attending Physician: Baldo Bowen MD       CHIEF COMPLAINT       Chief Complaint   Patient presents with    Chest Pain         HISTORY OF PRESENT ILLNESS    HPI  Isha Ramsey is a 64 y.o. female who presents to the emergency department from assisted living facility, brought in by EMS for evaluation of chest pain.  Patient chest pain started yesterday suddenly while she was sitting in bed she was given 1 nitro with no improvement given a second nitro she felt better so called the squad to bring her to the hospital.  Her chest pain is associated with neck pain and headache.  She was complaining of increased swelling in her lower limbs up to her abdomen causing internal groin pulling pain.  She also noticed some shortness of breath especially when moving.  Patient had multiple extensive history of chest pain has had extensive workup and is seeing a cardiologist with no acute findings.  Patient had a recent Cath Lab that showed no obstruction.  And her echo showed HFpEF.  She was taking 40 mg of Lasix daily.  Patient denies any dizziness, loss of consciousness, visual change, weakness, sensory changes, N/V/D, cough, fever, or urinary symptoms.  Patient denies any current chest pain or shortness of breath.  The patient has no other acute complaints at this time.      REVIEW OF SYSTEMS   Review of Systems   Constitutional:  Positive for fatigue. Negative for appetite change, chills, diaphoresis and fever.   HENT:  Negative for congestion, postnasal drip, rhinorrhea, sore throat, tinnitus and trouble swallowing.    Eyes:  Negative for photophobia and visual disturbance.   Respiratory:  Positive for chest tightness and shortness of breath. Negative for apnea, cough, choking, wheezing and stridor.

## 2024-12-21 ASSESSMENT — ENCOUNTER SYMPTOMS
BACK PAIN: 1
WHEEZING: 0
CONSTIPATION: 1
STRIDOR: 0
BLOOD IN STOOL: 0
DIARRHEA: 0
NAUSEA: 0
CHOKING: 0
SHORTNESS OF BREATH: 1
TROUBLE SWALLOWING: 0
PHOTOPHOBIA: 0
VOMITING: 0
SORE THROAT: 0
APNEA: 0
CHEST TIGHTNESS: 1
ABDOMINAL PAIN: 0
COUGH: 0
RHINORRHEA: 0

## 2024-12-21 ASSESSMENT — HEART SCORE: ECG: NORMAL

## 2024-12-23 ENCOUNTER — OFFICE VISIT (OUTPATIENT)
Age: 64
End: 2024-12-23
Payer: COMMERCIAL

## 2024-12-23 ENCOUNTER — PATIENT MESSAGE (OUTPATIENT)
Age: 64
End: 2024-12-23

## 2024-12-23 ENCOUNTER — TELEPHONE (OUTPATIENT)
Age: 64
End: 2024-12-23

## 2024-12-23 VITALS
SYSTOLIC BLOOD PRESSURE: 92 MMHG | DIASTOLIC BLOOD PRESSURE: 74 MMHG | BODY MASS INDEX: 33.13 KG/M2 | HEART RATE: 77 BPM | OXYGEN SATURATION: 96 % | WEIGHT: 180 LBS | HEIGHT: 62 IN

## 2024-12-23 DIAGNOSIS — M25.552 CHRONIC LEFT HIP PAIN: ICD-10-CM

## 2024-12-23 DIAGNOSIS — G89.29 CHRONIC LEFT HIP PAIN: ICD-10-CM

## 2024-12-23 DIAGNOSIS — M79.7 FIBROMYALGIA: ICD-10-CM

## 2024-12-23 DIAGNOSIS — Z51.81 MEDICATION MONITORING ENCOUNTER: ICD-10-CM

## 2024-12-23 DIAGNOSIS — M80.08XA AGE-RELATED OSTEOPOROSIS WITH CURRENT PATHOLOGICAL FRACTURE, VERTEBRA(E), INITIAL ENCOUNTER FOR FRACTURE (HCC): ICD-10-CM

## 2024-12-23 DIAGNOSIS — S22.000A COMPRESSION FRACTURE OF BODY OF THORACIC VERTEBRA (HCC): ICD-10-CM

## 2024-12-23 DIAGNOSIS — M54.50 CHRONIC BILATERAL LOW BACK PAIN WITHOUT SCIATICA: ICD-10-CM

## 2024-12-23 DIAGNOSIS — G89.29 CHRONIC BILATERAL LOW BACK PAIN WITHOUT SCIATICA: ICD-10-CM

## 2024-12-23 DIAGNOSIS — Z87.39 H/O MIXED CONNECTIVE TISSUE DISEASE: ICD-10-CM

## 2024-12-23 DIAGNOSIS — L40.50 PSORIATIC ARTHRITIS (HCC): Primary | ICD-10-CM

## 2024-12-23 PROCEDURE — 99214 OFFICE O/P EST MOD 30 MIN: CPT | Performed by: NURSE PRACTITIONER

## 2024-12-23 PROCEDURE — G2211 COMPLEX E/M VISIT ADD ON: HCPCS | Performed by: NURSE PRACTITIONER

## 2024-12-23 RX ORDER — CARBOXYMETHYLCELLULOSE SODIUM 5 MG/ML
SOLUTION/ DROPS OPHTHALMIC
COMMUNITY
Start: 2024-12-04

## 2024-12-23 RX ORDER — LIDOCAINE 50 MG/G
PATCH TOPICAL
COMMUNITY
Start: 2024-10-22

## 2024-12-23 RX ORDER — FENTANYL 25 UG/1
1 PATCH TRANSDERMAL
Qty: 10 PATCH | Refills: 0 | Status: SHIPPED | OUTPATIENT
Start: 2024-12-23 | End: 2025-01-22

## 2024-12-23 RX ORDER — ACETAMINOPHEN 325 MG/1
TABLET ORAL
COMMUNITY
Start: 2024-10-12

## 2024-12-23 RX ORDER — OXYCODONE AND ACETAMINOPHEN 5; 325 MG/1; MG/1
1 TABLET ORAL EVERY 12 HOURS PRN
Qty: 60 TABLET | Refills: 0 | Status: SHIPPED | OUTPATIENT
Start: 2024-12-23 | End: 2025-01-22

## 2024-12-23 RX ORDER — ESTRADIOL 0.1 MG/G
CREAM VAGINAL
COMMUNITY
Start: 2024-12-19

## 2024-12-23 RX ORDER — PHENAZOPYRIDINE HYDROCHLORIDE 100 MG/1
100 TABLET, FILM COATED ORAL 3 TIMES DAILY PRN
COMMUNITY

## 2024-12-23 ASSESSMENT — ENCOUNTER SYMPTOMS
EYE ITCHING: 0
ABDOMINAL PAIN: 0
TROUBLE SWALLOWING: 0
EYE PAIN: 0
NAUSEA: 0
SHORTNESS OF BREATH: 0
COUGH: 0
DIARRHEA: 0
BACK PAIN: 0

## 2024-12-23 NOTE — PROGRESS NOTES
Blisters     Dilaudid [Hydromorphone] Nausea And Vomiting    Doxycycline Hives     rash    Morphine Nausea And Vomiting    Sulfa Antibiotics Rash     Other Reaction(s): rash, able to take keflex       CURRENT MEDICATIONS      Current Outpatient Medications   Medication Sig Dispense Refill    simethicone (MYLICON) 125 MG chewable tablet Take 1 tablet by mouth every 6 hours as needed for Flatulence 60 tablet 3    FEROSUL 325 (65 Fe) MG tablet Take 1 tablet by mouth daily (with breakfast)      midodrine (PROAMATINE) 10 MG tablet Take 1 tablet by mouth 2 times daily as needed      nystatin (MYCOSTATIN) 735692 UNIT/GM cream       oxyCODONE-acetaminophen (PERCOCET) 5-325 MG per tablet Take 1 tablet by mouth every 12 hours as needed for Pain.      fentaNYL (DURAGESIC) 25 MCG/HR Place 1 patch onto the skin every 3 days for 30 days. Intended supply: 30 days Max Daily Amount: 1 patch 10 patch 0    FLUoxetine (PROZAC) 20 MG capsule Take 1 capsule by mouth every morning 30 capsule 2    romosozumab-aqqg (EVENITY) 105 MG/1.17ML SOSY injection Inject 2.34 mLs into the skin every 28 days for 5 doses (Patient not taking: Reported on 12/4/2024) 2 each 4    furosemide (LASIX) 40 MG tablet Take 0.5 tablets by mouth 2 times daily      potassium chloride (KLOR-CON M) 20 MEQ extended release tablet Take 2 tablets by mouth Twice a Week on Tuesday & Friday      polyethylene glycol (GLYCOLAX) 17 g packet Take 1 packet by mouth daily as needed for Constipation      Cholecalciferol (VITAMIN D3) 125 MCG (5000 UT) TABS Take 1 tablet by mouth daily      docusate sodium (COLACE) 100 MG capsule Take 1 capsule by mouth daily as needed for Constipation      senna (SENOKOT) 8.6 MG tablet Take 1 tablet by mouth daily as needed for Constipation      levothyroxine (SYNTHROID) 50 MCG tablet Take 1 tablet by mouth Daily 90 tablet 0    naloxone 4 MG/0.1ML LIQD nasal spray 1 spray by Nasal route as needed for Opioid Reversal 1 each 5    guaiFENesin (MUCINEX)

## 2024-12-23 NOTE — TELEPHONE ENCOUNTER
Per Liat, please call [facility] and have Alendronate discontinued as patient is on Evenity. Please also assist with helping patient get started on her Cosentyx. Left voice message for nurse for above. Awaiting return call.         Cosentyx through [facility] pharmacy.

## 2024-12-23 NOTE — TELEPHONE ENCOUNTER
Isha Ramsey called requesting a refill on the following medications:  Requested Prescriptions     Pending Prescriptions Disp Refills    fentaNYL (DURAGESIC) 25 MCG/HR 10 patch 0     Sig: Place 1 patch onto the skin every 3 days for 30 days. Intended supply: 30 days Max Daily Amount: 1 patch    oxyCODONE-acetaminophen (PERCOCET) 5-325 MG per tablet       Sig: Take 1 tablet by mouth every 12 hours as needed for Pain. Max Daily Amount: 2 tablets     Pharmacy verified:  .[email or web address]  [facility] - [place], [place] - [address] -  [phone] - F [phone]      Date of last visit: 09/11/2024  Date of next visit (if applicable): 01/06/2025

## 2024-12-26 ENCOUNTER — TELEPHONE (OUTPATIENT)
Age: 64
End: 2024-12-26

## 2024-12-26 DIAGNOSIS — G89.29 CHRONIC LEFT HIP PAIN: ICD-10-CM

## 2024-12-26 DIAGNOSIS — M25.552 CHRONIC LEFT HIP PAIN: ICD-10-CM

## 2024-12-26 NOTE — TELEPHONE ENCOUNTER
Requested left hip xray images from [facility]. Was informed disc would be mailed to provider office. Claim # [id number]      PW for disc: 1960    Awaiting images.

## 2025-01-01 ENCOUNTER — PATIENT MESSAGE (OUTPATIENT)
Dept: CARDIOLOGY CLINIC | Age: 65
End: 2025-01-01

## 2025-01-01 ENCOUNTER — TELEPHONE (OUTPATIENT)
Age: 65
End: 2025-01-01

## 2025-01-01 DIAGNOSIS — E83.51 HYPOCALCEMIA: Primary | ICD-10-CM

## 2025-01-01 NOTE — TELEPHONE ENCOUNTER
Can you please contact patient and/or her assisted living facility and let her know that her calcium level was low on recent ER evaluation, and we have to correct this prior to redosing the evenity. Can you please ask if she is currently taking a calcium supplement and how much she is taking?

## 2025-01-02 ENCOUNTER — TELEPHONE (OUTPATIENT)
Dept: CARDIOLOGY CLINIC | Age: 65
End: 2025-01-02

## 2025-01-02 ENCOUNTER — HOSPITAL ENCOUNTER (OUTPATIENT)
Dept: NURSING | Age: 65
Discharge: HOME | End: 2025-01-02

## 2025-01-02 NOTE — TELEPHONE ENCOUNTER
Events noted.  Patient has known small vessel CAD.  Limited options for increasing cardiac meds due to low Bps.  May move appointment up if patient feels she needs seen sooner.

## 2025-01-02 NOTE — TELEPHONE ENCOUNTER
Spoke with nurse Drea who stated patient is not on any calcium. Please advise. Thank you.       AL nurse: [phone]

## 2025-01-02 NOTE — TELEPHONE ENCOUNTER
Per Drea, ok to leave voice message on confidential voice mail regarding orders. Left voice message to notify of above.

## 2025-01-02 NOTE — TELEPHONE ENCOUNTER
[facility]sha [facility] (supporting Fredis Martinez MD)15 hours ago (3:58 PM)       Hi, I had messaged to let Dr Martinez know that I was in the ER on the Friday before Christmas. After my visit to his office on 12/04, he wanted me admitted due to very low BP, abdominal swelling, lower extremity edema, [place] [facility] just sent me home. Then,  the Friday before Christmas I had a really bad episode of angina, first squeezing in my neck and the worst chest pain and pressure center of my chest, had to take 2 nitro to relieve.   [facility] [place] [facility]  I showed them Dr Skinner note from the 4th regarding everything and wanting admission, apparently there was an abnormal EKG as well and I was sent home once again.  I have not had bad angina like that since they did my last Cath and they told me I had tortuosity in some vessels and those were fixed, have had no angina since until this episode.     I am still dealing with low blood pressures even on the midodrine and edema even on Lasix , new symptoms now are peripheral neuropathy both hands and feet, very fatigued.  Urologist is going to do cystoscopy and pelvic ultrasound to see if anything was missed there.  Wanted to update you. I have an appointment with Dr. Martinez not until March, but I think I need to see him much sooner.  Thanks Isha

## 2025-01-03 RX ORDER — CALCIUM CARBONATE 500(1250)
500 TABLET ORAL 2 TIMES DAILY
Qty: 60 TABLET | Refills: 2 | Status: SHIPPED | OUTPATIENT
Start: 2025-01-03

## 2025-01-03 NOTE — TELEPHONE ENCOUNTER
Lab order faxed to [facility] at [phone] per Drea request.     Drea with [facility] requesting calcium script be sent to [facility]. Thank you.

## 2025-01-06 ENCOUNTER — OFFICE VISIT (OUTPATIENT)
Dept: PAIN MANAGEMENT | Age: 65
End: 2025-01-06
Payer: COMMERCIAL

## 2025-01-06 VITALS
BODY MASS INDEX: 33.1 KG/M2 | SYSTOLIC BLOOD PRESSURE: 118 MMHG | WEIGHT: 179.9 LBS | HEIGHT: 62 IN | DIASTOLIC BLOOD PRESSURE: 70 MMHG

## 2025-01-06 DIAGNOSIS — S22.000A COMPRESSION FRACTURE OF BODY OF THORACIC VERTEBRA (HCC): ICD-10-CM

## 2025-01-06 DIAGNOSIS — S22.070S COMPRESSION FRACTURE OF T9 VERTEBRA, SEQUELA: Primary | ICD-10-CM

## 2025-01-06 DIAGNOSIS — G89.4 CHRONIC PAIN SYNDROME: ICD-10-CM

## 2025-01-06 DIAGNOSIS — M79.18 MYOFASCIAL PAIN: ICD-10-CM

## 2025-01-06 PROCEDURE — 99214 OFFICE O/P EST MOD 30 MIN: CPT | Performed by: ANESTHESIOLOGY

## 2025-01-06 NOTE — PROGRESS NOTES
[facility] PROFESSIONAL SERVS  [facility] NEUROSCIENCE AND REHABILITATION [address]  [facility] [address]  Dept: [phone]  Dept Fax: [phone]  Loc: [phone]    Visit Date: 1/6/2025    Functionality Assessment/Goals Worksheet     On a scale of 0 (Does not Interfere) to 10 (Completely Interferes)     1.  Which number describes how during the past week pain has interfered with       the following:  A.  General Activity:  6  B.  Mood: 6  C.  Walking Ability:  6  D.  Normal Work (Includes both work outside the home and housework):  6  E.  Relations with Other People:   6  F.  Sleep:   6  G.  Enjoyment of Life:   6    2.  Patient Prefers to Take their Pain Medications:     [x]  On a regular basis   [x]  Only when necessary    []  Does not take pain medications    3.  What are the Patient's Goals/Expectations for Visiting Pain Management?     [x]  Learn about my pain    [x]  Receive Medication   []  Physical Therapy     [x]  Treat Depression   [x]  Receive Injections    []  Treat Sleep   []  Deal with Anxiety and Stress   []  Treat Opoid Dependence/Addiction   []  Other:  
Beck.      Imaging:  I reviewed patient's x-ray of the lumbar spine flexion/extension views and MRI of the lumbar spine results.    Analgesics:  For her ongoing chronic pain secondary to vertebral body compression fractures, I have continued the patient's fentanyl patch at 25 mcg/h and continued her Percocet 5 mg she take twice a day as needed for breakthrough pain.  Patient is advised to take the medication as prescribed as taking more than prescribed can lead to the development of tolerance, physical dependency, and addiction.  Patient is advised to store and lock the medication in a safe and secure location.  If the medication is lost or stolen we will not provide early refills on this medication.  Patient understands that they must stay compliant with treatment plan outlined above in order to stay compliant with opioid therapy and any deviation from treatment plan will result in cessation of opioid therapy.  Risks of long-term opioid therapy were discussed in extensive detail with the patient and patient understands the risks involved with chronic, continuous opioids.      Patient has been compliant with office visits, rehabilitation plan including attending therapy as warranted, and injection therapy.  Patient has not demonstrated any aberrant behavior with medication.  Pain contract signed and reviewed by patient.  Patient understands if the contract is violated in any way opioid therapy will be discontinued immediately.  OARRS reviewed and is appropriate.  Naloxone offered to patient.  Last UDS: We will obtain next visit     Interventions:  None    Anticoagulation/NPO Recommendations:   None    Referrals:  None    Prescriptions:  Fentanyl patch 25 mcg/hour    Follow-up: 12 weeks      Leandro Powell DO  Interventional Pain Management/PM&R   [facility] [place]

## 2025-01-08 RX ORDER — FENTANYL 25 UG/1
1 PATCH TRANSDERMAL
Qty: 10 PATCH | Refills: 0 | Status: SHIPPED | OUTPATIENT
Start: 2025-01-22 | End: 2025-02-21

## 2025-01-09 ENCOUNTER — TELEPHONE (OUTPATIENT)
Dept: CARDIOLOGY CLINIC | Age: 65
End: 2025-01-09

## 2025-01-09 NOTE — TELEPHONE ENCOUNTER
[facility] faxed over weight log that shows 6 lb weight gain from 01/02/2025-01/08/2025. Please advise.                                      Updated medication list also scanned in to media.

## 2025-01-10 ENCOUNTER — TELEPHONE (OUTPATIENT)
Dept: PHYSICAL MEDICINE AND REHAB | Age: 65
End: 2025-01-10

## 2025-01-10 NOTE — TELEPHONE ENCOUNTER
Van crest  [facility] living need a signed order for the change in the med of fentanyl you did.at apt  on 1/6. Will write a letter for you to sign as have no other way to give them the order other than what was sent to the pharmacy.

## 2025-01-13 ENCOUNTER — TELEPHONE (OUTPATIENT)
Dept: PHYSICAL MEDICINE AND REHAB | Age: 65
End: 2025-01-13

## 2025-01-13 LAB
BUN BLDV-MCNC: 12 MG/DL
CALCIUM SERPL-MCNC: 8.4 MG/DL
CHLORIDE BLD-SCNC: 110 MMOL/L
CO2: 29 MMOL/L
CREAT SERPL-MCNC: 0.8 MG/DL
EGFR: 72
GLUCOSE BLD-MCNC: 81 MG/DL
POTASSIUM SERPL-SCNC: 4.1 MMOL/L
SODIUM BLD-SCNC: 144 MMOL/L

## 2025-01-13 NOTE — TELEPHONE ENCOUNTER
Pt sent my chart message saying that christiano tsang also needed orders/letter that you oked she take one extra percocet. Told her your note specifically says she can not have an increase beyond the 2 times a day.as needed. She also says the fioricet was to be increased to 1 every 6hrs.  Did not find a mention of that. Please advise as if oked any change on these 2 meds will write up letter for you to sing and fax to tali as a new order.   Negative

## 2025-01-14 ENCOUNTER — TELEPHONE (OUTPATIENT)
Age: 65
End: 2025-01-14

## 2025-01-14 DIAGNOSIS — E83.51 HYPOCALCEMIA: Primary | ICD-10-CM

## 2025-01-14 NOTE — TELEPHONE ENCOUNTER
Nurse Kennedy notified and verbalized understanding. Lab order faxed to [phone] per Marcia request.

## 2025-01-14 NOTE — TELEPHONE ENCOUNTER
Received call from Yareli with OPN stating patient repeat calcium level is 8.4 on 1/13/25. Results in chart.     Spoke with nurse Kennedy at [facility] who verified patient is taking Calcium 500 BID started on 1/4/25.

## 2025-01-15 ENCOUNTER — PATIENT MESSAGE (OUTPATIENT)
Age: 65
End: 2025-01-15

## 2025-01-20 RX ORDER — IBUPROFEN 200 MG
1 CAPSULE ORAL DAILY
Qty: 30 TABLET | Refills: 3 | Status: SHIPPED | OUTPATIENT
Start: 2025-01-20

## 2025-01-20 NOTE — TELEPHONE ENCOUNTER
I sent the prescription, will you please call the pharmacy and make sure they are aware of the switch?

## 2025-01-21 DIAGNOSIS — S22.000A COMPRESSION FRACTURE OF BODY OF THORACIC VERTEBRA (HCC): ICD-10-CM

## 2025-01-21 DIAGNOSIS — G43.909 MIGRAINE WITHOUT STATUS MIGRAINOSUS, NOT INTRACTABLE, UNSPECIFIED MIGRAINE TYPE: ICD-10-CM

## 2025-01-21 DIAGNOSIS — G43.409 HEMIPLEGIC MIGRAINE WITHOUT STATUS MIGRAINOSUS, NOT INTRACTABLE: ICD-10-CM

## 2025-01-21 RX ORDER — FENTANYL 25 UG/1
1 PATCH TRANSDERMAL
Qty: 10 PATCH | Refills: 0 | Status: CANCELLED | OUTPATIENT
Start: 2025-01-22 | End: 2025-02-21

## 2025-01-21 NOTE — TELEPHONE ENCOUNTER
Isha is requesting a refill of their   Requested Prescriptions     Pending Prescriptions Disp Refills    fentaNYL (DURAGESIC) 25 MCG/HR 10 patch 0     Sig: Place 1 patch onto the skin every 3 days for 30 days. Intended supply: 30 days Max Daily Amount: 1 patch    butalbital-acetaminophen-caffeine (FIORICET, ESGIC) -40 MG per tablet 15 tablet 0   . Please advise.      Last Appt:  Visit date not found  Next Appt:   Visit date not found  Preferred pharmacy:     [facility] - [place], [place] - [address] -  [phone] - F [phone]

## 2025-01-22 NOTE — TELEPHONE ENCOUNTER
OARRS reviewed. UDS: no data in last yr. .   Last seen: 1/6/2025. Follow-up:   Future Appointments   Date Time Provider Department Center   1/23/2025  9:15 AM Jordan Han MD N [facility] Heart [facility] [place]   2/25/2025 11:40 AM Lisa Thorne DO [facility]X RHEUM [facility] [place]   3/19/2025  2:00 PM Fredis Martinez MD N SRPX Del [facility] [place]   4/9/2025  3:00 PM Leandro Powell DO N SRPX Pain [facility]

## 2025-01-23 ENCOUNTER — OFFICE VISIT (OUTPATIENT)
Dept: CARDIOLOGY CLINIC | Age: 65
End: 2025-01-23

## 2025-01-23 VITALS
DIASTOLIC BLOOD PRESSURE: 72 MMHG | HEIGHT: 62 IN | HEART RATE: 74 BPM | BODY MASS INDEX: 34.04 KG/M2 | WEIGHT: 185 LBS | SYSTOLIC BLOOD PRESSURE: 124 MMHG

## 2025-01-23 DIAGNOSIS — M79.89 SWELLING OF LEFT LOWER EXTREMITY: ICD-10-CM

## 2025-01-23 DIAGNOSIS — I87.1 MAY-THURNER SYNDROME: Primary | ICD-10-CM

## 2025-01-23 RX ORDER — BUTALBITAL, ACETAMINOPHEN AND CAFFEINE 50; 325; 40 MG/1; MG/1; MG/1
1 TABLET ORAL EVERY 6 HOURS PRN
Qty: 30 TABLET | Refills: 0 | Status: SHIPPED | OUTPATIENT
Start: 2025-01-23 | End: 2025-02-22

## 2025-01-23 RX ORDER — OXYCODONE AND ACETAMINOPHEN 5; 325 MG/1; MG/1
1 TABLET ORAL EVERY 4 HOURS PRN
COMMUNITY

## 2025-01-23 NOTE — PROGRESS NOTES
Here for follow up referred by Dr. Martinez for PAD last seen by sonal 10/22    EKG done 12/20/24    C/o edema in legs and abdomen   
performed by Jh Laurent MD at [facility] ENDOSCOPY    FACET JOINT INJECTION Bilateral 05/16/2023    medial branch blocks bilateral  thoracic 7-8, 8-9 performed by Leandro Powell DO at [facility] OR    HYSTERECTOMY (CERVIX STATUS UNKNOWN)  2010    HYSTERECTOMY, TOTAL ABDOMINAL (CERVIX REMOVED)      JOINT REPLACEMENT      ACDF, bilateral shoulder surgeries    KYPHOSIS SURGERY  2023    T11    [facility] STEREO BREAST BX W LOC DEVICE 1ST LESION LEFT Left     Benign , done in [place] 3220-6244    [facility] STEREO BREAST BX W LOC DEVICE 1ST LESION RIGHT Right     Benign , done in [place] 8222-2936    [facility] US GUID NDL BIOPSY LEFT Left 03/04/2024    [facility] US GUID NDL BIOPSY LEFT 3/4/2024 Cuong Vee MD [facility] WOMEN'S CENTER    NECK SURGERY  12/2020    ACDF    OVARY REMOVAL  2012    PAIN MANAGEMENT PROCEDURE Bilateral 06/29/2021    medial branch blocks at bilateral L4/L5 and L5/S1 performed by Leandro Powell DO at [facility] OR    PAIN MANAGEMENT PROCEDURE Bilateral 07/20/2021    confirmatory medial branch blocks at bilateral L4/L5 and L5/S1 performed by Leandro Powell DO at [facility] SURG[facility] [place]    PAIN MANAGEMENT PROCEDURE Right 07/27/2021    thermal radiofrequency ablation medial branches L4/L5 and L5/S1 right side first performed by Leandro Powell DO at [facility] OR    PAIN MANAGEMENT PROCEDURE Left 08/03/2021    thermal radiofrequency ablation medial branches L4/L5 and L5/S1 left performed by Leandro Powell DO at [facility] OR    PAIN MANAGEMENT PROCEDURE N/A 03/05/2024    lumbar 3-4 epidural steroid injection performed by Leandro Powell DO at [facility] OR    PARATHYROID GLAND SURGERY Left 09/09/2024    LEFT CADE THYROIDECTOMY performed by Jaspal Delgado MD at [facility] OR    PTCA      Catheterizations    SEPTOPLASTY Right 06/14/2021    SEPTOPLASTY, SUBMUCOUS RESECTION TURBINATE, RIGHT PARTIAL INFERIOR, NASAL ENDOSCOPY WITH PARTIAL RESECTION OF RIGHT MIDDLE JHON BULLOSA

## 2025-01-27 DIAGNOSIS — S22.000A COMPRESSION FRACTURE OF BODY OF THORACIC VERTEBRA (HCC): ICD-10-CM

## 2025-01-27 LAB
ALBUMIN: 3.4 G/DL
ALP BLD-CCNC: 60 U/L
ALT SERPL-CCNC: 18 U/L
ANION GAP SERPL CALCULATED.3IONS-SCNC: ABNORMAL MMOL/L
AST SERPL-CCNC: 18 U/L
BASOPHILS ABSOLUTE: 0.1 /ΜL
BASOPHILS RELATIVE PERCENT: 1.4 %
BILIRUB SERPL-MCNC: 0.2 MG/DL (ref 0.1–1.4)
BUN BLDV-MCNC: 19 MG/DL
C-REACTIVE PROTEIN: 3.9
CALCIUM SERPL-MCNC: 8.5 MG/DL
CHLORIDE BLD-SCNC: 107 MMOL/L
CO2: 29 MMOL/L
CREAT SERPL-MCNC: 0.8 MG/DL
EOSINOPHILS ABSOLUTE: 0.2 /ΜL
EOSINOPHILS RELATIVE PERCENT: 2.7 %
GFR, ESTIMATED: 72
GLUCOSE BLD-MCNC: 91 MG/DL
HCT VFR BLD CALC: 37.2 % (ref 36–46)
HEMOGLOBIN: 12.7 G/DL (ref 12–16)
LYMPHOCYTES ABSOLUTE: 3 /ΜL
LYMPHOCYTES RELATIVE PERCENT: 48.6 %
MCH RBC QN AUTO: 30.7 PG
MCHC RBC AUTO-ENTMCNC: 34.2 G/DL
MCV RBC AUTO: 89.7 FL
MONOCYTES ABSOLUTE: 0.4 /ΜL
MONOCYTES RELATIVE PERCENT: 7.3 %
NEUTROPHILS ABSOLUTE: 2.5 /ΜL
NEUTROPHILS RELATIVE PERCENT: 40 %
PDW BLD-RTO: 13.6 %
PLATELET # BLD: 242 K/ΜL
PMV BLD AUTO: 11.2 FL
POTASSIUM SERPL-SCNC: 4 MMOL/L
RBC # BLD: 4.15 10^6/ΜL
SED RATE, AUTOMATED: 13
SODIUM BLD-SCNC: 147 MMOL/L
TOTAL PROTEIN: 5.3 G/DL (ref 6.4–8.2)
WBC # BLD: 6.1 10^3/ML

## 2025-01-27 NOTE — TELEPHONE ENCOUNTER
Script sent on  with fill date  is  per nursing home staff for Fentanyl patch. Need new script  OARRS reviewed. UDS:no data in last yr   Last seen: 2025. Follow-up:   Future Appointments   Date Time Provider Department Center   2025  2:00 PM STR VASCULAR LAB ROOM 1 [facility] VAS LAB STR Rad/Card   2025  3:00 PM STR CT IMAGING RM1  [facility] OUT EXP STR Rad/Card   2025 11:40 AM Lisa Thorne DO [facility] RHEUM [facility] [place]   3/19/2025  2:00 PM Fredis Martinez MD N [facility] Del [facility] [place]   2025  3:00 PM Leandro Powell DO N [facility] Pain [facility] [place]   2026 10:00 AM Jordan Han MD N [facility] Heart [facility] [place]

## 2025-01-28 ENCOUNTER — TELEPHONE (OUTPATIENT)
Age: 65
End: 2025-01-28

## 2025-01-28 DIAGNOSIS — E83.51 HYPOCALCEMIA: Primary | ICD-10-CM

## 2025-01-28 RX ORDER — FENTANYL 25 UG/1
1 PATCH TRANSDERMAL
Qty: 10 PATCH | Refills: 0 | Status: SHIPPED | OUTPATIENT
Start: 2025-01-28 | End: 2025-02-27

## 2025-01-28 NOTE — TELEPHONE ENCOUNTER
Received message from Yareli with OPN stating patient repeat calcium level is in chart to review. Yareli asking if patient can be scheduled for Evenity. Please advise. Thank you.

## 2025-01-28 NOTE — TELEPHONE ENCOUNTER
The calcium level on 1/20 normalized, then patient had repeat bloodwork done yesterday and it is mildly low again. Will have to hold evenity until normalizes.     Can you please contact patient and ensure she is taking the calcium supplements as prescribed and hasn't missed any doses?

## 2025-01-28 NOTE — TELEPHONE ENCOUNTER
PA sent to plan  (Key: [id number])  PA Case ID #: [id number]  Rx #: [id number]  Butalbital-APAP-Caffeine -40MG tablets    Form  [employer] Electronic PA Form (2017 NCPDP)    Your information has been submitted to [employer] Part D. [employer] Part D will review the request and will issue a decision, typically within 1-3 days from your submission.

## 2025-01-29 NOTE — TELEPHONE ENCOUNTER
Yareli with OPN notified.     Spoke with nurse Marcia with [facility] who verified patient is taking Calcium Citrate 950 mg daily. Patient has not missed any doses.

## 2025-01-31 LAB
BUN BLDV-MCNC: 25 MG/DL
CALCIUM SERPL-MCNC: 8.8 MG/DL
CHLORIDE BLD-SCNC: 106 MMOL/L
CO2: 29 MMOL/L
CREAT SERPL-MCNC: 0.8 MG/DL
EGFR: 72
GLUCOSE BLD-MCNC: 81 MG/DL
MAGNESIUM: 2.3 MG/DL
POTASSIUM SERPL-SCNC: 4.3 MMOL/L
SODIUM BLD-SCNC: 143 MMOL/L
VITAMIN D 25-HYDROXY: 45
VITAMIN D2, 25 HYDROXY: NORMAL
VITAMIN D3,25 HYDROXY: NORMAL

## 2025-02-11 ENCOUNTER — HOSPITAL ENCOUNTER (OUTPATIENT)
Dept: INTERVENTIONAL RADIOLOGY/VASCULAR | Age: 65
Discharge: HOME | End: 2025-02-13
Attending: INTERNAL MEDICINE
Payer: COMMERCIAL

## 2025-02-11 ENCOUNTER — HOSPITAL ENCOUNTER (OUTPATIENT)
Dept: CT IMAGING | Age: 65
Discharge: HOME | End: 2025-02-11
Attending: INTERNAL MEDICINE
Payer: COMMERCIAL

## 2025-02-11 DIAGNOSIS — I87.1 MAY-THURNER SYNDROME: ICD-10-CM

## 2025-02-11 DIAGNOSIS — M79.89 SWELLING OF LEFT LOWER EXTREMITY: ICD-10-CM

## 2025-02-11 PROCEDURE — 6360000004 HC RX CONTRAST MEDICATION: Performed by: INTERNAL MEDICINE

## 2025-02-11 PROCEDURE — 74174 CTA ABD&PLVS W/CONTRAST: CPT

## 2025-02-11 PROCEDURE — 93970 EXTREMITY STUDY: CPT

## 2025-02-11 RX ORDER — IOPAMIDOL 755 MG/ML
80 INJECTION, SOLUTION INTRAVASCULAR
Status: COMPLETED | OUTPATIENT
Start: 2025-02-11 | End: 2025-02-11

## 2025-02-11 RX ADMIN — IOPAMIDOL 100 ML: 755 INJECTION, SOLUTION INTRAVENOUS at 14:44

## 2025-02-12 ENCOUNTER — TELEPHONE (OUTPATIENT)
Dept: CARDIOLOGY CLINIC | Age: 65
End: 2025-02-12

## 2025-02-12 DIAGNOSIS — I87.1 MAY-THURNER SYNDROME: Primary | ICD-10-CM

## 2025-02-12 DIAGNOSIS — M79.89 SWELLING OF LEFT LOWER EXTREMITY: ICD-10-CM

## 2025-02-12 NOTE — TELEPHONE ENCOUNTER
Call CT scan, spoke to Brit  She reached out to Dr. Stoddard, unavailable. Will have him give us a call back

## 2025-02-12 NOTE — TELEPHONE ENCOUNTER
Results of CTA Venous:  IMPRESSION:  1. Stent in the left common iliac vein which appears patent.  2. Thrombus is seen in the left upper femoral vein and common femoral vein.  3. There is no abnormal compression of the left common iliac vein.    No  OAC on med list.

## 2025-02-12 NOTE — TELEPHONE ENCOUNTER
This does not make sense  The venous duplex says no DVT and the CT venous says DVT?    Can we have this reviewed by radiology

## 2025-02-13 ENCOUNTER — HOSPITAL ENCOUNTER (OUTPATIENT)
Dept: NURSING | Age: 65
Discharge: HOME | End: 2025-02-13

## 2025-02-14 NOTE — TELEPHONE ENCOUNTER
So there is no clot and they are saying the stent is open  Would use lymphedema therapy, elevate and follow up vein care center

## 2025-02-17 ENCOUNTER — PATIENT MESSAGE (OUTPATIENT)
Dept: CARDIOLOGY CLINIC | Age: 65
End: 2025-02-17

## 2025-02-17 NOTE — TELEPHONE ENCOUNTER
Called patient's listed cell phone several times- busy tone  Called home phone listed- disconnected

## 2025-02-18 NOTE — TELEPHONE ENCOUNTER
Call back from Drea at [facility].  Pt is not established with [facility].  Referral placed.

## 2025-02-19 ENCOUNTER — PATIENT MESSAGE (OUTPATIENT)
Dept: CARDIOLOGY CLINIC | Age: 65
End: 2025-02-19

## 2025-02-19 NOTE — TELEPHONE ENCOUNTER
Referral with cta lower extremity duplex faxed to [facility] facility  They will contact pt to schedule

## 2025-02-19 NOTE — TELEPHONE ENCOUNTER
Patient needs a new referral placed due to outstanding balance at the [facility]  Requesting [facility] in [facility] [place]  Fax for new patient referrals [phone]

## 2025-02-19 NOTE — TELEPHONE ENCOUNTER
Referral with cta lower extremity duplex faxed to [facility] facility  They will contact pt to schedule   No

## 2025-02-22 DIAGNOSIS — S22.000A COMPRESSION FRACTURE OF BODY OF THORACIC VERTEBRA (HCC): ICD-10-CM

## 2025-02-24 DIAGNOSIS — S22.000A COMPRESSION FRACTURE OF BODY OF THORACIC VERTEBRA (HCC): ICD-10-CM

## 2025-02-24 RX ORDER — FENTANYL 25 UG/1
1 PATCH TRANSDERMAL
Qty: 10 PATCH | Refills: 0 | OUTPATIENT
Start: 2025-02-24 | End: 2025-03-26

## 2025-02-24 RX ORDER — FENTANYL 25 UG/1
1 PATCH TRANSDERMAL
Qty: 10 PATCH | Refills: 0 | Status: SHIPPED | OUTPATIENT
Start: 2025-02-26 | End: 2025-03-28

## 2025-02-24 NOTE — TELEPHONE ENCOUNTER
Isha Ramsey called requesting a refill on the following medications:    [facility] CALLED  Requested Prescriptions     Pending Prescriptions Disp Refills    fentaNYL (DURAGESIC) 25 MCG/HR 10 patch 0     Sig: Place 1 patch onto the skin every 3 days for 30 days. Intended supply: 30 days Max Daily Amount: 1 patch     Pharmacy verified:  [facility]      Date of last visit: 01-06-25  Date of next visit (if applicable): 4/9/2025

## 2025-02-25 ENCOUNTER — OFFICE VISIT (OUTPATIENT)
Age: 65
End: 2025-02-25
Payer: COMMERCIAL

## 2025-02-25 VITALS
DIASTOLIC BLOOD PRESSURE: 64 MMHG | WEIGHT: 187 LBS | HEART RATE: 62 BPM | HEIGHT: 62 IN | OXYGEN SATURATION: 95 % | SYSTOLIC BLOOD PRESSURE: 116 MMHG | BODY MASS INDEX: 34.41 KG/M2

## 2025-02-25 DIAGNOSIS — L40.50 PSORIATIC ARTHRITIS (HCC): ICD-10-CM

## 2025-02-25 DIAGNOSIS — M80.08XA AGE-RELATED OSTEOPOROSIS WITH CURRENT PATHOLOGICAL FRACTURE, VERTEBRA(E), INITIAL ENCOUNTER FOR FRACTURE (HCC): Primary | ICD-10-CM

## 2025-02-25 DIAGNOSIS — Z51.81 MEDICATION MONITORING ENCOUNTER: ICD-10-CM

## 2025-02-25 DIAGNOSIS — M79.7 FIBROMYALGIA: ICD-10-CM

## 2025-02-25 DIAGNOSIS — G89.29 CHRONIC BILATERAL LOW BACK PAIN WITHOUT SCIATICA: ICD-10-CM

## 2025-02-25 DIAGNOSIS — M54.50 CHRONIC BILATERAL LOW BACK PAIN WITHOUT SCIATICA: ICD-10-CM

## 2025-02-25 PROCEDURE — 99214 OFFICE O/P EST MOD 30 MIN: CPT | Performed by: INTERNAL MEDICINE

## 2025-02-25 RX ORDER — EPINEPHRINE 1 MG/ML
0.3 INJECTION, SOLUTION, CONCENTRATE INTRAVENOUS PRN
OUTPATIENT
Start: 2025-02-25

## 2025-02-25 RX ORDER — SODIUM CHLORIDE 0.9 % (FLUSH) 0.9 %
5-40 SYRINGE (ML) INJECTION PRN
OUTPATIENT
Start: 2025-02-25

## 2025-02-25 RX ORDER — SODIUM CHLORIDE 9 MG/ML
INJECTION, SOLUTION INTRAVENOUS CONTINUOUS
OUTPATIENT
Start: 2025-02-25

## 2025-02-25 RX ORDER — SODIUM CHLORIDE 9 MG/ML
5-250 INJECTION, SOLUTION INTRAVENOUS PRN
OUTPATIENT
Start: 2025-02-25

## 2025-02-25 RX ORDER — ACETAMINOPHEN 325 MG/1
650 TABLET ORAL
OUTPATIENT
Start: 2025-02-25

## 2025-02-25 RX ORDER — HEPARIN SODIUM (PORCINE) LOCK FLUSH IV SOLN 100 UNIT/ML 100 UNIT/ML
500 SOLUTION INTRAVENOUS PRN
OUTPATIENT
Start: 2025-02-25

## 2025-02-25 RX ORDER — ONDANSETRON 2 MG/ML
8 INJECTION INTRAMUSCULAR; INTRAVENOUS
OUTPATIENT
Start: 2025-02-25

## 2025-02-25 RX ORDER — ALBUTEROL SULFATE 90 UG/1
4 INHALANT RESPIRATORY (INHALATION) PRN
OUTPATIENT
Start: 2025-02-25

## 2025-02-25 RX ORDER — HYDROCORTISONE SODIUM SUCCINATE 100 MG/2ML
100 INJECTION INTRAMUSCULAR; INTRAVENOUS
OUTPATIENT
Start: 2025-02-25

## 2025-02-25 RX ORDER — DIPHENHYDRAMINE HYDROCHLORIDE 50 MG/ML
50 INJECTION INTRAMUSCULAR; INTRAVENOUS
OUTPATIENT
Start: 2025-02-25

## 2025-02-25 RX ORDER — FAMOTIDINE 10 MG/ML
20 INJECTION, SOLUTION INTRAVENOUS
OUTPATIENT
Start: 2025-02-25

## 2025-02-25 ASSESSMENT — ENCOUNTER SYMPTOMS
EYE ITCHING: 0
ABDOMINAL PAIN: 0
BACK PAIN: 0
DIARRHEA: 0
TROUBLE SWALLOWING: 0
SHORTNESS OF BREATH: 0
COUGH: 0
NAUSEA: 0
EYE PAIN: 0

## 2025-02-25 NOTE — PROGRESS NOTES
[facility] RHEUMATOLOGY FOLLOW UP NOTE       Date Of Service: 2/25/2025  Provider: SANDRA HUNG DO    Name: Isha Ramsey   MRN: [id number]    Chief Complaint(s)     Chief Complaint   Patient presents with    Follow-up     4 month follow up PSA        History of Present Illness (HPI)     Isha Ramsey  is a(n)64 y.o. female with a hx of adrenal abnormalities, arthritis, asthma, CVA, headaches, hyperlipidemia, h/o myositis, bipolar disorder, may thurner syndrome, dry eye, fatty liver here for the f/u evaluation of h/o MCTD, inflammatory arthritis       # scheduled evaluation by vascular surgery - 2/27/2024 for the left leg swelling - and noted in the left femoral and common femoral vein.     # currently planned evaluation for possible     #  Psoriatic arthritis - cosentyx not started at assisted living facility -- active joint pains in the hands, elbows, neck, lower back, left hip, bilateral knees, ankles, feet - constant variable pain 8.5/10 over the past week - aching /stiffness - most severe in the mornings for hands .   Aggravating factors: weather changes, back: getting up from seated position, walking long distances  Alleviating factors: tylenol hands, fentyl, percocet (no sig relief currently)   - numbness/tingling in the fingers and feet reported.   - worsened restless leg symptoms.   - weakness hands , bilateral legs.   - + stiffness genearlized     Reported itching rash in the scalp and left ear fold - intermittent     REVIEW OF SYSTEMS: (ROS)    Review of Systems   Constitutional:  Positive for fatigue. Negative for fever and unexpected weight change.   HENT:  Negative for congestion and trouble swallowing.         Dry mouth    Eyes:  Negative for pain and itching.   Respiratory:  Negative for cough and shortness of breath.    Cardiovascular:  Negative for chest pain and leg swelling.   Gastrointestinal:  Negative for abdominal pain, diarrhea and nausea.   Endocrine: Negative for cold intolerance and heat

## 2025-02-26 ENCOUNTER — PATIENT MESSAGE (OUTPATIENT)
Age: 65
End: 2025-02-26

## 2025-02-26 DIAGNOSIS — Z51.81 MEDICATION MONITORING ENCOUNTER: ICD-10-CM

## 2025-02-26 DIAGNOSIS — L40.50 PSORIATIC ARTHRITIS (HCC): ICD-10-CM

## 2025-02-26 RX ORDER — FOLIC ACID 1 MG/1
1 TABLET ORAL DAILY
Qty: 90 TABLET | Refills: 1 | Status: SHIPPED | OUTPATIENT
Start: 2025-02-26

## 2025-03-03 LAB
ALBUMIN: 3.7 G/DL
ALP BLD-CCNC: 60 U/L
ALT SERPL-CCNC: 17 U/L
ANION GAP SERPL CALCULATED.3IONS-SCNC: ABNORMAL MMOL/L
AST SERPL-CCNC: 13 U/L
BASOPHILS ABSOLUTE: 0.1 /ΜL
BASOPHILS RELATIVE PERCENT: 1.2 %
BILIRUB SERPL-MCNC: 0.3 MG/DL (ref 0.1–1.4)
BUN BLDV-MCNC: 21 MG/DL
C-REACTIVE PROTEIN: 4.1
CALCIUM SERPL-MCNC: 8.9 MG/DL
CHLORIDE BLD-SCNC: 107 MMOL/L
CO2: 29 MMOL/L
CREAT SERPL-MCNC: 0.7 MG/DL
EOSINOPHILS ABSOLUTE: 0.4 /ΜL
EOSINOPHILS RELATIVE PERCENT: 5.7 %
GFR, ESTIMATED: 85
GLUCOSE BLD-MCNC: 77 MG/DL
HCT VFR BLD CALC: 37.5 % (ref 36–46)
HEMOGLOBIN: 12.6 G/DL (ref 12–16)
LYMPHOCYTES ABSOLUTE: 3 /ΜL
LYMPHOCYTES RELATIVE PERCENT: 44 %
MAGNESIUM: NORMAL
MCH RBC QN AUTO: 30.1 PG
MCHC RBC AUTO-ENTMCNC: 33.5 G/DL
MCV RBC AUTO: 89.7 FL
MONOCYTES ABSOLUTE: 0.5 /ΜL
MONOCYTES RELATIVE PERCENT: 6.8 %
NEUTROPHILS ABSOLUTE: 2.8 /ΜL
NEUTROPHILS RELATIVE PERCENT: 42.3 %
PDW BLD-RTO: 13.4 %
PLATELET # BLD: 247 K/ΜL
PMV BLD AUTO: 10.2 FL
POTASSIUM SERPL-SCNC: 4 MMOL/L
RBC # BLD: 4.18 10^6/ΜL
SED RATE, AUTOMATED: 16
SODIUM BLD-SCNC: 144 MMOL/L
TOTAL PROTEIN: 5.9 G/DL (ref 6.4–8.2)
WBC # BLD: 6.7 10^3/ML

## 2025-03-05 ENCOUNTER — TELEPHONE (OUTPATIENT)
Age: 65
End: 2025-03-05

## 2025-03-05 NOTE — TELEPHONE ENCOUNTER
Esha ruiz [facility] is calling to request a refill of percocet 5-325 mg taken 1 tablet every 12 hours PRN  For this patient.     [facility] - [place], [place] - [address] - P [phone] - F [phone]     DOLV 02/25/2025 DONV 04/29/2025

## 2025-03-06 DIAGNOSIS — S22.000A COMPRESSION FRACTURE OF BODY OF THORACIC VERTEBRA (HCC): ICD-10-CM

## 2025-03-06 NOTE — TELEPHONE ENCOUNTER
OARRS reviewed. UDS: no data   Last seen: 1/6/2025. Follow-up:   Future Appointments   Date Time Provider Department Center   3/19/2025  2:00 PM Fredis Martinez MD N [facility] Del [facility] [place]   4/9/2025  3:00 PM Leandro Powell DO N [facility] Pain [facility] [place]   4/29/2025 11:20 AM Liat Hallman, APRN - CNP SRPX RHEUM [facility] [place]   1/29/2026 10:00 AM Jordan Han MD N SRPX Heart [facility]

## 2025-03-06 NOTE — TELEPHONE ENCOUNTER
Patient was last seen 1/6/2025 and her next appt is 4/9/2025 with Dr Powell.  Jada calling in to refill percocet 5-325 mg one pill every 12 hours, to [facility].  Please advise.

## 2025-03-10 RX ORDER — OXYCODONE AND ACETAMINOPHEN 5; 325 MG/1; MG/1
1 TABLET ORAL EVERY 12 HOURS PRN
Qty: 60 TABLET | Refills: 0 | Status: SHIPPED | OUTPATIENT
Start: 2025-03-10 | End: 2025-04-09

## 2025-03-19 ENCOUNTER — PATIENT MESSAGE (OUTPATIENT)
Dept: CARDIOLOGY CLINIC | Age: 65
End: 2025-03-19

## 2025-03-19 ENCOUNTER — OFFICE VISIT (OUTPATIENT)
Dept: CARDIOLOGY CLINIC | Age: 65
End: 2025-03-19
Payer: COMMERCIAL

## 2025-03-19 VITALS
WEIGHT: 187 LBS | HEIGHT: 62 IN | SYSTOLIC BLOOD PRESSURE: 94 MMHG | DIASTOLIC BLOOD PRESSURE: 64 MMHG | BODY MASS INDEX: 34.41 KG/M2 | HEART RATE: 70 BPM

## 2025-03-19 DIAGNOSIS — R42 DIZZINESS: ICD-10-CM

## 2025-03-19 DIAGNOSIS — R06.09 DOE (DYSPNEA ON EXERTION): ICD-10-CM

## 2025-03-19 DIAGNOSIS — E78.2 MIXED HYPERLIPIDEMIA: ICD-10-CM

## 2025-03-19 DIAGNOSIS — I95.9 HYPOTENSION, UNSPECIFIED HYPOTENSION TYPE: ICD-10-CM

## 2025-03-19 DIAGNOSIS — R00.2 PALPITATIONS: Primary | ICD-10-CM

## 2025-03-19 DIAGNOSIS — I20.89 MICROVASCULAR ANGINA: ICD-10-CM

## 2025-03-19 DIAGNOSIS — R94.31 ABNORMAL ELECTROCARDIOGRAM (ECG) (EKG): ICD-10-CM

## 2025-03-19 DIAGNOSIS — I73.9 PAD (PERIPHERAL ARTERY DISEASE): ICD-10-CM

## 2025-03-19 DIAGNOSIS — I87.1 MAY-THURNER SYNDROME: ICD-10-CM

## 2025-03-19 DIAGNOSIS — I44.1 MOBITZ (TYPE) I (WENCKEBACH'S) ATRIOVENTRICULAR BLOCK: ICD-10-CM

## 2025-03-19 PROCEDURE — 93000 ELECTROCARDIOGRAM COMPLETE: CPT | Performed by: INTERNAL MEDICINE

## 2025-03-19 PROCEDURE — 99214 OFFICE O/P EST MOD 30 MIN: CPT | Performed by: INTERNAL MEDICINE

## 2025-03-19 RX ORDER — EZETIMIBE 10 MG/1
10 TABLET ORAL DAILY
COMMUNITY
Start: 2025-03-01

## 2025-03-19 NOTE — PROGRESS NOTES
6 month follow up.    EKG done today.    C/O CP last Tuesday described as a tightness around the neck and pressure in the chest and she took 1 nitro, sob, fatigue, low BP 78/43 last week, lightheaded and OBEY.    Denies palpitations, dizziness.    Will be needing clearance for dental work once she finds an oral surgeon.  
tablet by mouth 2 times daily as needed for Congestion      rOPINIRole (REQUIP) 0.5 MG tablet Take 1 tablet by mouth nightly      alendronate (FOSAMAX) 70 MG tablet Take 1 tablet by mouth once a week on Saturday      cetirizine (ZYRTEC) 10 MG tablet Take 1 tablet by mouth daily 30 tablet 0    atorvastatin (LIPITOR) 40 MG tablet Take 1 tablet by mouth three times a week 90 tablet 0    buPROPion (WELLBUTRIN XL) 300 MG extended release tablet take 1 tablet by mouth every morning 90 tablet 0    esomeprazole (NEXIUM) 20 MG delayed release capsule Take 1 capsule by mouth every morning (before breakfast) (Patient taking differently: Take 2 capsules by mouth every morning (before breakfast)) 90 capsule 0    meclizine (ANTIVERT) 25 MG tablet Take 1 tablet by mouth 2 times daily as needed for Dizziness or Nausea 30 tablet 0    ondansetron (ZOFRAN) 4 MG tablet Take 1 tablet by mouth 3 times daily as needed for Nausea or Vomiting 30 tablet 0    topiramate (TOPAMAX) 100 MG tablet TAKE 1 TABLET BY MOUTH TWICE A  tablet 1    acyclovir (ZOVIRAX) 200 MG capsule Take 1 capsule by mouth daily 90 capsule 0    traZODone (DESYREL) 150 MG tablet Take 1.5 tablets by mouth nightly 120 tablet 1    tiZANidine (ZANAFLEX) 2 MG tablet Take 1 tablet by mouth nightly as needed (BACK PAIN) 30 tablet 1    promethazine (PHENERGAN) 25 MG tablet Take 12-50 mg by mouth every 6 hours as needed for Nausea      nitroglycerin (NITROSTAT) 0.6 MG SL tablet Place 1 tablet under the tongue every 5 minutes as needed for Chest pain up to max of 3 total doses. If no relief after 1 dose, call 911. 30 tablet 1    methotrexate (RHEUMATREX) 2.5 MG chemo tablet Take 1 tablet by mouth once a week On Friday (Patient not taking: Reported on 3/19/2025)      secukinumab (COSENTYX) 150 MG/ML SOSY Inject 1 mL into the skin every 28 days (Patient not taking: Reported on 3/19/2025) 1 mL 3     No current facility-administered medications on file prior to visit.

## 2025-03-20 ENCOUNTER — TELEPHONE (OUTPATIENT)
Dept: CARDIOLOGY CLINIC | Age: 65
End: 2025-03-20

## 2025-03-20 NOTE — TELEPHONE ENCOUNTER
[facility] (supporting Fredis Martinez MD)         3/19/25  7:38 PM  Hi, we had discussed the compression Device that is being ordered that comes up over my abdomen, it's called a truncal compression Device made by  [employer] for future reference for others.. Thanks , Isha

## 2025-03-24 DIAGNOSIS — G43.409 HEMIPLEGIC MIGRAINE WITHOUT STATUS MIGRAINOSUS, NOT INTRACTABLE: ICD-10-CM

## 2025-03-24 DIAGNOSIS — S22.000A COMPRESSION FRACTURE OF BODY OF THORACIC VERTEBRA (HCC): ICD-10-CM

## 2025-03-24 DIAGNOSIS — G43.909 MIGRAINE WITHOUT STATUS MIGRAINOSUS, NOT INTRACTABLE, UNSPECIFIED MIGRAINE TYPE: ICD-10-CM

## 2025-03-24 LAB
ALBUMIN: 4 G/DL
ALP BLD-CCNC: 61 U/L
ALT SERPL-CCNC: 27 U/L
ANION GAP SERPL CALCULATED.3IONS-SCNC: ABNORMAL MMOL/L
AST SERPL-CCNC: 24 U/L
BASOPHILS ABSOLUTE: NORMAL
BASOPHILS RELATIVE PERCENT: 0.7 %
BILIRUB SERPL-MCNC: 0.3 MG/DL (ref 0.1–1.4)
BUN BLDV-MCNC: 21 MG/DL
C-REACTIVE PROTEIN: 5.3
CALCIUM SERPL-MCNC: 8.9 MG/DL
CHLORIDE BLD-SCNC: 105 MMOL/L
CO2: 26 MMOL/L
CREAT SERPL-MCNC: 0.9 MG/DL
EOSINOPHILS ABSOLUTE: 0.1 /ΜL
EOSINOPHILS RELATIVE PERCENT: 2.6 %
GFR, ESTIMATED: 63
GLUCOSE BLD-MCNC: 68 MG/DL
HCT VFR BLD CALC: 40.8 % (ref 36–46)
HEMOGLOBIN: 13.7 G/DL (ref 12–16)
LYMPHOCYTES ABSOLUTE: 2.4 /ΜL
LYMPHOCYTES RELATIVE PERCENT: 43.4 %
MCH RBC QN AUTO: 30.2 PG
MCHC RBC AUTO-ENTMCNC: 33.5 G/DL
MCV RBC AUTO: 90.2 FL
MONOCYTES ABSOLUTE: 0.4 /ΜL
MONOCYTES RELATIVE PERCENT: 7.1 %
NEUTROPHILS ABSOLUTE: 2.6 /ΜL
NEUTROPHILS RELATIVE PERCENT: 46.2 %
PDW BLD-RTO: 13.4 %
PLATELET # BLD: 251 K/ΜL
PMV BLD AUTO: 10.1 FL
POTASSIUM SERPL-SCNC: 3.8 MMOL/L
RBC # BLD: 4.53 10^6/ΜL
SED RATE, AUTOMATED: 16
SODIUM BLD-SCNC: 144 MMOL/L
TOTAL PROTEIN: 6.3 G/DL (ref 6.4–8.2)
WBC # BLD: 5.6 10^3/ML

## 2025-03-24 RX ORDER — FENTANYL 25 UG/1
1 PATCH TRANSDERMAL
Qty: 10 PATCH | Refills: 0 | Status: SHIPPED | OUTPATIENT
Start: 2025-03-26 | End: 2025-04-25

## 2025-03-24 RX ORDER — BUTALBITAL, ACETAMINOPHEN AND CAFFEINE 50; 325; 40 MG/1; MG/1; MG/1
1 TABLET ORAL EVERY 6 HOURS PRN
Qty: 30 TABLET | Refills: 0 | Status: SHIPPED | OUTPATIENT
Start: 2025-03-24 | End: 2025-04-23

## 2025-03-24 NOTE — TELEPHONE ENCOUNTER
Isha Ramsey called requesting a refill on the following medications:  Requested Prescriptions     Pending Prescriptions Disp Refills    fentaNYL (DURAGESIC) 25 MCG/HR 10 patch 0     Sig: Place 1 patch onto the skin every 72 hours for 30 days. Max Daily Amount: 1 patch    butalbital-acetaminophen-caffeine (FIORICET, ESGIC) -40 MG per tablet 30 tablet 0     Sig: Take 1 tablet by mouth every 6 hours as needed for Headaches Max Daily Amount: 4 tablets     Pharmacy verified:[facility]      Date of last visit: 1/6/25  Date of next visit (if applicable): 4/9/2025

## 2025-03-28 ENCOUNTER — PATIENT MESSAGE (OUTPATIENT)
Age: 65
End: 2025-03-28

## 2025-03-31 ENCOUNTER — RESULTS FOLLOW-UP (OUTPATIENT)
Age: 65
End: 2025-03-31

## 2025-03-31 DIAGNOSIS — L40.50 PSORIATIC ARTHRITIS (HCC): Primary | ICD-10-CM

## 2025-03-31 RX ORDER — LEFLUNOMIDE 10 MG/1
10 TABLET ORAL DAILY
Qty: 30 TABLET | Refills: 0 | Status: SHIPPED | OUTPATIENT
Start: 2025-03-31

## 2025-04-03 ENCOUNTER — OFFICE VISIT (OUTPATIENT)
Age: 65
End: 2025-04-03
Payer: COMMERCIAL

## 2025-04-03 VITALS
DIASTOLIC BLOOD PRESSURE: 75 MMHG | WEIGHT: 180 LBS | HEART RATE: 68 BPM | SYSTOLIC BLOOD PRESSURE: 105 MMHG | HEIGHT: 62 IN | BODY MASS INDEX: 33.13 KG/M2

## 2025-04-03 DIAGNOSIS — I87.1 MAY-THURNER SYNDROME: Primary | ICD-10-CM

## 2025-04-03 DIAGNOSIS — M79.89 SWELLING OF LEFT LOWER EXTREMITY: ICD-10-CM

## 2025-04-03 DIAGNOSIS — I87.2 CHRONIC VENOUS INSUFFICIENCY OF LOWER EXTREMITY: ICD-10-CM

## 2025-04-03 PROCEDURE — 99204 OFFICE O/P NEW MOD 45 MIN: CPT | Performed by: SURGERY

## 2025-04-03 RX ORDER — RIVAROXABAN 10 MG/1
10 TABLET, FILM COATED ORAL
Qty: 90 TABLET | Refills: 1 | Status: SHIPPED | OUTPATIENT
Start: 2025-04-03

## 2025-04-03 ASSESSMENT — ENCOUNTER SYMPTOMS
CHEST TIGHTNESS: 1
SHORTNESS OF BREATH: 1
BACK PAIN: 1
ABDOMINAL PAIN: 0
RHINORRHEA: 0

## 2025-04-03 NOTE — PATIENT INSTRUCTIONS
If you receive a survey asking about your care experience, please respond. Your answers will help ensure you receive high-quality care at this office. Thank you!    Your Medical Assistant today: Lavern FUENTES  Thank you for coming to our office! It was a pleasure to serve you.

## 2025-04-03 NOTE — PROGRESS NOTES
Chief Complaint   Patient presents with    New Patient         HPI: Isha Ramsey is an 64 y.o. female who presents with a  history of May-Thurner syndrome s/p left common iliac vein stent placement in 2013 at [facility]. She is uncertain if she had a DVT in the left leg or not previously, but states the stent was done for iliac vein \"compression\" and edema in the leg.She has a Hx of a stroke, CAD s/p PCI and CHF. She is not on aspirin or any form of anticoagulation at this time. CT venogram shows a patent left CIV stent with old scar or thrombus in the left femoral veins. She cannot walk much due to prior T spine fx and psoriatic arthritis. She complains of weight gain and L>R leg edema. She is supposed to get a \"lymphopress\" garmet for her edema.      Review of Systems   Constitutional:  Positive for activity change, fatigue and unexpected weight change.   HENT:  Negative for rhinorrhea.    Eyes:  Negative for visual disturbance.   Respiratory:  Positive for chest tightness and shortness of breath.         (+) WEST on exertion   Cardiovascular:  Positive for chest pain and leg swelling.   Gastrointestinal:  Negative for abdominal pain.   Genitourinary:  Negative for dysuria.   Musculoskeletal:  Positive for arthralgias, back pain, gait problem, joint swelling and myalgias.   Neurological:  Negative for dizziness and numbness.   Psychiatric/Behavioral:  Negative for behavioral problems and confusion.           Allergies:   Allergies   Allergen Reactions    Biaxin [Clarithromycin] Anaphylaxis and Swelling     Lips swelled    Triamcinolone Other (See Comments)    Butrans [Buprenorphine] Itching     Blisters     Dilaudid [Hydromorphone] Nausea And Vomiting    Doxycycline Hives     rash    Morphine Nausea And Vomiting    Sulfa Antibiotics Rash     Other Reaction(s): rash, able to take keflex         Current Outpatient Medications   Medication Sig Dispense Refill    rivaroxaban (XARELTO) 10 MG TABS tablet Take 1 tablet by mouth

## 2025-04-07 LAB
ALBUMIN: 3.5 G/DL
ALP BLD-CCNC: 53 U/L
ALT SERPL-CCNC: 23 U/L
ANION GAP SERPL CALCULATED.3IONS-SCNC: ABNORMAL MMOL/L
AST SERPL-CCNC: 17 U/L
BASOPHILS ABSOLUTE: 0.1 /ΜL
BASOPHILS RELATIVE PERCENT: 1.2 %
BILIRUB SERPL-MCNC: 0.2 MG/DL (ref 0.1–1.4)
BUN BLDV-MCNC: 22 MG/DL
C-REACTIVE PROTEIN: 4.3
CALCIUM SERPL-MCNC: 8.7 MG/DL
CHLORIDE BLD-SCNC: 106 MMOL/L
CO2: 28 MMOL/L
CREAT SERPL-MCNC: 0.8 MG/DL
EOSINOPHILS ABSOLUTE: 0.2 /ΜL
EOSINOPHILS RELATIVE PERCENT: 3.6 %
GFR, ESTIMATED: 72
GLUCOSE BLD-MCNC: 89 MG/DL
HCT VFR BLD CALC: 37.5 % (ref 36–46)
HEMOGLOBIN: 12.4 G/DL (ref 12–16)
LYMPHOCYTES ABSOLUTE: 2.4 /ΜL
LYMPHOCYTES RELATIVE PERCENT: 45.7 %
MCH RBC QN AUTO: 30.1 PG
MCHC RBC AUTO-ENTMCNC: 33.1 G/DL
MCV RBC AUTO: 91 FL
MONOCYTES ABSOLUTE: 0.5 /ΜL
MONOCYTES RELATIVE PERCENT: 9.1 %
NEUTROPHILS ABSOLUTE: 2.1 /ΜL
NEUTROPHILS RELATIVE PERCENT: 40.4 %
PDW BLD-RTO: 13.4 %
PLATELET # BLD: 227 K/ΜL
PMV BLD AUTO: 10.8 FL
POTASSIUM SERPL-SCNC: 3.4 MMOL/L
RBC # BLD: 4.12 10^6/ΜL
SED RATE, AUTOMATED: 21
SODIUM BLD-SCNC: 146 MMOL/L
TOTAL PROTEIN: 5.6 G/DL (ref 6.4–8.2)
WBC # BLD: 5.2 10^3/ML

## 2025-04-09 ENCOUNTER — OFFICE VISIT (OUTPATIENT)
Dept: PHYSICAL MEDICINE AND REHAB | Age: 65
End: 2025-04-09
Payer: COMMERCIAL

## 2025-04-09 ENCOUNTER — RESULTS FOLLOW-UP (OUTPATIENT)
Age: 65
End: 2025-04-09

## 2025-04-09 VITALS — SYSTOLIC BLOOD PRESSURE: 108 MMHG | DIASTOLIC BLOOD PRESSURE: 76 MMHG

## 2025-04-09 DIAGNOSIS — G43.909 MIGRAINE WITHOUT STATUS MIGRAINOSUS, NOT INTRACTABLE, UNSPECIFIED MIGRAINE TYPE: ICD-10-CM

## 2025-04-09 DIAGNOSIS — L40.50 PSORIATIC ARTHRITIS (HCC): ICD-10-CM

## 2025-04-09 DIAGNOSIS — Z87.81 HX OF COMPRESSION FRACTURE OF SPINE: ICD-10-CM

## 2025-04-09 DIAGNOSIS — G89.29 OTHER CHRONIC PAIN: ICD-10-CM

## 2025-04-09 DIAGNOSIS — M47.816 LUMBAR SPONDYLOSIS: Primary | ICD-10-CM

## 2025-04-09 DIAGNOSIS — S22.000A COMPRESSION FRACTURE OF BODY OF THORACIC VERTEBRA (HCC): ICD-10-CM

## 2025-04-09 DIAGNOSIS — M54.17 RADICULOPATHY, LUMBOSACRAL REGION: ICD-10-CM

## 2025-04-09 PROCEDURE — 99215 OFFICE O/P EST HI 40 MIN: CPT | Performed by: NURSE PRACTITIONER

## 2025-04-09 RX ORDER — ACETAMINOPHEN 500 MG
1000 TABLET ORAL 3 TIMES DAILY PRN
Qty: 180 TABLET | Refills: 5 | Status: SHIPPED | OUTPATIENT
Start: 2025-04-09

## 2025-04-09 RX ORDER — OXYCODONE AND ACETAMINOPHEN 5; 325 MG/1; MG/1
1 TABLET ORAL EVERY 12 HOURS
Qty: 60 TABLET | Refills: 0 | Status: SHIPPED | OUTPATIENT
Start: 2025-04-09 | End: 2025-05-09

## 2025-04-09 RX ORDER — FENTANYL 25 UG/1
1 PATCH TRANSDERMAL
Qty: 10 PATCH | Refills: 0 | Status: SHIPPED | OUTPATIENT
Start: 2025-04-25 | End: 2025-05-25

## 2025-04-09 RX ORDER — TIZANIDINE 2 MG/1
2 TABLET ORAL 4 TIMES DAILY PRN
Qty: 120 TABLET | Refills: 2 | Status: SHIPPED | OUTPATIENT
Start: 2025-04-09

## 2025-04-09 RX ORDER — LEFLUNOMIDE 10 MG/1
10 TABLET ORAL DAILY
Qty: 30 TABLET | Refills: 0 | Status: SHIPPED | OUTPATIENT
Start: 2025-04-09 | End: 2025-05-07 | Stop reason: SDUPTHER

## 2025-04-09 NOTE — PROGRESS NOTES
Functionality Assessment/Goals Worksheet     On a scale of 0 (Does not Interfere) to 10 (Completely Interferes)     1.  Which number describes how during the past week pain has interfered with           the following:  A.  General Activity:  8  B.  Mood: 7  C.  Walking Ability:  8  D.  Normal Work (Includes both work outside the home and housework):  10  E.  Relations with Other People:   7  F.  Sleep:   5  G.  Enjoyment of Life:   7    2.  Patient Prefers to Take their Pain Medications:     [x]  On a regular basis   []  Only when necessary    []  Does not take pain medications    3.  What are the Patient's Goals/Expectations for Visiting Pain Management?     []  Learn about my pain    [x]  Receive Medication   []  Physical Therapy     []  Treat Depression   []  Receive Injections    []  Treat Sleep   []  Deal with Anxiety and Stress   []  Treat Opoid Dependence/Addiction   []  Other:

## 2025-04-09 NOTE — PROGRESS NOTES
Chronic Pain/PM&R Clinic Note     Encounter Date: 1/6/25    Subjective:   Chief Complaint:   Chief Complaint   Patient presents with    Follow-up     12 wk f/u; discuss an epidural; abscess tooth for over a month, surgery on May 22nd; infected ingrown toenail and a cyst under right breast       History of Present Illness:   Isha Ramsey is a 64 y.o. female seen in the office initially on 03/05/21 for her management of migraines.  She has medical history of previous TIA with residual cognitive deficits, and ACDF C3-C7 with Dr Puri. She has longstanding history of migraine headaches as well as of the last 35 years.  She describes 2 different types of migraine headaches.  Her standard migraine headaches she reports started on the right side the neck and wrap around the entire side of the front of the head.  She has associated phonophobia and photophobia.  She also notices these migraines to begin with an aura where her nose starts to burn.  She has associated nausea/vomiting when the migraines are severe.  Her migraines last greater than 4 hours in duration interfering everyday activities.  In addition she reports greater than 15 migraine attacks last month.  She has failed multiple medications in the past including Fioricet, Ubrelvy, nortriptyline, and is currently on Aimovig and Topamax.  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    In addition, she does have axial low back pain that contributes to a lot of her debilitating pain.  She denies any radiation down the legs, focal leg weakness, leg paresthesias, saddle anesthesia, bowel/bladder incontinence.    7/8/2024, patient presents for planned follow-up for management of chronic thoracic back pain.  She states that she was admitted to the hospital 2 times since her last visit with the most recent 1 being for shortness of breath and palpitations.  She states that she was having some palpitations during her visit with her

## 2025-04-14 ENCOUNTER — HOSPITAL ENCOUNTER (OUTPATIENT)
Dept: NURSING | Age: 65
Discharge: HOME | End: 2025-04-14
Payer: COMMERCIAL

## 2025-04-14 VITALS
TEMPERATURE: 97.8 F | HEART RATE: 88 BPM | OXYGEN SATURATION: 97 % | DIASTOLIC BLOOD PRESSURE: 87 MMHG | RESPIRATION RATE: 16 BRPM | SYSTOLIC BLOOD PRESSURE: 119 MMHG

## 2025-04-14 DIAGNOSIS — S22.060A COMPRESSION FRACTURE OF T7 VERTEBRA, INITIAL ENCOUNTER (HCC): ICD-10-CM

## 2025-04-14 DIAGNOSIS — M80.08XA AGE-RELATED OSTEOPOROSIS WITH CURRENT PATHOLOGICAL FRACTURE, VERTEBRA(E), INITIAL ENCOUNTER FOR FRACTURE (HCC): Primary | ICD-10-CM

## 2025-04-14 DIAGNOSIS — S22.070A COMPRESSION FRACTURE OF T9 VERTEBRA, INITIAL ENCOUNTER (HCC): ICD-10-CM

## 2025-04-14 PROCEDURE — 6360000002 HC RX W HCPCS: Performed by: NURSE PRACTITIONER

## 2025-04-14 PROCEDURE — 96372 THER/PROPH/DIAG INJ SC/IM: CPT

## 2025-04-14 RX ORDER — ACETAMINOPHEN 325 MG/1
650 TABLET ORAL
OUTPATIENT
Start: 2025-04-21

## 2025-04-14 RX ORDER — ONDANSETRON 2 MG/ML
8 INJECTION INTRAMUSCULAR; INTRAVENOUS
OUTPATIENT
Start: 2025-04-21

## 2025-04-14 RX ORDER — HYDROCORTISONE SODIUM SUCCINATE 100 MG/2ML
100 INJECTION INTRAMUSCULAR; INTRAVENOUS
OUTPATIENT
Start: 2025-04-21

## 2025-04-14 RX ORDER — DIPHENHYDRAMINE HYDROCHLORIDE 50 MG/ML
50 INJECTION, SOLUTION INTRAMUSCULAR; INTRAVENOUS
OUTPATIENT
Start: 2025-04-21

## 2025-04-14 RX ORDER — SODIUM CHLORIDE 9 MG/ML
INJECTION, SOLUTION INTRAVENOUS CONTINUOUS
OUTPATIENT
Start: 2025-04-21

## 2025-04-14 RX ORDER — ALBUTEROL SULFATE 90 UG/1
4 INHALANT RESPIRATORY (INHALATION) PRN
OUTPATIENT
Start: 2025-04-21

## 2025-04-14 RX ORDER — EPINEPHRINE 1 MG/ML
0.3 INJECTION, SOLUTION INTRAMUSCULAR; SUBCUTANEOUS PRN
OUTPATIENT
Start: 2025-04-21

## 2025-04-14 RX ADMIN — ROMOSOZUMAB-AQQG 105 MG: 105 INJECTION, SOLUTION SUBCUTANEOUS at 14:08

## 2025-04-14 ASSESSMENT — PAIN SCALES - GENERAL: PAINLEVEL_OUTOF10: 9

## 2025-04-14 ASSESSMENT — PAIN DESCRIPTION - DESCRIPTORS: DESCRIPTORS: ACHING

## 2025-04-14 ASSESSMENT — PAIN DESCRIPTION - LOCATION: LOCATION: GENERALIZED

## 2025-04-14 ASSESSMENT — PAIN DESCRIPTION - PAIN TYPE: TYPE: CHRONIC PAIN

## 2025-04-14 NOTE — PROGRESS NOTES
1255  Isha was wheeled into room for evenity injection. Pt rights and responsibilities offered to her.   Isha states she has a tooth infection and is getting surgery around May 11 to remove tooth and they have to go into her jaw a little bit.   I messaged Dr. Thorne and he said --If there is an active tooth infection please hold the cosentyx. The evenity shouldn’t effect the infection     I told Isha this and she verbalized understanding. WE WILL give her evenity today and hold the cosentyx until after tooth is healed.     Evenity ordered.     1408  Evenity given and Isha tolerated well. D/c instructions discussed and Isha verbalized understanding. Isha was wheeled out via wheelchair for d/c today.          _m___ Safety:       (Environmental)  [place]ren to environment  Ensure ID band is correct and in place/ allergy band as needed  Assess for fall risk  Initiate fall precautions as applicable (fall band, side rails, etc.)  Call light within reach  Bed in low position/ wheels locked    _m___ Pain:       Assess pain level and characteristics  Administer analgesics as ordered  Assess effectiveness of pain management and report to MD as needed    _m___ Knowledge Deficit:  Assess baseline knowledge  Provide teaching at level of understanding  Provide teaching via preferred learning method  Evaluate teaching effectiveness    _m___ Hemodynamic/Respiratory Status:       (Pre and Post Procedure Monitoring)  Assess/Monitor vital signs and LOC  Assess Baseline SpO2 prior to any sedation  Obtain weight/height  Assess vital signs/ LOC until patient meets discharge criteria  Monitor procedure site and notify MD of any issues

## 2025-04-15 ENCOUNTER — TELEPHONE (OUTPATIENT)
Age: 65
End: 2025-04-15

## 2025-04-15 NOTE — TELEPHONE ENCOUNTER
Received call from [facility] OPN stating patient Evenity therapy plan orders will  prior to next infusion. Patient is needing 3 more Evenity treatments. [facility] OPN asking for 3 more Evenity therapy plan orders. Thank you.

## 2025-04-21 DIAGNOSIS — G43.909 MIGRAINE WITHOUT STATUS MIGRAINOSUS, NOT INTRACTABLE, UNSPECIFIED MIGRAINE TYPE: ICD-10-CM

## 2025-04-21 DIAGNOSIS — G43.409 HEMIPLEGIC MIGRAINE WITHOUT STATUS MIGRAINOSUS, NOT INTRACTABLE: ICD-10-CM

## 2025-04-22 RX ORDER — BUTALBITAL, ACETAMINOPHEN AND CAFFEINE 50; 325; 40 MG/1; MG/1; MG/1
TABLET ORAL
Qty: 30 TABLET | Refills: 0 | Status: SHIPPED | OUTPATIENT
Start: 2025-04-23 | End: 2025-05-23

## 2025-04-23 DIAGNOSIS — S22.000A COMPRESSION FRACTURE OF BODY OF THORACIC VERTEBRA (HCC): ICD-10-CM

## 2025-04-23 RX ORDER — FENTANYL 25 UG/1
1 PATCH TRANSDERMAL
Qty: 10 PATCH | Refills: 0 | Status: SHIPPED | OUTPATIENT
Start: 2025-04-25 | End: 2025-05-25

## 2025-04-23 NOTE — TELEPHONE ENCOUNTER
Isha Ramsey called requesting a refill on the following medications:  Requested Prescriptions     Pending Prescriptions Disp Refills    fentaNYL (DURAGESIC) 25 MCG/HR 10 patch 0     Sig: Place 1 patch onto the skin every 72 hours for 30 days. Max Daily Amount: 1 patch     Pharmacy verified:  [place] [facility]      Date of last visit: 04-09-25  Date of next visit (if applicable): 7/3/2025

## 2025-04-24 ENCOUNTER — HOSPITAL ENCOUNTER (EMERGENCY)
Age: 65
Discharge: HOME | End: 2025-04-24
Attending: FAMILY MEDICINE
Payer: COMMERCIAL

## 2025-04-24 VITALS
SYSTOLIC BLOOD PRESSURE: 141 MMHG | WEIGHT: 177 LBS | RESPIRATION RATE: 18 BRPM | TEMPERATURE: 98.1 F | OXYGEN SATURATION: 99 % | HEART RATE: 71 BPM | BODY MASS INDEX: 32.37 KG/M2 | DIASTOLIC BLOOD PRESSURE: 81 MMHG

## 2025-04-24 DIAGNOSIS — E87.6 HYPOKALEMIA: Primary | ICD-10-CM

## 2025-04-24 LAB
ALBUMIN SERPL BCP-MCNC: 4.1 GM/DL (ref 3.4–5)
ALP SERPL-CCNC: 91 U/L (ref 46–116)
ALT SERPL W P-5'-P-CCNC: 38 U/L (ref 14–63)
ANION GAP SERPL CALC-SCNC: 9 MEQ/L (ref 8–16)
AST SERPL W P-5'-P-CCNC: 26 U/L (ref 15–37)
BASOPHILS # BLD: 0.6 % (ref 0–3)
BASOPHILS ABSOLUTE: 0 THOU/MM3 (ref 0–0.1)
BILIRUB SERPL-MCNC: 0.4 MG/DL (ref 0.2–1)
BILIRUB UR QL STRIP.AUTO: NEGATIVE
BUN SERPL-MCNC: 28 MG/DL (ref 7–18)
CALCIUM SERPL-MCNC: 9.2 MG/DL (ref 8.5–10.1)
CHARACTER UR: CLEAR
CHLORIDE SERPL-SCNC: 101 MEQ/L (ref 98–107)
CO2 SERPL-SCNC: 28 MEQ/L (ref 21–32)
COLOR UR AUTO: YELLOW
CREAT SERPL-MCNC: 0.8 MG/DL (ref 0.6–1.3)
EKG ATRIAL RATE: 57 BPM
EKG P AXIS: 46 DEGREES
EKG P-R INTERVAL: 190 MS
EKG Q-T INTERVAL: 482 MS
EKG QRS DURATION: 84 MS
EKG QTC CALCULATION (BAZETT): 469 MS
EKG R AXIS: -27 DEGREES
EKG T AXIS: 69 DEGREES
EKG VENTRICULAR RATE: 57 BPM
EOSINOPHILS ABSOLUTE: 0.2 THOU/MM3 (ref 0–0.5)
EOSINOPHILS RELATIVE PERCENT: 2.9 % (ref 0–4)
GFR SERPL CREATININE-BSD FRML MDRD: 82 ML/MIN/1.73M2
GLUCOSE BLD STRIP.AUTO-MCNC: 97 MG/DL (ref 70–108)
GLUCOSE SERPL-MCNC: 99 MG/DL (ref 74–106)
GLUCOSE UR QL STRIP.AUTO: NEGATIVE MG/DL
HCT VFR BLD CALC: 44 % (ref 37–47)
HEMOGLOBIN: 13.9 GM/DL (ref 12–16)
HGB UR STRIP.AUTO-MCNC: NEGATIVE MG/L
IMMATURE GRANS (ABS): 0 THOU/MM3 (ref 0–0.07)
IMMATURE GRANULOCYTES %: 0 %
KETONES UR QL STRIP.AUTO: 15
LACTATE: 1 MMOL/L (ref 0.9–1.7)
LEUKOCYTE ESTERASE UR QL STRIP.AUTO: NEGATIVE
LYMPHOCYTES # BLD AUTO: 30 % (ref 15–47)
LYMPHOCYTES ABSOLUTE: 1.9 THOU/MM3 (ref 1–4.8)
MAGNESIUM SERPL-MCNC: 2.1 MG/DL (ref 1.8–2.4)
MCH RBC QN AUTO: 29.1 PG (ref 26–32)
MCHC RBC AUTO-ENTMCNC: 31.6 GM/DL (ref 31–35)
MCV RBC AUTO: 92.1 FL (ref 81–99)
MONOCYTES: 0.3 THOU/MM3 (ref 0.3–1.3)
MONOCYTES: 5.3 % (ref 0–12)
NEUTROPHILS ABSOLUTE: 3.8 THOU/MM3 (ref 1.8–7.7)
NITRITE UR QL STRIP.AUTO: NEGATIVE
PDW BLD-RTO: 12.9 % (ref 11.5–14.9)
PH UR STRIP.AUTO: 6 [PH] (ref 5–9)
PLATELET # BLD AUTO: 240 THOU/MM3 (ref 130–400)
PMV BLD AUTO: 10.6 FL (ref 9.4–12.4)
POTASSIUM SERPL-SCNC: 2.9 MEQ/L (ref 3.5–5.1)
PROT SERPL-MCNC: 7.6 GM/DL (ref 6.4–8.2)
PROT UR STRIP.AUTO-MCNC: NEGATIVE MG/DL
RBC # BLD: 4.78 MILL/MM3 (ref 4.1–5.3)
REFLEX TO URINE C & S: NORMAL
SEG NEUTROPHILS: 60.9 % (ref 43–75)
SODIUM SERPL-SCNC: 138 MEQ/L (ref 136–145)
SP GR UR STRIP.AUTO: 1.01 (ref 1–1.03)
TROPONIN, HIGH SENSITIVITY: 7 PG/ML (ref 0–51.3)
UROBILINOGEN UR STRIP-ACNC: 0.2 EU/DL (ref 0–1)
WBC # BLD: 6.2 THOU/MM3 (ref 4.8–10.8)

## 2025-04-24 PROCEDURE — 83605 ASSAY OF LACTIC ACID: CPT

## 2025-04-24 PROCEDURE — 82948 REAGENT STRIP/BLOOD GLUCOSE: CPT

## 2025-04-24 PROCEDURE — 83735 ASSAY OF MAGNESIUM: CPT

## 2025-04-24 PROCEDURE — 6370000000 HC RX 637 (ALT 250 FOR IP): Performed by: FAMILY MEDICINE

## 2025-04-24 PROCEDURE — 80053 COMPREHEN METABOLIC PANEL: CPT

## 2025-04-24 PROCEDURE — 93010 ELECTROCARDIOGRAM REPORT: CPT | Performed by: INTERNAL MEDICINE

## 2025-04-24 PROCEDURE — 81001 URINALYSIS AUTO W/SCOPE: CPT

## 2025-04-24 PROCEDURE — 85025 COMPLETE CBC W/AUTO DIFF WBC: CPT

## 2025-04-24 PROCEDURE — 2580000003 HC RX 258: Performed by: FAMILY MEDICINE

## 2025-04-24 PROCEDURE — 99284 EMERGENCY DEPT VISIT MOD MDM: CPT

## 2025-04-24 PROCEDURE — 84484 ASSAY OF TROPONIN QUANT: CPT

## 2025-04-24 PROCEDURE — 93005 ELECTROCARDIOGRAM TRACING: CPT | Performed by: FAMILY MEDICINE

## 2025-04-24 RX ORDER — POTASSIUM CHLORIDE 750 MG/1
40 TABLET, EXTENDED RELEASE ORAL ONCE
Status: COMPLETED | OUTPATIENT
Start: 2025-04-24 | End: 2025-04-24

## 2025-04-24 RX ORDER — ONDANSETRON 4 MG/1
4 TABLET, ORALLY DISINTEGRATING ORAL ONCE
Status: COMPLETED | OUTPATIENT
Start: 2025-04-24 | End: 2025-04-24

## 2025-04-24 RX ORDER — 0.9 % SODIUM CHLORIDE 0.9 %
1000 INTRAVENOUS SOLUTION INTRAVENOUS ONCE
Status: DISCONTINUED | OUTPATIENT
Start: 2025-04-24 | End: 2025-04-24

## 2025-04-24 RX ORDER — SODIUM CHLORIDE 9 MG/ML
INJECTION, SOLUTION INTRAVENOUS CONTINUOUS
Status: DISCONTINUED | OUTPATIENT
Start: 2025-04-24 | End: 2025-04-24 | Stop reason: HOSPADM

## 2025-04-24 RX ADMIN — SODIUM CHLORIDE: 0.9 INJECTION, SOLUTION INTRAVENOUS at 15:23

## 2025-04-24 RX ADMIN — ONDANSETRON 4 MG: 4 TABLET, ORALLY DISINTEGRATING ORAL at 16:29

## 2025-04-24 RX ADMIN — POTASSIUM CHLORIDE 40 MEQ: 750 TABLET, EXTENDED RELEASE ORAL at 16:03

## 2025-04-24 ASSESSMENT — ENCOUNTER SYMPTOMS
SHORTNESS OF BREATH: 0
ABDOMINAL PAIN: 0
COUGH: 0
VOMITING: 0
NAUSEA: 1

## 2025-04-24 ASSESSMENT — PAIN - FUNCTIONAL ASSESSMENT: PAIN_FUNCTIONAL_ASSESSMENT: NONE - DENIES PAIN

## 2025-04-24 NOTE — ED TRIAGE NOTES
Patient states her blood pressure was low this morning and she just feels \"off\" and states she hasn't urinated today.

## 2025-04-24 NOTE — ED PROVIDER NOTES
[facility]  eMERGENCY dEPARTMENT eNCOUnter          CHIEF COMPLAINT       Chief Complaint   Patient presents with    Dizziness       Nurses Notes reviewed and I agree except as noted in the HPI.      HISTORY OF PRESENT ILLNESS    Isha Ramsey is a 64 y.o. female who presents with dizziness,feeling off according to patient symptoms started two days ago. No other motor or sensory changes. No chest pain. Patient is concerned about possible uti. Patient medical history is complex. Patient denies cough. Denies chest pain.          REVIEW OF SYSTEMS     Review of Systems   Constitutional:  Negative for chills and fever.   Respiratory:  Negative for cough and shortness of breath.    Cardiovascular:  Negative for chest pain and palpitations.   Gastrointestinal:  Positive for nausea. Negative for abdominal pain and vomiting.   Genitourinary:  Negative for difficulty urinating, dysuria, flank pain and frequency.   Skin:  Negative for pallor and rash.   Neurological:  Positive for dizziness.   All other systems reviewed and are negative.        PAST MEDICAL HISTORY    has a past medical history of Abnormal ECG, Adrenal abnormality, Allergic rhinitis, Angina at rest, Anxiety, Arrhythmia, Arthritis, Asthma, Carpal tunnel syndrome, Cerebral artery occlusion with cerebral infarction ([facility]), Cerebrovascular disease, Chronic back pain, Chronic sinusitis, Cognitive impairment, COPD (chronic obstructive pulmonary disease) ([facility]), Coronary atherosclerosis, Depression, Dysphagia, Fibromyalgia, Fracture, GERD (gastroesophageal reflux disease), Headache, History of degenerative disc disease, Hyperlipidemia, Hypothyroidism, Irritable bowel syndrome, Lymphedema, May-Thurner syndrome, Neuropathy, Osteoarthritis, Peripheral vascular disease, PONV (postoperative nausea and vomiting), Positive BJ (antinuclear antibody), Psoriatic arthritis ([facility]), PTSD (post-traumatic stress disorder), Restless legs syndrome, Rheumatoid arteritis

## 2025-04-24 NOTE — DISCHARGE INSTRUCTIONS
Continue current medication regimen as prescribed.  Start your potassium when you go back to the assisted facility.  If you should note any new neurologic symptoms then go to the nearest emergency room.

## 2025-05-01 ENCOUNTER — PATIENT MESSAGE (OUTPATIENT)
Dept: PAIN MANAGEMENT | Age: 65
End: 2025-05-01

## 2025-05-05 LAB
ALBUMIN: 4.1 G/DL
ALP BLD-CCNC: 73 U/L
ALT SERPL-CCNC: 15 U/L
ANION GAP SERPL CALCULATED.3IONS-SCNC: ABNORMAL MMOL/L
AST SERPL-CCNC: 13 U/L
BASOPHILS ABSOLUTE: NORMAL
BASOPHILS RELATIVE PERCENT: 0.6 %
BILIRUB SERPL-MCNC: 0.3 MG/DL (ref 0.1–1.4)
BUN BLDV-MCNC: 31 MG/DL
C-REACTIVE PROTEIN: 2.4
CALCIUM SERPL-MCNC: 9.4 MG/DL
CHLORIDE BLD-SCNC: 106 MMOL/L
CO2: 27 MMOL/L
CREAT SERPL-MCNC: 0.9 MG/DL
EOSINOPHILS ABSOLUTE: 0.2 /ΜL
EOSINOPHILS RELATIVE PERCENT: 3.1 %
GFR, ESTIMATED: 63
GLUCOSE BLD-MCNC: 96 MG/DL
HCT VFR BLD CALC: 39.8 % (ref 36–46)
HEMOGLOBIN: 13.2 G/DL (ref 12–16)
LYMPHOCYTES ABSOLUTE: 2.6 /ΜL
LYMPHOCYTES RELATIVE PERCENT: 49.9 %
MCH RBC QN AUTO: 30 PG
MCHC RBC AUTO-ENTMCNC: 33.2 G/DL
MCV RBC AUTO: 90.4 FL
MONOCYTES ABSOLUTE: 0.2 /ΜL
MONOCYTES RELATIVE PERCENT: 4.7 %
NEUTROPHILS ABSOLUTE: 2.2 /ΜL
NEUTROPHILS RELATIVE PERCENT: 41.7 %
PDW BLD-RTO: 13.9 %
PLATELET # BLD: 232 K/ΜL
PMV BLD AUTO: 10.9 FL
POTASSIUM SERPL-SCNC: 3.2 MMOL/L
RBC # BLD: 4.41 10^6/ΜL
SED RATE, AUTOMATED: 14
SODIUM BLD-SCNC: 145 MMOL/L
TOTAL PROTEIN: 6.2 G/DL (ref 6.4–8.2)
WBC # BLD: 5.2 10^3/ML

## 2025-05-07 ENCOUNTER — RESULTS FOLLOW-UP (OUTPATIENT)
Age: 65
End: 2025-05-07

## 2025-05-07 DIAGNOSIS — L40.50 PSORIATIC ARTHRITIS (HCC): ICD-10-CM

## 2025-05-07 RX ORDER — LEFLUNOMIDE 10 MG/1
10 TABLET ORAL DAILY
Qty: 30 TABLET | Refills: 1 | Status: SHIPPED | OUTPATIENT
Start: 2025-05-07

## 2025-05-12 ENCOUNTER — HOSPITAL ENCOUNTER (OUTPATIENT)
Dept: NURSING | Age: 65
Discharge: HOME | End: 2025-05-12

## 2025-05-19 DIAGNOSIS — M47.816 LUMBAR SPONDYLOSIS: ICD-10-CM

## 2025-05-19 DIAGNOSIS — M54.17 RADICULOPATHY, LUMBOSACRAL REGION: ICD-10-CM

## 2025-05-19 DIAGNOSIS — Z87.81 HX OF COMPRESSION FRACTURE OF SPINE: ICD-10-CM

## 2025-05-19 DIAGNOSIS — S22.000A COMPRESSION FRACTURE OF BODY OF THORACIC VERTEBRA (HCC): ICD-10-CM

## 2025-05-19 RX ORDER — OXYCODONE AND ACETAMINOPHEN 5; 325 MG/1; MG/1
1 TABLET ORAL EVERY 12 HOURS
Qty: 60 TABLET | Refills: 0 | Status: SHIPPED | OUTPATIENT
Start: 2025-05-19 | End: 2025-06-18

## 2025-05-19 NOTE — TELEPHONE ENCOUNTER
OARRS reviewed. UDS: NO DATA LAST YR FOUND  .   Last seen:04/09/2025. Follow-up:   Future Appointments   Date Time Provider Department Center   6/2/2025  1:30 PM STR MINOR ROOM 8 STRZ OP NURS [facility]   7/3/2025  2:00 PM Leandro Powell DO N [facility] [place]   9/17/2025  1:00 PM Fredis Martinez MD N [facility]

## 2025-05-21 DIAGNOSIS — S22.000A COMPRESSION FRACTURE OF BODY OF THORACIC VERTEBRA (HCC): ICD-10-CM

## 2025-05-21 RX ORDER — FENTANYL 25 UG/1
1 PATCH TRANSDERMAL
Qty: 10 PATCH | Refills: 0 | Status: SHIPPED | OUTPATIENT
Start: 2025-05-25 | End: 2025-06-24

## 2025-05-21 NOTE — TELEPHONE ENCOUNTER
Isha Ramsey called requesting a refill on the following medications:  Requested Prescriptions     Pending Prescriptions Disp Refills    fentaNYL (DURAGESIC) 25 MCG/HR 10 patch 0     Sig: Place 1 patch onto the skin every 72 hours for 30 days. Max Daily Amount: 1 patch     Pharmacy verified:[facility]   .pv      Date of last visit: 1/6/25   Date of next visit (if applicable): 7/3/2025        Drea, from the Pt's nursing home is requesting 10 patches for this Requested refill.

## 2025-06-02 ENCOUNTER — HOSPITAL ENCOUNTER (OUTPATIENT)
Dept: NURSING | Age: 65
Discharge: HOME | End: 2025-06-02

## 2025-06-19 ENCOUNTER — TELEPHONE (OUTPATIENT)
Dept: PSYCHIATRY | Age: 65
End: 2025-06-19

## 2025-06-19 NOTE — TELEPHONE ENCOUNTER
Over past treatment I did not feel Isha was incompetent so I will write a letter for her when I'm back in office Monday.   Electronically signed by Sangita Malik MD on 6/19/2025 at 1:32 PM

## 2025-06-19 NOTE — TELEPHONE ENCOUNTER
Isha wrote into the office via [employer]:    [email or web address]    If you need to reach me my new phone number is      Address it to  Social Security Department   [address] [place] [address]  Isha Ramsey -- Mental Competency     1960     Dr. Malik, if you could just write a short letter, to my mental compatency.  I do not even touch the funds, it's just direct deposit to direct deposit and part goes into  T fund that is all set up.  I cannot be without my social security disability as I live in assisted living, I would be so appreciative.  If you be so kind to send a hard copy and just email me a copy.    Isha Ramsey to [facility] Psychiatry Clinical Staff (supporting Sangita Malik MD)    Hi, I am back in [place], living at [facility] to be closer to my doctors.  I was at [facility] in [facility] and they have caused me a major issue with my social security disability.  They went behind my back and declared me mentally incompetent to handle my money and had their facility doctor sign off in November on that diagnosis. I saw you virtually in November, but never met that doctor who is not a psychiatrist. I have long term disability that goes into [employer] and then directly goes into [facility] and that is all I want from Social Security is to send to bank and the facility withdraws all of it.       Here is the [facility] email to explain better.      Unfortunately, it has been brought to my attention about Social Security. I could not understand when I received a letter just a month after moving there that I had chosen you to be my payee [facility] on December 30 and that social security had called my son, Fco.  I made it extremely clear that my son was never to be contacted regarding anything financial as he is extremely irresponsible and a major pothead, if he had anyway to get a hold of money he would.  I made it clear any decisions were to be made by my  Pt placed on HD using temp catheter. Both ports aspirated and flushed without difficulty. UF goal 2600. Pt alert, VSS,  /76 Pulse 96.       Will continue to monitor

## 2025-06-20 ENCOUNTER — APPOINTMENT (OUTPATIENT)
Dept: ENDOSCOPY | Age: 65
End: 2025-06-20
Attending: INTERNAL MEDICINE
Payer: COMMERCIAL

## 2025-06-20 ENCOUNTER — ANESTHESIA EVENT (OUTPATIENT)
Dept: ENDOSCOPY | Age: 65
End: 2025-06-20
Payer: COMMERCIAL

## 2025-06-20 ENCOUNTER — HOSPITAL ENCOUNTER (OUTPATIENT)
Age: 65
Setting detail: OUTPATIENT SURGERY
Discharge: OTHER INSTITUTION | End: 2025-06-20
Attending: INTERNAL MEDICINE | Admitting: INTERNAL MEDICINE
Payer: COMMERCIAL

## 2025-06-20 ENCOUNTER — ANESTHESIA (OUTPATIENT)
Dept: ENDOSCOPY | Age: 65
End: 2025-06-20
Payer: COMMERCIAL

## 2025-06-20 VITALS
OXYGEN SATURATION: 95 % | RESPIRATION RATE: 16 BRPM | TEMPERATURE: 98 F | WEIGHT: 173 LBS | SYSTOLIC BLOOD PRESSURE: 112 MMHG | HEART RATE: 70 BPM | DIASTOLIC BLOOD PRESSURE: 58 MMHG | BODY MASS INDEX: 27.8 KG/M2 | HEIGHT: 66 IN

## 2025-06-20 PROCEDURE — 3609017700 HC EGD DILATION GASTRIC/DUODENAL STRICTURE: Performed by: INTERNAL MEDICINE

## 2025-06-20 PROCEDURE — C1769 GUIDE WIRE: HCPCS | Performed by: INTERNAL MEDICINE

## 2025-06-20 PROCEDURE — 88305 TISSUE EXAM BY PATHOLOGIST: CPT

## 2025-06-20 PROCEDURE — 3700000001 HC ADD 15 MINUTES (ANESTHESIA): Performed by: INTERNAL MEDICINE

## 2025-06-20 PROCEDURE — 7100000011 HC PHASE II RECOVERY - ADDTL 15 MIN: Performed by: INTERNAL MEDICINE

## 2025-06-20 PROCEDURE — 7100000010 HC PHASE II RECOVERY - FIRST 15 MIN: Performed by: INTERNAL MEDICINE

## 2025-06-20 PROCEDURE — 2580000003 HC RX 258

## 2025-06-20 PROCEDURE — 3609012400 HC EGD TRANSORAL BIOPSY SINGLE/MULTIPLE: Performed by: INTERNAL MEDICINE

## 2025-06-20 PROCEDURE — 3700000000 HC ANESTHESIA ATTENDED CARE: Performed by: INTERNAL MEDICINE

## 2025-06-20 PROCEDURE — 6360000002 HC RX W HCPCS

## 2025-06-20 RX ORDER — GLYCOPYRROLATE 1 MG/5 ML
SYRINGE (ML) INTRAVENOUS
Status: DISCONTINUED | OUTPATIENT
Start: 2025-06-20 | End: 2025-06-20 | Stop reason: SDUPTHER

## 2025-06-20 RX ORDER — PROPOFOL 10 MG/ML
INJECTION, EMULSION INTRAVENOUS
Status: DISCONTINUED | OUTPATIENT
Start: 2025-06-20 | End: 2025-06-20 | Stop reason: SDUPTHER

## 2025-06-20 RX ORDER — SODIUM CHLORIDE 9 MG/ML
INJECTION, SOLUTION INTRAVENOUS
Status: DISCONTINUED | OUTPATIENT
Start: 2025-06-20 | End: 2025-06-20 | Stop reason: SDUPTHER

## 2025-06-20 RX ORDER — SODIUM CHLORIDE 9 MG/ML
INJECTION, SOLUTION INTRAVENOUS CONTINUOUS
Status: DISCONTINUED | OUTPATIENT
Start: 2025-06-20 | End: 2025-06-20 | Stop reason: HOSPADM

## 2025-06-20 RX ADMIN — PROPOFOL 100 MG: 10 INJECTION, EMULSION INTRAVENOUS at 09:03

## 2025-06-20 RX ADMIN — PROPOFOL 100 MG: 10 INJECTION, EMULSION INTRAVENOUS at 08:58

## 2025-06-20 RX ADMIN — PHENYLEPHRINE HYDROCHLORIDE 100 MCG: 10 INJECTION INTRAVENOUS at 08:58

## 2025-06-20 RX ADMIN — PROPOFOL 50 MG: 10 INJECTION, EMULSION INTRAVENOUS at 09:06

## 2025-06-20 RX ADMIN — SODIUM CHLORIDE: 9 INJECTION, SOLUTION INTRAVENOUS at 08:49

## 2025-06-20 RX ADMIN — Medication 0.2 MG: at 08:58

## 2025-06-20 ASSESSMENT — PAIN - FUNCTIONAL ASSESSMENT
PAIN_FUNCTIONAL_ASSESSMENT: NONE - DENIES PAIN
PAIN_FUNCTIONAL_ASSESSMENT: NONE - DENIES PAIN

## 2025-06-20 NOTE — PROCEDURES
[address] [address]                             PROCEDURE NOTE      PATIENT NAME: DAREN SIDDIQI                  : 1960  MED REC NO: [id number]                       ROOM: [facility]  ACCOUNT NO: [id number]                       ADMIT DATE: 2025  PROVIDER: Rafi Mcdaniel MD      DATE OF PROCEDURE:  2025    SURGEON:  Rafi Mcdaniel MD    PROCEDURE:  Upper endoscopy with esophageal dilatation.    INSTRUMENT:  Olympus video upper endoscope.    MEDICATIONS:  Propofol, see anesthesia documentation report.    BRIEF HISTORY:  The patient is a 64-year-old with a history of recurrent dysphagia.  Due to her history, upper endoscopy with esophageal dilatation was planned.  The procedure was discussed with the patient.  Following discussion, she expressed understanding and did wish to proceed.    DESCRIPTION OF PROCEDURE:  The patient arrived to [facility] Endoscopy Department as an outpatient.  She was placed on the appropriate monitoring devices.  She was placed in the left lateral decubitus position.  Sedation provided by nurse anesthetist using propofol.  Then, the Olympus video upper endoscope passed gently across the cricopharyngeal musculature into the esophagus under direct endoscopic visualization.  The upper and middle portions of the esophagus were normal.  The lower esophagus, specifically at the level of the EG junction, did demonstrate a widely patent Schatzki ring and also a subtle mild ring just above this.  This is shown in photograph #13.  These were fibrotic rings as shown in photograph #13 best, also shown in photograph #12.  The mucosa itself was all normal.  The endoscopic appearance benign.  Tertiary type esophageal contractions incidentally noted in the lower esophagus as well.  The scope was advanced to the stomach.  There was a moderate-sized hiatal hernia.  The scope was advanced

## 2025-06-20 NOTE — PROGRESS NOTES
BRIEF H&P//fENDOSCOPY PREPROCEDURE REPORT     Patient: Isha Ramsey  : 1960  Acct#: [id number]    Date: 2025  Indications/Brief History: Dysphagia  Anticipated Procedure: EGD with dil esophagus    ASA class:  2  Airway Adequate?    Yes__x___   No_______    Preprocedure Exam:   Neuro: Alert, oriented.   Heart:  Regular rate and rhythm   Lungs: Clear to ausculation bilaterally, no evidence respiratory distress  Abdomen:  soft.  NT    PLAN:  EGD__x___   COLONOSCOPY ______     ERCP________    Rafi Mcdaniel MD MD  2025, 9:13 AM

## 2025-06-20 NOTE — PROGRESS NOTES
Admitted to Endo department and admitted to Endo room 11  Plan of care reviewed with patient.   Call light within reach.   Allergies reviewed with pt   Bed in lowest position, locked, with one bed rail up.   Appropriate arm bands on patient.   Bathroom offered.   All questions and concerns of patient addressed    [employer] [facility].

## 2025-06-20 NOTE — ANESTHESIA PRE PROCEDURE
Department of Anesthesiology  Preprocedure Note       Name:  Isha Ramsey   Age:  64 y.o.  :  1960                                          MRN:  [id number]         Date:  2025      Surgeon: Surgeon(s):  Rafi Mcdaniel MD    Procedure: Procedure(s):  EGD DILATATION    Medications prior to admission:   Prior to Admission medications    Medication Sig Start Date End Date Taking? Authorizing Provider   fentaNYL (DURAGESIC) 25 MCG/HR Place 1 patch onto the skin every 72 hours for 30 days. Max Daily Amount: 1 patch 25  Leandro Powell DO   leflunomide (ARAVA) 10 MG tablet Take 1 tablet by mouth daily 25   Liat Hallman APRN - CNP   butalbital-acetaminophen-caffeine (FIORICET, ESGIC) -40 MG per tablet TAKE ONE (1) TABLET BY MOUTH EVERY SIX (6) HOURS AS NEEDED. 25  Leandro Powell DO   acetaminophen (TYLENOL) 500 MG tablet Take 2 tablets by mouth 3 times daily as needed for Pain 25   Beba Sorenson APRN - CNP   tiZANidine (ZANAFLEX) 2 MG tablet Take 1 tablet by mouth 4 times daily as needed (pain) 25   Beba Sorenson APRN - CNP   rivaroxaban (XARELTO) 10 MG TABS tablet Take 1 tablet by mouth daily (with breakfast) 4/3/25   Davon Portillo MD   ezetimibe (ZETIA) 10 MG tablet Take 1 tablet by mouth daily 3/1/25   Eduard Ortiz MD   folic acid (FOLVITE) 1 MG tablet Take 1 tablet by mouth daily 25   Liat Hallman APRN - CNP   calcium citrate (CALCITRATE) 950 (200 Ca) MG tablet Take 1 tablet by mouth daily 25   Liat Hallman APRN - CNP   REFRESH TEARS 0.5 % SOLN ophthalmic solution  24   Eduard Ortiz MD   lidocaine (LIDODERM) 5 %  10/22/24   Eduard Ortiz MD   estradiol (ESTRACE) 0.1 MG/GM vaginal cream  24   Eduard Ortiz MD   phenazopyridine (PYRIDIUM) 100 MG tablet Take 1 tablet by mouth 3 times daily as needed for Pain    Provider, MD Eduard   simethicone (MYLICON) 125

## 2025-06-20 NOTE — PROGRESS NOTES
ENDOSCOPY POSTPROCEDURE REPORT     PT NAME: Isha Ramsey  MEDICAL RECORD NUMBER: [id number]  YOB: 1960    PROCEDURE PERFORMED BY : Rafi Mcdaniel MD    PROCEDURE TYPE:   EGD ___x___   COLONOSCOPY _______   ERCP ________  MEDICATIONS USED :  VERSED _____   FENTANYL_____   PROPOFOL __x____  Patient tolerated procedure well.  No apparent complications.   Estimated blood loss:  Nil  Specimens removed:  Yes__x___  No______  Disposition of Specimens:  To Lab      BRIEF  FINDINGS:  Schatzki ring and \"B\" ring just above it. Normal mucosa.  Widely patent and scope passed easily. Tertiary esophageal contractions noted.   2. Moderate HH  3.mild prepyloric erythema.  Bxs  4. O/w normal  5. 54F Am dil with moderate resistance. Inspection post-dilation normal.     PRELIMINARY PLAN:  1.Resume meds and diet  2.OV 3 months  3.F/U bxs    For complete findings and plan, see dictated report.     Rafi Mcdaniel MD, MD  6/20/2025  9:13 AM

## 2025-06-20 NOTE — ANESTHESIA POSTPROCEDURE EVALUATION
Department of Anesthesiology  Postprocedure Note    Patient: Isha Ramsey  MRN: [id number]  YOB: 1960  Date of evaluation: 6/20/2025    Procedure Summary       Date: 06/20/25 Room / Location: [place] [address] / [facility]    Anesthesia Start: 0849 Anesthesia Stop: 0915    Procedures:       EGD DILATATION (Esophagus)      ESOPHAGOGASTRODUODENOSCOPY BIOPSY (Esophagus) Diagnosis:       Dysphagia, unspecified type      (Dysphagia, unspecified type [R13.10])    Surgeons: Rafi Mcdaniel MD Responsible Provider: [place] Bentley DO    Anesthesia Type: MAC ASA Status: 3            Anesthesia Type: No value filed.    Sean Phase I: Sean Score: 10    Sean Phase II:      Anesthesia Post Evaluation    Patient location during evaluation: bedside  Patient participation: complete - patient participated  Level of consciousness: awake  Pain score: 0  Airway patency: patent  Nausea & Vomiting: no nausea and no vomiting  Cardiovascular status: hemodynamically stable and blood pressure returned to baseline  Respiratory status: room air, nonlabored ventilation and spontaneous ventilation  Hydration status: stable        No notable events documented.

## 2025-06-20 NOTE — PROGRESS NOTES
Scope #  .  EGD completed, tolerated well.  biopsies taken, 1 jars labeled and sent to lab. Photos taken.     Dilation complete with 54 Fr american dilator. Guide wire tip intact upon removal.

## 2025-06-20 NOTE — PROGRESS NOTES
Recovery mode. Denies discomfort, passing gas, taking fluids. Dr. Mcdaniel discussed findings and plan of care with patient. Discharge instructions provided, understanding verbalized.  Nurse called report to [facility] .

## 2025-06-25 ENCOUNTER — PATIENT MESSAGE (OUTPATIENT)
Dept: PAIN MANAGEMENT | Age: 65
End: 2025-06-25

## 2025-06-25 ENCOUNTER — TELEPHONE (OUTPATIENT)
Dept: PSYCHIATRY | Age: 65
End: 2025-06-25

## 2025-06-25 DIAGNOSIS — G43.909 MIGRAINE WITHOUT STATUS MIGRAINOSUS, NOT INTRACTABLE, UNSPECIFIED MIGRAINE TYPE: ICD-10-CM

## 2025-06-25 DIAGNOSIS — S22.000A COMPRESSION FRACTURE OF BODY OF THORACIC VERTEBRA (HCC): ICD-10-CM

## 2025-06-25 DIAGNOSIS — G43.409 HEMIPLEGIC MIGRAINE WITHOUT STATUS MIGRAINOSUS, NOT INTRACTABLE: ICD-10-CM

## 2025-06-25 RX ORDER — BUTALBITAL, ACETAMINOPHEN AND CAFFEINE 50; 325; 40 MG/1; MG/1; MG/1
1 TABLET ORAL EVERY 6 HOURS PRN
Qty: 120 TABLET | Refills: 0 | Status: SHIPPED | OUTPATIENT
Start: 2025-06-25 | End: 2025-07-25

## 2025-06-25 RX ORDER — FENTANYL 25 UG/1
1 PATCH TRANSDERMAL
Qty: 10 PATCH | Refills: 0 | Status: SHIPPED | OUTPATIENT
Start: 2025-06-25 | End: 2025-07-25

## 2025-06-25 NOTE — TELEPHONE ENCOUNTER
Isha wrote into the office via [employer]:    Hi Dr Malik, I spoke to Social Security and they will need the letter from you by July 11, and they said that will suffice and they will not need to call other physicians, but still want an interview. They said it won't have any effect on my disability. Thank you so much for doing this for me.     Please advise; she is scheduled to return on 07/29/25. Not seen since 11/2024.

## 2025-06-25 NOTE — TELEPHONE ENCOUNTER
Per pt my chart message - i, I have moved to [facility] in order to be closer to all of my doctors and my son.  They use a different pharmacy here called [facility].  She just changed my fentanyl patch and she said I only have 1 left to be changed on Saturday as well as I need a refill on my butalbital. All of my prescriptions will need to be changed to this facility.  Before we head into the holiday week, they want to be sure I have my fentanyl patches and my butalbital, so they asked me to get this started.  After this, the nurses will be taking over.  I will be seeing Dr. Powell on July 3rd.      The [facility] number they gave me is        OARRS reviewed. UDS:no data last yr.   Last seen: 1/6/2025. Follow-up:   Future Appointments   Date Time Provider Department Center   7/3/2025  2:00 PM Leandro Powell DO [facility] [place]   7/29/2025 11:00 AM Sangita Malik MD N [facility] [place]   9/17/2025  1:00 PM Fredis Martinez MD [facility] [place]

## 2025-06-25 NOTE — TELEPHONE ENCOUNTER
This letter was completed Monday and given to office staff.   Electronically signed by Sangita Malik MD on 6/25/2025 at 11:59 AM

## 2025-07-03 ENCOUNTER — OFFICE VISIT (OUTPATIENT)
Dept: PHYSICAL MEDICINE AND REHAB | Age: 65
End: 2025-07-03

## 2025-07-03 VITALS
HEIGHT: 66 IN | DIASTOLIC BLOOD PRESSURE: 72 MMHG | SYSTOLIC BLOOD PRESSURE: 124 MMHG | BODY MASS INDEX: 27.8 KG/M2 | WEIGHT: 173 LBS

## 2025-07-03 DIAGNOSIS — M54.6 ACUTE LEFT-SIDED THORACIC BACK PAIN: ICD-10-CM

## 2025-07-03 DIAGNOSIS — G89.29 OTHER CHRONIC PAIN: ICD-10-CM

## 2025-07-03 DIAGNOSIS — Z51.81 ENCOUNTER FOR MONITORING OPIOID MAINTENANCE THERAPY: Primary | ICD-10-CM

## 2025-07-03 DIAGNOSIS — M54.50 LUMBAR SPINE PAIN: ICD-10-CM

## 2025-07-03 DIAGNOSIS — Z79.891 ENCOUNTER FOR MONITORING OPIOID MAINTENANCE THERAPY: Primary | ICD-10-CM

## 2025-07-03 NOTE — PROGRESS NOTES
Functionality Assessment/Goals Worksheet     On a scale of 0 (Does not Interfere) to 10 (Completely Interferes)     1.  Which number describes how during the past week pain has interfered with           the following:  A.  General Activity:  8  B.  Mood: 6  C.  Walking Ability:  9  D.  Normal Work (Includes both work outside the home and housework):  10  E.  Relations with Other People:   4  F.  Sleep:   8  G.  Enjoyment of Life:   6    2.  Patient Prefers to Take their Pain Medications:     [x]  On a regular basis   []  Only when necessary    []  Does not take pain medications    3.  What are the Patient's Goals/Expectations for Visiting Pain Management?     []  Learn about my pain    []  Receive Medication   []  Physical Therapy     []  Treat Depression   [x]  Receive Injections    []  Treat Sleep   []  Deal with Anxiety and Stress   []  Treat Opoid Dependence/Addiction   []  Other:

## 2025-07-03 NOTE — PROGRESS NOTES
Chronic Pain/[facility] Clinic Note     Encounter Date: 7/3/25    Subjective:   Chief Complaint:   Chief Complaint   Patient presents with    3 Month Follow-Up     Patient complains of lumps in her back near her spine.     Medication Refill       History of Present Illness:   Isha Ramsey is a 64 y.o. female seen in the office initially on 03/05/21 for her management of migraines.  She has medical history of previous TIA with residual cognitive deficits, and ACDF C3-C7 with Dr Puri. She has longstanding history of migraine headaches as well as of the last 35 years.  She describes 2 different types of migraine headaches.  Her standard migraine headaches she reports started on the right side the neck and wrap around the entire side of the front of the head.  She has associated phonophobia and photophobia.  She also notices these migraines to begin with an aura where her nose starts to burn.  She has associated nausea/vomiting when the migraines are severe.  Her migraines last greater than 4 hours in duration interfering everyday activities.  In addition she reports greater than 15 migraine attacks last month.  She has failed multiple medications in the past including Fioricet, Ubrelvy, nortriptyline, and is currently on Aimovig and Topamax.  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    In addition, she does have axial low back pain that contributes to a lot of her debilitating pain.  She denies any radiation down the legs, focal leg weakness, leg paresthesias, saddle anesthesia, bowel/bladder incontinence.    Today, 7/3/2025, patient presents to point follow-up management ongoing chronic thoracic back pain.  She states that she was unable to make her MRI imaging but states that she did have an episode where she moved and felt a pop in her back which before this is led to compression fractures in her thoracic spine.  She states that she has some worsening lower thoracic and upper lumbar pain

## 2025-07-06 RX ORDER — OXYCODONE AND ACETAMINOPHEN 5; 325 MG/1; MG/1
1 TABLET ORAL 2 TIMES DAILY PRN
Qty: 60 TABLET | Refills: 0 | Status: SHIPPED | OUTPATIENT
Start: 2025-07-06 | End: 2025-08-05

## 2025-07-14 ENCOUNTER — PATIENT MESSAGE (OUTPATIENT)
Age: 65
End: 2025-07-14

## 2025-07-14 DIAGNOSIS — Z51.81 MEDICATION MONITORING ENCOUNTER: Primary | ICD-10-CM

## 2025-07-16 LAB
A/G RATIO: 1.6 RATIO (ref 0.8–2.6)
ALBUMIN: 3.7 G/DL (ref 3.5–5.2)
ALP BLD-CCNC: 76 U/L (ref 23–144)
ALT SERPL-CCNC: 18 U/L (ref 0–60)
AST SERPL-CCNC: 21 U/L (ref 0–55)
BASOPHILS ABSOLUTE: 0.1 K/UL (ref 0–0.3)
BASOPHILS RELATIVE PERCENT: 1.2 % (ref 0–2)
BILIRUB SERPL-MCNC: 0.2 MG/DL (ref 0–1.2)
BUN / CREAT RATIO: 12 (ref 7–25)
BUN BLDV-MCNC: 11 MG/DL (ref 3–29)
C REACTIVE PROTEIN (CRP), BODY FLUID: 0.3 MG/DL
CALCIUM SERPL-MCNC: 9 MG/DL (ref 8.5–10.5)
CHLORIDE BLD-SCNC: 110 MEQ/L (ref 96–110)
CO2: 21 MEQ/L (ref 19–32)
CREAT SERPL-MCNC: 0.9 MG/DL (ref 0.5–1.2)
DIFFERENTIAL COUNT: ABNORMAL
EOSINOPHILS ABSOLUTE: 0.2 K/UL (ref 0–0.5)
EOSINOPHILS RELATIVE PERCENT: 3.6 % (ref 0–5)
ESTIMATED GLOMERULAR FILTRATION RATE CREATININE EQUATION: 72 MLS/MIN/1.73M2
FASTING STATUS: ABNORMAL
GLOBULIN: 2.3 G/DL (ref 1.9–3.6)
GLUCOSE BLD-MCNC: 70 MG/DL (ref 70–99)
HCT VFR BLD CALC: 38.9 % (ref 34–49)
HEMOGLOBIN: 12.6 G/DL (ref 11.2–15.7)
IMMATURE GRANS (ABS): 0 K/UL (ref 0–0.1)
IMMATURE GRANULOCYTES %: 0.2 %
LYMPHOCYTES ABSOLUTE: 2.6 K/UL (ref 0.9–4.1)
LYMPHOCYTES RELATIVE PERCENT: 45.5 % (ref 14–51)
MCH RBC QN AUTO: 29.9 PG (ref 26–34)
MCHC RBC AUTO-ENTMCNC: 32.4 G/DL (ref 30.7–35.5)
MCV RBC AUTO: 92.2 FL (ref 80–100)
MONOCYTES ABSOLUTE: 0.4 K/UL (ref 0.2–1)
MONOCYTES RELATIVE PERCENT: 6.4 % (ref 4–12)
NEUTROPHILS ABSOLUTE: 2.5 K/UL (ref 1.8–7.5)
NEUTROPHILS RELATIVE PERCENT: 43.1 % (ref 42–80)
PDW BLD-RTO: 12.8 %
PLATELET # BLD: 232 K/UL (ref 140–400)
PMV BLD AUTO: 12.4 FL (ref 7.2–11.7)
POTASSIUM SERPL-SCNC: 3.2 MEQ/L (ref 3.4–5.3)
RBC # BLD: 4.22 M/UL (ref 3.95–5.26)
RETICULOCYTE ABSOLUTE COUNT: 0 /100 WBC
SED RATE, AUTOMATED: 12 MM/HR (ref 0–30)
SODIUM BLD-SCNC: 144 MEQ/L (ref 135–148)
TOTAL PROTEIN: 6 G/DL (ref 6–8.3)
WBC # BLD: 5.8 K/UL (ref 3.5–10.9)

## 2025-07-22 RX ORDER — PROMETHAZINE HYDROCHLORIDE 25 MG/1
12.5-5 TABLET ORAL EVERY 6 HOURS PRN
Qty: 240 TABLET | Refills: 0 | Status: SHIPPED | OUTPATIENT
Start: 2025-07-22 | End: 2025-08-21

## 2025-07-23 ENCOUNTER — OFFICE VISIT (OUTPATIENT)
Age: 65
End: 2025-07-23
Payer: COMMERCIAL

## 2025-07-23 VITALS
BODY MASS INDEX: 31.49 KG/M2 | HEART RATE: 78 BPM | OXYGEN SATURATION: 97 % | DIASTOLIC BLOOD PRESSURE: 78 MMHG | SYSTOLIC BLOOD PRESSURE: 124 MMHG | HEIGHT: 62 IN | WEIGHT: 171.1 LBS

## 2025-07-23 DIAGNOSIS — M79.7 FIBROMYALGIA: ICD-10-CM

## 2025-07-23 DIAGNOSIS — M80.08XA AGE-RELATED OSTEOPOROSIS WITH CURRENT PATHOLOGICAL FRACTURE, VERTEBRA(E), INITIAL ENCOUNTER FOR FRACTURE (HCC): ICD-10-CM

## 2025-07-23 DIAGNOSIS — G89.29 CHRONIC BILATERAL LOW BACK PAIN WITHOUT SCIATICA: ICD-10-CM

## 2025-07-23 DIAGNOSIS — Z51.81 MEDICATION MONITORING ENCOUNTER: ICD-10-CM

## 2025-07-23 DIAGNOSIS — L40.50 PSORIATIC ARTHRITIS (HCC): Primary | ICD-10-CM

## 2025-07-23 DIAGNOSIS — M54.50 CHRONIC BILATERAL LOW BACK PAIN WITHOUT SCIATICA: ICD-10-CM

## 2025-07-23 PROCEDURE — G2211 COMPLEX E/M VISIT ADD ON: HCPCS | Performed by: NURSE PRACTITIONER

## 2025-07-23 PROCEDURE — 99214 OFFICE O/P EST MOD 30 MIN: CPT | Performed by: NURSE PRACTITIONER

## 2025-07-23 RX ORDER — SECUKINUMAB 150 MG/ML
150 INJECTION SUBCUTANEOUS
Qty: 1 ADJUSTABLE DOSE PRE-FILLED PEN SYRINGE | Refills: 3 | Status: ACTIVE | OUTPATIENT
Start: 2025-07-23

## 2025-07-23 RX ORDER — SECUKINUMAB 150 MG/ML
INJECTION SUBCUTANEOUS
Qty: 5 ADJUSTABLE DOSE PRE-FILLED PEN SYRINGE | Refills: 0 | Status: ACTIVE | OUTPATIENT
Start: 2025-07-23

## 2025-07-23 ASSESSMENT — ENCOUNTER SYMPTOMS
GASTROINTESTINAL NEGATIVE: 1
EYES NEGATIVE: 1
BACK PAIN: 1
SHORTNESS OF BREATH: 1

## 2025-07-23 NOTE — PROGRESS NOTES
[facility] RHEUMATOLOGY FOLLOW UP NOTE       Date Of Service: 7/23/2025  Provider: ESTEFANIA Driver - CNP    Name: Isha Ramsey   MRN: [id number]    Chief Complaint(s)     Chief Complaint   Patient presents with    Follow-up     2 month follow up PSA        History of Present Illness (HPI)     Isha Ramsey  is a(n)64 y.o. female with a hx of adrenal abnormalities, arthritis, asthma, CVA, headaches, hyperlipidemia, h/o myositis, bipolar disorder, may thurner syndrome, dry eye, fatty liver here for the f/u evaluation of h/o MCTD, inflammatory arthritis     Interval hx:    - continued tooth abscess- is going Friday to get tooth pulled. Still on antibiotics  Has not started the cosentyx yet due to this.    - currently on abx for UTI    - moved to a different nursing facility    - scheduled for xray thoracic and lumbar spine and then MRI lumbar spine per pain management     pain affecting the fingers, neck, low back, elbows, right hip  Pain on a scale 0-10: 7/10  Type of pain: constant aching, stiffness  Timing: mornings  Aggravating factors: weather changes, back: getting up from seated position, walking long distances  Alleviating factors: tylenol hands    Associated symptoms:  + swelling/  Redness/ warmth (fingers), + AM stiffness lasting several hours      REVIEW OF SYSTEMS: (ROS)    Review of Systems   Constitutional: Negative.    HENT: Negative.     Eyes: Negative.    Respiratory:  Positive for shortness of breath (w/ exertion).    Cardiovascular: Negative.    Gastrointestinal: Negative.    Endocrine: Negative.    Genitourinary: Negative.    Musculoskeletal:  Positive for arthralgias, back pain and neck pain.   Skin: Negative.    Neurological: Negative.    Psychiatric/Behavioral: Negative.         PAST MEDICAL HISTORY      Past Medical History:   Diagnosis Date    Abnormal ECG O6/25/2024    Adrenal abnormality     Allergic rhinitis     Angina at rest     MICROVASCULAR CARDIAC DISEASE    Anxiety

## 2025-07-27 ASSESSMENT — PATIENT HEALTH QUESTIONNAIRE - PHQ9
7. TROUBLE CONCENTRATING ON THINGS, SUCH AS READING THE NEWSPAPER OR WATCHING TELEVISION: NOT AT ALL
6. FEELING BAD ABOUT YOURSELF - OR THAT YOU ARE A FAILURE OR HAVE LET YOURSELF OR YOUR FAMILY DOWN: NOT AT ALL
2. FEELING DOWN, DEPRESSED OR HOPELESS: NOT AT ALL
10. IF YOU CHECKED OFF ANY PROBLEMS, HOW DIFFICULT HAVE THESE PROBLEMS MADE IT FOR YOU TO DO YOUR WORK, TAKE CARE OF THINGS AT HOME, OR GET ALONG WITH OTHER PEOPLE: SOMEWHAT DIFFICULT
1. LITTLE INTEREST OR PLEASURE IN DOING THINGS: NOT AT ALL
8. MOVING OR SPEAKING SO SLOWLY THAT OTHER PEOPLE COULD HAVE NOTICED. OR THE OPPOSITE - BEING SO FIDGETY OR RESTLESS THAT YOU HAVE BEEN MOVING AROUND A LOT MORE THAN USUAL: NOT AT ALL
SUM OF ALL RESPONSES TO PHQ QUESTIONS 1-9: 3
5. POOR APPETITE OR OVEREATING: SEVERAL DAYS
9. THOUGHTS THAT YOU WOULD BE BETTER OFF DEAD, OR OF HURTING YOURSELF: NOT AT ALL
6. FEELING BAD ABOUT YOURSELF - OR THAT YOU ARE A FAILURE OR HAVE LET YOURSELF OR YOUR FAMILY DOWN: NOT AT ALL
3. TROUBLE FALLING OR STAYING ASLEEP: NOT AT ALL
SUM OF ALL RESPONSES TO PHQ QUESTIONS 1-9: 3
4. FEELING TIRED OR HAVING LITTLE ENERGY: MORE THAN HALF THE DAYS
7. TROUBLE CONCENTRATING ON THINGS, SUCH AS READING THE NEWSPAPER OR WATCHING TELEVISION: NOT AT ALL
2. FEELING DOWN, DEPRESSED OR HOPELESS: NOT AT ALL
SUM OF ALL RESPONSES TO PHQ QUESTIONS 1-9: 3
SUM OF ALL RESPONSES TO PHQ QUESTIONS 1-9: 3
4. FEELING TIRED OR HAVING LITTLE ENERGY: MORE THAN HALF THE DAYS
1. LITTLE INTEREST OR PLEASURE IN DOING THINGS: NOT AT ALL
SUM OF ALL RESPONSES TO PHQ QUESTIONS 1-9: 3
8. MOVING OR SPEAKING SO SLOWLY THAT OTHER PEOPLE COULD HAVE NOTICED. OR THE OPPOSITE, BEING SO FIGETY OR RESTLESS THAT YOU HAVE BEEN MOVING AROUND A LOT MORE THAN USUAL: NOT AT ALL
10. IF YOU CHECKED OFF ANY PROBLEMS, HOW DIFFICULT HAVE THESE PROBLEMS MADE IT FOR YOU TO DO YOUR WORK, TAKE CARE OF THINGS AT HOME, OR GET ALONG WITH OTHER PEOPLE: SOMEWHAT DIFFICULT
3. TROUBLE FALLING OR STAYING ASLEEP: NOT AT ALL
9. THOUGHTS THAT YOU WOULD BE BETTER OFF DEAD, OR OF HURTING YOURSELF: NOT AT ALL
5. POOR APPETITE OR OVEREATING: SEVERAL DAYS

## 2025-07-27 ASSESSMENT — ANXIETY QUESTIONNAIRES
IF YOU CHECKED OFF ANY PROBLEMS ON THIS QUESTIONNAIRE, HOW DIFFICULT HAVE THESE PROBLEMS MADE IT FOR YOU TO DO YOUR WORK, TAKE CARE OF THINGS AT HOME, OR GET ALONG WITH OTHER PEOPLE: SOMEWHAT DIFFICULT
1. FEELING NERVOUS, ANXIOUS, OR ON EDGE: SEVERAL DAYS
2. NOT BEING ABLE TO STOP OR CONTROL WORRYING: SEVERAL DAYS
1. FEELING NERVOUS, ANXIOUS, OR ON EDGE: SEVERAL DAYS
3. WORRYING TOO MUCH ABOUT DIFFERENT THINGS: SEVERAL DAYS
5. BEING SO RESTLESS THAT IT IS HARD TO SIT STILL: NOT AT ALL
2. NOT BEING ABLE TO STOP OR CONTROL WORRYING: SEVERAL DAYS
GAD7 TOTAL SCORE: 5
7. FEELING AFRAID AS IF SOMETHING AWFUL MIGHT HAPPEN: SEVERAL DAYS
3. WORRYING TOO MUCH ABOUT DIFFERENT THINGS: SEVERAL DAYS
5. BEING SO RESTLESS THAT IT IS HARD TO SIT STILL: NOT AT ALL
IF YOU CHECKED OFF ANY PROBLEMS ON THIS QUESTIONNAIRE, HOW DIFFICULT HAVE THESE PROBLEMS MADE IT FOR YOU TO DO YOUR WORK, TAKE CARE OF THINGS AT HOME, OR GET ALONG WITH OTHER PEOPLE: SOMEWHAT DIFFICULT
6. BECOMING EASILY ANNOYED OR IRRITABLE: NOT AT ALL
4. TROUBLE RELAXING: SEVERAL DAYS
7. FEELING AFRAID AS IF SOMETHING AWFUL MIGHT HAPPEN: SEVERAL DAYS
6. BECOMING EASILY ANNOYED OR IRRITABLE: NOT AT ALL
4. TROUBLE RELAXING: SEVERAL DAYS

## 2025-07-28 ENCOUNTER — HOSPITAL ENCOUNTER (OUTPATIENT)
Dept: WOMENS IMAGING | Age: 65
Discharge: HOME | End: 2025-07-28
Payer: COMMERCIAL

## 2025-07-28 ENCOUNTER — TELEPHONE (OUTPATIENT)
Age: 65
End: 2025-07-28

## 2025-07-28 DIAGNOSIS — Z12.31 VISIT FOR SCREENING MAMMOGRAM: ICD-10-CM

## 2025-07-28 PROCEDURE — 77063 BREAST TOMOSYNTHESIS BI: CPT

## 2025-07-28 NOTE — TELEPHONE ENCOUNTER
Spoke with [facility] and clarified Cosentyx induction and loading dose. Verbal script given for loading dose as pharmacy did not receive script for the loading dose.

## 2025-07-29 ENCOUNTER — TELEMEDICINE (OUTPATIENT)
Dept: PSYCHIATRY | Age: 65
End: 2025-07-29
Payer: COMMERCIAL

## 2025-07-29 DIAGNOSIS — F43.10 PTSD (POST-TRAUMATIC STRESS DISORDER): ICD-10-CM

## 2025-07-29 DIAGNOSIS — F41.9 ANXIETY: ICD-10-CM

## 2025-07-29 DIAGNOSIS — F33.42 RECURRENT MAJOR DEPRESSIVE DISORDER, IN FULL REMISSION: Primary | ICD-10-CM

## 2025-07-29 PROCEDURE — 99214 OFFICE O/P EST MOD 30 MIN: CPT | Performed by: PSYCHIATRY & NEUROLOGY

## 2025-07-30 ENCOUNTER — PATIENT MESSAGE (OUTPATIENT)
Dept: PHYSICAL MEDICINE AND REHAB | Age: 65
End: 2025-07-30

## 2025-07-31 DIAGNOSIS — S22.000A COMPRESSION FRACTURE OF BODY OF THORACIC VERTEBRA (HCC): ICD-10-CM

## 2025-07-31 NOTE — TELEPHONE ENCOUNTER
[facility] reviewed. UDS: + for  fentanyl, bupropion, butalbital, prozac, oxycodone,tramadol, topiramate. Negative tizanidine.   Last seen: 7/3/2025. Follow-up:   Future Appointments   Date Time Provider Department Center   8/4/2025 11:30 AM STR MRI RM1 [facility] MRI STR Rad/Card   8/7/2025  2:40 PM Beba Sorenson APRN - CNP N [facility] [place]   8/26/2025  3:30 PM Sangita Malik MD N [facility] PSYCH [facility] - [place]   9/16/2025 11:30 AM Fredis Martinez MD N [facility] [place]   9/24/2025 11:20 AM Liat Hallman APRN - CNP [facility] RHEUM [facility] - [place]   11/25/2025 11:00 AM Lisa Thorne DO [facility]

## 2025-08-02 RX ORDER — FENTANYL 25 UG/1
1 PATCH TRANSDERMAL
Qty: 10 PATCH | Refills: 0 | Status: SHIPPED | OUTPATIENT
Start: 2025-08-02 | End: 2025-09-01

## 2025-08-04 ENCOUNTER — HOSPITAL ENCOUNTER (OUTPATIENT)
Dept: GENERAL RADIOLOGY | Age: 65
Discharge: HOME | End: 2025-08-04
Payer: COMMERCIAL

## 2025-08-04 ENCOUNTER — HOSPITAL ENCOUNTER (OUTPATIENT)
Dept: MRI IMAGING | Age: 65
Discharge: HOME | End: 2025-08-04
Payer: COMMERCIAL

## 2025-08-04 ENCOUNTER — TELEPHONE (OUTPATIENT)
Dept: PSYCHIATRY | Age: 65
End: 2025-08-04

## 2025-08-04 DIAGNOSIS — S22.000A COMPRESSION FRACTURE OF BODY OF THORACIC VERTEBRA (HCC): ICD-10-CM

## 2025-08-04 DIAGNOSIS — Z87.81 HX OF COMPRESSION FRACTURE OF SPINE: ICD-10-CM

## 2025-08-04 DIAGNOSIS — M54.50 LUMBAR SPINE PAIN: ICD-10-CM

## 2025-08-04 DIAGNOSIS — M54.6 ACUTE LEFT-SIDED THORACIC BACK PAIN: ICD-10-CM

## 2025-08-04 DIAGNOSIS — M47.816 LUMBAR SPONDYLOSIS: ICD-10-CM

## 2025-08-04 DIAGNOSIS — M54.17 RADICULOPATHY, LUMBOSACRAL REGION: ICD-10-CM

## 2025-08-04 PROCEDURE — 72072 X-RAY EXAM THORAC SPINE 3VWS: CPT

## 2025-08-04 PROCEDURE — 72148 MRI LUMBAR SPINE W/O DYE: CPT

## 2025-08-04 PROCEDURE — 72100 X-RAY EXAM L-S SPINE 2/3 VWS: CPT

## 2025-08-07 ENCOUNTER — TELEPHONE (OUTPATIENT)
Dept: PHYSICAL MEDICINE AND REHAB | Age: 65
End: 2025-08-07

## 2025-08-07 ENCOUNTER — OFFICE VISIT (OUTPATIENT)
Dept: PHYSICAL MEDICINE AND REHAB | Age: 65
End: 2025-08-07
Payer: COMMERCIAL

## 2025-08-07 VITALS
HEIGHT: 62 IN | SYSTOLIC BLOOD PRESSURE: 88 MMHG | BODY MASS INDEX: 31.47 KG/M2 | WEIGHT: 171 LBS | DIASTOLIC BLOOD PRESSURE: 72 MMHG

## 2025-08-07 DIAGNOSIS — Z79.891 ENCOUNTER FOR MONITORING OPIOID MAINTENANCE THERAPY: ICD-10-CM

## 2025-08-07 DIAGNOSIS — Z87.81 HX OF COMPRESSION FRACTURE OF SPINE: ICD-10-CM

## 2025-08-07 DIAGNOSIS — Z51.81 ENCOUNTER FOR MONITORING OPIOID MAINTENANCE THERAPY: ICD-10-CM

## 2025-08-07 DIAGNOSIS — M54.17 RADICULOPATHY, LUMBOSACRAL REGION: Primary | ICD-10-CM

## 2025-08-07 DIAGNOSIS — M47.816 LUMBAR SPONDYLOSIS: ICD-10-CM

## 2025-08-07 DIAGNOSIS — M47.814 THORACIC SPONDYLOSIS: ICD-10-CM

## 2025-08-07 DIAGNOSIS — G89.29 OTHER CHRONIC PAIN: ICD-10-CM

## 2025-08-07 PROCEDURE — 99214 OFFICE O/P EST MOD 30 MIN: CPT | Performed by: NURSE PRACTITIONER

## 2025-08-07 RX ORDER — OXYCODONE AND ACETAMINOPHEN 5; 325 MG/1; MG/1
1 TABLET ORAL 2 TIMES DAILY PRN
Qty: 60 TABLET | Refills: 0 | Status: SHIPPED | OUTPATIENT
Start: 2025-08-07 | End: 2025-09-06

## 2025-08-07 RX ORDER — LIDOCAINE 50 MG/G
1 PATCH TOPICAL DAILY
Qty: 30 PATCH | Refills: 5 | Status: SHIPPED | OUTPATIENT
Start: 2025-08-07

## 2025-08-07 RX ORDER — BUTALBITAL, ACETAMINOPHEN AND CAFFEINE 50; 325; 40 MG/1; MG/1; MG/1
1 TABLET ORAL EVERY 6 HOURS PRN
Qty: 120 TABLET | Refills: 5 | Status: SHIPPED | OUTPATIENT
Start: 2025-08-07 | End: 2026-02-03

## 2025-08-13 ENCOUNTER — TELEPHONE (OUTPATIENT)
Dept: PHYSICAL MEDICINE AND REHAB | Age: 65
End: 2025-08-13

## 2025-08-14 ENCOUNTER — TELEPHONE (OUTPATIENT)
Dept: CARDIOLOGY CLINIC | Age: 65
End: 2025-08-14

## 2025-08-21 LAB
BACTERIA, URINE: ABNORMAL /HPF
BILIRUBIN, URINE: NEGATIVE MG/DL
BILIRUBIN, URINE: NEGATIVE MG/DL
CLARITY, UA: CLEAR
CLARITY, UA: CLEAR
COLOR, UA: YELLOW
COLOR, UA: YELLOW
EPITHELIAL CELLS, UA: ABNORMAL /HPF (ref 0–5)
EPITHELIAL CELLS, UA: ABNORMAL /HPF (ref 0–5)
ERYTHROCYTES URINE: ABNORMAL /HPF (ref 0–2)
GLUCOSE URINE: NEGATIVE MG/DL
GLUCOSE URINE: NEGATIVE MG/DL
KETONES, URINE: NEGATIVE MG/DL
KETONES, URINE: NEGATIVE MG/DL
LEUKOCYTE ESTERASE, URINE: ABNORMAL
LEUKOCYTE ESTERASE, URINE: ABNORMAL
LEUKOCYTES, UA: ABNORMAL /HPF (ref 0–5)
MUCUS: PRESENT
NITRITE, URINE: NEGATIVE
NITRITE, URINE: NEGATIVE
PH, URINE: 6.5 (ref 4.5–8)
PH, URINE: 6.5 (ref 4.5–8)
PROTEIN, URINE: NEGATIVE MG/DL
PROTEIN, URINE: NEGATIVE MG/DL
SPECIFIC GRAVITY UA: 1.01 (ref 1–1.03)
SPECIFIC GRAVITY UA: 1.01 (ref 1–1.03)
URINE HGB: NEGATIVE MG/DL
URINE HGB: NEGATIVE MG/DL
UROBILINOGEN, URINE: <2 MG/DL (ref 0–2)
UROBILINOGEN, URINE: <2 MG/DL (ref 0–2)

## 2025-08-23 ASSESSMENT — PATIENT HEALTH QUESTIONNAIRE - PHQ9
4. FEELING TIRED OR HAVING LITTLE ENERGY: MORE THAN HALF THE DAYS
8. MOVING OR SPEAKING SO SLOWLY THAT OTHER PEOPLE COULD HAVE NOTICED. OR THE OPPOSITE - BEING SO FIDGETY OR RESTLESS THAT YOU HAVE BEEN MOVING AROUND A LOT MORE THAN USUAL: NOT AT ALL
9. THOUGHTS THAT YOU WOULD BE BETTER OFF DEAD, OR OF HURTING YOURSELF: NOT AT ALL
SUM OF ALL RESPONSES TO PHQ QUESTIONS 1-9: 4
4. FEELING TIRED OR HAVING LITTLE ENERGY: MORE THAN HALF THE DAYS
10. IF YOU CHECKED OFF ANY PROBLEMS, HOW DIFFICULT HAVE THESE PROBLEMS MADE IT FOR YOU TO DO YOUR WORK, TAKE CARE OF THINGS AT HOME, OR GET ALONG WITH OTHER PEOPLE: NOT DIFFICULT AT ALL
3. TROUBLE FALLING OR STAYING ASLEEP: NOT AT ALL
5. POOR APPETITE OR OVEREATING: SEVERAL DAYS
3. TROUBLE FALLING OR STAYING ASLEEP: NOT AT ALL
SUM OF ALL RESPONSES TO PHQ QUESTIONS 1-9: 4
1. LITTLE INTEREST OR PLEASURE IN DOING THINGS: SEVERAL DAYS
SUM OF ALL RESPONSES TO PHQ QUESTIONS 1-9: 4
6. FEELING BAD ABOUT YOURSELF - OR THAT YOU ARE A FAILURE OR HAVE LET YOURSELF OR YOUR FAMILY DOWN: NOT AT ALL
10. IF YOU CHECKED OFF ANY PROBLEMS, HOW DIFFICULT HAVE THESE PROBLEMS MADE IT FOR YOU TO DO YOUR WORK, TAKE CARE OF THINGS AT HOME, OR GET ALONG WITH OTHER PEOPLE: NOT DIFFICULT AT ALL
5. POOR APPETITE OR OVEREATING: SEVERAL DAYS
2. FEELING DOWN, DEPRESSED OR HOPELESS: NOT AT ALL
7. TROUBLE CONCENTRATING ON THINGS, SUCH AS READING THE NEWSPAPER OR WATCHING TELEVISION: NOT AT ALL
9. THOUGHTS THAT YOU WOULD BE BETTER OFF DEAD, OR OF HURTING YOURSELF: NOT AT ALL
1. LITTLE INTEREST OR PLEASURE IN DOING THINGS: SEVERAL DAYS
2. FEELING DOWN, DEPRESSED OR HOPELESS: NOT AT ALL
8. MOVING OR SPEAKING SO SLOWLY THAT OTHER PEOPLE COULD HAVE NOTICED. OR THE OPPOSITE, BEING SO FIGETY OR RESTLESS THAT YOU HAVE BEEN MOVING AROUND A LOT MORE THAN USUAL: NOT AT ALL
SUM OF ALL RESPONSES TO PHQ QUESTIONS 1-9: 4
SUM OF ALL RESPONSES TO PHQ QUESTIONS 1-9: 4
6. FEELING BAD ABOUT YOURSELF - OR THAT YOU ARE A FAILURE OR HAVE LET YOURSELF OR YOUR FAMILY DOWN: NOT AT ALL
7. TROUBLE CONCENTRATING ON THINGS, SUCH AS READING THE NEWSPAPER OR WATCHING TELEVISION: NOT AT ALL

## 2025-08-23 ASSESSMENT — ANXIETY QUESTIONNAIRES
IF YOU CHECKED OFF ANY PROBLEMS ON THIS QUESTIONNAIRE, HOW DIFFICULT HAVE THESE PROBLEMS MADE IT FOR YOU TO DO YOUR WORK, TAKE CARE OF THINGS AT HOME, OR GET ALONG WITH OTHER PEOPLE: NOT DIFFICULT AT ALL
3. WORRYING TOO MUCH ABOUT DIFFERENT THINGS: NOT AT ALL
3. WORRYING TOO MUCH ABOUT DIFFERENT THINGS: NOT AT ALL
6. BECOMING EASILY ANNOYED OR IRRITABLE: NOT AT ALL
1. FEELING NERVOUS, ANXIOUS, OR ON EDGE: NOT AT ALL
4. TROUBLE RELAXING: NOT AT ALL
4. TROUBLE RELAXING: NOT AT ALL
1. FEELING NERVOUS, ANXIOUS, OR ON EDGE: NOT AT ALL
IF YOU CHECKED OFF ANY PROBLEMS ON THIS QUESTIONNAIRE, HOW DIFFICULT HAVE THESE PROBLEMS MADE IT FOR YOU TO DO YOUR WORK, TAKE CARE OF THINGS AT HOME, OR GET ALONG WITH OTHER PEOPLE: NOT DIFFICULT AT ALL
6. BECOMING EASILY ANNOYED OR IRRITABLE: NOT AT ALL
7. FEELING AFRAID AS IF SOMETHING AWFUL MIGHT HAPPEN: NOT AT ALL
7. FEELING AFRAID AS IF SOMETHING AWFUL MIGHT HAPPEN: NOT AT ALL
2. NOT BEING ABLE TO STOP OR CONTROL WORRYING: NOT AT ALL
2. NOT BEING ABLE TO STOP OR CONTROL WORRYING: NOT AT ALL

## 2025-08-26 ENCOUNTER — TELEMEDICINE (OUTPATIENT)
Dept: PSYCHIATRY | Age: 65
End: 2025-08-26
Payer: COMMERCIAL

## 2025-08-26 DIAGNOSIS — F41.9 ANXIETY: ICD-10-CM

## 2025-08-26 DIAGNOSIS — F43.10 PTSD (POST-TRAUMATIC STRESS DISORDER): ICD-10-CM

## 2025-08-26 DIAGNOSIS — F33.42 RECURRENT MAJOR DEPRESSIVE DISORDER, IN FULL REMISSION: Primary | ICD-10-CM

## 2025-08-26 PROCEDURE — 90833 PSYTX W PT W E/M 30 MIN: CPT | Performed by: PSYCHIATRY & NEUROLOGY

## 2025-08-26 PROCEDURE — 99213 OFFICE O/P EST LOW 20 MIN: CPT | Performed by: PSYCHIATRY & NEUROLOGY

## 2025-09-02 DIAGNOSIS — S22.000A COMPRESSION FRACTURE OF BODY OF THORACIC VERTEBRA (HCC): ICD-10-CM

## 2025-09-03 RX ORDER — FENTANYL 25 UG/1
1 PATCH TRANSDERMAL
Qty: 10 PATCH | Refills: 0 | Status: SHIPPED | OUTPATIENT
Start: 2025-09-03 | End: 2025-10-03

## (undated) DEVICE — NEEDLE HYPO 18GA L1.5IN THN WALL PIVOTING SHLD BVL ORIENTED

## (undated) DEVICE — MARKER,SKIN,WI/RULER AND LABELS: Brand: MEDLINE

## (undated) DEVICE — COVERS JACKSON TABLE ULTRA COMFORT MED

## (undated) DEVICE — SUTURE VICRYL + SZ 4-0 L18IN ABSRB WHT TIE POLYGLACTIN 910 VCP109G

## (undated) DEVICE — CONMED DISPOSABLE BIPOLAR CABLE, 10' (3.05M): Brand: CONMED

## (undated) DEVICE — COUNTER NDL 10 COUNT HLD 20 FOAM BLK SGL MAG

## (undated) DEVICE — GLOVE BIOGEL POWDER FREE SZ 8

## (undated) DEVICE — APPLICATOR MEDICATED 26 CC SOLUTION CLR STRL CHLORAPREP

## (undated) DEVICE — BONE CEMENT CT01B KYPHON VUE W MIXER: Brand: KYPHON VUE BONE CEMENT AND KYPHON MIXER

## (undated) DEVICE — MASTISOL ADHESIVE LIQ 2/3ML

## (undated) DEVICE — 6 ML SYRINGE LUER-LOCK TIP: Brand: MONOJECT

## (undated) DEVICE — APPLICATOR MEDICATED 3 CC SOLUTION CLR STRL CHLORAPREP

## (undated) DEVICE — TUBING, SUCTION, 1/4" X 12', STRAIGHT: Brand: MEDLINE

## (undated) DEVICE — TOWEL,OR,DSP,ST,BLUE,STD,4/PK,20PK/CS: Brand: MEDLINE

## (undated) DEVICE — SYRINGE MED 10ML TRNSLUC BRL PLUNG BLK MRK POLYPR CTRL

## (undated) DEVICE — BLADE 1884002HRE M4 SERR 4MM ROTATE: Brand: STRAIGHTSHOT

## (undated) DEVICE — SPONGE,PEANUT,XRAY,ST,SM,3/8",5/CARD: Brand: MEDLINE INDUSTRIES, INC.

## (undated) DEVICE — STRIP,CLOSURE,WOUND,MEDI-STRIP,1/2X4: Brand: MEDLINE

## (undated) DEVICE — PROBE 8225101 5PK STD PRASS FL TIP ROHS

## (undated) DEVICE — GAUZE SPONGES,USP TYPE VII GAUZE, 12 PLY: Brand: CURITY

## (undated) DEVICE — CONTAINER SPEC 4 OZ

## (undated) DEVICE — 3 ML SYRINGE LUER-LOCK TIP: Brand: MONOJECT

## (undated) DEVICE — DUAL LUMEN STOMACH TUBE: Brand: SALEM SUMP

## (undated) DEVICE — SYRINGE MED 10ML LUERLOCK TIP W/O SFTY DISP

## (undated) DEVICE — DRAPE,INSTRUMENT,MAGNETIC,10X16: Brand: MEDLINE

## (undated) DEVICE — HYPODERMIC SAFETY NEEDLE: Brand: MAGELLAN

## (undated) DEVICE — TURBINATOR WAND: Brand: COBLATION

## (undated) DEVICE — GOWN,SIRUS,NON REINFRCD,LARGE,SET IN SL: Brand: MEDLINE

## (undated) DEVICE — BASIC SINGLE BASIN BTC-LF: Brand: MEDLINE INDUSTRIES, INC.

## (undated) DEVICE — SURE SET SINGLE BASIN-LF: Brand: MEDLINE INDUSTRIES, INC.

## (undated) DEVICE — GLOVE ORANGE PI 8   MSG9080

## (undated) DEVICE — SOLUTION PREP PAINT POV IOD FOR SKIN MUCOUS MEM

## (undated) DEVICE — MICRO TIP WIPE: Brand: DEVON

## (undated) DEVICE — MONOJECT MAGELLAN 1 ML TUBERCULIN SAFETY SYRINGE PERMANENT NEEDLE 27G X1/2 IN. (0.4 X 13 MM): Brand: MONOJECT

## (undated) DEVICE — GAUZE,SPONGE,8"X4",12PLY,XRAY,STRL,LF: Brand: MEDLINE

## (undated) DEVICE — SUTURE NONABSORBABLE 3-0 12X18 IN PRE-CUT VICRYL VIO SUTUPAK VCP104G

## (undated) DEVICE — 1010 S-DRAPE TOWEL DRAPE 10/BX: Brand: STERI-DRAPE™

## (undated) DEVICE — SUTURE VICRYL + SZ 3-0 L27IN ABSRB UD L26MM SH 1/2 CIR VCP416H

## (undated) DEVICE — AGENT HEMOSTATIC SURGIFLOW MATRIX KIT W/THROMBIN

## (undated) DEVICE — Device

## (undated) DEVICE — PREMIUM DRY TRAY LF: Brand: MEDLINE INDUSTRIES, INC.

## (undated) DEVICE — GLOVE ORANGE PI 7 1/2   MSG9075

## (undated) DEVICE — DISSECTOR ENDOSCP L21CM TIP CURVATURE 40DEG FN CRV JAW VES

## (undated) DEVICE — 1840 FOAM BLOCK NEEDLE COUNTER: Brand: DEVON

## (undated) DEVICE — SYRINGE MED 3ML CLR PLAS STD N CTRL LUERLOCK TIP DISP

## (undated) DEVICE — SOLUTION IV 1000ML 0.9% SOD CHL PH 5 INJ USP VIAFLX PLAS

## (undated) DEVICE — APPLICATOR COTTONTIP 6IN NS 1000 CA

## (undated) DEVICE — NEEDLE SPNL 22GA L3.5IN BLK HUB S STL REG WALL FIT STYL W/

## (undated) DEVICE — INTENDED FOR TISSUE SEPARATION, AND OTHER PROCEDURES THAT REQUIRE A SHARP SURGICAL BLADE TO PUNCTURE OR CUT.: Brand: BARD-PARKER ® CARBON RIB-BACK BLADES

## (undated) DEVICE — BLADE ES ELASTOMERIC COAT INSUL DURABLE BEND UPTO 90DEG

## (undated) DEVICE — LIQUIBAND RAPID ADHESIVE 36/CS 0.8ML: Brand: MEDLINE

## (undated) DEVICE — GOWN,SIRUS,NONRNF,SETINSLV,XL,20/CS: Brand: MEDLINE

## (undated) DEVICE — COUNTER NDL 40 COUNT HLD 70 FOAM BLK ADH W/ MAG

## (undated) DEVICE — KYPHOPLASTY: Brand: MEDLINE INDUSTRIES, INC.

## (undated) DEVICE — NEEDLE HYPO 27GA L15IN REG BVL W O SFTY FOR SYR DISPOSABLE

## (undated) DEVICE — SPONGE DRN W4XL4IN RAYON/POLYESTER 6 PLY NONWOVEN PRECUT 2 PER PK

## (undated) DEVICE — DRAPE,EENT,SPLIT,STERILE: Brand: MEDLINE

## (undated) DEVICE — TUBING 1895522 5PK STRAIGHTSHOT TO XPS: Brand: STRAIGHTSHOT®

## (undated) DEVICE — PAD,NON-ADHERENT,3X8,STERILE,LF,1/PK: Brand: MEDLINE

## (undated) DEVICE — KIT KEX152EB-CDS- 15/2 FF WITH CDS: Brand: KYPHPAK® FIRST FRACTURE TRAY

## (undated) DEVICE — KIT,ANTI FOG,W/SPONGE & FLUID,SOFT PACK: Brand: MEDLINE

## (undated) DEVICE — CLIP LIG M TI 6 SIL HNDL FOR OPN AND ENDOSCP SGL APPL

## (undated) DEVICE — SUTURE SILK 2-0 CP-1 30IN N ABSRB BRAID BLK 443H

## (undated) DEVICE — TOWEL,OR,DSP,ST,BLUE,DLX,4/PK,20PK/CS: Brand: MEDLINE

## (undated) DEVICE — PACK,SET UP,NO DRAPES: Brand: MEDLINE

## (undated) DEVICE — GLOVE SURG SZ 75 L12IN FNGR THK94MIL BRN BISQUE LTX POLYMER

## (undated) DEVICE — NEEDLE HYPO 27GA L1.25IN GRY POLYPR HUB S STL REG BVL STR

## (undated) DEVICE — SUTURE VICRYL SZ 5-0 L18IN ABSRB UD L13MM P-3 3/8 CIR PRIM J493G

## (undated) DEVICE — PACK-MAJOR

## (undated) DEVICE — CLIP LIG SM TI 6 BLU HNDL FOR OPN AND ENDOSCP SGL APPL

## (undated) DEVICE — SYRINGE MEDICAL 3ML CLEAR PLASTIC STANDARD NON CONTROL LUERLOCK TIP DISPOSABLE

## (undated) DEVICE — SYRINGE IRRIG 60ML SFT PLIABLE BLB EZ TO GRP 1 HND USE W/

## (undated) DEVICE — SOLUTION SCRB 4OZ 7.5% POVIDONE IOD ANTIMIC BTL

## (undated) DEVICE — NEEDLE SPINAL 22GA L3.5IN SPINOCAN

## (undated) DEVICE — BANDAGE ADH W1XL3IN NAT FAB WVN FLX DURABLE N ADH PD SEAL

## (undated) DEVICE — SUTURE PLN GUT SZ 4-0 L18IN ABSRB YELLOWISH TAN L13MM SC-1 1828H

## (undated) DEVICE — GAUZE,SPONGE,FLUFF,4"X4",12PLY,ST,10/TR: Brand: MEDLINE

## (undated) DEVICE — CODMAN® SURGICAL PATTIES 1/2" X 3" (1.27CM X 7.62CM): Brand: CODMAN®

## (undated) DEVICE — BONE BIOPSY DEVICE F07A TAPERED SIZE 2: Brand: MEDTRONIC REUSABLE INSTRUMENTS

## (undated) DEVICE — PENCIL SMK EVAC ALL IN 1 DSGN ENH VISIBILITY IMPROVED AIR

## (undated) DEVICE — AGENT HEMSTAT 3GM PURIFIED PLNT STARCH PWD ABSRB ARISTA AH

## (undated) DEVICE — CORD,CAUTERY,BIPOLAR,STERILE: Brand: MEDLINE

## (undated) DEVICE — 3M™ STERI-DRAPE™ INSTRUMENT POUCH 1018: Brand: STERI-DRAPE™

## (undated) DEVICE — EVACUATOR SURG 100CC SIL BLB SUCT RESVR FOR CLS WND DRNGE

## (undated) DEVICE — DRAIN SURG FLAT W7MMXL20CM FULL PERF

## (undated) DEVICE — 1LYRTR 16FR10ML 100%SILI SNAP: Brand: MEDLINE INDUSTRIES, INC.

## (undated) DEVICE — TELFA NON-ADHERENT ABSORBENT DRESSING: Brand: TELFA

## (undated) DEVICE — NEEDLE SPNL L3.5IN DIA25GA QNCKE TYP BVL SPINOCAN

## (undated) DEVICE — EMG TUBE 8229707 NIM TRIVANTAGE 7.0MM ID: Brand: NIM TRIVANTAGE™

## (undated) DEVICE — GLOVE SURG SZ 85 L12IN THK75MIL DK GRN LTX FREE

## (undated) DEVICE — CLEANGUIDE DISPOSABLE MARKED SPRING TIP GUIDEWIRE, 1.86 MM X 210 CM: Brand: CLEANGUIDE

## (undated) DEVICE — AGENT HEMSTAT 3GM OXIDIZED REGENERATED CELOS ABSRB FOR CONT (ORDER MULTIPLES OF 5EA)